# Patient Record
Sex: FEMALE | Race: WHITE | Employment: UNEMPLOYED | URBAN - METROPOLITAN AREA
[De-identification: names, ages, dates, MRNs, and addresses within clinical notes are randomized per-mention and may not be internally consistent; named-entity substitution may affect disease eponyms.]

---

## 2017-01-03 ENCOUNTER — HOSPITAL ENCOUNTER (OUTPATIENT)
Dept: INFUSION THERAPY | Age: 57
Discharge: HOME | End: 2017-01-03
Payer: OTHER GOVERNMENT

## 2017-01-03 VITALS
DIASTOLIC BLOOD PRESSURE: 74 MMHG | HEART RATE: 69 BPM | SYSTOLIC BLOOD PRESSURE: 112 MMHG | RESPIRATION RATE: 18 BRPM | TEMPERATURE: 97.1 F

## 2017-01-03 LAB
ALBUMIN SERPL BCP-MCNC: 3.7 G/DL (ref 3.5–5)
ALBUMIN/GLOB SERPL: 1.2 {RATIO} (ref 1.1–2.2)
ALP SERPL-CCNC: 132 U/L (ref 45–117)
ALT SERPL-CCNC: 30 U/L (ref 12–78)
ANION GAP BLD CALC-SCNC: 9 MMOL/L (ref 5–15)
AST SERPL W P-5'-P-CCNC: 24 U/L (ref 15–37)
BASOPHILS # BLD AUTO: 0 K/UL (ref 0–0.1)
BASOPHILS # BLD: 0 % (ref 0–1)
BILIRUB SERPL-MCNC: 0.3 MG/DL (ref 0.2–1)
BUN SERPL-MCNC: 16 MG/DL (ref 6–20)
BUN/CREAT SERPL: 17 (ref 12–20)
CALCIUM SERPL-MCNC: 9.3 MG/DL (ref 8.5–10.1)
CHLORIDE SERPL-SCNC: 103 MMOL/L (ref 97–108)
CO2 SERPL-SCNC: 27 MMOL/L (ref 21–32)
CREAT SERPL-MCNC: 0.94 MG/DL (ref 0.55–1.02)
EOSINOPHIL # BLD: 0 K/UL (ref 0–0.4)
EOSINOPHIL NFR BLD: 1 % (ref 0–7)
ERYTHROCYTE [DISTWIDTH] IN BLOOD BY AUTOMATED COUNT: 11.8 % (ref 11.5–14.5)
GLOBULIN SER CALC-MCNC: 3 G/DL (ref 2–4)
GLUCOSE SERPL-MCNC: 111 MG/DL (ref 65–100)
HCT VFR BLD AUTO: 32.5 % (ref 35–47)
HGB BLD-MCNC: 10.7 G/DL (ref 11.5–16)
LYMPHOCYTES # BLD AUTO: 30 % (ref 12–49)
LYMPHOCYTES # BLD: 1.7 K/UL (ref 0.8–3.5)
MCH RBC QN AUTO: 31.2 PG (ref 26–34)
MCHC RBC AUTO-ENTMCNC: 32.9 G/DL (ref 30–36.5)
MCV RBC AUTO: 94.8 FL (ref 80–99)
MONOCYTES # BLD: 0.3 K/UL (ref 0–1)
MONOCYTES NFR BLD AUTO: 4 % (ref 5–13)
NEUTS SEG # BLD: 3.8 K/UL (ref 1.8–8)
NEUTS SEG NFR BLD AUTO: 65 % (ref 32–75)
PLATELET # BLD AUTO: 200 K/UL (ref 150–400)
POTASSIUM SERPL-SCNC: 3.7 MMOL/L (ref 3.5–5.1)
PROT SERPL-MCNC: 6.7 G/DL (ref 6.4–8.2)
RBC # BLD AUTO: 3.43 M/UL (ref 3.8–5.2)
SODIUM SERPL-SCNC: 139 MMOL/L (ref 136–145)
WBC # BLD AUTO: 5.9 K/UL (ref 3.6–11)

## 2017-01-03 PROCEDURE — 74011000250 HC RX REV CODE- 250: Performed by: OBSTETRICS & GYNECOLOGY

## 2017-01-03 PROCEDURE — 36415 COLL VENOUS BLD VENIPUNCTURE: CPT | Performed by: OBSTETRICS & GYNECOLOGY

## 2017-01-03 PROCEDURE — 74011250636 HC RX REV CODE- 250/636: Performed by: OBSTETRICS & GYNECOLOGY

## 2017-01-03 PROCEDURE — 86304 IMMUNOASSAY TUMOR CA 125: CPT | Performed by: OBSTETRICS & GYNECOLOGY

## 2017-01-03 PROCEDURE — 80053 COMPREHEN METABOLIC PANEL: CPT | Performed by: OBSTETRICS & GYNECOLOGY

## 2017-01-03 PROCEDURE — 85025 COMPLETE CBC W/AUTO DIFF WBC: CPT | Performed by: OBSTETRICS & GYNECOLOGY

## 2017-01-03 PROCEDURE — 77030012965 HC NDL HUBR BBMI -A

## 2017-01-03 PROCEDURE — 36591 DRAW BLOOD OFF VENOUS DEVICE: CPT

## 2017-01-03 RX ORDER — SODIUM CHLORIDE 0.9 % (FLUSH) 0.9 %
10-40 SYRINGE (ML) INJECTION AS NEEDED
Status: ACTIVE | OUTPATIENT
Start: 2017-01-03 | End: 2017-01-04

## 2017-01-03 RX ORDER — SODIUM CHLORIDE 9 MG/ML
10 INJECTION INTRAMUSCULAR; INTRAVENOUS; SUBCUTANEOUS AS NEEDED
Status: ACTIVE | OUTPATIENT
Start: 2017-01-03 | End: 2017-01-04

## 2017-01-03 RX ORDER — HEPARIN 100 UNIT/ML
500 SYRINGE INTRAVENOUS AS NEEDED
Status: ACTIVE | OUTPATIENT
Start: 2017-01-03 | End: 2017-01-04

## 2017-01-03 RX ADMIN — Medication 500 UNITS: at 10:35

## 2017-01-03 RX ADMIN — Medication 20 ML: at 10:35

## 2017-01-03 RX ADMIN — SODIUM CHLORIDE 10 ML: 9 INJECTION, SOLUTION INTRAMUSCULAR; INTRAVENOUS; SUBCUTANEOUS at 10:35

## 2017-01-03 NOTE — PROGRESS NOTES
Outpatient Infusion Center Short Visit Progress Note    7253 Pt admit to [facility] for port flush with labs ambulatory in stable condition. Assessment completed. No new concerns voiced. Please review pending lab results in [address] [place] [address]. Visit Vitals    /74 (BP 1 Location: Right arm, BP Patient Position: Sitting)    Pulse 69    Temp 97.1 °F (36.2 °C)    Resp 18    Breastfeeding No       Port with positive blood return. Labs drawn, port flushed, heparinized and de-accessed per protocol. Medications:  Normal saline  Heparin flush    1045 Pt tolerated treatment well. D/c home ambulatory in no distress. Pt aware of next appointment scheduled for 2/28/17 at 1030. Recent Results (from the past 12 hour(s))   CBC WITH AUTOMATED DIFF    Collection Time: 01/03/17 10:34 AM   Result Value Ref Range    WBC 5.9 3.6 - 11.0 K/uL    RBC 3.43 (L) 3.80 - 5.20 M/uL    HGB 10.7 (L) 11.5 - 16.0 g/dL    HCT 32.5 (L) 35.0 - 47.0 %    MCV 94.8 80.0 - 99.0 FL    MCH 31.2 26.0 - 34.0 PG    MCHC 32.9 30.0 - 36.5 g/dL    RDW 11.8 11.5 - 14.5 %    PLATELET 067 769 - 651 K/uL    NEUTROPHILS 65 32 - 75 %    LYMPHOCYTES 30 12 - 49 %    MONOCYTES 4 (L) 5 - 13 %    EOSINOPHILS 1 0 - 7 %    BASOPHILS 0 0 - 1 %    ABS. NEUTROPHILS 3.8 1.8 - 8.0 K/UL    ABS. LYMPHOCYTES 1.7 0.8 - 3.5 K/UL    ABS. MONOCYTES 0.3 0.0 - 1.0 K/UL    ABS. EOSINOPHILS 0.0 0.0 - 0.4 K/UL    ABS.  BASOPHILS 0.0 0.0 - 0.1 K/UL   METABOLIC PANEL, COMPREHENSIVE    Collection Time: 01/03/17 10:34 AM   Result Value Ref Range    Sodium 139 136 - 145 mmol/L    Potassium 3.7 3.5 - 5.1 mmol/L    Chloride 103 97 - 108 mmol/L    CO2 27 21 - 32 mmol/L    Anion gap 9 5 - 15 mmol/L    Glucose 111 (H) 65 - 100 mg/dL    BUN 16 6 - 20 MG/DL    Creatinine 0.94 0.55 - 1.02 MG/DL    BUN/Creatinine ratio 17 12 - 20      GFR est AA >60 >60 ml/min/1.73m2    GFR est non-AA >60 >60 ml/min/1.73m2    Calcium 9.3 8.5 - 10.1 MG/DL    Bilirubin, total 0.3 0.2 - 1.0 MG/DL    ALT 30 12 - 78 U/L    AST 24 15 - 37 U/L    Alk.  phosphatase 132 (H) 45 - 117 U/L    Protein, total 6.7 6.4 - 8.2 g/dL    Albumin 3.7 3.5 - 5.0 g/dL    Globulin 3.0 2.0 - 4.0 g/dL    A-G Ratio 1.2 1.1 - 2.2

## 2017-01-04 LAB — CANCER AG125 SERPL-ACNC: 27 U/ML (ref 0–30)

## 2017-01-10 DIAGNOSIS — R10.84 GENERALIZED ABDOMINAL PAIN: ICD-10-CM

## 2017-01-10 DIAGNOSIS — R11.2 CINV (CHEMOTHERAPY-INDUCED NAUSEA AND VOMITING): ICD-10-CM

## 2017-01-10 DIAGNOSIS — T45.1X5A CINV (CHEMOTHERAPY-INDUCED NAUSEA AND VOMITING): ICD-10-CM

## 2017-01-10 DIAGNOSIS — G89.3 CANCER ASSOCIATED PAIN: ICD-10-CM

## 2017-01-10 DIAGNOSIS — C56.9 OVARIAN CANCER, UNSPECIFIED LATERALITY (HCC): ICD-10-CM

## 2017-01-10 DIAGNOSIS — C78.6 CARCINOMATOSIS PERITONEI (HCC): ICD-10-CM

## 2017-01-10 DIAGNOSIS — Z93.2 ILEOSTOMY IN PLACE (HCC): ICD-10-CM

## 2017-01-10 DIAGNOSIS — F41.8 ANXIETY ABOUT HEALTH: ICD-10-CM

## 2017-01-10 RX ORDER — HYDROCODONE BITARTRATE AND ACETAMINOPHEN 7.5; 325 MG/15ML; MG/15ML
15-30 SOLUTION ORAL
Qty: 950 ML | Refills: 0 | Status: SHIPPED | OUTPATIENT
Start: 2017-01-10 | End: 2017-01-23 | Stop reason: SDUPTHER

## 2017-01-23 DIAGNOSIS — R10.84 GENERALIZED ABDOMINAL PAIN: ICD-10-CM

## 2017-01-23 DIAGNOSIS — F41.8 ANXIETY ABOUT HEALTH: ICD-10-CM

## 2017-01-23 DIAGNOSIS — G89.3 CANCER ASSOCIATED PAIN: ICD-10-CM

## 2017-01-23 DIAGNOSIS — C56.9 OVARIAN CANCER, UNSPECIFIED LATERALITY (HCC): ICD-10-CM

## 2017-01-23 DIAGNOSIS — C78.6 CARCINOMATOSIS PERITONEI (HCC): ICD-10-CM

## 2017-01-23 DIAGNOSIS — Z93.2 ILEOSTOMY IN PLACE (HCC): ICD-10-CM

## 2017-01-23 DIAGNOSIS — T45.1X5A CINV (CHEMOTHERAPY-INDUCED NAUSEA AND VOMITING): ICD-10-CM

## 2017-01-23 DIAGNOSIS — R11.2 CINV (CHEMOTHERAPY-INDUCED NAUSEA AND VOMITING): ICD-10-CM

## 2017-01-24 RX ORDER — HYDROCODONE BITARTRATE AND ACETAMINOPHEN 7.5; 325 MG/15ML; MG/15ML
15-30 SOLUTION ORAL
Qty: 950 ML | Refills: 0 | Status: SHIPPED | OUTPATIENT
Start: 2017-01-24 | End: 2017-02-07 | Stop reason: SDUPTHER

## 2017-01-24 NOTE — TELEPHONE ENCOUNTER
From: iGgi Lynch  To: Fernando Wright MD  Sent: 1/23/2017 2:31 PM EST  Subject: Medication Renewal Request    Original authorizing provider: MD Gigi Avila would like a refill of the following medications:  HYDROcodone-acetaminophen (HYCET) 0.5-21.7 mg/mL oral solution Fernando Wright MD]    Preferred pharmacy: [place] [id number] [place] [address] AT [facility]    Comment:  Hi Dr. Cam Duncan, I can  the prescription or have someone pick it up on Wednesday or Thursday, if that's okay. Thanks!  Leatha Smith

## 2017-02-06 ENCOUNTER — HOSPITAL ENCOUNTER (OUTPATIENT)
Dept: CT IMAGING | Age: 57
Discharge: HOME | End: 2017-02-06
Attending: OBSTETRICS & GYNECOLOGY
Payer: OTHER GOVERNMENT

## 2017-02-06 DIAGNOSIS — C56.9 OVARIAN CANCER, UNSPECIFIED LATERALITY (HCC): ICD-10-CM

## 2017-02-06 PROCEDURE — 74011636320 HC RX REV CODE- 636/320: Performed by: OBSTETRICS & GYNECOLOGY

## 2017-02-06 PROCEDURE — 74177 CT ABD & PELVIS W/CONTRAST: CPT

## 2017-02-06 PROCEDURE — 74011000258 HC RX REV CODE- 258: Performed by: OBSTETRICS & GYNECOLOGY

## 2017-02-06 RX ORDER — SODIUM CHLORIDE 0.9 % (FLUSH) 0.9 %
10 SYRINGE (ML) INJECTION
Status: COMPLETED | OUTPATIENT
Start: 2017-02-06 | End: 2017-02-06

## 2017-02-06 RX ADMIN — SODIUM CHLORIDE 100 ML: 900 INJECTION, SOLUTION INTRAVENOUS at 13:07

## 2017-02-06 RX ADMIN — Medication 10 ML: at 13:07

## 2017-02-06 RX ADMIN — IOHEXOL 50 ML: 240 INJECTION, SOLUTION INTRATHECAL; INTRAVASCULAR; INTRAVENOUS; ORAL at 13:07

## 2017-02-06 RX ADMIN — IOPAMIDOL 100 ML: 755 INJECTION, SOLUTION INTRAVENOUS at 13:07

## 2017-02-07 ENCOUNTER — OFFICE VISIT (OUTPATIENT)
Dept: GYNECOLOGY | Age: 57
End: 2017-02-07

## 2017-02-07 VITALS
BODY MASS INDEX: 21.97 KG/M2 | HEIGHT: 63 IN | HEART RATE: 64 BPM | WEIGHT: 124 LBS | DIASTOLIC BLOOD PRESSURE: 68 MMHG | SYSTOLIC BLOOD PRESSURE: 108 MMHG

## 2017-02-07 DIAGNOSIS — F41.8 ANXIETY ABOUT HEALTH: ICD-10-CM

## 2017-02-07 DIAGNOSIS — C56.9 OVARIAN CANCER, UNSPECIFIED LATERALITY (HCC): Primary | ICD-10-CM

## 2017-02-07 DIAGNOSIS — Z93.2 ILEOSTOMY IN PLACE (HCC): ICD-10-CM

## 2017-02-07 DIAGNOSIS — C78.6 CARCINOMATOSIS PERITONEI (HCC): ICD-10-CM

## 2017-02-07 DIAGNOSIS — G89.3 CANCER ASSOCIATED PAIN: ICD-10-CM

## 2017-02-07 DIAGNOSIS — R11.2 CINV (CHEMOTHERAPY-INDUCED NAUSEA AND VOMITING): ICD-10-CM

## 2017-02-07 DIAGNOSIS — R10.84 GENERALIZED ABDOMINAL PAIN: ICD-10-CM

## 2017-02-07 DIAGNOSIS — T45.1X5A CINV (CHEMOTHERAPY-INDUCED NAUSEA AND VOMITING): ICD-10-CM

## 2017-02-07 RX ORDER — HYDROCODONE BITARTRATE AND ACETAMINOPHEN 7.5; 325 MG/15ML; MG/15ML
15-30 SOLUTION ORAL
Qty: 950 ML | Refills: 0 | Status: SHIPPED | OUTPATIENT
Start: 2017-02-07 | End: 2017-02-14 | Stop reason: SDUPTHER

## 2017-02-07 NOTE — PROGRESS NOTES
[address] [place] [address] Mathias Moritz [address], [address] [facility]  [phone]  Virlinda Bamberger, MD Casey Bue, MD  Office Note  Patient ID:  Name:  Simona Le  MRN:  [id number]  :  1960/56 y.o. Date:  2017      HISTORY OF PRESENT ILLNESS:  Simona Le is a 61 y.o.  postmenopausal female with a diagnosis of stage IV ovarian cancer. She underwent exploratory laparotomy with suboptimal debulking. She had extensive disease. She was readmitted about a week later with obstruction and subsequently had a cecal perforation, requiring resection, end ileostomy, and mucous fistula. During the hospitalization she was also diagnosed with pulmonary embolus and had chest tubes placed for bilateral pleural effusions. She had a prolonged hospital course and actually received a dose of cisplatin chemotherapy while in the hospital to help dry up the effusions. She completed 6 cycles of Taxol/Carboplatin chemotherapy following her initial debulking. She  did relatively well, considering she had an ileostomy to deal with during chemotherapy. I took her to the OR on 16 for interval debulking and reversal of her ileostomy. She had extensive disease, but we were able to resect the majority of her disease. One week later she developed an anastomotic leak requiring reexploration and recreation of an ileostomy. She had another prolonged hospitalization, but recovered well since surgery. We then reinitiated Taxol/Carboplatin chemotherapy, but did reduce the dose of Taxol to 135 mg/m2. She completed 4 mores cycles. Her CA-125 normalized and we stopped treatment. She presents today with her  to discuss her recent CT scan. Unfortunately, it appears to demonstrate progression of disease, although minimally worse than her prior scan 2 months ago. She is actually feeling better than she did at that time.         Problem List:  Patient Active Problem List    Diagnosis Date Noted    Cancer associated pain 05/13/2016    Generalized abdominal pain 05/13/2016    Anxiety about health 05/13/2016    CINV (chemotherapy-induced nausea and vomiting) 03/22/2016    Anemia due to chemotherapy 02/22/2016    Ile[facility] in place [facility]) 02/15/2016    Carcinomatosis peritonei ([facility] 10/29/2015    Ovarian cancer ([facility] 10/12/2015     PMH:  Past Medical History   Diagnosis Date    Abdominal carcinomatosis ([address] 10/2015    Arthritis      left shoulder    BRCA negative 12/2015    Chronic pain     Depression      HX    Environmental allergies     GERD (gastroesophageal reflux disease)     Hypertension      NEW OVER PAST 5-6 MONTHS    Ovarian cancer ([facility] 10/2015     serous adenocarcinoma, high grade, stage IIIC    Pulmonary embolism ([facility] 10/2015      PSH:  Past Surgical History   Procedure Laterality Date    Hx breast biopsy  1995     benign right breast bx    Hx heent  LAST 2005     oral tissue graft X3    Hx heent  1970     tonsillectomy    Hx laparotomy  10/2015     tumor sampling    Pr cardiac surg procedure unlist  12/11/15     cardiac cath/normal     Hx other surgical  11/2015     tunneled portacath    Hx dilation and curettage  2/2011     POLYPECTOMY    Hx gyn  10/2015     EXP LAPAROTOMY    Hx gi  2015     PERFORATED BOWEL; ILEOSTOMY    Hx laparotomy  4/2016     hysterectomy, BSO, radical debulking    Hx laparotomy  5/2016     resection ileocolic anastomosis, leak, ileostomy      Social History:  Social History   Substance Use Topics    Smoking status: Never Smoker    Smokeless tobacco: Never Used    Alcohol use No      Family History:  Family History   Problem Relation Age of Onset    Cancer Maternal Uncle      skin    Hypertension Mother     High Cholesterol Mother     Anxiety Mother     Heart Disease Mother     High Cholesterol Father     Hypertension Father     Stroke Father     Arthritis-osteo Sister    [facility] Migraines Sister     Other Sister      FIBROMYALGIA    Depression Brother     Other Brother      DRUG ABUSE HEPATITIS C    Migraines Sister     Arrhythmia Sister     Depression Sister     Lung Disease Paternal Aunt      COPD    Cancer Paternal Uncle      LUNG    Lung Disease Paternal Uncle      COPD    Lung Disease Maternal Grandmother      COPD    Anesth Problems Neg Hx       Medications: (reviewed)  Current Outpatient Prescriptions   Medication Sig    HYDROcodone-acetaminophen (HYCET) 0.5-21.7 mg/mL oral solution Take 15-30 mL by mouth every six (6) hours as needed. Max Daily Amount: 60 mg. Disp: two(2) 473 ml bottles  Indications: PAIN    zolpidem (AMBIEN) 10 mg tablet Take 1 Tab by mouth nightly as needed for Sleep. Max Daily Amount: 10 mg.    magnesium oxide (MAG-OX) 400 mg tablet Take 1 Tab by mouth three (3) times daily. Indications: HYPOMAGNESEMIA    ondansetron (ZOFRAN ODT) 4 mg disintegrating tablet Take 1 Tab by mouth every eight (8) hours as needed for Nausea.  esomeprazole (NEXIUM) 40 mg capsule Take 1 Cap by mouth daily.  apixaban (ELIQUIS) 5 mg tablet Take 1 Tab by mouth two (2) times a day.  promethazine (PHENERGAN) 25 mg tablet Take 1 Tab by mouth every six (6) hours as needed for Nausea.  multivit-min-FA-coenzyme Q10 (AQUADEKS) 100-350-5 mcg-mcg-mg chew Take 1 Tab by mouth daily. Indications: VITAMIN DEFICIENCY PREVENTION    Calcium-Vitamin D3-Vitamin K 724-455-82 mg-unit-mcg chew Take 1 Tab by mouth two (2) times a day.  metoprolol (LOPRESSOR) 100 mg tablet Take  by mouth two (2) times a day.  lidocaine-prilocaine (EMLA) topical cream Apply small amount over port area and cover with a band aid 1 hour before chemo treatment    multivitamin (ONE A DAY) tablet Take 1 Tab by mouth daily. No current facility-administered medications for this visit.       Allergies: (reviewed)  Allergies   Allergen Reactions    Ativan [Lorazepam] Other (comments)     SEVERE CONFUSION    Morphine Rash and Itching          OBJECTIVE:    Physical Exam:  VITAL SIGNS: Vitals:    02/07/17 1414   BP: 108/68   Pulse: 64   Weight: 124 lb (56.2 kg)   Height: 5' 3\" (1.6 m)     Body mass index is 21.97 kg/(m^2). GENERAL AVA: Conversant, alert, oriented. No acute distress. HEENT: HEENT. No thyroid enlargement. No JVD. Neck: Supple without restrictions. RESPIRATORY: Clear to auscultation and percussion to the bases. No CVAT. CARDIOVASC: RRR without murmur/rub. GASTROINT: soft, non-tender, without masses or organomegaly; ileostomy in RLQ   MUSCULOSKEL: no joint tenderness, deformity or swelling   EXTREMITIES: extremities normal, atraumatic, no cyanosis or edema   PELVIC: Deferred   RECTAL: Deferred   JOSE SURVEY: No suspicious lymphadenopathy or edema noted. NEURO: Grossly intact. No acute deficit. Lab Results   Component Value Date/Time    WBC 5.9 01/03/2017 10:34 AM    HGB 10.7 01/03/2017 10:34 AM    HCT 32.5 01/03/2017 10:34 AM    PLATELET 256 45/37/9920 10:34 AM    MCV 94.8 01/03/2017 10:34 AM     Lab Results   Component Value Date/Time    Sodium 139 01/03/2017 10:34 AM    Potassium 3.7 01/03/2017 10:34 AM    Chloride 103 01/03/2017 10:34 AM    CO2 27 01/03/2017 10:34 AM    Anion gap 9 01/03/2017 10:34 AM    Glucose 111 01/03/2017 10:34 AM    BUN 16 01/03/2017 10:34 AM    Creatinine 0.94 01/03/2017 10:34 AM    BUN/Creatinine ratio 17 01/03/2017 10:34 AM    GFR est AA >60 01/03/2017 10:34 AM    GFR est non-AA >60 01/03/2017 10:34 AM    Calcium 9.3 01/03/2017 10:34 AM       CT of chest/abdomen/pelvis (1/6/17)  LUNG BASES: Clear. INCIDENTALLY IMAGED HEART AND MEDIASTINUM: Unremarkable. LIVER: A small hypodensity in segment 8 of the liver is unchanged from the prior  examination. No evidence to suggest metastatic disease to the liver. There is a  tiny cyst superficial in segment 3.     GALLBLADDER: Unremarkable. SPLEEN: No mass. PANCREAS: No mass or ductal dilatation. ADRENALS: Unremarkable.   KIDNEYS: No mass, calculus, or hydronephrosis. STOMACH: Unremarkable. SMALL BOWEL: No dilatation or wall thickening. COLON: No dilatation or wall thickening. PERITONEUM: There is increased focal ascites in the subhepatic space. There is persistent soft tissue abnormality superior spleen as well as adjacent  to the dome of the liver on the right which has been demonstrated previously and  may represent carcinomatosis.     RETROPERITONEUM: No lymphadenopathy or aortic aneurysm. REPRODUCTIVE ORGANS: Uterus and ovaries are surgically removed  URINARY BLADDER: No mass or calculus. BONES: No destructive bone lesion. ADDITIONAL COMMENTS: N/A     IMPRESSION:  1. Peritoneal implants superior to the spleen in the subphrenic space is  slightly more prominent than the prior examination. Peritoneal thickening at the  dome of the liver appears stable. 2. Slight increase in subhepatic ascites. IMPRESSION/PLAN:  Melody Oseguera is a 61 y.o. female with a diagnosis of stage IV ovarian cancer. She is s/p suboptimal debulking, followed by a second laparotomy for cecal perforation. She completed 6 cycles of Taxol/Carboplatin chemotherapy, in addition to the one dose of cisplatin given during her initial hospitalization. Based upon her CT and her CA-125, she responded well, though there was still disease remaining. She underwent interval debulking with resection of the majority of her disease, although there was still small volume carcinomatosis remaining. I recommended continuing with Taxol/Carboplatin, as she did respond well, despite still having residual disease at her interval debulking. I dropped the dose of Taxol to 135 mg/m2 to reduce the chance of neuropathy, but kept the carboplatin dose at an AUC of 6. She completed 4 cycles and her CA-125 has normalized. I reviewed the scan with her and her . We went through the images and compared it to her prior scan. We discussed treatment option.   I would recommend Doxil/Avastin considering it has been less than 6 months since we stopped Taxol/Carboplatin. She wants to think about it before making a decision. If she decides to hold off on treatment, we will repeat a scan in 2 months.       Signed By: Maile Taylor MD     2/7/2017/10:35 AM

## 2017-02-09 ENCOUNTER — TELEPHONE (OUTPATIENT)
Dept: GYNECOLOGY | Age: 57
End: 2017-02-09

## 2017-02-10 NOTE — TELEPHONE ENCOUNTER
Pt calling to state she would like to be scheduled for new chemo regimen. Would like to wait for 2 weeks before starting. Tues. , Wednesday, or thursdays are good.

## 2017-02-14 DIAGNOSIS — R10.84 GENERALIZED ABDOMINAL PAIN: ICD-10-CM

## 2017-02-14 DIAGNOSIS — C56.9 OVARIAN CANCER, UNSPECIFIED LATERALITY (HCC): ICD-10-CM

## 2017-02-14 DIAGNOSIS — G89.3 CANCER ASSOCIATED PAIN: ICD-10-CM

## 2017-02-14 DIAGNOSIS — F41.8 ANXIETY ABOUT HEALTH: ICD-10-CM

## 2017-02-14 DIAGNOSIS — C78.6 CARCINOMATOSIS PERITONEI (HCC): ICD-10-CM

## 2017-02-14 DIAGNOSIS — Z93.2 ILEOSTOMY IN PLACE (HCC): ICD-10-CM

## 2017-02-14 DIAGNOSIS — R11.2 CINV (CHEMOTHERAPY-INDUCED NAUSEA AND VOMITING): ICD-10-CM

## 2017-02-14 DIAGNOSIS — T45.1X5A CINV (CHEMOTHERAPY-INDUCED NAUSEA AND VOMITING): ICD-10-CM

## 2017-02-14 RX ORDER — HYDROCODONE BITARTRATE AND ACETAMINOPHEN 7.5; 325 MG/15ML; MG/15ML
15-30 SOLUTION ORAL
Qty: 950 ML | Refills: 0 | Status: SHIPPED | OUTPATIENT
Start: 2017-02-14 | End: 2017-02-21 | Stop reason: SDUPTHER

## 2017-02-14 NOTE — TELEPHONE ENCOUNTER
From: Megan Peng  To: Sylwia Retana MD  Sent: 2/14/2017 1:49 PM EST  Subject: Medication Renewal Request    Original authorizing provider: MD Megan Menendez would like a refill of the following medications:  HYDROcodone-acetaminophen (HYCET) 0.5-21.7 mg/mL oral solution Sylwia Retana MD]    Preferred pharmacy: [address] [place] [address] AT [facility]    Comment:  Hi Dr. Mal Mcfadden, If you would write a prescription for Hycet, I can ask my sister to pick it up later this week. I'll have time to get it refilled before I run out. Thanks!  Hema Smith

## 2017-02-15 ENCOUNTER — TELEPHONE (OUTPATIENT)
Dept: GYNECOLOGY | Age: 57
End: 2017-02-15

## 2017-02-15 DIAGNOSIS — C56.9 OVARIAN CANCER, UNSPECIFIED LATERALITY (HCC): Primary | ICD-10-CM

## 2017-02-16 DIAGNOSIS — C56.9 OVARIAN CANCER, UNSPECIFIED LATERALITY (HCC): Primary | ICD-10-CM

## 2017-02-21 DIAGNOSIS — C78.6 CARCINOMATOSIS PERITONEI (HCC): ICD-10-CM

## 2017-02-21 DIAGNOSIS — G89.3 CANCER ASSOCIATED PAIN: ICD-10-CM

## 2017-02-21 DIAGNOSIS — C56.9 OVARIAN CANCER, UNSPECIFIED LATERALITY (HCC): ICD-10-CM

## 2017-02-21 DIAGNOSIS — F41.8 ANXIETY ABOUT HEALTH: ICD-10-CM

## 2017-02-21 DIAGNOSIS — Z93.2 ILEOSTOMY IN PLACE (HCC): ICD-10-CM

## 2017-02-21 DIAGNOSIS — R11.2 CINV (CHEMOTHERAPY-INDUCED NAUSEA AND VOMITING): ICD-10-CM

## 2017-02-21 DIAGNOSIS — R10.84 GENERALIZED ABDOMINAL PAIN: ICD-10-CM

## 2017-02-21 DIAGNOSIS — T45.1X5A CINV (CHEMOTHERAPY-INDUCED NAUSEA AND VOMITING): ICD-10-CM

## 2017-02-21 RX ORDER — HYDROCODONE BITARTRATE AND ACETAMINOPHEN 7.5; 325 MG/15ML; MG/15ML
15-30 SOLUTION ORAL
Qty: 950 ML | Refills: 0 | Status: SHIPPED | OUTPATIENT
Start: 2017-02-21 | End: 2017-03-01 | Stop reason: SDUPTHER

## 2017-02-21 NOTE — TELEPHONE ENCOUNTER
From: Bella Vallejo  To: Holly Roche MD  Sent: 2/21/2017 12:48 PM EST  Subject: Medication Renewal Request    Original authorizing provider: MD Bella Morton would like a refill of the following medications:  HYDROcodone-acetaminophen (HYCET) 0.5-21.7 mg/mL oral solution Holly Rohce MD]    Preferred pharmacy: [address] [place] [address] AT [facility]    Comment:  I'll be coming to [facility] tomorrow for an EKG at 11:30. I can  the prescription before or after that appointment, depending on if you close for lunch. Also, could Dr. Mariela Byrnes check on the dosage frequency. When I was in the last cycle of chemo it was written for every four hours. Currently the prescription is written to take every 6 hours. I'm starting chemo on Monday & I don't want to have any problems with my insurance company approving refills if I need to take it more often. Thanks.

## 2017-02-22 ENCOUNTER — HOSPITAL ENCOUNTER (OUTPATIENT)
Dept: NON INVASIVE DIAGNOSTICS | Age: 57
Discharge: HOME | End: 2017-02-22
Attending: OBSTETRICS & GYNECOLOGY
Payer: OTHER GOVERNMENT

## 2017-02-22 DIAGNOSIS — C56.9 OVARIAN CANCER, UNSPECIFIED LATERALITY (HCC): ICD-10-CM

## 2017-02-22 PROCEDURE — 93306 TTE W/DOPPLER COMPLETE: CPT

## 2017-02-23 RX ORDER — DEXAMETHASONE SODIUM PHOSPHATE 4 MG/ML
12 INJECTION, SOLUTION INTRA-ARTICULAR; INTRALESIONAL; INTRAMUSCULAR; INTRAVENOUS; SOFT TISSUE ONCE
Status: COMPLETED | OUTPATIENT
Start: 2017-02-27 | End: 2017-02-27

## 2017-02-24 LAB
ALBUMIN SERPL-MCNC: 4.2 G/DL (ref 3.5–5.5)
ALBUMIN/GLOB SERPL: 1.8 {RATIO} (ref 1.1–2.5)
ALP SERPL-CCNC: 118 IU/L (ref 39–117)
ALT SERPL-CCNC: 18 IU/L (ref 0–32)
APPEARANCE UR: CLEAR
AST SERPL-CCNC: 23 IU/L (ref 0–40)
BASOPHILS # BLD AUTO: 0 X10E3/UL (ref 0–0.2)
BASOPHILS NFR BLD AUTO: 0 %
BILIRUB SERPL-MCNC: 0.4 MG/DL (ref 0–1.2)
BILIRUB UR QL STRIP: NEGATIVE
BUN SERPL-MCNC: 17 MG/DL (ref 6–24)
BUN/CREAT SERPL: 18 (ref 9–23)
CALCIUM SERPL-MCNC: 9.2 MG/DL (ref 8.7–10.2)
CANCER AG125 SERPL-ACNC: 63.9 U/ML (ref 0–38.1)
CHLORIDE SERPL-SCNC: 98 MMOL/L (ref 96–106)
CO2 SERPL-SCNC: 23 MMOL/L (ref 18–29)
COLOR UR: YELLOW
CREAT SERPL-MCNC: 0.96 MG/DL (ref 0.57–1)
EOSINOPHIL # BLD AUTO: 0.1 X10E3/UL (ref 0–0.4)
EOSINOPHIL NFR BLD AUTO: 2 %
ERYTHROCYTE [DISTWIDTH] IN BLOOD BY AUTOMATED COUNT: 14.3 % (ref 12.3–15.4)
GLOBULIN SER CALC-MCNC: 2.3 G/DL (ref 1.5–4.5)
GLUCOSE SERPL-MCNC: 82 MG/DL (ref 65–99)
GLUCOSE UR QL: NEGATIVE
HCT VFR BLD AUTO: 31.6 % (ref 34–46.6)
HGB BLD-MCNC: 10.3 G/DL (ref 11.1–15.9)
HGB UR QL STRIP: NEGATIVE
IMM GRANULOCYTES # BLD: 0 X10E3/UL (ref 0–0.1)
IMM GRANULOCYTES NFR BLD: 0 %
KETONES UR QL STRIP: NEGATIVE
LEUKOCYTE ESTERASE UR QL STRIP: NEGATIVE
LYMPHOCYTES # BLD AUTO: 2.8 X10E3/UL (ref 0.7–3.1)
LYMPHOCYTES NFR BLD AUTO: 46 %
MCH RBC QN AUTO: 30.3 PG (ref 26.6–33)
MCHC RBC AUTO-ENTMCNC: 32.6 G/DL (ref 31.5–35.7)
MCV RBC AUTO: 93 FL (ref 79–97)
MICRO URNS: NORMAL
MONOCYTES # BLD AUTO: 0.3 X10E3/UL (ref 0.1–0.9)
MONOCYTES NFR BLD AUTO: 5 %
NEUTROPHILS # BLD AUTO: 2.8 X10E3/UL (ref 1.4–7)
NEUTROPHILS NFR BLD AUTO: 47 %
NITRITE UR QL STRIP: NEGATIVE
PH UR STRIP: 6.5 [PH] (ref 5–7.5)
PLATELET # BLD AUTO: 262 X10E3/UL (ref 150–379)
POTASSIUM SERPL-SCNC: 4.9 MMOL/L (ref 3.5–5.2)
PROT SERPL-MCNC: 6.5 G/DL (ref 6–8.5)
PROT UR QL STRIP: NEGATIVE
RBC # BLD AUTO: 3.4 X10E6/UL (ref 3.77–5.28)
SODIUM SERPL-SCNC: 135 MMOL/L (ref 134–144)
SP GR UR: 1.02 (ref 1–1.03)
UROBILINOGEN UR STRIP-MCNC: 0.2 MG/DL (ref 0.2–1)
WBC # BLD AUTO: 6 X10E3/UL (ref 3.4–10.8)

## 2017-02-27 ENCOUNTER — HOSPITAL ENCOUNTER (OUTPATIENT)
Dept: INFUSION THERAPY | Age: 57
Discharge: HOME | End: 2017-02-27
Payer: OTHER GOVERNMENT

## 2017-02-27 VITALS
WEIGHT: 126 LBS | BODY MASS INDEX: 22.32 KG/M2 | HEIGHT: 63 IN | DIASTOLIC BLOOD PRESSURE: 52 MMHG | SYSTOLIC BLOOD PRESSURE: 86 MMHG | HEART RATE: 62 BPM | OXYGEN SATURATION: 98 % | TEMPERATURE: 98 F | RESPIRATION RATE: 16 BRPM

## 2017-02-27 PROCEDURE — 74011000250 HC RX REV CODE- 250: Performed by: OBSTETRICS & GYNECOLOGY

## 2017-02-27 PROCEDURE — 74011000258 HC RX REV CODE- 258: Performed by: OBSTETRICS & GYNECOLOGY

## 2017-02-27 PROCEDURE — 96417 CHEMO IV INFUS EACH ADDL SEQ: CPT

## 2017-02-27 PROCEDURE — 96415 CHEMO IV INFUSION ADDL HR: CPT

## 2017-02-27 PROCEDURE — 74011250636 HC RX REV CODE- 250/636: Performed by: OBSTETRICS & GYNECOLOGY

## 2017-02-27 PROCEDURE — 96375 TX/PRO/DX INJ NEW DRUG ADDON: CPT

## 2017-02-27 PROCEDURE — 96413 CHEMO IV INFUSION 1 HR: CPT

## 2017-02-27 RX ORDER — SODIUM CHLORIDE 0.9 % (FLUSH) 0.9 %
5-10 SYRINGE (ML) INJECTION EVERY 8 HOURS
Status: ACTIVE | OUTPATIENT
Start: 2017-02-27 | End: 2017-02-28

## 2017-02-27 RX ORDER — DEXTROSE MONOHYDRATE 50 MG/ML
50 INJECTION, SOLUTION INTRAVENOUS AS NEEDED
Status: DISPENSED | OUTPATIENT
Start: 2017-02-27 | End: 2017-02-28

## 2017-02-27 RX ORDER — SODIUM CHLORIDE 9 MG/ML
50 INJECTION, SOLUTION INTRAVENOUS AS NEEDED
Status: DISPENSED | OUTPATIENT
Start: 2017-02-27 | End: 2017-02-28

## 2017-02-27 RX ORDER — SODIUM CHLORIDE 9 MG/ML
10 INJECTION INTRAMUSCULAR; INTRAVENOUS; SUBCUTANEOUS AS NEEDED
Status: ACTIVE | OUTPATIENT
Start: 2017-02-27 | End: 2017-02-28

## 2017-02-27 RX ORDER — HEPARIN 100 UNIT/ML
500 SYRINGE INTRAVENOUS AS NEEDED
Status: ACTIVE | OUTPATIENT
Start: 2017-02-27 | End: 2017-02-28

## 2017-02-27 RX ADMIN — FAMOTIDINE 20 MG: 10 INJECTION INTRAVENOUS at 12:00

## 2017-02-27 RX ADMIN — Medication 10 ML: at 15:55

## 2017-02-27 RX ADMIN — Medication 500 UNITS: at 15:55

## 2017-02-27 RX ADMIN — SODIUM CHLORIDE 10 ML: 9 INJECTION, SOLUTION INTRAMUSCULAR; INTRAVENOUS; SUBCUTANEOUS at 15:25

## 2017-02-27 RX ADMIN — SODIUM CHLORIDE 50 ML/HR: 900 INJECTION, SOLUTION INTRAVENOUS at 12:40

## 2017-02-27 RX ADMIN — DOXORUBICIN HYDROCHLORIDE 60 MG: 2 INJECTION, SUSPENSION, LIPOSOMAL INTRAVENOUS at 12:46

## 2017-02-27 RX ADMIN — BEVACIZUMAB 800 MG: 400 INJECTION, SOLUTION INTRAVENOUS at 14:17

## 2017-02-27 RX ADMIN — DEXAMETHASONE SODIUM PHOSPHATE 12 MG: 4 INJECTION, SOLUTION INTRA-ARTICULAR; INTRALESIONAL; INTRAMUSCULAR; INTRAVENOUS; SOFT TISSUE at 11:55

## 2017-02-27 RX ADMIN — DEXTROSE MONOHYDRATE 50 ML/HR: 5 INJECTION, SOLUTION INTRAVENOUS at 12:39

## 2017-02-28 ENCOUNTER — APPOINTMENT (OUTPATIENT)
Dept: INFUSION THERAPY | Age: 57
End: 2017-02-28
Payer: OTHER GOVERNMENT

## 2017-03-01 DIAGNOSIS — G89.3 CANCER ASSOCIATED PAIN: ICD-10-CM

## 2017-03-01 DIAGNOSIS — R11.2 CINV (CHEMOTHERAPY-INDUCED NAUSEA AND VOMITING): ICD-10-CM

## 2017-03-01 DIAGNOSIS — T45.1X5A CINV (CHEMOTHERAPY-INDUCED NAUSEA AND VOMITING): ICD-10-CM

## 2017-03-01 DIAGNOSIS — Z93.2 ILEOSTOMY IN PLACE (HCC): ICD-10-CM

## 2017-03-01 DIAGNOSIS — C78.6 CARCINOMATOSIS PERITONEI (HCC): ICD-10-CM

## 2017-03-01 DIAGNOSIS — F41.8 ANXIETY ABOUT HEALTH: ICD-10-CM

## 2017-03-01 DIAGNOSIS — C56.9 OVARIAN CANCER, UNSPECIFIED LATERALITY (HCC): ICD-10-CM

## 2017-03-01 DIAGNOSIS — R10.84 GENERALIZED ABDOMINAL PAIN: ICD-10-CM

## 2017-03-02 RX ORDER — HYDROCODONE BITARTRATE AND ACETAMINOPHEN 7.5; 325 MG/15ML; MG/15ML
15-30 SOLUTION ORAL
Qty: 950 ML | Refills: 0 | Status: SHIPPED | OUTPATIENT
Start: 2017-03-02 | End: 2017-03-09 | Stop reason: SDUPTHER

## 2017-03-02 NOTE — TELEPHONE ENCOUNTER
From: Megan Peng  To: Sylwia Retana MD  Sent: 3/1/2017 3:01 PM EST  Subject: Medication Renewal Request    Original authorizing provider: MD eMgan Menendez would like a refill of the following medications:  HYDROcodone-acetaminophen (HYCET) 0.5-21.7 mg/mL oral solution Sylwia Retana MD]    Preferred pharmacy: [facility] [address] [place] [address] AT [facility]    Comment:  Pedro Mcfadden, My sister or brother-in-law, Felicia Medrano or Chelo Marcio, will come by to pick this up on Thursday if that's okay. Thanks!  Hema Smith

## 2017-03-09 DIAGNOSIS — T45.1X5A CINV (CHEMOTHERAPY-INDUCED NAUSEA AND VOMITING): ICD-10-CM

## 2017-03-09 DIAGNOSIS — R11.2 CINV (CHEMOTHERAPY-INDUCED NAUSEA AND VOMITING): ICD-10-CM

## 2017-03-09 DIAGNOSIS — G89.3 CANCER ASSOCIATED PAIN: ICD-10-CM

## 2017-03-09 DIAGNOSIS — F41.8 ANXIETY ABOUT HEALTH: ICD-10-CM

## 2017-03-09 DIAGNOSIS — C78.6 CARCINOMATOSIS PERITONEI (HCC): ICD-10-CM

## 2017-03-09 DIAGNOSIS — Z93.2 ILEOSTOMY IN PLACE (HCC): ICD-10-CM

## 2017-03-09 DIAGNOSIS — R10.84 GENERALIZED ABDOMINAL PAIN: ICD-10-CM

## 2017-03-09 DIAGNOSIS — C56.9 OVARIAN CANCER, UNSPECIFIED LATERALITY (HCC): ICD-10-CM

## 2017-03-09 RX ORDER — HYDROCODONE BITARTRATE AND ACETAMINOPHEN 7.5; 325 MG/15ML; MG/15ML
15-30 SOLUTION ORAL
Qty: 950 ML | Refills: 0 | Status: SHIPPED | OUTPATIENT
Start: 2017-03-09 | End: 2017-03-16 | Stop reason: SDUPTHER

## 2017-03-14 ENCOUNTER — TELEPHONE (OUTPATIENT)
Dept: GYNECOLOGY | Age: 57
End: 2017-03-14

## 2017-03-14 NOTE — TELEPHONE ENCOUNTER
Pt calling with c/o an area in crease of vulva that has opened up \"about 1\" long, with itching and burning upon urination. She \"feels this may be a yeast infection\". She will use Desadin and keep area clean upon urination. To call back if this does not help.

## 2017-03-21 ENCOUNTER — HOSPITAL ENCOUNTER (INPATIENT)
Age: 57
LOS: 1 days | Discharge: HOME | DRG: 389 | End: 2017-03-23
Attending: EMERGENCY MEDICINE | Admitting: OBSTETRICS & GYNECOLOGY
Payer: OTHER GOVERNMENT

## 2017-03-21 ENCOUNTER — APPOINTMENT (OUTPATIENT)
Dept: CT IMAGING | Age: 57
DRG: 389 | End: 2017-03-21
Attending: EMERGENCY MEDICINE
Payer: OTHER GOVERNMENT

## 2017-03-21 DIAGNOSIS — R10.84 GENERALIZED ABDOMINAL PAIN: Primary | ICD-10-CM

## 2017-03-21 DIAGNOSIS — C56.9 OVARIAN CANCER, UNSPECIFIED LATERALITY (HCC): Primary | ICD-10-CM

## 2017-03-21 DIAGNOSIS — K56.609 SBO (SMALL BOWEL OBSTRUCTION) (HCC): ICD-10-CM

## 2017-03-21 PROBLEM — E86.0 DEHYDRATION: Status: ACTIVE | Noted: 2017-03-21

## 2017-03-21 PROBLEM — Z86.711 HX OF PULMONARY EMBOLUS: Status: ACTIVE | Noted: 2017-03-21

## 2017-03-21 PROBLEM — R79.89 ELEVATED SERUM CREATININE: Status: ACTIVE | Noted: 2017-03-21

## 2017-03-21 PROBLEM — I82.890 THROMBOSIS OF PELVIC VEIN: Status: ACTIVE | Noted: 2017-03-21

## 2017-03-21 PROBLEM — K56.7 ILEUS (HCC): Status: ACTIVE | Noted: 2017-03-21

## 2017-03-21 LAB
ALBUMIN SERPL BCP-MCNC: 3.5 G/DL (ref 3.5–5)
ALBUMIN/GLOB SERPL: 0.8 {RATIO} (ref 1.1–2.2)
ALP SERPL-CCNC: 227 U/L (ref 45–117)
ALT SERPL-CCNC: 26 U/L (ref 12–78)
AMYLASE SERPL-CCNC: 101 U/L (ref 25–115)
ANION GAP BLD CALC-SCNC: 9 MMOL/L (ref 5–15)
AST SERPL W P-5'-P-CCNC: 19 U/L (ref 15–37)
BASOPHILS # BLD AUTO: 0 K/UL (ref 0–0.1)
BASOPHILS # BLD: 0 % (ref 0–1)
BILIRUB SERPL-MCNC: 0.3 MG/DL (ref 0.2–1)
BUN SERPL-MCNC: 28 MG/DL (ref 6–20)
BUN/CREAT SERPL: 24 (ref 12–20)
CALCIUM SERPL-MCNC: 9.6 MG/DL (ref 8.5–10.1)
CHLORIDE SERPL-SCNC: 105 MMOL/L (ref 97–108)
CO2 SERPL-SCNC: 22 MMOL/L (ref 21–32)
CREAT SERPL-MCNC: 1.19 MG/DL (ref 0.55–1.02)
EOSINOPHIL # BLD: 0.1 K/UL (ref 0–0.4)
EOSINOPHIL NFR BLD: 1 % (ref 0–7)
ERYTHROCYTE [DISTWIDTH] IN BLOOD BY AUTOMATED COUNT: 13.5 % (ref 11.5–14.5)
GLOBULIN SER CALC-MCNC: 4.6 G/DL (ref 2–4)
GLUCOSE SERPL-MCNC: 129 MG/DL (ref 65–100)
HCT VFR BLD AUTO: 33.1 % (ref 35–47)
HGB BLD-MCNC: 11 G/DL (ref 11.5–16)
LIPASE SERPL-CCNC: 151 U/L (ref 73–393)
LYMPHOCYTES # BLD AUTO: 31 % (ref 12–49)
LYMPHOCYTES # BLD: 1.5 K/UL (ref 0.8–3.5)
MCH RBC QN AUTO: 31 PG (ref 26–34)
MCHC RBC AUTO-ENTMCNC: 33.2 G/DL (ref 30–36.5)
MCV RBC AUTO: 93.2 FL (ref 80–99)
MONOCYTES # BLD: 0.4 K/UL (ref 0–1)
MONOCYTES NFR BLD AUTO: 8 % (ref 5–13)
NEUTS SEG # BLD: 3.1 K/UL (ref 1.8–8)
NEUTS SEG NFR BLD AUTO: 60 % (ref 32–75)
PLATELET # BLD AUTO: 278 K/UL (ref 150–400)
POTASSIUM SERPL-SCNC: 4 MMOL/L (ref 3.5–5.1)
PROT SERPL-MCNC: 8.1 G/DL (ref 6.4–8.2)
RBC # BLD AUTO: 3.55 M/UL (ref 3.8–5.2)
SODIUM SERPL-SCNC: 136 MMOL/L (ref 136–145)
WBC # BLD AUTO: 5 K/UL (ref 3.6–11)

## 2017-03-21 PROCEDURE — 74011000258 HC RX REV CODE- 258: Performed by: EMERGENCY MEDICINE

## 2017-03-21 PROCEDURE — 80053 COMPREHEN METABOLIC PANEL: CPT | Performed by: EMERGENCY MEDICINE

## 2017-03-21 PROCEDURE — 74011250637 HC RX REV CODE- 250/637: Performed by: NURSE PRACTITIONER

## 2017-03-21 PROCEDURE — 36415 COLL VENOUS BLD VENIPUNCTURE: CPT | Performed by: EMERGENCY MEDICINE

## 2017-03-21 PROCEDURE — 96375 TX/PRO/DX INJ NEW DRUG ADDON: CPT

## 2017-03-21 PROCEDURE — 99285 EMERGENCY DEPT VISIT HI MDM: CPT

## 2017-03-21 PROCEDURE — 74011250636 HC RX REV CODE- 250/636: Performed by: EMERGENCY MEDICINE

## 2017-03-21 PROCEDURE — 82150 ASSAY OF AMYLASE: CPT | Performed by: EMERGENCY MEDICINE

## 2017-03-21 PROCEDURE — 85025 COMPLETE CBC W/AUTO DIFF WBC: CPT | Performed by: EMERGENCY MEDICINE

## 2017-03-21 PROCEDURE — 99218 HC RM OBSERVATION: CPT

## 2017-03-21 PROCEDURE — 74011000250 HC RX REV CODE- 250: Performed by: NURSE PRACTITIONER

## 2017-03-21 PROCEDURE — 74177 CT ABD & PELVIS W/CONTRAST: CPT

## 2017-03-21 PROCEDURE — 74011000258 HC RX REV CODE- 258: Performed by: NURSE PRACTITIONER

## 2017-03-21 PROCEDURE — 83690 ASSAY OF LIPASE: CPT | Performed by: EMERGENCY MEDICINE

## 2017-03-21 PROCEDURE — 74011636320 HC RX REV CODE- 636/320: Performed by: EMERGENCY MEDICINE

## 2017-03-21 PROCEDURE — 74011250636 HC RX REV CODE- 250/636

## 2017-03-21 PROCEDURE — 74011250636 HC RX REV CODE- 250/636: Performed by: NURSE PRACTITIONER

## 2017-03-21 PROCEDURE — 96374 THER/PROPH/DIAG INJ IV PUSH: CPT

## 2017-03-21 RX ORDER — METOCLOPRAMIDE HYDROCHLORIDE 5 MG/ML
10 INJECTION INTRAMUSCULAR; INTRAVENOUS EVERY 6 HOURS
Status: COMPLETED | OUTPATIENT
Start: 2017-03-21 | End: 2017-03-21

## 2017-03-21 RX ORDER — HYDROCODONE BITARTRATE AND ACETAMINOPHEN 7.5; 325 MG/15ML; MG/15ML
7.5-1 SOLUTION ORAL
Status: DISCONTINUED | OUTPATIENT
Start: 2017-03-21 | End: 2017-03-22

## 2017-03-21 RX ORDER — DEXTROSE MONOHYDRATE AND SODIUM CHLORIDE 5; .225 G/100ML; G/100ML
125 INJECTION, SOLUTION INTRAVENOUS CONTINUOUS
Status: DISCONTINUED | OUTPATIENT
Start: 2017-03-21 | End: 2017-03-21

## 2017-03-21 RX ORDER — HYDROMORPHONE HYDROCHLORIDE 1 MG/ML
0.5 INJECTION, SOLUTION INTRAMUSCULAR; INTRAVENOUS; SUBCUTANEOUS
Status: DISCONTINUED | OUTPATIENT
Start: 2017-03-21 | End: 2017-03-23 | Stop reason: HOSPADM

## 2017-03-21 RX ORDER — KETOROLAC TROMETHAMINE 30 MG/ML
30 INJECTION, SOLUTION INTRAMUSCULAR; INTRAVENOUS
Status: COMPLETED | OUTPATIENT
Start: 2017-03-21 | End: 2017-03-21

## 2017-03-21 RX ORDER — DIPHENHYDRAMINE HYDROCHLORIDE 50 MG/ML
12.5 INJECTION, SOLUTION INTRAMUSCULAR; INTRAVENOUS
Status: DISCONTINUED | OUTPATIENT
Start: 2017-03-21 | End: 2017-03-23 | Stop reason: HOSPADM

## 2017-03-21 RX ORDER — SODIUM CHLORIDE 0.9 % (FLUSH) 0.9 %
5-10 SYRINGE (ML) INJECTION EVERY 8 HOURS
Status: DISCONTINUED | OUTPATIENT
Start: 2017-03-21 | End: 2017-03-23 | Stop reason: HOSPADM

## 2017-03-21 RX ORDER — DEXTROSE MONOHYDRATE AND SODIUM CHLORIDE 5; .45 G/100ML; G/100ML
125 INJECTION, SOLUTION INTRAVENOUS CONTINUOUS
Status: DISCONTINUED | OUTPATIENT
Start: 2017-03-21 | End: 2017-03-23 | Stop reason: HOSPADM

## 2017-03-21 RX ORDER — ALUMINA, MAGNESIA, AND SIMETHICONE 2400; 2400; 240 MG/30ML; MG/30ML; MG/30ML
30 SUSPENSION ORAL
Status: DISCONTINUED | OUTPATIENT
Start: 2017-03-21 | End: 2017-03-23 | Stop reason: HOSPADM

## 2017-03-21 RX ORDER — SODIUM CHLORIDE 0.9 % (FLUSH) 0.9 %
5-10 SYRINGE (ML) INJECTION AS NEEDED
Status: DISCONTINUED | OUTPATIENT
Start: 2017-03-21 | End: 2017-03-23 | Stop reason: HOSPADM

## 2017-03-21 RX ORDER — HYDROCODONE BITARTRATE AND ACETAMINOPHEN 7.5; 325 MG/15ML; MG/15ML
7.5 SOLUTION ORAL
Status: DISCONTINUED | OUTPATIENT
Start: 2017-03-21 | End: 2017-03-21

## 2017-03-21 RX ORDER — HYDROMORPHONE HYDROCHLORIDE 1 MG/ML
1 INJECTION, SOLUTION INTRAMUSCULAR; INTRAVENOUS; SUBCUTANEOUS
Status: DISPENSED | OUTPATIENT
Start: 2017-03-21 | End: 2017-03-21

## 2017-03-21 RX ORDER — ONDANSETRON 2 MG/ML
INJECTION INTRAMUSCULAR; INTRAVENOUS
Status: COMPLETED
Start: 2017-03-21 | End: 2017-03-21

## 2017-03-21 RX ORDER — ONDANSETRON 4 MG/1
4 TABLET, ORALLY DISINTEGRATING ORAL
Status: DISPENSED | OUTPATIENT
Start: 2017-03-21 | End: 2017-03-21

## 2017-03-21 RX ORDER — NALOXONE HYDROCHLORIDE 0.4 MG/ML
0.4 INJECTION, SOLUTION INTRAMUSCULAR; INTRAVENOUS; SUBCUTANEOUS AS NEEDED
Status: DISCONTINUED | OUTPATIENT
Start: 2017-03-21 | End: 2017-03-23 | Stop reason: HOSPADM

## 2017-03-21 RX ORDER — SODIUM CHLORIDE 0.9 % (FLUSH) 0.9 %
10 SYRINGE (ML) INJECTION
Status: COMPLETED | OUTPATIENT
Start: 2017-03-21 | End: 2017-03-21

## 2017-03-21 RX ADMIN — IOPAMIDOL 100 ML: 755 INJECTION, SOLUTION INTRAVENOUS at 07:20

## 2017-03-21 RX ADMIN — HYDROCODONE BITARTRATE, ACETAMINOPHEN 7.5 MG: 325; 7.5 SOLUTION ORAL at 13:13

## 2017-03-21 RX ADMIN — SODIUM CHLORIDE 100 ML: 900 INJECTION, SOLUTION INTRAVENOUS at 07:20

## 2017-03-21 RX ADMIN — Medication 10 ML: at 07:20

## 2017-03-21 RX ADMIN — MAGNESIUM SULFATE HEPTAHYDRATE: 500 INJECTION, SOLUTION INTRAMUSCULAR; INTRAVENOUS at 12:10

## 2017-03-21 RX ADMIN — KETOROLAC TROMETHAMINE 30 MG: 30 INJECTION INTRAMUSCULAR; INTRAVENOUS at 06:06

## 2017-03-21 RX ADMIN — SODIUM CHLORIDE 1000 ML: 900 INJECTION, SOLUTION INTRAVENOUS at 06:05

## 2017-03-21 RX ADMIN — METOCLOPRAMIDE 10 MG: 5 INJECTION, SOLUTION INTRAMUSCULAR; INTRAVENOUS at 23:26

## 2017-03-21 RX ADMIN — HYDROCODONE BITARTRATE, ACETAMINOPHEN 10 MG: 325; 7.5 SOLUTION ORAL at 22:14

## 2017-03-21 RX ADMIN — APIXABAN 5 MG: 5 TABLET, FILM COATED ORAL at 20:35

## 2017-03-21 RX ADMIN — METOCLOPRAMIDE 10 MG: 5 INJECTION, SOLUTION INTRAMUSCULAR; INTRAVENOUS at 13:08

## 2017-03-21 RX ADMIN — APIXABAN 5 MG: 5 TABLET, FILM COATED ORAL at 11:34

## 2017-03-21 RX ADMIN — DEXTROSE MONOHYDRATE AND SODIUM CHLORIDE 125 ML/HR: 5; .45 INJECTION, SOLUTION INTRAVENOUS at 19:23

## 2017-03-21 RX ADMIN — HYDROMORPHONE HYDROCHLORIDE 0.5 MG: 1 INJECTION, SOLUTION INTRAMUSCULAR; INTRAVENOUS; SUBCUTANEOUS at 09:51

## 2017-03-21 RX ADMIN — METOCLOPRAMIDE 10 MG: 5 INJECTION, SOLUTION INTRAMUSCULAR; INTRAVENOUS at 19:26

## 2017-03-21 RX ADMIN — ONDANSETRON 4 MG: 2 INJECTION INTRAMUSCULAR; INTRAVENOUS at 06:06

## 2017-03-21 RX ADMIN — HYDROMORPHONE HYDROCHLORIDE 0.5 MG: 1 INJECTION, SOLUTION INTRAMUSCULAR; INTRAVENOUS; SUBCUTANEOUS at 19:31

## 2017-03-21 RX ADMIN — HYDROCODONE BITARTRATE, ACETAMINOPHEN 10 MG: 325; 7.5 SOLUTION ORAL at 17:01

## 2017-03-21 NOTE — PROGRESS NOTES
Spiritual Care Assessment/Progress Notes    Radha Samayoa [id number]  [id number]    1960  61 y.o.  female    Patient Telephone Number: [phone] (home)   Latter day Affiliation: Religion   Language: English   Extended Emergency Contact Information  Primary Emergency Contact: Eamon Smith  Address: [facility] [address]           [place], [address] Phone: [phone]  Mobile Phone: [phone]  Relation: Spouse   Patient Active Problem List    Diagnosis Date Noted    Cancer associated pain 05/13/2016    Generalized abdominal pain 05/13/2016    Anxiety about health 05/13/2016    CINV (chemotherapy-induced nausea and vomiting) 03/22/2016    Anemia due to chemotherapy 02/22/2016    Ileostomy in place [facility]) 02/15/2016    Carcinomatosis peritonei ([facility] 10/29/2015    Ovarian cancer ([facility] 10/12/2015        Date: 3/21/2017       Level of Latter day/Spiritual Activity:  [x]         Involved in linda tradition/spiritual practice    []         Not involved in linda tradition/spiritual practice  []         Spiritually oriented    []         Claims no spiritual orientation    []         seeking spiritual identity  []         Feels alienated from Hinduism practice/tradition  []         Feels angry about Hinduism practice/tradition  [x]         Spirituality/Hinduism tradition is a resource for coping at this time.   []         Not able to assess due to medical condition    Services Provided Today:  []         crisis intervention    []         reading Scriptures  [x]         spiritual assessment    [x]         prayer  [x]         empathic listening/emotional support  []         rites and rituals (cite in comments)  []         life review     []         Hinduism support  []         theological development   []         advocacy  []         ethical dialog     []         blessing  []         bereavement support    [x]         support to family  []         anticipatory grief support []         help with AMD  []         spiritual guidance    []         meditation      Spiritual Care Needs  []         Emotional Support  []         Spiritual/Jehovah's witness Care  []         Loss/Adjustment  []         Advocacy/Referral                /Ethics  [x]         No needs expressed at               this time  []         Other: (note in               comments)  [address]  []         Follow up visits with               pt/family  []         Provide materials  []         Schedule sacraments  []         Contact Community               Clergy  [x]         Follow up as needed  []         Other: (note in               comments)     Comments: I was called to visit with Ms. Smith because she had a question related to hospital chaplaincy. She was inquiring because her nephew is a . We had a very nice conversation, and in the midst of it she shared with me that she has stage 4 ovarian cancer. She noted that she has been hospitalized here in the recent past and her  recalled chaplains visiting. Valerie is an important coping resource for her. I provided a presence of support and affirmation and also prayed with her and her . They were appreciative of the support. Spiritual Care Services remains available as needed. Rev. Stacia Sahni M.Div, [facility]

## 2017-03-21 NOTE — ED NOTES
Assumed care, verbal and beside report received from Beatrice Her.JEAN. Pt resting comfortably in bed, monitor x 3,  alert and oriented x 3 with no complaints at this time.

## 2017-03-21 NOTE — ED NOTES
Pt resting comfortably in bed; call bell within reach. On monitor x2 with no complaints at this time. Pt's  at bedside. No needs expressed at this time.

## 2017-03-21 NOTE — ED PROVIDER NOTES
HPI Comments: The patient is a 58-year-old female with a past medical history significant for ovarian cancer status post total hysterectomy with BSO, ileostomy, peritoneal carcinomatosis, currently undergoing chemotherapy presents to the ED with the complaint of abdominal cramps for the last 12 hours worse this evening, intermittent in nature and coming in waves, lasting approximately 2-3 minutes at a time, without any relieving factors. She also admits to nausea. The patient state that she's had significant decrease output of the ostomy. She denies any fever, cough, congestion, headache, neck pain, back pain, chest pain, shortness of breath, dysuria, hematuria, vaginal discharge or bleeding, dizziness, weakness and numbness. Patient is a 61 y.o. female presenting with abdominal pain.    Abdominal Pain           Past Medical History:   Diagnosis Date    Abdominal carcinomatosis ([address] 10/2015    Arthritis     left shoulder    BRCA negative 12/2015    Chronic pain     Depression     HX    Environmental allergies     GERD (gastroesophageal reflux disease)     Hypertension     NEW OVER PAST 5-6 MONTHS    Ovar[address] cancer [facility] [address] 10/2015    serous adenocarcinoma, high grade, stage IIIC    Pulmonary embolism ([facility] [address] 10/2015       Past Surgical History:   Procedure Laterality Date    CARDIAC SURG PROCEDURE UNLIST  12/11/15    cardiac cath/normal     HX BREAST BIOPSY  1995    benign right breast bx    HX DILATION AND CURETTAGE  2/2011    POLYPECTOMY    HX GI  2015    PERFORATED BOWEL; ILEOSTOMY    HX GYN  10/2015    EXP LAPAROTOMY    HX HEENT  LAST 2005    oral tissue graft X3    HX HEENT  1970    tonsillectomy    HX LAPAROTOMY  10/2015    tumor sampling    HX LAPAROTOMY  4/2016    hysterectomy, BSO, radical debulking    HX LAPAROTOMY  5/2016    resection ileocolic anastomosis, leak, ileostomy    HX OTHER SURGICAL  11/2015    tunneled portacath         Family History:   Problem Relation Age of Onset    Cancer Maternal Uncle      skin    Hypertension Mother     High Cholesterol Mother     Anxiety Mother     Heart Disease Mother     High Cholesterol Father     Hypertension Father     Stroke Father     Arthritis-osteo Sister     Migraines Sister     Other Sister      FIBROMYALGIA    Depression Brother     Other Brother      DRUG ABUSE HEPATITIS C    Migraines Sister     Arrhythmia Sister     Depression Sister     Lung Disease Paternal Aunt      COPD    Cancer Paternal Uncle      LUNG    Lung Disease Paternal Uncle      COPD    Lung Disease Maternal Grandmother      COPD    Anesth Problems Neg Hx        Social History     Social History    Marital status:      Spouse name: N/A    Number of children: N/A    Years of education: N/A     Occupational History    Not on file. Social History Main Topics    Smoking status: Never Smoker    Smokeless tobacco: Never Used    Alcohol use No    Drug use: No    Sexual activity: Not on file     Other Topics Concern    Not on file     Social History Narrative         ALLERGIES: Ativan [lorazepam] and Morphine    Review of Systems   Gastrointestinal: Positive for abdominal pain. All other systems reviewed and are negative. Vitals:    03/21/17 0552   BP: 117/67   Pulse: 69   Resp: 18   Temp: 98.4 °F (36.9 °C)   SpO2: 97%   Weight: 56.5 kg (124 lb 9.6 oz)   Height: 5' 3\" (1.6 m)            Physical Exam   Nursing note and vitals reviewed. CONSTITUTIONAL: Well-appearing; well-nourished; in moderate distress  HEAD: Normocephalic; atraumatic  EYES: PERRL; EOM intact; conjunctiva and sclera are clear bilaterally. ENT: No rhinorrhea; normal pharynx with no tonsillar hypertrophy; mucous membranes pink/moist, no erythema, no exudate. NECK: Supple; non-tender; no cervical lymphadenopathy  CARD: Normal S1, S2; no murmurs, rubs, or gallops. Regular rate and rhythm.   RESP: Normal respiratory effort; breath sounds clear and equal bilaterally; no wheezes, rhonchi, or rales. ABD: Normal bowel sounds; non-distended; Diffuse tenderness; no rebound or guarding; no palpable organomegaly, no masses, no bruits. Ileostomy is patent and draining with approximately 50 cc of residue. Back Exam: Normal inspection; no vertebral point tenderness, no CVA tenderness. Normal range of motion. EXT: Normal ROM in all four extremities; non-tender to palpation; no swelling or deformity; distal pulses are normal, no edema. SKIN: Warm; dry; no rash. NEURO:Alert and oriented x 3, coherent, VALERI-XII grossly intact, sensory and motor are non-focal.        MDM  Number of Diagnoses or Management Options  Diagnosis management comments:   Assessment: 67-year-old female with intermittent abdominal pain and history of ovarian cancer with ileostomy rule out bowel obstruction. Plan: Lab/ IV fluid/ antiemetics and analgesia/ CT scan of the abdomen and pelvis/ serial exam/ monitor and reevaluate. Amount and/or Complexity of Data Reviewed  Clinical lab tests: reviewed and ordered  Tests in the radiology section of CPT®: ordered and reviewed  Tests in the medicine section of CPT®: reviewed and ordered  Discussion of test results with the performing providers: yes  Decide to obtain previous medical records or to obtain history from someone other than the patient: yes  Obtain history from someone other than the patient: yes  Review and summarize past medical records: yes  Discuss the patient with other providers: yes  Independent visualization of images, tracings, or specimens: yes    Risk of Complications, Morbidity, and/or Mortality  Presenting problems: moderate  Diagnostic procedures: moderate  Management options: moderate      ED Course       Procedures     PROGRESS NOTE:  Pt has been reexamined by Lele Cook MD all available results have been reviewed with pt and any available family. Pt understands sx, dx, and tx in ED.  Care plan has been outlined and questions have been answered. Pt and any available family understands and agrees to need for admission to hospital for further tx not available in ED. Pt is ready for admission. Written by Benedict Rizvi MD,  7:58 AM    CONSULT NOTE:  Benedict Rizvi MD spoke with Dr. Stefan Parada of gyn. Oncology  Discussed patient's presentation, history, physical assessment, and available diagnostic results. Will come and see the patient in the ED. 08:10 AM.    .

## 2017-03-21 NOTE — PROGRESS NOTES
TRANSFER - IN REPORT:    Verbal report received from Ya Deal RN(name) on [address]  being received from ED(unit) for routine progression of care      Report consisted of patients Situation, Background, Assessment and   Recommendations(SBAR). Information from the following report(s) SBAR, Kardex, ED Summary, Procedure Summary, Intake/Output, MAR, Recent Results and Med Rec Status was reviewed with the receiving nurse. Opportunity for questions and clarification was provided. Assessment completed upon patients arrival to unit and care assumed.

## 2017-03-21 NOTE — H&P
Almita Christensen. Sunil Sharp NP       Melody Oseguera       [id number]  1960    Admitted 3/21/2017 Date 3/21/2017     HISTORY OF PRESENT ILLNESS:  Melody Oseguera is a 61 y.o.  postmenopausal female with a diagnosis of stage IV ovarian cancer. She underwent exploratory laparotomy with suboptimal debulking. She had extensive disease. She was readmitted about a week later with obstruction and subsequently had a cecal perforation, requiring resection, end ileostomy, and mucous fistula. During the hospitalization she was also diagnosed with pulmonary embolus and had chest tubes placed for bilateral pleural effusions. She had a prolonged hospital course and actually received a dose of cisplatin chemotherapy while in the hospital to help dry up the effusions. She completed 6 cycles of Taxol/Carboplatin chemotherapy following her initial debulking. She did relatively well, considering she had an ileostomy to deal with during chemotherapy. I took her to the OR on 16 for interval debulking and reversal of her ileostomy. She had extensive disease, but we were able to resect the majority of her disease. One week later she developed an anastomotic leak requiring reexploration and recreation of an ileostomy. She had another prolonged hospitalization, but recovered well since surgery. We then reinitiated Taxol/Carboplatin chemotherapy, but did reduce the dose of Taxol to 135 mg/m2. She completed 4 mores cycles. Her CA-125 normalized and we stopped treatment.     She recently presented to Dr. Chava Vicente office today with her  to discuss her follow up/surveillance CT scan. Unfortunately, it demonstrated progression of disease, although minimally worse than her prior scan 2 months previously. She was actually feeling better than she did at that time.    After review of scans and disease, the decision was made to begin Doxil/Avastin Q28 days for 6 cycles. She began this on 2/27/2017    SUBJECTIVE:  After her first treatment, pt said she did well and said \"all was good until yesterday\" when she began having increase in abdominal pain and a noticeable increase in belching. Pt said she was taking her pain medication [id number] yesterday with \"very little relief\" of her abd pain. She was able to eat \"a normal dinner\" of chicken and rice with apple sauce without nausea. She said the abd pain was present throughout. Stated she began to notice a decrease in ostomy output beginning yesterday afternoon. She said she usually has to get up 2-3 times between 8pm and 9am to empty her bag. She said she only had to empty at 8 pm last night and not again until 8 this morning. Said it was \"just slightly more liquid\" than usual, but had been regular up until yesterday. She denies any vomiting to this point and says she is currently not nauseated. Says abd pain is currently \"pretty well controlled\".  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/21/2017    Hx of pulmonary embolus 03/21/2017    Elevated serum creatinine 03/21/2017    Dehydration 03/21/2017    Cancer associated pain 05/13/2016    Generalized abdominal pain 05/13/2016    Anxiety about health 05/13/2016    CINV (chemotherapy-induced nausea and vomiting) 03/22/2016    Anemia due to chemotherapy 02/22/2016    Ileostomy in place [facility]) 02/15/2016    Carcinomatosis peritonei ([address] 10/29/2015    Ovarian cancer ([address] 10/12/2015     Past Medical History:   Diagnosis Date    Abdominal carcinomatosis ([address] 10/2015    Arthritis     left shoulder    BRCA negative 12/2015    Chronic pain     Depression     HX    Environmental allergies     GERD (gastroesophageal reflux disease)     Hypertension     NEW OVER PAST 5-6 MONTHS    Ovarian cancer ([address] 10/2015    serous adenocarcinoma, high grade, stage IIIC    Pulmonary embolism ([facility] [address] 10/2015      Past Surgical History:   Procedure Laterality Date    CARDIAC SURG PROCEDURE UNLIST  12/11/15    cardiac cath/normal     HX BREAST BIOPSY  1995    benign right breast bx    HX DILATION AND CURETTAGE  2/2011    POLYPECTOMY    HX GI  2015    PERFORATED BOWEL; ILEOSTOMY    HX GYN  10/2015    EXP LAPAROTOMY    HX HEENT  LAST 2005    oral tissue graft X3    HX HEENT  1970    tonsillectomy    HX LAPAROTOMY  10/2015    tumor sampling    HX LAPAROTOMY  4/2016    hysterectomy, BSO, radical debulking    HX LAPAROTOMY  5/2016    resection ileocolic anastomosis, leak, ileostomy    HX OTHER SURGICAL  11/2015    tunneled portacath      Social History   Substance Use Topics    Smoking status: Never Smoker    Smokeless tobacco: Never Used    Alcohol use No      Family History   Problem Relation Age of Onset    Cancer Maternal Uncle      skin    Hypertension Mother     High Cholesterol Mother     Anxiety Mother     Heart Disease Mother     High Cholesterol Father     Hypertension Father     Stroke Father     Arthritis-osteo Sister     Migraines Sister     Other Sister      FIBROMYALGIA    Depression Brother     Other Brother      DRUG ABUSE HEPATITIS C    Migraines Sister     Arrhythmia Sister     Depression Sister     Lung Disease Paternal Aunt      COPD    Cancer Paternal Uncle      LUNG    Lung Disease Paternal Uncle      COPD    Lung Disease Maternal Grandmother      COPD    Anesth Problems Neg Hx       Current Facility-Administered Medications   Medication Dose Route Frequency    HYDROmorphone (PF) (DILAUDID) injection 1 mg  1 mg IntraVENous NOW    ondansetron (ZOFRAN ODT) tablet 4 mg  4 mg Oral NOW    sodium chloride (NS) flush 5-10 mL  5-10 mL IntraVENous Q8H    sodium chloride (NS) flush 5-10 mL  5-10 mL IntraVENous PRN    HYDROmorphone (PF) (DILAUDID) injection 0.5 mg  0.5 mg IntraVENous Q4H PRN    naloxone (NARCAN) injection 0.4 mg  0.4 mg IntraVENous PRN    diphenhydrAMINE (BENADRYL) injection 12.5 mg  12.5 mg IntraVENous Q4H PRN    metoclopramide HCl (REGLAN) injection 10 mg  10 mg IntraVENous Q6H    dextrose 5 % - 0.45% NaCl 1,000 mL with potassium chloride 20 mEq, magnesium sulfate 1 g, thiamine 100 mg, mvi, adult no. 4 with vit K 10 mL infusion   IntraVENous Q24H    HYDROcodone-acetaminophen (HYCET) 0.5-21.7 mg/mL oral solution 7.5 mg  7.5 mg Oral Q6H PRN    apixaban (ELIQUIS) tablet 5 mg  5 mg Oral Q12H    aluminum & magnesium hydroxide-simethicone (MYLANTA II) oral suspension 30 mL  30 mL Oral Q4H PRN    dextrose 5 % - 0.45% NaCl infusion  125 mL/hr IntraVENous CONTINUOUS     Allergies   Allergen Reactions    Ativan [Lorazepam] Other (comments)     SEVERE CONFUSION    Morphine Rash and Itching        Review of Systems  General: Denies wt loss, fatigue  HEENT: Denies visual changes, dysphagia or headache  Resp: Denies SOB, DUDLEY, wheezing or cough  CV: Denies CP, palpitations  GI/: See subjective above: Denies dysuria or frequency  MuskSkel: Denies muscle ache or joint pain  Neuro: Denies dizzyness or syncope      OBJECTIVE:    Physical Exam  General: A&O X3 in NAD with pleasant affect  HEENT: Sclera anicteric, Mucosa pink, moist   Neck: No JVD bilat. No cervical adenopathy appreciated  Port site without redness, tenderness or swelling  Heart: Regular without M/R/G  Lungs: CTA Bilat without wheezing or rales   Abd: Soft, with mild tenderness and no distention noted. Ostomy with small amount of stool.  Well healed abd scars with + BS throughout abdomen  Ext: Without edema and + pedal pulses bilat  Neuro: grossly intact      Data Review      Recent Results (from the past 24 hour(s))   CBC WITH AUTOMATED DIFF    Collection Time: 03/21/17  6:04 AM   Result Value Ref Range    WBC 5.0 3.6 - 11.0 K/uL    RBC 3.55 (L) 3.80 - 5.20 M/uL    HGB 11.0 (L) 11.5 - 16.0 g/dL    HCT 33.1 (L) 35.0 - 47.0 %    MCV 93.2 80.0 - 99.0 FL    MCH 31.0 26.0 - 34.0 PG    MCHC 33.2 30.0 - 36.5 g/dL    RDW 13.5 11.5 - 14.5 %    PLATELET 346 710 - 122 K/uL    NEUTROPHILS 60 32 - 75 %    LYMPHOCYTES 31 12 - 49 %    MONOCYTES 8 5 - 13 %    EOSINOPHILS 1 0 - 7 %    BASOPHILS 0 0 - 1 %    ABS. NEUTROPHILS 3.1 1.8 - 8.0 K/UL    ABS. LYMPHOCYTES 1.5 0.8 - 3.5 K/UL    ABS. MONOCYTES 0.4 0.0 - 1.0 K/UL    ABS. EOSINOPHILS 0.1 0.0 - 0.4 K/UL    ABS. BASOPHILS 0.0 0.0 - 0.1 K/UL   METABOLIC PANEL, COMPREHENSIVE    Collection Time: 03/21/17  6:04 AM   Result Value Ref Range    Sodium 136 136 - 145 mmol/L    Potassium 4.0 3.5 - 5.1 mmol/L    Chloride 105 97 - 108 mmol/L    CO2 22 21 - 32 mmol/L    Anion gap 9 5 - 15 mmol/L    Glucose 129 (H) 65 - 100 mg/dL    BUN 28 (H) 6 - 20 MG/DL    Creatinine 1.19 (H) 0.55 - 1.02 MG/DL    BUN/Creatinine ratio 24 (H) 12 - 20      GFR est AA 57 (L) >60 ml/min/1.73m2    GFR est non-AA 47 (L) >60 ml/min/1.73m2    Calcium 9.6 8.5 - 10.1 MG/DL    Bilirubin, total 0.3 0.2 - 1.0 MG/DL    ALT (SGPT) 26 12 - 78 U/L    AST (SGOT) 19 15 - 37 U/L    Alk. phosphatase 227 (H) 45 - 117 U/L    Protein, total 8.1 6.4 - 8.2 g/dL    Albumin 3.5 3.5 - 5.0 g/dL    Globulin 4.6 (H) 2.0 - 4.0 g/dL    A-G Ratio 0.8 (L) 1.1 - 2.2     LIPASE    Collection Time: 03/21/17  6:04 AM   Result Value Ref Range    Lipase 151 73 - 393 U/L   AMYLASE    Collection Time: 03/21/17  6:04 AM   Result Value Ref Range    Amylase 101 25 - 115 U/L       Imaging  CT of abd 3/21/2017:  FINDINGS:   LUNG BASES: Scarring is stable at the left lung base. INCIDENTALLY IMAGED HEART AND MEDIASTINUM: Unremarkable. LIVER: Hypodensity in the right hepatic lobe is stable compared to the prior  exam. Tiny cyst in the left hepatic lobe is stable. No new liver lesion is  identified. GALLBLADDER: Unremarkable. SPLEEN: No mass. PANCREAS: No mass or ductal dilatation. ADRENALS: Unremarkable. KIDNEYS: Tiny hypodensity in the right kidney is unchanged.  No mass lesion or  hydronephrosis. STOMACH: Unremarkable. SMALL BOWEL: There is mild small bowel distention in the lower abdomen, new  compared to the prior exam, with decompressed loops distally. This is concerning  for early or partial obstruction. COLON: The patient is status post partial colectomy. There is a right lower  quadrant ostomy. APPENDIX: Surgically absent. PERITONEUM: Peritoneal implants are unchanged compared to the prior examination. Ascites likely has not changed significantly. RETROPERITONEUM: IVC filter projects within the infrarenal vena cava. REPRODUCTIVE ORGANS: The patient is status post hysterectomy and bilateral  oophorectomy. URINARY BLADDER: No mass or calculus. BONES: No destructive bone lesion. ADDITIONAL COMMENTS: N/A       IMPRESSION:  New mild small bowel distention with decompressed loops distally. This is  concerning for early or partial obstruction.  Peritoneal implants and ascites are  unchanged compared to the prior examination.       IMPRESSION:    Patient Active Problem List   Diagnosis Code    Ovarian cancer ([facility] [address] C56.9    Carcinomatosis peritonei ([facility] [facility] C78.6, C80.1    Ileostomy in place ([facility] [address] [facility] Z93.2    Anemia due to chemotherapy D64.81, T45.1X5A    CINV (chemotherapy-induced nausea and vomiting) R11.2, T45.1X5A    Cancer associated pain G89.3    Generalized abdominal pain R10.84    Anxiety about health F41.8    Ileus ([facility]) K56.7    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    Hx of pulmonary embolus Z86.711    Elevated serum creatinine R79.89    Dehydration E86.0       PLAN:  Will admit under observation   NPO with ice chips and some med's  Monitor BUN/creat carefully  Begin IV hydration with one goody bag daily with MVI/K+, Mag. D5%/0.45 NaCl when goody bag not infusing  Due to no nausea or vomiting, will hold on NG tube, but did discuss with pt if these occur, it could become necessary  Begin Reglan for 3 days scheduled unless diarrhea begins  Simethicone liquid for belching (pt requested this as long as she is not vomiting)  Continue Eliquis for pelvic vein thrombosis with active cancer  Ambulate throughout the day.        Signed By: Carlos Demarco NP     3/21/2017/9:39 AM

## 2017-03-21 NOTE — IP AVS SNAPSHOT
Current Discharge Medication List  
  
CONTINUE these medications which have NOT CHANGED Dose & Instructions Dispensing Information Comments Morning Noon Evening Bedtime  
 apixaban 5 mg tablet Commonly known as:  Virgel Roxanne Your last dose was: Your next dose is:    
   
   
 Dose:  5 mg Take 1 Tab by mouth two (2) times a day. Quantity:  60 Tab Refills:  6 Calcium-Vitamin D3-Vitamin K [id number] mg-unit-mcg Chew Your last dose was: Your next dose is:    
   
   
 Dose:  1 Tab Take 1 Tab by mouth two (2) times a day. Refills:  0  
     
   
   
   
  
 esomeprazole 40 mg capsule Commonly known as:  NexIUM Your last dose was: Your next dose is:    
   
   
 Dose:  40 mg Take 1 Cap by mouth daily. Quantity:  90 Cap Refills:  2 HYDROcodone-acetaminophen 0.5-21.7 mg/mL oral solution Commonly known as:  HYCET Your last dose was: Your next dose is:    
   
   
 Dose:  15-30 mL Take 15-30 mL by mouth every four (4) hours as needed. Max Daily Amount: 90 mg. Disp: two(2) 473 ml bottles  Indications: Pain Quantity:  950 mL Refills:  0  
     
   
   
   
  
 lidocaine-prilocaine topical cream  
Commonly known as:  EMLA Your last dose was: Your next dose is:    
   
   
 Apply small amount over port area and cover with a band aid 1 hour before chemo treatment Quantity:  30 g Refills:  3  
     
   
   
   
  
 magnesium oxide 400 mg tablet Commonly known as:  MAG-OX Your last dose was: Your next dose is:    
   
   
 Dose:  400 mg Take 1 Tab by mouth three (3) times daily. Indications: HYPOMAGNESEMIA Quantity:  90 Tab Refills:  2  
     
   
   
   
  
 metoprolol tartrate 100 mg IR tablet Commonly known as:  LOPRESSOR Your last dose was: Your next dose is: Take  by mouth two (2) times a day. Refills:  0  
     
   
   
   
  
 multivitamin tablet Commonly known as:  ONE A DAY Your last dose was: Your next dose is:    
   
   
 Dose:  1 Tab Take 1 Tab by mouth daily. Refills:  0  
     
   
   
   
  
 mv. min cmb#[id number] 100-350-5 mcg-mcg-mg Chew Commonly known as:  AQUADEKS Your last dose was: Your next dose is:    
   
   
 Dose:  1 Tab Take 1 Tab by mouth daily. Indications: VITAMIN DEFICIENCY PREVENTION Quantity:  60 Tab Refills:  1 Substitution to equivalent ok  
    
   
   
   
  
 ondansetron 4 mg disintegrating tablet Commonly known as:  ZOFRAN ODT Your last dose was: Your next dose is:    
   
   
 Dose:  4 mg Take 1 Tab by mouth every eight (8) hours as needed for Nausea. Quantity:  30 Tab Refills:  2  
     
   
   
   
  
 promethazine 25 mg tablet Commonly known as:  PHENERGAN Your last dose was: Your next dose is:    
   
   
 Dose:  25 mg Take 1 Tab by mouth every six (6) hours as needed for Nausea. Quantity:  40 Tab Refills:  1  
     
   
   
   
  
 zolpidem 10 mg tablet Commonly known as:  AMBIEN Your last dose was: Your next dose is:    
   
   
 Dose:  10 mg Take 1 Tab by mouth nightly as needed for Sleep. Max Daily Amount: 10 mg.  
 Quantity:  30 Tab Refills:  1

## 2017-03-21 NOTE — IP AVS SNAPSHOT
[address] 
[phone] Patient: Simona Le MRN: [id number] :1960 You are allergic to the following Allergen Reactions Ativan (Lorazepam) Other (comments) SEVERE CONFUSION Morphine Rash Itching Recent Documentation Height Weight Breastfeeding? BMI OB Status Smoking Status 1.6 m 56.5 kg No 22.07 kg/m2 Postmenopausal Never Smoker Emergency Contacts Name Discharge Info Relation Home Work Mobile [address] CAREGIVER [3] Spouse [3] [phone] About your hospitalization You were admitted on:  2017 You last received care in the:  [facility] [address] You were discharged on:  2017 Unit phone number:  [phone] Why you were hospitalized Your primary diagnosis was:  Not on File Your diagnoses also included:  Ileus (Hcc), Thrombosis Of Pelvic Vein, Hx Of Pulmonary Embolus, Ovarian Cancer (Hcc), Cancer Associated Pain, Carcinomatosis Peritonei (Hcc), Ileostomy In Place (Hcc), Elevated Serum Creatinine, Dehydration, Chemotherapy-Induced Neutropenia (Hcc) Providers Seen During Your Hospitalizations Provider Role Specialty Primary office phone Radha Jensen MD Attending Provider Emergency Medicine [phone] Isaak Bowers MD Attending Provider Gynecologic Oncology [phone] Your Primary Care Physician (PCP) Primary Care Physician Office Phone Office Fax NONE ** None ** ** None ** Follow-up Information Follow up With Details Comments Contact Info None   None ([phone]) Patient stated that they have no PCP Isaak Bowers MD Schedule an appointment as soon as possible for a visit in 2 weeks  [address] [place] [address] [address] 
[phone] Your Appointments  2017 11:00 AM EDT  
 INFUSION [address] with PERFECTO INFUSION NURSE 7  
[address] [facility] ([address]) [address] [phone]  
[phone] Go to [address], ground floor. Thursday April 20, 2017  2:15 PM EDT CHEMOTHERAPY with Juni Cabrera MD  
[facility] Gynecologic Oncology [facility] [address] [facility] [address] [phone]  
[phone] Tuesday April 25, 2017 10:00 AM EDT INFUSION [address] with PERFECTO INFUSION NURSE 7  
[address] [facility] ([address]) [address] [phone]  
[phone] Go to [address], ground floor. Current Discharge Medication List  
  
CONTINUE these medications which have NOT CHANGED Dose & Instructions Dispensing Information Comments Morning Noon Evening Bedtime  
 apixaban 5 mg tablet Commonly known as:  Jacquie Craig Your last dose was: Your next dose is:    
   
   
 Dose:  5 mg Take 1 Tab by mouth two (2) times a day. Quantity:  60 Tab Refills:  6 Calcium-Vitamin D3-Vitamin K [id number] mg-unit-mcg Chew Your last dose was: Your next dose is:    
   
   
 Dose:  1 Tab Take 1 Tab by mouth two (2) times a day. Refills:  0  
     
   
   
   
  
 esomeprazole 40 mg capsule Commonly known as:  NexIUM Your last dose was: Your next dose is:    
   
   
 Dose:  40 mg Take 1 Cap by mouth daily. Quantity:  90 Cap Refills:  2 HYDROcodone-acetaminophen 0.5-21.7 mg/mL oral solution Commonly known as:  HYCET Your last dose was: Your next dose is:    
   
   
 Dose:  15-30 mL Take 15-30 mL by mouth every four (4) hours as needed. Max Daily Amount: 90 mg. Disp: two(2) 473 ml bottles  Indications: Pain Quantity:  950 mL Refills:  0  
     
   
   
   
  
 lidocaine-prilocaine topical cream  
Commonly known as:  EMLA Your last dose was: Your next dose is:    
   
   
 Apply small amount over port area and cover with a band aid 1 hour before chemo treatment Quantity:  30 g Refills:  3  
     
   
   
   
  
 magnesium oxide 400 mg tablet Commonly known as:  MAG-OX Your last dose was: Your next dose is:    
   
   
 Dose:  400 mg Take 1 Tab by mouth three (3) times daily. Indications: HYPOMAGNESEMIA Quantity:  90 Tab Refills:  2  
     
   
   
   
  
 metoprolol tartrate 100 mg IR tablet Commonly known as:  LOPRESSOR Your last dose was: Your next dose is: Take  by mouth two (2) times a day. Refills:  0  
     
   
   
   
  
 multivitamin tablet Commonly known as:  ONE A DAY Your last dose was: Your next dose is:    
   
   
 Dose:  1 Tab Take 1 Tab by mouth daily. Refills:  0  
     
   
   
   
  
 mv. min cmb#[id number] 100-350-5 mcg-mcg-mg Chew Commonly known as:  AQUADEKS Your last dose was: Your next dose is:    
   
   
 Dose:  1 Tab Take 1 Tab by mouth daily. Indications: VITAMIN DEFICIENCY PREVENTION Quantity:  60 Tab Refills:  1 Substitution to equivalent ok  
    
   
   
   
  
 ondansetron 4 mg disintegrating tablet Commonly known as:  ZOFRAN ODT Your last dose was: Your next dose is:    
   
   
 Dose:  4 mg Take 1 Tab by mouth every eight (8) hours as needed for Nausea. Quantity:  30 Tab Refills:  2  
     
   
   
   
  
 promethazine 25 mg tablet Commonly known as:  PHENERGAN Your last dose was: Your next dose is:    
   
   
 Dose:  25 mg Take 1 Tab by mouth every six (6) hours as needed for Nausea. Quantity:  40 Tab Refills:  1  
     
   
   
   
  
 zolpidem 10 mg tablet Commonly known as:  AMBIEN Your last dose was: Your next dose is:    
   
   
 Dose:  10 mg Take 1 Tab by mouth nightly as needed for Sleep. Max Daily Amount: 10 mg.  
 Quantity:  30 Tab Refills:  1 Discharge Instructions [facility] GYN[facility]OLOG[facility] ON[facility]OGY 
[address] Gladys Carbajal [phone]         [phone] MD Bernardo Jones MD Dianne E. Wall, HAYDER Patient Discharge Instructions Edelmira Crenshaw / [id number] : 1960 Admit Date: 3/21/2017 Discharge Date: 3/23/2017 Take Home Medications See Discharge Medication Review provided to you by your nurse. If you did not receive one, request this prior to your discharge. · It is important that you take your medications as they are prescribed. · Keep your medications in the bottles provided by the pharmacist and keep a list of the medication names, dosages, times to be taken and what they are for in your wallet. · Do not take other medications without consulting your doctor. · You should take a daily gentle stool softener such as a colace pill or dulcolax suppository for constipation as this is not uncommon after surgery and/or while on pain medication. If not prescribed, this can be found over the counter. If constipation persists for >24 hours you should take a dose of Milk of Magnesia. Call if your constipation continues. Diet · Stay hydrated with fluids such as gatorade and water. This will also help prevent constipation. Limit somewhat any usual caffeine intake of beverages such as soda, tea and coffee as this may serve to dehydrate you. · For the first 2-3 days keep a low fiber diet avoiding raw vegetables or fruits with skin. A diet consisting of soup, cereal, yogurt, eggs, fish, Boost/Ensure. Avoid fatty/greasy foods. · If nauseated, keep your diet limited to liquids and call if this persists. Activity · Gradually increase your activity each day. There are generally no restrictions on walking, climbing stairs or riding in a car. Try to walk at least 6 times per day. · Showers are okay. If you have an incision, no tub bathing/swimming for two weeks. · There are no lifting restrictions if you did not have surgery. Causes For Concern If any of the following occur, please call our office and speak with the Nurse/aid who will help you with your problem or ask the doctor to call you. Increasing abdominal pain despite medication Persisting nausea or vomiting. Fever or chills and a temperature >101F Constipation (no bowel movement for three days). Diarrhea (more than three watery stools within 24 hours). If after hours and you cannot reach an on-call physician, call 911. Follow-Up Call [phone] to schedule an appointment with Dr. Rae Washington in 2-3 weeks or sooner if needed. Information obtained by : 
I understand that if any problems occur once I am at home I am to contact my physician. I understand and acknowledge receipt of the instructions indicated above. Physician's or R.N.'s Signature                                                                  Date/Time Patient or Representative Signature                                                          Date/Time Discharge Orders None [facility] Announcement We are excited to announce that we are making your provider's discharge notes available to you in [facility].   You will see these notes when they are completed and signed by the physician that discharged you from your recent hospital stay. If you have any questions or concerns about any information you see in [facility], please call the Health Information Department where you were seen or reach out to your Primary Care Provider for more information about your plan of care. Introducing [facility]! Dear Annette Ayon: 
Thank you for requesting a [employer] [facility] account. Our records indicate that you already have an active [facility] [facility] account. You can access your account anytime at [email or web address] Did you know that you can access your hospital and ER discharge instructions at any time in [facility] [facility]? You can also review all of your test results from your hospital stay or ER visit. Additional Information If you have questions, please visit the Frequently Asked Questions section of the [employer] website at [email or web address] Remember, [facility] [facility] is NOT to be used for urgent needs. For medical emergencies, dial 911. Now available from your iPhone and Android! General Information Please provide this summary of care documentation to your next provider. Patient Signature:  ____________________________________________________________ Date:  ____________________________________________________________  
  
[employer] Police Provider Signature:  ____________________________________________________________ Date:  ____________________________________________________________

## 2017-03-21 NOTE — ED TRIAGE NOTES
TRIAGE NOTE: Patient arrived from home with c/o abdominal pain since yesterday evening but has increased today. +nausea. Patient has an ileostomy that has not had much output since yesterday.

## 2017-03-21 NOTE — ROUTINE PROCESS
TRANSFER - OUT REPORT:    Verbal report given to Yassine Delgado RN ([facility] (unit) for routine progression of care       Report consisted of patients Situation, Background, Assessment and Recommendations(SBAR). Information from the following report(s) SBAR, ED Summary, [facility] and Recent Results was reviewed with the receiving nurse. Lines:   Venous Access Device left chest port 05/17/16 Upper chest (subclavicular area), left (Active)       Peripheral IV 09/06/16 Left Antecubital (Active)       Peripheral IV 03/21/17 Right Forearm (Active)   Site Assessment Clean, dry, & intact 3/21/2017  6:09 AM   Phlebitis Assessment 0 3/21/2017  6:09 AM   Infiltration Assessment 4 3/21/2017  6:09 AM   Dressing Status Clean, dry, & intact 3/21/2017  6:09 AM   Dressing Type Transparent 3/21/2017  6:09 AM   Hub Color/Line Status Flushed 3/21/2017  6:09 AM   Action Taken Blood drawn 3/21/2017  6:09 AM        Opportunity for questions and clarification was provided.

## 2017-03-22 PROBLEM — D70.1 CHEMOTHERAPY-INDUCED NEUTROPENIA (HCC): Status: ACTIVE | Noted: 2017-03-22

## 2017-03-22 PROBLEM — T45.1X5A CHEMOTHERAPY-INDUCED NEUTROPENIA (HCC): Status: ACTIVE | Noted: 2017-03-22

## 2017-03-22 LAB
ALBUMIN SERPL BCP-MCNC: 3 G/DL (ref 3.5–5)
ALBUMIN/GLOB SERPL: 0.8 {RATIO} (ref 1.1–2.2)
ALP SERPL-CCNC: 189 U/L (ref 45–117)
ALT SERPL-CCNC: 20 U/L (ref 12–78)
ANION GAP BLD CALC-SCNC: 7 MMOL/L (ref 5–15)
AST SERPL W P-5'-P-CCNC: 17 U/L (ref 15–37)
BASOPHILS # BLD AUTO: 0 K/UL (ref 0–0.1)
BASOPHILS # BLD: 0 % (ref 0–1)
BILIRUB SERPL-MCNC: 0.3 MG/DL (ref 0.2–1)
BUN SERPL-MCNC: 15 MG/DL (ref 6–20)
BUN/CREAT SERPL: 17 (ref 12–20)
CALCIUM SERPL-MCNC: 8.8 MG/DL (ref 8.5–10.1)
CANCER AG125 SERPL-ACNC: 80 U/ML (ref 0–30)
CHLORIDE SERPL-SCNC: 108 MMOL/L (ref 97–108)
CO2 SERPL-SCNC: 26 MMOL/L (ref 21–32)
CREAT SERPL-MCNC: 0.9 MG/DL (ref 0.55–1.02)
DIFFERENTIAL METHOD BLD: ABNORMAL
EOSINOPHIL # BLD: 0.1 K/UL (ref 0–0.4)
EOSINOPHIL NFR BLD: 2 % (ref 0–7)
ERYTHROCYTE [DISTWIDTH] IN BLOOD BY AUTOMATED COUNT: 13.6 % (ref 11.5–14.5)
GLOBULIN SER CALC-MCNC: 3.8 G/DL (ref 2–4)
GLUCOSE SERPL-MCNC: 95 MG/DL (ref 65–100)
HCT VFR BLD AUTO: 29.3 % (ref 35–47)
HGB BLD-MCNC: 9.6 G/DL (ref 11.5–16)
LYMPHOCYTES # BLD AUTO: 65 % (ref 12–49)
LYMPHOCYTES # BLD: 1.9 K/UL (ref 0.8–3.5)
MAGNESIUM SERPL-MCNC: 2.2 MG/DL (ref 1.6–2.4)
MCH RBC QN AUTO: 31.2 PG (ref 26–34)
MCHC RBC AUTO-ENTMCNC: 32.8 G/DL (ref 30–36.5)
MCV RBC AUTO: 95.1 FL (ref 80–99)
MONOCYTES # BLD: 0.1 K/UL (ref 0–1)
MONOCYTES NFR BLD AUTO: 4 % (ref 5–13)
NEUTS SEG # BLD: 0.9 K/UL (ref 1.8–8)
NEUTS SEG NFR BLD AUTO: 29 % (ref 32–75)
PATH REV BLD -IMP: ABNORMAL
PLATELET # BLD AUTO: 245 K/UL (ref 150–400)
PLATELET COMMENTS,PCOM: ABNORMAL
POTASSIUM SERPL-SCNC: 3.5 MMOL/L (ref 3.5–5.1)
PROT SERPL-MCNC: 6.8 G/DL (ref 6.4–8.2)
RBC # BLD AUTO: 3.08 M/UL (ref 3.8–5.2)
RBC MORPH BLD: ABNORMAL
RBC MORPH BLD: ABNORMAL
SODIUM SERPL-SCNC: 141 MMOL/L (ref 136–145)
WBC # BLD AUTO: 3 K/UL (ref 3.6–11)
WBC MORPH BLD: ABNORMAL

## 2017-03-22 PROCEDURE — 77030003560 HC NDL HUBR BARD -A

## 2017-03-22 PROCEDURE — 86304 IMMUNOASSAY TUMOR CA 125: CPT | Performed by: NURSE PRACTITIONER

## 2017-03-22 PROCEDURE — 77030011641 HC PASTE OST ADH BMS -A

## 2017-03-22 PROCEDURE — 77030010522

## 2017-03-22 PROCEDURE — 83735 ASSAY OF MAGNESIUM: CPT | Performed by: NURSE PRACTITIONER

## 2017-03-22 PROCEDURE — 77030010541

## 2017-03-22 PROCEDURE — 85025 COMPLETE CBC W/AUTO DIFF WBC: CPT | Performed by: NURSE PRACTITIONER

## 2017-03-22 PROCEDURE — 77030010520

## 2017-03-22 PROCEDURE — 74011000250 HC RX REV CODE- 250: Performed by: NURSE PRACTITIONER

## 2017-03-22 PROCEDURE — 74011250637 HC RX REV CODE- 250/637: Performed by: NURSE PRACTITIONER

## 2017-03-22 PROCEDURE — 74011250636 HC RX REV CODE- 250/636: Performed by: NURSE PRACTITIONER

## 2017-03-22 PROCEDURE — 36415 COLL VENOUS BLD VENIPUNCTURE: CPT | Performed by: NURSE PRACTITIONER

## 2017-03-22 PROCEDURE — 80053 COMPREHEN METABOLIC PANEL: CPT | Performed by: NURSE PRACTITIONER

## 2017-03-22 PROCEDURE — 74011000258 HC RX REV CODE- 258: Performed by: NURSE PRACTITIONER

## 2017-03-22 PROCEDURE — 99218 HC RM OBSERVATION: CPT

## 2017-03-22 RX ORDER — METOPROLOL TARTRATE 50 MG/1
50 TABLET ORAL EVERY 12 HOURS
Status: DISCONTINUED | OUTPATIENT
Start: 2017-03-22 | End: 2017-03-23

## 2017-03-22 RX ORDER — METOCLOPRAMIDE HYDROCHLORIDE 5 MG/ML
10 INJECTION INTRAMUSCULAR; INTRAVENOUS EVERY 6 HOURS
Status: DISCONTINUED | OUTPATIENT
Start: 2017-03-22 | End: 2017-03-23 | Stop reason: HOSPADM

## 2017-03-22 RX ORDER — CETIRIZINE HCL 10 MG
10 TABLET ORAL DAILY
Status: DISCONTINUED | OUTPATIENT
Start: 2017-03-22 | End: 2017-03-23 | Stop reason: HOSPADM

## 2017-03-22 RX ORDER — POTASSIUM CHLORIDE 14.9 MG/ML
10 INJECTION INTRAVENOUS
Status: COMPLETED | OUTPATIENT
Start: 2017-03-22 | End: 2017-03-22

## 2017-03-22 RX ORDER — HYDROCODONE BITARTRATE AND ACETAMINOPHEN 7.5; 325 MG/15ML; MG/15ML
10-15 SOLUTION ORAL
Status: DISCONTINUED | OUTPATIENT
Start: 2017-03-22 | End: 2017-03-23 | Stop reason: HOSPADM

## 2017-03-22 RX ADMIN — HYDROCODONE BITARTRATE, ACETAMINOPHEN 10 MG: 325; 7.5 SOLUTION ORAL at 03:50

## 2017-03-22 RX ADMIN — HYDROCODONE BITARTRATE, ACETAMINOPHEN 15 MG: 325; 7.5 SOLUTION ORAL at 17:36

## 2017-03-22 RX ADMIN — METOPROLOL TARTRATE 50 MG: 50 TABLET ORAL at 08:52

## 2017-03-22 RX ADMIN — HYDROCODONE BITARTRATE, ACETAMINOPHEN 15 MG: 325; 7.5 SOLUTION ORAL at 13:53

## 2017-03-22 RX ADMIN — METOCLOPRAMIDE 10 MG: 5 INJECTION, SOLUTION INTRAMUSCULAR; INTRAVENOUS at 17:34

## 2017-03-22 RX ADMIN — CETIRIZINE HYDROCHLORIDE 10 MG: 10 TABLET, FILM COATED ORAL at 08:52

## 2017-03-22 RX ADMIN — HYDROCODONE BITARTRATE, ACETAMINOPHEN 15 MG: 325; 7.5 SOLUTION ORAL at 23:05

## 2017-03-22 RX ADMIN — MAGNESIUM SULFATE HEPTAHYDRATE: 500 INJECTION, SOLUTION INTRAMUSCULAR; INTRAVENOUS at 11:17

## 2017-03-22 RX ADMIN — APIXABAN 5 MG: 5 TABLET, FILM COATED ORAL at 08:52

## 2017-03-22 RX ADMIN — HYDROCODONE BITARTRATE, ACETAMINOPHEN 15 MG: 325; 7.5 SOLUTION ORAL at 08:52

## 2017-03-22 RX ADMIN — Medication 10 ML: at 21:01

## 2017-03-22 RX ADMIN — APIXABAN 5 MG: 5 TABLET, FILM COATED ORAL at 21:00

## 2017-03-22 RX ADMIN — DEXTROSE MONOHYDRATE AND SODIUM CHLORIDE 125 ML/HR: 5; .45 INJECTION, SOLUTION INTRAVENOUS at 19:07

## 2017-03-22 RX ADMIN — POTASSIUM CHLORIDE 10 MEQ: 200 INJECTION, SOLUTION INTRAVENOUS at 08:49

## 2017-03-22 NOTE — PROGRESS NOTES
Problem: Falls - Risk of  Goal: *Absence of falls  Outcome: Progressing Towards Goal  Patient up ad virgil. Fall precautions in place. Bed in lowest position, side rails up, slip resistant footwear on patient and personal belongings within reach. Patient instructed to use call bell when needing assistance.

## 2017-03-22 NOTE — PROGRESS NOTES
Bedside and Verbal shift change report given to Naresh Montano (oncoming nurse) by Abiodun Yen (offgoing nurse). Report included the following information SBAR, Kardex, Procedure Summary, MAR and Recent Results. 7516: IV K+ hung. Ran concurrently with IVF. Patient complained of burring. Decreased rate from 100 ML to 75 ML. Still had c/o burning decreased to 50 ML and still had complaints so infusion stopped. Patient K+ 3.5 and patient to receive goodie bag at 1200. Will continue to monitor.

## 2017-03-22 NOTE — PROGRESS NOTES
Bedside and Verbal shift change report given to [address] [place] (oncoming nurse) by Dexter Andrade (offgoing nurse). Report included the following information SBAR, Kardex, Procedure Summary, MAR and Recent Results.

## 2017-03-22 NOTE — PROGRESS NOTES
Lizzie Don, NP    Admit Date: 3/21/2017  Hospital day 2    Subjective:   Pt stated she had \"a good night\". Pain is better, but still present. Denies Nausea with ice chips  Ostomy bag did need to be emptied at 8 pm and 3AM this morning. Pt says she noticed flatus only after Reglan given  IV fluid continues infusing with BUN/Creat improved this morning  Ambulating in room     supportive at side      Review of Systems:  General: Feels a \"bit tired\" this morning\" otherwise, denies complaint  HEENT: Denies visual changes, dysphagia or headache  Resp: Denies SOB, DUDLEY, wheezing or cough  CV: Denies CP, palpitations  GI/: Continues with some abd pain/discomfort. Denies nausea and has not vomited. Had some flatus, but \"not much\"  MuskSkel: Denies muscle ache or joint pain  Neuro: Denies dizzyness or syncope    Objective:     Blood pressure 100/58, pulse 75, temperature 98.2 °F (36.8 °C), resp. rate 16, height 5' 3\" (1.6 m), weight 124 lb 9.6 oz (56.5 kg), SpO2 97 %. Temp (24hrs), Av °F (36.7 °C), Min:97.5 °F (36.4 °C), Max:98.2 °F (36.8 °C)      _____________________  Physical Exam:     General: A&O X 3  in no acute distress. Cardiovascular: Regular without M/R/G  Lungs:CTA bilat without wheezing or rales  Abdomen: Soft without distention and with ostomy bag empty at present (emptied at 3:30), no gas present. + bowel sounds throughout with only mild tenderness with palpation (improved form yesterday). Ext: + pedal pulses without edema bilat.  SCD's and Compression boots in place bilat  Neuro: grossly intact      Recent Results (from the past 12 hour(s))   METABOLIC PANEL, COMPREHENSIVE    Collection Time: 17  3:55 AM   Result Value Ref Range    Sodium 141 136 - 145 mmol/L    Potassium 3.5 3.5 - 5.1 mmol/L    Chloride 108 97 - 108 mmol/L    CO2 26 21 - 32 mmol/L    Anion gap 7 5 - 15 mmol/L    Glucose 95 65 - 100 mg/dL BUN 15 6 - 20 MG/DL    Creatinine 0.90 0.55 - 1.02 MG/DL    BUN/Creatinine ratio 17 12 - 20      GFR est AA >60 >60 ml/min/1.73m2    GFR est non-AA >60 >60 ml/min/1.73m2    Calcium 8.8 8.5 - 10.1 MG/DL    Bilirubin, total 0.3 0.2 - 1.0 MG/DL    ALT (SGPT) 20 12 - 78 U/L    AST (SGOT) 17 15 - 37 U/L    Alk. phosphatase 189 (H) 45 - 117 U/L    Protein, total 6.8 6.4 - 8.2 g/dL    Albumin 3.0 (L) 3.5 - 5.0 g/dL    Globulin 3.8 2.0 - 4.0 g/dL    A-G Ratio 0.8 (L) 1.1 - 2.2     CBC WITH AUTOMATED DIFF    Collection Time: 03/22/17  3:55 AM   Result Value Ref Range    WBC 3.0 (L) 3.6 - 11.0 K/uL    RBC 3.08 (L) 3.80 - 5.20 M/uL    HGB 9.6 (L) 11.5 - 16.0 g/dL    HCT 29.3 (L) 35.0 - 47.0 %    MCV 95.1 80.0 - 99.0 FL    MCH 31.2 26.0 - 34.0 PG    MCHC 32.8 30.0 - 36.5 g/dL    RDW 13.6 11.5 - 14.5 %    PLATELET 508 312 - 973 K/uL    NEUTROPHILS 29 (L) 32 - 75 %    LYMPHOCYTES 65 (H) 12 - 49 %    MONOCYTES 4 (L) 5 - 13 %    EOSINOPHILS 2 0 - 7 %    BASOPHILS 0 0 - 1 %    ABS. NEUTROPHILS 0.9 (L) 1.8 - 8.0 K/UL    ABS. LYMPHOCYTES 1.9 0.8 - 3.5 K/UL    ABS. MONOCYTES 0.1 0.0 - 1.0 K/UL    ABS. EOSINOPHILS 0.1 0.0 - 0.4 K/UL    ABS.  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 Will restart her home lopressor at 1/2 usual dose. Will place on neutropenic precautions with ANC now down to 0.9  Pt takes Zyrtec daily and has asked to continue it while in hospital   Hycet dose adjusted to pt's home dose  Mobilize in hallway throughout the day and this was discussed with pt.     Iraida Baugh NP  3/22/2017    Data Review:    Recent Labs      03/22/17   0355  03/21/17   0604   WBC  3.0*  5.0   HGB  9.6*  11.0*   HCT  29.3*  33.1*   PLT  245  278     Recent Labs      03/22/17   0355  03/21/17   0604   NA  141  136   K  3.5  4.0   CL  108  105   CO2  26  22   GLU  95  129*   BUN  15  28*   CREA  0.90  1.19*   CA  8.8  9.6   MG  2.2   --    ALB  3.0*  3.5   SGOT  17  19   ALT  20  26     Recent Labs      03/21/17   0604   AML  101   LPSE  151           ______________________  Medications:    Current Facility-Administered Medications   Medication Dose Route Frequency Provider Last Rate Last Dose    potassium chloride 10 mEq in 50 ml IVPB  10 mEq IntraVENous NOW Genny Don, NP        HYDROcodone-acetaminophen (HYCET) 0.5-21.7 mg/mL oral solution 10-15 mg  10-15 mg Oral Q4H PRN Genny Don, NP        metoprolol tartrate (LOPRESSOR) tablet 50 mg  50 mg Oral Q12H Genny Don, NP        cetirizine (ZYRTEC) tablet 10 mg  10 mg Oral DAILY Genny Don, NP        sodium chloride (OCEAN) 0.65 % nasal spray 2 Spray  2 Spray Both Nostrils Q2H PRN Genny Don, NP        sodium chloride (NS) flush 5-10 mL  5-10 mL IntraVENous Q8H Genny Don, NP        sodium chloride (NS) flush 5-10 mL  5-10 mL IntraVENous PRN Genny Don, NP        HYDROmorphone (PF) (DILAUDID) injection 0.5 mg  0.5 mg IntraVENous Q4H PRN Genny Don, NP   0.5 mg at 03/21/17 1931    naloxone (NARCAN) injection 0.4 mg  0.4 mg IntraVENous PRN Genny Don NP        diphenhydrAMINE (BENADRYL) injection 12.5 mg  12.5 mg IntraVENous Q4H PRN Genny Don, HAYDER        dextrose 5 % - 0.45% NaCl 1,000 mL with potassium chloride 20 mEq, magnesium sulfate 1 g, thiamine 100 mg, mvi, adult no. 4 with vit K 10 mL infusion   IntraVENous Q24H Genny Don NP        apixaban (ELIQUIS) tablet 5 mg  5 mg Oral Q12H Genny Don NP   5 mg at 03/21/17 2035    aluminum & magnesium hydroxide-simethicone (MYLANTA II) oral suspension 30 mL  30 mL Oral Q4H PRN Genny Don NP        dextrose 5 % - 0.45% NaCl infusion  125 mL/hr IntraVENous CONTINUOUS Genny Don  mL/hr at 03/21/17 1923 125 mL/hr at 03/21/17 1923       Seen and agree with above note. Looks and feels much better today. Ostomy output has increased. Remains without pain. No nausea at this time. Tolerated clears this morning. Will advance diet as tolerated, possibly even allowing some solid food this evening. Best case scenario, she may be able to go home tomorrow.     Emeterio Campos MD

## 2017-03-22 NOTE — WOUND CARE
Ostomy Consult:   Patient supine in bed;  Right abdominal quadrant ileostomy appliance intact; Patient draining stool; scant amount in pouch because patient recently emptied;  Provided supplies for ostomy care;   Will continue to follow weekly and as needed for ostomy care;     Cedric Danielson RN

## 2017-03-23 VITALS
TEMPERATURE: 98.7 F | SYSTOLIC BLOOD PRESSURE: 105 MMHG | OXYGEN SATURATION: 97 % | HEART RATE: 65 BPM | BODY MASS INDEX: 22.08 KG/M2 | WEIGHT: 124.6 LBS | HEIGHT: 63 IN | DIASTOLIC BLOOD PRESSURE: 64 MMHG | RESPIRATION RATE: 17 BRPM

## 2017-03-23 LAB
ALBUMIN SERPL BCP-MCNC: 2.8 G/DL (ref 3.5–5)
ALBUMIN/GLOB SERPL: 0.8 {RATIO} (ref 1.1–2.2)
ALP SERPL-CCNC: 166 U/L (ref 45–117)
ALT SERPL-CCNC: 17 U/L (ref 12–78)
ANION GAP BLD CALC-SCNC: 8 MMOL/L (ref 5–15)
AST SERPL W P-5'-P-CCNC: 15 U/L (ref 15–37)
BASOPHILS # BLD AUTO: 0 K/UL (ref 0–0.1)
BASOPHILS # BLD: 0 % (ref 0–1)
BILIRUB SERPL-MCNC: 0.3 MG/DL (ref 0.2–1)
BUN SERPL-MCNC: 6 MG/DL (ref 6–20)
BUN/CREAT SERPL: 7 (ref 12–20)
CALCIUM SERPL-MCNC: 8.6 MG/DL (ref 8.5–10.1)
CHLORIDE SERPL-SCNC: 110 MMOL/L (ref 97–108)
CO2 SERPL-SCNC: 25 MMOL/L (ref 21–32)
CREAT SERPL-MCNC: 0.84 MG/DL (ref 0.55–1.02)
EOSINOPHIL # BLD: 0.1 K/UL (ref 0–0.4)
EOSINOPHIL NFR BLD: 2 % (ref 0–7)
ERYTHROCYTE [DISTWIDTH] IN BLOOD BY AUTOMATED COUNT: 13.5 % (ref 11.5–14.5)
GLOBULIN SER CALC-MCNC: 3.6 G/DL (ref 2–4)
GLUCOSE SERPL-MCNC: 91 MG/DL (ref 65–100)
HCT VFR BLD AUTO: 27.4 % (ref 35–47)
HGB BLD-MCNC: 9 G/DL (ref 11.5–16)
LYMPHOCYTES # BLD AUTO: 61 % (ref 12–49)
LYMPHOCYTES # BLD: 2.4 K/UL (ref 0.8–3.5)
MAGNESIUM SERPL-MCNC: 2 MG/DL (ref 1.6–2.4)
MCH RBC QN AUTO: 31.3 PG (ref 26–34)
MCHC RBC AUTO-ENTMCNC: 32.8 G/DL (ref 30–36.5)
MCV RBC AUTO: 95.1 FL (ref 80–99)
MONOCYTES # BLD: 0.3 K/UL (ref 0–1)
MONOCYTES NFR BLD AUTO: 8 % (ref 5–13)
NEUTS SEG # BLD: 1.2 K/UL (ref 1.8–8)
NEUTS SEG NFR BLD AUTO: 29 % (ref 32–75)
PLATELET # BLD AUTO: 243 K/UL (ref 150–400)
POTASSIUM SERPL-SCNC: 3.7 MMOL/L (ref 3.5–5.1)
PROT SERPL-MCNC: 6.4 G/DL (ref 6.4–8.2)
RBC # BLD AUTO: 2.88 M/UL (ref 3.8–5.2)
SODIUM SERPL-SCNC: 143 MMOL/L (ref 136–145)
WBC # BLD AUTO: 4 K/UL (ref 3.6–11)

## 2017-03-23 PROCEDURE — 74011250636 HC RX REV CODE- 250/636: Performed by: NURSE PRACTITIONER

## 2017-03-23 PROCEDURE — 99218 HC RM OBSERVATION: CPT

## 2017-03-23 PROCEDURE — 65210000002 HC RM PRIVATE GYN

## 2017-03-23 PROCEDURE — 83735 ASSAY OF MAGNESIUM: CPT | Performed by: NURSE PRACTITIONER

## 2017-03-23 PROCEDURE — 36415 COLL VENOUS BLD VENIPUNCTURE: CPT | Performed by: NURSE PRACTITIONER

## 2017-03-23 PROCEDURE — 74011250637 HC RX REV CODE- 250/637: Performed by: NURSE PRACTITIONER

## 2017-03-23 PROCEDURE — 85025 COMPLETE CBC W/AUTO DIFF WBC: CPT | Performed by: NURSE PRACTITIONER

## 2017-03-23 PROCEDURE — 80053 COMPREHEN METABOLIC PANEL: CPT | Performed by: NURSE PRACTITIONER

## 2017-03-23 PROCEDURE — 74011000258 HC RX REV CODE- 258: Performed by: NURSE PRACTITIONER

## 2017-03-23 PROCEDURE — 74011000250 HC RX REV CODE- 250: Performed by: NURSE PRACTITIONER

## 2017-03-23 PROCEDURE — 74011250636 HC RX REV CODE- 250/636: Performed by: OBSTETRICS & GYNECOLOGY

## 2017-03-23 RX ORDER — HEPARIN 100 UNIT/ML
300 SYRINGE INTRAVENOUS AS NEEDED
Status: DISCONTINUED | OUTPATIENT
Start: 2017-03-23 | End: 2017-03-23 | Stop reason: HOSPADM

## 2017-03-23 RX ORDER — METOPROLOL TARTRATE 25 MG/1
25 TABLET, FILM COATED ORAL EVERY 12 HOURS
Status: DISCONTINUED | OUTPATIENT
Start: 2017-03-23 | End: 2017-03-23 | Stop reason: HOSPADM

## 2017-03-23 RX ADMIN — Medication 10 ML: at 06:34

## 2017-03-23 RX ADMIN — HYDROCODONE BITARTRATE, ACETAMINOPHEN 15 MG: 325; 7.5 SOLUTION ORAL at 05:07

## 2017-03-23 RX ADMIN — SODIUM CHLORIDE, PRESERVATIVE FREE 300 UNITS: 5 INJECTION INTRAVENOUS at 15:44

## 2017-03-23 RX ADMIN — HYDROCODONE BITARTRATE, ACETAMINOPHEN 10 MG: 325; 7.5 SOLUTION ORAL at 09:44

## 2017-03-23 RX ADMIN — CETIRIZINE HYDROCHLORIDE 10 MG: 10 TABLET, FILM COATED ORAL at 09:20

## 2017-03-23 RX ADMIN — METOCLOPRAMIDE 10 MG: 5 INJECTION, SOLUTION INTRAMUSCULAR; INTRAVENOUS at 00:50

## 2017-03-23 RX ADMIN — METOPROLOL TARTRATE 25 MG: 25 TABLET ORAL at 09:19

## 2017-03-23 RX ADMIN — METOCLOPRAMIDE 10 MG: 5 INJECTION, SOLUTION INTRAMUSCULAR; INTRAVENOUS at 06:34

## 2017-03-23 RX ADMIN — APIXABAN 5 MG: 5 TABLET, FILM COATED ORAL at 09:20

## 2017-03-23 RX ADMIN — SALINE NASAL SPRAY 2 SPRAY: 1.5 SOLUTION NASAL at 09:44

## 2017-03-23 RX ADMIN — HYDROCODONE BITARTRATE, ACETAMINOPHEN 10 MG: 325; 7.5 SOLUTION ORAL at 14:08

## 2017-03-23 RX ADMIN — DEXTROSE MONOHYDRATE AND SODIUM CHLORIDE 125 ML/HR: 5; .45 INJECTION, SOLUTION INTRAVENOUS at 10:39

## 2017-03-23 RX ADMIN — METOCLOPRAMIDE 10 MG: 5 INJECTION, SOLUTION INTRAMUSCULAR; INTRAVENOUS at 14:05

## 2017-03-23 RX ADMIN — MAGNESIUM SULFATE HEPTAHYDRATE: 500 INJECTION, SOLUTION INTRAMUSCULAR; INTRAVENOUS at 12:08

## 2017-03-23 NOTE — PROGRESS NOTES
Almita Christensen. Florencia, NP    Admit Date: 3/21/2017  Hospital day 2    Subjective:   Denies nausea with increase in diet yesterday. Says she feels better this morning  Ostomy bag did need to be emptied at 8 pm and 2 AM this morning. Pt says she has noticed a \"little more gas in my bag\" since yesterday  IV fluid continues infusing   Ambulating in hallway yesterday well          Review of Systems:  General: Feels better today and denies complaint  HEENT: Denies visual changes, dysphagia or headache  Resp: Denies SOB, DUDLEY, wheezing or cough  CV: Denies CP, palpitations  GI/: Denies abd pain this morning. Denies nausea and has not vomited since admission now. Musk[place]el: Denies muscle ache or joint pain  Neuro: Denies dizzyness or syncope    Objective:     Blood pressure 99/59, pulse 85, temperature 98.2 °F (36.8 °C), resp. rate 16, height 5' 3\" (1.6 m), weight 124 lb 9.6 oz (56.5 kg), SpO2 97 %, not currently breastfeeding. Temp (24hrs), Av.9 °F (36.6 °C), Min:97.7 °F (36.5 °C), Max:98.2 °F (36.8 °C)      _____________________  Physical Exam:     General: A&O X 3  in no acute distress. Cardiovascular: Regular without M/R/G  Lungs:CTA bilat without wheezing or rales  Abdomen: Soft without distention or tenderness and + bowel sounds throughout. Small amount of gas noted in ostomy bag   Ext: + pedal pulses without edema bilat.  SCD's and Compression boots in place bilat  Neuro: grossly intact      Recent Results (from the past 12 hour(s))   METABOLIC PANEL, COMPREHENSIVE    Collection Time: 17  4:26 AM   Result Value Ref Range    Sodium 143 136 - 145 mmol/L    Potassium 3.7 3.5 - 5.1 mmol/L    Chloride 110 (H) 97 - 108 mmol/L    CO2 25 21 - 32 mmol/L    Anion gap 8 5 - 15 mmol/L    Glucose 91 65 - 100 mg/dL    BUN 6 6 - 20 MG/DL    Creatinine 0.84 0.55 - 1.02 MG/DL    BUN/Creatinine ratio 7 (L) 12 - 20      GFR est AA >60 >60 ml/min/1.73m2 GFR est non-AA >60 >60 ml/min/1.73m2    Calcium 8.6 8.5 - 10.1 MG/DL    Bilirubin, total 0.3 0.2 - 1.0 MG/DL    ALT (SGPT) 17 12 - 78 U/L    AST (SGOT) 15 15 - 37 U/L    Alk. phosphatase 166 (H) 45 - 117 U/L    Protein, total 6.4 6.4 - 8.2 g/dL    Albumin 2.8 (L) 3.5 - 5.0 g/dL    Globulin 3.6 2.0 - 4.0 g/dL    A-G Ratio 0.8 (L) 1.1 - 2.2     CBC WITH AUTOMATED DIFF    Collection Time: 03/23/17  4:26 AM   Result Value Ref Range    WBC 4.0 3.6 - 11.0 K/uL    RBC 2.88 (L) 3.80 - 5.20 M/uL    HGB 9.0 (L) 11.5 - 16.0 g/dL    HCT 27.4 (L) 35.0 - 47.0 %    MCV 95.1 80.0 - 99.0 FL    MCH 31.3 26.0 - 34.0 PG    MCHC 32.8 30.0 - 36.5 g/dL    RDW 13.5 11.5 - 14.5 %    PLATELET 826 511 - 583 K/uL    NEUTROPHILS 29 (L) 32 - 75 %    LYMPHOCYTES 61 (H) 12 - 49 %    MONOCYTES 8 5 - 13 %    EOSINOPHILS 2 0 - 7 %    BASOPHILS 0 0 - 1 %    ABS. NEUTROPHILS 1.2 (L) 1.8 - 8.0 K/UL    ABS. LYMPHOCYTES 2.4 0.8 - 3.5 K/UL    ABS. MONOCYTES 0.3 0.0 - 1.0 K/UL    ABS. EOSINOPHILS 0.1 0.0 - 0.4 K/UL    ABS. BASOPHILS 0.0 0.0 - 0.1 K/UL   MAGNESIUM    Collection Time: 03/23/17  4:26 AM   Result Value Ref Range    Magnesium 2.0 1.6 - 2.4 mg/dL         Assessment:   Active Problems:    Ovarian cancer ([facility] (10/12/2015)      Carcinomatosis peritonei ([facility] (10/29/2015)      Ileostomy in place 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  (25 mg) as pt's HR and SBP remain too low to be able to administer 50 mg dose  Will D/C neutropenic precautions with ANC now up to 1.2  Continue Zyrtec daily with saline nasal spray as needed  Mobilize in hallway again today throughout the day and this was discussed with pt.   Possible discharge later today if pt remains without nausea or vomiting    Jessica Don NP  3/23/2017    Data Review:    Recent Labs      03/23/17   0426  03/22/17   0355  03/21/17   0604   WBC  4.0  3.0*  5.0   HGB  9.0*  9.6*  11.0*   HCT  27.4*  29.3*  33.1*   PLT  243  245  278     Recent Labs      03/23/17   0426  03/22/17   0355  03/21/17   0604   NA  143  141  136   K  3.7  3.5  4.0   CL  110*  108  105   CO2  25  26  22   GLU  91  95  129*   BUN  6  15  28*   CREA  0.84  0.90  1.19*   CA  8.6  8.8  9.6   MG  2.0  2.2   --    ALB  2.8*  3.0*  3.5   SGOT  15  17  19   ALT  17  20  26     Recent Labs      03/21/17   0604   AML  101   LPSE  151           ______________________  Medications:    Current Facility-Administered Medications   Medication Dose Route Frequency Provider Last Rate Last Dose    HYDROcodone-acetaminophen (HYCET) 0.5-21.7 mg/mL oral solution 10-15 mg  10-15 mg Oral Q4H PRN Genny Don, NP   15 mg at 03/23/17 0507    metoprolol tartrate (LOPRESSOR) tablet 50 mg  50 mg Oral Q12H Gennyyancy Don, NP   Stopped at 03/22/17 2059    cetirizine (ZYRTEC) tablet 10 mg  10 mg Oral DAILY Genny Don, NP   10 mg at 03/22/17 0852    sodium chloride (OCEAN) 0.65 % nasal spray 2 Spray  2 Spray Both Nostrils Q2H PRN Genny Don, NP        metoclopramide HCl (REGLAN) injection 10 mg  10 mg IntraVENous Q6H Genny ROMÁN Don, NP   10 mg at 03/23/17 0634    sodium chloride (NS) flush 5-10 mL  5-10 mL IntraVENous Q8H Genny Don, NP   10 mL at 03/23/17 0634    sodium chloride (NS) flush 5-10 mL  5-10 mL IntraVENous PRN Genny Don, NP        HYDROmorphone (PF) (DILAUDID) injection 0.5 mg  0.5 mg IntraVENous Q4H PRN Genny Don NP   0.5 mg at 03/21/17 1931    naloxone (NARCAN) injection 0.4 mg  0.4 mg IntraVENous PRN Genny Don NP        diphenhydrAMINE (BENADRYL) injection 12.5 mg  12.5 mg IntraVENous Q4H PRN Genny Don NP        dextrose 5 % - 0.45% NaCl 1,000 mL with potassium chloride 20 mEq, magnesium sulfate 1 g, thiamine 100 mg, mvi, adult no. 4 with vit K 10 mL infusion   IntraVENous Q24H Genny Don NP        apixaban (ELIQUIS) tablet 5 mg  5 mg Oral Q12H Genny Don NP   5 mg at 03/22/17 2100    aluminum & magnesium hydroxide-simethicone (MYLANTA II) oral suspension 30 mL  30 mL Oral Q4H PRN Genny Don NP        dextrose 5 % - 0.45% NaCl infusion  125 mL/hr IntraVENous CONTINUOUS Genny Don  mL/hr at 03/22/17 1907 125 mL/hr at 03/22/17 1907

## 2017-03-23 NOTE — PROGRESS NOTES
Bedside shift change report given to Marina Vazquez RN (oncoming nurse) by J Carlos Bennett RN (offgoing nurse). Report included the following information SBAR.

## 2017-03-23 NOTE — CONSULTS
Upon review, Inpatient admission is appropriate for this patient. Known metastatic ovarian cancer and ileostomy presents with SBO with nausea and vomiting. Has needed IV hydration with repletion of electrolytes. Also issues of BP control and neutropenia following chemotherapy PTA. Gonsalo Whitlock, [facility].  Care Management

## 2017-03-23 NOTE — DISCHARGE SUMMARY
[place] [address] Oncology   Discharge Summary        Patient ID:  Christopher Ruelas  [id number]  61 y.o.  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 See daily notes for details of hospital stay, but briefly, pt pt was recently found to have recurrence of he ovarian cancer and was re-started treatment (Doxil/Avastin this time) and her first treatment was  2/27/ 2017. After her first treatment, pt said she did well and said \"all was good\" until 3/20 when she began having increase in abdominal pain and a noticeable increase in belching. Pt said she was taking her pain medication [id number] yesterday with \"very little relief\" of her abd pain. She was able to eat \"a normal dinner\" of chicken and rice with apple sauce without nausea. She said the abd pain was present throughout. Stated she began to notice a decrease in ostomy output beginning yesterday afternoon. She said she usually has to get up 2-3 times between 8pm and 9am to empty her bag. She said she only had to empty at 8 pm last night and not again until 8 this morning. Said it was \"just slightly more liquid\" than usual, but had been regular up until yesterday. She denies any vomiting to this point and says she is currently not nauseated. Says abd pain is currently \"pretty well controlled\".  She says she has not noticed as much gas in her bag. She was admitted and placed on bowel rest. IV fluids with daily goody bag was begun. She was also started Reglan. On HD #2, she had had no further nausea and no vomiting. And her ostomy had begun to put out slight increase in stool, but still not much gas. She was started on clear liquids and monitored. Later in the day of HD #2, she had tolerated clear liquids without any nausea and in the evening was started on a GI lite diet. She tolerated this well  On HD #3, her ostomy began to put out slightly more than prior day and she was now noticing gas in her bag. Pain was well controlled and she continued without nausea. She was deemed ready for discharge. Discharge instructions were reviewed with her and she will continue with her planned chemotherapy next Tuesday. Encouraged to call for any concerns or questions. Consults: None    Disposition: home    Patient Instructions: Activity: No lifting, pushing or pulling anything heavier than 20 lbs x 2 weeks. Walking as tolerated. No driving while you are taking narcotics. Diet: regular. If you have cramps or nausea, try eating smaller light meals more frequently. Follow-up with Dr. Cisco sEpinosa in 3-4 weeks.  Call [phone] to make your appointment       Signed:  Ewa Leung NP  3/23/2017

## 2017-03-23 NOTE — DISCHARGE INSTRUCTIONS
[facility] GYNECOLOGIC ONCOLOGY  [address], [facility] Thelma [phone]         [phone]    MD Alexandre Nunez MD Dianne E. Sylvester Aberdeen, NP     Patient Discharge Instructions          Bella Vallejo / [id number] : 1960    Admit Date: 3/21/2017 Discharge Date: 3/23/2017     Take Home Medications     See Discharge Medication Review provided to you by your nurse. If you did not receive one, request this prior to your discharge. · It is important that you take your medications as they are prescribed. · Keep your medications in the bottles provided by the pharmacist and keep a list of the medication names, dosages, times to be taken and what they are for in your wallet. · Do not take other medications without consulting your doctor. · You should take a daily gentle stool softener such as a colace pill or dulcolax suppository for constipation as this is not uncommon after surgery and/or while on pain medication. If not prescribed, this can be found over the counter. If constipation persists for >24 hours you should take a dose of Milk of Magnesia. Call if your constipation continues. Diet    · Stay hydrated with fluids such as gatorade and water. This will also help prevent constipation. Limit somewhat any usual caffeine intake of beverages such as soda, tea and coffee as this may serve to dehydrate you. · For the first 2-3 days keep a low fiber diet avoiding raw vegetables or fruits with skin. A diet consisting of soup, cereal, yogurt, eggs, fish, Boost/Ensure. Avoid fatty/greasy foods. · If nauseated, keep your diet limited to liquids and call if this persists. Activity    · Gradually increase your activity each day. There are generally no restrictions on walking, climbing stairs or riding in a car. Try to walk at least 6 times per day. · Showers are okay. If you have an incision, no tub bathing/swimming for two weeks.    · There are no lifting restrictions if you did not have surgery. Causes For Concern    If any of the following occur, please call our office and speak with the Nurse/aid who will help you with your problem or ask the doctor to call you. Increasing abdominal pain despite medication  Persisting nausea or vomiting. Fever or chills and a temperature >101F  Constipation (no bowel movement for three days). Diarrhea (more than three watery stools within 24 hours). If after hours and you cannot reach an on-call physician, call 911. Follow-Up    Call [phone] to schedule an appointment with Dr. Karime Yusuf in 2-3 weeks or sooner if needed. Information obtained by :  I understand that if any problems occur once I am at home I am to contact my physician. I understand and acknowledge receipt of the instructions indicated above.                                                                                                                                            Physician's or R.N.'s Signature                                                                  Date/Time                                                                                                                                              Patient or Representative Signature                                                          Date/Time

## 2017-03-24 RX ORDER — DEXAMETHASONE SODIUM PHOSPHATE 4 MG/ML
12 INJECTION, SOLUTION INTRA-ARTICULAR; INTRALESIONAL; INTRAMUSCULAR; INTRAVENOUS; SOFT TISSUE ONCE
Status: COMPLETED | OUTPATIENT
Start: 2017-03-28 | End: 2017-03-28

## 2017-03-28 ENCOUNTER — HOSPITAL ENCOUNTER (OUTPATIENT)
Dept: INFUSION THERAPY | Age: 57
Discharge: HOME | End: 2017-03-28
Payer: OTHER GOVERNMENT

## 2017-03-28 VITALS
RESPIRATION RATE: 16 BRPM | SYSTOLIC BLOOD PRESSURE: 99 MMHG | BODY MASS INDEX: 22.15 KG/M2 | DIASTOLIC BLOOD PRESSURE: 63 MMHG | TEMPERATURE: 98.2 F | WEIGHT: 125 LBS | HEIGHT: 63 IN | HEART RATE: 65 BPM

## 2017-03-28 DIAGNOSIS — T45.1X5A CINV (CHEMOTHERAPY-INDUCED NAUSEA AND VOMITING): ICD-10-CM

## 2017-03-28 DIAGNOSIS — R10.84 GENERALIZED ABDOMINAL PAIN: ICD-10-CM

## 2017-03-28 DIAGNOSIS — Z93.2 ILEOSTOMY IN PLACE (HCC): ICD-10-CM

## 2017-03-28 DIAGNOSIS — C56.9 OVARIAN CANCER, UNSPECIFIED LATERALITY (HCC): ICD-10-CM

## 2017-03-28 DIAGNOSIS — R11.2 CINV (CHEMOTHERAPY-INDUCED NAUSEA AND VOMITING): ICD-10-CM

## 2017-03-28 DIAGNOSIS — G89.3 CANCER ASSOCIATED PAIN: ICD-10-CM

## 2017-03-28 DIAGNOSIS — C78.6 CARCINOMATOSIS PERITONEI (HCC): ICD-10-CM

## 2017-03-28 DIAGNOSIS — F41.8 ANXIETY ABOUT HEALTH: ICD-10-CM

## 2017-03-28 PROBLEM — R79.89 ELEVATED SERUM CREATININE: Status: RESOLVED | Noted: 2017-03-21 | Resolved: 2017-03-28

## 2017-03-28 PROBLEM — Z79.01 ANTICOAGULATION ADEQUATE WITH ANTICOAGULANT THERAPY: Status: ACTIVE | Noted: 2017-03-28

## 2017-03-28 LAB
ALBUMIN SERPL BCP-MCNC: 3 G/DL (ref 3.5–5)
ALBUMIN/GLOB SERPL: 0.7 {RATIO} (ref 1.1–2.2)
ALP SERPL-CCNC: 148 U/L (ref 45–117)
ALT SERPL-CCNC: 17 U/L (ref 12–78)
ANION GAP BLD CALC-SCNC: 6 MMOL/L (ref 5–15)
APPEARANCE UR: CLEAR
AST SERPL W P-5'-P-CCNC: 14 U/L (ref 15–37)
BACTERIA URNS QL MICRO: NEGATIVE /HPF
BASOPHILS # BLD AUTO: 0 K/UL (ref 0–0.1)
BASOPHILS # BLD: 0 % (ref 0–1)
BILIRUB SERPL-MCNC: 0.2 MG/DL (ref 0.2–1)
BILIRUB UR QL: NEGATIVE
BUN SERPL-MCNC: 16 MG/DL (ref 6–20)
BUN/CREAT SERPL: 17 (ref 12–20)
CALCIUM SERPL-MCNC: 8.9 MG/DL (ref 8.5–10.1)
CHLORIDE SERPL-SCNC: 103 MMOL/L (ref 97–108)
CO2 SERPL-SCNC: 31 MMOL/L (ref 21–32)
COLOR UR: ABNORMAL
CREAT SERPL-MCNC: 0.95 MG/DL (ref 0.55–1.02)
EOSINOPHIL # BLD: 0.1 K/UL (ref 0–0.4)
EOSINOPHIL NFR BLD: 1 % (ref 0–7)
EPITH CASTS URNS QL MICRO: ABNORMAL /LPF
ERYTHROCYTE [DISTWIDTH] IN BLOOD BY AUTOMATED COUNT: 13 % (ref 11.5–14.5)
GLOBULIN SER CALC-MCNC: 4.2 G/DL (ref 2–4)
GLUCOSE SERPL-MCNC: 88 MG/DL (ref 65–100)
GLUCOSE UR STRIP.AUTO-MCNC: NEGATIVE MG/DL
HCT VFR BLD AUTO: 28.6 % (ref 35–47)
HGB BLD-MCNC: 9.4 G/DL (ref 11.5–16)
HGB UR QL STRIP: ABNORMAL
HYALINE CASTS URNS QL MICRO: ABNORMAL /LPF (ref 0–5)
KETONES UR QL STRIP.AUTO: NEGATIVE MG/DL
LEUKOCYTE ESTERASE UR QL STRIP.AUTO: NEGATIVE
LYMPHOCYTES # BLD AUTO: 41 % (ref 12–49)
LYMPHOCYTES # BLD: 2.1 K/UL (ref 0.8–3.5)
MCH RBC QN AUTO: 31.2 PG (ref 26–34)
MCHC RBC AUTO-ENTMCNC: 32.9 G/DL (ref 30–36.5)
MCV RBC AUTO: 95 FL (ref 80–99)
MONOCYTES # BLD: 0.5 K/UL (ref 0–1)
MONOCYTES NFR BLD AUTO: 9 % (ref 5–13)
NEUTS SEG # BLD: 2.5 K/UL (ref 1.8–8)
NEUTS SEG NFR BLD AUTO: 49 % (ref 32–75)
NITRITE UR QL STRIP.AUTO: NEGATIVE
PH UR STRIP: 6 [PH] (ref 5–8)
PLATELET # BLD AUTO: 256 K/UL (ref 150–400)
POTASSIUM SERPL-SCNC: 4 MMOL/L (ref 3.5–5.1)
PROT SERPL-MCNC: 7.2 G/DL (ref 6.4–8.2)
PROT UR STRIP-MCNC: 30 MG/DL
RBC # BLD AUTO: 3.01 M/UL (ref 3.8–5.2)
RBC #/AREA URNS HPF: ABNORMAL /HPF (ref 0–5)
SODIUM SERPL-SCNC: 140 MMOL/L (ref 136–145)
SP GR UR REFRACTOMETRY: 1.02 (ref 1–1.03)
UA: UC IF INDICATED,UAUC: ABNORMAL
UROBILINOGEN UR QL STRIP.AUTO: 0.2 EU/DL (ref 0.2–1)
WBC # BLD AUTO: 5.2 K/UL (ref 3.6–11)
WBC URNS QL MICRO: ABNORMAL /HPF (ref 0–4)

## 2017-03-28 PROCEDURE — 74011000258 HC RX REV CODE- 258: Performed by: OBSTETRICS & GYNECOLOGY

## 2017-03-28 PROCEDURE — 36415 COLL VENOUS BLD VENIPUNCTURE: CPT | Performed by: OBSTETRICS & GYNECOLOGY

## 2017-03-28 PROCEDURE — 77030012965 HC NDL HUBR BBMI -A

## 2017-03-28 PROCEDURE — 96375 TX/PRO/DX INJ NEW DRUG ADDON: CPT

## 2017-03-28 PROCEDURE — 74011250636 HC RX REV CODE- 250/636: Performed by: OBSTETRICS & GYNECOLOGY

## 2017-03-28 PROCEDURE — 96417 CHEMO IV INFUS EACH ADDL SEQ: CPT

## 2017-03-28 PROCEDURE — 96413 CHEMO IV INFUSION 1 HR: CPT

## 2017-03-28 PROCEDURE — 80053 COMPREHEN METABOLIC PANEL: CPT | Performed by: OBSTETRICS & GYNECOLOGY

## 2017-03-28 PROCEDURE — 74011250636 HC RX REV CODE- 250/636

## 2017-03-28 PROCEDURE — 81001 URINALYSIS AUTO W/SCOPE: CPT | Performed by: OBSTETRICS & GYNECOLOGY

## 2017-03-28 PROCEDURE — 85025 COMPLETE CBC W/AUTO DIFF WBC: CPT | Performed by: OBSTETRICS & GYNECOLOGY

## 2017-03-28 PROCEDURE — 74011000250 HC RX REV CODE- 250: Performed by: OBSTETRICS & GYNECOLOGY

## 2017-03-28 RX ORDER — ONDANSETRON 2 MG/ML
INJECTION INTRAMUSCULAR; INTRAVENOUS
Status: COMPLETED
Start: 2017-03-28 | End: 2017-03-28

## 2017-03-28 RX ORDER — HYDROCODONE BITARTRATE AND ACETAMINOPHEN 7.5; 325 MG/15ML; MG/15ML
15-30 SOLUTION ORAL
Qty: 950 ML | Refills: 0 | Status: SHIPPED | OUTPATIENT
Start: 2017-03-28 | End: 2017-04-04 | Stop reason: SDUPTHER

## 2017-03-28 RX ORDER — HEPARIN 100 UNIT/ML
500 SYRINGE INTRAVENOUS AS NEEDED
Status: ACTIVE | OUTPATIENT
Start: 2017-03-28 | End: 2017-03-29

## 2017-03-28 RX ORDER — ONDANSETRON HYDROCHLORIDE 4 MG/5ML
8 SOLUTION ORAL ONCE
Status: DISCONTINUED | OUTPATIENT
Start: 2017-03-28 | End: 2017-03-29 | Stop reason: SDUPTHER

## 2017-03-28 RX ORDER — SODIUM CHLORIDE 9 MG/ML
10 INJECTION INTRAMUSCULAR; INTRAVENOUS; SUBCUTANEOUS AS NEEDED
Status: ACTIVE | OUTPATIENT
Start: 2017-03-28 | End: 2017-03-29

## 2017-03-28 RX ORDER — SODIUM CHLORIDE 0.9 % (FLUSH) 0.9 %
10-40 SYRINGE (ML) INJECTION AS NEEDED
Status: ACTIVE | OUTPATIENT
Start: 2017-03-28 | End: 2017-03-29

## 2017-03-28 RX ADMIN — DOXORUBICIN HYDROCHLORIDE 60 MG: 2 INJECTION, SUSPENSION, LIPOSOMAL INTRAVENOUS at 16:01

## 2017-03-28 RX ADMIN — DEXAMETHASONE SODIUM PHOSPHATE 12 MG: 4 INJECTION, SOLUTION INTRA-ARTICULAR; INTRALESIONAL; INTRAMUSCULAR; INTRAVENOUS; SOFT TISSUE at 14:13

## 2017-03-28 RX ADMIN — BEVACIZUMAB 800 MG: 400 INJECTION, SOLUTION INTRAVENOUS at 14:55

## 2017-03-28 RX ADMIN — Medication 10 ML: at 15:53

## 2017-03-28 RX ADMIN — Medication 500 UNITS: at 15:52

## 2017-03-28 RX ADMIN — SODIUM CHLORIDE 10 ML: 9 INJECTION, SOLUTION INTRAMUSCULAR; INTRAVENOUS; SUBCUTANEOUS at 11:17

## 2017-03-28 RX ADMIN — FAMOTIDINE 20 MG: 10 INJECTION INTRAVENOUS at 14:10

## 2017-03-28 RX ADMIN — ONDANSETRON 8 MG: 2 INJECTION INTRAMUSCULAR; INTRAVENOUS at 17:13

## 2017-03-28 NOTE — PROGRESS NOTES
[facility] - Chemotherapy Progress Note    9077 Pt admit to [facility] for Avastin/Liposomal Doxil. Chemotherapy Flowsheet 3/28/2017   Cycle C2   Date 3/28/2017   Drug / Regimen Avastin/Doxil   Pre Meds Given   Notes Given     Pt ambulatory in stable condition. Assessment completed. No new concerns voiced. Port accessed with positive blood return. Labs drawn per order and sent. Recent Results (from the past 12 hour(s))   CBC WITH AUTOMATED DIFF    Collection Time: 03/28/17 11:17 AM   Result Value Ref Range    WBC 5.2 3.6 - 11.0 K/uL    RBC 3.01 (L) 3.80 - 5.20 M/uL    HGB 9.4 (L) 11.5 - 16.0 g/dL    HCT 28.6 (L) 35.0 - 47.0 %    MCV 95.0 80.0 - 99.0 FL    MCH 31.2 26.0 - 34.0 PG    MCHC 32.9 30.0 - 36.5 g/dL    RDW 13.0 11.5 - 14.5 %    PLATELET 999 135 - 454 K/uL    NEUTROPHILS 49 32 - 75 %    LYMPHOCYTES 41 12 - 49 %    MONOCYTES 9 5 - 13 %    EOSINOPHILS 1 0 - 7 %    BASOPHILS 0 0 - 1 %    ABS. NEUTROPHILS 2.5 1.8 - 8.0 K/UL    ABS. LYMPHOCYTES 2.1 0.8 - 3.5 K/UL    ABS. MONOCYTES 0.5 0.0 - 1.0 K/UL    ABS. EOSINOPHILS 0.1 0.0 - 0.4 K/UL    ABS. BASOPHILS 0.0 0.0 - 0.1 K/UL   METABOLIC PANEL, COMPREHENSIVE    Collection Time: 03/28/17 11:17 AM   Result Value Ref Range    Sodium 140 136 - 145 mmol/L    Potassium 4.0 3.5 - 5.1 mmol/L    Chloride 103 97 - 108 mmol/L    CO2 31 21 - 32 mmol/L    Anion gap 6 5 - 15 mmol/L    Glucose 88 65 - 100 mg/dL    BUN 16 6 - 20 MG/DL    Creatinine 0.95 0.55 - 1.02 MG/DL    BUN/Creatinine ratio 17 12 - 20      GFR est AA >60 >60 ml/min/1.73m2    GFR est non-AA >60 >60 ml/min/1.73m2    Calcium 8.9 8.5 - 10.1 MG/DL    Bilirubin, total 0.2 0.2 - 1.0 MG/DL    ALT (SGPT) 17 12 - 78 U/L    AST (SGOT) 14 (L) 15 - 37 U/L    Alk.  phosphatase 148 (H) 45 - 117 U/L    Protein, total 7.2 6.4 - 8.2 g/dL    Albumin 3.0 (L) 3.5 - 5.0 g/dL    Globulin 4.2 (H) 2.0 - 4.0 g/dL    A-G Ratio 0.7 (L) 1.1 - 2.2     URINALYSIS W/ REFLEX CULTURE    Collection Time: 03/28/17 11:17 AM   Result Value Ref Range    Color YELLOW/STRAW      Appearance CLEAR CLEAR      Specific gravity 1.025 1.003 - 1.030      pH (UA) 6.0 5.0 - 8.0      Protein 30 (A) NEG mg/dL    Glucose NEGATIVE  NEG mg/dL    Ketone NEGATIVE  NEG mg/dL    Bilirubin NEGATIVE  NEG      Blood SMALL (A) NEG      Urobilinogen 0.2 0.2 - 1.0 EU/dL    Nitrites NEGATIVE  NEG      Leukocyte Esterase NEGATIVE  NEG      UA:UC IF INDICATED CULTURE NOT INDICATED BY UA RESULT CNI      WBC 0-4 0 - 4 /hpf    RBC 5-10 0 - 5 /hpf    Epithelial cells FEW FEW /lpf    Bacteria NEGATIVE  NEG /hpf    Hyaline cast 0-2 0 - 5 /lpf       Visit Vitals    BP 99/63    Pulse 65    Temp 98.2 °F (36.8 °C)    Resp 16    Ht 5' 3\" (1.6 m)    Wt 56.7 kg (125 lb)    BMI 22.14 kg/m2       Medications:  NS at KVO  Dextrose at [place]  Pepcid  Decadron  Avastin  Doxil Liposomal    1700-towards end of second infusion pt c/o \"a little\" nausea. Spoke with Taj ReadD who advised to give pt 8MG of Zofran IV. Administered per order. Pt states she was feeling better afterwards. 1730 Pt tolerated treatment well. Port maintained positive blood return throughout treatment, flushed with positive blood return at conclusion and de-accessed. D/c home ambulatory in no distress. Pt aware of next [facility] appointment scheduled for 04/25/17.

## 2017-03-28 NOTE — PROGRESS NOTES
Benigno Rocha. Florencia, NP    Infusion Date: 3/28/2017     MAHSA Samayoa is a 61 y.o.  postmenopausal female with a diagnosis of stage IV ovarian cancer. She underwent exploratory laparotomy with suboptimal debulking. She had extensive disease. She was readmitted about a week later with obstruction and subsequently had a cecal perforation, requiring resection, end ileostomy, and mucous fistula. During the hospitalization she was also diagnosed with pulmonary embolus and had chest tubes placed for bilateral pleural effusions. She had a prolonged hospital course and actually received a dose of cisplatin chemotherapy while in the hospital to help dry up the effusions. She completed 6 cycles of Taxol/Carboplatin chemotherapy following her initial debulking. She did relatively well, considering she had an ileostomy to deal with during chemotherapy. I took her to the OR on 4/29/16 for interval debulking and reversal of her ileostomy. She had extensive disease, but we were able to resect the majority of her disease. One week later she developed an anastomotic leak requiring reexploration and recreation of an ileostomy. She had another prolonged hospitalization, but recovered well since surgery. We then reinitiated Taxol/Carboplatin chemotherapy, but did reduce the dose of Taxol to 135 mg/m2. She completed 4 mores cycles. Her CA-125 normalized and we stopped treatment.      On 2/7, she presented to Dr. Ball Peoples office with her  to discuss her follow up/surveillance CT scan. Unfortunately, it demonstrated progression of disease, although minimally worse than her prior scan 2 months previously. She was actually feeling better than she did at that time. After review of scans and disease, the decision was made to begin Doxil/Avastin Q28 days for 6 cycles.  She began this on 2/27/2017    Subjective:   Pt presents today for her second cycl of Doxil/Avastin  She was recently in the hospital for partial bowel obstruction and this resolved with medical management and bowel rest.  Since discharge last Thursday (), pt says she has been feeling \"so much better\" and even though it has been mostly bland diet, she has not bee nauseated and her colostomy has been working well  Today, she says she feels strong and denies any complaint           Review of Systems:  General: Denies complaint  HEENT: Denies visual changes, dysphagia or headache  Resp: Denies SOB, DUDLEY, wheezing or cough  CV: Denies CP, palpitations  GI/: Denies abd pain. Denies N/V. Says appetite is improving   MuskSkel: Denies muscle ache or joint pain  Neuro: Denies dizzyness or syncope    Objective:     Blood pressure 96/62, pulse 66, temperature 97.4 °F (36.3 °C), resp. rate 16, height 5' 3\" (1.6 m), weight 125 lb (56.7 kg). Temp (24hrs), Av.4 °F (36.3 °C), Min:97.4 °F (36.3 °C), Max:97.4 °F (36.3 °C)      _____________________  Physical Exam:     General: A&O X 3  in no acute distress. Port site without redness, tenderness or swelling  Cardiovascular: Regular without M/R/G  Lungs:CTA bilat without wheezing or rales  Abdomen: Soft without distention or tenderness and + bowel sounds throughout. Stool present in bag  Ext: + pedal pulses without edema bilat. SCD's and Compression boots in place bilat  Neuro: grossly intact      Recent Results (from the past 12 hour(s))   CBC WITH AUTOMATED DIFF    Collection Time: 17 11:17 AM   Result Value Ref Range    WBC 5.2 3.6 - 11.0 K/uL    RBC 3.01 (L) 3.80 - 5.20 M/uL    HGB 9.4 (L) 11.5 - 16.0 g/dL    HCT 28.6 (L) 35.0 - 47.0 %    MCV 95.0 80.0 - 99.0 FL    MCH 31.2 26.0 - 34.0 PG    MCHC 32.9 30.0 - 36.5 g/dL    RDW 13.0 11.5 - 14.5 %    PLATELET 938 835 - 801 K/uL    NEUTROPHILS 49 32 - 75 %    LYMPHOCYTES 41 12 - 49 %    MONOCYTES 9 5 - 13 %    EOSINOPHILS 1 0 - 7 %    BASOPHILS 0 0 - 1 %    ABS. NEUTROPHILS 2.5 1.8 - 8.0 K/UL    ABS. LYMPHOCYTES 2.1 0.8 - 3.5 K/UL    ABS. MONOCYTES 0.5 0.0 - 1.0 K/UL    ABS. EOSINOPHILS 0.1 0.0 - 0.4 K/UL    ABS. BASOPHILS 0.0 0.0 - 0.1 K/UL   METABOLIC PANEL, COMPREHENSIVE    Collection Time: 03/28/17 11:17 AM   Result Value Ref Range    Sodium 140 136 - 145 mmol/L    Potassium 4.0 3.5 - 5.1 mmol/L    Chloride 103 97 - 108 mmol/L    CO2 31 21 - 32 mmol/L    Anion gap 6 5 - 15 mmol/L    Glucose 88 65 - 100 mg/dL    BUN 16 6 - 20 MG/DL    Creatinine 0.95 0.55 - 1.02 MG/DL    BUN/Creatinine ratio 17 12 - 20      GFR est AA >60 >60 ml/min/1.73m2    GFR est non-AA >60 >60 ml/min/1.73m2    Calcium 8.9 8.5 - 10.1 MG/DL    Bilirubin, total 0.2 0.2 - 1.0 MG/DL    ALT (SGPT) 17 12 - 78 U/L    AST (SGOT) 14 (L) 15 - 37 U/L    Alk.  phosphatase 148 (H) 45 - 117 U/L    Protein, total 7.2 6.4 - 8.2 g/dL    Albumin 3.0 (L) 3.5 - 5.0 g/dL    Globulin 4.2 (H) 2.0 - 4.0 g/dL    A-G Ratio 0.7 (L) 1.1 - 2.2     URINALYSIS W/ REFLEX CULTURE    Collection Time: 03/28/17 11:17 AM   Result Value Ref Range    Color YELLOW/STRAW      Appearance CLEAR CLEAR      Specific gravity 1.025 1.003 - 1.030      pH (UA) 6.0 5.0 - 8.0      Protein 30 (A) NEG mg/dL    Glucose NEGATIVE  NEG mg/dL    Ketone NEGATIVE  NEG mg/dL    Bilirubin NEGATIVE  NEG      Blood SMALL (A) NEG      Urobilinogen 0.2 0.2 - 1.0 EU/dL    Nitrites NEGATIVE  NEG      Leukocyte Esterase NEGATIVE  NEG      UA:UC IF INDICATED CULTURE NOT INDICATED BY UA RESULT CNI      WBC 0-4 0 - 4 /hpf    RBC 5-10 0 - 5 /hpf    Epithelial cells FEW FEW /lpf    Bacteria NEGATIVE  NEG /hpf    Hyaline cast 0-2 0 - 5 /lpf         Assessment:     Patient Active Problem List   Diagnosis Code    Ovarian cancer (HCC) C56.9    Carcinomatosis peritonei (HCC) 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.6, C80.1    Ileostomy in place (HCC) Z93.2    Anemia due to chemotherapy D64.81, T45.1X5A    CINV (chemotherapy-induced nausea and vomiting) R11.2, T45.1X5A    Cancer associated pain G89.3    Generalized abdominal pain R10.84    Anxiety about health F41.8    Ileus ([address] K56.7    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    Hx of pulmonary embolus Z86.711    Elevated serum creatinine R79.89    Dehydration E86.0    Chemotherapy-induced neutropenia (HCC) D70.1         Plan:   Proceed with treatment today and she will let us know if she has any return of nausea or abd pain/discomfort  Refill Hycet per pt's request  Hydration encouraged  Monitor HGB closely  Continue Xarelto for previous PE    Genny Don NP  3/28/2017          ______________________  Medications:    Current Outpatient Prescriptions   Medication Sig Dispense Refill    HYDROcodone-acetaminophen (HYCET) 0.5-21.7 mg/mL oral solution Take 15-30 mL by mouth every four (4) hours as needed. Max Daily Amount: 90 mg. Disp: two(2) 473 ml bottles  Indications: Pain 950 mL 0    ondansetron (ZOFRAN ODT) 4 mg disintegrating tablet Take 1 Tab by mouth every eight (8) hours as needed for Nausea. 30 Tab 2    zolpidem (AMBIEN) 10 mg tablet Take 1 Tab by mouth nightly as needed for Sleep. Max Daily Amount: 10 mg. 30 Tab 1    magnesium oxide (MAG-OX) 400 mg tablet Take 1 Tab by mouth three (3) times daily. Indications: HYPOMAGNESEMIA 90 Tab 2    esomeprazole (NEXIUM) 40 mg capsule Take 1 Cap by mouth daily. 90 Cap 2    apixaban (ELIQUIS) 5 mg tablet Take 1 Tab by mouth two (2) times a day. 60 Tab 6    promethazine (PHENERGAN) 25 mg tablet Take 1 Tab by mouth every six (6) hours as needed for Nausea. 40 Tab 1    multivit-min-FA-coenzyme Q10 (AQUADEKS) 100-350-5 mcg-mcg-mg chew Take 1 Tab by mouth daily. Indications: VITAMIN DEFICIENCY PREVENTION 60 Tab 1    Calcium-Vitamin D3-Vitamin K 500-500-40 mg-unit-mcg chew Take 1 Tab by mouth two (2) times a day.  metoprolol (LOPRESSOR) 100 mg tablet Take  by mouth two (2) times a day.       lidocaine-prilocaine (EMLA) topical cream Apply small amount over port area and cover with a band aid 1 hour before chemo treatment 30 g 3    multivitamin (ONE A DAY) tablet Take 1 Tab by mouth daily.        Current Facility-Administered Medications   Medication Dose Route Frequency Provider Last Rate Last Dose    sodium chloride 0.9 % injection 10 mL  10 mL IntraVENous PRN Conner Man MD        heparin (porcine) pf 500 Units  500 Units IntraVENous PRN Conner Man MD        sodium chloride (NS) flush 10-40 mL  10-40 mL IntraVENous PRN Conner Man MD        liposomal DOXOrubicin (DOXIL/LIPODOX) 60 mg in dextrose 5% 250 mL, overfill volume 25 mL chemo infusion  60 mg IntraVENous ONCE Conner Man MD        bevacizumab (AVASTIN) 800 mg in 0.9% sodium chloride 100 mL, overfill volume 10 mL IVPB  800 mg IntraVENous Rachel Russell MD

## 2017-03-28 NOTE — NURSE NAVIGATOR
ONCOLOGY NURSE NAVIGATOR    Met with June last week on [address] shortly before her discharge  I had met June and her  back in 2015  She reflected on her recent recurrence of ovarian cancer and getting back in to chemo    She [place] from [place] [facility] [place] [address]  She likes idea of accessing integrative supports in [facility]  Will aim to schedule massage at times she has appts pre-chemo with Dr. Faith Lopez    No barriers to care assessed  She knows how to reach me and I'll follow    Ludmila Dos Santos from [facility] will [facility] to [facility]    Mortimer Hinders MS RN AOCNS

## 2017-04-04 DIAGNOSIS — F41.8 ANXIETY ABOUT HEALTH: ICD-10-CM

## 2017-04-04 DIAGNOSIS — T45.1X5A CINV (CHEMOTHERAPY-INDUCED NAUSEA AND VOMITING): ICD-10-CM

## 2017-04-04 DIAGNOSIS — C56.9 OVARIAN CANCER, UNSPECIFIED LATERALITY (HCC): ICD-10-CM

## 2017-04-04 DIAGNOSIS — Z93.2 ILEOSTOMY IN PLACE (HCC): ICD-10-CM

## 2017-04-04 DIAGNOSIS — C78.6 CARCINOMATOSIS PERITONEI (HCC): ICD-10-CM

## 2017-04-04 DIAGNOSIS — G89.3 CANCER ASSOCIATED PAIN: ICD-10-CM

## 2017-04-04 DIAGNOSIS — R10.84 GENERALIZED ABDOMINAL PAIN: ICD-10-CM

## 2017-04-04 DIAGNOSIS — R11.2 CINV (CHEMOTHERAPY-INDUCED NAUSEA AND VOMITING): ICD-10-CM

## 2017-04-04 RX ORDER — HYDROCODONE BITARTRATE AND ACETAMINOPHEN 7.5; 325 MG/15ML; MG/15ML
15-30 SOLUTION ORAL
Qty: 950 ML | Refills: 0 | Status: SHIPPED | OUTPATIENT
Start: 2017-04-04 | End: 2017-04-10 | Stop reason: SDUPTHER

## 2017-04-04 RX ORDER — FENTANYL 12.5 UG/1
1 PATCH TRANSDERMAL
Qty: 10 PATCH | Refills: 0 | Status: SHIPPED | OUTPATIENT
Start: 2017-04-04 | End: 2017-04-27 | Stop reason: DRUGHIGH

## 2017-04-05 ENCOUNTER — TELEPHONE (OUTPATIENT)
Dept: GYNECOLOGY | Age: 57
End: 2017-04-05

## 2017-04-05 NOTE — TELEPHONE ENCOUNTER
Called pt to discuss her concerns. She says she is feeling better today and has not had any further temp and her bag is passing gas and has semi liquid stool in it. She says she has stuck to a clear liquid diet today with chicken broth, \"lot's of water\" and decaf teas. Says she was nauseated some early this morning, but not since even with broth and tea. She still has phenergan and used it this morning. We discussed trying full liquid in small amounts to see how it worked. She said she would do a cream soup this evening. Instructed if she has any vomiting, or bag output stopped, to proceed to the ER for evaluation. She will call me tomorrow to let me know how she is doing or sooner if needed. She also decided to wait on beginning to use he Fentanyl patch as when she was reading the side effects, she read it could cause a bowel obstruction and she wanted to wait and make sure it was not that causing it. I agreed and she said she had enough pain medication for now.

## 2017-04-05 NOTE — TELEPHONE ENCOUNTER
Pt states she started yesterday afternoon with abdominal spasms that would come and go. With each spasms she noticed liquid stool output in her colostomy bag. Has been using Phenergan for nausea. Felt she had a fever during the night. Temp at 4am 99.6,and at 5:30am temp was 98.8 and had broken a sweat. Has changed diet to liquids. Temp now is 98.0.

## 2017-04-10 DIAGNOSIS — F41.8 ANXIETY ABOUT HEALTH: ICD-10-CM

## 2017-04-10 DIAGNOSIS — C56.9 OVARIAN CANCER, UNSPECIFIED LATERALITY (HCC): ICD-10-CM

## 2017-04-10 DIAGNOSIS — C78.6 CARCINOMATOSIS PERITONEI (HCC): ICD-10-CM

## 2017-04-10 DIAGNOSIS — Z93.2 ILEOSTOMY IN PLACE (HCC): ICD-10-CM

## 2017-04-10 DIAGNOSIS — G89.3 CANCER ASSOCIATED PAIN: ICD-10-CM

## 2017-04-10 DIAGNOSIS — T45.1X5A CINV (CHEMOTHERAPY-INDUCED NAUSEA AND VOMITING): ICD-10-CM

## 2017-04-10 DIAGNOSIS — R11.2 CINV (CHEMOTHERAPY-INDUCED NAUSEA AND VOMITING): ICD-10-CM

## 2017-04-10 DIAGNOSIS — R10.84 GENERALIZED ABDOMINAL PAIN: ICD-10-CM

## 2017-04-10 RX ORDER — HYDROCODONE BITARTRATE AND ACETAMINOPHEN 7.5; 325 MG/15ML; MG/15ML
15-30 SOLUTION ORAL
Qty: 950 ML | Refills: 0 | Status: SHIPPED | OUTPATIENT
Start: 2017-04-10 | End: 2017-04-20 | Stop reason: SDUPTHER

## 2017-04-11 ENCOUNTER — HOSPITAL ENCOUNTER (OUTPATIENT)
Dept: GENERAL RADIOLOGY | Age: 57
Discharge: HOME | End: 2017-04-11
Attending: INTERNAL MEDICINE
Payer: OTHER GOVERNMENT

## 2017-04-11 DIAGNOSIS — K56.7 ILEUS, UNSPECIFIED (HCC): ICD-10-CM

## 2017-04-11 DIAGNOSIS — Z92.89 HISTORY OF ETT: ICD-10-CM

## 2017-04-11 PROCEDURE — 74000 XR ABD PORT  1 V: CPT

## 2017-04-11 PROCEDURE — 71010 XR CHEST PORT: CPT

## 2017-04-19 ENCOUNTER — APPOINTMENT (OUTPATIENT)
Dept: CT IMAGING | Age: 57
End: 2017-04-19
Attending: EMERGENCY MEDICINE
Payer: OTHER GOVERNMENT

## 2017-04-19 ENCOUNTER — HOSPITAL ENCOUNTER (EMERGENCY)
Age: 57
Discharge: HOME | End: 2017-04-20
Attending: EMERGENCY MEDICINE
Payer: OTHER GOVERNMENT

## 2017-04-19 DIAGNOSIS — R10.84 ABDOMINAL PAIN, GENERALIZED: Primary | ICD-10-CM

## 2017-04-19 LAB
ALBUMIN SERPL BCP-MCNC: 3.3 G/DL (ref 3.5–5)
ALBUMIN/GLOB SERPL: 0.8 {RATIO} (ref 1.1–2.2)
ALP SERPL-CCNC: 124 U/L (ref 45–117)
ALT SERPL-CCNC: 16 U/L (ref 12–78)
ANION GAP BLD CALC-SCNC: 9 MMOL/L (ref 5–15)
AST SERPL W P-5'-P-CCNC: 26 U/L (ref 15–37)
BASOPHILS # BLD AUTO: 0 K/UL (ref 0–0.1)
BASOPHILS # BLD: 0 % (ref 0–1)
BILIRUB SERPL-MCNC: 0.2 MG/DL (ref 0.2–1)
BUN SERPL-MCNC: 19 MG/DL (ref 6–20)
BUN/CREAT SERPL: 18 (ref 12–20)
CALCIUM SERPL-MCNC: 9.3 MG/DL (ref 8.5–10.1)
CHLORIDE SERPL-SCNC: 99 MMOL/L (ref 97–108)
CO2 SERPL-SCNC: 27 MMOL/L (ref 21–32)
CREAT SERPL-MCNC: 1.03 MG/DL (ref 0.55–1.02)
EOSINOPHIL # BLD: 0.1 K/UL (ref 0–0.4)
EOSINOPHIL NFR BLD: 2 % (ref 0–7)
ERYTHROCYTE [DISTWIDTH] IN BLOOD BY AUTOMATED COUNT: 13.3 % (ref 11.5–14.5)
GLOBULIN SER CALC-MCNC: 4.2 G/DL (ref 2–4)
GLUCOSE SERPL-MCNC: 93 MG/DL (ref 65–100)
HCT VFR BLD AUTO: 30.5 % (ref 35–47)
HGB BLD-MCNC: 9.9 G/DL (ref 11.5–16)
LYMPHOCYTES # BLD AUTO: 41 % (ref 12–49)
LYMPHOCYTES # BLD: 2.1 K/UL (ref 0.8–3.5)
MCH RBC QN AUTO: 31.3 PG (ref 26–34)
MCHC RBC AUTO-ENTMCNC: 32.5 G/DL (ref 30–36.5)
MCV RBC AUTO: 96.5 FL (ref 80–99)
MONOCYTES # BLD: 0.5 K/UL (ref 0–1)
MONOCYTES NFR BLD AUTO: 9 % (ref 5–13)
NEUTS SEG # BLD: 2.5 K/UL (ref 1.8–8)
NEUTS SEG NFR BLD AUTO: 48 % (ref 32–75)
PLATELET # BLD AUTO: 304 K/UL (ref 150–400)
POTASSIUM SERPL-SCNC: 3.9 MMOL/L (ref 3.5–5.1)
PROT SERPL-MCNC: 7.5 G/DL (ref 6.4–8.2)
RBC # BLD AUTO: 3.16 M/UL (ref 3.8–5.2)
SODIUM SERPL-SCNC: 135 MMOL/L (ref 136–145)
WBC # BLD AUTO: 5.1 K/UL (ref 3.6–11)

## 2017-04-19 PROCEDURE — 85025 COMPLETE CBC W/AUTO DIFF WBC: CPT | Performed by: EMERGENCY MEDICINE

## 2017-04-19 PROCEDURE — 74177 CT ABD & PELVIS W/CONTRAST: CPT

## 2017-04-19 PROCEDURE — 99283 EMERGENCY DEPT VISIT LOW MDM: CPT

## 2017-04-19 PROCEDURE — 80053 COMPREHEN METABOLIC PANEL: CPT | Performed by: EMERGENCY MEDICINE

## 2017-04-19 PROCEDURE — 36415 COLL VENOUS BLD VENIPUNCTURE: CPT | Performed by: EMERGENCY MEDICINE

## 2017-04-19 PROCEDURE — 74011250636 HC RX REV CODE- 250/636: Performed by: EMERGENCY MEDICINE

## 2017-04-19 PROCEDURE — 74011636320 HC RX REV CODE- 636/320: Performed by: EMERGENCY MEDICINE

## 2017-04-19 PROCEDURE — 96375 TX/PRO/DX INJ NEW DRUG ADDON: CPT

## 2017-04-19 PROCEDURE — 96374 THER/PROPH/DIAG INJ IV PUSH: CPT

## 2017-04-19 PROCEDURE — 96361 HYDRATE IV INFUSION ADD-ON: CPT

## 2017-04-19 PROCEDURE — 74011000258 HC RX REV CODE- 258: Performed by: EMERGENCY MEDICINE

## 2017-04-19 RX ORDER — HYDROMORPHONE HYDROCHLORIDE 1 MG/ML
1 INJECTION, SOLUTION INTRAMUSCULAR; INTRAVENOUS; SUBCUTANEOUS
Status: COMPLETED | OUTPATIENT
Start: 2017-04-19 | End: 2017-04-19

## 2017-04-19 RX ORDER — HYDROCODONE BITARTRATE AND ACETAMINOPHEN 7.5; 325 MG/15ML; MG/15ML
30 SOLUTION ORAL
Status: COMPLETED | OUTPATIENT
Start: 2017-04-19 | End: 2017-04-20

## 2017-04-19 RX ORDER — SODIUM CHLORIDE 0.9 % (FLUSH) 0.9 %
10 SYRINGE (ML) INJECTION
Status: COMPLETED | OUTPATIENT
Start: 2017-04-19 | End: 2017-04-19

## 2017-04-19 RX ORDER — ONDANSETRON 2 MG/ML
4 INJECTION INTRAMUSCULAR; INTRAVENOUS
Status: COMPLETED | OUTPATIENT
Start: 2017-04-19 | End: 2017-04-19

## 2017-04-19 RX ADMIN — IOPAMIDOL 100 ML: 755 INJECTION, SOLUTION INTRAVENOUS at 22:56

## 2017-04-19 RX ADMIN — Medication 10 ML: at 22:56

## 2017-04-19 RX ADMIN — ONDANSETRON 4 MG: 2 INJECTION INTRAMUSCULAR; INTRAVENOUS at 23:03

## 2017-04-19 RX ADMIN — SODIUM CHLORIDE 100 ML: 900 INJECTION, SOLUTION INTRAVENOUS at 22:56

## 2017-04-19 RX ADMIN — HYDROMORPHONE HYDROCHLORIDE 1 MG: 1 INJECTION, SOLUTION INTRAMUSCULAR; INTRAVENOUS; SUBCUTANEOUS at 23:03

## 2017-04-19 RX ADMIN — SODIUM CHLORIDE 1000 ML: 900 INJECTION, SOLUTION INTRAVENOUS at 23:04

## 2017-04-20 ENCOUNTER — OFFICE VISIT (OUTPATIENT)
Dept: GYNECOLOGY | Age: 57
End: 2017-04-20

## 2017-04-20 VITALS
SYSTOLIC BLOOD PRESSURE: 97 MMHG | BODY MASS INDEX: 22.54 KG/M2 | DIASTOLIC BLOOD PRESSURE: 65 MMHG | HEART RATE: 67 BPM | HEIGHT: 63 IN

## 2017-04-20 VITALS
WEIGHT: 127.2 LBS | BODY MASS INDEX: 22.54 KG/M2 | RESPIRATION RATE: 16 BRPM | TEMPERATURE: 98.2 F | HEIGHT: 63 IN | SYSTOLIC BLOOD PRESSURE: 106 MMHG | DIASTOLIC BLOOD PRESSURE: 58 MMHG | HEART RATE: 74 BPM | OXYGEN SATURATION: 99 %

## 2017-04-20 DIAGNOSIS — G89.3 CANCER ASSOCIATED PAIN: ICD-10-CM

## 2017-04-20 DIAGNOSIS — C56.9 OVARIAN CANCER, UNSPECIFIED LATERALITY (HCC): Primary | ICD-10-CM

## 2017-04-20 DIAGNOSIS — R10.84 GENERALIZED ABDOMINAL PAIN: ICD-10-CM

## 2017-04-20 DIAGNOSIS — Z93.2 ILEOSTOMY IN PLACE (HCC): ICD-10-CM

## 2017-04-20 DIAGNOSIS — R11.2 CINV (CHEMOTHERAPY-INDUCED NAUSEA AND VOMITING): ICD-10-CM

## 2017-04-20 DIAGNOSIS — T45.1X5A CINV (CHEMOTHERAPY-INDUCED NAUSEA AND VOMITING): ICD-10-CM

## 2017-04-20 DIAGNOSIS — C78.6 CARCINOMATOSIS PERITONEI (HCC): ICD-10-CM

## 2017-04-20 DIAGNOSIS — C56.9 OVARIAN CANCER, UNSPECIFIED LATERALITY (HCC): ICD-10-CM

## 2017-04-20 DIAGNOSIS — F41.8 ANXIETY ABOUT HEALTH: ICD-10-CM

## 2017-04-20 PROCEDURE — 74011250637 HC RX REV CODE- 250/637: Performed by: EMERGENCY MEDICINE

## 2017-04-20 RX ORDER — HYDROCODONE BITARTRATE AND ACETAMINOPHEN 7.5; 325 MG/15ML; MG/15ML
15-30 SOLUTION ORAL
Qty: 950 ML | Refills: 0 | Status: SHIPPED | OUTPATIENT
Start: 2017-04-20 | End: 2017-04-25 | Stop reason: SDUPTHER

## 2017-04-20 RX ORDER — PROMETHAZINE HYDROCHLORIDE 25 MG/1
25 TABLET ORAL
Qty: 40 TAB | Refills: 1 | Status: SHIPPED | OUTPATIENT
Start: 2017-04-20 | End: 2017-06-07

## 2017-04-20 RX ORDER — ZOLPIDEM TARTRATE 10 MG/1
10 TABLET ORAL
Qty: 30 TAB | Refills: 1 | Status: SHIPPED | OUTPATIENT
Start: 2017-04-20 | End: 2017-12-27 | Stop reason: SDUPTHER

## 2017-04-20 RX ADMIN — HYDROCODONE BITARTRATE, ACETAMINOPHEN 15 MG: 325; 7.5 SOLUTION ORAL at 00:46

## 2017-04-20 NOTE — ED PROVIDER NOTES
HPI Comments: 61 y.o. female with extensive past medical history, please see list, significant for ovarian cancer, abd carcinomatosis, PE, BRCA negative, HTN, depression, tonsillectomy, laparotomy, cardiac cath, perforated bowel ileostomy, and hysterectomy  who presents from home with chief complaint of abd pain. Pt c/o 8/10 abd pain earlier that has improved that onset 8 hours ago. Pt emptied her ileostomy bag 9 hours ago and had no output over the next 5 hours, which is unusual. Pt reports a small output since then, but much less than normal. Pt took hycet and has a fentanyl 12mcg patch, which she claims didn't provide her any relief. Pt also c/o nausea for which she took phenergan and zofran, chills, and that urinating \"feels different, but not painful\". Pt ate lunch, but hasn't had anything but tea and water since her abd. Pain onset. Pt had a partial bowel obstruction 1 month ago that didn't require surgery. Pt is undergoing chemo, which she last received 21 days ago through her chest port. Pt denies any fever, sweating, dysuria, or vomiting. Pt denies any allergy to IV contrast. There are no other acute medical concerns at this time. Social hx: No tobacco use  Oncologist: Per Garcia MD  PCP: None    Note written by Arnaud Ba, as dictated by [facility], [place], DO 11:06 PM        The history is provided by the patient. No  was used.         Past Medical History:   Diagnosis Date    Abdominal carcinomatosis ([address] 10/2015    Arthritis     left shoulder    BRCA negative 12/2015    Chronic pain     Depression     HX    Environmental allergies     GERD (gastroesophageal reflux disease)     Hypertension     NEW OVER PAST 5-6 MONTHS    Ovarian cancer ([address] 10/2015    serous adenocarcinoma, high grade, stage IIIC    Pulmonary embolism ([address] 10/2015       Past Surgical History:   Procedure Laterality Date    CARDIAC SURG PROCEDURE UNLIST  12/11/15    cardiac cath/normal     HX BREAST BIOPSY  1995    benign right breast bx    HX DILATION AND CURETTAGE  2/2011    POLYPECTOMY    HX GI  2015    PERFORATED BOWEL; ILEOSTOMY    HX GYN  10/2015    EXP LAPAROTOMY    HX HEENT  LAST 2005    oral tissue graft X3    HX HEENT  1970    tonsillectomy    HX LAPAROTOMY  10/2015    tumor sampling    HX LAPAROTOMY  4/2016    hysterectomy, BSO, radical debulking    HX LAPAROTOMY  5/2016    resection ileocolic anastomosis, leak, ileostomy    HX OTHER SURGICAL  11/2015    tunneled portacath         Family History:   Problem Relation Age of Onset    Cancer Maternal Uncle      skin    Hypertension Mother     High Cholesterol Mother     Anxiety Mother     Heart Disease Mother     High Cholesterol Father     Hypertension Father     Stroke Father     Arthritis-osteo Sister     Migraines Sister     Other Sister      FIBROMYALGIA    Depression Brother     Other Brother      DRUG ABUSE HEPATITIS C    Migraines Sister     Arrhythmia Sister     Depression Sister     Lung Disease Paternal Aunt      COPD    Cancer Paternal Uncle      LUNG    Lung Disease Paternal Uncle      COPD    Lung Disease Maternal Grandmother      COPD    Anesth Problems Neg Hx        Social History     Social History    Marital status:      Spouse name: N/A    Number of children: N/A    Years of education: N/A     Occupational History    Not on file. Social History Main Topics    Smoking status: Never Smoker    Smokeless tobacco: Never Used    Alcohol use No    Drug use: No    Sexual activity: Not on file     Other Topics Concern    Not on file     Social History Narrative         ALLERGIES: Ativan [lorazepam] and Morphine    Review of Systems   Constitutional: Positive for chills. Negative for diaphoresis and fever. Gastrointestinal: Positive for abdominal pain (comes and goes) and nausea. Negative for vomiting. Genitourinary: Negative for dysuria.    All other systems reviewed and are negative. Vitals:    04/19/17 2110 04/19/17 2304   BP: 112/71 111/51   Pulse: 69 79   Resp: 16 16   Temp: 98 °F (36.7 °C)    SpO2: 96% 100%   Weight: 57.7 kg (127 lb 3.2 oz)    Height: 5' 3\" (1.6 m)             Physical Exam   Nursing note and vitals reviewed. Constitutional: Pt is awake and alert. Pt appears well-developed and well-nourished. NAD. HENT:   Head: Normocephalic and atraumatic. Nose: Nose normal.   Mouth/Throat: Oropharynx is clear and moist. No oropharyngeal exudate. Eyes: Conjunctivae and extraocular motions are normal. Pupils are equal, round, and reactive to light. Right eye exhibits no discharge. Left eye exhibits no discharge. No scleral icterus. Neck: No tracheal deviation present. Supple neck. Cardiovascular: Normal rate, regular rhythm, normal heart sounds and intact distal pulses. Exam reveals no gallop and no friction rub. No murmur heard. Pulmonary/Chest: Effort normal and breath sounds normal.  Pt  has no wheezes. Pt  has no rales. Abdominal: Soft. Pt  exhibits mild distention and no mass. Tender centrally around umbilicus  Pt  has no rebound and no guarding. Ileostomy bag in RLQ  Musculoskeletal:  Pt  exhibits no edema and no tenderness. Ext: Normal ROM in all four extremities; not tender to palpation; distal pulses are normal, no edema. Neurological:  Pt is alert. nonfocal neuro exam.  Skin: Skin is warm and dry. Pt  is not diaphoretic. Psychiatric:  Pt  has a normal mood and affect. Behavior is normal.   Note written by Arnaud Thompson, as dictated by Joie Baron DO 11:08 PM              [facility]  ED Course       Procedures      11:57 PM - getting ivf. No nausea. Pain 1/10. Feels better. CT results reassuring. Would like to go home. Will see Dr Emily Granados in 14 hrs. They will do chemo labs which will include a UA. Did  Not send one here. No dysuria sx. Has half ileostomy bag full right now. [id number] [address]a going home. Went over reasons to return.   Was already having improved pain by the time she got here earlier. Labs Reviewed   CBC WITH AUTOMATED DIFF - Abnormal; Notable for the following:        Result Value    RBC 3.16 (*)     HGB 9.9 (*)     HCT 30.5 (*)     All other components within normal limits   METABOLIC PANEL, COMPREHENSIVE - Abnormal; Notable for the following:     Sodium 135 (*)     Creatinine 1.03 (*)     GFR est non-AA 55 (*)     Alk.  phosphatase 124 (*)     Albumin 3.3 (*)     Globulin 4.2 (*)     A-G Ratio 0.8 (*)     All other components within normal limits   SAMPLES BEING HELD

## 2017-04-20 NOTE — PROGRESS NOTES
[address] [place] [address] [address], [address]  [phone]  MD Carli Lofton MD  Office Note  Patient ID:  Name:  Dona Longo  MRN:  [id number]  :  1960/56 y.o. Date:  2017      HISTORY OF PRESENT ILLNESS:  Dona Longo is a 61 y.o.  postmenopausal female with a diagnosis of stage IV ovarian cancer. She underwent exploratory laparotomy with suboptimal debulking. She had extensive disease. She was readmitted about a week later with obstruction and subsequently had a cecal perforation, requiring resection, end ileostomy, and mucous fistula. During the hospitalization she was also diagnosed with pulmonary embolus and had chest tubes placed for bilateral pleural effusions. She had a prolonged hospital course and actually received a dose of cisplatin chemotherapy while in the hospital to help dry up the effusions. She completed 6 cycles of Taxol/Carboplatin chemotherapy following her initial debulking. She  did relatively well, considering she had an ileostomy to deal with during chemotherapy. I took her to the OR on 16 for interval debulking and reversal of her ileostomy. She had extensive disease, but we were able to resect the majority of her disease. One week later she developed an anastomotic leak requiring reexploration and recreation of an ileostomy. She had another prolonged hospitalization, but recovered well since surgery. We then reinitiated Taxol/Carboplatin chemotherapy, but did reduce the dose of Taxol to 135 mg/m2. She completed 4 mores cycles. Her CA-125 normalized and we stopped treatment. She now has evidence of recurrent disease and she is receiving Doxil/Avastin. She has completed 2 cycles so far. She was actually seen in the ER last night for some abdominal pain and decreased ostomy output. She was concerned for a possible obstruction.   She was admitted to [facility] last month for similar symptoms. Her scan showed no evidence of obstruction and she was discharged home. She feels better today.        Problem List:  Patient Active Problem List    Diagnosis Date Noted    Anticoagulation adequate with anticoagulant therapy 03/28/2017    Chemotherapy-induced neutropenia (HCC) 03/22/2017    Ileus ([address] 03/21/2017    Thrombosis of pelvic vein 03/21/2017    Hx of pulmonary embolus 03/21/2017    Dehydration 03/21/2017    Cancer associated pain 05/13/2016    Generalized abdominal pain 05/13/2016    Anxiety about health 05/13/2016    CINV (chemotherapy-induced nausea and vomiting) 03/22/2016    Anemia due to chemotherapy 02/22/2016    Ileostomy in place [facility]) 02/15/2016    Carcinomatosis peritonei ([address] 10/29/2015    Ovarian cancer ([address] 10/12/2015     PMH:  Past Medical History:   Diagnosis Date    Abdominal carcinomatosis ([address] 10/2015    Arthritis     left shoulder    BRCA negative 12/2015    Chronic pain     Depression     HX    Environmental allergies     GERD (gastroesophageal reflux disease)     Hypertension     NEW OVER PAST 5-6 MONTHS    Ovarian cancer ([address] 10/2015    serous adenocarcinoma, high grade, stage IIIC    Pulmonary embolism ([address] 10/2015      PSH:  Past Surgical History:   Procedure Laterality Date    CARDIAC SURG PROCEDURE UNLIST  12/11/15    cardiac cath/normal     HX BREAST BIOPSY  1995    benign right breast bx    HX DILATION AND CURETTAGE  2/2011    POLYPECTOMY    HX GI  2015    PERFORATED BOWEL; ILEOSTOMY    HX GYN  10/2015    EXP LAPAROTOMY    HX HEENT  LAST 2005    oral tissue graft X3    HX HEENT  1970    tonsillectomy    HX LAPAROTOMY  10/2015    tumor sampling    HX LAPAROTOMY  4/2016    hysterectomy, BSO, radical debulking    HX LAPAROTOMY  5/2016    resection ileocolic anastomosis, leak, ileostomy    HX OTHER SURGICAL  11/2015    tunneled portacath      Social History:  Social History   Substance Use Topics    Smoking status: Never Smoker    Smokeless tobacco: Never Used    Alcohol use No      Family History:  Family History   Problem Relation Age of Onset    Cancer Maternal Uncle      skin    Hypertension Mother     High Cholesterol Mother     Anxiety Mother     Heart Disease Mother     High Cholesterol Father     Hypertension Father     Stroke Father    Pia Mourning Sister     Migraines Sister     Other Sister      FIBROMYALGIA    Depression Brother     Other Brother      DRUG ABUSE HEPATITIS C    Migraines Sister     Arrhythmia Sister     Depression Sister     Lung Disease Paternal Aunt      COPD    Cancer Paternal Uncle      LUNG    Lung Disease Paternal Uncle      COPD    Lung Disease Maternal Grandmother      COPD    Anesth Problems Neg Hx       Medications: (reviewed)  Current Outpatient Prescriptions   Medication Sig    HYDROcodone-acetaminophen (HYCET) 0.5-21.7 mg/mL oral solution Take 15-30 mL by mouth every four (4) hours as needed. Max Daily Amount: 90 mg. Disp: two(2) 473 ml bottles  Indications: Pain    fentaNYL (DURAGESIC) 12 mcg/hr patch 1 Patch by TransDERmal route every seventy-two (72) hours. Max Daily Amount: 1 Patch.  ondansetron (ZOFRAN ODT) 4 mg disintegrating tablet Take 1 Tab by mouth every eight (8) hours as needed for Nausea.  zolpidem (AMBIEN) 10 mg tablet Take 1 Tab by mouth nightly as needed for Sleep. Max Daily Amount: 10 mg.    magnesium oxide (MAG-OX) 400 mg tablet Take 1 Tab by mouth three (3) times daily. Indications: HYPOMAGNESEMIA    esomeprazole (NEXIUM) 40 mg capsule Take 1 Cap by mouth daily.  apixaban (ELIQUIS) 5 mg tablet Take 1 Tab by mouth two (2) times a day.  promethazine (PHENERGAN) 25 mg tablet Take 1 Tab by mouth every six (6) hours as needed for Nausea.  multivit-min-FA-coenzyme Q10 (AQUADEKS) 100-350-5 mcg-mcg-mg chew Take 1 Tab by mouth daily.  Indications: VITAMIN DEFICIENCY PREVENTION    Calcium-Vitamin D3-Vitamin K 681-059-66 mg-unit-mcg chew Take 1 Tab by mouth two (2) times a day.  metoprolol (LOPRESSOR) 100 mg tablet Take  by mouth two (2) times a day.  lidocaine-prilocaine (EMLA) topical cream Apply small amount over port area and cover with a band aid 1 hour before chemo treatment    multivitamin (ONE A DAY) tablet Take 1 Tab by mouth daily. No current facility-administered medications for this visit. Allergies: (reviewed)  Allergies   Allergen Reactions    Ativan [Lorazepam] Other (comments)     SEVERE CONFUSION    Morphine Rash and Itching          OBJECTIVE:    Physical Exam:  VITAL SIGNS: There were no vitals filed for this visit. There is no height or weight on file to calculate BMI. GENERAL AVA: Conversant, alert, oriented. No acute distress. HEENT: HEENT. No thyroid enlargement. No JVD. Neck: Supple without restrictions. RESPIRATORY: Clear to auscultation and percussion to the bases. No CVAT. CARDIOVASC: RRR without murmur/rub. GASTROINT: soft, non-tender, without masses or organomegaly; ileostomy in RLQ   MUSCULOSKEL: no joint tenderness, deformity or swelling   EXTREMITIES: extremities normal, atraumatic, no cyanosis or edema   PELVIC: Deferred   RECTAL: Deferred   JOSE SURVEY: No suspicious lymphadenopathy or edema noted. NEURO: Grossly intact. No acute deficit.        Lab Results   Component Value Date/Time    WBC 5.1 04/19/2017 09:22 PM    HGB 9.9 04/19/2017 09:22 PM    HCT 30.5 04/19/2017 09:22 PM    PLATELET 299 17/33/5312 09:22 PM    MCV 96.5 04/19/2017 09:22 PM     Lab Results   Component Value Date/Time    Sodium 135 04/19/2017 09:22 PM    Potassium 3.9 04/19/2017 09:22 PM    Chloride 99 04/19/2017 09:22 PM    CO2 27 04/19/2017 09:22 PM    Anion gap 9 04/19/2017 09:22 PM    Glucose 93 04/19/2017 09:22 PM    BUN 19 04/19/2017 09:22 PM    Creatinine 1.03 04/19/2017 09:22 PM    BUN/Creatinine ratio 18 04/19/2017 09:22 PM    GFR est AA >60 04/19/2017 09:22 PM    GFR est non-AA 55 04/19/2017 09:22 PM    Calcium 9.3 04/19/2017 09:22 PM       CT of chest/abdomen/pelvis (1/6/17)  LUNG BASES: Clear. INCIDENTALLY IMAGED HEART AND MEDIASTINUM: Unremarkable. LIVER: A small hypodensity in segment 8 of the liver is unchanged from the prior  examination. No evidence to suggest metastatic disease to the liver. There is a  tiny cyst superficial in segment 3.     GALLBLADDER: Unremarkable. SPLEEN: No mass. PANCREAS: No mass or ductal dilatation. ADRENALS: Unremarkable. KIDNEYS: No mass, calculus, or hydronephrosis. STOMACH: Unremarkable. SMALL BOWEL: No dilatation or wall thickening. COLON: No dilatation or wall thickening. PERITONEUM: There is increased focal ascites in the subhepatic space. There is persistent soft tissue abnormality superior spleen as well as adjacent  to the dome of the liver on the right which has been demonstrated previously and  may represent carcinomatosis.     RETROPERITONEUM: No lymphadenopathy or aortic aneurysm. REPRODUCTIVE ORGANS: Uterus and ovaries are surgically removed  URINARY BLADDER: No mass or calculus. BONES: No destructive bone lesion. ADDITIONAL COMMENTS: N/A     IMPRESSION:  1. Peritoneal implants superior to the spleen in the subphrenic space is  slightly more prominent than the prior examination. Peritoneal thickening at the  dome of the liver appears stable. 2. Slight increase in subhepatic ascites. IMPRESSION/PLAN:  Gin Parra is a 61 y.o. female with a diagnosis of stage IV ovarian cancer. She is s/p suboptimal debulking, followed by a second laparotomy for cecal perforation. She completed 6 cycles of Taxol/Carboplatin chemotherapy, in addition to the one dose of cisplatin given during her initial hospitalization. Based upon her CT and her CA-125, she responded well, though there was still disease remaining.   She underwent interval debulking with resection of the majority of her disease, although there was still small volume carcinomatosis remaining. I recommended continuing with Taxol/Carboplatin, as she did respond well, despite still having residual disease at her interval debulking. I dropped the dose of Taxol to 135 mg/m2 to reduce the chance of neuropathy, but kept the carboplatin dose at an AUC of 6. She completed 4 more cycles and her CA-125 normalized. She now has recurrent disease and I recommended Doxil/Avastin, considering it had been less than 6 months since we stopped Taxol/Carboplatin. She has completed 2 cycles of that regimen. It is a little to early to determine if the regimen is working or not, although I am a little concerned about the increase in ascites on her CT. We will continue with the current regimen for now.       Signed By: Daniel Simon MD     4/20/2017/10:35 AM

## 2017-04-20 NOTE — ED NOTES
Pt discharged from ED with follow up care instructions; pt verbalized understanding. VSS. NAD. Pt reports pain of 2/10 at this time. Pt ambulatory from ED with steady gait.

## 2017-04-20 NOTE — DISCHARGE INSTRUCTIONS
We hope that we have addressed all of your medical concerns. The examination and treatment you received in the Emergency Department were for an emergent problem and were not intended as complete care. It is important that you follow up with your healthcare provider(s) for ongoing care. If your symptoms worsen or do not improve as expected, and you are unable to reach your usual health care provider(s), you should return to the Emergency Department. Today's healthcare is undergoing tremendous change, and patient satisfaction surveys are one of the many tools to assess the quality of medical care. You may receive a survey from the [employer] regarding your experience in the Emergency Department. I hope that your experience has been completely positive, particularly the medical care that I provided. As such, please participate in the survey; anything less than excellent does not meet my expectations or intentions. [facility] and [address] [place] [address] participate in nationally recognized quality of care measures. If your blood pressure is greater than 120/80, as reported below, we urge that you seek medical care to address the potential of high blood pressure, commonly known as hypertension. Hypertension can be hereditary or can be caused by certain medical conditions, pain, stress, or \"white coat syndrome. \"       Please make an appointment with your health care provider(s) for follow up of your Emergency Department visit. VITALS:   Patient Vitals for the past 8 hrs:   Temp Pulse Resp BP SpO2   04/19/17 2304 - 79 16 111/51 100 %   04/19/17 2110 98 °F (36.7 °C) 69 16 112/71 96 %          Thank you for allowing us to provide you with medical care today. We realize that you have many choices for your emergency care needs. Please choose us in the future for any continued health care needs.       DO [facility].   Office: [phone]            Recent Results (from the past 24 hour(s))   CBC WITH AUTOMATED DIFF    Collection Time: 04/19/17  9:22 PM   Result Value Ref Range    WBC 5.1 3.6 - 11.0 K/uL    RBC 3.16 (L) 3.80 - 5.20 M/uL    HGB 9.9 (L) 11.5 - 16.0 g/dL    HCT 30.5 (L) 35.0 - 47.0 %    MCV 96.5 80.0 - 99.0 FL    MCH 31.3 26.0 - 34.0 PG    MCHC 32.5 30.0 - 36.5 g/dL    RDW 13.3 11.5 - 14.5 %    PLATELET 610 178 - 657 K/uL    NEUTROPHILS 48 32 - 75 %    LYMPHOCYTES 41 12 - 49 %    MONOCYTES 9 5 - 13 %    EOSINOPHILS 2 0 - 7 %    BASOPHILS 0 0 - 1 %    ABS. NEUTROPHILS 2.5 1.8 - 8.0 K/UL    ABS. LYMPHOCYTES 2.1 0.8 - 3.5 K/UL    ABS. MONOCYTES 0.5 0.0 - 1.0 K/UL    ABS. EOSINOPHILS 0.1 0.0 - 0.4 K/UL    ABS. BASOPHILS 0.0 0.0 - 0.1 K/UL   METABOLIC PANEL, COMPREHENSIVE    Collection Time: 04/19/17  9:22 PM   Result Value Ref Range    Sodium 135 (L) 136 - 145 mmol/L    Potassium 3.9 3.5 - 5.1 mmol/L    Chloride 99 97 - 108 mmol/L    CO2 27 21 - 32 mmol/L    Anion gap 9 5 - 15 mmol/L    Glucose 93 65 - 100 mg/dL    BUN 19 6 - 20 MG/DL    Creatinine 1.03 (H) 0.55 - 1.02 MG/DL    BUN/Creatinine ratio 18 12 - 20      GFR est AA >60 >60 ml/min/1.73m2    GFR est non-AA 55 (L) >60 ml/min/1.73m2    Calcium 9.3 8.5 - 10.1 MG/DL    Bilirubin, total 0.2 0.2 - 1.0 MG/DL    ALT (SGPT) 16 12 - 78 U/L    AST (SGOT) 26 15 - 37 U/L    Alk. phosphatase 124 (H) 45 - 117 U/L    Protein, total 7.5 6.4 - 8.2 g/dL    Albumin 3.3 (L) 3.5 - 5.0 g/dL    Globulin 4.2 (H) 2.0 - 4.0 g/dL    A-G Ratio 0.8 (L) 1.1 - 2.2         Ct Abd Pelv W Cont    Result Date: 4/19/2017  INDICATION: abd pain. no oral contrast. COMPARISON: 3/21/2017 TECHNIQUE: Following the uneventful intravenous administration of 100 cc Isovue-370, thin axial images were obtained through the abdomen and pelvis. Coronal and sagittal reconstructions were generated. Oral contrast was not administered.  CT dose reduction was achieved through use of a standardized protocol tailored for this examination and automatic exposure control for dose modulation. FINDINGS: LUNG BASES: Clear. INCIDENTALLY IMAGED HEART AND MEDIASTINUM: Unremarkable. LIVER: 1 cm low-density and 5 mm low-density in the liver are stable. Peritoneal implants overlying the liver and spleen are stable. Ascites is stable. Small bowel is less distended on the current study. Patient appears be status post subtotal colectomy. GALLBLADDER: Unremarkable. SPLEEN: No mass. PANCREAS: No mass or ductal dilatation. ADRENALS: Unremarkable. KIDNEYS: No mass, calculus, or hydronephrosis. STOMACH: Unremarkable. SMALL BOWEL: There is less small bowel distention when compared to 3/21/2017 COLON: Patient appears to be status post subtotal colectomy APPENDIX: Surgically absent PERITONEUM: Ascites is stable RETROPERITONEUM: No lymphadenopathy or aortic aneurysm. REPRODUCTIVE ORGANS: Surgically absent URINARY BLADDER: No mass or calculus. BONES: No destructive bone lesion. ADDITIONAL COMMENTS: N/A     IMPRESSION: 1. Ascites and peritoneal implants are stable. 2 small low densities in the liver are stable. Small bowel distention has improved when compared to 3/21/2017 without definite evidence of obstruction. . Ileostomy is noted in the right lower quadrant. Patient appears to be status post subtotal colectomy. Abdominal Pain: After Your Visit to the Emergency Room  Your Care Instructions  Many abdominal problems can cause belly pain. Some are not serious and get better on their own in a few days. Other abdominal problems need more testing and emergency treatment. Your doctor may have recommended a follow-up visit in the next 8 to 12 hours. If you are not getting better, you may need more tests or treatment. Even though you have been released from the emergency room, you still need to watch for any problems. The doctor carefully checked you. But sometimes problems can develop later.  If you have new symptoms, or if your symptoms do not get better, return to the emergency room or call your doctor right away. A visit to the emergency room is only one step in your treatment. Even if you feel better, you still need to do what your doctor recommends, such as going to all suggested follow-up appointments and taking medicines exactly as directed. This will help you recover and help prevent future problems. How can you care for yourself at home? · Rest until you feel better. · Drink plenty of fluids to prevent dehydration. Choose water and other caffeine-free clear liquids until you feel better. If you have kidney, heart, or liver disease and have to limit fluids, talk with your doctor before you increase the amount of fluids you drink. · Take your medicines exactly as directed. Call your doctor if you think you are having a problem with your medicine. · Do not drive after taking a prescription pain medicine. When should you call for help? Call 911 if:  · You passed out (lost consciousness). · You have sudden chest pain and shortness of breath, or you cough up blood. · You pass maroon or very bloody stools. · You vomit blood or what looks like coffee grounds. · You have new, severe belly pain. · You have other symptoms that you think are a medical emergency. Return to the emergency room now if:  · You feel weak and lightheaded. · Your pain becomes focused in one area of your belly. · Your belly pain is worse after you cough or move. · You have a new or higher fever. Call your doctor today if:  · Your stools are black and tarlike or have streaks of blood. · You have new, unusual bleeding or discharge from the vagina. · You have blood in your urine, it hurts when you urinate, or you have to urinate more often than usual.  · Your belly pain has not improved after 1 day. Where can you learn more?    Go to [employer]  Enter D163 in the search box to learn more about \"Abdominal Pain: After Your Visit to the Emergency Room. \"   © 4551-0992 [employer]. Care instructions adapted under license by [facility] (which disclaims liability or warranty for this information). This care instruction is for use with your licensed healthcare professional. If you have questions about a medical condition or this instruction, always ask your healthcare professional. [place] [address] any warranty or liability for your use of this information.   Content Version: 9.3.14605; Last Revised: January 14, 2011

## 2017-04-21 ENCOUNTER — TELEPHONE (OUTPATIENT)
Dept: GYNECOLOGY | Age: 57
End: 2017-04-21

## 2017-04-21 LAB
ALBUMIN SERPL-MCNC: 4 G/DL (ref 3.5–5.5)
ALBUMIN/GLOB SERPL: 1.7 {RATIO} (ref 1.2–2.2)
ALP SERPL-CCNC: 113 IU/L (ref 39–117)
ALT SERPL-CCNC: 9 IU/L (ref 0–32)
APPEARANCE UR: CLEAR
AST SERPL-CCNC: 20 IU/L (ref 0–40)
BACTERIA #/AREA URNS HPF: ABNORMAL /[HPF]
BASOPHILS # BLD AUTO: 0 X10E3/UL (ref 0–0.2)
BASOPHILS NFR BLD AUTO: 1 %
BILIRUB SERPL-MCNC: 0.2 MG/DL (ref 0–1.2)
BILIRUB UR QL STRIP: NEGATIVE
BUN SERPL-MCNC: 10 MG/DL (ref 6–24)
BUN/CREAT SERPL: 11 (ref 9–23)
CALCIUM SERPL-MCNC: 9.2 MG/DL (ref 8.7–10.2)
CASTS URNS MICRO: ABNORMAL
CASTS URNS QL MICRO: PRESENT /LPF
CHLORIDE SERPL-SCNC: 96 MMOL/L (ref 96–106)
CO2 SERPL-SCNC: 27 MMOL/L (ref 18–29)
COLOR UR: YELLOW
CREAT SERPL-MCNC: 0.87 MG/DL (ref 0.57–1)
EOSINOPHIL # BLD AUTO: 0.1 X10E3/UL (ref 0–0.4)
EOSINOPHIL NFR BLD AUTO: 1 %
EPI CELLS #/AREA URNS HPF: ABNORMAL /HPF
ERYTHROCYTE [DISTWIDTH] IN BLOOD BY AUTOMATED COUNT: 12.2 % (ref 12.3–15.4)
GLOBULIN SER CALC-MCNC: 2.4 G/DL (ref 1.5–4.5)
GLUCOSE SERPL-MCNC: 100 MG/DL (ref 65–99)
GLUCOSE UR QL: NEGATIVE
HCT VFR BLD AUTO: 30.3 % (ref 34–46.6)
HGB BLD-MCNC: 10.1 G/DL (ref 11.1–15.9)
HGB UR QL STRIP: ABNORMAL
KETONES UR QL STRIP: NEGATIVE
LEUKOCYTE ESTERASE UR QL STRIP: NEGATIVE
LYMPHOCYTES # BLD AUTO: 1.8 X10E3/UL (ref 0.7–3.1)
LYMPHOCYTES NFR BLD AUTO: 27 %
MCH RBC QN AUTO: 31.4 PG (ref 26.6–33)
MCHC RBC AUTO-ENTMCNC: 33.2 G/DL (ref 31.5–35.7)
MCV RBC AUTO: 94 FL (ref 79–97)
MICRO URNS: ABNORMAL
MONOCYTES # BLD AUTO: 0.5 X10E3/UL (ref 0.1–0.9)
MONOCYTES NFR BLD AUTO: 8 %
NEUTROPHILS # BLD AUTO: 4.1 X10E3/UL (ref 1.4–7)
NEUTROPHILS NFR BLD AUTO: 63 %
NITRITE UR QL STRIP: NEGATIVE
PH UR STRIP: 5.5 [PH] (ref 5–7.5)
PLATELET # BLD AUTO: 311 X10E3/UL (ref 150–379)
POTASSIUM SERPL-SCNC: 3.5 MMOL/L (ref 3.5–5.2)
PROT SERPL-MCNC: 6.4 G/DL (ref 6–8.5)
PROT UR QL STRIP: ABNORMAL
RBC # BLD AUTO: 3.21 X10E6/UL (ref 3.77–5.28)
RBC #/AREA URNS HPF: ABNORMAL /HPF
SODIUM SERPL-SCNC: 132 MMOL/L (ref 134–144)
SP GR UR: >=1.03 (ref 1–1.03)
UROBILINOGEN UR STRIP-MCNC: 0.2 MG/DL (ref 0.2–1)
WBC # BLD AUTO: 6.5 X10E3/UL (ref 3.4–10.8)
WBC #/AREA URNS HPF: ABNORMAL /HPF

## 2017-04-21 RX ORDER — CIPROFLOXACIN 500 MG/1
500 TABLET ORAL 2 TIMES DAILY
Qty: 14 TAB | Refills: 0 | Status: SHIPPED | OUTPATIENT
Start: 2017-04-21 | End: 2017-05-18 | Stop reason: ALTCHOICE

## 2017-04-21 NOTE — TELEPHONE ENCOUNTER
Stat lab results received and reviewed by . Called the pt per of  and advised of results and uti. Advised that she will need to start Cipro for 7 days. Her temperature is down to 99.8 and she states she is feeling better. Precautions reviewed and advised to call Monday to let us know how she is doing. Per vo prescription for Cipro 500mg sent to pharmacy.

## 2017-04-21 NOTE — TELEPHONE ENCOUNTER
Pt states she spoke with  last night due to a fever she was running. He advised her to have stat labs today to evaluate. Per vo from Dr. Sajan Huynh order changed to stat and faxed to [facility] [phone].

## 2017-04-22 LAB — CANCER AG125 SERPL-ACNC: 126.2 U/ML (ref 0–38.1)

## 2017-04-24 ENCOUNTER — TELEPHONE (OUTPATIENT)
Dept: GYNECOLOGY | Age: 57
End: 2017-04-24

## 2017-04-24 RX ORDER — DEXAMETHASONE SODIUM PHOSPHATE 4 MG/ML
12 INJECTION, SOLUTION INTRA-ARTICULAR; INTRALESIONAL; INTRAMUSCULAR; INTRAVENOUS; SOFT TISSUE ONCE
Status: DISCONTINUED | OUTPATIENT
Start: 2017-04-26 | End: 2017-04-25 | Stop reason: SDUPTHER

## 2017-04-25 ENCOUNTER — HOSPITAL ENCOUNTER (OUTPATIENT)
Dept: INFUSION THERAPY | Age: 57
Discharge: HOME | End: 2017-04-25
Payer: OTHER GOVERNMENT

## 2017-04-25 ENCOUNTER — APPOINTMENT (OUTPATIENT)
Dept: INFUSION THERAPY | Age: 57
End: 2017-04-25
Payer: OTHER GOVERNMENT

## 2017-04-25 VITALS
HEART RATE: 69 BPM | HEIGHT: 63 IN | RESPIRATION RATE: 16 BRPM | WEIGHT: 125.5 LBS | DIASTOLIC BLOOD PRESSURE: 68 MMHG | BODY MASS INDEX: 22.24 KG/M2 | OXYGEN SATURATION: 100 % | TEMPERATURE: 96.6 F | SYSTOLIC BLOOD PRESSURE: 103 MMHG

## 2017-04-25 DIAGNOSIS — G89.3 CANCER ASSOCIATED PAIN: ICD-10-CM

## 2017-04-25 DIAGNOSIS — E87.1 HYPONATREMIA: ICD-10-CM

## 2017-04-25 DIAGNOSIS — C78.6 CARCINOMATOSIS PERITONEI (HCC): ICD-10-CM

## 2017-04-25 DIAGNOSIS — D70.1 CHEMOTHERAPY-INDUCED NEUTROPENIA (HCC): Primary | ICD-10-CM

## 2017-04-25 DIAGNOSIS — C56.9 OVARIAN CANCER, UNSPECIFIED LATERALITY (HCC): ICD-10-CM

## 2017-04-25 DIAGNOSIS — D64.81 ANEMIA DUE TO CHEMOTHERAPY: ICD-10-CM

## 2017-04-25 DIAGNOSIS — T45.1X5A ANEMIA DUE TO CHEMOTHERAPY: ICD-10-CM

## 2017-04-25 DIAGNOSIS — R10.84 GENERALIZED ABDOMINAL PAIN: ICD-10-CM

## 2017-04-25 DIAGNOSIS — R11.2 CINV (CHEMOTHERAPY-INDUCED NAUSEA AND VOMITING): ICD-10-CM

## 2017-04-25 DIAGNOSIS — T45.1X5A CHEMOTHERAPY-INDUCED NEUTROPENIA (HCC): Primary | ICD-10-CM

## 2017-04-25 DIAGNOSIS — F41.8 ANXIETY ABOUT HEALTH: ICD-10-CM

## 2017-04-25 DIAGNOSIS — Z93.2 ILEOSTOMY IN PLACE (HCC): ICD-10-CM

## 2017-04-25 DIAGNOSIS — T45.1X5A CINV (CHEMOTHERAPY-INDUCED NAUSEA AND VOMITING): ICD-10-CM

## 2017-04-25 PROBLEM — Z71.89 COUNSELING REGARDING END OF LIFE DECISION MAKING: Status: ACTIVE | Noted: 2017-04-25

## 2017-04-25 PROCEDURE — 74011250636 HC RX REV CODE- 250/636: Performed by: OBSTETRICS & GYNECOLOGY

## 2017-04-25 PROCEDURE — 96413 CHEMO IV INFUSION 1 HR: CPT

## 2017-04-25 PROCEDURE — 74011000250 HC RX REV CODE- 250: Performed by: OBSTETRICS & GYNECOLOGY

## 2017-04-25 PROCEDURE — 74011000258 HC RX REV CODE- 258: Performed by: OBSTETRICS & GYNECOLOGY

## 2017-04-25 PROCEDURE — 77030012965 HC NDL HUBR BBMI -A

## 2017-04-25 PROCEDURE — 74011250636 HC RX REV CODE- 250/636: Performed by: NURSE PRACTITIONER

## 2017-04-25 PROCEDURE — 74011250636 HC RX REV CODE- 250/636

## 2017-04-25 PROCEDURE — 96417 CHEMO IV INFUS EACH ADDL SEQ: CPT

## 2017-04-25 PROCEDURE — 96367 TX/PROPH/DG ADDL SEQ IV INF: CPT

## 2017-04-25 PROCEDURE — 96375 TX/PRO/DX INJ NEW DRUG ADDON: CPT

## 2017-04-25 RX ORDER — POTASSIUM CHLORIDE 7.45 MG/ML
10 INJECTION INTRAVENOUS
Status: COMPLETED | OUTPATIENT
Start: 2017-04-25 | End: 2017-04-25

## 2017-04-25 RX ORDER — DEXAMETHASONE SODIUM PHOSPHATE 4 MG/ML
12 INJECTION, SOLUTION INTRA-ARTICULAR; INTRALESIONAL; INTRAMUSCULAR; INTRAVENOUS; SOFT TISSUE ONCE
Status: COMPLETED | OUTPATIENT
Start: 2017-04-25 | End: 2017-04-25

## 2017-04-25 RX ORDER — HYDROCODONE BITARTRATE AND ACETAMINOPHEN 7.5; 325 MG/15ML; MG/15ML
15-30 SOLUTION ORAL
Qty: 950 ML | Refills: 0 | Status: SHIPPED | OUTPATIENT
Start: 2017-04-25 | End: 2017-05-18 | Stop reason: SDUPTHER

## 2017-04-25 RX ORDER — SODIUM CHLORIDE 9 MG/ML
10 INJECTION INTRAMUSCULAR; INTRAVENOUS; SUBCUTANEOUS AS NEEDED
Status: ACTIVE | OUTPATIENT
Start: 2017-04-25 | End: 2017-04-26

## 2017-04-25 RX ORDER — HEPARIN 100 UNIT/ML
500 SYRINGE INTRAVENOUS AS NEEDED
Status: ACTIVE | OUTPATIENT
Start: 2017-04-25 | End: 2017-04-26

## 2017-04-25 RX ORDER — FENTANYL 50 UG/1
1 PATCH TRANSDERMAL
Qty: 10 PATCH | Refills: 0 | Status: SHIPPED | OUTPATIENT
Start: 2017-04-25 | End: 2017-05-18 | Stop reason: DRUGHIGH

## 2017-04-25 RX ORDER — SODIUM CHLORIDE 0.9 % (FLUSH) 0.9 %
10-40 SYRINGE (ML) INJECTION AS NEEDED
Status: ACTIVE | OUTPATIENT
Start: 2017-04-25 | End: 2017-04-26

## 2017-04-25 RX ADMIN — DEXAMETHASONE SODIUM PHOSPHATE 12 MG: 4 INJECTION, SOLUTION INTRA-ARTICULAR; INTRALESIONAL; INTRAMUSCULAR; INTRAVENOUS; SOFT TISSUE at 10:36

## 2017-04-25 RX ADMIN — Medication 20 ML: at 14:35

## 2017-04-25 RX ADMIN — BEVACIZUMAB 800 MG: 400 INJECTION, SOLUTION INTRAVENOUS at 12:55

## 2017-04-25 RX ADMIN — DOXORUBICIN HYDROCHLORIDE 60 MG: 2 INJECTION, SUSPENSION, LIPOSOMAL INTRAVENOUS at 11:41

## 2017-04-25 RX ADMIN — POTASSIUM CHLORIDE 10 MEQ: 10 INJECTION, SOLUTION INTRAVENOUS at 13:30

## 2017-04-25 RX ADMIN — Medication 500 UNITS: at 14:36

## 2017-04-25 RX ADMIN — FAMOTIDINE 20 MG: 10 INJECTION, SOLUTION INTRAVENOUS at 10:34

## 2017-04-25 NOTE — PROGRESS NOTES
Ally Davalos. Wall, NP    Infusion Date: 4/25/2017     MAHSA Estrella is a 61 y.o.  postmenopausal female with a diagnosis of stage IV ovarian cancer. She underwent exploratory laparotomy with suboptimal debulking. She had extensive disease. She was readmitted about a week later with obstruction and subsequently had a cecal perforation, requiring resection, end ileostomy, and mucous fistula. During the hospitalization she was also diagnosed with pulmonary embolus and had chest tubes placed for bilateral pleural effusions. She had a prolonged hospital course and actually received a dose of cisplatin chemotherapy while in the hospital to help dry up the effusions. She completed 6 cycles of Taxol/Carboplatin chemotherapy following her initial debulking. She did relatively well, considering she had an ileostomy to deal with during chemotherapy. I took her to the OR on 4/29/16 for interval debulking and reversal of her ileostomy. She had extensive disease, but we were able to resect the majority of her disease. One week later she developed an anastomotic leak requiring reexploration and recreation of an ileostomy. She had another prolonged hospitalization, but recovered well since surgery. We then reinitiated Taxol/Carboplatin chemotherapy, but did reduce the dose of Taxol to 135 mg/m2. She completed 4 mores cycles. Her CA-125 normalized and we stopped treatment.      On 2/7, she presented to Dr. Deborah Kumari office with her  to discuss her follow up/surveillance CT scan. Unfortunately, it demonstrated progression of disease, although minimally worse than her prior scan 2 months previously. She was actually feeling better than she did at that time. After review of scans and disease, the decision was made to begin Doxil/Avastin Q28 days for 6 cycles.  She began this on 2/27/2017    Subjective:   Pt presents today for her third cycl of Doxil/Avastin  She was recently in the ER for abd pain and r/o bowel obstruction. CT scan in the ER was negative and whe was discharged home. She ate very light for a few days and her ostomy output improved. She says her abd pain is increasing and she has increased her fentanyl patches from 12.5 to 25. She said it held her well for a few days, but this past week, she has needed her breakthrough medication almost [id number] around the clock. She is \"not happy\" about her doubling of her ca-125 since 3/22. She and Dr. Edita Hall discussed that it could take 2-3 cycles to begin to see a decrease in her CA-125. They said if it was not beginning to decrease after this round, they would begin to look at other treatment options. Review of Systems:  General: abdominal pain as above. Continued fatigue  HEENT: Denies visual changes, dysphagia or headache  Resp: Denies SOB, DUDLEY, wheezing or cough  CV: Denies CP, palpitations  GI/: Denies abd pain. Denies N/V. Says appetite is only fair, but she eats because she knows she must  MuskSkel: Denies muscle ache or joint pain  Neuro: Denies dizzyness or syncope    Objective:     Blood pressure 103/68, pulse 69, temperature 96.6 °F (35.9 °C), resp. rate 16, height 5' 3\" (1.6 m), weight 125 lb 8 oz (56.9 kg), SpO2 100 %, not currently breastfeeding. Temp (24hrs), Av.6 °F (35.9 °C), Min:96.6 °F (35.9 °C), Max:96.6 °F (35.9 °C)      _____________________  Physical Exam:     General: A&O X 3  Slightly uncomfortable appearing  HEENT: Oral mucosa pink, moist. No cervical adenopathy appreciated  Port site without redness, tenderness or swelling  Cardiovascular: Regular without M/R/G  Lungs:CTA bilat without wheezing or rales  Abdomen: Soft with tenderness to palpation to lower/mid abdomen and + bowel sounds throughout. Stool present in bag  Ext: + pedal pulses without edema bilat.   bilat  Neuro: grossly intact    LABS: 2017:  WBC 6.5; HGB 10.2 HCT 30.1; Plts 311; ANC 4.1; Na 132; K+ 3.5; Cl 96; CO2 27; BUN 10; Creat 0.87; Gluc 100; Urine protein: trace   162 (on 3/22=80; 2/23= 63.9)      Imaging:  CT can of Abdomen/Pelvis with contrast 4/19/2017  FINDINGS:   LUNG BASES: Clear. INCIDENTALLY IMAGED HEART AND MEDIASTINUM: Unremarkable. LIVER: 1 cm low-density and 5 mm low-density in the liver are stable. Peritoneal  implants overlying the liver and spleen are stable.     Ascites is stable. Small bowel is less distended on the current study. Patient  appears be status post subtotal colectomy. GALLBLADDER: Unremarkable. SPLEEN: No mass. PANCREAS: No mass or ductal dilatation. ADRENALS: Unremarkable. KIDNEYS: No mass, calculus, or hydronephrosis. STOMACH: Unremarkable. SMALL BOWEL: There is less small bowel distention when compared to 3/21/2017  COLON: Patient appears to be status post subtotal colectomy  APPENDIX: Surgically absent  PERITONEUM: Ascites is stable  RETROPERITONEUM: No lymphadenopathy or aortic aneurysm. REPRODUCTIVE ORGANS: Surgically absent  URINARY BLADDER: No mass or calculus. BONES: No destructive bone lesion. ADDITIONAL COMMENTS: N/A     IMPRESSION  IMPRESSION:     1. Ascites and peritoneal implants are stable. 2 small low densities in the  liver are stable. Small bowel distention has improved when compared to 3/21/2017  without definite evidence of obstruction. . 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 Leatha Hooks so they can discuss options going forward. Replace K+ of 3.5 today with 10 meq KCL IV (due to short gut, pt does not absorb PO well)  Increased Fentanyl patch to 50 mcg Q3 days and gave re-fill of Hycet   Hydration encouraged  Continue Xarelto for previous PE. When the pt began discussing her rising CA-125, she became teary. I sat and talked with her for an hour. She said she needed \"help talking to her children about dying\". I discussed that we had a counselor available for her to maybe help with strategies. She was very open to this. We also discussed \"5 Wishes\" and she was given a copy to review. She expressed teary concern saying \"I how does one live with knowing they have a limited time left? \". I stayed with pt and just allowed her to talk and express her fears and concerns. Acknowledged I do not have all the answers, but with the input of the the counselor, maybe it would help. Pt acknowledged she had a Will. She discussed that she is concerned for her  as she stated \"I handle all the insurance and banking in the house and I guess I need to begin to show him how I do things\". She did acknowledges \"I am going with the mind set I have 18 months left\". I said we never know and to live live daily as a celebration and enjoy he family. At the end, I said [facility] would [facility] contact her about meeting with counselor    I strongly encouraged her to call for any concerns or questions.   Greater than 60 minutes spent with pt with greater than 50% spent in counseling       [address], NP  4/25/2017          ______________________  Medications:    Current Outpatient Prescriptions   Medication Sig Dispense Refill    fentaNYL (DURAGESIC) 50 mcg/hr PATCH 1 Patch by TransDERmal route every seventy-two (72) hours. Max Daily Amount: 1 Patch. 10 Patch 0    HYDROcodone-acetaminophen (HYCET) 0.5-21.7 mg/mL oral solution Take 15-30 mL by mouth every four (4) hours as needed. Max Daily Amount: 90 mg. Disp: two(2) 473 ml bottles  Indications: Pain 950 mL 0    ciprofloxacin HCl (CIPRO) 500 mg tablet Take 1 Tab by mouth two (2) times a day. 14 Tab 0    Cetirizine (ZYRTEC) 10 mg cap Take  by mouth.  apixaban (ELIQUIS) 2.5 mg tablet Take 1 Tab by mouth two (2) times a day. 60 Tab 6    promethazine (PHENERGAN) 25 mg tablet Take 1 Tab by mouth every six (6) hours as needed for Nausea. 40 Tab 1    zolpidem (AMBIEN) 10 mg tablet Take 1 Tab by mouth nightly as needed for Sleep. Max Daily Amount: 10 mg. 30 Tab 1    fentaNYL (DURAGESIC) 12 mcg/hr patch 1 Patch by TransDERmal route every seventy-two (72) hours. Max Daily Amount: 1 Patch. 10 Patch 0    ondansetron (ZOFRAN ODT) 4 mg disintegrating tablet Take 1 Tab by mouth every eight (8) hours as needed for Nausea. 30 Tab 2    magnesium oxide (MAG-OX) 400 mg tablet Take 1 Tab by mouth three (3) times daily. Indications: HYPOMAGNESEMIA 90 Tab 2    esomeprazole (NEXIUM) 40 mg capsule Take 1 Cap by mouth daily. 90 Cap 2    multivit-min-FA-coenzyme Q10 (AQUADEKS) 100-350-5 mcg-mcg-mg chew Take 1 Tab by mouth daily. Indications: VITAMIN DEFICIENCY PREVENTION 60 Tab 1    Calcium-Vitamin D3-Vitamin K 500-500-40 mg-unit-mcg chew Take 1 Tab by mouth two (2) times a day.  metoprolol (LOPRESSOR) 100 mg tablet Take  by mouth two (2) times a day.  lidocaine-prilocaine (EMLA) topical cream Apply small amount over port area and cover with a band aid 1 hour before chemo treatment 30 g 3    multivitamin (ONE A DAY) tablet Take 1 Tab by mouth daily.        Current Facility-Administered Medications   Medication Dose Route Frequency Provider Last Rate Last Dose    sodium chloride 0.9 % injection 10 mL 10 mL IntraVENous PRN Cristal Salas MD        heparin (porcine) pf 500 Units  500 Units IntraVENous PRN Cristal Salas MD   500 Units at 04/25/17 1436    sodium chloride (NS) flush 10-40 mL  10-40 mL IntraVENous PRN Cristal Salas MD   20 mL at 04/25/17 1439

## 2017-04-27 ENCOUNTER — APPOINTMENT (OUTPATIENT)
Dept: GENERAL RADIOLOGY | Age: 57
DRG: 389 | End: 2017-04-27
Attending: NURSE PRACTITIONER
Payer: OTHER GOVERNMENT

## 2017-04-27 ENCOUNTER — HOSPITAL ENCOUNTER (INPATIENT)
Age: 57
LOS: 1 days | Discharge: HOME | DRG: 389 | End: 2017-04-28
Attending: OBSTETRICS & GYNECOLOGY | Admitting: OBSTETRICS & GYNECOLOGY
Payer: OTHER GOVERNMENT

## 2017-04-27 ENCOUNTER — TELEPHONE (OUTPATIENT)
Dept: GYNECOLOGY | Age: 57
End: 2017-04-27

## 2017-04-27 ENCOUNTER — DOCUMENTATION ONLY (OUTPATIENT)
Dept: GYNECOLOGY | Age: 57
End: 2017-04-27

## 2017-04-27 PROBLEM — K56.609 BOWEL OBSTRUCTION (HCC): Status: ACTIVE | Noted: 2017-04-27

## 2017-04-27 LAB
ALBUMIN SERPL BCP-MCNC: 3.3 G/DL (ref 3.5–5)
ALBUMIN/GLOB SERPL: 0.8 {RATIO} (ref 1.1–2.2)
ALP SERPL-CCNC: 113 U/L (ref 45–117)
ALT SERPL-CCNC: 14 U/L (ref 12–78)
ANION GAP BLD CALC-SCNC: 9 MMOL/L (ref 5–15)
APPEARANCE UR: CLEAR
AST SERPL W P-5'-P-CCNC: 15 U/L (ref 15–37)
BACTERIA URNS QL MICRO: NEGATIVE /HPF
BASOPHILS # BLD AUTO: 0 K/UL (ref 0–0.1)
BASOPHILS # BLD: 0 % (ref 0–1)
BILIRUB SERPL-MCNC: 0.2 MG/DL (ref 0.2–1)
BILIRUB UR QL: NEGATIVE
BUN SERPL-MCNC: 20 MG/DL (ref 6–20)
BUN/CREAT SERPL: 20 (ref 12–20)
CALCIUM SERPL-MCNC: 9.2 MG/DL (ref 8.5–10.1)
CHLORIDE SERPL-SCNC: 101 MMOL/L (ref 97–108)
CO2 SERPL-SCNC: 29 MMOL/L (ref 21–32)
COLOR UR: ABNORMAL
CREAT SERPL-MCNC: 0.99 MG/DL (ref 0.55–1.02)
EOSINOPHIL # BLD: 0 K/UL (ref 0–0.4)
EOSINOPHIL NFR BLD: 0 % (ref 0–7)
EPITH CASTS URNS QL MICRO: ABNORMAL /LPF
ERYTHROCYTE [DISTWIDTH] IN BLOOD BY AUTOMATED COUNT: 13.3 % (ref 11.5–14.5)
GLOBULIN SER CALC-MCNC: 4.3 G/DL (ref 2–4)
GLUCOSE SERPL-MCNC: 100 MG/DL (ref 65–100)
GLUCOSE UR STRIP.AUTO-MCNC: NEGATIVE MG/DL
HCT VFR BLD AUTO: 30.4 % (ref 35–47)
HGB BLD-MCNC: 10.2 G/DL (ref 11.5–16)
HGB UR QL STRIP: NEGATIVE
KETONES UR QL STRIP.AUTO: NEGATIVE MG/DL
LEUKOCYTE ESTERASE UR QL STRIP.AUTO: NEGATIVE
LYMPHOCYTES # BLD AUTO: 24 % (ref 12–49)
LYMPHOCYTES # BLD: 1.9 K/UL (ref 0.8–3.5)
MAGNESIUM SERPL-MCNC: 1.4 MG/DL (ref 1.6–2.4)
MCH RBC QN AUTO: 31.4 PG (ref 26–34)
MCHC RBC AUTO-ENTMCNC: 33.6 G/DL (ref 30–36.5)
MCV RBC AUTO: 93.5 FL (ref 80–99)
MONOCYTES # BLD: 0.6 K/UL (ref 0–1)
MONOCYTES NFR BLD AUTO: 8 % (ref 5–13)
NEUTS SEG # BLD: 5.6 K/UL (ref 1.8–8)
NEUTS SEG NFR BLD AUTO: 68 % (ref 32–75)
NITRITE UR QL STRIP.AUTO: NEGATIVE
PH UR STRIP: 6 [PH] (ref 5–8)
PLATELET # BLD AUTO: 346 K/UL (ref 150–400)
POTASSIUM SERPL-SCNC: 3.5 MMOL/L (ref 3.5–5.1)
PREALB SERPL-MCNC: 30 MG/DL (ref 20–40)
PROT SERPL-MCNC: 7.6 G/DL (ref 6.4–8.2)
PROT UR STRIP-MCNC: NEGATIVE MG/DL
RBC # BLD AUTO: 3.25 M/UL (ref 3.8–5.2)
RBC #/AREA URNS HPF: ABNORMAL /HPF (ref 0–5)
SODIUM SERPL-SCNC: 139 MMOL/L (ref 136–145)
SP GR UR REFRACTOMETRY: 1.02 (ref 1–1.03)
UROBILINOGEN UR QL STRIP.AUTO: 0.2 EU/DL (ref 0.2–1)
WBC # BLD AUTO: 8.2 K/UL (ref 3.6–11)
WBC URNS QL MICRO: ABNORMAL /HPF (ref 0–4)

## 2017-04-27 PROCEDURE — 77030003560 HC NDL HUBR BARD -A

## 2017-04-27 PROCEDURE — 74011000258 HC RX REV CODE- 258: Performed by: NURSE PRACTITIONER

## 2017-04-27 PROCEDURE — 80053 COMPREHEN METABOLIC PANEL: CPT | Performed by: NURSE PRACTITIONER

## 2017-04-27 PROCEDURE — 83735 ASSAY OF MAGNESIUM: CPT | Performed by: NURSE PRACTITIONER

## 2017-04-27 PROCEDURE — 85025 COMPLETE CBC W/AUTO DIFF WBC: CPT | Performed by: NURSE PRACTITIONER

## 2017-04-27 PROCEDURE — 74011250637 HC RX REV CODE- 250/637: Performed by: NURSE PRACTITIONER

## 2017-04-27 PROCEDURE — 36415 COLL VENOUS BLD VENIPUNCTURE: CPT | Performed by: NURSE PRACTITIONER

## 2017-04-27 PROCEDURE — 86304 IMMUNOASSAY TUMOR CA 125: CPT | Performed by: NURSE PRACTITIONER

## 2017-04-27 PROCEDURE — 74011000250 HC RX REV CODE- 250: Performed by: NURSE PRACTITIONER

## 2017-04-27 PROCEDURE — 65210000002 HC RM PRIVATE GYN

## 2017-04-27 PROCEDURE — 84134 ASSAY OF PREALBUMIN: CPT | Performed by: NURSE PRACTITIONER

## 2017-04-27 PROCEDURE — 74011250636 HC RX REV CODE- 250/636: Performed by: NURSE PRACTITIONER

## 2017-04-27 PROCEDURE — 81001 URINALYSIS AUTO W/SCOPE: CPT | Performed by: NURSE PRACTITIONER

## 2017-04-27 PROCEDURE — 74020 XR ABD (AP AND ERECT OR DECUB): CPT

## 2017-04-27 RX ORDER — SODIUM CHLORIDE 0.9 % (FLUSH) 0.9 %
SYRINGE (ML) INJECTION
Status: COMPLETED
Start: 2017-04-27 | End: 2017-04-27

## 2017-04-27 RX ORDER — NALOXONE HYDROCHLORIDE 0.4 MG/ML
0.4 INJECTION, SOLUTION INTRAMUSCULAR; INTRAVENOUS; SUBCUTANEOUS AS NEEDED
Status: DISCONTINUED | OUTPATIENT
Start: 2017-04-27 | End: 2017-04-28 | Stop reason: HOSPADM

## 2017-04-27 RX ORDER — FENTANYL 50 UG/1
1 PATCH TRANSDERMAL
Status: DISCONTINUED | OUTPATIENT
Start: 2017-04-27 | End: 2017-04-28 | Stop reason: HOSPADM

## 2017-04-27 RX ORDER — HYDROMORPHONE HYDROCHLORIDE 1 MG/ML
1 INJECTION, SOLUTION INTRAMUSCULAR; INTRAVENOUS; SUBCUTANEOUS
Status: DISCONTINUED | OUTPATIENT
Start: 2017-04-27 | End: 2017-04-28 | Stop reason: HOSPADM

## 2017-04-27 RX ORDER — METOPROLOL TARTRATE 50 MG/1
100 TABLET ORAL EVERY 12 HOURS
Status: DISCONTINUED | OUTPATIENT
Start: 2017-04-27 | End: 2017-04-28

## 2017-04-27 RX ORDER — ONDANSETRON 2 MG/ML
4 INJECTION INTRAMUSCULAR; INTRAVENOUS
Status: DISCONTINUED | OUTPATIENT
Start: 2017-04-27 | End: 2017-04-28 | Stop reason: HOSPADM

## 2017-04-27 RX ORDER — DEXTROSE, SODIUM CHLORIDE, AND POTASSIUM CHLORIDE 5; .45; .15 G/100ML; G/100ML; G/100ML
125 INJECTION INTRAVENOUS CONTINUOUS
Status: DISCONTINUED | OUTPATIENT
Start: 2017-04-27 | End: 2017-04-28 | Stop reason: HOSPADM

## 2017-04-27 RX ORDER — HYDROCODONE BITARTRATE AND ACETAMINOPHEN 7.5; 325 MG/15ML; MG/15ML
10 SOLUTION ORAL
Status: DISCONTINUED | OUTPATIENT
Start: 2017-04-27 | End: 2017-04-28 | Stop reason: HOSPADM

## 2017-04-27 RX ORDER — METOCLOPRAMIDE HYDROCHLORIDE 5 MG/ML
10 INJECTION INTRAMUSCULAR; INTRAVENOUS EVERY 6 HOURS
Status: DISCONTINUED | OUTPATIENT
Start: 2017-04-27 | End: 2017-04-28 | Stop reason: HOSPADM

## 2017-04-27 RX ORDER — ZOLPIDEM TARTRATE 5 MG/1
10 TABLET ORAL
Status: DISCONTINUED | OUTPATIENT
Start: 2017-04-27 | End: 2017-04-28 | Stop reason: HOSPADM

## 2017-04-27 RX ORDER — ONDANSETRON 4 MG/1
4 TABLET, ORALLY DISINTEGRATING ORAL
Status: DISCONTINUED | OUTPATIENT
Start: 2017-04-27 | End: 2017-04-27

## 2017-04-27 RX ORDER — DIPHENHYDRAMINE HYDROCHLORIDE 50 MG/ML
12.5 INJECTION, SOLUTION INTRAMUSCULAR; INTRAVENOUS
Status: DISCONTINUED | OUTPATIENT
Start: 2017-04-27 | End: 2017-04-28 | Stop reason: HOSPADM

## 2017-04-27 RX ADMIN — METOCLOPRAMIDE 10 MG: 5 INJECTION, SOLUTION INTRAMUSCULAR; INTRAVENOUS at 13:29

## 2017-04-27 RX ADMIN — ONDANSETRON 4 MG: 2 INJECTION INTRAMUSCULAR; INTRAVENOUS at 13:22

## 2017-04-27 RX ADMIN — Medication 10 ML: at 20:39

## 2017-04-27 RX ADMIN — DEXTROSE MONOHYDRATE, SODIUM CHLORIDE, AND POTASSIUM CHLORIDE 125 ML/HR: 50; 4.5; 1.49 INJECTION, SOLUTION INTRAVENOUS at 20:48

## 2017-04-27 RX ADMIN — HYDROMORPHONE HYDROCHLORIDE 1 MG: 1 INJECTION, SOLUTION INTRAMUSCULAR; INTRAVENOUS; SUBCUTANEOUS at 18:15

## 2017-04-27 RX ADMIN — HYDROCODONE BITARTRATE, ACETAMINOPHEN 10 MG: 325; 7.5 SOLUTION ORAL at 21:12

## 2017-04-27 RX ADMIN — PROCHLORPERAZINE EDISYLATE 10 MG: 5 INJECTION INTRAMUSCULAR; INTRAVENOUS at 20:38

## 2017-04-27 RX ADMIN — FOLIC ACID: 5 INJECTION, SOLUTION INTRAMUSCULAR; INTRAVENOUS; SUBCUTANEOUS at 13:22

## 2017-04-27 RX ADMIN — METOCLOPRAMIDE 10 MG: 5 INJECTION, SOLUTION INTRAMUSCULAR; INTRAVENOUS at 19:20

## 2017-04-27 RX ADMIN — METOPROLOL TARTRATE 50 MG: 50 TABLET ORAL at 20:36

## 2017-04-27 RX ADMIN — HYDROMORPHONE HYDROCHLORIDE 1 MG: 1 INJECTION, SOLUTION INTRAMUSCULAR; INTRAVENOUS; SUBCUTANEOUS at 13:39

## 2017-04-27 RX ADMIN — APIXABAN 2.5 MG: 2.5 TABLET, FILM COATED ORAL at 20:38

## 2017-04-27 RX ADMIN — PROCHLORPERAZINE EDISYLATE 10 MG: 5 INJECTION INTRAMUSCULAR; INTRAVENOUS at 13:51

## 2017-04-27 NOTE — IP AVS SNAPSHOT
Summary of Care Report The Summary of Care report has been created to help improve care coordination. Users with access to [employer] or [address] (Web-based application) may access additional patient information including the Discharge Summary. If you are not currently a [address] user and need more information, please call the number listed below in the [place] [address] section and ask to be connected with Medical Records. Facility Information Name Address Phone [address] [facility] [phone] [id number] [phone] Patient Information Patient Name Sex  Irene Rocha ([id number]) Female 1960 Discharge Information Admitting Provider Service Area Unit Martha Cruz MD / [phone] [address] / [phone] Discharge Provider Discharge Date/Time Discharge Disposition Destination (none) 2017 15:00 (Pending) AHR (none) Patient Language Language ENGLISH [13] Hospital Problems as of 2017  Reviewed: 2017  3:38 PM by Martha Cruz MD  
  
  
  
 Class Noted - Resolved Last Modified POA Active Problems Bowel obstruction ([facility]  2017 - Present 2017 by Johanny Rand NP Unknown Entered by Johanny Rand NP Protein calorie malnutrition ([facility]  2017 - Present 2017 by Martha Cruz MD Unknown Entered by Martha Cruz MD  
  
Non-Hospital Problems as of 2017  Reviewed: 2017  3:38 PM by Martha Cruz MD  
  
  
  
 Class Noted - Resolved Last Modified Active Problems Ovarian cancer ([address]  10/12/2015 - Present 3/21/2017 by Johanny Rand NP Entered by Martha Cruz MD  
  Carcinomatosis peritonei ([facility]  10/29/2015 - Present 3/21/2017 by Johanny Rand NP   Entered by Janneth Martinez MD  
 Ileostomy in place [facility])  2/15/2016 - Present 3/21/2017 by Ethan Valdivia, NP Entered by BENY Laguna Anemia due to chemotherapy  2/22/2016 - Present 2/22/2016 by Barbie Graham MD  
  Entered by Barbie Graham MD  
  CINV (chemotherapy-induced nausea and vomiting)  3/22/2016 - Present 3/22/2016 by BENY Laguna Entered by BENY Laguna Cancer associated pain  5/13/2016 - Present 3/21/2017 by Ethan Valdivia, NP Entered by Shandra Whitehead MD  
  Generalized abdominal pain  5/13/2016 - Present 5/17/2016 by BENY Laguna Entered by Shandra Whitehead MD  
  Anxiety about health  5/13/2016 - Present 5/17/2016 by BENY Laguna Entered by Shandra Whitehead MD  
  Ileus [facility])  3/21/2017 - Present 3/21/2017 by Ethan Valdivia NP Entered by Ethan Valdivia NP Thrombosis of pelvic vein  3/21/2017 - Present 3/21/2017 by Ethan Valdivia, NP Entered by Ethan Valdivia NP Hx of pulmonary embolus  3/21/2017 - Present 3/21/2017 by Ethan Valdivia, NP Entered by Ethan Valdivia NP Dehydration  3/21/2017 - Present 3/21/2017 by Ethan Valdivia, NP Entered by Ethan Valdivia NP Chemotherapy-induced neutropenia ([facility]  3/22/2017 - Present 3/22/2017 by Ethan Valdivia, NP Entered by Ethan Valdivia NP Anticoagulation adequate with anticoagulant therapy  3/28/2017 - Present 3/28/2017 by Ethan Valdivia NP Entered by Ethan Valdivia NP Counseling regarding end of life decision making  4/25/2017 - Present 4/25/2017 by Ethan Valdivia, NP Entered by Ethan Valdivia NP You are allergic to the following Allergen Reactions Ativan (Lorazepam) Other (comments) SEVERE CONFUSION Morphine Rash Itching Current Discharge Medication List  
  
START taking these medications Dose & Instructions Dispensing Information Comments  
 polyethylene glycol 17 gram/dose powder Commonly known as:  Simona Qiu Dose:  17 g Take 17 g by mouth daily as needed. Quantity:  238 g Refills:  3 CONTINUE these medications which have NOT CHANGED Dose & Instructions Dispensing Information Comments  
 apixaban 2.5 mg tablet Commonly known as:  Noah Mcfadden Dose:  2.5 mg Take 1 Tab by mouth two (2) times a day. Quantity:  60 Tab Refills:  6 Calcium-Vitamin D3-Vitamin K 023-610-78 mg-unit-mcg Chew Dose:  1 Tab Take 1 Tab by mouth two (2) times a day. Refills:  0  
   
 ciprofloxacin HCl 500 mg tablet Commonly known as:  CIPRO Dose:  500 mg Take 1 Tab by mouth two (2) times a day. Quantity:  14 Tab Refills:  0  
   
 esomeprazole 40 mg capsule Commonly known as:  NexIUM Dose:  40 mg Take 1 Cap by mouth daily. Quantity:  90 Cap Refills:  2  
   
 fentaNYL 50 mcg/hr PATCH Commonly known as:  Sharad Umana Dose:  1 Patch 1 Patch by TransDERmal route every seventy-two (72) hours. Max Daily Amount: 1 Patch. Quantity:  10 Patch Refills:  0 HYDROcodone-acetaminophen 0.5-21.7 mg/mL oral solution Commonly known as:  HYCET Dose:  15-30 mL Take 15-30 mL by mouth every four (4) hours as needed. Max Daily Amount: 90 mg. Disp: two(2) 473 ml bottles  Indications: Pain Quantity:  950 mL Refills:  0  
   
 lidocaine-prilocaine topical cream  
Commonly known as:  EMLA Apply small amount over port area and cover with a band aid 1 hour before chemo treatment Quantity:  30 g Refills:  3  
   
 magnesium oxide 400 mg tablet Commonly known as:  MAG-OX Dose:  400 mg Take 1 Tab by mouth three (3) times daily. Indications: HYPOMAGNESEMIA Quantity:  90 Tab Refills:  2  
   
 metoprolol tartrate 100 mg IR tablet Commonly known as:  LOPRESSOR Dose:  50 mg Take 50 mg by mouth two (2) times a day. Refills:  0  
   
 multivitamin tablet Commonly known as:  ONE A DAY Dose:  1 Tab Take 1 Tab by mouth daily. Refills:  0 mv. min [id number] 100-350-5 mcg-mcg-mg Miranda Bunting Commonly known as:  AQUADEKS Dose:  1 Tab Take 1 Tab by mouth daily. Indications: VITAMIN DEFICIENCY PREVENTION Quantity:  60 Tab Refills:  1 Substitution to equivalent ok  
  
 ondansetron 4 mg disintegrating tablet Commonly known as:  ZOFRAN ODT Dose:  4 mg Take 1 Tab by mouth every eight (8) hours as needed for Nausea. Quantity:  30 Tab Refills:  2  
   
 promethazine 25 mg tablet Commonly known as:  PHENERGAN Dose:  25 mg Take 1 Tab by mouth every six (6) hours as needed for Nausea. Quantity:  40 Tab Refills:  1 ZyrTEC 10 mg Cap Generic drug:  Cetirizine Take  by mouth. Refills:  0 ASK your doctor about these medications Dose & Instructions Dispensing Information Comments  
 zolpidem 10 mg tablet Commonly known as:  AMBIEN Dose:  10 mg Take 1 Tab by mouth nightly as needed for Sleep. Max Daily Amount: 10 mg.  
 Quantity:  30 Tab Refills:  1 Follow-up Information Follow up With Details Comments Contact Info None   None (395) Patient stated that they have no PCP Discharge Instructions Bowel Blockage (Intestinal Obstruction): Care Instructions Your Care Instructions A bowel blockage, also called an intestinal obstruction, can prevent gas, fluids, or solids from moving through the intestines normally. It can cause constipation and, rarely, diarrhea. You may have pain, nausea, vomiting, and cramping. Most of the time, complete blockages require a stay in the hospital and possibly surgery. But if your bowel is only partly blocked, your doctor may tell you to wait until it clears on its own and you are able to pass gas and stool. If so, there are things you can do at home to help make you feel better.  
If you have had surgery for a bowel blockage, there are things you can do at home to make sure you heal well. You can also make some changes to keep your bowel from becoming blocked again. Follow-up care is a key part of your treatment and safety. Be sure to make and go to all appointments, and call your doctor if you are having problems. It's also a good idea to know your test results and keep a list of the medicines you take. How can you care for yourself at home? If your doctor has told you to wait at home for a blockage to clear on its own: · Follow your doctor's instructions. These may include eating a liquid diet to avoid complete blockage. · Take your medicines exactly as prescribed. Call your doctor if you think you are having a problem with your medicine. · Put a heating pad set on low on your belly to relieve mild cramps and pain. To prevent another blockage · Try to eat smaller amounts of food more often. For example, have 5 or 6 small meals throughout the day instead of 2 or 3 large meals. · Chew your food very well. Try to chew each bite about 20 times or until it is liquid. · Avoid high-fiber foods and raw fruits and vegetables with skins, husks, strings, or seeds. These can form a ball of undigested material that can cause a blockage if a part of your bowel is scarred or narrowed. · Check with your doctor before you eat whole-grain products or use a fiber supplement such as Citrucel or Metamucil. · To help you have regular bowel movements, eat at regular times, do not strain during a bowel movement, and drink at least 8 to 10 glasses of water each day. If you have kidney, heart, or liver disease and have to limit fluids, talk with your doctor or before you increase the amount of fluids you drink. · Drink high-calorie liquid formulas if your doctor says to. Severe symptoms may make it hard for your body to take in vitamins and minerals. · Get regular exercise. It helps you digest your food better.  Get at least 30 minutes of physical activity on most days of the week. Walking is a good choice. When should you call for help? Call 911 anytime you think you may need emergency care. For example, call if: 
· You passed out (lost consciousness). · You have severe pain or swelling in your belly. · You vomit blood or what looks like coffee grounds. · You pass maroon or very bloody stools. · You cannot pass any stools or gas. Call your doctor now or seek immediate medical care if: 
· You have a new or higher fever. · You cannot keep fluids or medicines down. · You have new pain that gets worse when you move or cough. · Your symptoms become much worse than usual. 
Watch closely for changes in your health, and be sure to contact your doctor if: 
· You do not get better as expected. · You have steady diarrhea for more than 2 weeks. · You've been losing weight. Where can you learn more? Go to http://[employer].info/. Enter H314 in the search box to learn more about \"Bowel Blockage (Intestinal Obstruction): Care Instructions. \" Current as of: August 9, 2016 Content Version: 11.2 © 6691-1402 [employer]. Care instructions adapted under license by [employer] (which disclaims liability or warranty for this information). If you have questions about a medical condition or this instruction, always ask your healthcare professional. [address] any warranty or liability for your use of this information. Chart Review Routing History Recipient Method Report Sent By Rios Hill MD  
Phone: [phone] In [employer] IP Auto Routed Rachana Cruz MD [[id number]] 11/5/2015  7:05 AM 11/05/2015 Chance Hyde In [employer] IP Auto Routed Rachana Cruz MD [[id number]] 11/5/2015  7:05 AM 11/05/2015 Rachael Childers In [employer] IP Auto Routed Rachana Cruz MD [[id number]] 11/5/2015  7:05 AM 11/05/2015 Frank Rueda In [employer] IP Auto Routed Rachana Cruz MD [[id number]] 11/5/2015  7:05 AM 11/05/2015 Mike Rodas MD  
Fax: [phone] Phone: [phone] Fax Notes/Transcriptions Veronica Loaiza [address] [place] [address] [[id number]] 12/8/2015  3:25 PM 12/08/2015 Notes/Transcriptions Benton Macedo [place], [place] [[id number]] 12/8/2015  3:25 PM 12/03/2015

## 2017-04-27 NOTE — IP AVS SNAPSHOT
[address] [facility] 
[phone] Patient: Jin Reynolds MRN: [id number] :1960 You are allergic to the following Allergen Reactions Ativan (Lorazepam) Other (comments) SEVERE CONFUSION Morphine Rash Itching Recent Documentation Weight BMI OB Status Smoking Status 55.6 kg 21.72 kg/m2 Pregnant Never Smoker Emergency Contacts Name Discharge Info Relation Home Work Mobile [address] CAREGIVER [3] Spouse [3] [phone] About your hospitalization You were admitted on:  2017 You last received care in the:  [facility] [place] [facility]  You were discharged on:  2017 Unit phone number:  [phone] Why you were hospitalized Your primary diagnosis was:  Not on File Your diagnoses also included: Bowel Obstruction (Hcc), Protein Calorie Malnutrition (Hcc) Providers Seen During Your Hospitalizations Provider Role Specialty Primary office phone Ethan Blanco MD Attending Provider Gynecologic Oncology [phone] Your Primary Care Physician (PCP) Primary Care Physician Office Phone Office Fax NONE ** None ** ** None ** Follow-up Information Follow up With Details Comments Contact Info None   None ([phone]) Patient stated that they have no PCP Your Appointments Thursday May 18, 2017  2:00 PM EDT CHEMOTHERAPY with Ethan Blanco MD  
[facility] Gynecologic Oncology [address] [place] [address] [address] [facility] [address] [phone]  
[phone] Tuesday May 23, 2017 11:00 AM EDT INFUSION [address] with RM 102A- CHAIR [address] ([address]) [address] [facility] [address] [address]  
[phone] Go to [address] [address], ground floor. Current Discharge Medication List  
  
START taking these medications Dose & Instructions Dispensing Information Comments Morning Noon Evening Bedtime  
 polyethylene glycol 17 gram/dose powder Commonly known as:  Sarah Fletcher Your last dose was: Your next dose is:    
   
   
 Dose:  17 g Take 17 g by mouth daily as needed. Quantity:  238 g Refills:  3 CONTINUE these medications which have NOT CHANGED Dose & Instructions Dispensing Information Comments Morning Noon Evening Bedtime  
 apixaban 2.5 mg tablet Commonly known as:  Leslye Miller Your last dose was: Your next dose is:    
   
   
 Dose:  2.5 mg Take 1 Tab by mouth two (2) times a day. Quantity:  60 Tab Refills:  6 Calcium-Vitamin D3-Vitamin K [id number] mg-unit-mcg Chew Your last dose was: Your next dose is:    
   
   
 Dose:  1 Tab Take 1 Tab by mouth two (2) times a day. Refills:  0  
     
   
   
   
  
 ciprofloxacin HCl 500 mg tablet Commonly known as:  CIPRO Your last dose was: Your next dose is:    
   
   
 Dose:  500 mg Take 1 Tab by mouth two (2) times a day. Quantity:  14 Tab Refills:  0  
     
   
   
   
  
 esomeprazole 40 mg capsule Commonly known as:  NexIUM Your last dose was: Your next dose is:    
   
   
 Dose:  40 mg Take 1 Cap by mouth daily. Quantity:  90 Cap Refills:  2  
     
   
   
   
  
 fentaNYL 50 mcg/hr PATCH Commonly known as:  Gabino Roy Your last dose was: Your next dose is:    
   
   
 Dose:  1 Patch 1 Patch by TransDERmal route every seventy-two (72) hours. Max Daily Amount: 1 Patch. Quantity:  10 Patch Refills:  0 HYDROcodone-acetaminophen 0.5-21.7 mg/mL oral solution Commonly known as:  HYCET Your last dose was: Your next dose is:    
   
   
 Dose:  15-30 mL Take 15-30 mL by mouth every four (4) hours as needed. Max Daily Amount: 90 mg. Disp: two(2) 473 ml bottles  Indications: Pain Quantity:  950 mL Refills:  0  
     
   
   
   
  
 lidocaine-prilocaine topical cream  
Commonly known as:  EMLA Your last dose was: Your next dose is:    
   
   
 Apply small amount over port area and cover with a band aid 1 hour before chemo treatment Quantity:  30 g Refills:  3  
     
   
   
   
  
 magnesium oxide 400 mg tablet Commonly known as:  MAG-OX Your last dose was: Your next dose is:    
   
   
 Dose:  400 mg Take 1 Tab by mouth three (3) times daily. Indications: HYPOMAGNESEMIA Quantity:  90 Tab Refills:  2  
     
   
   
   
  
 metoprolol tartrate 100 mg IR tablet Commonly known as:  LOPRESSOR Your last dose was: Your next dose is:    
   
   
 Dose:  50 mg Take 50 mg by mouth two (2) times a day. Refills:  0  
     
   
   
   
  
 multivitamin tablet Commonly known as:  ONE A DAY Your last dose was: Your next dose is:    
   
   
 Dose:  1 Tab Take 1 Tab by mouth daily. Refills:  0  
     
   
   
   
  
 mv. min cmb#[id number] 100-350-5 mcg-mcg-mg Chew Commonly known as:  AQUADEKS Your last dose was: Your next dose is:    
   
   
 Dose:  1 Tab Take 1 Tab by mouth daily. Indications: VITAMIN DEFICIENCY PREVENTION Quantity:  60 Tab Refills:  1 Substitution to equivalent ok  
    
   
   
   
  
 ondansetron 4 mg disintegrating tablet Commonly known as:  ZOFRAN ODT Your last dose was: Your next dose is:    
   
   
 Dose:  4 mg Take 1 Tab by mouth every eight (8) hours as needed for Nausea. Quantity:  30 Tab Refills:  2  
     
   
   
   
  
 promethazine 25 mg tablet Commonly known as:  PHENERGAN Your last dose was:     
   
Your next dose is:    
   
   
 Dose:  25 mg  
 Take 1 Tab by mouth every six (6) hours as needed for Nausea. Quantity:  40 Tab Refills:  1 ZyrTEC 10 mg Cap Generic drug:  Cetirizine Your last dose was: Your next dose is: Take  by mouth. Refills:  0 ASK your doctor about these medications Dose & Instructions Dispensing Information Comments Morning Noon Evening Bedtime  
 zolpidem 10 mg tablet Commonly known as:  AMBIEN Your last dose was: Your next dose is:    
   
   
 Dose:  10 mg Take 1 Tab by mouth nightly as needed for Sleep. Max Daily Amount: 10 mg.  
 Quantity:  30 Tab Refills:  1 Where to Get Your Medications Information on where to get these meds will be given to you by the nurse or doctor. ! Ask your nurse or doctor about these medications  
  polyethylene glycol 17 gram/dose powder Discharge Instructions Bowel Blockage (Intestinal Obstruction): Care Instructions Your Care Instructions A bowel blockage, also called an intestinal obstruction, can prevent gas, fluids, or solids from moving through the intestines normally. It can cause constipation and, rarely, diarrhea. You may have pain, nausea, vomiting, and cramping. Most of the time, complete blockages require a stay in the hospital and possibly surgery. But if your bowel is only partly blocked, your doctor may tell you to wait until it clears on its own and you are able to pass gas and stool. If so, there are things you can do at home to help make you feel better. If you have had surgery for a bowel blockage, there are things you can do at home to make sure you heal well. You can also make some changes to keep your bowel from becoming blocked again. Follow-up care is a key part of your treatment and safety.  Be sure to make and go to all appointments, and call your doctor if you are having problems. It's also a good idea to know your test results and keep a list of the medicines you take. How can you care for yourself at home? If your doctor has told you to wait at home for a blockage to clear on its own: · Follow your doctor's instructions. These may include eating a liquid diet to avoid complete blockage. · Take your medicines exactly as prescribed. Call your doctor if you think you are having a problem with your medicine. · Put a heating pad set on low on your belly to relieve mild cramps and pain. To prevent another blockage · Try to eat smaller amounts of food more often. For example, have 5 or 6 small meals throughout the day instead of 2 or 3 large meals. · Chew your food very well. Try to chew each bite about 20 times or until it is liquid. · Avoid high-fiber foods and raw fruits and vegetables with skins, husks, strings, or seeds. These can form a ball of undigested material that can cause a blockage if a part of your bowel is scarred or narrowed. · Check with your doctor before you eat whole-grain products or use a fiber supplement such as Citrucel or Metamucil. · To help you have regular bowel movements, eat at regular times, do not strain during a bowel movement, and drink at least 8 to 10 glasses of water each day. If you have kidney, heart, or liver disease and have to limit fluids, talk with your doctor or before you increase the amount of fluids you drink. · Drink high-calorie liquid formulas if your doctor says to. Severe symptoms may make it hard for your body to take in vitamins and minerals. · Get regular exercise. It helps you digest your food better. Get at least 30 minutes of physical activity on most days of the week. Walking is a good choice. When should you call for help? Call 911 anytime you think you may need emergency care. For example, call if: 
· You passed out (lost consciousness). · You have severe pain or swelling in your belly. · You vomit blood or what looks like coffee grounds. · You pass maroon or very bloody stools. · You cannot pass any stools or gas. Call your doctor now or seek immediate medical care if: 
· You have a new or higher fever. · You cannot keep fluids or medicines down. · You have new pain that gets worse when you move or cough. · Your symptoms become much worse than usual. 
Watch closely for changes in your health, and be sure to contact your doctor if: 
· You do not get better as expected. · You have steady diarrhea for more than 2 weeks. · You've been losing weight. Where can you learn more? Go to http://narda-manuel.info/. Enter B736 in the search box to learn more about \"Bowel Blockage (Intestinal Obstruction): Care Instructions. \" Current as of: August 9, 2016 Content Version: 11.2 © 8398-8000 [employer]. Care instructions adapted under license by [employer] (which disclaims liability or warranty for this information). If you have questions about a medical condition or this instruction, always ask your healthcare professional. [address] any warranty or liability for your use of this information. Discharge Orders None Introducing [facility]! Dear Marce Snider: 
Thank you for requesting a Quirky account. Our records indicate that you already have an active Quirky account. You can access your account anytime at [email or web address] Did you know that you can access your hospital and ER discharge instructions at any time in Quirky? You can also review all of your test results from your hospital stay or ER visit. Additional Information If you have questions, please visit the Frequently Asked Questions section of the Quirky website at [email or web address] Remember, Quirky is NOT to be used for urgent needs. For medical emergencies, dial 911. Now available from your iPhone and Android! General Information Please provide this summary of care documentation to your next provider. Patient Signature:  ____________________________________________________________ Date:  ____________________________________________________________  
  
Ricarda Juanpablo Provider Signature:  ____________________________________________________________ Date:  ____________________________________________________________

## 2017-04-27 NOTE — IP AVS SNAPSHOT
Current Discharge Medication List  
  
START taking these medications Dose & Instructions Dispensing Information Comments Morning Noon Evening Bedtime  
 polyethylene glycol 17 gram/dose powder Commonly known as:  Chaneta [place] Your last dose was: Your next dose is:    
   
   
 Dose:  17 g Take 17 g by mouth daily as needed. Quantity:  238 g Refills:  3 CONTINUE these medications which have NOT CHANGED Dose & Instructions Dispensing Information Comments Morning Noon Evening Bedtime  
 apixaban 2.5 mg tablet Commonly known as:  Elta Cheek Your last dose was: Your next dose is:    
   
   
 Dose:  2.5 mg Take 1 Tab by mouth two (2) times a day. Quantity:  60 Tab Refills:  6 Calcium-Vitamin D3-Vitamin K [phone] mg-unit-mcg Chew Your last dose was: Your next dose is:    
   
   
 Dose:  1 Tab Take 1 Tab by mouth two (2) times a day. Refills:  0  
     
   
   
   
  
 ciprofloxacin HCl 500 mg tablet Commonly known as:  CIPRO Your last dose was: Your next dose is:    
   
   
 Dose:  500 mg Take 1 Tab by mouth two (2) times a day. Quantity:  14 Tab Refills:  0  
     
   
   
   
  
 esomeprazole 40 mg capsule Commonly known as:  NexIUM Your last dose was: Your next dose is:    
   
   
 Dose:  40 mg Take 1 Cap by mouth daily. Quantity:  90 Cap Refills:  2  
     
   
   
   
  
 fentaNYL 50 mcg/hr PATCH Commonly known as:  Mayte Ardon Your last dose was: Your next dose is:    
   
   
 Dose:  1 Patch 1 Patch by TransDERmal route every seventy-two (72) hours. Max Daily Amount: 1 Patch. Quantity:  10 Patch Refills:  0 HYDROcodone-acetaminophen 0.5-21.7 mg/mL oral solution Commonly known as:  HYCET Your last dose was: Your next dose is:    
   
   
 Dose:  15-30 mL Take 15-30 mL by mouth every four (4) hours as needed. Max Daily Amount: 90 mg. Disp: two(2) 473 ml bottles  Indications: Pain Quantity:  950 mL Refills:  0  
     
   
   
   
  
 lidocaine-prilocaine topical cream  
Commonly known as:  EMLA Your last dose was: Your next dose is:    
   
   
 Apply small amount over port area and cover with a band aid 1 hour before chemo treatment Quantity:  30 g Refills:  3  
     
   
   
   
  
 magnesium oxide 400 mg tablet Commonly known as:  MAG-OX Your last dose was: Your next dose is:    
   
   
 Dose:  400 mg Take 1 Tab by mouth three (3) times daily. Indications: HYPOMAGNESEMIA Quantity:  90 Tab Refills:  2  
     
   
   
   
  
 metoprolol tartrate 100 mg IR tablet Commonly known as:  LOPRESSOR Your last dose was: Your next dose is:    
   
   
 Dose:  50 mg Take 50 mg by mouth two (2) times a day. Refills:  0  
     
   
   
   
  
 multivitamin tablet Commonly known as:  ONE A DAY Your last dose was: Your next dose is:    
   
   
 Dose:  1 Tab Take 1 Tab by mouth daily. Refills:  0  
     
   
   
   
  
 mv. min cmb#[id number] 100-350-5 mcg-mcg-mg Chew Commonly known as:  AQUADEKS Your last dose was: Your next dose is:    
   
   
 Dose:  1 Tab Take 1 Tab by mouth daily. Indications: VITAMIN DEFICIENCY PREVENTION Quantity:  60 Tab Refills:  1 Substitution to equivalent ok  
    
   
   
   
  
 ondansetron 4 mg disintegrating tablet Commonly known as:  ZOFRAN ODT Your last dose was: Your next dose is:    
   
   
 Dose:  4 mg Take 1 Tab by mouth every eight (8) hours as needed for Nausea. Quantity:  30 Tab Refills:  2  
     
   
   
   
  
 promethazine 25 mg tablet Commonly known as:  PHENERGAN Your last dose was:     
   
Your next dose is:    
   
   
 Dose:  25 mg  
 Take 1 Tab by mouth every six (6) hours as needed for Nausea. Quantity:  40 Tab Refills:  1 ZyrTEC 10 mg Cap Generic drug:  Cetirizine Your last dose was: Your next dose is: Take  by mouth. Refills:  0 ASK your doctor about these medications Dose & Instructions Dispensing Information Comments Morning Noon Evening Bedtime  
 zolpidem 10 mg tablet Commonly known as:  AMBIEN Your last dose was: Your next dose is:    
   
   
 Dose:  10 mg Take 1 Tab by mouth nightly as needed for Sleep. Max Daily Amount: 10 mg.  
 Quantity:  30 Tab Refills:  1 Where to Get Your Medications Information on where to get these meds will be given to you by the nurse or doctor. ! Ask your nurse or doctor about these medications  
  polyethylene glycol 17 gram/dose powder

## 2017-04-27 NOTE — H&P
[address] [place] [address]  Dr. Devorah Law. [place], HAYDER       Kathrin Damon       [id number]  1960    Admitted (Not on file) Date 2017     HISTORY OF PRESENT ILLNESS:  Kathrin Damon is a 61 y.o.  postmenopausal female with a diagnosis of stage IV ovarian cancer. She underwent exploratory laparotomy with suboptimal debulking. She had extensive disease. She was readmitted about a week later with obstruction and subsequently had a cecal perforation, requiring resection, end ileostomy, and mucous fistula. During the hospitalization she was also diagnosed with pulmonary embolus and had chest tubes placed for bilateral pleural effusions. She had a prolonged hospital course and actually received a dose of cisplatin chemotherapy while in the hospital to help dry up the effusions. She completed 6 cycles of Taxol/Carboplatin chemotherapy following her initial debulking. She did relatively well, considering she had an ileostomy to deal with during chemotherapy. I took her to the OR on 16 for interval debulking and reversal of her ileostomy. She had extensive disease, but we were able to resect the majority of her disease. One week later she developed an anastomotic leak requiring reexploration and recreation of an ileostomy. She had another prolonged hospitalization, but recovered well since surgery. We then reinitiated Taxol/Carboplatin chemotherapy, but did reduce the dose of Taxol to 135 mg/m2. She completed 4 mores cycles. Her CA-125 normalized and we stopped treatment.     She recently presented to Dr. Jaime Prince office today with her  to discuss her follow up/surveillance CT scan. Unfortunately, it demonstrated progression of disease, although minimally worse than her prior scan 2 months previously. She was actually feeling better than she did at that time.    After review of scans and disease, the decision was made to begin Doxil/Avastin Q28 days for 6 cycles. She began this on 2/27/2017 and has now completed 3 cycles. SUBJECTIVE:  After pt completed her third cycle of Doxil/Avastin this past Tuesday (4/25/17), she initially did well. Then yesterday, she noticed her output in her ostomy decreasing in the morning with increasing abd pain. Throughout the day yesterday, she said her pain \"really increased and her nausea got worse\". By the evening, she began with some vomiting and no further s tool in ostomy and by this morning, no gas in her bag with increase nausea and vomiting. She has been unable to keep any po antiemetics down today. She is therefore admitted for possible bowel obstruction with metastatic ovarian cancer.       is present and supportive          Patient Active Problem List    Diagnosis Date Noted    Counseling regarding end of life decision making 04/25/2017    Anticoagulation adequate with anticoagulant therapy 03/28/2017    Chemotherapy-induced neutropenia (HCC) 03/22/2017    Ileus ([address] 03/21/2017    Thrombosis of pelvic vein 03/21/2017    Hx of pulmonary embolus 03/21/2017    Dehydration 03/21/2017    Cancer associated pain 05/13/2016    Generalized abdominal pain 05/13/2016    Anxiety about health 05/13/2016    CINV (chemotherapy-induced nausea and vomiting) 03/22/2016    Anemia due to chemotherapy 02/22/2016    Ileostomy in place [facility]) 02/15/2016    Carcinomatosis peritonei ([address] 10/29/2015    Ovarian cancer ([address] 10/12/2015     Past Medical History:   Diagnosis Date    Abdominal carcinomatosis ([address] 10/2015    Arthritis     left shoulder    BRCA negative 12/2015    Chronic pain     Depression     HX    Environmental allergies     GERD (gastroesophageal reflux disease)     Hypertension     NEW OVER PAST 5-6 MONTHS    Ovarian cancer ([address] 10/2015    serous adenocarcinoma, high grade, stage IIIC    Pulmonary embolism ([address] 10/2015      Past Surgical History:   Procedure Laterality Date    CARDIAC SURG PROCEDURE UNLIST  12/11/15    cardiac cath/normal     HX BREAST BIOPSY  1995    benign right breast bx    HX DILATION AND CURETTAGE  2/2011    POLYPECTOMY    HX GI  2015    PERFORATED BOWEL; ILEOSTOMY    HX GYN  10/2015    EXP LAPAROTOMY    HX HEENT  LAST 2005    oral tissue graft X3    HX HEENT  1970    tonsillectomy    HX LAPAROTOMY  10/2015    tumor sampling    HX LAPAROTOMY  4/2016    hysterectomy, BSO, radical debulking    HX LAPAROTOMY  5/2016    resection ileocolic anastomosis, leak, ileostomy    HX OTHER SURGICAL  11/2015    tunneled portacath      Social History   Substance Use Topics    Smoking status: Never Smoker    Smokeless tobacco: Never Used    Alcohol use No      Family History   Problem Relation Age of Onset    Cancer Maternal Uncle      skin    Hypertension Mother     High Cholesterol Mother     Anxiety Mother     Heart Disease Mother     High Cholesterol Father     Hypertension Father     Stroke Father     Arthritis-osteo Sister     Migraines Sister     Other Sister      FIBROMYALGIA    Depression Brother     Other Brother      DRUG ABUSE HEPATITIS C    Migraines Sister     Arrhythmia Sister     Depression Sister     Lung Disease Paternal Aunt      COPD    Cancer Paternal Uncle      LUNG    Lung Disease Paternal Uncle      COPD    Lung Disease Maternal Grandmother      COPD    Anesth Problems Neg Hx       No current facility-administered medications for this encounter. Current Outpatient Prescriptions   Medication Sig    fentaNYL (DURAGESIC) 50 mcg/hr PATCH 1 Patch by TransDERmal route every seventy-two (72) hours. Max Daily Amount: 1 Patch.  HYDROcodone-acetaminophen (HYCET) 0.5-21.7 mg/mL oral solution Take 15-30 mL by mouth every four (4) hours as needed. Max Daily Amount: 90 mg. Disp: two(2) 473 ml bottles  Indications: Pain    ciprofloxacin HCl (CIPRO) 500 mg tablet Take 1 Tab by mouth two (2) times a day.     Cetirizine (ZYRTEC) 10 mg cap Take  by mouth.    apixaban (ELIQUIS) 2.5 mg tablet Take 1 Tab by mouth two (2) times a day.  promethazine (PHENERGAN) 25 mg tablet Take 1 Tab by mouth every six (6) hours as needed for Nausea.  zolpidem (AMBIEN) 10 mg tablet Take 1 Tab by mouth nightly as needed for Sleep. Max Daily Amount: 10 mg.    fentaNYL (DURAGESIC) 12 mcg/hr patch 1 Patch by TransDERmal route every seventy-two (72) hours. Max Daily Amount: 1 Patch.  ondansetron (ZOFRAN ODT) 4 mg disintegrating tablet Take 1 Tab by mouth every eight (8) hours as needed for Nausea.  magnesium oxide (MAG-OX) 400 mg tablet Take 1 Tab by mouth three (3) times daily. Indications: HYPOMAGNESEMIA    esomeprazole (NEXIUM) 40 mg capsule Take 1 Cap by mouth daily.  multivit-min-FA-coenzyme Q10 (AQUADEKS) 100-350-5 mcg-mcg-mg chew Take 1 Tab by mouth daily. Indications: VITAMIN DEFICIENCY PREVENTION    Calcium-Vitamin D3-Vitamin K 686-087-65 mg-unit-mcg chew Take 1 Tab by mouth two (2) times a day.  metoprolol (LOPRESSOR) 100 mg tablet Take  by mouth two (2) times a day.  lidocaine-prilocaine (EMLA) topical cream Apply small amount over port area and cover with a band aid 1 hour before chemo treatment    multivitamin (ONE A DAY) tablet Take 1 Tab by mouth daily. Allergies   Allergen Reactions    Ativan [Lorazepam] Other (comments)     SEVERE CONFUSION    Morphine Rash and Itching        Review of Systems  10 point review negative other than stated in the subject above    OBJECTIVE:  Visit Vitals    /70 (BP 1 Location: Left arm, BP Patient Position: At rest)    Pulse 75    Temp 97.9 °F (36.6 °C)    Resp 16    Wt 122 lb 9.6 oz (55.6 kg)    SpO2 97%    BMI 21.72 kg/m2       Physical Exam  General: A&O X3 appearing uncomfortable and holding emesis bag   HEENT: Sclera anicteric, Mucosa pink, dry  Neck: No JVD bilat. No cervical adenopathy appreciated  Port site: Accessed without redness, tenderness or swelling.   Heart: Regular without M/R/G  Lungs: CTA Bilat without wheezing or rales   Abd: Soft, with mild tenderness throughout abdomen, but no distention noted. Ostomy without any stool present. No gas noted at present Well healed abd scars  Faint bowel sounds audible to LUQ  Ext: Without edema and + pedal pulses bilat  Neuro: grossly intact      Data Review  Recent Results (from the past 12 hour(s))   METABOLIC PANEL, COMPREHENSIVE    Collection Time: 04/27/17  1:02 PM   Result Value Ref Range    Sodium 139 136 - 145 mmol/L    Potassium 3.5 3.5 - 5.1 mmol/L    Chloride 101 97 - 108 mmol/L    CO2 29 21 - 32 mmol/L    Anion gap 9 5 - 15 mmol/L    Glucose 100 65 - 100 mg/dL    BUN 20 6 - 20 MG/DL    Creatinine 0.99 0.55 - 1.02 MG/DL    BUN/Creatinine ratio 20 12 - 20      GFR est AA >60 >60 ml/min/1.73m2    GFR est non-AA 58 (L) >60 ml/min/1.73m2    Calcium 9.2 8.5 - 10.1 MG/DL    Bilirubin, total 0.2 0.2 - 1.0 MG/DL    ALT (SGPT) 14 12 - 78 U/L    AST (SGOT) 15 15 - 37 U/L    Alk. phosphatase 113 45 - 117 U/L    Protein, total 7.6 6.4 - 8.2 g/dL    Albumin 3.3 (L) 3.5 - 5.0 g/dL    Globulin 4.3 (H) 2.0 - 4.0 g/dL    A-G Ratio 0.8 (L) 1.1 - 2.2     MAGNESIUM    Collection Time: 04/27/17  1:02 PM   Result Value Ref Range    Magnesium 1.4 (L) 1.6 - 2.4 mg/dL   CBC WITH AUTOMATED DIFF    Collection Time: 04/27/17  1:02 PM   Result Value Ref Range    WBC 8.2 3.6 - 11.0 K/uL    RBC 3.25 (L) 3.80 - 5.20 M/uL    HGB 10.2 (L) 11.5 - 16.0 g/dL    HCT 30.4 (L) 35.0 - 47.0 %    MCV 93.5 80.0 - 99.0 FL    MCH 31.4 26.0 - 34.0 PG    MCHC 33.6 30.0 - 36.5 g/dL    RDW 13.3 11.5 - 14.5 %    PLATELET 087 626 - 158 K/uL    NEUTROPHILS 68 32 - 75 %    LYMPHOCYTES 24 12 - 49 %    MONOCYTES 8 5 - 13 %    EOSINOPHILS 0 0 - 7 %    BASOPHILS 0 0 - 1 %    ABS. NEUTROPHILS 5.6 1.8 - 8.0 K/UL    ABS. LYMPHOCYTES 1.9 0.8 - 3.5 K/UL    ABS. MONOCYTES 0.6 0.0 - 1.0 K/UL    ABS. EOSINOPHILS 0.0 0.0 - 0.4 K/UL    ABS.  BASOPHILS 0.0 0.0 - 0.1 K/UL   PREALBUMIN    Collection Time: 04/27/17  1:02 PM   Result Value Ref Range    Prealbumin 30.0 20.0 - 40.0 mg/dL   URINALYSIS W/MICROSCOPIC    Collection Time: 04/27/17  1:02 PM   Result Value Ref Range    Color YELLOW/STRAW      Appearance CLEAR CLEAR      Specific gravity 1.017 1.003 - 1.030      pH (UA) 6.0 5.0 - 8.0      Protein NEGATIVE  NEG mg/dL    Glucose NEGATIVE  NEG mg/dL    Ketone NEGATIVE  NEG mg/dL    Bilirubin NEGATIVE  NEG      Blood NEGATIVE  NEG      Urobilinogen 0.2 0.2 - 1.0 EU/dL    Nitrites NEGATIVE  NEG      Leukocyte Esterase NEGATIVE  NEG      WBC 0-4 0 - 4 /hpf    RBC 0-5 0 - 5 /hpf    Epithelial cells MODERATE (A) FEW /lpf    Bacteria NEGATIVE  NEG /hpf           Imaging (will obtain new imaging today)  CT of abd/pelvis 4/19/2017:  FINDINGS:   LUNG BASES: Clear. INCIDENTALLY IMAGED HEART AND MEDIASTINUM: Unremarkable. LIVER: 1 cm low-density and 5 mm low-density in the liver are stable. Peritoneal  implants overlying the liver and spleen are stable.     Ascites is stable. Small bowel is less distended on the current study. Patient  appears be status post subtotal colectomy. GALLBLADDER: Unremarkable. SPLEEN: No mass. PANCREAS: No mass or ductal dilatation. ADRENALS: Unremarkable. KIDNEYS: No mass, calculus, or hydronephrosis. STOMACH: Unremarkable. SMALL BOWEL: There is less small bowel distention when compared to 3/21/2017  COLON: Patient appears to be status post subtotal colectomy  APPENDIX: Surgically absent  PERITONEUM: Ascites is stable  RETROPERITONEUM: No lymphadenopathy or aortic aneurysm. REPRODUCTIVE ORGANS: Surgically absent  URINARY BLADDER: No mass or calculus. BONES: No destructive bone lesion. ADDITIONAL COMMENTS: N/A       IMPRESSION:  1. Ascites and peritoneal implants are stable. 2 small low densities in the  liver are stable. Small bowel distention has improved when compared to 3/21/2017  without definite evidence of obstruction. . Ileostomy is noted in the right lower  quadrant.  Patient appears to be status post subtotal colectomy.      KUB 4/27/2017 (today)  FINDINGS: Supine and upright views of the abdomen demonstrate a filter over the  inferior vena cava and surgical clips in the midabdomen and left pelvis. There  is an air-fluid level in the stomach and there are small bowel air-fluid levels  within over a possibility of bowel gas. There is a right lower quadrant ostomy. No colon gas is identified. There is no free intraperitoneal air. No soft tissue  masses or pathologic calcifications are seen. The bones and soft tissues are  within normal limits.       IMPRESSION: Probable small bowel obstruction versus ileus. IMPRESSION:    Patient Active Problem List   Diagnosis Code    Ovarian cancer ([facility] C56.9    Carcinomatosis peritonei ([facility] C78.6, C80.1    Ileostomy in place ([facility] Z93.2    Anemia due to chemotherapy D64.81, T45.1X5A    CINV (chemotherapy-induced nausea and vomiting) R11.2, T45.1X5A    Cancer associated pain G89.3    Generalized abdominal pain R10.84    Anxiety about health F41.8    Ileus (HCC) K56.7    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    Hx of pulmonary embolus Z86.711    Dehydration E86.0    Chemotherapy-induced neutropenia (HCC) D70.1    Anticoagulation adequate with anticoagulant therapy Z79.01    Counseling regarding end of life decision making Z71.89       PLAN:  Will admit for hydration and IV nausea medication with bowel rest to see if it can resolve with consecutive management   NPO with ice chips and some med's  Begin IV hydration with one goody bag daily with MVI/K+, Mag. D5%/0.45 NaCl when goody bag not infusing  Will hold on NG tube and evaluate how she does with IV antiemetics (currently not able to keep down)   Begin Reglan for 3 days scheduled unless diarrhea begins  Continue Eliquis for pelvic vein thrombosis with active cancer  Ambulate throughout the day.        Signed By: Jose Schofield NP     4/27/2017/9:39 AM       Patient seen and examined with NP. Agree with above plan. Flat/upright abdominal films personally reviewed. Air-fluid levels consistent with SBO. This is only partial as she is still having some ostomy output. Her pain has improved since arriving and her nausea is better after IV antiemetics. Will allow sips of clears and monitor closely.       Luis Palma MD

## 2017-04-27 NOTE — TELEPHONE ENCOUNTER
Pt  she states she had chemo on Tuesday, nausea and vomiting, \"cannot keep anything down: and her ostomy output has decreased

## 2017-04-27 NOTE — PROGRESS NOTES
Pt called to say she had decreased output in her ostomy early yesterday, then it seemed to just stop and she has had constant abd pain with nausea and vomiting that began lat last night. Says she has now been vomiting this morning and is no longer is expelling gas in her bag.   She will be a direct admit for possible bowel obstruction/metastatic ovarian cancer

## 2017-04-28 VITALS
DIASTOLIC BLOOD PRESSURE: 61 MMHG | RESPIRATION RATE: 16 BRPM | BODY MASS INDEX: 21.72 KG/M2 | WEIGHT: 122.6 LBS | TEMPERATURE: 98.1 F | OXYGEN SATURATION: 95 % | SYSTOLIC BLOOD PRESSURE: 104 MMHG | HEART RATE: 71 BPM

## 2017-04-28 PROBLEM — E46 PROTEIN CALORIE MALNUTRITION (HCC): Status: ACTIVE | Noted: 2017-04-28

## 2017-04-28 LAB
ALBUMIN SERPL BCP-MCNC: 2.9 G/DL (ref 3.5–5)
ALBUMIN/GLOB SERPL: 0.8 {RATIO} (ref 1.1–2.2)
ALP SERPL-CCNC: 103 U/L (ref 45–117)
ALT SERPL-CCNC: 14 U/L (ref 12–78)
ANION GAP BLD CALC-SCNC: 8 MMOL/L (ref 5–15)
AST SERPL W P-5'-P-CCNC: 14 U/L (ref 15–37)
BASOPHILS # BLD AUTO: 0 K/UL (ref 0–0.1)
BASOPHILS # BLD: 0 % (ref 0–1)
BILIRUB SERPL-MCNC: 0.2 MG/DL (ref 0.2–1)
BUN SERPL-MCNC: 13 MG/DL (ref 6–20)
BUN/CREAT SERPL: 14 (ref 12–20)
CALCIUM SERPL-MCNC: 9 MG/DL (ref 8.5–10.1)
CANCER AG125 SERPL-ACNC: 100 U/ML (ref 0–30)
CHLORIDE SERPL-SCNC: 103 MMOL/L (ref 97–108)
CO2 SERPL-SCNC: 27 MMOL/L (ref 21–32)
CREAT SERPL-MCNC: 0.94 MG/DL (ref 0.55–1.02)
EOSINOPHIL # BLD: 0.1 K/UL (ref 0–0.4)
EOSINOPHIL NFR BLD: 1 % (ref 0–7)
ERYTHROCYTE [DISTWIDTH] IN BLOOD BY AUTOMATED COUNT: 13.8 % (ref 11.5–14.5)
GLOBULIN SER CALC-MCNC: 3.8 G/DL (ref 2–4)
GLUCOSE SERPL-MCNC: 112 MG/DL (ref 65–100)
HCT VFR BLD AUTO: 28.3 % (ref 35–47)
HGB BLD-MCNC: 9.1 G/DL (ref 11.5–16)
LYMPHOCYTES # BLD AUTO: 27 % (ref 12–49)
LYMPHOCYTES # BLD: 1.9 K/UL (ref 0.8–3.5)
MAGNESIUM SERPL-MCNC: 2.2 MG/DL (ref 1.6–2.4)
MCH RBC QN AUTO: 30.4 PG (ref 26–34)
MCHC RBC AUTO-ENTMCNC: 32.2 G/DL (ref 30–36.5)
MCV RBC AUTO: 94.6 FL (ref 80–99)
MONOCYTES # BLD: 0.7 K/UL (ref 0–1)
MONOCYTES NFR BLD AUTO: 10 % (ref 5–13)
NEUTS SEG # BLD: 4.4 K/UL (ref 1.8–8)
NEUTS SEG NFR BLD AUTO: 62 % (ref 32–75)
PLATELET # BLD AUTO: 283 K/UL (ref 150–400)
POTASSIUM SERPL-SCNC: 3.8 MMOL/L (ref 3.5–5.1)
PROT SERPL-MCNC: 6.7 G/DL (ref 6.4–8.2)
RBC # BLD AUTO: 2.99 M/UL (ref 3.8–5.2)
SODIUM SERPL-SCNC: 138 MMOL/L (ref 136–145)
WBC # BLD AUTO: 7.1 K/UL (ref 3.6–11)

## 2017-04-28 PROCEDURE — 74011250636 HC RX REV CODE- 250/636: Performed by: OBSTETRICS & GYNECOLOGY

## 2017-04-28 PROCEDURE — 74011250636 HC RX REV CODE- 250/636: Performed by: NURSE PRACTITIONER

## 2017-04-28 PROCEDURE — 36415 COLL VENOUS BLD VENIPUNCTURE: CPT | Performed by: NURSE PRACTITIONER

## 2017-04-28 PROCEDURE — 80053 COMPREHEN METABOLIC PANEL: CPT | Performed by: NURSE PRACTITIONER

## 2017-04-28 PROCEDURE — 74011000258 HC RX REV CODE- 258: Performed by: NURSE PRACTITIONER

## 2017-04-28 PROCEDURE — 83735 ASSAY OF MAGNESIUM: CPT | Performed by: NURSE PRACTITIONER

## 2017-04-28 PROCEDURE — 85025 COMPLETE CBC W/AUTO DIFF WBC: CPT | Performed by: NURSE PRACTITIONER

## 2017-04-28 PROCEDURE — 74011000250 HC RX REV CODE- 250: Performed by: NURSE PRACTITIONER

## 2017-04-28 PROCEDURE — 74011250637 HC RX REV CODE- 250/637: Performed by: NURSE PRACTITIONER

## 2017-04-28 RX ORDER — HEPARIN 100 UNIT/ML
300 SYRINGE INTRAVENOUS AS NEEDED
Status: DISCONTINUED | OUTPATIENT
Start: 2017-04-28 | End: 2017-04-28 | Stop reason: HOSPADM

## 2017-04-28 RX ORDER — METOPROLOL TARTRATE 50 MG/1
50 TABLET ORAL EVERY 12 HOURS
Status: DISCONTINUED | OUTPATIENT
Start: 2017-04-28 | End: 2017-04-28 | Stop reason: HOSPADM

## 2017-04-28 RX ORDER — POLYETHYLENE GLYCOL 3350 17 G/17G
17 POWDER, FOR SOLUTION ORAL
Qty: 238 G | Refills: 3 | Status: SHIPPED | OUTPATIENT
Start: 2017-04-28 | End: 2018-04-10 | Stop reason: ALTCHOICE

## 2017-04-28 RX ADMIN — PROCHLORPERAZINE EDISYLATE 10 MG: 5 INJECTION INTRAMUSCULAR; INTRAVENOUS at 09:32

## 2017-04-28 RX ADMIN — Medication 300 UNITS: at 15:46

## 2017-04-28 RX ADMIN — METOCLOPRAMIDE 10 MG: 5 INJECTION, SOLUTION INTRAMUSCULAR; INTRAVENOUS at 07:35

## 2017-04-28 RX ADMIN — METOPROLOL TARTRATE 50 MG: 50 TABLET ORAL at 08:49

## 2017-04-28 RX ADMIN — FOLIC ACID: 5 INJECTION, SOLUTION INTRAMUSCULAR; INTRAVENOUS; SUBCUTANEOUS at 09:00

## 2017-04-28 RX ADMIN — HYDROMORPHONE HYDROCHLORIDE 1 MG: 1 INJECTION, SOLUTION INTRAMUSCULAR; INTRAVENOUS; SUBCUTANEOUS at 02:13

## 2017-04-28 RX ADMIN — METOCLOPRAMIDE 10 MG: 5 INJECTION, SOLUTION INTRAMUSCULAR; INTRAVENOUS at 02:16

## 2017-04-28 RX ADMIN — METOCLOPRAMIDE 10 MG: 5 INJECTION, SOLUTION INTRAMUSCULAR; INTRAVENOUS at 11:36

## 2017-04-28 RX ADMIN — APIXABAN 2.5 MG: 2.5 TABLET, FILM COATED ORAL at 08:48

## 2017-04-28 RX ADMIN — HYDROCODONE BITARTRATE, ACETAMINOPHEN 10 MG: 325; 7.5 SOLUTION ORAL at 05:14

## 2017-04-28 RX ADMIN — HYDROMORPHONE HYDROCHLORIDE 1 MG: 1 INJECTION, SOLUTION INTRAMUSCULAR; INTRAVENOUS; SUBCUTANEOUS at 06:47

## 2017-04-28 RX ADMIN — HYDROMORPHONE HYDROCHLORIDE 1 MG: 1 INJECTION, SOLUTION INTRAMUSCULAR; INTRAVENOUS; SUBCUTANEOUS at 11:36

## 2017-04-28 NOTE — CDMP QUERY
There is noted history of PE which is still being treated. Please clarify acuity    =>Continued Acute PE in the setting of history requiring Eliquis   =>Other Explanation of clinical findings  =>Unable to Determine (no explanation of clinical findings)    Reference--  Acute PE   \"Acute\" defines the period of time beginning with the initial diagnosis, up to and including the entire period of time where anticoagulation is administered (3-12 months). Recurrent Acute PE   The \"recurrent\" acute PE time period begins with the diagnosis of the recurrent acute PE and end 3-12 months beyond that time. These patients will likely require lifelong anticoagulation therapy. Chronic PE   \"Chronic\" is equivalent to chronic thromboembolic pulmonary HTN           The medical record reflects the following clinical findings, treatment, and risk factors:    Risk Factors: PE  Treatment: Eliquis    Please clarify and document your clinical opinion in the progress notes and discharge summary including the definitive and/or presumptive diagnosis, (suspected or probable), related to the above clinical findings. Please include clinical findings supporting your diagnosis.       Thank you,         Ayad Santillan [facility]

## 2017-04-28 NOTE — DISCHARGE INSTRUCTIONS
Bowel Blockage (Intestinal Obstruction): Care Instructions  Your Care Instructions  A bowel blockage, also called an intestinal obstruction, can prevent gas, fluids, or solids from moving through the intestines normally. It can cause constipation and, rarely, diarrhea. You may have pain, nausea, vomiting, and cramping. Most of the time, complete blockages require a stay in the hospital and possibly surgery. But if your bowel is only partly blocked, your doctor may tell you to wait until it clears on its own and you are able to pass gas and stool. If so, there are things you can do at home to help make you feel better. If you have had surgery for a bowel blockage, there are things you can do at home to make sure you heal well. You can also make some changes to keep your bowel from becoming blocked again. Follow-up care is a key part of your treatment and safety. Be sure to make and go to all appointments, and call your doctor if you are having problems. It's also a good idea to know your test results and keep a list of the medicines you take. How can you care for yourself at home? If your doctor has told you to wait at home for a blockage to clear on its own:  · Follow your doctor's instructions. These may include eating a liquid diet to avoid complete blockage. · Take your medicines exactly as prescribed. Call your doctor if you think you are having a problem with your medicine. · Put a heating pad set on low on your belly to relieve mild cramps and pain. To prevent another blockage  · Try to eat smaller amounts of food more often. For example, have 5 or 6 small meals throughout the day instead of 2 or 3 large meals. · Chew your food very well. Try to chew each bite about 20 times or until it is liquid. · Avoid high-fiber foods and raw fruits and vegetables with skins, husks, strings, or seeds.  These can form a ball of undigested material that can cause a blockage if a part of your bowel is scarred or narrowed. · Check with your doctor before you eat whole-grain products or use a fiber supplement such as Citrucel or Metamucil. · To help you have regular bowel movements, eat at regular times, do not strain during a bowel movement, and drink at least 8 to 10 glasses of water each day. If you have kidney, heart, or liver disease and have to limit fluids, talk with your doctor or before you increase the amount of fluids you drink. · Drink high-calorie liquid formulas if your doctor says to. Severe symptoms may make it hard for your body to take in vitamins and minerals. · Get regular exercise. It helps you digest your food better. Get at least 30 minutes of physical activity on most days of the week. Walking is a good choice. When should you call for help? Call 911 anytime you think you may need emergency care. For example, call if:  · You passed out (lost consciousness). · You have severe pain or swelling in your belly. · You vomit blood or what looks like coffee grounds. · You pass maroon or very bloody stools. · You cannot pass any stools or gas. Call your doctor now or seek immediate medical care if:  · You have a new or higher fever. · You cannot keep fluids or medicines down. · You have new pain that gets worse when you move or cough. · Your symptoms become much worse than usual.  Watch closely for changes in your health, and be sure to contact your doctor if:  · You do not get better as expected. · You have steady diarrhea for more than 2 weeks. · You've been losing weight. Where can you learn more? Go to http://narda-manuel.info/. Enter B718 in the search box to learn more about \"Bowel Blockage (Intestinal Obstruction): Care Instructions. \"  Current as of: August 9, 2016  Content Version: 11.2  © 2432-1599 [employer]. Care instructions adapted under license by [employer] (which disclaims liability or warranty for this information).  If you have questions about a medical condition or this instruction, always ask your healthcare professional. [place] [address] any warranty or liability for your use of this information.

## 2017-04-28 NOTE — DISCHARGE SUMMARY
[address] [address] [facility]  [phone]  MD Victorina Haas MD Dianne E. Ennis Olmsted, NP      Discharge Summary  Patient ID:  Karlee Amezcua  [id number]  61 y.o.  1960    Admit date: 4/27/2017    PCP: None    Admit MD: Lester Simon MD    Discharge MD: Dr. Victorina Forbes    Discharge date and time: 04/28/2017  Admission Diagnoses:     met ovarian cancer  Bowel obstruction [facility])  Patient Active Problem List   Diagnosis Code    Ovarian cancer ([facility] C56.9    Carcinomatosis peritonei ([facility] C78.6, C80.1    Ileostomy in place ([facility] Z93.2    Anemia due to chemotherapy D64.81, T45.1X5A    CINV (chemotherapy-induced nausea and vomiting) R11.2, T45.1X5A    Cancer associated pain G89.3    Generalized abdominal pain R10.84    Anxiety about health F41.8    Ileus (HCC) K56.7    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    Hx of pulmonary embolus Z86.711    Dehydration E86.0    Chemotherapy-induced neutropenia (HCC) D70.1    Anticoagulation adequate with anticoagulant therapy Z79.01    Counseling regarding end of life decision making Z71.89    Bowel obstruction (HCC) K56.60    Protein calorie malnutrition ([facility] E46       Discharge Diagnoses:    Patient Active Problem List   Diagnosis Code    Ovarian cancer ([facility] C56.9    Carcinomatosis peritonei ([facility] C78.6, C80.1    Ileostomy in place ([facility] Z93.2    Anemia due to chemotherapy D64.81, T45.1X5A    CINV (chemotherapy-induced nausea and vomiting) R11.2, T45.1X5A    Cancer associated pain G89.3    Generalized abdominal pain R10.84    Anxiety about health F41.8    Ileus (HCC) K56.7    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 Then on 4/27, pt called to say she had decreased output in her ostomy early yesterday, then it seemed to just stop and she has had constant abd pain with nausea and vomiting that began late on 4/26. She then said she had been vomiting in the morning of 4/27 and was no longer expelling gas in her bag. She was then a direct admit for possible bowel obstruction/metastatic ovarian cancer for IV hydration, IV antiemetics, bowel rest and IV electrolyte replacement. By late on 4/27, she said she was beginning to have relief of her nausea and she began clear liquids. On the morning of the 28th, her ostomy had begun to function. She was started on GI lite diet and tolerated this well. By late on 4/28, she was feeling \"much better\", denied nausea and ostomy had both stool and gas present. At this time, she was deemed stable for discharge home. Discharge instructions reviewed with pt and her . She will begin Miralax the day of chemotherapy as well as anytime she feels her output in her ostomy is decreased. She was encouraged to call for any concerns or questions. Her follow up with Dr. Radha Casper is on May 18th. She will keep that apt or will be sooner if needed. Significant Diagnostic Studies:  Lab Results   Component Value Date/Time    WBC 7.1 04/28/2017 04:17 AM    ABS.  NEUTROPHILS 4.4 04/28/2017 04:17 AM    HGB 9.1 04/28/2017 04:17 AM    HCT 28.3 04/28/2017 04:17 AM    MCV 94.6 04/28/2017 04:17 AM    MCH 30.4 04/28/2017 04:17 AM    PLATELET 350 72/10/6721 04:17 AM     Lab Results   Component Value Date/Time    Sodium 138 04/28/2017 04:17 AM    Potassium 3.8 04/28/2017 04:17 AM    Chloride 103 04/28/2017 04:17 AM    CO2 27 04/28/2017 04:17 AM    Glucose 112 04/28/2017 04:17 AM    BUN 13 04/28/2017 04:17 AM    Creatinine 0.94 04/28/2017 04:17 AM    Calcium 9.0 04/28/2017 04:17 AM    Albumin 2.9 04/28/2017 04:17 AM    Bilirubin, total 0.2 04/28/2017 04:17 AM    AST (SGOT) 14 04/28/2017 04:17 AM    ALT (SGPT) 14 04/28/2017 04:17 AM    Alk. phosphatase 103 04/28/2017 04:17 AM       Condition on Discharge: good    Disposition: home    Patient Instructions:   Current Discharge Medication List      START taking these medications    Details   polyethylene glycol (MIRALAX) 17 gram/dose powder Take 17 g by mouth daily as needed. Qty: 238 g, Refills: 3         CONTINUE these medications which have NOT CHANGED    Details   fentaNYL (DURAGESIC) 50 mcg/hr PATCH 1 Patch by TransDERmal route every seventy-two (72) hours. Max Daily Amount: 1 Patch. Qty: 10 Patch, Refills: 0    Associated Diagnoses: Chemotherapy-induced neutropenia ([facility] Carcinomatosis peritonei ([facility] Ovarian cancer, unspecified laterality ([facility] Hyponatremia; Anemia due to chemotherapy; Ileostomy in place [facility]); Cancer associated pain; Anxiety about health; Generalized abdominal pain; CINV (chemotherapy-induced nausea and vomiting)      HYDROcodone-acetaminophen (HYCET) 0.5-21.7 mg/mL oral solution Take 15-30 mL by mouth every four (4) hours as needed. Max Daily Amount: 90 mg. Disp: two(2) 473 ml bottles  Indications: Pain  Qty: 950 mL, Refills: 0    Associated Diagnoses: Ovarian cancer, unspecified laterality ([facility] Ileostomy in place [facility]); Carcinomatosis peritonei ([facility] Cancer associated pain; Anxiety about health; Generalized abdominal pain; CINV (chemotherapy-induced nausea and vomiting); Chemotherapy-induced neutropenia ([facility] Hyponatremia; Anemia due to chemotherapy      ciprofloxacin HCl (CIPRO) 500 mg tablet Take 1 Tab by mouth two (2) times a day. Qty: 14 Tab, Refills: 0      Cetirizine (ZYRTEC) 10 mg cap Take  by mouth. apixaban (ELIQUIS) 2.5 mg tablet Take 1 Tab by mouth two (2) times a day.   Qty: 60 Tab, Refills: 6      promethazine (PHENERGAN) 25 mg tablet Take 1 Tab by mouth every six (6) hours as needed for Nausea. Qty: 40 Tab, Refills: 1    Associated Diagnoses: Ovarian cancer, unspecified laterality ([address] Carcinomatosis peritonei ([address] CINV (chemotherapy-induced nausea and vomiting)      ondansetron (ZOFRAN ODT) 4 mg disintegrating tablet Take 1 Tab by mouth every eight (8) hours as needed for Nausea. Qty: 30 Tab, Refills: 2    Associated Diagnoses: Ovarian cancer, unspecified laterality ([address] Generalized abdominal pain; CINV (chemotherapy-induced nausea and vomiting); Cancer associated pain; Anxiety about health; Carcinomatosis peritonei ([address] Ileostomy in place ([facility]      esomeprazole (NEXIUM) 40 mg capsule Take 1 Cap by mouth daily. Qty: 90 Cap, Refills: 2      Calcium-Vitamin D3-Vitamin K 352-302-03 mg-unit-mcg chew Take 1 Tab by mouth two (2) times a day. metoprolol (LOPRESSOR) 100 mg tablet Take 50 mg by mouth two (2) times a day. multivitamin (ONE A DAY) tablet Take 1 Tab by mouth daily. magnesium oxide (MAG-OX) 400 mg tablet Take 1 Tab by mouth three (3) times daily. Indications: HYPOMAGNESEMIA  Qty: 90 Tab, Refills: 2    Associated Diagnoses: Ovarian cancer, unspecified laterality ([address] Hypomagnesemia      multivit-min-FA-coenzyme Q10 (AQUADEKS) 100-350-5 mcg-mcg-mg chew Take 1 Tab by mouth daily. Indications: VITAMIN DEFICIENCY PREVENTION  Qty: 60 Tab, Refills: 1    Comments: Substitution to equivalent ok      lidocaine-prilocaine (EMLA) topical cream Apply small amount over port area and cover with a band aid 1 hour before chemo treatment  Qty: 30 g, Refills: 3             Miralax and Stool softner for constipation  Diet: Regular Diet and Encourage fluids      Follow-up with Dr. Phuong Molina as scheduled.  She will call for any concerns or questions    Signed:  Tavo Montanez NP  4/28/2017/2:53 PM

## 2017-04-28 NOTE — PROGRESS NOTES
Primary Nurse Devan Can, RN and Fatou Anne RN, RN performed a dual skin assessment on this patient No impairment noted  Pb score is 21

## 2017-04-28 NOTE — PROGRESS NOTES
Care Management Interventions  PCP Verified by CM:  (Uses [facility])  Mode of Transport at Discharge: Other (see comment) (private vehicle)  Discharge Durable Medical Equipment: No (Has Rolling Walker, Bedside Commode, and Shower Chair at home)  Physical Therapy Consult: No  Occupational Therapy Consult: No  Speech Therapy Consult: No  Current Support Network: Lives with Spouse (Independent with ADLs)  Confirm Follow Up Transport: Family  Plan discussed with Pt/Family/Caregiver: Yes  Freedom of Choice Offered: Yes    CM reviewed chart. Pt is independent with her ADLs and IADLs. Pt lives with her  in a 1-level private residence with 3 steps to enter. Pt has a rolling, bedside commode, and shower chair at home if needed. Pt has used [facility] and [facility] in the past.  Pt has also used [facility] for home infusions. Pt gets her prescriptions filled at her local [facility] and/or [facility]. Pt's  will provide transportation. Cm will continue to follow for any needs that arise.   Kingsley Chen, CLARISSAW, ACM

## 2017-04-28 NOTE — CDMP QUERY
Please clarify if this patient is being treated/managed for:      => Severe Protein Calorie Malnutrition with 19.21 % weight loss in year in the setting of CA/SBO requiring nutritional support  =>Other Explanation of clinical findings  =>Unable to Determine (no explanation of clinical findings)    The medical record reflects the following clinical findings, treatment, and risk factors:    Risk Factors: CA/SOB  Clinical Indicators: N/V , wt 4/2016 151 lbs --admission wt 122 lbs  Meets Criteria for Chronic Malnutrition    [X] Severe Malnutrition, as evidenced by:              [  ] Moderate muscle wasting, loss of subcutaneous fat              [X] Nutritional intake of <75% of recommended intake for >1 month              [X] Weight loss of  >5% in 1 month, >7.5% in 3 months, >10% in 6 months,               [  ] Severe edema   Treatment: IV fluids lab monitoring       Please clarify and document your clinical opinion in the progress notes and discharge summary including the definitive and/or presumptive diagnosis, (suspected or probable), related to the above clinical findings.  Please include clinical findings supporting your diagnosis    Thank you,         Yvonne Said [facility]

## 2017-04-28 NOTE — PROGRESS NOTES
Music Therapy Assessment    Lenin Gong [id number]  [id number]    1960  61 y.o.  female    Patient Telephone Number: [phone] (home)   Jain Affiliation: Scientologist   Language: English   Extended Emergency Contact Information  Primary Emergency Contact: Eamon Smith  Address: [address]   [address]           [place] [place] Phone: [phone]  Mobile Phone: [phone]  Relation: Spouse   Patient Active Problem List    Diagnosis Date Noted    Protein calorie malnutrition ([address] 04/28/2017    Bowel obstruction ([address] 04/27/2017    Counseling regarding end of life decision making 04/25/2017    Anticoagulation adequate with anticoagulant therapy 03/28/2017    Chemotherapy-induced neutropenia ([address] 03/22/2017    Ileus ([address] 03/21/2017    Thrombosis of pelvic vein 03/21/2017    Hx of pulmonary embolus 03/21/2017    Dehydration 03/21/2017    Cancer associated pain 05/13/2016    Generalized abdominal pain 05/13/2016    Anxiety about health 05/13/2016    CINV (chemotherapy-induced nausea and vomiting) 03/22/2016    Anemia due to chemotherapy 02/22/2016    Ileostomy in place [facility]) 02/15/2016    Carcinomatosis peritonei ([address] 10/29/2015    Ovarian cancer ([address] 10/12/2015        Date: 4/28/2017       Mental Status:   [x] Alert [  ] Jose D Mantis [  ]  Confused     Communication Status: [  ] Impaired Speech [  ] Nonverbal     Physical Status:   [  ] Hard of Hearing [  ] Oxygen in use [  ] Ambulatory   [  ] Ambulatory with assistance  [  ] Non-ambulatory -N/A    Music Preferences, Background: N/A: Please see Session Observations below. Clinical Problem addressed: N/A: Please see Session Observations below.     Goal(s) met in session: N/A: \"                                       \"  Physical/Pain management (Scale of 1-10):    Pre-session rating ___________    Post-session rating __________   [  ] Increased relaxation   [  ] Regulated breathing patterns  [  ] Minimized physical distress   [  ] Decreased nausea discomfort     Emotional/Psychological:  Expression of feelings _____________ [  ] Decreased aggressive behavior   [  ] Decreased sadness   [  ] Increased range of coping skills     [  ] Improved mood    [  ] Decreased withdrawn behavior     Social:  [  ] Decreased feelings of isolation  [  ] Positive social interaction   [  ] Improved strained family relationships:     Spiritual:  [  ] Spiritual support    [  ] Expressed peace     Techniques Utilized (Check all that apply): N/A: Please see Session Observations below. [  ] Procedural support MT [  ] Music for relaxation [  ] Patient preferred music  [  ] Zuly analysis  [  ] Song choice  [  ] Music for validation  [  ] Music listening  [  ] Progressive relaxation [  ] Guided imagery  [  ] Servando Reyes  [  ] Figueroa Mosley   [  ] Carolina Lawler writing  [  ] Priya Dessert along   [  ] Darliss Other  [  ] Sensory stimulation  [  ] Active Listening  [  ] Music for spiritual support [  ] Making of CDs as gifts    Session Observations:  Referral from Rosario Michelle, Nurse Navigator. The patient (pt) was lying in bed facing the window when this music therapist (MT) entered. The pt said she was hoping to take a nap so requested a follow up visit later. The MT expressed understanding. Will follow as able.     Sara Escalera MT-BC (Music Therapist-Board Certified)  Spiritual Care Department  Referral-based service

## 2017-04-28 NOTE — PROGRESS NOTES
Chastity Aleman. Bonilla Helms, HAYDER       Girish Villagomez       [id number]  1960    Admitted 2017 Date 2017     HISTORY OF PRESENT ILLNESS:  Girish Villagomez is a 61 y.o.  postmenopausal female with a diagnosis of stage IV ovarian cancer. She underwent exploratory laparotomy with suboptimal debulking. She had extensive disease. She was readmitted about a week later with obstruction and subsequently had a cecal perforation, requiring resection, end ileostomy, and mucous fistula. During the hospitalization she was also diagnosed with pulmonary embolus and had chest tubes placed for bilateral pleural effusions. She had a prolonged hospital course and actually received a dose of cisplatin chemotherapy while in the hospital to help dry up the effusions. She completed 6 cycles of Taxol/Carboplatin chemotherapy following her initial debulking. She did relatively well, considering she had an ileostomy to deal with during chemotherapy. I took her to the OR on 16 for interval debulking and reversal of her ileostomy. She had extensive disease, but we were able to resect the majority of her disease. One week later she developed an anastomotic leak requiring reexploration and recreation of an ileostomy. She had another prolonged hospitalization, but recovered well since surgery. We then reinitiated Taxol/Carboplatin chemotherapy, but did reduce the dose of Taxol to 135 mg/m2. She completed 4 mores cycles. Her CA-125 normalized and we stopped treatment.     She recently presented to Dr. Beti Unger office today with her  to discuss her follow up/surveillance CT scan. Unfortunately, it demonstrated progression of disease, although minimally worse than her prior scan 2 months previously. She was actually feeling better than she did at that time.    After review of scans and disease, the decision was made to begin Doxil/Avastin Q28 days for 6 cycles. She began this on 2/27/2017 and has now completed 3 cycles. SUBJECTIVE:  This morning, pt is feeling better. Her nausea has improved with her sudden onset of stool in her ostomy. Tolerated clear liquid last evening/this morning. Says her pain is improved this morning, but remains \"sore to my abdomen\"   Pt has not ambulated much yet   is present and supportive       Current Facility-Administered Medications   Medication Dose Route Frequency    HYDROmorphone (PF) (DILAUDID) injection 1 mg  1 mg IntraVENous Q4H PRN    naloxone (NARCAN) injection 0.4 mg  0.4 mg IntraVENous PRN    diphenhydrAMINE (BENADRYL) injection 12.5 mg  12.5 mg IntraVENous Q4H PRN    apixaban (ELIQUIS) tablet 2.5 mg  2.5 mg Oral Q12H    metoprolol tartrate (LOPRESSOR) tablet 100 mg  100 mg Oral Q12H    fentaNYL (DURAGESIC) 50 mcg/hr patch 1 Patch  1 Patch TransDERmal Q72H    HYDROcodone-acetaminophen (HYCET) 0.5-21.7 mg/mL oral solution 10 mg  10 mg Oral Q4H PRN    dextrose 5 % - 0.45% NaCl 1,000 mL with potassium chloride 20 mEq, magnesium sulfate 2 g, folic acid 1 mg, thiamine 100 mg, mvi, adult no. 4 with vit K 10 mL infusion   IntraVENous DAILY    dextrose 5% - 0.45% NaCl with KCl 20 mEq/L infusion  125 mL/hr IntraVENous CONTINUOUS    metoclopramide HCl (REGLAN) injection 10 mg  10 mg IntraVENous Q6H    zolpidem (AMBIEN) tablet 10 mg  10 mg Oral QHS PRN    ondansetron (ZOFRAN) injection 4 mg  4 mg IntraVENous Q6H PRN    prochlorperazine (COMPAZINE) with saline injection 10 mg  10 mg IntraVENous Q6H PRN     Allergies   Allergen Reactions    Ativan [Lorazepam] Other (comments)     SEVERE CONFUSION    Morphine Rash and Itching        Review of Systems  10 point review negative other than stated in the subject above    OBJECTIVE:  Visit Vitals    /61 (BP 1 Location: Right arm, BP Patient Position: At rest)    Pulse 71    Temp 98.1 °F (36.7 °C)    Resp 16    Wt 122 lb 9.6 oz (55.6 kg)    SpO2 95%    BMI 21.72 kg/m2       Physical Exam  General: A&O X3 currently, in NAD  HEENT: Sclera anicteric, Mucosa pink, dry  Neck: No JVD bilat. Remains without cervical adenopathy   Port site: Accessed without redness, tenderness or swelling. Heart: Regular without M/R/G  Lungs: CTA Bilat without wheezing or rales   Abd: Soft, remains with mild tenderness throughout abdomen, but pt states \"it is more sore than pain\". Ostomy with stool present and small amount of gas in bag. Well healed abd scars  +bowel sounds audible throughout  Ext: Without edema and + pedal pulses bilat  Neuro: grossly intact      Data Review  Recent Results (from the past 12 hour(s))   METABOLIC PANEL, COMPREHENSIVE    Collection Time: 04/28/17  4:17 AM   Result Value Ref Range    Sodium 138 136 - 145 mmol/L    Potassium 3.8 3.5 - 5.1 mmol/L    Chloride 103 97 - 108 mmol/L    CO2 27 21 - 32 mmol/L    Anion gap 8 5 - 15 mmol/L    Glucose 112 (H) 65 - 100 mg/dL    BUN 13 6 - 20 MG/DL    Creatinine 0.94 0.55 - 1.02 MG/DL    BUN/Creatinine ratio 14 12 - 20      GFR est AA >60 >60 ml/min/1.73m2    GFR est non-AA >60 >60 ml/min/1.73m2    Calcium 9.0 8.5 - 10.1 MG/DL    Bilirubin, total 0.2 0.2 - 1.0 MG/DL    ALT (SGPT) 14 12 - 78 U/L    AST (SGOT) 14 (L) 15 - 37 U/L    Alk.  phosphatase 103 45 - 117 U/L    Protein, total 6.7 6.4 - 8.2 g/dL    Albumin 2.9 (L) 3.5 - 5.0 g/dL    Globulin 3.8 2.0 - 4.0 g/dL    A-G Ratio 0.8 (L) 1.1 - 2.2     MAGNESIUM    Collection Time: 04/28/17  4:17 AM   Result Value Ref Range    Magnesium 2.2 1.6 - 2.4 mg/dL   CBC WITH AUTOMATED DIFF    Collection Time: 04/28/17  4:17 AM   Result Value Ref Range    WBC 7.1 3.6 - 11.0 K/uL    RBC 2.99 (L) 3.80 - 5.20 M/uL    HGB 9.1 (L) 11.5 - 16.0 g/dL    HCT 28.3 (L) 35.0 - 47.0 %    MCV 94.6 80.0 - 99.0 FL    MCH 30.4 26.0 - 34.0 PG    MCHC 32.2 30.0 - 36.5 g/dL    RDW 13.8 11.5 - 14.5 %    PLATELET 120 403 - 279 K/uL    NEUTROPHILS 62 32 - 75 %    LYMPHOCYTES 27 12 - 49 %    MONOCYTES 10 5 - 13 %    EOSINOPHILS 1 0 - 7 %    BASOPHILS 0 0 - 1 %    ABS. NEUTROPHILS 4.4 1.8 - 8.0 K/UL    ABS. LYMPHOCYTES 1.9 0.8 - 3.5 K/UL    ABS. MONOCYTES 0.7 0.0 - 1.0 K/UL    ABS. EOSINOPHILS 0.1 0.0 - 0.4 K/UL    ABS. BASOPHILS 0.0 0.0 - 0.1 K/UL           Imaging (will obtain new imaging today)  CT of abd/pelvis 4/19/2017:  FINDINGS:   LUNG BASES: Clear. INCIDENTALLY IMAGED HEART AND MEDIASTINUM: Unremarkable. LIVER: 1 cm low-density and 5 mm low-density in the liver are stable. Peritoneal  implants overlying the liver and spleen are stable.     Ascites is stable. Small bowel is less distended on the current study. Patient  appears be status post subtotal colectomy. GALLBLADDER: Unremarkable. SPLEEN: No mass. PANCREAS: No mass or ductal dilatation. ADRENALS: Unremarkable. KIDNEYS: No mass, calculus, or hydronephrosis. STOMACH: Unremarkable. SMALL BOWEL: There is less small bowel distention when compared to 3/21/2017  COLON: Patient appears to be status post subtotal colectomy  APPENDIX: Surgically absent  PERITONEUM: Ascites is stable  RETROPERITONEUM: No lymphadenopathy or aortic aneurysm. REPRODUCTIVE ORGANS: Surgically absent  URINARY BLADDER: No mass or calculus. BONES: No destructive bone lesion. ADDITIONAL COMMENTS: N/A       IMPRESSION:  1. Ascites and peritoneal implants are stable. 2 small low densities in the  liver are stable. Small bowel distention has improved when compared to 3/21/2017  without definite evidence of obstruction. . Ileostomy is noted in the right lower  quadrant. Patient appears to be status post subtotal colectomy.      KUB 4/27/2017 (today)  FINDINGS: Supine and upright views of the abdomen demonstrate a filter over the  inferior vena cava and surgical clips in the midabdomen and left pelvis. There  is an air-fluid level in the stomach and there are small bowel air-fluid levels  within over a possibility of bowel gas. There is a right lower quadrant ostomy.   No colon gas is identified. There is no free intraperitoneal air. No soft tissue  masses or pathologic calcifications are seen. The bones and soft tissues are  within normal limits.       IMPRESSION: Probable small bowel obstruction versus ileus. IMPRESSION:    Patient Active Problem List   Diagnosis Code    Ovarian cancer ([facility] [address] C56.9    Carcinomatosis peritonei ([facility] [address] C78.6, C80.1    Ileostomy in place ([facility] [address] Z93.2    Anemia due to chemotherapy D64.81, T45.1X5A    CINV (chemotherapy-induced nausea and vomiting) R11.2, T45.1X5A    Cancer associated pain G89.3    Generalized abdominal pain R10.84    Anxiety about health F41.8    Ileus (HCC) K56.7    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    Hx of pulmonary embolus Z86.711    Dehydration E86.0    Chemotherapy-induced neutropenia (HCC) D70.1    Anticoagulation adequate with anticoagulant therapy Z79.01    Counseling regarding end of life decision making Z71.89    Bowel obstruction (HCC) K56.60       PLAN:  Will begin GI Lite diet and if tolerated, possible discharge later this evening. Discussed with pt that on the day of her chemotherapy, it may be necessary for her to begin Miralax the day of and for a few days after as she is not able to tolerate constipation well at all and leads to obstruction/ N/V quickly. Pt is in agreement and will begin this with next treatment   Continue IV hydration with one goody bag daily with MVI/K+, Mag. D5%/0.45 NaCl when goody bag not infusing  Protein calorie malnutrition, chronic, secondary to malignancy, obstructive symptoms, and clinical short gut. Continue Reglan for 3 days scheduled unless diarrhea begins  Continue Eliquis for pelvic vein thrombosis and hx of pulmonary embolus in the setting of active cancer  Ambulate throughout the day. Signed By: Mimi Walton NP     4/28/2017/9:39 AM     Seen and evaluated with NP. Agree with above plan. Looks much better this morning. Advance diet. Possible PM discharge.     Lavone Ganser Harriet Baez MD

## 2017-04-28 NOTE — PROGRESS NOTES
Bedside and Verbal shift change report given to Tian Jensen (oncoming nurse) by Mina Grace RN (offgoing nurse). Report included the following information SBAR, Intake/Output and MAR.

## 2017-04-28 NOTE — PROGRESS NOTES
Bedside and Verbal shift change report given to Keyon (oncoming nurse) by Radha Alston (offgoing nurse). Report included the following information SBAR, Kardex, Procedure Summary, Intake/Output, MAR and Recent Results.

## 2017-05-05 ENCOUNTER — TELEPHONE (OUTPATIENT)
Dept: GYNECOLOGY | Age: 57
End: 2017-05-05

## 2017-05-05 NOTE — TELEPHONE ENCOUNTER
Pt calling with c/o continuous nausea. Taking Zofran, Phenergan, Greer tea, Peppermint on cotton balls without relief. States she is eating little, taking in fluids, but has lost 5 lbs. This week. Doesn't feel dehydrate. Urinating without problems, stools continue to be liquid. No fevers.

## 2017-05-07 ENCOUNTER — APPOINTMENT (OUTPATIENT)
Dept: CT IMAGING | Age: 57
End: 2017-05-07
Attending: EMERGENCY MEDICINE
Payer: OTHER GOVERNMENT

## 2017-05-07 ENCOUNTER — HOSPITAL ENCOUNTER (OUTPATIENT)
Age: 57
Setting detail: OBSERVATION
Discharge: HOME | End: 2017-05-11
Attending: EMERGENCY MEDICINE | Admitting: OBSTETRICS & GYNECOLOGY
Payer: OTHER GOVERNMENT

## 2017-05-07 DIAGNOSIS — N13.30 HYDRONEPHROSIS, UNSPECIFIED HYDRONEPHROSIS TYPE: Primary | ICD-10-CM

## 2017-05-07 DIAGNOSIS — R18.8 OTHER ASCITES: ICD-10-CM

## 2017-05-07 LAB
ALBUMIN SERPL BCP-MCNC: 3.5 G/DL (ref 3.5–5)
ALBUMIN/GLOB SERPL: 0.8 {RATIO} (ref 1.1–2.2)
ALP SERPL-CCNC: 110 U/L (ref 45–117)
ALT SERPL-CCNC: 12 U/L (ref 12–78)
ANION GAP BLD CALC-SCNC: 9 MMOL/L (ref 5–15)
AST SERPL W P-5'-P-CCNC: 20 U/L (ref 15–37)
BILIRUB SERPL-MCNC: 0.4 MG/DL (ref 0.2–1)
BUN SERPL-MCNC: 29 MG/DL (ref 6–20)
BUN/CREAT SERPL: 25 (ref 12–20)
CALCIUM SERPL-MCNC: 9.7 MG/DL (ref 8.5–10.1)
CHLORIDE SERPL-SCNC: 92 MMOL/L (ref 97–108)
CO2 SERPL-SCNC: 30 MMOL/L (ref 21–32)
CREAT SERPL-MCNC: 1.14 MG/DL (ref 0.55–1.02)
ERYTHROCYTE [DISTWIDTH] IN BLOOD BY AUTOMATED COUNT: 13 % (ref 11.5–14.5)
GLOBULIN SER CALC-MCNC: 4.5 G/DL (ref 2–4)
GLUCOSE SERPL-MCNC: 108 MG/DL (ref 65–100)
HCT VFR BLD AUTO: 33.3 % (ref 35–47)
HGB BLD-MCNC: 11 G/DL (ref 11.5–16)
MCH RBC QN AUTO: 31.3 PG (ref 26–34)
MCHC RBC AUTO-ENTMCNC: 33 G/DL (ref 30–36.5)
MCV RBC AUTO: 94.9 FL (ref 80–99)
PLATELET # BLD AUTO: 287 K/UL (ref 150–400)
POTASSIUM SERPL-SCNC: 4 MMOL/L (ref 3.5–5.1)
PROT SERPL-MCNC: 8 G/DL (ref 6.4–8.2)
RBC # BLD AUTO: 3.51 M/UL (ref 3.8–5.2)
SODIUM SERPL-SCNC: 131 MMOL/L (ref 136–145)
WBC # BLD AUTO: 7.2 K/UL (ref 3.6–11)

## 2017-05-07 PROCEDURE — 74011636320 HC RX REV CODE- 636/320: Performed by: EMERGENCY MEDICINE

## 2017-05-07 PROCEDURE — 99284 EMERGENCY DEPT VISIT MOD MDM: CPT

## 2017-05-07 PROCEDURE — 74011250636 HC RX REV CODE- 250/636: Performed by: OBSTETRICS & GYNECOLOGY

## 2017-05-07 PROCEDURE — 74011250637 HC RX REV CODE- 250/637: Performed by: OBSTETRICS & GYNECOLOGY

## 2017-05-07 PROCEDURE — 96361 HYDRATE IV INFUSION ADD-ON: CPT

## 2017-05-07 PROCEDURE — 96374 THER/PROPH/DIAG INJ IV PUSH: CPT

## 2017-05-07 PROCEDURE — 74011000258 HC RX REV CODE- 258: Performed by: EMERGENCY MEDICINE

## 2017-05-07 PROCEDURE — 74177 CT ABD & PELVIS W/CONTRAST: CPT

## 2017-05-07 PROCEDURE — 96375 TX/PRO/DX INJ NEW DRUG ADDON: CPT

## 2017-05-07 PROCEDURE — 36415 COLL VENOUS BLD VENIPUNCTURE: CPT | Performed by: EMERGENCY MEDICINE

## 2017-05-07 PROCEDURE — 85027 COMPLETE CBC AUTOMATED: CPT | Performed by: EMERGENCY MEDICINE

## 2017-05-07 PROCEDURE — 80053 COMPREHEN METABOLIC PANEL: CPT | Performed by: EMERGENCY MEDICINE

## 2017-05-07 PROCEDURE — 99218 HC RM OBSERVATION: CPT

## 2017-05-07 PROCEDURE — 74011250636 HC RX REV CODE- 250/636: Performed by: EMERGENCY MEDICINE

## 2017-05-07 RX ORDER — METOPROLOL TARTRATE 50 MG/1
50 TABLET ORAL 2 TIMES DAILY
Status: DISCONTINUED | OUTPATIENT
Start: 2017-05-07 | End: 2017-05-11 | Stop reason: HOSPADM

## 2017-05-07 RX ORDER — ZOLPIDEM TARTRATE 5 MG/1
10 TABLET ORAL
Status: DISCONTINUED | OUTPATIENT
Start: 2017-05-07 | End: 2017-05-07 | Stop reason: DRUGHIGH

## 2017-05-07 RX ORDER — HYDROMORPHONE HYDROCHLORIDE 1 MG/ML
1 INJECTION, SOLUTION INTRAMUSCULAR; INTRAVENOUS; SUBCUTANEOUS
Status: COMPLETED | OUTPATIENT
Start: 2017-05-07 | End: 2017-05-07

## 2017-05-07 RX ORDER — FENTANYL 50 UG/1
1 PATCH TRANSDERMAL
Status: DISCONTINUED | OUTPATIENT
Start: 2017-05-07 | End: 2017-05-11 | Stop reason: HOSPADM

## 2017-05-07 RX ORDER — SODIUM CHLORIDE 0.9 % (FLUSH) 0.9 %
5-10 SYRINGE (ML) INJECTION AS NEEDED
Status: DISCONTINUED | OUTPATIENT
Start: 2017-05-07 | End: 2017-05-11 | Stop reason: HOSPADM

## 2017-05-07 RX ORDER — ONDANSETRON 2 MG/ML
8 INJECTION INTRAMUSCULAR; INTRAVENOUS
Status: COMPLETED | OUTPATIENT
Start: 2017-05-07 | End: 2017-05-07

## 2017-05-07 RX ORDER — HYDROCODONE BITARTRATE AND ACETAMINOPHEN 7.5; 325 MG/15ML; MG/15ML
15-30 SOLUTION ORAL
Status: DISCONTINUED | OUTPATIENT
Start: 2017-05-07 | End: 2017-05-11 | Stop reason: HOSPADM

## 2017-05-07 RX ORDER — SODIUM CHLORIDE 0.9 % (FLUSH) 0.9 %
5-10 SYRINGE (ML) INJECTION EVERY 8 HOURS
Status: DISCONTINUED | OUTPATIENT
Start: 2017-05-07 | End: 2017-05-11 | Stop reason: HOSPADM

## 2017-05-07 RX ORDER — HYDROMORPHONE HYDROCHLORIDE 1 MG/ML
1 INJECTION, SOLUTION INTRAMUSCULAR; INTRAVENOUS; SUBCUTANEOUS
Status: DISCONTINUED | OUTPATIENT
Start: 2017-05-07 | End: 2017-05-11 | Stop reason: HOSPADM

## 2017-05-07 RX ORDER — SODIUM CHLORIDE 9 MG/ML
75 INJECTION, SOLUTION INTRAVENOUS CONTINUOUS
Status: DISCONTINUED | OUTPATIENT
Start: 2017-05-07 | End: 2017-05-07

## 2017-05-07 RX ORDER — ONDANSETRON 2 MG/ML
4 INJECTION INTRAMUSCULAR; INTRAVENOUS
Status: DISCONTINUED | OUTPATIENT
Start: 2017-05-07 | End: 2017-05-11 | Stop reason: HOSPADM

## 2017-05-07 RX ORDER — DEXTROSE, SODIUM CHLORIDE, AND POTASSIUM CHLORIDE 5; .45; .15 G/100ML; G/100ML; G/100ML
125 INJECTION INTRAVENOUS CONTINUOUS
Status: DISCONTINUED | OUTPATIENT
Start: 2017-05-07 | End: 2017-05-11 | Stop reason: HOSPADM

## 2017-05-07 RX ORDER — NALOXONE HYDROCHLORIDE 0.4 MG/ML
0.4 INJECTION, SOLUTION INTRAMUSCULAR; INTRAVENOUS; SUBCUTANEOUS AS NEEDED
Status: DISCONTINUED | OUTPATIENT
Start: 2017-05-07 | End: 2017-05-11 | Stop reason: HOSPADM

## 2017-05-07 RX ORDER — SODIUM CHLORIDE 0.9 % (FLUSH) 0.9 %
10 SYRINGE (ML) INJECTION
Status: DISCONTINUED | OUTPATIENT
Start: 2017-05-07 | End: 2017-05-07

## 2017-05-07 RX ORDER — POLYETHYLENE GLYCOL 3350 17 G/17G
17 POWDER, FOR SOLUTION ORAL
Status: DISCONTINUED | OUTPATIENT
Start: 2017-05-07 | End: 2017-05-11 | Stop reason: HOSPADM

## 2017-05-07 RX ORDER — SODIUM CHLORIDE 0.9 % (FLUSH) 0.9 %
10 SYRINGE (ML) INJECTION
Status: DISCONTINUED | OUTPATIENT
Start: 2017-05-07 | End: 2017-05-07 | Stop reason: SDUPTHER

## 2017-05-07 RX ORDER — ZOLPIDEM TARTRATE 5 MG/1
5 TABLET ORAL
Status: DISCONTINUED | OUTPATIENT
Start: 2017-05-07 | End: 2017-05-11 | Stop reason: HOSPADM

## 2017-05-07 RX ORDER — DIPHENHYDRAMINE HYDROCHLORIDE 50 MG/ML
12.5 INJECTION, SOLUTION INTRAMUSCULAR; INTRAVENOUS
Status: DISCONTINUED | OUTPATIENT
Start: 2017-05-07 | End: 2017-05-11 | Stop reason: HOSPADM

## 2017-05-07 RX ADMIN — HYDROMORPHONE HYDROCHLORIDE 1 MG: 1 INJECTION, SOLUTION INTRAMUSCULAR; INTRAVENOUS; SUBCUTANEOUS at 21:54

## 2017-05-07 RX ADMIN — Medication 10 ML: at 19:20

## 2017-05-07 RX ADMIN — DEXTROSE MONOHYDRATE, SODIUM CHLORIDE, AND POTASSIUM CHLORIDE 125 ML/HR: 50; 4.5; 1.49 INJECTION, SOLUTION INTRAVENOUS at 19:52

## 2017-05-07 RX ADMIN — HYDROMORPHONE HYDROCHLORIDE 1 MG: 1 INJECTION, SOLUTION INTRAMUSCULAR; INTRAVENOUS; SUBCUTANEOUS at 16:17

## 2017-05-07 RX ADMIN — ONDANSETRON 8 MG: 2 INJECTION INTRAMUSCULAR; INTRAVENOUS at 16:17

## 2017-05-07 RX ADMIN — PROMETHAZINE HYDROCHLORIDE 12.5 MG: 25 INJECTION INTRAMUSCULAR; INTRAVENOUS at 19:40

## 2017-05-07 RX ADMIN — SODIUM CHLORIDE 100 ML: 900 INJECTION, SOLUTION INTRAVENOUS at 19:19

## 2017-05-07 RX ADMIN — SODIUM CHLORIDE 1000 ML: 900 INJECTION, SOLUTION INTRAVENOUS at 16:17

## 2017-05-07 RX ADMIN — IOHEXOL 50 ML: 240 INJECTION, SOLUTION INTRATHECAL; INTRAVASCULAR; INTRAVENOUS; ORAL at 17:27

## 2017-05-07 RX ADMIN — METOPROLOL TARTRATE 50 MG: 50 TABLET ORAL at 21:47

## 2017-05-07 RX ADMIN — APIXABAN 2.5 MG: 2.5 TABLET, FILM COATED ORAL at 21:47

## 2017-05-07 RX ADMIN — HYDROMORPHONE HYDROCHLORIDE 1 MG: 1 INJECTION, SOLUTION INTRAMUSCULAR; INTRAVENOUS; SUBCUTANEOUS at 19:27

## 2017-05-07 RX ADMIN — IOPAMIDOL 100 ML: 755 INJECTION, SOLUTION INTRAVENOUS at 19:11

## 2017-05-07 NOTE — ED TRIAGE NOTES
Pt presents with complaints of nausea and vomiting, abdominal pain that began this morning at 6am. Pt states that she is currently undergoing chemotherapy for ovarian cancer. Pt states her last chemo treatment was two weeks ago.  Pt sent to ER by her oncologist Dr Jeanne Gibson for evaluation

## 2017-05-07 NOTE — IP AVS SNAPSHOT
[address] 
[phone] Patient: Do Patel MRN: [id number] :1960 You are allergic to the following Allergen Reactions Ativan (Lorazepam) Other (comments) SEVERE CONFUSION Morphine Rash Itching Recent Documentation Height Weight Breastfeeding? BMI OB Status Smoking Status 1.6 m 55.3 kg Unknown 21.61 kg/m2 Unknown Never Smoker Emergency Contacts Name Discharge Info Relation Home Work Mobile [address] CAREGIVER [3] Spouse [3] [phone] About your hospitalization You were admitted on: May 7, 2017 You last received care in the:  [facility] [address] You were discharged on:  May 11, 2017 Unit phone number:  [phone] Why you were hospitalized Your primary diagnosis was:  Not on File Your diagnoses also included:  Small Bowel Obstruction (Hcc), Ovarian Cancer (Hcc), Hx Of Pulmonary Embolus, Protein Calorie Malnutrition (Hcc), Dehydration, Anemia Due To Chemotherapy, Carcinomatosis Peritonei (Hcc), Generalized Abdominal Pain, Ileostomy In Place (Hcc), Bowel Obstruction (Hcc), Malignant Ascites, Hydronephrosis Of Right Kidney Providers Seen During Your Hospitalizations Provider Role Specialty Primary office phone Jemma Ogden MD Attending Provider Emergency Medicine [phone] Gerrianne Libman., MD Attending Provider Gynecologic Oncology [phone] Your Primary Care Physician (PCP) Primary Care Physician Office Phone Office Fax NONE ** None ** ** None ** Follow-up Information Follow up With Details Comments Contact Info None   None ([phone]) Patient stated that they have no PCP Gerrianne Libman., MD In 2 weeks  [address] [place] [address] [address] 
[phone] Your Appointments  Thursday May 18, 2017  2:00 PM EDT  
 CHEMOTHERAPY with Curly Abraham MD  
[facility] ABDIRAHMAN WILCOX Gynecologic Oncology [address] [facility]) [address] [place] [address] [facility] [place] [phone]  
[phone] Tuesday May 23, 2017 11:00 AM EDT INFUSION 180 [address] with RM 102A- CHAIR [facility] - [address] ([address]) [address] [place] [address] [address] [phone]  
[phone] Go to [address], ground floor. Thursday Carrie 15, 2017  2:00 PM EDT CHEMOTHERAPY with Curly bAraham MD  
[facility] Gynecologic Oncology [address] [facility]) [address] [address] [address] [phone]  
[phone] Tuesday June 20, 2017 11:00 AM EDT INFUSION [address] RI with  [address]B - CHAIR [address] ([address]) [address] [place] [address] [address] [phone]  
[phone] Go to [address], ground floor. Current Discharge Medication List  
  
START taking these medications Dose & Instructions Dispensing Information Comments Morning Noon Evening Bedtime  
 metoclopramide HCl 10 mg tablet Commonly known as:  REGLAN Your last dose was: Your next dose is: Take with meals and at bedtime. Quantity:  120 Tab Refills:  3  
     
   
   
   
  
 sucralfate 100 mg/mL suspension Commonly known as:  Lorjeremias Calender Your last dose was: Your next dose is:    
   
   
 Dose:  1 tsp Take 5 mL by mouth four (4) times daily. Quantity:  414 mL Refills:  3 CONTINUE these medications which have CHANGED Dose & Instructions Dispensing Information Comments Morning Noon Evening Bedtime * HYDROcodone-acetaminophen 0.5-21.7 mg/mL oral solution Commonly known as:  HYCET What changed:  Another medication with the same name was added.  Make sure you understand how and when to take each. Your last dose was: Your next dose is:    
   
   
 Dose:  15-30 mL Take 15-30 mL by mouth every four (4) hours as needed. Max Daily Amount: 90 mg. Disp: two(2) 473 ml bottles  Indications: Pain Quantity:  950 mL Refills:  0  
     
   
   
   
  
 * HYDROcodone-acetaminophen 0.5-21.7 mg/mL oral solution Commonly known as:  HYCET What changed: You were already taking a medication with the same name, and this prescription was added. Make sure you understand how and when to take each. Your last dose was: Your next dose is:    
   
   
 Dose:  15-30 mL Take 15-30 mL by mouth every four (4) hours as needed. Max Daily Amount: 90 mg. Indications: Pain Quantity:  473 mL Refills:  0  
     
   
   
   
  
 * Notice: This list has 2 medication(s) that are the same as other medications prescribed for you. Read the directions carefully, and ask your doctor or other care provider to review them with you. CONTINUE these medications which have NOT CHANGED Dose & Instructions Dispensing Information Comments Morning Noon Evening Bedtime  
 apixaban 2.5 mg tablet Commonly known as:  Roz Deras Your last dose was: Your next dose is:    
   
   
 Dose:  2.5 mg Take 1 Tab by mouth two (2) times a day. Quantity:  60 Tab Refills:  6 Calcium-Vitamin D3-Vitamin K 490-450-21 mg-unit-mcg Chew Your last dose was: Your next dose is:    
   
   
 Dose:  1 Tab Take 1 Tab by mouth two (2) times a day. Refills:  0  
     
   
   
   
  
 ciprofloxacin HCl 500 mg tablet Commonly known as:  CIPRO Your last dose was: Your next dose is:    
   
   
 Dose:  500 mg Take 1 Tab by mouth two (2) times a day. Quantity:  14 Tab Refills:  0  
     
   
   
   
  
 esomeprazole 40 mg capsule Commonly known as:  NexIUM Your last dose was: Your next dose is:    
   
   
 Dose:  40 mg Take 1 Cap by mouth daily. Quantity:  90 Cap Refills:  2  
     
   
   
   
  
 fentaNYL 50 mcg/hr PATCH Commonly known as:  Marge [place] Your last dose was: Your next dose is:    
   
   
 Dose:  1 Patch 1 Patch by TransDERmal route every seventy-two (72) hours. Max Daily Amount: 1 Patch. Quantity:  10 Patch Refills:  0  
     
   
   
   
  
 lidocaine-prilocaine topical cream  
Commonly known as:  EMLA Your last dose was: Your next dose is:    
   
   
 Apply small amount over port area and cover with a band aid 1 hour before chemo treatment Quantity:  30 g Refills:  3  
     
   
   
   
  
 magnesium oxide 400 mg tablet Commonly known as:  MAG-OX Your last dose was: Your next dose is:    
   
   
 Dose:  400 mg Take 1 Tab by mouth three (3) times daily. Indications: HYPOMAGNESEMIA Quantity:  90 Tab Refills:  2  
     
   
   
   
  
 metoprolol tartrate 100 mg IR tablet Commonly known as:  LOPRESSOR Your last dose was: Your next dose is:    
   
   
 Dose:  50 mg Take 50 mg by mouth two (2) times a day. Refills:  0  
     
   
   
   
  
 multivitamin tablet Commonly known as:  ONE A DAY Your last dose was: Your next dose is:    
   
   
 Dose:  1 Tab Take 1 Tab by mouth daily. Refills:  0  
     
   
   
   
  
 mv. min cmb#[id number] 100-350-5 mcg-mcg-mg Chew Commonly known as:  AQUADEKS Your last dose was: Your next dose is:    
   
   
 Dose:  1 Tab Take 1 Tab by mouth daily. Indications: VITAMIN DEFICIENCY PREVENTION Quantity:  60 Tab Refills:  1 Substitution to equivalent ok  
    
   
   
   
  
 ondansetron 4 mg disintegrating tablet Commonly known as:  ZOFRAN ODT Your last dose was: Your next dose is:    
   
   
 Dose:  4 mg Take 1 Tab by mouth every eight (8) hours as needed for Nausea. Quantity:  30 Tab Refills:  2  
     
   
   
   
  
 polyethylene glycol 17 gram/dose powder Commonly known as:  Lyneugenio Glad Your last dose was: Your next dose is:    
   
   
 Dose:  17 g Take 17 g by mouth daily as needed. Quantity:  238 g Refills:  3  
     
   
   
   
  
 promethazine 25 mg tablet Commonly known as:  PHENERGAN Your last dose was: Your next dose is:    
   
   
 Dose:  25 mg Take 1 Tab by mouth every six (6) hours as needed for Nausea. Quantity:  40 Tab Refills:  1  
     
   
   
   
  
 zolpidem 10 mg tablet Commonly known as:  AMBIEN Your last dose was: Your next dose is:    
   
   
 Dose:  10 mg Take 1 Tab by mouth nightly as needed for Sleep. Max Daily Amount: 10 mg.  
 Quantity:  30 Tab Refills:  1 ZyrTEC 10 mg Cap Generic drug:  Cetirizine Your last dose was: Your next dose is: Take  by mouth. Refills:  0 Where to Get Your Medications Information on where to get these meds will be given to you by the nurse or doctor. ! Ask your nurse or doctor about these medications HYDROcodone-acetaminophen 0.5-21.7 mg/mL oral solution  
 metoclopramide HCl 10 mg tablet  
 sucralfate 100 mg/mL suspension Discharge Instructions [address] [place] [address] Basilio Osgood, MD Chinita Friar, MD Gates Irons, NP Patient Discharge Instructions Kathrin Damon / [id number] : 1960 Admitted 2017 Discharged: 2017 Take Home Medications No current facility-administered medications on file prior to encounter. Current Outpatient Prescriptions on File Prior to Encounter Medication Sig Dispense Refill  polyethylene glycol (MIRALAX) 17 gram/dose powder Take 17 g by mouth daily as needed.  238 g 3  
 fentaNYL (DURAGESIC) 50 mcg/hr PATCH 1 Patch by TransDERmal route every seventy-two (72) hours. Max Daily Amount: 1 Patch. 10 Patch 0  
 HYDROcodone-acetaminophen (HYCET) 0.5-21.7 mg/mL oral solution Take 15-30 mL by mouth every four (4) hours as needed. Max Daily Amount: 90 mg. Disp: two(2) 473 ml bottles  Indications: Pain 950 mL 0  
 ciprofloxacin HCl (CIPRO) 500 mg tablet Take 1 Tab by mouth two (2) times a day. 14 Tab 0  
 Cetirizine (ZYRTEC) 10 mg cap Take  by mouth.  apixaban (ELIQUIS) 2.5 mg tablet Take 1 Tab by mouth two (2) times a day. 60 Tab 6  promethazine (PHENERGAN) 25 mg tablet Take 1 Tab by mouth every six (6) hours as needed for Nausea. 40 Tab 1  
 zolpidem (AMBIEN) 10 mg tablet Take 1 Tab by mouth nightly as needed for Sleep. Max Daily Amount: 10 mg. 30 Tab 1  
 ondansetron (ZOFRAN ODT) 4 mg disintegrating tablet Take 1 Tab by mouth every eight (8) hours as needed for Nausea. 30 Tab 2  
 magnesium oxide (MAG-OX) 400 mg tablet Take 1 Tab by mouth three (3) times daily. Indications: HYPOMAGNESEMIA 90 Tab 2  
 esomeprazole (NEXIUM) 40 mg capsule Take 1 Cap by mouth daily. 90 Cap 2  
 multivit-min-FA-coenzyme Q10 (AQUADEKS) 100-350-5 mcg-mcg-mg chew Take 1 Tab by mouth daily. Indications: VITAMIN DEFICIENCY PREVENTION 60 Tab 1  
 Calcium-Vitamin D3-Vitamin K 500-500-40 mg-unit-mcg chew Take 1 Tab by mouth two (2) times a day.  metoprolol (LOPRESSOR) 100 mg tablet Take 50 mg by mouth two (2) times a day.  lidocaine-prilocaine (EMLA) topical cream Apply small amount over port area and cover with a band aid 1 hour before chemo treatment 30 g 3  
 multivitamin (ONE A DAY) tablet Take 1 Tab by mouth daily. · It is important that you take the medication exactly as they are prescribed. · Keep your medication in the bottles provided by the pharmacist and keep a list of the medication names, dosages, and times to be taken in your wallet. · Do not take other medications without consulting your doctor. What to do at [facility] Recommended diet: Regular Diet, stay hydrated. You should take a stool softener such as colace for constipation. If constipation persists for >24 hours you should take a dose of Milk of Magnesia. Call if your constipation persists. If you have had a hysterectomy or vaginal surgery you may experience vaginal spotting. This is acceptable. Should the bleeding require more that 4 pads a day please call our office. Nothing per vagina for 6-8 weeks. Recommended activity: No driving for 1 week and/or while on pain medication Walk at least 6 times per day Showers okay, no tub bathing/swimming for two weeks Activity as able, stairs okay. If you had a laparoscopic procedure, no lifting greater than 25 lbs for 3 weeks. If you had an open procedure, no heavy lifting greater than 15 lbs for 6 weeks. If you experience any of the following persisting symptoms: 
 
Nausea/vomiting, fever > 101 F, diarrhea/constipation, increasing pain despite medication, increasing vaginal discharge or any concerns, please call our office. Follow-up with Dr. Chip Lawson in 2 weeks. Call [phone] for an appointment. Information obtained by : 
I understand that if any problems occur once I am at home I am to contact my physician. I understand and acknowledge receipt of the instructions indicated above. Physician's or R.N.'s Signature                                                                  Date/Time Patient or Representative Signature                                                          Date/Time Discharge Orders None Introducing [facility] & [facility] SERVICES!    
 Dear Caden Many: 
 Thank you for requesting a [employer] account. Our records indicate that you already have an active [employer] account. You can access your account anytime at [email or web address] Did you know that you can access your hospital and ER discharge instructions at any time in [employer]? You can also review all of your test results from your hospital stay or ER visit. Additional Information If you have questions, please visit the Frequently Asked Questions section of the [employer] website at [email or web address] Remember, [employer] is NOT to be used for urgent needs. For medical emergencies, dial 911. Now available from your iPhone and Android! General Information Please provide this summary of care documentation to your next provider. Patient Signature:  ____________________________________________________________ Date:  ____________________________________________________________  
  
[facility] Provider Signature:  ____________________________________________________________ Date:  ____________________________________________________________

## 2017-05-07 NOTE — ED PROVIDER NOTES
HPI Comments: 61 y.o. female with past medical history significant for GERD, arthritis, ovarian cancer, abdominal carcinomatosis, pulmonary embolism, HTN, chronic pain, and depression who presents from home with chief complaint of vomiting. Pt states that she had nausea and vomiting on Thursday (3 days ago) and had another episode of vomiting this morning. Pt reports that the nausea has been constant since Thursday. She also complains of some abdominal pain that has been intermittent in severity since Thursday as well. She states that she has been SOB and slightly diaphoretic due to the pain. Pt reports that she was unable to keep any food down and has not been able to take her medications today either. She reports having some irregularities urinating and was told by an Urgent Care clinic this morning that her urinalysis was normal and she did not have a UTI. Pt also complains of some back pain. She reports having multiple partial bowel obstructions in the past but states that her current pain is not similar to the pain she experienced while having a bowel obstruction. Pt has had an ileostomy bag since November 2015. She is currently undergoing chemotherapy for ovarian cancer. There are no other acute medical concerns at this time. Social hx: nonsmoker, no EtOH use, no drug use  PCP: None  Oncologist: Barbara Gonzalez MD    Note written by Arnaud Leiva, as dictated by Torres Roberts MD 3:45 PM        The history is provided by the patient. No  was used.         Past Medical History:   Diagnosis Date    Abdominal carcinomatosis ([facility] 10/2015    Arthritis     left shoulder    BRCA negative 12/2015    Chronic pain     Depression     HX    Environmental allergies     GERD (gastroesophageal reflux disease)     Hypertension     NEW OVER PAST 5-6 MONTHS    Ovarian cancer ([facility] [address] 10/2015    serous adenocarcinoma, high grade, stage IIIC    Pulmonary embolism ([facility] [address] [facility] 10/2015       Past Surgical History:   Procedure Laterality Date    CARDIAC SURG PROCEDURE UNLIST  12/11/15    cardiac cath/normal     HX BREAST BIOPSY  1995    benign right breast bx    HX DILATION AND CURETTAGE  2/2011    POLYPECTOMY    HX GI  2015    PERFORATED BOWEL; ILEOSTOMY    HX GYN  10/2015    EXP LAPAROTOMY    HX HEENT  LAST 2005    oral tissue graft X3    HX HEENT  1970    tonsillectomy    HX LAPAROTOMY  10/2015    tumor sampling    HX LAPAROTOMY  4/2016    hysterectomy, BSO, radical debulking    HX LAPAROTOMY  5/2016    resection ileocolic anastomosis, leak, ileostomy    HX OTHER SURGICAL  11/2015    tunneled portacath         Family History:   Problem Relation Age of Onset    Cancer Maternal Uncle      skin    Hypertension Mother     High Cholesterol Mother     Anxiety Mother     Heart Disease Mother     High Cholesterol Father     Hypertension Father     Stroke Father     Arthritis-osteo Sister     Migraines Sister     Other Sister      FIBROMYALGIA    Depression Brother     Other Brother      DRUG ABUSE HEPATITIS C    Migraines Sister     Arrhythmia Sister     Depression Sister     Lung Disease Paternal Aunt      COPD    Cancer Paternal Uncle      LUNG    Lung Disease Paternal Uncle      COPD    Lung Disease Maternal Grandmother      COPD    Anesth Problems Neg Hx        Social History     Social History    Marital status:      Spouse name: N/A    Number of children: N/A    Years of education: N/A     Occupational History    Not on file. Social History Main Topics    Smoking status: Never Smoker    Smokeless tobacco: Never Used    Alcohol use No    Drug use: No    Sexual activity: Not on file     Other Topics Concern    Not on file     Social History Narrative         ALLERGIES: Ativan [lorazepam] and Morphine    Review of Systems   Constitutional: Positive for diaphoresis. Negative for activity change, appetite change and fatigue.    HENT: Negative for ear pain, facial swelling, sore throat and trouble swallowing. Eyes: Negative for pain, discharge and visual disturbance. Respiratory: Positive for shortness of breath. Negative for chest tightness and wheezing. Cardiovascular: Negative for chest pain and palpitations. Gastrointestinal: Positive for abdominal pain, nausea and vomiting. Negative for blood in stool. Genitourinary: Negative for difficulty urinating, flank pain and hematuria. Musculoskeletal: Negative for arthralgias, joint swelling, myalgias and neck pain. Skin: Negative for color change and rash. Neurological: Negative for dizziness, weakness, numbness and headaches. Hematological: Negative for adenopathy. Does not bruise/bleed easily. Psychiatric/Behavioral: Negative for behavioral problems, confusion and sleep disturbance. All other systems reviewed and are negative. Vitals:    05/07/17 1503   BP: 136/88   Pulse: (!) 114   Resp: 18   Temp: 98.2 °F (36.8 °C)   SpO2: 99%   Weight: 55.3 kg (122 lb)   Height: 5' 3\" (1.6 m)            Physical Exam   Constitutional: She is oriented to person, place, and time. She appears well-developed and well-nourished. No distress. HENT:   Head: Normocephalic and atraumatic. Nose: Nose normal.   Mouth/Throat: Oropharynx is clear and moist.   Eyes: Conjunctivae and EOM are normal. Pupils are equal, round, and reactive to light. No scleral icterus. Neck: Normal range of motion. Neck supple. No JVD present. No tracheal deviation present. No thyromegaly present. No carotid bruits noted. Cardiovascular: Normal rate, regular rhythm, normal heart sounds and intact distal pulses. Exam reveals no gallop and no friction rub. No murmur heard. Pulmonary/Chest: Effort normal and breath sounds normal. No respiratory distress. She has no wheezes. She has no rales. She exhibits no tenderness. Abdominal: Soft. She exhibits no distension and no mass. Bowel sounds are decreased.  There is tenderness (upon palpation). There is no rebound and no guarding. Ileostomy bag on R side. Musculoskeletal: Normal range of motion. She exhibits no edema or tenderness. Lymphadenopathy:     She has no cervical adenopathy. Neurological: She is alert and oriented to person, place, and time. She has normal reflexes. No cranial nerve deficit. Coordination normal.   Skin: Skin is warm and dry. No rash noted. No erythema. Psychiatric: She has a normal mood and affect. Her behavior is normal. Judgment and thought content normal.   Nursing note and vitals reviewed. Note written by Arnaud Dickey, as dictated by Clari Francis MD 3:45 PM    MDM  Number of Diagnoses or Management Options  Hydronephrosis, unspecified hydronephrosis type: Other ascites:      Amount and/or Complexity of Data Reviewed  Clinical lab tests: ordered and reviewed  Tests in the radiology section of CPT®: ordered and reviewed  Decide to obtain previous medical records or to obtain history from someone other than the patient: yes  Review and summarize past medical records: yes  Independent visualization of images, tracings, or specimens: yes    Risk of Complications, Morbidity, and/or Mortality  Presenting problems: high  Diagnostic procedures: high  Management options: high    Patient Progress  Patient progress: stable    ED Course       Procedures      Patient is to be admitted with possible ascites/small bowel obstruction and right sided hydronephrosis  .

## 2017-05-07 NOTE — H&P
[facility] Gynecologic Oncology  H&P      Jin Reynolds     [id number] : 1960    Admitted: 2017 Date: 2017     Subjective:     Jin Reynolds is a 61 y.o.  postmenopausal female who is being seen for nausea/vomiting and abdominal pain in the setting of recurrent ovarian carcinoma. Patient well know to me and the gyn oncology team.    Briefly, Mrs. Smith has a diagnosis of stage IV ovarian cancer. She underwent exploratory laparotomy with suboptimal debulking. She had extensive disease. She was readmitted about a week later with obstruction and subsequently had a cecal perforation, requiring resection, end ileostomy, and mucous fistula. During the hospitalization she was also diagnosed with pulmonary embolus and had chest tubes placed for bilateral pleural effusions. She had a prolonged hospital course and actually received a dose of cisplatin chemotherapy while in the hospital to help dry up the effusions. She completed 6 cycles of Taxol/Carboplatin chemotherapy following her initial debulking. She did relatively well, considering she had an ileostomy to deal with during chemotherapy. I took her to the OR on 16 for interval debulking and reversal of her ileostomy. She had extensive disease, but we were able to resect the majority of her disease. One week later she developed an anastomotic leak requiring reexploration and recreation of an ileostomy. She had another prolonged hospitalization, but recovered well since surgery. We then reinitiated Taxol/Carboplatin chemotherapy, but did reduce the dose of Taxol to 135 mg/m2. She completed 4 mores cycles. Her CA-125 normalized and we stopped treatment.     She now has evidence of recurrent disease and was initiated on Doxil/Avastin. She has completed 3 cycles so far.   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 She has been admitted 2 times since March and seen in the ER an additional time for these symptoms. Her symptoms quickly resolve following admission and she has not required any significant intervention, not even an NGT. Her most recent scan on 4/19/17 showed no evidence of obstruction, and her peritoneal disease appeared stable when compared to her scan from 3/20/17. She called the office a few days ago and reported symptoms of abdominal pain, nausea, and decreased PO intake. She was offered the opportunity to come into the [facility] for IV fluids but she declined. She called me today through the paging system and reported nausea/vomiting, which was initially productive, but eventually turned into dry heaves. Her pain had also increased, including back pain, which is new for her. I directed her to come to the ER at [facility] for evaluation due to my concern for a small bowel obstruction. Since arriving in the ER she states that her pain has significantly improved. She says some of that may be secondary to the IV Dilaudid, but she also reports increased ostomy output. She thinks that maybe the obstruction is relieving itself. Her nausea has also improved, but she did get IV Zofran. She denies any fevers or chills. She reports some dysuria, but a urinalysis from earlier today at an urgent care center in [facility] was negative.       Patient Active Problem List    Diagnosis Date Noted    Protein calorie malnutrition ([address] 04/28/2017    Bowel obstruction ([address] 04/27/2017    Counseling regarding end of life decision making 04/25/2017    Anticoagulation adequate with anticoagulant therapy 03/28/2017    Chemotherapy-induced neutropenia ([address] 03/22/2017    Ileus ([address] 03/21/2017    Thrombosis of pelvic vein 03/21/2017    Hx of pulmonary embolus 03/21/2017    Dehydration 03/21/2017    Cancer associated pain 05/13/2016    Generalized abdominal pain 05/13/2016    Anxiety about health 05/13/2016    CINV (chemotherapy-induced nausea and vomiting) 03/22/2016    Anemia due to chemotherapy 02/22/2016    Ileostomy in place [facility]) 02/15/2016    Carcinomatosis peritonei ([address] 10/29/2015    Small bowel obstruction ([address] 10/29/2015    Ovarian cancer ([address] 10/12/2015     Past Medical History:   Diagnosis Date    Abdominal carcinomatosis ([address] 10/2015    Arthritis     left shoulder    BRCA negative 12/2015    Chronic pain     Depression     HX    Environmental allergies     GERD (gastroesophageal reflux disease)     Hypertension     NEW OVER PAST 5-6 MONTHS    Ovarian cancer ([facility] [address] [address] 10/2015    serous adenocarcinoma, high grade, stage IIIC    Pulmonary embolism ([facility] [address] 10/2015      Past Surgical History:   Procedure Laterality Date    CARDIAC SURG PROCEDURE UNLIST  12/11/15    cardiac cath/normal     HX BREAST BIOPSY  1995    benign right breast bx    HX DILATION AND CURETTAGE  2/2011    POLYPECTOMY    HX GI  2015    PERFORATED BOWEL; ILEOSTOMY    HX GYN  10/2015    EXP LAPAROTOMY    HX HEENT  LAST 2005    oral tissue graft X3    HX HEENT  1970    tonsillectomy    HX LAPAROTOMY  10/2015    tumor sampling    HX LAPAROTOMY  4/2016    hysterectomy, BSO, radical debulking    HX LAPAROTOMY  5/2016    resection ileocolic anastomosis, leak, ileostomy    HX OTHER SURGICAL  11/2015    tunneled portacath      Social History   Substance Use Topics    Smoking status: Never Smoker    Smokeless tobacco: Never Used    Alcohol use No      Family History   Problem Relation Age of Onset    Cancer Maternal Uncle      skin    Hypertension Mother     High Cholesterol Mother     Anxiety Mother     Heart Disease Mother     High Cholesterol Father     Hypertension Father     Stroke Father     Arthritis-osteo Sister    [facility] Migraines Sister     Other Sister      FIBROMYALGIA    Depression Brother     Other Brother      DRUG ABUSE HEPATITIS C    Migraines Sister     Arrhythmia Sister     Depression Sister     Lung Disease Paternal Aunt      COPD    Cancer Paternal Uncle      LUNG    Lung Disease Paternal Uncle      COPD    Lung Disease Maternal Grandmother      COPD    Anesth Problems Neg Hx       Current Facility-Administered Medications   Medication Dose Route Frequency    0.9% sodium chloride infusion  75 mL/hr IntraVENous CONTINUOUS    sodium chloride 0.9 % bolus infusion 100 mL  100 mL IntraVENous RAD ONCE    iopamidol (ISOVUE-370) 76 % injection 100 mL  100 mL IntraVENous RAD ONCE    sodium chloride (NS) flush 10 mL  10 mL IntraVENous RAD ONCE     Current Outpatient Prescriptions   Medication Sig    polyethylene glycol (MIRALAX) 17 gram/dose powder Take 17 g by mouth daily as needed.  fentaNYL (DURAGESIC) 50 mcg/hr PATCH 1 Patch by TransDERmal route every seventy-two (72) hours. Max Daily Amount: 1 Patch.  HYDROcodone-acetaminophen (HYCET) 0.5-21.7 mg/mL oral solution Take 15-30 mL by mouth every four (4) hours as needed. Max Daily Amount: 90 mg. Disp: two(2) 473 ml bottles  Indications: Pain    ciprofloxacin HCl (CIPRO) 500 mg tablet Take 1 Tab by mouth two (2) times a day.  Cetirizine (ZYRTEC) 10 mg cap Take  by mouth.  apixaban (ELIQUIS) 2.5 mg tablet Take 1 Tab by mouth two (2) times a day.  promethazine (PHENERGAN) 25 mg tablet Take 1 Tab by mouth every six (6) hours as needed for Nausea.  zolpidem (AMBIEN) 10 mg tablet Take 1 Tab by mouth nightly as needed for Sleep. Max Daily Amount: 10 mg.    ondansetron (ZOFRAN ODT) 4 mg disintegrating tablet Take 1 Tab by mouth every eight (8) hours as needed for Nausea.  magnesium oxide (MAG-OX) 400 mg tablet Take 1 Tab by mouth three (3) times daily. Indications: HYPOMAGNESEMIA    esomeprazole (NEXIUM) 40 mg capsule Take 1 Cap by mouth daily.  multivit-min-FA-coenzyme Q10 (AQUADEKS) 100-350-5 mcg-mcg-mg chew Take 1 Tab by mouth daily.  Indications: VITAMIN DEFICIENCY PREVENTION    Calcium-Vitamin D3-Vitamin K 989-006-90 mg-unit-mcg chew Take 1 Tab by mouth two (2) times a day.  metoprolol (LOPRESSOR) 100 mg tablet Take 50 mg by mouth two (2) times a day.  lidocaine-prilocaine (EMLA) topical cream Apply small amount over port area and cover with a band aid 1 hour before chemo treatment    multivitamin (ONE A DAY) tablet Take 1 Tab by mouth daily. Allergies   Allergen Reactions    Ativan [Lorazepam] Other (comments)     SEVERE CONFUSION    Morphine Rash and Itching        Review of Systems:  A comprehensive review of systems was negative except for that written in the History of Present Illness. , 10 point ROS    Objective:     Physical Exam:   Visit Vitals    /88 (BP 1 Location: Left arm, BP Patient Position: At rest)    Pulse (!) 114    Temp 98.2 °F (36.8 °C)    Resp 18    Ht 5' 3\" (1.6 m)    Wt 122 lb (55.3 kg)    SpO2 99%    Breastfeeding Unknown    BMI 21.61 kg/m2     General:  Alert, cooperative, no distress, appears stated age. HEENT:  Within normal limits. Neck: Supple, without masses. Back:   No CVA tenderness. Lungs:   Clear to auscultation bilaterally. Heart:  Regular rhythm. Tachycardia. Abdomen:   Soft, mildly distended, mildly tender. No guarding or rebound. Ileostomy in right abdomen pink and productive of liquid stool. Pelvic:  Deferred. Rectal:  Deferred. Extremities: Extremities normal, atraumatic, no cyanosis or edema. Skin: No rashes or lesions. Lymph nodes: Cervical, supraclavicular, and axillary nodes normal.   Neurologic: Grossly intact.        Labs:    Recent Results (from the past 24 hour(s))   CBC W/O DIFF    Collection Time: 05/07/17  3:56 PM   Result Value Ref Range    WBC 7.2 3.6 - 11.0 K/uL    RBC 3.51 (L) 3.80 - 5.20 M/uL    HGB 11.0 (L) 11.5 - 16.0 g/dL    HCT 33.3 (L) 35.0 - 47.0 %    MCV 94.9 80.0 - 99.0 FL    MCH 31.3 26.0 - 34.0 PG    MCHC 33.0 30.0 - 36.5 g/dL    RDW 13.0 11.5 - 14.5 % PLATELET 642 052 - 521 K/uL   METABOLIC PANEL, COMPREHENSIVE    Collection Time: 05/07/17  3:56 PM   Result Value Ref Range    Sodium 131 (L) 136 - 145 mmol/L    Potassium 4.0 3.5 - 5.1 mmol/L    Chloride 92 (L) 97 - 108 mmol/L    CO2 30 21 - 32 mmol/L    Anion gap 9 5 - 15 mmol/L    Glucose 108 (H) 65 - 100 mg/dL    BUN 29 (H) 6 - 20 MG/DL    Creatinine 1.14 (H) 0.55 - 1.02 MG/DL    BUN/Creatinine ratio 25 (H) 12 - 20      GFR est AA 60 (L) >60 ml/min/1.73m2    GFR est non-AA 49 (L) >60 ml/min/1.73m2    Calcium 9.7 8.5 - 10.1 MG/DL    Bilirubin, total 0.4 0.2 - 1.0 MG/DL    ALT (SGPT) 12 12 - 78 U/L    AST (SGOT) 20 15 - 37 U/L    Alk. phosphatase 110 45 - 117 U/L    Protein, total 8.0 6.4 - 8.2 g/dL    Albumin 3.5 3.5 - 5.0 g/dL    Globulin 4.5 (H) 2.0 - 4.0 g/dL    A-G Ratio 0.8 (L) 1.1 - 2.2         Imaging:   CT of abdomen/pelvis pending      Assessment:     60 yo WF with recurrent ovarian carcinoma, currently receiving Doxil/Avastin chemotherapy. Presents with clinical picture of small bowel obstruction. It appears to be only partial, as she is still having ostomy output. This is the result of her ovarian cancer and peritoneal adhesive disease. On her last CT, she was noted have increased ascites. Question if this may be playing some part as it appeared to be loculated in the upper abdomen. She does appear to be dehydrated, as she appears to be hemoconcentrated and her creatinine is elevated from baseline. Plan:     I reviewed with Lm Fair her medical records, physical exam, and review of symptoms. Will admit for observation. I have ordered a repeat CT of the abdomen/pelvis with IV and PO contrast.  Results pending at this time. We will aggressively hydrate. This will hopefully make her feel better, as well as help protect her kidneys. Continue with IV antiemetics and analgesia as needed.   If her ascites has increased from her prior scan, we will consider a paracentesis to see if this will help her symptoms.         Signed By: James Palmer MD     May 7, 2017

## 2017-05-07 NOTE — ROUTINE PROCESS
TRANSFER - OUT REPORT:    Verbal report given to Maia Menezes RN (name) on Andrae Smiley  being transferred to [facility] (unit) for routine progression of care       Report consisted of patients Situation, Background, Assessment and   Recommendations(SBAR). Information from the following report(s) SBAR, ED Summary and MAR was reviewed with the receiving nurse. Lines:   Peripheral IV 05/07/17 Left Antecubital (Active)   Site Assessment Clean, dry, & intact 5/7/2017  4:33 PM   Phlebitis Assessment 0 5/7/2017  4:33 PM   Infiltration Assessment 0 5/7/2017  4:33 PM   Dressing Status Clean, dry, & intact 5/7/2017  4:33 PM   Dressing Type Tape;Transparent 5/7/2017  4:33 PM   Hub Color/Line Status Pink;Capped;Flushed;Patent 5/7/2017  4:33 PM        Opportunity for questions and clarification was provided.       Patient transported with:   [facility]

## 2017-05-07 NOTE — PROGRESS NOTES
TRANSFER - IN REPORT:    Verbal report received from [facility] (name) on Mindy King  being received from ED (unit) for routine progression of care      Report consisted of patients Situation, Background, Assessment and   Recommendations(SBAR). Information from the following report(s) SBAR was reviewed with the receiving nurse. Opportunity for questions and clarification was provided. Assessment completed upon patients arrival to unit and care assumed.

## 2017-05-08 ENCOUNTER — APPOINTMENT (OUTPATIENT)
Dept: ULTRASOUND IMAGING | Age: 57
End: 2017-05-08
Attending: NURSE PRACTITIONER
Payer: OTHER GOVERNMENT

## 2017-05-08 PROBLEM — R18.0 MALIGNANT ASCITES: Status: ACTIVE | Noted: 2017-05-08

## 2017-05-08 PROBLEM — N13.30 HYDRONEPHROSIS OF RIGHT KIDNEY: Status: ACTIVE | Noted: 2017-05-08

## 2017-05-08 LAB
ALBUMIN SERPL BCP-MCNC: 2.6 G/DL (ref 3.5–5)
ALBUMIN/GLOB SERPL: 0.7 {RATIO} (ref 1.1–2.2)
ALP SERPL-CCNC: 82 U/L (ref 45–117)
ALT SERPL-CCNC: 10 U/L (ref 12–78)
ANION GAP BLD CALC-SCNC: 7 MMOL/L (ref 5–15)
AST SERPL W P-5'-P-CCNC: 14 U/L (ref 15–37)
BASOPHILS # BLD AUTO: 0 K/UL (ref 0–0.1)
BASOPHILS # BLD: 0 % (ref 0–1)
BILIRUB SERPL-MCNC: 0.4 MG/DL (ref 0.2–1)
BUN SERPL-MCNC: 19 MG/DL (ref 6–20)
BUN/CREAT SERPL: 19 (ref 12–20)
CALCIUM SERPL-MCNC: 8.9 MG/DL (ref 8.5–10.1)
CHLORIDE SERPL-SCNC: 96 MMOL/L (ref 97–108)
CO2 SERPL-SCNC: 32 MMOL/L (ref 21–32)
CREAT SERPL-MCNC: 0.99 MG/DL (ref 0.55–1.02)
EOSINOPHIL # BLD: 0.1 K/UL (ref 0–0.4)
EOSINOPHIL NFR BLD: 2 % (ref 0–7)
ERYTHROCYTE [DISTWIDTH] IN BLOOD BY AUTOMATED COUNT: 13.1 % (ref 11.5–14.5)
GLOBULIN SER CALC-MCNC: 3.7 G/DL (ref 2–4)
GLUCOSE SERPL-MCNC: 103 MG/DL (ref 65–100)
HCT VFR BLD AUTO: 27 % (ref 35–47)
HGB BLD-MCNC: 8.9 G/DL (ref 11.5–16)
INR PPP: 1.1 (ref 0.9–1.1)
LYMPHOCYTES # BLD AUTO: 43 % (ref 12–49)
LYMPHOCYTES # BLD: 1.7 K/UL (ref 0.8–3.5)
MAGNESIUM SERPL-MCNC: 2 MG/DL (ref 1.6–2.4)
MCH RBC QN AUTO: 31.4 PG (ref 26–34)
MCHC RBC AUTO-ENTMCNC: 33 G/DL (ref 30–36.5)
MCV RBC AUTO: 95.4 FL (ref 80–99)
MONOCYTES # BLD: 0.2 K/UL (ref 0–1)
MONOCYTES NFR BLD AUTO: 5 % (ref 5–13)
NEUTS SEG # BLD: 2.1 K/UL (ref 1.8–8)
NEUTS SEG NFR BLD AUTO: 50 % (ref 32–75)
PHOSPHATE SERPL-MCNC: 3.6 MG/DL (ref 2.6–4.7)
PLATELET # BLD AUTO: 234 K/UL (ref 150–400)
POTASSIUM SERPL-SCNC: 3.5 MMOL/L (ref 3.5–5.1)
PREALB SERPL-MCNC: 19.9 MG/DL (ref 20–40)
PROT SERPL-MCNC: 6.3 G/DL (ref 6.4–8.2)
PROTHROMBIN TIME: 11.2 SEC (ref 9–11.1)
RBC # BLD AUTO: 2.83 M/UL (ref 3.8–5.2)
SODIUM SERPL-SCNC: 135 MMOL/L (ref 136–145)
WBC # BLD AUTO: 4.1 K/UL (ref 3.6–11)

## 2017-05-08 PROCEDURE — 84100 ASSAY OF PHOSPHORUS: CPT | Performed by: OBSTETRICS & GYNECOLOGY

## 2017-05-08 PROCEDURE — 85610 PROTHROMBIN TIME: CPT | Performed by: NURSE PRACTITIONER

## 2017-05-08 PROCEDURE — 74011000250 HC RX REV CODE- 250: Performed by: NURSE PRACTITIONER

## 2017-05-08 PROCEDURE — 74011250637 HC RX REV CODE- 250/637: Performed by: OBSTETRICS & GYNECOLOGY

## 2017-05-08 PROCEDURE — C9113 INJ PANTOPRAZOLE SODIUM, VIA: HCPCS | Performed by: NURSE PRACTITIONER

## 2017-05-08 PROCEDURE — 84134 ASSAY OF PREALBUMIN: CPT | Performed by: NURSE PRACTITIONER

## 2017-05-08 PROCEDURE — 80053 COMPREHEN METABOLIC PANEL: CPT | Performed by: OBSTETRICS & GYNECOLOGY

## 2017-05-08 PROCEDURE — 83735 ASSAY OF MAGNESIUM: CPT | Performed by: OBSTETRICS & GYNECOLOGY

## 2017-05-08 PROCEDURE — 85025 COMPLETE CBC W/AUTO DIFF WBC: CPT | Performed by: OBSTETRICS & GYNECOLOGY

## 2017-05-08 PROCEDURE — 36415 COLL VENOUS BLD VENIPUNCTURE: CPT | Performed by: OBSTETRICS & GYNECOLOGY

## 2017-05-08 PROCEDURE — 99218 HC RM OBSERVATION: CPT

## 2017-05-08 PROCEDURE — 74011250636 HC RX REV CODE- 250/636: Performed by: OBSTETRICS & GYNECOLOGY

## 2017-05-08 PROCEDURE — 74011250636 HC RX REV CODE- 250/636: Performed by: NURSE PRACTITIONER

## 2017-05-08 RX ORDER — METOCLOPRAMIDE HYDROCHLORIDE 5 MG/ML
5 INJECTION INTRAMUSCULAR; INTRAVENOUS EVERY 6 HOURS
Status: DISPENSED | OUTPATIENT
Start: 2017-05-08 | End: 2017-05-11

## 2017-05-08 RX ADMIN — PROMETHAZINE HYDROCHLORIDE 12.5 MG: 25 INJECTION INTRAMUSCULAR; INTRAVENOUS at 23:04

## 2017-05-08 RX ADMIN — HYDROCODONE BITARTRATE, ACETAMINOPHEN 15 MG: 325; 7.5 SOLUTION ORAL at 20:40

## 2017-05-08 RX ADMIN — METOPROLOL TARTRATE 50 MG: 50 TABLET ORAL at 20:40

## 2017-05-08 RX ADMIN — PROMETHAZINE HYDROCHLORIDE 12.5 MG: 25 INJECTION INTRAMUSCULAR; INTRAVENOUS at 02:45

## 2017-05-08 RX ADMIN — SODIUM CHLORIDE 40 MG: 9 INJECTION INTRAMUSCULAR; INTRAVENOUS; SUBCUTANEOUS at 10:19

## 2017-05-08 RX ADMIN — Medication 5 ML: at 22:00

## 2017-05-08 RX ADMIN — METOCLOPRAMIDE 5 MG: 5 INJECTION, SOLUTION INTRAMUSCULAR; INTRAVENOUS at 10:01

## 2017-05-08 RX ADMIN — DEXTROSE MONOHYDRATE, SODIUM CHLORIDE, AND POTASSIUM CHLORIDE 125 ML/HR: 50; 4.5; 1.49 INJECTION, SOLUTION INTRAVENOUS at 04:15

## 2017-05-08 RX ADMIN — ONDANSETRON 4 MG: 2 INJECTION INTRAMUSCULAR; INTRAVENOUS at 20:40

## 2017-05-08 RX ADMIN — HYDROCODONE BITARTRATE, ACETAMINOPHEN 15 MG: 325; 7.5 SOLUTION ORAL at 13:01

## 2017-05-08 RX ADMIN — METOCLOPRAMIDE 5 MG: 5 INJECTION, SOLUTION INTRAMUSCULAR; INTRAVENOUS at 17:27

## 2017-05-08 RX ADMIN — HYDROMORPHONE HYDROCHLORIDE 1 MG: 1 INJECTION, SOLUTION INTRAMUSCULAR; INTRAVENOUS; SUBCUTANEOUS at 02:43

## 2017-05-08 RX ADMIN — METOPROLOL TARTRATE 50 MG: 50 TABLET ORAL at 10:19

## 2017-05-08 RX ADMIN — DEXTROSE MONOHYDRATE, SODIUM CHLORIDE, AND POTASSIUM CHLORIDE 125 ML/HR: 50; 4.5; 1.49 INJECTION, SOLUTION INTRAVENOUS at 12:53

## 2017-05-08 RX ADMIN — HYDROMORPHONE HYDROCHLORIDE 1 MG: 1 INJECTION, SOLUTION INTRAMUSCULAR; INTRAVENOUS; SUBCUTANEOUS at 22:54

## 2017-05-08 RX ADMIN — METOCLOPRAMIDE 5 MG: 5 INJECTION, SOLUTION INTRAMUSCULAR; INTRAVENOUS at 23:00

## 2017-05-08 RX ADMIN — Medication 10 ML: at 04:42

## 2017-05-08 NOTE — PROGRESS NOTES
Car Dobson. HAYDER Don    Admit Date: 5/7/2017  HPI:  Tonita Dakins is a 61 y.o.  postmenopausal female who is being seen for nausea/vomiting and abdominal pain in the setting of recurrent ovarian carcinoma. Patient well know to me and the gyn oncology team.     Briefly, Mrs. Smith has a diagnosis of stage IV ovarian cancer. She underwent exploratory laparotomy with suboptimal debulking. She had extensive disease. She was readmitted about a week later with obstruction and subsequently had a cecal perforation, requiring resection, end ileostomy, and mucous fistula. During the hospitalization she was also diagnosed with pulmonary embolus and had chest tubes placed for bilateral pleural effusions. She had a prolonged hospital course and actually received a dose of cisplatin chemotherapy while in the hospital to help dry up the effusions. She completed 6 cycles of Taxol/Carboplatin chemotherapy following her initial debulking. She did relatively well, considering she had an ileostomy to deal with during chemotherapy. I took her to the OR on 4/29/16 for interval debulking and reversal of her ileostomy. She had extensive disease, but we were able to resect the majority of her disease. One week later she developed an anastomotic leak requiring reexploration and recreation of an ileostomy. She had another prolonged hospitalization, but recovered well since surgery. We then reinitiated Taxol/Carboplatin chemotherapy, but did reduce the dose of Taxol to 135 mg/m2. She completed 4 mores cycles. Her CA-125 normalized and we stopped treatment.      She now has evidence of recurrent disease and was initiated on Doxil/Avastin. She has completed 3 cycles so far.  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 She has been admitted 2 times since March and seen in the ER an additional time for these symptoms. Her symptoms quickly resolve following admission and she has not required any significant intervention, not even an NGT. Her most recent scan on 17 showed no evidence of obstruction, and her peritoneal disease appeared stable when compared to her scan from 3/20/17.      She called the office a few days ago and reported symptoms of abdominal pain, nausea, and decreased PO intake. She was offered the opportunity to come into the [facility] for IV fluids but she declined. She called me today through the paging system and reported nausea/vomiting, which was initially productive, but eventually turned into dry heaves. Her pain had also increased, including back pain, which is new for her. Dr. Dc Bowen directed her to come to the ER at [facility] for evaluation due to his concern for a small bowel obstruction.         Subjective:   Since arriving in the ER yesterday, she states that her pain has significantly improved. She says some of that may be secondary to the IV Dilaudid, but she also reports increased ostomy output. She thinks that maybe the obstruction has been resolving since admission. Her nausea has also improved, and denies any in last 24 hours. She is tolerating liquids well. This morning without nausea  She denies any fevers or chills. Says her dysuria is resolved. Says she is voiding without difficulty. She is complaining of dysphagia that she feels has been increasing over the last \"few weeks\". Objective:     Blood pressure 104/63, pulse 69, temperature 98.6 °F (37 °C), resp. rate 16, height 5' 3\" (1.6 m), weight 122 lb (55.3 kg), SpO2 97 %, unknown if currently breastfeeding. Temp (24hrs), Av.3 °F (36.8 °C), Min:98.2 °F (36.8 °C), Max:98.6 °F (37 °C)      _____________________  Physical Exam:     General: A&O X 3  in no acute distress.   Cardiovascular: Regular without M/R/G  Lungs:CTA Bilat without wheezing/rales  Abdomen: Softly and mildly distended. Non-tender. Ostomy with stool present. + BS throughout. Ext: + pedal pulses without edema bilat. Neuro: grossly intact      Assessment:     Patient Active Problem List   Diagnosis Code    Ovarian cancer ([facility] [address] [address] C56.9    Carcinomatosis peritonei ([facility] [address] C78.6, C80.1    Small bowel obstruction ([facility] [address] K56.69    Ileostomy in place ([facility] [address] Z93.2    Anemia due to chemotherapy D64.81, T45.1X5A    CINV (chemotherapy-induced nausea and vomiting) R11.2, T45.1X5A    Cancer associated pain G89.3    Generalized abdominal pain R10.84    Anxiety about health F41.8    Ileus (HCC) K56.7    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    Hx of pulmonary embolus Z86.711    Dehydration E86.0    Chemotherapy-induced neutropenia (HCC) D70.1    Anticoagulation adequate with anticoagulant therapy Z79.01    Counseling regarding end of life decision making Z71.89    Bowel obstruction (HCC) K56.60    Protein calorie malnutrition ([facility] [address] E46    Malignant ascites R18.0    Hydronephrosis of right kidney N13.30         Plan:   Would like to have a paracentesis for malignant ascites, but will have to wait a day due to her last dose of Eliquis being 2100 last evening. Will check INR  Also, will consult GI for evaluation of dysphagia   Begin Reglan/ Protonix IV  Mobilize  Hold Eliqus for now      Slava Brody NP  5/8/2017    Data Review:    Recent Labs      05/08/17   0453  05/07/17   1556   WBC  4.1  7.2   HGB  8.9*  11.0*   HCT  27.0*  33.3*   PLT  234  287     Recent Labs      05/08/17   0453  05/07/17   1556   NA  135*  131*   K  3.5  4.0   CL  96*  92*   CO2  32  30   GLU  103*  108*   BUN  19  29*   CREA  0.99  1.14*   CA  8.9  9.7   MG  2.0   --    PHOS  3.6   --    ALB  2.6*  3.5   SGOT  14*  20   ALT  10*  12     No results for input(s): AML, LPSE in the last 72 hours.         ______________________  Medications:    Current Facility-Administered Medications   Medication Dose Route Frequency Provider Last Rate Last Dose    pantoprazole (PROTONIX) 40 mg in sodium chloride 0.9 % 10 mL injection  40 mg IntraVENous Q24H Genny Don, HAYDER        metoclopramide HCl (REGLAN) injection 5 mg  5 mg IntraVENous Q6H Genny Don, HAYDER        fentaNYL (DURAGESIC) 50 mcg/hr patch 1 Patch  1 Patch TransDERmal Q72H Alessandra Dixon MD   1 Patch at 05/07/17 1957    HYDROcodone-acetaminophen (HYCET) 0.5-21.7 mg/mL oral solution 7.5-15 mg  15-30 mL Oral Q4H PRN Alessandra Dixon MD        metoprolol tartrate (LOPRESSOR) tablet 50 mg  50 mg Oral BID Alessandra Dixon MD   50 mg at 05/07/17 2147    polyethylene glycol (MIRALAX) packet 17 g  17 g Oral DAILY PRN Alessandra Dixon MD        sodium chloride (NS) flush 5-10 mL  5-10 mL IntraVENous Q8H Alessandra Dixon MD        sodium chloride (NS) flush 5-10 mL  5-10 mL IntraVENous PRN Alessandra Dixon MD   10 mL at 05/08/17 0442    dextrose 5% - 0.45% NaCl with KCl 20 mEq/L infusion  125 mL/hr IntraVENous CONTINUOUS Alessandra Dixon  mL/hr at 05/08/17 0415 125 mL/hr at 05/08/17 0415    HYDROmorphone (PF) (DILAUDID) injection 1 mg  1 mg IntraVENous Q2H PRN Alessandra Dixon MD   1 mg at 05/08/17 0243    naloxone (NARCAN) injection 0.4 mg  0.4 mg IntraVENous PRN Alessandra Dixon MD        diphenhydrAMINE (BENADRYL) injection 12.5 mg  12.5 mg IntraVENous Q4H PRN Alessandra Dixon MD        promethazine (PHENERGAN) 12.5 mg in NS 50 mL IVPB  12.5 mg IntraVENous Q4H PRN Alessandra Dixon  mL/hr at 05/08/17 0245 12.5 mg at 05/08/17 0245    ondansetron (ZOFRAN) injection 4 mg  4 mg IntraVENous Q4H PRN Alessandra Dixon MD        zolpidem [facility] [place]) tablet 5 mg  5 mg Oral QHS PRN Alessandra Dixon MD

## 2017-05-08 NOTE — PROGRESS NOTES
Problem: Discharge Planning  Goal: *Discharge to safe environment  Outcome: Progressing Towards Goal  Disposition plan is to discharge home in care of family and self

## 2017-05-08 NOTE — PROGRESS NOTES
CM reviewed chart. CM noted pt had complaints of vomiting with history of abdominal carcinomatosis, arthritis, chronic pain, depression, GERD, HTN, ovarian cancer, and pulmonary embolism. CM met with pt to introduce self and role and offer support. Bedside assessment completed. CURRENT LIVING SITUATION-  Pt states she lives with her  in a private residence. Pt was driving prior to this hospital stay and has assistance with transportation as needed. Pt denies use of assistive devices and is independent with ADL's and IADL's. Pt does not have a PCP and declines list of PCP providers. Pt states she uses Dr Sheila Mathur as her PCP. PT gets her prescriptions filled at [facility]. CM verified with pt her insurance is [employer]. Pt has an AMD.      DISCHARGE PLANNING-  Pt denies need for assistance with medications, transportation, and follow up appointments. Disposition plan is to discharge home in care of  and self. CM will continue to follow as necessary.   Scott Mohs, RN CRM      Care Management Interventions  PCP Verified by CM: No (No PCP and declines list of PCP's)  Palliative Care Consult (Criteria: CHF and RRAT>21): No  Mode of Transport at Discharge: Self (Private car)  MyChart Signup: Yes  Physical Therapy Consult: No  Occupational Therapy Consult: No  Speech Therapy Consult: No  Current Support Network: Lives with Spouse, Own Home  Confirm Follow Up Transport: Family (private car)  Plan discussed with Pt/Family/Caregiver: Yes  Freedom of Choice Offered: Yes  Discharge Location  Discharge Placement: Home with family assistance

## 2017-05-08 NOTE — PROGRESS NOTES
Bedside and Verbal shift change report given to Chica Rice RN (oncoming nurse) by Adina Shaikh RN (offgoing nurse). Report included the following information SBAR, Kardex, ED Summary, Intake/Output, MAR, Accordion and Recent Results.

## 2017-05-08 NOTE — CONSULTS
[address] [facility] NOTE  NAILA Marrero  [phone] office  [phone] NP in-hospital cell phone M-F until 4:30  After 5pm or on weekends, please call  for physician on call        NAME:  Andrae Smiley   :   1960   MRN:   [id number]       Referring Physician: Jessica Fischer NP    Consult Date: 2017 4:16 PM    Chief Complaint: dysphagia     History of Present Illness:  Patient is a 61 y.o. who is seen in consultation at the request of Jessica Fischer NP for dysphagia. Pt admitted for severe abdominal pain radiating to back. Different from other presentations of bowel obstructions like in past. Pt admits to dysphagia and we are asked to assess. Pt is having dysphagia with pills, solids and some liquids. Some liquids get to point and stop. Sometimes relieved with repeated swallowing or regurgitation. Pt had a couple of scary instances home alone where she was unsure if it would be cleared. Pt denies any thrush, black/bloody stools. States she has had 2 EGD in past by her GI doctor in [facility]. Pt states she has a hiatal hernia. I have reviewed the emergency room note, hospital admission note, notes by all other clinicians who have seen the patient during this hospitalization to date. I have reviewed the problem list and the reason for this hospitalization. I have reviewed the allergies and the medications the patient was taking at home prior to this hospitalization.     PMH:  Past Medical History:   Diagnosis Date    Abdominal carcinomatosis ([address] 10/2015    Arthritis     left shoulder    BRCA negative 2015    Chronic pain     Depression     HX    Environmental allergies     GERD (gastroesophageal reflux disease)     Hypertension     NEW OVER PAST 5-6 MONTHS    Ovarian cancer ([address] 10/2015    serous adenocarcinoma, high grade, stage IIIC    Pulmonary embolism ([address] 10/2015 PSH:  Past Surgical History:   Procedure Laterality Date    CARDIAC SURG PROCEDURE UNLIST  12/11/15    cardiac cath/normal     HX BREAST BIOPSY      benign right breast bx    HX DILATION AND CURETTAGE  2011    POLYPECTOMY    HX GI      PERFORATED BOWEL; ILEOSTOMY    HX GYN  10/2015    EXP LAPAROTOMY    HX HEENT  LAST     oral tissue graft X3    HX HEENT  1970    tonsillectomy    HX LAPAROTOMY  10/2015    tumor sampling    HX LAPAROTOMY  2016    hysterectomy, BSO, radical debulking    HX LAPAROTOMY  2016    resection ileocolic anastomosis, leak, ileostomy    HX OTHER SURGICAL  2015    tunneled portacath       Allergies: Allergies   Allergen Reactions    Ativan [Lorazepam] Other (comments)     SEVERE CONFUSION    Morphine Rash and Itching       Home Medications:  Prior to Admission Medications   Prescriptions Last Dose Informant Patient Reported? Taking? Calcium-Vitamin D3-Vitamin K 915-348-13 mg-unit-mcg chew   Yes No   Sig: Take 1 Tab by mouth two (2) times a day. Cetirizine (ZYRTEC) 10 mg cap   Yes No   Sig: Take  by mouth. HYDROcodone-acetaminophen (HYCET) 0.5-21.7 mg/mL oral solution   No No   Sig: Take 15-30 mL by mouth every four (4) hours as needed. Max Daily Amount: 90 mg. Disp: two(2) 473 ml bottles  Indications: Pain   apixaban (ELIQUIS) 2.5 mg tablet   No No   Sig: Take 1 Tab by mouth two (2) times a day. ciprofloxacin HCl (CIPRO) 500 mg tablet   No No   Sig: Take 1 Tab by mouth two (2) times a day. esomeprazole (NEXIUM) 40 mg capsule   No No   Sig: Take 1 Cap by mouth daily. fentaNYL (DURAGESIC) 50 mcg/hr PATCH   No No   Si Patch by TransDERmal route every seventy-two (72) hours. Max Daily Amount: 1 Patch.   lidocaine-prilocaine (EMLA) topical cream   No No   Sig: Apply small amount over port area and cover with a band aid 1 hour before chemo treatment   magnesium oxide (MAG-OX) 400 mg tablet   No No   Sig: Take 1 Tab by mouth three (3) times daily. Indications: HYPOMAGNESEMIA   metoprolol (LOPRESSOR) 100 mg tablet   Yes No   Sig: Take 50 mg by mouth two (2) times a day. multivit-min-FA-coenzyme Q10 (AQUADEKS) 100-350-5 mcg-mcg-mg chew   No No   Sig: Take 1 Tab by mouth daily. Indications: VITAMIN DEFICIENCY PREVENTION   multivitamin (ONE A DAY) tablet   Yes No   Sig: Take 1 Tab by mouth daily. ondansetron (ZOFRAN ODT) 4 mg disintegrating tablet   No No   Sig: Take 1 Tab by mouth every eight (8) hours as needed for Nausea. polyethylene glycol (MIRALAX) 17 gram/dose powder   No No   Sig: Take 17 g by mouth daily as needed. promethazine (PHENERGAN) 25 mg tablet   No No   Sig: Take 1 Tab by mouth every six (6) hours as needed for Nausea. zolpidem (AMBIEN) 10 mg tablet   No No   Sig: Take 1 Tab by mouth nightly as needed for Sleep. Max Daily Amount: 10 mg.       Facility-Administered Medications: None       Hospital Medications:  Current Facility-Administered Medications   Medication Dose Route Frequency    pantoprazole (PROTONIX) 40 mg in sodium chloride 0.9 % 10 mL injection  40 mg IntraVENous Q24H    metoclopramide HCl (REGLAN) injection 5 mg  5 mg IntraVENous Q6H    fentaNYL (DURAGESIC) 50 mcg/hr patch 1 Patch  1 Patch TransDERmal Q72H    HYDROcodone-acetaminophen (HYCET) 0.5-21.7 mg/mL oral solution 7.5-15 mg  15-30 mL Oral Q4H PRN    metoprolol tartrate (LOPRESSOR) tablet 50 mg  50 mg Oral BID    polyethylene glycol (MIRALAX) packet 17 g  17 g Oral DAILY PRN    sodium chloride (NS) flush 5-10 mL  5-10 mL IntraVENous Q8H    sodium chloride (NS) flush 5-10 mL  5-10 mL IntraVENous PRN    dextrose 5% - 0.45% NaCl with KCl 20 mEq/L infusion  125 mL/hr IntraVENous CONTINUOUS    HYDROmorphone (PF) (DILAUDID) injection 1 mg  1 mg IntraVENous Q2H PRN    naloxone (NARCAN) injection 0.4 mg  0.4 mg IntraVENous PRN    diphenhydrAMINE (BENADRYL) injection 12.5 mg  12.5 mg IntraVENous Q4H PRN    promethazine (PHENERGAN) 12.5 mg in NS 50 mL IVPB  12.5 mg IntraVENous Q4H PRN    ondansetron (ZOFRAN) injection 4 mg  4 mg IntraVENous Q4H PRN    zolpidem (AMBIEN) tablet 5 mg  5 mg Oral QHS PRN       Social History:  Social History   Substance Use Topics    Smoking status: Never Smoker    Smokeless tobacco: Never Used    Alcohol use No       Family History:  Family History   Problem Relation Age of Onset    Cancer Maternal Uncle      skin    Hypertension Mother     High Cholesterol Mother     Anxiety Mother     Heart Disease Mother     High Cholesterol Father     Hypertension Father     Stroke Father     Arthritis-osteo Sister     Migraines Sister     Other Sister      FIBROMYALGIA    Depression Brother     Other Brother      DRUG ABUSE HEPATITIS C    Migraines Sister     Arrhythmia Sister     Depression Sister     Lung Disease Paternal Aunt      COPD    Cancer Paternal Uncle      LUNG    Lung Disease Paternal Uncle      COPD    Lung Disease Maternal Grandmother      COPD    Anesth Problems Neg Hx        Review of Systems:  Constitutional: negative fever, negative chills, + weight loss  Eyes:   negative visual changes  ENT:   negative sore throat, tongue or lip swelling  Respiratory:  negative cough, negative dyspnea  Cards:  negative for chest pain, palpitations, lower extremity edema  GI:   See HPI  :  negative for frequency, dysuria  Integument:  negative for rash and pruritus  Heme:  + on Eliquis for PE  Musculoskel: negative for myalgias, back pain and muscle weakness  Neuro: negative for headaches, dizziness, vertigo  Psych:  negative for feelings of anxiety, depression     Objective:   Patient Vitals for the past 8 hrs:   BP Temp Pulse Resp SpO2   05/08/17 1602 93/56 98.2 °F (36.8 °C) 73 18 98 %   05/08/17 1035 95/64 98 °F (36.7 °C) 76 18 97 %     05/08 0701 - 05/08 1900  In: 250 [P.O.:250]  Out: -   05/06 1901 - 05/08 0700  In: -   Out: 350 [Urine:350]    EXAM:     CONST:  Pleasant, small woman in bed in no acute distress   NEURO: alert and oriented x 3   HEENT: EOMI, no scleral icterus   LUNGS: clear to ausculation, (-) wheeze   CARD:  regular rate and rhythm, S1 S2   ABD:  soft, no tenderness, no rebound, bowel sounds (+) all 4 quadrants, no masses, non distended. Ileostomy in R abdomen   EXT:  no edema, warm   PSYCH: full, not anxious     Data Review     Recent Labs      05/08/17   0453  05/07/17   1556   WBC  4.1  7.2   HGB  8.9*  11.0*   HCT  27.0*  33.3*   PLT  234  287     Recent Labs      05/08/17   0453  05/07/17   1556   NA  135*  131*   K  3.5  4.0   CL  96*  92*   CO2  32  30   BUN  19  29*   CREA  0.99  1.14*   GLU  103*  108*   PHOS  3.6   --    CA  8.9  9.7     Recent Labs      05/08/17   0453  05/07/17   1556   SGOT  14*  20   AP  82  110   TP  6.3*  8.0   ALB  2.6*  3.5   GLOB  3.7  4.5*     Recent Labs      05/08/17   1042   INR  1.1   PTP  11.2*          Assessment:   · Dysphagia: pt with dysphagia, mostly since starting the last round of chemo. Also more nausea and vomiting. No signs of thrush in oropharynx.       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 Due to Eliquis, need to hold procedure until at least Wednesday. If still here, will perform inpatient. If discharged, will plan on Wednesday as an outpatient. · Agree with PPI  · Thank you for allowing me to participate in care of Tonita Dakins     Signed By: Camilla Moreno. Andria Holloway NP     5/8/2017  4:16 PM       GI attending note:    Gen: NAD; Cardiac: nml S1, S2; Pulm: CTA B; Abd: normoactive BS, nt, nd, no rebound/guarding, RLQ ileostomy and midline scar. Vitals, labs, and imaging reviewed. Agree with the history, physical, and plan of the NP. Thank you for this consultation.     Dr. Benton Vidal

## 2017-05-08 NOTE — PROGRESS NOTES
Bedside and Verbal shift change report given to Slava Montague RN (oncoming nurse) by Oj Jerez RN (offgoing nurse). Report included the following information SBAR.

## 2017-05-09 ENCOUNTER — APPOINTMENT (OUTPATIENT)
Dept: ULTRASOUND IMAGING | Age: 57
End: 2017-05-09
Attending: NURSE PRACTITIONER
Payer: OTHER GOVERNMENT

## 2017-05-09 LAB
ALBUMIN SERPL BCP-MCNC: 2.6 G/DL (ref 3.5–5)
ALBUMIN/GLOB SERPL: 0.7 {RATIO} (ref 1.1–2.2)
ALP SERPL-CCNC: 78 U/L (ref 45–117)
ALT SERPL-CCNC: 10 U/L (ref 12–78)
ANION GAP BLD CALC-SCNC: 6 MMOL/L (ref 5–15)
AST SERPL W P-5'-P-CCNC: 15 U/L (ref 15–37)
BASOPHILS # BLD AUTO: 0 K/UL (ref 0–0.1)
BASOPHILS # BLD: 0 % (ref 0–1)
BILIRUB SERPL-MCNC: 0.2 MG/DL (ref 0.2–1)
BUN SERPL-MCNC: 13 MG/DL (ref 6–20)
BUN/CREAT SERPL: 14 (ref 12–20)
CALCIUM SERPL-MCNC: 9 MG/DL (ref 8.5–10.1)
CHLORIDE SERPL-SCNC: 102 MMOL/L (ref 97–108)
CO2 SERPL-SCNC: 29 MMOL/L (ref 21–32)
CREAT SERPL-MCNC: 0.93 MG/DL (ref 0.55–1.02)
DIFFERENTIAL METHOD BLD: ABNORMAL
EOSINOPHIL # BLD: 0.1 K/UL (ref 0–0.4)
EOSINOPHIL NFR BLD: 2 % (ref 0–7)
ERYTHROCYTE [DISTWIDTH] IN BLOOD BY AUTOMATED COUNT: 13.2 % (ref 11.5–14.5)
GLOBULIN SER CALC-MCNC: 3.5 G/DL (ref 2–4)
GLUCOSE SERPL-MCNC: 96 MG/DL (ref 65–100)
HCT VFR BLD AUTO: 26.4 % (ref 35–47)
HGB BLD-MCNC: 8.5 G/DL (ref 11.5–16)
LYMPHOCYTES # BLD AUTO: 47 % (ref 12–49)
LYMPHOCYTES # BLD: 1.7 K/UL (ref 0.8–3.5)
MAGNESIUM SERPL-MCNC: 1.7 MG/DL (ref 1.6–2.4)
MCH RBC QN AUTO: 31.5 PG (ref 26–34)
MCHC RBC AUTO-ENTMCNC: 32.2 G/DL (ref 30–36.5)
MCV RBC AUTO: 97.8 FL (ref 80–99)
MONOCYTES # BLD: 0.2 K/UL (ref 0–1)
MONOCYTES NFR BLD AUTO: 6 % (ref 5–13)
NEUTS SEG # BLD: 1.6 K/UL (ref 1.8–8)
NEUTS SEG NFR BLD AUTO: 45 % (ref 32–75)
PLATELET # BLD AUTO: 224 K/UL (ref 150–400)
POTASSIUM SERPL-SCNC: 3.9 MMOL/L (ref 3.5–5.1)
PROT SERPL-MCNC: 6.1 G/DL (ref 6.4–8.2)
RBC # BLD AUTO: 2.7 M/UL (ref 3.8–5.2)
RBC MORPH BLD: ABNORMAL
SODIUM SERPL-SCNC: 137 MMOL/L (ref 136–145)
WBC # BLD AUTO: 3.6 K/UL (ref 3.6–11)
WBC MORPH BLD: ABNORMAL

## 2017-05-09 PROCEDURE — 99218 HC RM OBSERVATION: CPT

## 2017-05-09 PROCEDURE — 49083 ABD PARACENTESIS W/IMAGING: CPT

## 2017-05-09 PROCEDURE — 74011000250 HC RX REV CODE- 250: Performed by: NURSE PRACTITIONER

## 2017-05-09 PROCEDURE — C1729 CATH, DRAINAGE: HCPCS

## 2017-05-09 PROCEDURE — 74011250637 HC RX REV CODE- 250/637: Performed by: OBSTETRICS & GYNECOLOGY

## 2017-05-09 PROCEDURE — 80053 COMPREHEN METABOLIC PANEL: CPT | Performed by: NURSE PRACTITIONER

## 2017-05-09 PROCEDURE — 83735 ASSAY OF MAGNESIUM: CPT | Performed by: NURSE PRACTITIONER

## 2017-05-09 PROCEDURE — 85025 COMPLETE CBC W/AUTO DIFF WBC: CPT | Performed by: NURSE PRACTITIONER

## 2017-05-09 PROCEDURE — C9113 INJ PANTOPRAZOLE SODIUM, VIA: HCPCS | Performed by: NURSE PRACTITIONER

## 2017-05-09 PROCEDURE — 74011250636 HC RX REV CODE- 250/636: Performed by: OBSTETRICS & GYNECOLOGY

## 2017-05-09 PROCEDURE — 74011250636 HC RX REV CODE- 250/636: Performed by: NURSE PRACTITIONER

## 2017-05-09 PROCEDURE — 36415 COLL VENOUS BLD VENIPUNCTURE: CPT | Performed by: NURSE PRACTITIONER

## 2017-05-09 RX ADMIN — METOCLOPRAMIDE 5 MG: 5 INJECTION, SOLUTION INTRAMUSCULAR; INTRAVENOUS at 06:00

## 2017-05-09 RX ADMIN — DIPHENHYDRAMINE HYDROCHLORIDE 12.5 MG: 50 INJECTION, SOLUTION INTRAMUSCULAR; INTRAVENOUS at 20:55

## 2017-05-09 RX ADMIN — SODIUM CHLORIDE 40 MG: 9 INJECTION INTRAMUSCULAR; INTRAVENOUS; SUBCUTANEOUS at 07:32

## 2017-05-09 RX ADMIN — Medication 10 ML: at 10:29

## 2017-05-09 RX ADMIN — HYDROMORPHONE HYDROCHLORIDE 1 MG: 1 INJECTION, SOLUTION INTRAMUSCULAR; INTRAVENOUS; SUBCUTANEOUS at 16:57

## 2017-05-09 RX ADMIN — DEXTROSE MONOHYDRATE, SODIUM CHLORIDE, AND POTASSIUM CHLORIDE 125 ML/HR: 50; 4.5; 1.49 INJECTION, SOLUTION INTRAVENOUS at 05:11

## 2017-05-09 RX ADMIN — Medication 10 ML: at 06:00

## 2017-05-09 RX ADMIN — METOCLOPRAMIDE 5 MG: 5 INJECTION, SOLUTION INTRAMUSCULAR; INTRAVENOUS at 18:23

## 2017-05-09 RX ADMIN — HYDROMORPHONE HYDROCHLORIDE 1 MG: 1 INJECTION, SOLUTION INTRAMUSCULAR; INTRAVENOUS; SUBCUTANEOUS at 10:26

## 2017-05-09 RX ADMIN — PROMETHAZINE HYDROCHLORIDE 12.5 MG: 25 INJECTION INTRAMUSCULAR; INTRAVENOUS at 18:52

## 2017-05-09 RX ADMIN — METOPROLOL TARTRATE 50 MG: 50 TABLET ORAL at 20:42

## 2017-05-09 RX ADMIN — HYDROMORPHONE HYDROCHLORIDE 1 MG: 1 INJECTION, SOLUTION INTRAMUSCULAR; INTRAVENOUS; SUBCUTANEOUS at 20:45

## 2017-05-09 RX ADMIN — METOCLOPRAMIDE 5 MG: 5 INJECTION, SOLUTION INTRAMUSCULAR; INTRAVENOUS at 12:16

## 2017-05-09 RX ADMIN — HYDROMORPHONE HYDROCHLORIDE 1 MG: 1 INJECTION, SOLUTION INTRAMUSCULAR; INTRAVENOUS; SUBCUTANEOUS at 05:08

## 2017-05-09 RX ADMIN — METOPROLOL TARTRATE 50 MG: 50 TABLET ORAL at 10:09

## 2017-05-09 RX ADMIN — DEXTROSE MONOHYDRATE, SODIUM CHLORIDE, AND POTASSIUM CHLORIDE 125 ML/HR: 50; 4.5; 1.49 INJECTION, SOLUTION INTRAVENOUS at 20:45

## 2017-05-09 RX ADMIN — PROMETHAZINE HYDROCHLORIDE 12.5 MG: 25 INJECTION INTRAMUSCULAR; INTRAVENOUS at 10:30

## 2017-05-09 NOTE — PROGRESS NOTES
Noel Cavazos [address] M.D.  [phone]               GASTROENTEROLOGY PROGRESS NOTE        NAME: Montana Santos   :  1960   MRN:  [id number]       Subjective:   No major issues. Plan to stay in the hospital till tomorrow. Objective:   VITALS:   Last 24hrs VS reviewed. Most recent are:  Visit Vitals    /65    Pulse 76    Temp 98.3 °F (36.8 °C)    Resp 16    Ht 5' 3\" (1.6 m)    Wt 55.3 kg (122 lb)    SpO2 100%    Breastfeeding Unknown    BMI 21.61 kg/m2       Intake/Output Summary (Last 24 hours) at 17 1610  Last data filed at 17 0143   Gross per 24 hour   Intake              240 ml   Output                0 ml   Net              240 ml       PHYSICAL EXAM:  General: Alert, in no acute distress    Lungs:            CTA Bilaterally  Heart:  Normal S1, S2    Abdomen: Soft, Non distended, Non tender. Normoactive bowel sounds, no rebound/guarding, RLQ ostomy in place  MSK:   Poor muscle tone  Skin:   Warm to touch  Psych:   Good insight. Not anxious nor agitated. Lab Data Reviewed:   Recent Labs      17   0458  17   0453   WBC  3.6  4.1   HGB  8.5*  8.9*   HCT  26.4*  27.0*   PLT  224  234     Recent Labs      17   0458  17   0453   NA  137  135*   K  3.9  3.5   CL  102  96*   CO2  29  32   BUN  13  19   CREA  0.93  0.99   GLU  96  103*   CA  9.0  8.9   MG  1.7  2.0   PHOS   --   3.6     Recent Labs      17   0458  17   0453   SGOT  15  14*   AP  78  82   TP  6.1*  6.3*   ALB  2.6*  2.6*   GLOB  3.5  3.7     Recent Labs      17   1042   INR  1.1   PTP  11.2*      No results for input(s): FE, TIBC, PSAT, FERR in the last 72 hours. No results for input(s): CPK, CKMB in the last 72 hours. No lab exists for component: EDI    See Electronic Medical Record for all procedure/radiology reports and details which were not copied into this note but were reviewed prior to the creation of the Plan.     Assessment: · Dysphagia: pt with dysphagia, mostly since starting the last round of chemo. Also more nausea and vomiting. No signs of thrush in oropharynx.            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 Signed by:  Shahzad Hensley MD         5/9/2017  4:10 PM

## 2017-05-09 NOTE — PROGRESS NOTES
Bedside and Verbal shift change report given to [address] [facility] (oncoming nurse) by Tony Andrews RN (offgoing nurse). Report included the following information SBAR, Intake/Output and MAR.

## 2017-05-09 NOTE — ROUTINE PROCESS
TRANSFER - OUT REPORT:    Verbal report given to Liza(name) on Rut Smith  being transferred to [facility](unit) for routine progression of care       Report consisted of patients Situation, Background, Assessment and   Recommendations(SBAR). Information from the following report(s) SBAR, Procedure Summary and MAR was reviewed with the receiving nurse. Lines:   Venous Access Device Upper chest (subclavicular area), left (Active)   Central Line Being Utilized Yes 5/8/2017  9:57 PM   Criteria for Appropriate Use Irritant/vesicant medication 5/8/2017  9:57 PM   Site Assessment Clean, dry, & intact 5/8/2017  9:57 PM   Date of Last Dressing Change 05/07/17 5/8/2017  9:57 PM   Dressing Status Clean, dry, & intact 5/8/2017  9:57 PM   Dressing Type Transparent;Disk with Chlorhexadine gluconate (CHG) 5/8/2017  9:57 PM   Action Taken Open ports on tubing capped 5/8/2017  9:57 PM   Positive Blood Return (Medial Site) Yes 5/8/2017  8:40 AM   Action Taken (Medial Site) Flushed 5/8/2017  1:27 AM       Peripheral IV 05/07/17 Left Antecubital (Active)   Site Assessment Clean, dry, & intact 5/8/2017  9:57 PM   Phlebitis Assessment 0 5/8/2017  9:57 PM   Infiltration Assessment 0 5/8/2017  9:57 PM   Dressing Status Clean, dry, & intact 5/8/2017  9:57 PM   Dressing Type Transparent 5/8/2017  9:57 PM   Hub Color/Line Status Pink;Flushed;Capped 5/8/2017  9:57 PM   Action Taken Open ports on tubing capped 5/8/2017  9:57 PM   Alcohol Cap Used Yes 5/8/2017  9:57 PM        Opportunity for questions and clarification was provided.       Patient transported with:   [place]

## 2017-05-09 NOTE — PROGRESS NOTES
[address] [phone]         [phone]    MD Magdy Jensen MD Ruperto Flurry. Emily Arvizu NP     Patient Name: Cassy Rico   Admit Date: 5/7/2017   OR Date: * No surgery found *       Visit Date: 5/9/2017        SUBJECTIVE:    No new complaints. Overall feeling better. No emesis. Tolerating GI lite diet. OBJECTIVE:    Patient Vitals for the past 24 hrs:   Temp Pulse Resp BP SpO2   05/09/17 0403 98.4 °F (36.9 °C) 65 16 121/65 100 %   05/08/17 2036 98.8 °F (37.1 °C) 79 18 107/67 99 %   05/08/17 1602 98.2 °F (36.8 °C) 73 18 93/56 98 %   05/08/17 1035 98 °F (36.7 °C) 76 18 95/64 97 %         Date 05/08/17 0700 - 05/09/17 0659 05/09/17 0700 - 05/10/17 0659   Shift 0909-3333 5323-7772 24 Hour Total 7491-5953 1339-7329 24 Hour Total   I  N  T  A  K  E   P.O. 250 240 490         P.O. 250 240 490       Shift Total  (mL/kg) 250  (4.5) 240  (4.3) 490  (8.9)      O  U  T  P  U  T   Urine  (mL/kg/hr) 400  (0.6)  400  (0.3)         Urine Voided 400  400         Urine Occurrence(s) 1 x  1 x       Shift Total  (mL/kg) 400  (7.2)  400  (7.2)      NET -150 240 90      Weight (kg) 55.3 55.3 55.3 55.3 55.3 55.3       Physical Exam     General:  alert, cooperative, no distress     Cardiac:  Regular rate and rhythm        Lungs:  clear to auscultation bilaterally  Abdomen:  soft, mildly tender; pink ostomy in right abdomen producing liquid stool. Wound:  n/a   Extremity: extremities normal, atraumatic, no cyanosis or edema      Lab Results   Component Value Date/Time    WBC 3.6 05/09/2017 04:58 AM    ABS.  NEUTROPHILS 1.6 05/09/2017 04:58 AM    HGB 8.5 05/09/2017 04:58 AM    HCT 26.4 05/09/2017 04:58 AM    MCV 97.8 05/09/2017 04:58 AM    MCH 31.5 05/09/2017 04:58 AM    PLATELET 066 60/80/2740 04:58 AM     Lab Results   Component Value Date/Time    Sodium 137 05/09/2017 04:58 AM    Potassium 3.9 05/09/2017 04:58 AM    Chloride 102 05/09/2017 04:58 AM    CO2 29 05/09/2017 04:58 AM Glucose 96 05/09/2017 04:58 AM    BUN 13 05/09/2017 04:58 AM    Creatinine 0.93 05/09/2017 04:58 AM    Calcium 9.0 05/09/2017 04:58 AM    Albumin 2.6 05/09/2017 04:58 AM    Bilirubin, total 0.2 05/09/2017 04:58 AM    AST (SGOT) 15 05/09/2017 04:58 AM    ALT (SGPT) 10 05/09/2017 04:58 AM    Alk. phosphatase 78 05/09/2017 04:58 AM       IMPRESSION/PLAN:    Oncologic:  Progressive, recurrent ovarian cancer. Currently on Doxil/Avastin, s/p 3 cycles. Heme/CV:  Hemodynamically stable. Renal:  Good UOP. Creatinine back to baseline. FEN/GI:  Partial small bowel obstruction. She has been having intermittent symptoms for a couple months now. Feels better this morning. Gastroenterology has seen her and is planning an EGD tomorrow to evaluate her complaints of difficulty swallowing. I suspect that is from her obstructive symptoms and ascites causing pressure. She is scheduled for a paracentesis today. ID/Wound:  Afebrile. No evidence of infection. PPX:  Holding Eliquis for procedures. Dispostion:  Looking better. Possible d/c tomorrow afternoon following her EGD.         Jerel Allen MD

## 2017-05-09 NOTE — PROGRESS NOTES
patient requested to see Lewis Liscomb ostomy RN called and seen. Pt also requested a listing for GI lite diet requested .

## 2017-05-09 NOTE — PROGRESS NOTES
Bedside and Verbal shift change report given to JEAN De Jesus (oncoming nurse) by Lenny Gurrola (offgoing nurse). Report included the following information SBAR, Kardex, Intake/Output, MAR, Accordion and Recent Results.

## 2017-05-09 NOTE — WOUND CARE
Ostomy: provided ostomy supplies for patient; stoma red, moist, budded draining brown liquid drainage;     Madhav Dover RN

## 2017-05-10 ENCOUNTER — ANESTHESIA (OUTPATIENT)
Dept: ENDOSCOPY | Age: 57
End: 2017-05-10
Payer: OTHER GOVERNMENT

## 2017-05-10 ENCOUNTER — ANESTHESIA EVENT (OUTPATIENT)
Dept: ENDOSCOPY | Age: 57
End: 2017-05-10
Payer: OTHER GOVERNMENT

## 2017-05-10 PROCEDURE — 99218 HC RM OBSERVATION: CPT

## 2017-05-10 PROCEDURE — 74011250637 HC RX REV CODE- 250/637: Performed by: OBSTETRICS & GYNECOLOGY

## 2017-05-10 PROCEDURE — 88342 IMHCHEM/IMCYTCHM 1ST ANTB: CPT | Performed by: INTERNAL MEDICINE

## 2017-05-10 PROCEDURE — 88341 IMHCHEM/IMCYTCHM EA ADD ANTB: CPT | Performed by: INTERNAL MEDICINE

## 2017-05-10 PROCEDURE — 77030009426 HC FCPS BIOP ENDOSC BSC -B: Performed by: INTERNAL MEDICINE

## 2017-05-10 PROCEDURE — 76040000019: Performed by: INTERNAL MEDICINE

## 2017-05-10 PROCEDURE — C9113 INJ PANTOPRAZOLE SODIUM, VIA: HCPCS | Performed by: NURSE PRACTITIONER

## 2017-05-10 PROCEDURE — 76060000031 HC ANESTHESIA FIRST 0.5 HR: Performed by: INTERNAL MEDICINE

## 2017-05-10 PROCEDURE — 74011250636 HC RX REV CODE- 250/636

## 2017-05-10 PROCEDURE — 88305 TISSUE EXAM BY PATHOLOGIST: CPT | Performed by: INTERNAL MEDICINE

## 2017-05-10 PROCEDURE — 74011250636 HC RX REV CODE- 250/636: Performed by: NURSE PRACTITIONER

## 2017-05-10 PROCEDURE — 74011000250 HC RX REV CODE- 250: Performed by: NURSE PRACTITIONER

## 2017-05-10 PROCEDURE — 74011250636 HC RX REV CODE- 250/636: Performed by: OBSTETRICS & GYNECOLOGY

## 2017-05-10 PROCEDURE — 88312 SPECIAL STAINS GROUP 1: CPT | Performed by: INTERNAL MEDICINE

## 2017-05-10 RX ORDER — PROPOFOL 10 MG/ML
INJECTION, EMULSION INTRAVENOUS AS NEEDED
Status: DISCONTINUED | OUTPATIENT
Start: 2017-05-10 | End: 2017-05-10 | Stop reason: HOSPADM

## 2017-05-10 RX ORDER — EPINEPHRINE 0.1 MG/ML
1 INJECTION INTRACARDIAC; INTRAVENOUS
Status: CANCELLED | OUTPATIENT
Start: 2017-05-10 | End: 2017-05-10

## 2017-05-10 RX ORDER — SODIUM CHLORIDE 9 MG/ML
150 INJECTION, SOLUTION INTRAVENOUS CONTINUOUS
Status: CANCELLED | OUTPATIENT
Start: 2017-05-10 | End: 2017-05-10

## 2017-05-10 RX ORDER — SODIUM CHLORIDE 0.9 % (FLUSH) 0.9 %
5-10 SYRINGE (ML) INJECTION AS NEEDED
Status: CANCELLED | OUTPATIENT
Start: 2017-05-10 | End: 2017-05-10

## 2017-05-10 RX ORDER — ATROPINE SULFATE 0.1 MG/ML
0.5 INJECTION INTRAVENOUS
Status: CANCELLED | OUTPATIENT
Start: 2017-05-10 | End: 2017-05-10

## 2017-05-10 RX ORDER — SODIUM CHLORIDE 9 MG/ML
INJECTION, SOLUTION INTRAVENOUS
Status: DISCONTINUED | OUTPATIENT
Start: 2017-05-10 | End: 2017-05-10 | Stop reason: HOSPADM

## 2017-05-10 RX ORDER — MIDAZOLAM HYDROCHLORIDE 1 MG/ML
.25-5 INJECTION, SOLUTION INTRAMUSCULAR; INTRAVENOUS
Status: CANCELLED | OUTPATIENT
Start: 2017-05-10 | End: 2017-05-10

## 2017-05-10 RX ORDER — SODIUM CHLORIDE 0.9 % (FLUSH) 0.9 %
5-10 SYRINGE (ML) INJECTION EVERY 8 HOURS
Status: CANCELLED | OUTPATIENT
Start: 2017-05-10 | End: 2017-05-10

## 2017-05-10 RX ORDER — PANTOPRAZOLE SODIUM 40 MG/1
40 TABLET, DELAYED RELEASE ORAL
Status: DISCONTINUED | OUTPATIENT
Start: 2017-05-11 | End: 2017-05-11 | Stop reason: HOSPADM

## 2017-05-10 RX ORDER — DEXTROMETHORPHAN/PSEUDOEPHED 2.5-7.5/.8
1.2 DROPS ORAL
Status: CANCELLED | OUTPATIENT
Start: 2017-05-10

## 2017-05-10 RX ADMIN — DEXTROSE MONOHYDRATE, SODIUM CHLORIDE, AND POTASSIUM CHLORIDE 125 ML/HR: 50; 4.5; 1.49 INJECTION, SOLUTION INTRAVENOUS at 04:53

## 2017-05-10 RX ADMIN — PROPOFOL 100 MG: 10 INJECTION, EMULSION INTRAVENOUS at 13:03

## 2017-05-10 RX ADMIN — METOCLOPRAMIDE 5 MG: 5 INJECTION, SOLUTION INTRAMUSCULAR; INTRAVENOUS at 00:17

## 2017-05-10 RX ADMIN — HYDROMORPHONE HYDROCHLORIDE 1 MG: 1 INJECTION, SOLUTION INTRAMUSCULAR; INTRAVENOUS; SUBCUTANEOUS at 19:12

## 2017-05-10 RX ADMIN — PROPOFOL 30 MG: 10 INJECTION, EMULSION INTRAVENOUS at 13:09

## 2017-05-10 RX ADMIN — HYDROMORPHONE HYDROCHLORIDE 1 MG: 1 INJECTION, SOLUTION INTRAMUSCULAR; INTRAVENOUS; SUBCUTANEOUS at 14:50

## 2017-05-10 RX ADMIN — HYDROMORPHONE HYDROCHLORIDE 1 MG: 1 INJECTION, SOLUTION INTRAMUSCULAR; INTRAVENOUS; SUBCUTANEOUS at 09:36

## 2017-05-10 RX ADMIN — METOCLOPRAMIDE 5 MG: 5 INJECTION, SOLUTION INTRAMUSCULAR; INTRAVENOUS at 19:01

## 2017-05-10 RX ADMIN — PROPOFOL 20 MG: 10 INJECTION, EMULSION INTRAVENOUS at 13:11

## 2017-05-10 RX ADMIN — Medication 10 ML: at 05:00

## 2017-05-10 RX ADMIN — Medication 10 ML: at 00:17

## 2017-05-10 RX ADMIN — METOPROLOL TARTRATE 50 MG: 50 TABLET ORAL at 22:03

## 2017-05-10 RX ADMIN — HYDROMORPHONE HYDROCHLORIDE 1 MG: 1 INJECTION, SOLUTION INTRAMUSCULAR; INTRAVENOUS; SUBCUTANEOUS at 00:17

## 2017-05-10 RX ADMIN — PROPOFOL 50 MG: 10 INJECTION, EMULSION INTRAVENOUS at 13:06

## 2017-05-10 RX ADMIN — HYDROMORPHONE HYDROCHLORIDE 1 MG: 1 INJECTION, SOLUTION INTRAMUSCULAR; INTRAVENOUS; SUBCUTANEOUS at 16:53

## 2017-05-10 RX ADMIN — PROMETHAZINE HYDROCHLORIDE 12.5 MG: 25 INJECTION INTRAMUSCULAR; INTRAVENOUS at 22:42

## 2017-05-10 RX ADMIN — Medication 10 ML: at 22:05

## 2017-05-10 RX ADMIN — SODIUM CHLORIDE 40 MG: 9 INJECTION INTRAMUSCULAR; INTRAVENOUS; SUBCUTANEOUS at 08:38

## 2017-05-10 RX ADMIN — PROPOFOL 20 MG: 10 INJECTION, EMULSION INTRAVENOUS at 13:14

## 2017-05-10 RX ADMIN — SODIUM CHLORIDE: 9 INJECTION, SOLUTION INTRAVENOUS at 12:45

## 2017-05-10 RX ADMIN — HYDROMORPHONE HYDROCHLORIDE 1 MG: 1 INJECTION, SOLUTION INTRAMUSCULAR; INTRAVENOUS; SUBCUTANEOUS at 22:08

## 2017-05-10 RX ADMIN — Medication 10 ML: at 16:54

## 2017-05-10 RX ADMIN — METOCLOPRAMIDE 5 MG: 5 INJECTION, SOLUTION INTRAMUSCULAR; INTRAVENOUS at 05:00

## 2017-05-10 RX ADMIN — HYDROMORPHONE HYDROCHLORIDE 1 MG: 1 INJECTION, SOLUTION INTRAMUSCULAR; INTRAVENOUS; SUBCUTANEOUS at 04:54

## 2017-05-10 NOTE — PROGRESS NOTES
Clinical Pharmacy Note: IV to PO Automatic Conversion  Please note: Aditi Howell medication(s) (protonix) has been changed from IV to PO based on the following critiera:    - Patient is taking scheduled oral medications  - Patient is tolerating tube feeds at goal rate or a full liquid, soft or regular diet    This IV to PO conversion is based on the [employer] approved automatic conversion policy for eligible patients. Please call with questions.

## 2017-05-10 NOTE — PROGRESS NOTES
Bedside and Verbal shift change report given to [address] (oncoming nurse) by Scott Alcazar RN (offgoing nurse). Report included the following information SBAR, Intake/Output and MAR.

## 2017-05-10 NOTE — PROCEDURES
Noel Chapin [facility] [address] [place] [address] [facility]  [phone]               Esophagogastroduodenoscopy (EGD) Procedure Note    NAME: Jennifer Nobles  :  1960  MRN:  [id number]    Indications:  Dyphagia/odynophagia     : Rubina Brown MD    Referring Provider:  None    Medicine:  MAC anesthesia      Procedure Details:  After informed consent was obtained with all risks and benefits of the procedure explained and preprocedure exam completed, the patient was placed in the left lateral decubitus position. Universal protocol for patient identification was performed and documented in the nursing notes. Throughout the procedure, the patient's blood pressure was monitored at least every five minutes; pulse, and oxygen saturations were monitored continuously. All vital signs were documented in the nursing notes. The endoscope was inserted into the mouth and advanced under direct vision to second portion of the duodenum. A careful inspection was made as the gastroscope was withdrawn, including a retroflexed view of the proximal stomach; findings and interventions are described below.       Findings:   Esophagus: in the mid esophagus was circumferential ulceration with mild narrowing which sloughed away with passage of the scope s/p biopsies, mild distal linear ulcerations s/p biopsies  Stomach: moderate hiatal hernia, diminutive polyp in the body s/p biopsies, rest of the stomach was normal  Duodenum: normal    Interventions:    biopsy of esophagus and stomach    Specimens:     ID Type Source Tests Collected by Time Destination   1 : gastric polyp Preservative Gastric  Rubnia Brown MD 5/10/2017 1314 Pathology   2 : lower esophagus r/o inflammation, cmv, hsv Preservative Esophagus, Distal  Rubina Brown MD 5/10/2017 1316 Pathology   3 : proximal esophagus r/o inflammation, cmv, hsv Preservative Esophagus, Proximal  Rubina Brown MD 5/10/2017 1318 Pathology        EBL: None          Complications:     No immediate complications        Impression:  -As above. No significant stricture. Esophageal ulcerations-biopsies taken. Recommendations:  -Acid suppression with a proton pump inhibitor 40 mg daily.  -Carafate 1 g TID. -Resume Eliquis tomorrow  -Stable for discharge from GI perspective    Signed by:  Jeanmarie Calhoun MD         5/10/2017 1:29 PM

## 2017-05-10 NOTE — PROGRESS NOTES
Bedside and Verbal shift change report given to [address] (oncoming nurse) by Rosa Clayton RN (offgoing nurse). Report included the following information SBAR, Intake/Output and MAR.

## 2017-05-10 NOTE — PERIOP NOTES
TRANSFER - IN REPORT:    [facility] Selvin Adams  being received from [facility] (unit) for ordered procedure      Report consisted of patients Situation, Background, Assessment and   Recommendations(SBAR). Information from the following report(s) SBAR, ED Summary, [facility] and Recent Results was reviewed with the receiving nurse. Opportunity for questions and clarification was provided. Assessment completed upon patients arrival to unit and care assumed.

## 2017-05-10 NOTE — WOUND CARE
Ostomy:   Patient removed ileostomy appliance; stoma red, moist with opening @ 7 O'clock draining brown liquid drainage; peristomal skin intact; crease @ 3 and 9 O'clock; cleansed skin, dried well, applied skin prep; cut to fit wafer 1 1/8 opening, applied eakins ring over caulking and around stoma; placed wafer around stoma, attached pouch and clip; supplies at bedside;     Renny Jordan RN

## 2017-05-10 NOTE — PROGRESS NOTES
Bedside and Verbal shift change report given to [address] [facility] (oncoming nurse) by Noel Hall RN (offgoing nurse). Report included the following information SBAR, Intake/Output and MAR.

## 2017-05-10 NOTE — ROUTINE PROCESS
Merissa Mclean  1960  [id number]    Situation:  Verbal report received from: Radha Spears  Procedure: Procedure(s):  ESOPHAGOGASTRODUODENOSCOPY (EGD)  ESOPHAGOGASTRODUODENAL (EGD) BIOPSY    Background:    Preoperative diagnosis: Abdominal pain  Postoperative diagnosis: Gastric Polyps  Hiatal Hernia  Esophagitis    :  Dr. Shani Curry  Assistant(s): Endoscopy Technician-1: Evan Woods  Endoscopy RN-1: Susana Saleem RN    Specimens:   ID Type Source Tests Collected by Time Destination   1 : gastric polyp Preservative Gastric  Jeanmarie Calhoun MD 5/10/2017 1314 Pathology   2 : lower esophagus r/o inflammation, cmv, hsv Preservative Esophagus, Distal  Jeanmarie Calhoun MD 5/10/2017 1316 Pathology   3 : proximal esophagus r/o inflammation, cmv, hsv Preservative Esophagus, Proximal  Jeanmarie Calhoun MD 5/10/2017 1318 Pathology     H. Pylori  no    Assessment:  Intra-procedure medications   Anesthesia gave intra-procedure sedation and medications, see anesthesia flow sheet yes    Intravenous fluids: NS@ KVO     Vital signs stable     Abdominal assessment: round and soft     Recommendation:  Return to floor.

## 2017-05-10 NOTE — PROGRESS NOTES
TRANSFER - IN REPORT:    Verbal report received from Madai AGUAYO rn(name) on [address]  being received from ENDO(unit) for routine progression of care      Report consisted of patients Situation, Background, Assessment and   Recommendations(SBAR). Information from the following report(s) SBAR, Intake/Output and MAR was reviewed with the receiving nurse. Opportunity for questions and clarification was provided. Assessment completed upon patients arrival to unit and care assumed.

## 2017-05-10 NOTE — PHYSICIAN ADVISORY
Letter of Status Determination:   Recommend hospitalization status upgraded from Observation to Inpatient  Status     Pt Name:  Yesika Joshi   MR#  [id number]   [facility]#   [id number]   [address] [place] [address]  [phone]  @ . [facility] hospital   Hospitalization date  5/7/2017  2:53 PM   Current Attending Physician  Isela Vickers MD   Principal diagnosis  <principal problem not specified>   Small bowel obstruction    Clinicals  61 y.o. y.o  female hospitalized with above diagnosis   The pt has progressive Ovarian cancer who presented to the ER on 05/07/17 with nausea and vomiting, This started two weeks post her latest chemo. Workup in ER revealed signs of partial small bowel obstruction. She is now improving with conservative measures. EGD is planned for today for evaluation of her dysphagia      Milliman (MCG) criteria   Does  NOT apply    STATUS DETERMINATION  This patient is at above basilio risk of deterioration based on documented presenting clinical data, comorbid conditions, high risk of adverse events and current acute care course. Ms. Yesika Joshi now meets Inpatient Admission status criteria in accordance with CMS regulation Section 43 .3. Specifically, due to medical necessity the patient's stay now exceeds Two Midnights. It is our recommendation that this patient's hospitalization status should be upgraded from  OBSERVATION to INPATIENT status.      The final decision of the patient's hospitalization status depends on the attending physician's judgment            Additional comments     Payor:  / Plan: [address] DEPENDENTS / Product Type: Kiah Berman /         Shirley Nagel MD MPH FACP     Physician [address]   President Medical Staff, [facility]  [phone]

## 2017-05-10 NOTE — H&P
[address], [address] [place] [address]          Pre-procedure History and Physical       NAME:  Merissa Mclean   :   1960   MRN:   [id number]     CHIEF COMPLAINT/HPI: See procedure note    PMH:  Past Medical History:   Diagnosis Date    Abdominal carcinomatosis ([facility] [address] 10/2015    Arthritis     left shoulder    BRCA negative 2015    Chronic pain     Depression     HX    Environmental allergies     GERD (gastroesophageal reflux disease)     Hypertension     NEW OVER PAST 5-6 MONTHS    Ovarian cancer ([facility] [address] 10/2015    serous adenocarcinoma, high grade, stage IIIC    Pulmonary embolism ([facility] [address] 10/2015       PSH:  Past Surgical History:   Procedure Laterality Date    CARDIAC SURG PROCEDURE UNLIST  12/11/15    cardiac cath/normal     HX BREAST BIOPSY      benign right breast bx    HX DILATION AND CURETTAGE  2011    POLYPECTOMY    HX GI      PERFORATED BOWEL; ILEOSTOMY    HX GYN  10/2015    EXP LAPAROTOMY    HX HEENT  LAST     oral tissue graft X3    HX HEENT  1970    tonsillectomy    HX LAPAROTOMY  10/2015    tumor sampling    HX LAPAROTOMY  2016    hysterectomy, BSO, radical debulking    HX LAPAROTOMY  2016    resection ileocolic anastomosis, leak, ileostomy    HX OTHER SURGICAL  2015    tunneled portacath       Allergies: Allergies   Allergen Reactions    Ativan [Lorazepam] Other (comments)     SEVERE CONFUSION    Morphine Rash and Itching       Home Medications:  Prior to Admission Medications   Prescriptions Last Dose Informant Patient Reported? Taking? Calcium-Vitamin D3-Vitamin K [phone] mg-unit-mcg chew   Yes No   Sig: Take 1 Tab by mouth two (2) times a day. Cetirizine (ZYRTEC) 10 mg cap   Yes No   Sig: Take  by mouth. HYDROcodone-acetaminophen (HYCET) 0.5-21.7 mg/mL oral solution   No No   Sig: Take 15-30 mL by mouth every four (4) hours as needed. Max Daily Amount: 90 mg.  Disp: two(2) 473 ml bottles  Indications: Pain apixaban (ELIQUIS) 2.5 mg tablet   No No   Sig: Take 1 Tab by mouth two (2) times a day. ciprofloxacin HCl (CIPRO) 500 mg tablet   No No   Sig: Take 1 Tab by mouth two (2) times a day. esomeprazole (NEXIUM) 40 mg capsule   No No   Sig: Take 1 Cap by mouth daily. fentaNYL (DURAGESIC) 50 mcg/hr PATCH   No No   Si Patch by TransDERmal route every seventy-two (72) hours. Max Daily Amount: 1 Patch.   lidocaine-prilocaine (EMLA) topical cream   No No   Sig: Apply small amount over port area and cover with a band aid 1 hour before chemo treatment   magnesium oxide (MAG-OX) 400 mg tablet   No No   Sig: Take 1 Tab by mouth three (3) times daily. Indications: HYPOMAGNESEMIA   metoprolol (LOPRESSOR) 100 mg tablet   Yes No   Sig: Take 50 mg by mouth two (2) times a day. multivit-min-FA-coenzyme Q10 (AQUADEKS) 100-350-5 mcg-mcg-mg chew   No No   Sig: Take 1 Tab by mouth daily. Indications: VITAMIN DEFICIENCY PREVENTION   multivitamin (ONE A DAY) tablet   Yes No   Sig: Take 1 Tab by mouth daily. ondansetron (ZOFRAN ODT) 4 mg disintegrating tablet   No No   Sig: Take 1 Tab by mouth every eight (8) hours as needed for Nausea. polyethylene glycol (MIRALAX) 17 gram/dose powder   No No   Sig: Take 17 g by mouth daily as needed. promethazine (PHENERGAN) 25 mg tablet   No No   Sig: Take 1 Tab by mouth every six (6) hours as needed for Nausea. zolpidem (AMBIEN) 10 mg tablet   No No   Sig: Take 1 Tab by mouth nightly as needed for Sleep. Max Daily Amount: 10 mg.       Facility-Administered Medications: None       Hospital Medications:  Current Facility-Administered Medications   Medication Dose Route Frequency    pantoprazole (PROTONIX) 40 mg in sodium chloride 0.9 % 10 mL injection  40 mg IntraVENous Q24H    metoclopramide HCl (REGLAN) injection 5 mg  5 mg IntraVENous Q6H    fentaNYL (DURAGESIC) 50 mcg/hr patch 1 Patch  1 Patch TransDERmal Q72H    HYDROcodone-acetaminophen (HYCET) 0.5-21.7 mg/mL oral solution 7.5-15 mg  15-30 mL Oral Q4H PRN    metoprolol tartrate (LOPRESSOR) tablet 50 mg  50 mg Oral BID    polyethylene glycol (MIRALAX) packet 17 g  17 g Oral DAILY PRN    sodium chloride (NS) flush 5-10 mL  5-10 mL IntraVENous Q8H    sodium chloride (NS) flush 5-10 mL  5-10 mL IntraVENous PRN    dextrose 5% - 0.45% NaCl with KCl 20 mEq/L infusion  125 mL/hr IntraVENous CONTINUOUS    HYDROmorphone (PF) (DILAUDID) injection 1 mg  1 mg IntraVENous Q2H PRN    naloxone (NARCAN) injection 0.4 mg  0.4 mg IntraVENous PRN    diphenhydrAMINE (BENADRYL) injection 12.5 mg  12.5 mg IntraVENous Q4H PRN    promethazine (PHENERGAN) 12.5 mg in NS 50 mL IVPB  12.5 mg IntraVENous Q4H PRN    ondansetron (ZOFRAN) injection 4 mg  4 mg IntraVENous Q4H PRN    zolpidem (AMBIEN) tablet 5 mg  5 mg Oral QHS PRN       Family History:  Family History   Problem Relation Age of Onset    Cancer Maternal Uncle      skin    Hypertension Mother     High Cholesterol Mother     Anxiety Mother     Heart Disease Mother     High Cholesterol Father     Hypertension Father     Stroke Father     Arthritis-osteo Sister     Migraines Sister     Other Sister      FIBROMYALGIA    Depression Brother     Other Brother      DRUG ABUSE HEPATITIS C    Migraines Sister     Arrhythmia Sister     Depression Sister     Lung Disease Paternal Aunt      COPD    Cancer Paternal Uncle      LUNG    Lung Disease Paternal Uncle      COPD    Lung Disease Maternal Grandmother      COPD    Anesth Problems Neg Hx        Social History:  Social History   Substance Use Topics    Smoking status: Never Smoker    Smokeless tobacco: Never Used    Alcohol use No         PHYSICAL EXAM PRIOR TO SEDATION:  General: Alert, in no acute distress    Lungs:            CTA bilaterally  Heart:  Normal S1, S2    Abdomen: Soft, Non distended, Non tender. Normoactive bowel sounds. Assessment:   Stable for sedation administration.     Plan:   · Endoscopic procedure with sedation     Signed By: Maciel Garduno MD     5/10/2017  1:03 PM

## 2017-05-10 NOTE — PROGRESS NOTES
[phone]         [phone]    MD Mary Ruiz MD Zoila Maffucci. Mamie Koch NP     Patient Name: Merle Berger   Admit Date: 5/7/2017   Visit Date: 5/10/2017        SUBJECTIVE:  Complains of discomfort to paracentesis site. Noted results with only 200 cc's removed. No further vomiting and ostomy functioning well. Still has some dysphagia  Noted plan for upper endoscopy today. Pt has been ambulating in hallway      OBJECTIVE:    Patient Vitals for the past 24 hrs:   Temp Pulse Resp BP SpO2   05/10/17 0846 98.3 °F (36.8 °C) 74 17 101/64 98 %   05/10/17 0449 98.9 °F (37.2 °C) 73 16 117/68 99 %   05/09/17 2036 99.1 °F (37.3 °C) 83 16 124/74 100 %   05/09/17 1430 98.3 °F (36.8 °C) 76 16 113/65 100 %   05/09/17 0930 - 76 16 115/56 100 %   05/09/17 0923 - 79 16 117/56 100 %         Date 05/09/17 0700 - 05/10/17 0659 05/10/17 0700 - 05/11/17 0659   Shift 3425-7213 8030-7975 24 Hour Total 4969-6271 3777-7113 24 Hour Total   I  N  T  A  K  E   P. O.  700 700         P. O.  700 700       I.V.  (mL/kg/hr)  1200  (1.8) 1200  (0.9)         Volume (dextrose 5% - 0.45% NaCl with KCl 20 mEq/L infusion)  1200 1200       Shift Total  (mL/kg)  1900  (34.3) 1900  (34.3)      O  U  T  P  U  T   Urine  (mL/kg/hr)  600  (0.9) 600  (0.5)         Urine Voided  600 600       Shift Total  (mL/kg)  600  (10.8) 600  (10.8)      NET  1300 1300      Weight (kg) 55.3 55.3 55.3 55.3 55.3 55.3       Physical Exam     General:  alert, cooperative, no distress       HEENT:  Oral mucosa pink/moist.  No visible signs of candida       Cardiac:  Regular without M/R/G        Lungs:  CTA without wheezing or rales. Abdomen:  soft, mildly tender; pink ostomy in right abdomen continues producing liquid stool.  Paracentesis site without redness, swelling but mild tenderness to touch   Extremity: + pedal pulses without edema bilat      Lab Results   Component Value Date/Time    WBC 3.6 05/09/2017 04:58 AM    ABS. NEUTROPHILS 1.6 05/09/2017 04:58 AM    HGB 8.5 05/09/2017 04:58 AM    HCT 26.4 05/09/2017 04:58 AM    MCV 97.8 05/09/2017 04:58 AM    MCH 31.5 05/09/2017 04:58 AM    PLATELET 108 09/62/8058 04:58 AM     Lab Results   Component Value Date/Time    Sodium 137 05/09/2017 04:58 AM    Potassium 3.9 05/09/2017 04:58 AM    Chloride 102 05/09/2017 04:58 AM    CO2 29 05/09/2017 04:58 AM    Glucose 96 05/09/2017 04:58 AM    BUN 13 05/09/2017 04:58 AM    Creatinine 0.93 05/09/2017 04:58 AM    Calcium 9.0 05/09/2017 04:58 AM    Albumin 2.6 05/09/2017 04:58 AM    Bilirubin, total 0.2 05/09/2017 04:58 AM    AST (SGOT) 15 05/09/2017 04:58 AM    ALT (SGPT) 10 05/09/2017 04:58 AM    Alk. phosphatase 78 05/09/2017 04:58 AM       IMPRESSION/PLAN:    Oncologic:  Progressive, recurrent ovarian cancer. Currently on Doxil/Avastin, s/p 3 cycles. Heme/CV:  Hemodynamically stable. Renal:  Good UOP. Creatinine back to baseline. FEN/GI:  Partial small bowel obstruction. She has been having intermittent symptoms for a couple months now. Feels better this morning. Gastroenterology has seen her and is planning an EGD today to evaluate her complaints of difficulty swallowing. I suspect that is from her obstructive symptoms causing pressure. She had a paracentesis yesterday with only 200 cc's removed. ID/Wound:  Afebrile. No evidence of infection. PPX:  Holding Eliquis for procedures. Dispostion:  Looking better. Possible d/c later today following her EGD pending results  Will consider giving pt Rx for reglan to be taken beginin day 3 for 3 days after chemotherapy as this seems to be the time when she begins with her obstructive symptoms and nausea. She will also begin Miralax the evening after chemotherapy as another preventive measure. Will consider scheduling IV hydration after chemotherapy as this is when she begin her dehydration. Will re-evaluate after endoscopy today.   Appreciate GI input Miles Sethi NP     Agree with above assessment. Samanta Blas was down in endoscopy for her EGD when I made rounds. There appeared to be some circumferential ulceration with mild narrowing in the mid esophagus. She was also noted to have a moderate hiatal hernia. The stomach and duodenum appeared normal.     Possible d/c tomorrow.     Dexter Briceno MD

## 2017-05-10 NOTE — ANESTHESIA POSTPROCEDURE EVALUATION
Post-Anesthesia Evaluation and Assessment    Patient: Kelly Rice MRN: [id number]  SSN: [id number]    YOB: 1960  Age: 61 y.o. Sex: female       Cardiovascular Function/Vital Signs  Visit Vitals    /73    Pulse 76    Temp 36.9 °C (98.5 °F)    Resp 13    Ht 5' 3\" (1.6 m)    Wt 55.3 kg (122 lb)    SpO2 100%    Breastfeeding Unknown    BMI 21.61 kg/m2       Patient is status post MAC anesthesia for Procedure(s):  ESOPHAGOGASTRODUODENOSCOPY (EGD)  ESOPHAGOGASTRODUODENAL (EGD) BIOPSY. Nausea/Vomiting: None    Postoperative hydration reviewed and adequate. Pain:  Pain Scale 1: Visual (05/10/17 1326)  Pain Intensity 1: 0 (05/10/17 1326)   Managed    Neurological Status: At baseline    Mental Status and Level of Consciousness: Arousable    Pulmonary Status:   O2 Device: Room air (05/10/17 1326)   Adequate oxygenation and airway patent    Complications related to anesthesia: None    Post-anesthesia assessment completed.  No concerns    Signed By: Cierra Tijerina MD     May 10, 2017

## 2017-05-10 NOTE — ANESTHESIA PREPROCEDURE EVALUATION
Anesthetic History     PONV          Review of Systems / Medical History  Patient summary reviewed, nursing notes reviewed and pertinent labs reviewed    Pulmonary                   Neuro/Psych         Psychiatric history     Cardiovascular    Hypertension                Comments: 2/2017--SUMMARY:  Left ventricle: Systolic function was normal. Ejection fraction was  estimated in the range of 60 % to 65 %. There were no regional wall motion  abnormalities.      GI/Hepatic/Renal     GERD          Comments: C/o ab pain  S/p colon resection 2016  illeostomy Endo/Other        Arthritis     Other Findings   Comments: Ovarian cancer/ malignant ascites  chemo         Physical Exam    Airway  Mallampati: II  TM Distance: > 6 cm  Neck ROM: normal range of motion   Mouth opening: Normal     Cardiovascular    Rhythm: regular  Rate: normal         Dental  No notable dental hx       Pulmonary  Breath sounds clear to auscultation               Abdominal  Abdominal exam normal       Other Findings            Anesthetic Plan    ASA: 3  Anesthesia type: MAC          Induction: Intravenous  Anesthetic plan and risks discussed with: Patient

## 2017-05-10 NOTE — PROGRESS NOTES
Bedside and Verbal shift change report given to JEAN De Jesus (oncoming nurse) by Peyton Venegas (offgoing nurse). Report included the following information SBAR, Kardex, Intake/Output, MAR, Accordion and Recent Results.

## 2017-05-10 NOTE — PROGRESS NOTES
TRANSFER - OUT REPORT:    Verbal report given to Aram Hess RN on Gin Parra  being transferred to [facility] for routine post - op       Report consisted of patients Situation, Background, Assessment and   Recommendations(SBAR). Information from the following report(s) Procedure Summary was reviewed with the receiving nurse. Lines:   Venous Access Device Upper chest (subclavicular area), left (Active)   Central Line Being Utilized Yes 5/10/2017 12:56 AM   Criteria for Appropriate Use Irritant/vesicant medication 5/10/2017 12:56 AM   Site Assessment Clean, dry, & intact 5/10/2017 12:56 AM   Date of Last Dressing Change 05/07/17 5/10/2017 12:56 AM   Dressing Status Clean, dry, & intact 5/10/2017 12:56 AM   Dressing Type Transparent 5/10/2017 12:56 AM   Action Taken Open ports on tubing capped 5/10/2017 12:56 AM   Positive Blood Return (Medial Site) Yes 5/8/2017  8:40 AM   Action Taken (Medial Site) Flushed 5/8/2017  1:27 AM       Peripheral IV 05/07/17 Left Antecubital (Active)   Site Assessment Clean, dry, & intact 5/10/2017 12:56 AM   Phlebitis Assessment 0 5/10/2017 12:56 AM   Infiltration Assessment 0 5/10/2017 12:56 AM   Dressing Status Clean, dry, & intact 5/10/2017 12:56 AM   Dressing Type Tape;Transparent 5/10/2017 12:56 AM   Hub Color/Line Status Pink;Capped 5/10/2017 12:56 AM   Action Taken Open ports on tubing capped 5/8/2017  9:57 PM   Alcohol Cap Used Yes 5/8/2017  9:57 PM        Opportunity for questions and clarification was provided. Patient transported with:   transporter on stretcher.

## 2017-05-11 VITALS
OXYGEN SATURATION: 96 % | DIASTOLIC BLOOD PRESSURE: 70 MMHG | SYSTOLIC BLOOD PRESSURE: 113 MMHG | WEIGHT: 122 LBS | TEMPERATURE: 98.6 F | HEIGHT: 63 IN | RESPIRATION RATE: 16 BRPM | BODY MASS INDEX: 21.62 KG/M2 | HEART RATE: 76 BPM

## 2017-05-11 PROCEDURE — 74011250636 HC RX REV CODE- 250/636: Performed by: NURSE PRACTITIONER

## 2017-05-11 PROCEDURE — 99218 HC RM OBSERVATION: CPT

## 2017-05-11 PROCEDURE — 74011250637 HC RX REV CODE- 250/637: Performed by: NURSE PRACTITIONER

## 2017-05-11 PROCEDURE — 74011250637 HC RX REV CODE- 250/637: Performed by: OBSTETRICS & GYNECOLOGY

## 2017-05-11 PROCEDURE — 74011250636 HC RX REV CODE- 250/636: Performed by: OBSTETRICS & GYNECOLOGY

## 2017-05-11 RX ORDER — HEPARIN 100 UNIT/ML
300 SYRINGE INTRAVENOUS AS NEEDED
Status: DISCONTINUED | OUTPATIENT
Start: 2017-05-11 | End: 2017-05-11 | Stop reason: HOSPADM

## 2017-05-11 RX ORDER — HYDROCODONE BITARTRATE AND ACETAMINOPHEN 7.5; 325 MG/15ML; MG/15ML
15-30 SOLUTION ORAL
Qty: 473 ML | Refills: 0 | Status: SHIPPED | OUTPATIENT
Start: 2017-05-11 | End: 2017-05-18 | Stop reason: SDUPTHER

## 2017-05-11 RX ORDER — METOCLOPRAMIDE 10 MG/1
TABLET ORAL
Qty: 120 TAB | Refills: 3 | Status: SHIPPED | OUTPATIENT
Start: 2017-05-11 | End: 2017-06-07

## 2017-05-11 RX ORDER — SUCRALFATE 1 G/10ML
1 SUSPENSION ORAL 4 TIMES DAILY
Qty: 414 ML | Refills: 3 | Status: SHIPPED | OUTPATIENT
Start: 2017-05-11

## 2017-05-11 RX ADMIN — HYDROCODONE BITARTRATE, ACETAMINOPHEN 15 MG: 325; 7.5 SOLUTION ORAL at 07:10

## 2017-05-11 RX ADMIN — Medication 300 UNITS: at 10:37

## 2017-05-11 RX ADMIN — DEXTROSE MONOHYDRATE, SODIUM CHLORIDE, AND POTASSIUM CHLORIDE 125 ML/HR: 50; 4.5; 1.49 INJECTION, SOLUTION INTRAVENOUS at 00:25

## 2017-05-11 RX ADMIN — HYDROMORPHONE HYDROCHLORIDE 1 MG: 1 INJECTION, SOLUTION INTRAMUSCULAR; INTRAVENOUS; SUBCUTANEOUS at 04:29

## 2017-05-11 RX ADMIN — METOPROLOL TARTRATE 50 MG: 50 TABLET ORAL at 08:25

## 2017-05-11 RX ADMIN — METOCLOPRAMIDE 5 MG: 5 INJECTION, SOLUTION INTRAMUSCULAR; INTRAVENOUS at 01:43

## 2017-05-11 RX ADMIN — HYDROMORPHONE HYDROCHLORIDE 1 MG: 1 INJECTION, SOLUTION INTRAMUSCULAR; INTRAVENOUS; SUBCUTANEOUS at 10:36

## 2017-05-11 RX ADMIN — PANTOPRAZOLE SODIUM 40 MG: 40 TABLET, DELAYED RELEASE ORAL at 07:11

## 2017-05-11 NOTE — DISCHARGE INSTRUCTIONS
[address] [place] [address]  MD Marcelle Kohli MD Jonny Kidd, HAYDER    Patient Discharge Instructions    Davion Santillan / [id number] : 1960    Admitted 2017 Discharged: 2017     Take Home Medications     No current facility-administered medications on file prior to encounter. Current Outpatient Prescriptions on File Prior to Encounter   Medication Sig Dispense Refill    polyethylene glycol (MIRALAX) 17 gram/dose powder Take 17 g by mouth daily as needed. 238 g 3    fentaNYL (DURAGESIC) 50 mcg/hr PATCH 1 Patch by TransDERmal route every seventy-two (72) hours. Max Daily Amount: 1 Patch. 10 Patch 0    HYDROcodone-acetaminophen (HYCET) 0.5-21.7 mg/mL oral solution Take 15-30 mL by mouth every four (4) hours as needed. Max Daily Amount: 90 mg. Disp: two(2) 473 ml bottles  Indications: Pain 950 mL 0    ciprofloxacin HCl (CIPRO) 500 mg tablet Take 1 Tab by mouth two (2) times a day. 14 Tab 0    Cetirizine (ZYRTEC) 10 mg cap Take  by mouth.  apixaban (ELIQUIS) 2.5 mg tablet Take 1 Tab by mouth two (2) times a day. 60 Tab 6    promethazine (PHENERGAN) 25 mg tablet Take 1 Tab by mouth every six (6) hours as needed for Nausea. 40 Tab 1    zolpidem (AMBIEN) 10 mg tablet Take 1 Tab by mouth nightly as needed for Sleep. Max Daily Amount: 10 mg. 30 Tab 1    ondansetron (ZOFRAN ODT) 4 mg disintegrating tablet Take 1 Tab by mouth every eight (8) hours as needed for Nausea. 30 Tab 2    magnesium oxide (MAG-OX) 400 mg tablet Take 1 Tab by mouth three (3) times daily. Indications: HYPOMAGNESEMIA 90 Tab 2    esomeprazole (NEXIUM) 40 mg capsule Take 1 Cap by mouth daily. 90 Cap 2    multivit-min-FA-coenzyme Q10 (AQUADEKS) 100-350-5 mcg-mcg-mg chew Take 1 Tab by mouth daily. Indications: VITAMIN DEFICIENCY PREVENTION 60 Tab 1    Calcium-Vitamin D3-Vitamin K 500-500-40 mg-unit-mcg chew Take 1 Tab by mouth two (2) times a day.       metoprolol (LOPRESSOR) 100 mg tablet Take 50 mg by mouth two (2) times a day.  lidocaine-prilocaine (EMLA) topical cream Apply small amount over port area and cover with a band aid 1 hour before chemo treatment 30 g 3    multivitamin (ONE A DAY) tablet Take 1 Tab by mouth daily. · It is important that you take the medication exactly as they are prescribed. · Keep your medication in the bottles provided by the pharmacist and keep a list of the medication names, dosages, and times to be taken in your wallet. · Do not take other medications without consulting your doctor. What to do at Home    Recommended diet: Regular Diet, stay hydrated. You should take a stool softener such as colace for constipation. If constipation persists for >24 hours you should take a dose of Milk of Magnesia. Call if your constipation persists. If you have had a hysterectomy or vaginal surgery you may experience vaginal spotting. This is acceptable. Should the bleeding require more that 4 pads a day please call our office. Nothing per vagina for 6-8 weeks. Recommended activity:   No driving for 1 week and/or while on pain medication  Walk at least 6 times per day  Showers okay, no tub bathing/swimming for two weeks  Activity as able, stairs okay. If you had a laparoscopic procedure, no lifting greater than 25 lbs for 3 weeks. If you had an open procedure, no heavy lifting greater than 15 lbs for 6 weeks. If you experience any of the following persisting symptoms:    Nausea/vomiting, fever > 101 F, diarrhea/constipation, increasing pain despite medication, increasing vaginal discharge or any concerns, please call our office. Follow-up with Dr. Reagan Parks in 2 weeks. Call [phone] for an appointment. Information obtained by :  I understand that if any problems occur once I am at home I am to contact my physician. I understand and acknowledge receipt of the instructions indicated above. Physician's or R.N.'s Signature                                                                  Date/Time                                                                                                                                              Patient or Representative Signature                                                          Date/Time

## 2017-05-11 NOTE — PROGRESS NOTES
[facility]  [phone]         [phone]    MD Christopher Birmingham MD Malen Kling. Ana Barron, NP     Patient Name: Nay Villarreal   Admit Date: 5/7/2017   Visit Date: 5/11/2017        SUBJECTIVE:    Discomfort at paracentesis site improved. Only 200 cc's removed. No further vomiting and ostomy functioning well. Still has some dysphagia. EGD yesterday - revealed mid esophogeal ulceration and narrowing. OBJECTIVE:    Patient Vitals for the past 24 hrs:   Temp Pulse Resp BP SpO2   05/11/17 0223 98.9 °F (37.2 °C) 76 16 130/77 99 %   05/10/17 2114 98.9 °F (37.2 °C) 84 16 143/81 100 %   05/10/17 1437 - 86 - 120/71 -   05/10/17 1427 - 77 (!) 5 147/74 100 %   05/10/17 1417 - 69 18 129/71 100 %   05/10/17 1407 - 76 17 128/76 100 %   05/10/17 1402 - 75 27 121/72 100 %   05/10/17 1357 - 69 13 120/70 100 %   05/10/17 1352 - 76 - 131/73 100 %   05/10/17 1347 - 69 13 126/75 100 %   05/10/17 1342 - 71 13 127/69 100 %   05/10/17 1337 - 80 13 126/72 100 %   05/10/17 1332 - 69 16 121/71 100 %   05/10/17 1327 - 71 19 121/69 100 %   05/10/17 1326 - 72 20 121/69 -   05/10/17 1319 - 80 12 152/90 100 %   05/10/17 1230 98.5 °F (36.9 °C) 75 16 125/71 99 %   05/10/17 0846 98.3 °F (36.8 °C) 74 17 101/64 98 %         Date 05/10/17 0700 - 05/11/17 0659 05/11/17 0700 - 05/12/17 0659   Shift 5351-0017 4387-5568 24 Hour Total 2225-6925 0701-9445 24 Hour Total   I  N  T  A  K  E   P.O.  240 240 240  240      P. O.  240 240 240  240    I.V.  (mL/kg/hr)  59801  (15.2) 48287  (7.6) 346  346      I.V.  08006 30660 346  346    Shift Total  (mL/kg)  55849  (186.9) 93947  (186.9) 586  (10.6)  586  (10.6)   O  U  T  P  U  T   Urine  (mL/kg/hr)            Urine Occurrence(s)  2 x 2 x 1 x  1 x    Stool            Stool Occurrence(s)  1 x 1 x       Shift Total  (mL/kg)         NET  84601 93029 586  586   Weight (kg) 55.3 55.3 55.3 55.3 55.3 55.3       Physical Exam     General:  alert, cooperative, no distress HEENT:  Oral mucosa pink/moist.  No visible signs of candida       Cardiac:  Regular without M/R/G        Lungs:  CTA without wheezing or rales. Abdomen:  soft, mildly tender; pink ostomy in right abdomen continues producing liquid stool. Paracentesis site without redness, swelling but mild tenderness to touch   Extremity: + pedal pulses without edema bilat      Lab Results   Component Value Date/Time    WBC 3.6 05/09/2017 04:58 AM    ABS. NEUTROPHILS 1.6 05/09/2017 04:58 AM    HGB 8.5 05/09/2017 04:58 AM    HCT 26.4 05/09/2017 04:58 AM    MCV 97.8 05/09/2017 04:58 AM    MCH 31.5 05/09/2017 04:58 AM    PLATELET 264 78/07/0162 04:58 AM     Lab Results   Component Value Date/Time    Sodium 137 05/09/2017 04:58 AM    Potassium 3.9 05/09/2017 04:58 AM    Chloride 102 05/09/2017 04:58 AM    CO2 29 05/09/2017 04:58 AM    Glucose 96 05/09/2017 04:58 AM    BUN 13 05/09/2017 04:58 AM    Creatinine 0.93 05/09/2017 04:58 AM    Calcium 9.0 05/09/2017 04:58 AM    Albumin 2.6 05/09/2017 04:58 AM    Bilirubin, total 0.2 05/09/2017 04:58 AM    AST (SGOT) 15 05/09/2017 04:58 AM    ALT (SGPT) 10 05/09/2017 04:58 AM    Alk. phosphatase 78 05/09/2017 04:58 AM       IMPRESSION/PLAN:    Oncologic:  Progressive, recurrent ovarian cancer. Currently on Doxil/Avastin, s/p 3 cycles. Heme/CV:  Hemodynamically stable. Renal:  Good UOP. Creatinine back to baseline. FEN/GI:  Partial small bowel obstruction. She has been having intermittent symptoms for a couple months now. Feels better. Overall improving. EGD revealed some ulceration and narrowing of the esophagus. They recommended PPI and carafate. Biopsies pending. ID/Wound:  Afebrile. No evidence of infection. PPX:  Holding Eliquis for procedures. Dispostion:  Looking better.     Will consider giving pt Rx for reglan to be taken beginin day 3 for 3 days after chemotherapy as this seems to be the time when she begins with her obstructive symptoms and nausea. She will also begin Miralax the evening after chemotherapy as another preventive measure. Will consider scheduling IV hydration after chemotherapy as this is when she begin her dehydration. Appreciate GI input    Plan d/c home today.         Ellard Cheadle., MD

## 2017-05-11 NOTE — PROGRESS NOTES
Bedside and Verbal shift change report given to Leah Nunes (oncoming nurse) by Angel Doe (offgoing nurse). Report included the following information SBAR, Kardex, Intake/Output and MAR.

## 2017-05-16 ENCOUNTER — DOCUMENTATION ONLY (OUTPATIENT)
Dept: GYNECOLOGY | Age: 57
End: 2017-05-16

## 2017-05-16 NOTE — PROGRESS NOTES
Pt called saying she felt like she was dehydrated \"again\" and was requesting hydration on the day that she has to be her for her office visit with Dr. Roula Armstrong. I have arranged for her to get 1-2 liters of NaCl hydration at 1100 on Thursday 5/18 and she will proceed to her apporintment with Dr. Roula Armstrong later that day at 1400.   She will call if she needs anything before then,

## 2017-05-17 LAB
ALBUMIN SERPL-MCNC: 4 G/DL (ref 3.5–5.5)
ALBUMIN/GLOB SERPL: 1.6 {RATIO} (ref 1.2–2.2)
ALP SERPL-CCNC: 81 IU/L (ref 39–117)
ALT SERPL-CCNC: 11 IU/L (ref 0–32)
AST SERPL-CCNC: 16 IU/L (ref 0–40)
BASOPHILS # BLD AUTO: 0 X10E3/UL (ref 0–0.2)
BASOPHILS NFR BLD AUTO: 0 %
BILIRUB SERPL-MCNC: 0.2 MG/DL (ref 0–1.2)
BUN SERPL-MCNC: 12 MG/DL (ref 6–24)
BUN/CREAT SERPL: 12 (ref 9–23)
CALCIUM SERPL-MCNC: 9.1 MG/DL (ref 8.7–10.2)
CANCER AG125 SERPL-ACNC: 145.6 U/ML (ref 0–38.1)
CHLORIDE SERPL-SCNC: 94 MMOL/L (ref 96–106)
CO2 SERPL-SCNC: 27 MMOL/L (ref 18–29)
CREAT SERPL-MCNC: 1.01 MG/DL (ref 0.57–1)
EOSINOPHIL # BLD AUTO: 0.1 X10E3/UL (ref 0–0.4)
EOSINOPHIL NFR BLD AUTO: 1 %
ERYTHROCYTE [DISTWIDTH] IN BLOOD BY AUTOMATED COUNT: 14.4 % (ref 12.3–15.4)
GLOBULIN SER CALC-MCNC: 2.5 G/DL (ref 1.5–4.5)
GLUCOSE SERPL-MCNC: 98 MG/DL (ref 65–99)
HCT VFR BLD AUTO: 30.7 % (ref 34–46.6)
HGB BLD-MCNC: 9.8 G/DL (ref 11.1–15.9)
IMM GRANULOCYTES # BLD: 0 X10E3/UL (ref 0–0.1)
IMM GRANULOCYTES NFR BLD: 0 %
LYMPHOCYTES # BLD AUTO: 2 X10E3/UL (ref 0.7–3.1)
LYMPHOCYTES NFR BLD AUTO: 48 %
MAGNESIUM SERPL-MCNC: 1.3 MG/DL (ref 1.6–2.3)
MCH RBC QN AUTO: 31 PG (ref 26.6–33)
MCHC RBC AUTO-ENTMCNC: 31.9 G/DL (ref 31.5–35.7)
MCV RBC AUTO: 97 FL (ref 79–97)
MONOCYTES # BLD AUTO: 0.3 X10E3/UL (ref 0.1–0.9)
MONOCYTES NFR BLD AUTO: 8 %
NEUTROPHILS # BLD AUTO: 1.8 X10E3/UL (ref 1.4–7)
NEUTROPHILS NFR BLD AUTO: 43 %
PLATELET # BLD AUTO: 334 X10E3/UL (ref 150–379)
POTASSIUM SERPL-SCNC: 3.4 MMOL/L (ref 3.5–5.2)
PROT SERPL-MCNC: 6.5 G/DL (ref 6–8.5)
RBC # BLD AUTO: 3.16 X10E6/UL (ref 3.77–5.28)
SODIUM SERPL-SCNC: 139 MMOL/L (ref 134–144)
WBC # BLD AUTO: 4.2 X10E3/UL (ref 3.4–10.8)

## 2017-05-18 ENCOUNTER — HOSPITAL ENCOUNTER (OUTPATIENT)
Dept: INFUSION THERAPY | Age: 57
Discharge: HOME | End: 2017-05-18
Payer: OTHER GOVERNMENT

## 2017-05-18 ENCOUNTER — OFFICE VISIT (OUTPATIENT)
Dept: GYNECOLOGY | Age: 57
End: 2017-05-18

## 2017-05-18 VITALS
OXYGEN SATURATION: 100 % | TEMPERATURE: 97.5 F | SYSTOLIC BLOOD PRESSURE: 98 MMHG | DIASTOLIC BLOOD PRESSURE: 62 MMHG | HEART RATE: 73 BPM | RESPIRATION RATE: 16 BRPM

## 2017-05-18 VITALS — WEIGHT: 124.2 LBS | HEIGHT: 63 IN | BODY MASS INDEX: 22.01 KG/M2

## 2017-05-18 DIAGNOSIS — E87.1 HYPONATREMIA: ICD-10-CM

## 2017-05-18 DIAGNOSIS — C78.6 CARCINOMATOSIS PERITONEI (HCC): ICD-10-CM

## 2017-05-18 DIAGNOSIS — T45.1X5A CINV (CHEMOTHERAPY-INDUCED NAUSEA AND VOMITING): ICD-10-CM

## 2017-05-18 DIAGNOSIS — G89.3 CANCER ASSOCIATED PAIN: ICD-10-CM

## 2017-05-18 DIAGNOSIS — R11.2 CINV (CHEMOTHERAPY-INDUCED NAUSEA AND VOMITING): ICD-10-CM

## 2017-05-18 DIAGNOSIS — C56.9 OVARIAN CANCER, UNSPECIFIED LATERALITY (HCC): ICD-10-CM

## 2017-05-18 DIAGNOSIS — T45.1X5A CHEMOTHERAPY-INDUCED NEUTROPENIA (HCC): ICD-10-CM

## 2017-05-18 DIAGNOSIS — T45.1X5A ANEMIA DUE TO CHEMOTHERAPY: ICD-10-CM

## 2017-05-18 DIAGNOSIS — Z93.2 ILEOSTOMY IN PLACE (HCC): ICD-10-CM

## 2017-05-18 DIAGNOSIS — D70.1 CHEMOTHERAPY-INDUCED NEUTROPENIA (HCC): ICD-10-CM

## 2017-05-18 DIAGNOSIS — F41.8 ANXIETY ABOUT HEALTH: ICD-10-CM

## 2017-05-18 DIAGNOSIS — R10.84 GENERALIZED ABDOMINAL PAIN: ICD-10-CM

## 2017-05-18 DIAGNOSIS — D64.81 ANEMIA DUE TO CHEMOTHERAPY: ICD-10-CM

## 2017-05-18 DIAGNOSIS — C56.9 OVARIAN CANCER, UNSPECIFIED LATERALITY (HCC): Primary | ICD-10-CM

## 2017-05-18 DIAGNOSIS — E83.42 HYPOMAGNESEMIA: ICD-10-CM

## 2017-05-18 PROCEDURE — 74011250636 HC RX REV CODE- 250/636: Performed by: NURSE PRACTITIONER

## 2017-05-18 PROCEDURE — 96361 HYDRATE IV INFUSION ADD-ON: CPT

## 2017-05-18 PROCEDURE — 74011000250 HC RX REV CODE- 250

## 2017-05-18 PROCEDURE — 77030012965 HC NDL HUBR BBMI -A

## 2017-05-18 PROCEDURE — 96360 HYDRATION IV INFUSION INIT: CPT

## 2017-05-18 PROCEDURE — 74011250636 HC RX REV CODE- 250/636

## 2017-05-18 RX ORDER — FENTANYL 100 UG/H
1 PATCH TRANSDERMAL
Qty: 10 PATCH | Refills: 0 | Status: SHIPPED | OUTPATIENT
Start: 2017-05-18 | End: 2017-06-13 | Stop reason: SDUPTHER

## 2017-05-18 RX ORDER — SODIUM CHLORIDE 9 MG/ML
10 INJECTION INTRAMUSCULAR; INTRAVENOUS; SUBCUTANEOUS AS NEEDED
Status: ACTIVE | OUTPATIENT
Start: 2017-05-18 | End: 2017-05-19

## 2017-05-18 RX ORDER — FENTANYL 12.5 UG/1
PATCH TRANSDERMAL
COMMUNITY
Start: 2017-04-04 | End: 2017-05-18 | Stop reason: DRUGHIGH

## 2017-05-18 RX ORDER — SODIUM CHLORIDE 0.9 % (FLUSH) 0.9 %
10-40 SYRINGE (ML) INJECTION AS NEEDED
Status: ACTIVE | OUTPATIENT
Start: 2017-05-18 | End: 2017-05-19

## 2017-05-18 RX ORDER — HEPARIN 100 UNIT/ML
500 SYRINGE INTRAVENOUS AS NEEDED
Status: ACTIVE | OUTPATIENT
Start: 2017-05-18 | End: 2017-05-19

## 2017-05-18 RX ORDER — HYDROCODONE BITARTRATE AND ACETAMINOPHEN 7.5; 325 MG/15ML; MG/15ML
15-30 SOLUTION ORAL
Qty: 950 ML | Refills: 0 | Status: SHIPPED | OUTPATIENT
Start: 2017-05-18 | End: 2017-05-23 | Stop reason: SDUPTHER

## 2017-05-18 RX ORDER — LANOLIN ALCOHOL/MO/W.PET/CERES
400 CREAM (GRAM) TOPICAL 2 TIMES DAILY
Qty: 60 TAB | Refills: 2 | Status: SHIPPED | OUTPATIENT
Start: 2017-05-18

## 2017-05-18 RX ADMIN — SODIUM CHLORIDE 10 ML: 9 INJECTION, SOLUTION INTRAMUSCULAR; INTRAVENOUS; SUBCUTANEOUS at 11:19

## 2017-05-18 RX ADMIN — SODIUM CHLORIDE 2000 ML: 900 INJECTION, SOLUTION INTRAVENOUS at 11:20

## 2017-05-18 RX ADMIN — Medication 20 ML: at 13:25

## 2017-05-18 RX ADMIN — HEPARIN 500 UNITS: 100 SYRINGE at 13:25

## 2017-05-18 NOTE — PROGRESS NOTES
[address] [place] [address] [phone]  [phone]  MD Quinn Aguilar MD  Office Note  Patient ID:  Name:  Alejandro Menard  MRN:  [id number]  :  1960/56 y.o. Date:  2017      HISTORY OF PRESENT ILLNESS:  Alejandro Menard is a 61 y.o.  postmenopausal female with a diagnosis of stage IV ovarian cancer. She underwent exploratory laparotomy with suboptimal debulking. She had extensive disease. She was readmitted about a week later with obstruction and subsequently had a cecal perforation, requiring resection, end ileostomy, and mucous fistula. During the hospitalization she was also diagnosed with pulmonary embolus and had chest tubes placed for bilateral pleural effusions. She had a prolonged hospital course and actually received a dose of cisplatin chemotherapy while in the hospital to help dry up the effusions. She completed 6 cycles of Taxol/Carboplatin chemotherapy following her initial debulking. She  did relatively well, considering she had an ileostomy to deal with during chemotherapy. I took her to the OR on 16 for interval debulking and reversal of her ileostomy. She had extensive disease, but we were able to resect the majority of her disease. One week later she developed an anastomotic leak requiring reexploration and recreation of an ileostomy. She had another prolonged hospitalization, but recovered well since surgery. We then reinitiated Taxol/Carboplatin chemotherapy, but did reduce the dose of Taxol to 135 mg/m2. She completed 4 mores cycles. Her CA-125 normalized and we stopped treatment. She now has evidence of recurrent disease and she is receiving Doxil/Avastin. She has completed 3 cycles so far. She has been admitted to [facility] several time since starting this regimen with evidence of partial small bowel obstruction.   The symptoms have resolved each time with conservative management. She had increased ascites on her last scan, but no significant change in her disease otherwise. Her CA-125 done earlier this week went up. All of this suggests progression of disease. She presents today for her pre-chemo visit.       Problem List:  Patient Active Problem List    Diagnosis Date Noted    Malignant ascites 05/08/2017    Hydronephrosis of right kidney 05/08/2017    Protein calorie malnutrition ([address] 04/28/2017    Bowel obstruction ([address] 04/27/2017    Counseling regarding end of life decision making 04/25/2017    Anticoagulation adequate with anticoagulant therapy 03/28/2017    Chemotherapy-induced neutropenia ([address] 03/22/2017    Ileus ([address] 03/21/2017    Thrombosis of pelvic vein 03/21/2017    Hx of pulmonary embolus 03/21/2017    Dehydration 03/21/2017    Cancer associated pain 05/13/2016    Generalized abdominal pain 05/13/2016    Anxiety about health 05/13/2016    CINV (chemotherapy-induced nausea and vomiting) 03/22/2016    Anemia due to chemotherapy 02/22/2016    Ileostomy in place [facility]) 02/15/2016    Carcinomatosis peritonei ([address] 10/29/2015    Small bowel obstruction ([address] 10/29/2015    Ovarian cancer ([address] 10/12/2015     PMH:  Past Medical History:   Diagnosis Date    Abdominal carcinomatosis ([address] 10/2015    Arthritis     left shoulder    BRCA negative 12/2015    Chronic pain     Depression     HX    Environmental allergies     GERD (gastroesophageal reflux disease)     Hypertension     NEW OVER PAST 5-6 MONTHS    Ovarian cancer ([address] 10/2015    serous adenocarcinoma, high grade, stage IIIC    Pulmonary embolism ([address] 10/2015      PSH:  Past Surgical History:   Procedure Laterality Date    CARDIAC SURG PROCEDURE UNLIST  12/11/15    cardiac cath/normal     HX BREAST BIOPSY  1995    benign right breast bx    HX DILATION AND CURETTAGE  2/2011    POLYPECTOMY    HX GI  2015    PERFORATED BOWEL; ILEOSTOMY    HX GYN  10/2015    EXP LAPAROTOMY    HX HEENT  LAST 2005    oral tissue graft X3    HX HEENT  1970    tonsillectomy    HX LAPAROTOMY  10/2015    tumor sampling    HX LAPAROTOMY  4/2016    hysterectomy, BSO, radical debulking    HX LAPAROTOMY  5/2016    resection ileocolic anastomosis, leak, ileostomy    HX OTHER SURGICAL  11/2015    tunneled portacath      Social History:  Social History   Substance Use Topics    Smoking status: Never Smoker    Smokeless tobacco: Never Used    Alcohol use No      Family History:  Family History   Problem Relation Age of Onset    Cancer Maternal Uncle      skin    Hypertension Mother     High Cholesterol Mother     Anxiety Mother     Heart Disease Mother     High Cholesterol Father     Hypertension Father     Stroke Father     Arthritis-osteo Sister     Migraines Sister     Other Sister      FIBROMYALGIA    Depression Brother     Other Brother      DRUG ABUSE HEPATITIS C    Migraines Sister     Arrhythmia Sister     Depression Sister     Lung Disease Paternal Aunt      COPD    Cancer Paternal Uncle      LUNG    Lung Disease Paternal Uncle      COPD    Lung Disease Maternal Grandmother      COPD    Anesth Problems Neg Hx       Medications: (reviewed)  Current Outpatient Prescriptions   Medication Sig    HYDROcodone-acetaminophen (HYCET) 0.5-21.7 mg/mL oral solution Take 15-30 mL by mouth every four (4) hours as needed. Max Daily Amount: 90 mg. Disp: two(2) 473 ml bottles  Indications: Pain    fentaNYL (DURAGESIC) 100 mcg/hr PATCH 1 Patch by TransDERmal route every seventy-two (72) hours. Max Daily Amount: 1 Patch.  metoclopramide HCl (REGLAN) 10 mg tablet Take with meals and at bedtime.  sucralfate (CARAFATE) 100 mg/mL suspension Take 5 mL by mouth four (4) times daily.  polyethylene glycol (MIRALAX) 17 gram/dose powder Take 17 g by mouth daily as needed.  Cetirizine (ZYRTEC) 10 mg cap Take  by mouth.  apixaban (ELIQUIS) 2.5 mg tablet Take 1 Tab by mouth two (2) times a day.     promethazine (PHENERGAN) 25 mg tablet Take 1 Tab by mouth every six (6) hours as needed for Nausea.  zolpidem (AMBIEN) 10 mg tablet Take 1 Tab by mouth nightly as needed for Sleep. Max Daily Amount: 10 mg.    ondansetron (ZOFRAN ODT) 4 mg disintegrating tablet Take 1 Tab by mouth every eight (8) hours as needed for Nausea.  esomeprazole (NEXIUM) 40 mg capsule Take 1 Cap by mouth daily.  multivit-min-FA-coenzyme Q10 (AQUADEKS) 100-350-5 mcg-mcg-mg chew Take 1 Tab by mouth daily. Indications: VITAMIN DEFICIENCY PREVENTION    Calcium-Vitamin D3-Vitamin K 903-224-84 mg-unit-mcg chew Take 1 Tab by mouth two (2) times a day.  metoprolol (LOPRESSOR) 100 mg tablet Take 50 mg by mouth two (2) times a day.  lidocaine-prilocaine (EMLA) topical cream Apply small amount over port area and cover with a band aid 1 hour before chemo treatment    multivitamin (ONE A DAY) tablet Take 1 Tab by mouth daily.  magnesium oxide (MAG-OX) 400 mg tablet Take 1 Tab by mouth two (2) times a day. Indications: HYPOMAGNESEMIA     No current facility-administered medications for this visit. Facility-Administered Medications Ordered in Other Visits   Medication Dose Route Frequency    sodium chloride 0.9 % injection 10 mL  10 mL IntraVENous PRN    heparin (porcine) pf 500 Units  500 Units IntraVENous PRN    sodium chloride (NS) flush 10-40 mL  10-40 mL IntraVENous PRN     Allergies: (reviewed)  Allergies   Allergen Reactions    Ativan [Lorazepam] Other (comments)     SEVERE CONFUSION    Morphine Rash and Itching          OBJECTIVE:    Physical Exam:  VITAL SIGNS: Vitals:    05/18/17 1401   Weight: 124 lb 3.2 oz (56.3 kg)   Height: 5' 2.99\" (1.6 m)     Body mass index is 22.01 kg/(m^2). GENERAL AVA: Conversant, alert, oriented. No acute distress. HEENT: HEENT. No thyroid enlargement. No JVD. Neck: Supple without restrictions. RESPIRATORY: Clear to auscultation and percussion to the bases. No CVAT.    CARDIOVASC: RRR without murmur/rub. GASTROINT: soft, non-tender, without masses or organomegaly; ileostomy in RLQ   MUSCULOSKEL: no joint tenderness, deformity or swelling   EXTREMITIES: extremities normal, atraumatic, no cyanosis or edema   PELVIC: Deferred   RECTAL: Deferred   JOSE SURVEY: No suspicious lymphadenopathy or edema noted. NEURO: Grossly intact. No acute deficit. Lab Results   Component Value Date/Time    WBC 4.2 05/16/2017 10:12 AM    HGB 9.8 05/16/2017 10:12 AM    HCT 30.7 05/16/2017 10:12 AM    PLATELET 863 48/24/0649 10:12 AM    MCV 97 05/16/2017 10:12 AM     Lab Results   Component Value Date/Time    Sodium 139 05/16/2017 10:12 AM    Potassium 3.4 05/16/2017 10:12 AM    Chloride 94 05/16/2017 10:12 AM    CO2 27 05/16/2017 10:12 AM    Anion gap 6 05/09/2017 04:58 AM    Glucose 98 05/16/2017 10:12 AM    BUN 12 05/16/2017 10:12 AM    Creatinine 1.01 05/16/2017 10:12 AM    BUN/Creatinine ratio 12 05/16/2017 10:12 AM    GFR est AA 71 05/16/2017 10:12 AM    GFR est non-AA 62 05/16/2017 10:12 AM    Calcium 9.1 05/16/2017 10:12 AM       CT of chest/abdomen/pelvis (5/7/17)  LUNG BASES: Left lung base subpleural scarring is unchanged. No pneumonia. INCIDENTALLY IMAGED HEART AND MEDIASTINUM: Cardiac size is within normal limits. No pericardial effusion. Small hiatal hernia is unchanged. LIVER: Small segment 8 and segment 3 hypoenhancing liver lesions are unchanged. GALLBLADDER: Unremarkable. SPLEEN: No mass. PANCREAS: No mass or ductal dilatation. ADRENALS: Unremarkable. KIDNEYS: Right hydronephrosis is mild and new. Proximal right ureter is mildly  dilated. There is transition to nondilated ureter in the mid ureter. No renal  mass or left hydronephrosis. STOMACH: Unremarkable. SMALL BOWEL: Small bowel loops in the lower pelvis measuring up to 2.7 cm in  diameter. Contrast extends through the small bowel into the right lower quadrant  ileostomy. Small bowel centralization is unchanged.   COLON: Majority of the colon has been resected. Distal sigmoid colon and rectum  are nondistended. APPENDIX: Resected. PERITONEUM: Small volume of ascites is slightly increased. Subtle non masslike  enhancement of the peritoneum is unchanged. No pneumoperitoneum. RETROPERITONEUM: No lymphadenopathy. IVC filter is unchanged. REPRODUCTIVE ORGANS: Uterus is been resected. Ovaries have been resected. No  pelvic mass. URINARY BLADDER: No mass or calculus. BONES: No destructive bone lesion. ADDITIONAL COMMENTS: N/A     IMPRESSION:  1. New mild right hydronephrosis is likely secondary to ureteral obstruction in  the mid ureter. No ureteral calculus. 2. Mild small bowel distention may represent early partial small bowel  obstruction. No complete bowel obstruction. 3. Increased small volume of ascites. Unchanged non masslike peritoneal  enhancement. 4. Unchanged small liver lesions. IMPRESSION/PLAN:  Andrae Smiley is a 61 y.o. female with a diagnosis of stage IV ovarian cancer. She is s/p suboptimal debulking, followed by a second laparotomy for cecal perforation. She completed 6 cycles of Taxol/Carboplatin chemotherapy, in addition to the one dose of cisplatin given during her initial hospitalization. Based upon her CT and her CA-125, she responded well, though there was still disease remaining. She underwent interval debulking with resection of the majority of her disease, although there was still small volume carcinomatosis remaining. I recommended continuing with Taxol/Carboplatin, as she did respond well, despite still having residual disease at her interval debulking. I dropped the dose of Taxol to 135 mg/m2 to reduce the chance of neuropathy, but kept the carboplatin dose at an AUC of 6. She completed 4 more cycles and her CA-125 normalized. We then stopped therapy.  She subsequently developed recurrent disease and I recommended Doxil/Avastin, considering it had been less than 6 months since we stopped Taxol/Carboplatin. She has completed 3 cycles of that regimen and appears to have progression of disease. I went over different options with her, specifically Gemzar and Topotecan. I recommended Gemzar, but I also suggested that we continue the Avastin, as this might help with the ascites. We will hopefully see a response soon.         Signed By: Gerrianne Libman., MD     5/18/2017/10:35 AM

## 2017-05-18 NOTE — PROGRESS NOTES
Pre chemo appointment, pt went for hydration in opic today. Pt complains of pain in her lower abdomen.

## 2017-05-18 NOTE — PROGRESS NOTES
Pt was noted with a Mag of 1.3. She still has \"a few of the old prescription form the Fall\" at home. She will take this until Tuesday when we will recheck her mag at her chemo apt. Also, she was noted with K+ of 3.4, with will take 3 days of 20 meq KCL and we will recheck he on Tuesday as well.

## 2017-05-18 NOTE — PROGRESS NOTES
[facility]fusion [facility] -  Progress Note    8541 Pt admit to [facility] for Hydration ambulatory in stable condition. Assessment completed. No new concerns voiced. Port accessed with positive blood return, line flushed and NS infusing per orders. Visit Vitals    BP 98/62    Pulse 73    Temp 97.5 °F (36.4 °C)    Resp 16    SpO2 100%    Breastfeeding No       Medications:  NS 2L bolus    1325 Pt tolerated treatment well. Port maintained positive blood return throughout treatment, flushed with positive blood return at conclusion, heparinized and de-accessed D/c home ambulatory in no distress. Pt aware of next [facility] appointment scheduled for 5/23/17 at 1100.

## 2017-05-19 RX ORDER — GRANISETRON HYDROCHLORIDE 1 MG/ML
1 INJECTION, SOLUTION INTRAVENOUS ONCE
Status: COMPLETED | OUTPATIENT
Start: 2017-05-23 | End: 2017-05-23

## 2017-05-19 RX ORDER — DEXAMETHASONE SODIUM PHOSPHATE 4 MG/ML
8 INJECTION, SOLUTION INTRA-ARTICULAR; INTRALESIONAL; INTRAMUSCULAR; INTRAVENOUS; SOFT TISSUE ONCE
Status: COMPLETED | OUTPATIENT
Start: 2017-05-23 | End: 2017-05-23

## 2017-05-23 ENCOUNTER — HOSPITAL ENCOUNTER (OUTPATIENT)
Dept: INFUSION THERAPY | Age: 57
Discharge: HOME | End: 2017-05-23
Payer: OTHER GOVERNMENT

## 2017-05-23 VITALS
HEIGHT: 63 IN | BODY MASS INDEX: 21.26 KG/M2 | OXYGEN SATURATION: 98 % | TEMPERATURE: 97.1 F | RESPIRATION RATE: 16 BRPM | HEART RATE: 80 BPM | WEIGHT: 120 LBS | SYSTOLIC BLOOD PRESSURE: 111 MMHG | DIASTOLIC BLOOD PRESSURE: 68 MMHG

## 2017-05-23 DIAGNOSIS — G89.3 CANCER ASSOCIATED PAIN: ICD-10-CM

## 2017-05-23 DIAGNOSIS — T45.1X5A CINV (CHEMOTHERAPY-INDUCED NAUSEA AND VOMITING): ICD-10-CM

## 2017-05-23 DIAGNOSIS — E87.1 HYPONATREMIA: ICD-10-CM

## 2017-05-23 DIAGNOSIS — D70.1 CHEMOTHERAPY-INDUCED NEUTROPENIA (HCC): ICD-10-CM

## 2017-05-23 DIAGNOSIS — T45.1X5A CHEMOTHERAPY-INDUCED NEUTROPENIA (HCC): ICD-10-CM

## 2017-05-23 DIAGNOSIS — R10.84 GENERALIZED ABDOMINAL PAIN: ICD-10-CM

## 2017-05-23 DIAGNOSIS — C78.6 CARCINOMATOSIS PERITONEI (HCC): ICD-10-CM

## 2017-05-23 DIAGNOSIS — C56.9 OVARIAN CANCER, UNSPECIFIED LATERALITY (HCC): ICD-10-CM

## 2017-05-23 DIAGNOSIS — D64.81 ANEMIA DUE TO CHEMOTHERAPY: ICD-10-CM

## 2017-05-23 DIAGNOSIS — R11.2 CINV (CHEMOTHERAPY-INDUCED NAUSEA AND VOMITING): ICD-10-CM

## 2017-05-23 DIAGNOSIS — F41.8 ANXIETY ABOUT HEALTH: ICD-10-CM

## 2017-05-23 DIAGNOSIS — Z93.2 ILEOSTOMY IN PLACE (HCC): ICD-10-CM

## 2017-05-23 DIAGNOSIS — T45.1X5A ANEMIA DUE TO CHEMOTHERAPY: ICD-10-CM

## 2017-05-23 LAB
ANION GAP BLD CALC-SCNC: 9 MMOL/L (ref 5–15)
APPEARANCE UR: CLEAR
BACTERIA URNS QL MICRO: NEGATIVE /HPF
BILIRUB UR QL CFM: NEGATIVE
BUN SERPL-MCNC: 19 MG/DL (ref 6–20)
BUN/CREAT SERPL: 22 (ref 12–20)
CALCIUM SERPL-MCNC: 8.4 MG/DL (ref 8.5–10.1)
CHLORIDE SERPL-SCNC: 103 MMOL/L (ref 97–108)
CO2 SERPL-SCNC: 27 MMOL/L (ref 21–32)
COLOR UR: ABNORMAL
CREAT SERPL-MCNC: 0.86 MG/DL (ref 0.55–1.02)
EPITH CASTS URNS QL MICRO: ABNORMAL /LPF
GLUCOSE SERPL-MCNC: 122 MG/DL (ref 65–100)
GLUCOSE UR STRIP.AUTO-MCNC: NEGATIVE MG/DL
HGB UR QL STRIP: NEGATIVE
HYALINE CASTS URNS QL MICRO: ABNORMAL /LPF (ref 0–5)
KETONES UR QL STRIP.AUTO: ABNORMAL MG/DL
LEUKOCYTE ESTERASE UR QL STRIP.AUTO: NEGATIVE
NITRITE UR QL STRIP.AUTO: NEGATIVE
PH UR STRIP: 5.5 [PH] (ref 5–8)
POTASSIUM SERPL-SCNC: 4.4 MMOL/L (ref 3.5–5.1)
PROT UR STRIP-MCNC: 30 MG/DL
RBC #/AREA URNS HPF: ABNORMAL /HPF (ref 0–5)
SODIUM SERPL-SCNC: 139 MMOL/L (ref 136–145)
SP GR UR REFRACTOMETRY: 1.02 (ref 1–1.03)
UA: UC IF INDICATED,UAUC: ABNORMAL
UROBILINOGEN UR QL STRIP.AUTO: 0.2 EU/DL (ref 0.2–1)
WBC URNS QL MICRO: ABNORMAL /HPF (ref 0–4)

## 2017-05-23 PROCEDURE — 74011250636 HC RX REV CODE- 250/636

## 2017-05-23 PROCEDURE — 74011250636 HC RX REV CODE- 250/636: Performed by: NURSE PRACTITIONER

## 2017-05-23 PROCEDURE — 77030012965 HC NDL HUBR BBMI -A

## 2017-05-23 PROCEDURE — 96367 TX/PROPH/DG ADDL SEQ IV INF: CPT

## 2017-05-23 PROCEDURE — 96375 TX/PRO/DX INJ NEW DRUG ADDON: CPT

## 2017-05-23 PROCEDURE — 36415 COLL VENOUS BLD VENIPUNCTURE: CPT | Performed by: NURSE PRACTITIONER

## 2017-05-23 PROCEDURE — 74011000258 HC RX REV CODE- 258: Performed by: OBSTETRICS & GYNECOLOGY

## 2017-05-23 PROCEDURE — 96417 CHEMO IV INFUS EACH ADDL SEQ: CPT

## 2017-05-23 PROCEDURE — 74011250636 HC RX REV CODE- 250/636: Performed by: OBSTETRICS & GYNECOLOGY

## 2017-05-23 PROCEDURE — 74011000250 HC RX REV CODE- 250: Performed by: OBSTETRICS & GYNECOLOGY

## 2017-05-23 PROCEDURE — 80048 BASIC METABOLIC PNL TOTAL CA: CPT | Performed by: NURSE PRACTITIONER

## 2017-05-23 PROCEDURE — 81001 URINALYSIS AUTO W/SCOPE: CPT | Performed by: OBSTETRICS & GYNECOLOGY

## 2017-05-23 PROCEDURE — 96413 CHEMO IV INFUSION 1 HR: CPT

## 2017-05-23 RX ORDER — HYDROCODONE BITARTRATE AND ACETAMINOPHEN 7.5; 325 MG/15ML; MG/15ML
15-30 SOLUTION ORAL
Qty: 950 ML | Refills: 0 | Status: SHIPPED | OUTPATIENT
Start: 2017-05-23 | End: 2017-05-30 | Stop reason: ALTCHOICE

## 2017-05-23 RX ORDER — POTASSIUM CHLORIDE 7.45 MG/ML
10 INJECTION INTRAVENOUS
Status: COMPLETED | OUTPATIENT
Start: 2017-05-23 | End: 2017-05-23

## 2017-05-23 RX ORDER — SODIUM CHLORIDE 0.9 % (FLUSH) 0.9 %
5-10 SYRINGE (ML) INJECTION AS NEEDED
Status: ACTIVE | OUTPATIENT
Start: 2017-05-23 | End: 2017-05-24

## 2017-05-23 RX ORDER — HEPARIN 100 UNIT/ML
500 SYRINGE INTRAVENOUS AS NEEDED
Status: ACTIVE | OUTPATIENT
Start: 2017-05-23 | End: 2017-05-24

## 2017-05-23 RX ORDER — POTASSIUM CHLORIDE 7.45 MG/ML
10 INJECTION INTRAVENOUS
Status: DISCONTINUED | OUTPATIENT
Start: 2017-05-23 | End: 2017-05-23

## 2017-05-23 RX ORDER — SODIUM CHLORIDE 9 MG/ML
10 INJECTION INTRAMUSCULAR; INTRAVENOUS; SUBCUTANEOUS AS NEEDED
Status: ACTIVE | OUTPATIENT
Start: 2017-05-23 | End: 2017-05-24

## 2017-05-23 RX ADMIN — SODIUM CHLORIDE 1264 MG: 900 INJECTION, SOLUTION INTRAVENOUS at 12:58

## 2017-05-23 RX ADMIN — SODIUM CHLORIDE 10 ML: 9 INJECTION INTRAMUSCULAR; INTRAVENOUS; SUBCUTANEOUS at 11:19

## 2017-05-23 RX ADMIN — SODIUM CHLORIDE 1000 ML: 900 INJECTION, SOLUTION INTRAVENOUS at 14:15

## 2017-05-23 RX ADMIN — FAMOTIDINE 20 MG: 10 INJECTION, SOLUTION INTRAVENOUS at 11:46

## 2017-05-23 RX ADMIN — GRANISETRON HYDROCHLORIDE 1 MG: 1 INJECTION, SOLUTION INTRAVENOUS at 11:49

## 2017-05-23 RX ADMIN — POTASSIUM CHLORIDE 10 MEQ: 10 INJECTION, SOLUTION INTRAVENOUS at 14:31

## 2017-05-23 RX ADMIN — BEVACIZUMAB 840 MG: 100 INJECTION, SOLUTION INTRAVENOUS at 13:33

## 2017-05-23 RX ADMIN — DEXAMETHASONE SODIUM PHOSPHATE 8 MG: 4 INJECTION, SOLUTION INTRA-ARTICULAR; INTRALESIONAL; INTRAMUSCULAR; INTRAVENOUS; SOFT TISSUE at 11:51

## 2017-05-23 RX ADMIN — SODIUM CHLORIDE 150 MG: 900 INJECTION, SOLUTION INTRAVENOUS at 12:23

## 2017-05-23 RX ADMIN — Medication 10 ML: at 11:18

## 2017-05-23 NOTE — PROGRESS NOTES
Trav Jimenez. Wall, NP    Infusion Date: 5/23/2017     HPI  Chinmay Jacobson is a 61 y.o.  postmenopausal female with a diagnosis of stage IV ovarian cancer. She underwent exploratory laparotomy with suboptimal debulking. She had extensive disease. She was readmitted about a week later with obstruction and subsequently had a cecal perforation, requiring resection, end ileostomy, and mucous fistula. During the hospitalization she was also diagnosed with pulmonary embolus and had chest tubes placed for bilateral pleural effusions. She had a prolonged hospital course and actually received a dose of cisplatin chemotherapy while in the hospital to help dry up the effusions. She completed 6 cycles of Taxol/Carboplatin chemotherapy following her initial debulking. She did relatively well, considering she had an ileostomy to deal with during chemotherapy. I took her to the OR on 4/29/16 for interval debulking and reversal of her ileostomy. She had extensive disease, but we were able to resect the majority of her disease. One week later she developed an anastomotic leak requiring reexploration and recreation of an ileostomy. She had another prolonged hospitalization, but recovered well since surgery. We then reinitiated Taxol/Carboplatin chemotherapy, but did reduce the dose of Taxol to 135 mg/m2. She completed 4 mores cycles. Her CA-125 normalized and we stopped treatment.      On 2/7, she presented to Dr. Maria Del Carmen Allison office with her  to discuss her follow up/surveillance CT scan. Unfortunately, it demonstrated progression of disease, although minimally worse than her prior scan 2 months previously. She was actually feeling better than she did at that time. After review of scans and disease, the decision was made to begin Doxil/Avastin Q28 days for 6 cycles.  She began this on 2/27/2017    She now has evidence of recurrent disease and she is receiving Doxil/Avastin. She has completed 3 cycles so far. She has been admitted to [address] [place] [address] several time since starting this regimen with evidence of partial small bowel obstruction. The symptoms have resolved each time with conservative management. She had increased ascites on her last scan, but no significant change in her disease otherwise. Her CA-125 done earlier this week went up. All of this suggests progression of disease. Subjective:   Pt presents today for her first cycle of Gemzar/Avastin. She has recently completed 3 cycle os Doxil/Avastin (2/27-4/25) and progressed on this regimen. Dr. Parviz Armas and pt discussed options and agreed to switch to [facility]. She was recently in the hospital for abd pain and r/o bowel obstruction. It resolved with medical management alone. While there, she wa sable to obtain an upper endoscopy which showed some narrowing and ulcerations. (see below). She was started on Carafate and will follow up with her GI doctor in [place]. She says her abd pain is increasing and she has increased her fentanyl patches now to 100 mcg Q 72 hours. She says she is still needing the Hycet almost every 5-6 hours     Her  is now up to 145 (up from 100 on 4/27: 126 on 4/21 and 80 on 3/22)      Of note, while she was in the hospital on 5/10, she had been complaining of difficulty swallowing and a GI consult was obtained. Dr. Jaye Shone saw the pt and an upper endoscopy was performed. It showed  Findings:   Esophagus: in the mid esophagus was circumferential ulceration with mild narrowing which sloughed away with passage of the scope s/p biopsies, mild distal linear ulcerations s/p biopsies  Stomach: moderate hiatal hernia, diminutive polyp in the body s/p biopsies, rest of the stomach was normal  Duodenum: normal   (Bx results below)      Review of Systems:  General: abdominal pain as above.  Continued fatigue with some wt loss  HEENT: Denies visual changes, dysphagia or headache  Resp: Denies SOB, DUDLEY, wheezing or cough  CV: Denies CP, palpitations  GI/: Acknowledges continued abd pain, but says it is manageable with meds. Denies N/V. Says appetite is only fair, but she eats because she knows she must and continues with protein shakes. She says she has  Been having more gas in her bag this week. Says constipation has been better this week and stool soft. [place]el: Denies muscle ache or joint pain  Neuro: Denies dizzyness or syncope    Objective:     Blood pressure 111/68, pulse 80, temperature 97.1 °F (36.2 °C), resp. rate 16, height 5' 3\" (1.6 m), weight 120 lb (54.4 kg), SpO2 98 %. Temp (24hrs), Av.1 °F (36.2 °C), Min:97.1 °F (36.2 °C), Max:97.1 °F (36.2 °C)      _____________________  Physical Exam:     General: A&O X 3. Remains tired appearing, but with continued pleasant affect. HEENT: Oral mucosa pink, slightly dry no sores or lesions noted. No cervical adenopathy appreciated  Port site without redness, tenderness or swelling  Cardiovascular: Regular without M/R/G  Lungs:CTA bilat without wheezing or rales  Abdomen: Soft with persistent tenderness to palpation to lower/mid abdomen and + bowel sounds throughout. Stool present in bag with some gas  Ext: + pedal pulses without edema bilat. bilat  Neuro: grossly intact    LABS: 17:  WBC 4.2; HGB 9.8 HCT 30.7; Plts 334; ANC 4.1; Na 132; K+ 3.5; Cl 96; CO2 27; BUN 10; Creat 0.87; Gluc 100; Urine protein: 30   17= 145.6 (on = 100; 14=221.2 3/22=80; = 63.9)        PATHOLOGY from 5/10/2017 Upper Endoscopy:  1. Gastric polyp, polypectomy:   Benign polypoid gastric oxyntic mucosa with some features of hyperplastic polyp   2. Lower esophagus, biopsy:   Acute erosive esophagitis of squamous mucosa with ulceration and severe squamous atypia (see comment)   3.  Proximal esophagus, biopsy:   Acute esophagitis of squamous mucosa with severe squamous atypia           Imaging:  CT Abd/Pelvis 5/7/2017:  FINDINGS:   LUNG BASES: Left lung base subpleural scarring is unchanged. No pneumonia. INCIDENTALLY IMAGED HEART AND MEDIASTINUM: Cardiac size is within normal limits. No pericardial effusion. Small hiatal hernia is unchanged. LIVER: Small segment 8 and segment 3 hypoenhancing liver lesions are unchanged. GALLBLADDER: Unremarkable. SPLEEN: No mass. PANCREAS: No mass or ductal dilatation. ADRENALS: Unremarkable. KIDNEYS: Right hydronephrosis is mild and new. Proximal right ureter is mildly  dilated. There is transition to nondilated ureter in the mid ureter. No renal  mass or left hydronephrosis. STOMACH: Unremarkable. SMALL BOWEL: Small bowel loops in the lower pelvis measuring up to 2.7 cm in  diameter. Contrast extends through the small bowel into the right lower quadrant  ileostomy. Small bowel centralization is unchanged. COLON: Majority of the colon has been resected. Distal sigmoid colon and rectum  are nondistended. APPENDIX: Resected. PERITONEUM: Small volume of ascites is slightly increased. Subtle non masslike  enhancement of the peritoneum is unchanged. No pneumoperitoneum. RETROPERITONEUM: No lymphadenopathy. IVC filter is unchanged. REPRODUCTIVE ORGANS: Uterus is been resected. Ovaries have been resected. No  pelvic mass. URINARY BLADDER: No mass or calculus. BONES: No destructive bone lesion. ADDITIONAL COMMENTS: N/A     IMPRESSION  IMPRESSION:     1. New mild right hydronephrosis is likely secondary to ureteral obstruction in  the mid ureter. No ureteral calculus. 2. Mild small bowel distention may represent early partial small bowel  obstruction. No complete bowel obstruction. 3. Increased small volume of ascites. Unchanged non masslike peritoneal  enhancement. 4. Unchanged small liver lesions. CT scan of Abdomen/Pelvis with contrast 4/19/2017  FINDINGS:   LUNG BASES: Clear. INCIDENTALLY IMAGED HEART AND MEDIASTINUM: Unremarkable.   LIVER: 1 cm low-density and 5 mm low-density in the liver are stable. Peritoneal  implants overlying the liver and spleen are stable.     Ascites is stable. Small bowel is less distended on the current study. Patient  appears be status post subtotal colectomy. GALLBLADDER: Unremarkable. SPLEEN: No mass. PANCREAS: No mass or ductal dilatation. ADRENALS: Unremarkable. KIDNEYS: No mass, calculus, or hydronephrosis. STOMACH: Unremarkable. SMALL BOWEL: There is less small bowel distention when compared to 3/21/2017  COLON: Patient appears to be status post subtotal colectomy  APPENDIX: Surgically absent  PERITONEUM: Ascites is stable  RETROPERITONEUM: No lymphadenopathy or aortic aneurysm. REPRODUCTIVE ORGANS: Surgically absent  URINARY BLADDER: No mass or calculus. BONES: No destructive bone lesion. ADDITIONAL COMMENTS: N/A     IMPRESSION  IMPRESSION:     1. Ascites and peritoneal implants are stable. 2 small low densities in the  liver are stable. Small bowel distention has improved when compared to 3/21/2017  without definite evidence of obstruction. . 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    Hx of pulmonary embolus Z86.711    Dehydration E86.0    Chemotherapy-induced neutropenia (HCC) D70.1    Anticoagulation adequate with anticoagulant therapy Z79.01    Counseling regarding end of life decision making Z71.89    Bowel obstruction (HCC) K56.60    Protein calorie malnutrition ([address] E46    Malignant ascites R18.0    Hydronephrosis of right kidney N13.30         Plan:   Proceed with C1 of Gemzar/Avastin today and she will let us know if she has any return of nausea or abd pain/discomfort  Will hydrate with extra liter of NaCl today due to creat on 5/16   Monitor hgb and protein in urine (today it was 30 and reviewed with Dr. Duran Overall. We will proceed today and monitor closely)  Encouaged protein shakes and frequent small meals. She will keep tract of her weight and call in one week to let us know how she is doing. If she loses more than 5 lbs by Friday, she is to call. Will monitor creat closely especially in light of her mild right hydronephrosis  Replace K+ of 3.4 today with 10 meq KCL IV (due to short gut, pt does not absorb PO well) and she will continue with her oral at home  Continue with Fentanyl patch to 100 mcg Q3 days (per Dr. Duran Overall on last visit) and gave re-fill of Hycet   Hydration encouraged  Continue Eliquis for previous PE. She will try some phazyme for gas  Begin Miralax to try to prevent her post chemotherapy constipation  She will call for any concerns or questions      Aspen Guzman NP  5/23/2017          ______________________  Medications:    Current Outpatient Prescriptions   Medication Sig Dispense Refill    HYDROcodone-acetaminophen (HYCET) 0.5-21.7 mg/mL oral solution Take 15-30 mL by mouth every four (4) hours as needed. Max Daily Amount: 90 mg. Disp: two(2) 473 ml bottles  Indications: Pain 950 mL 0    fentaNYL (DURAGESIC) 100 mcg/hr PATCH 1 Patch by TransDERmal route every seventy-two (72) hours. Max Daily Amount: 1 Patch. 10 Patch 0    magnesium oxide (MAG-OX) 400 mg tablet Take 1 Tab by mouth two (2) times a day. Indications: HYPOMAGNESEMIA 60 Tab 2    metoclopramide HCl (REGLAN) 10 mg tablet Take with meals and at bedtime. 120 Tab 3    sucralfate (CARAFATE) 100 mg/mL suspension Take 5 mL by mouth four (4) times daily.  414 mL 3    polyethylene glycol (MIRALAX) 17 gram/dose powder Take 17 g by mouth daily as needed. 238 g 3    Cetirizine (ZYRTEC) 10 mg cap Take  by mouth.  apixaban (ELIQUIS) 2.5 mg tablet Take 1 Tab by mouth two (2) times a day. 60 Tab 6    promethazine (PHENERGAN) 25 mg tablet Take 1 Tab by mouth every six (6) hours as needed for Nausea. 40 Tab 1    zolpidem (AMBIEN) 10 mg tablet Take 1 Tab by mouth nightly as needed for Sleep. Max Daily Amount: 10 mg. 30 Tab 1    ondansetron (ZOFRAN ODT) 4 mg disintegrating tablet Take 1 Tab by mouth every eight (8) hours as needed for Nausea. 30 Tab 2    esomeprazole (NEXIUM) 40 mg capsule Take 1 Cap by mouth daily. 90 Cap 2    multivit-min-FA-coenzyme Q10 (AQUADEKS) 100-350-5 mcg-mcg-mg chew Take 1 Tab by mouth daily. Indications: VITAMIN DEFICIENCY PREVENTION 60 Tab 1    Calcium-Vitamin D3-Vitamin K 500-500-40 mg-unit-mcg chew Take 1 Tab by mouth two (2) times a day.  metoprolol (LOPRESSOR) 100 mg tablet Take 50 mg by mouth two (2) times a day.  lidocaine-prilocaine (EMLA) topical cream Apply small amount over port area and cover with a band aid 1 hour before chemo treatment 30 g 3    multivitamin (ONE A DAY) tablet Take 1 Tab by mouth daily.        Current Facility-Administered Medications   Medication Dose Route Frequency Provider Last Rate Last Dose    sodium chloride (NS) flush 5-10 mL  5-10 mL IntraVENous PRN Tk Vickers MD   10 mL at 05/23/17 1118    sodium chloride 0.9 % injection 10 mL  10 mL IntraVENous PRN Tk Vickers MD   10 mL at 05/23/17 1119    heparin (porcine) pf 500 Units  500 Units IntraVENous PRN Tk Vickers MD        sodium chloride 0.9 % bolus infusion 1,000 mL  1,000 mL IntraVENous CONTINUOUS Genny Don NP 1,333.3 mL/hr at 05/23/17 1415 1,000 mL at 05/23/17 1415

## 2017-05-23 NOTE — PROGRESS NOTES
Outpatient Infusion Center - Chemotherapy Progress Note    3163 Pt admit to [facility] for Gemzar/Avastin C1D1 ambulatory in stable condition accompanied by spouse. Assessment completed. Pt reports nausea, asked if she could take her phenergan; discussed that she will be receiving pre-meds today before treatment; stated she will hold off. No new concerns voiced. Port accessed with positive blood return; labs drawn 05/16, per JEAN Dent/Dr. Brenda Thornton, okay to use treatment today; will collect urine sample for Avastin. Line flushed, NS infusing; pre-meds given, Gemzar ordered. [address], NP in w/ pt; BMP entered in CC/ IV potassium entered in CC; urine protein, 30, notified Alexander Ambriz NP, dedra to proceed w/ Avastin    Visit Vitals    /72    Pulse 76    Temp 97.1 °F (36.2 °C)    Resp 16    Ht 5' 3\" (1.6 m)    Wt 54.4 kg (120 lb)    SpO2 98%    BMI 21.26 kg/m2       Medications:  NS  Pepcid 20 mg  Kytril 1 mg  Decadron 8 mg  Emend 150 mg  Gemzar  Avastin  NS 1L bolus  Potassium IVPB/1 run      1545 Pt tolerated treatment well. Port maintained positive blood return throughout treatment, flushed with positive blood return at conclusion, heparinized and de-accessed. D/c home ambulatory in no distress. Pt aware of next [facility] appointment scheduled for 05/30/2017 at the [address] [place] [address].       Recent Results (from the past 12 hour(s))   URINALYSIS W/ REFLEX CULTURE    Collection Time: 05/23/17 11:06 AM   Result Value Ref Range    Color YELLOW/STRAW      Appearance CLEAR CLEAR      Specific gravity 1.020 1.003 - 1.030      pH (UA) 5.5 5.0 - 8.0      Protein 30 (A) NEG mg/dL    Glucose NEGATIVE  NEG mg/dL    Ketone TRACE (A) NEG mg/dL    Blood NEGATIVE  NEG      Urobilinogen 0.2 0.2 - 1.0 EU/dL    Nitrites NEGATIVE  NEG      Leukocyte Esterase NEGATIVE  NEG      UA:UC IF INDICATED CULTURE NOT INDICATED BY UA RESULT CNI      WBC 0-4 0 - 4 /hpf    RBC 5-10 0 - 5 /hpf    Epithelial cells FEW FEW /lpf    Bacteria NEGATIVE  NEG /hpf Hyaline cast 2-5 0 - 5 /lpf   BILIRUBIN, CONFIRM    Collection Time: 05/23/17 11:06 AM   Result Value Ref Range    Bilirubin UA, confirm NEGATIVE  NEG     METABOLIC PANEL, BASIC    Collection Time: 05/23/17  2:22 PM   Result Value Ref Range    Sodium 139 136 - 145 mmol/L    Potassium 4.4 3.5 - 5.1 mmol/L    Chloride 103 97 - 108 mmol/L    CO2 27 21 - 32 mmol/L    Anion gap 9 5 - 15 mmol/L    Glucose 122 (H) 65 - 100 mg/dL    BUN 19 6 - 20 MG/DL    Creatinine 0.86 0.55 - 1.02 MG/DL    BUN/Creatinine ratio 22 (H) 12 - 20      GFR est AA >60 >60 ml/min/1.73m2    GFR est non-AA >60 >60 ml/min/1.73m2    Calcium 8.4 (L) 8.5 - 10.1 MG/DL

## 2017-05-25 PROBLEM — R80.0 ISOLATED PROTEINURIA: Status: ACTIVE | Noted: 2017-05-25

## 2017-05-29 RX ORDER — GRANISETRON HYDROCHLORIDE 1 MG/ML
1 INJECTION, SOLUTION INTRAVENOUS ONCE
Status: COMPLETED | OUTPATIENT
Start: 2017-05-30 | End: 2017-05-30

## 2017-05-29 RX ORDER — DEXAMETHASONE SODIUM PHOSPHATE 4 MG/ML
8 INJECTION, SOLUTION INTRA-ARTICULAR; INTRALESIONAL; INTRAMUSCULAR; INTRAVENOUS; SOFT TISSUE ONCE
Status: COMPLETED | OUTPATIENT
Start: 2017-05-30 | End: 2017-05-30

## 2017-05-30 ENCOUNTER — DOCUMENTATION ONLY (OUTPATIENT)
Dept: GYNECOLOGY | Age: 57
End: 2017-05-30

## 2017-05-30 ENCOUNTER — HOSPITAL ENCOUNTER (OUTPATIENT)
Dept: INFUSION THERAPY | Age: 57
Discharge: HOME | End: 2017-05-30
Payer: OTHER GOVERNMENT

## 2017-05-30 VITALS
BODY MASS INDEX: 20.91 KG/M2 | WEIGHT: 118 LBS | TEMPERATURE: 98.3 F | SYSTOLIC BLOOD PRESSURE: 102 MMHG | DIASTOLIC BLOOD PRESSURE: 68 MMHG | HEART RATE: 85 BPM | HEIGHT: 63 IN | RESPIRATION RATE: 16 BRPM

## 2017-05-30 DIAGNOSIS — T45.1X5A CINV (CHEMOTHERAPY-INDUCED NAUSEA AND VOMITING): ICD-10-CM

## 2017-05-30 DIAGNOSIS — D70.1 CHEMOTHERAPY-INDUCED NEUTROPENIA (HCC): ICD-10-CM

## 2017-05-30 DIAGNOSIS — F41.8 ANXIETY ABOUT HEALTH: ICD-10-CM

## 2017-05-30 DIAGNOSIS — Z93.2 ILEOSTOMY IN PLACE (HCC): ICD-10-CM

## 2017-05-30 DIAGNOSIS — C56.9 OVARIAN CANCER, UNSPECIFIED LATERALITY (HCC): ICD-10-CM

## 2017-05-30 DIAGNOSIS — C78.6 CARCINOMATOSIS PERITONEI (HCC): ICD-10-CM

## 2017-05-30 DIAGNOSIS — T45.1X5A CHEMOTHERAPY-INDUCED NEUTROPENIA (HCC): ICD-10-CM

## 2017-05-30 DIAGNOSIS — E87.1 HYPONATREMIA: ICD-10-CM

## 2017-05-30 DIAGNOSIS — R10.84 GENERALIZED ABDOMINAL PAIN: ICD-10-CM

## 2017-05-30 DIAGNOSIS — G89.3 CANCER ASSOCIATED PAIN: ICD-10-CM

## 2017-05-30 DIAGNOSIS — R11.2 CINV (CHEMOTHERAPY-INDUCED NAUSEA AND VOMITING): ICD-10-CM

## 2017-05-30 DIAGNOSIS — D64.81 ANEMIA DUE TO CHEMOTHERAPY: ICD-10-CM

## 2017-05-30 DIAGNOSIS — T45.1X5A ANEMIA DUE TO CHEMOTHERAPY: ICD-10-CM

## 2017-05-30 LAB
ALBUMIN SERPL BCP-MCNC: 3 G/DL (ref 3.5–5)
ALBUMIN/GLOB SERPL: 0.7 {RATIO} (ref 1.1–2.2)
ALP SERPL-CCNC: 99 U/L (ref 45–117)
ALT SERPL-CCNC: 24 U/L (ref 12–78)
ANION GAP BLD CALC-SCNC: 9 MMOL/L (ref 5–15)
AST SERPL W P-5'-P-CCNC: 23 U/L (ref 15–37)
BASOPHILS # BLD AUTO: 0 K/UL (ref 0–0.1)
BASOPHILS # BLD: 0 % (ref 0–1)
BILIRUB SERPL-MCNC: 0.3 MG/DL (ref 0.2–1)
BUN SERPL-MCNC: 24 MG/DL (ref 6–20)
BUN/CREAT SERPL: 24 (ref 12–20)
CALCIUM SERPL-MCNC: 9.3 MG/DL (ref 8.5–10.1)
CHLORIDE SERPL-SCNC: 98 MMOL/L (ref 97–108)
CO2 SERPL-SCNC: 32 MMOL/L (ref 21–32)
CREAT SERPL-MCNC: 1.02 MG/DL (ref 0.55–1.02)
EOSINOPHIL # BLD: 0 K/UL (ref 0–0.4)
EOSINOPHIL NFR BLD: 1 % (ref 0–7)
ERYTHROCYTE [DISTWIDTH] IN BLOOD BY AUTOMATED COUNT: 12.6 % (ref 11.5–14.5)
GLOBULIN SER CALC-MCNC: 4.5 G/DL (ref 2–4)
GLUCOSE SERPL-MCNC: 83 MG/DL (ref 65–100)
HCT VFR BLD AUTO: 29 % (ref 35–47)
HGB BLD-MCNC: 9.3 G/DL (ref 11.5–16)
LYMPHOCYTES # BLD AUTO: 27 % (ref 12–49)
LYMPHOCYTES # BLD: 1.5 K/UL (ref 0.8–3.5)
MCH RBC QN AUTO: 31 PG (ref 26–34)
MCHC RBC AUTO-ENTMCNC: 32.1 G/DL (ref 30–36.5)
MCV RBC AUTO: 96.7 FL (ref 80–99)
MONOCYTES # BLD: 0.4 K/UL (ref 0–1)
MONOCYTES NFR BLD AUTO: 7 % (ref 5–13)
NEUTS SEG # BLD: 3.6 K/UL (ref 1.8–8)
NEUTS SEG NFR BLD AUTO: 65 % (ref 32–75)
PLATELET # BLD AUTO: 200 K/UL (ref 150–400)
POTASSIUM SERPL-SCNC: 4 MMOL/L (ref 3.5–5.1)
PROT SERPL-MCNC: 7.5 G/DL (ref 6.4–8.2)
RBC # BLD AUTO: 3 M/UL (ref 3.8–5.2)
SODIUM SERPL-SCNC: 139 MMOL/L (ref 136–145)
WBC # BLD AUTO: 5.5 K/UL (ref 3.6–11)

## 2017-05-30 PROCEDURE — 74011000250 HC RX REV CODE- 250: Performed by: NURSE PRACTITIONER

## 2017-05-30 PROCEDURE — 74011000250 HC RX REV CODE- 250: Performed by: OBSTETRICS & GYNECOLOGY

## 2017-05-30 PROCEDURE — 36415 COLL VENOUS BLD VENIPUNCTURE: CPT | Performed by: NURSE PRACTITIONER

## 2017-05-30 PROCEDURE — 80053 COMPREHEN METABOLIC PANEL: CPT | Performed by: NURSE PRACTITIONER

## 2017-05-30 PROCEDURE — 74011250636 HC RX REV CODE- 250/636: Performed by: NURSE PRACTITIONER

## 2017-05-30 PROCEDURE — 74011250636 HC RX REV CODE- 250/636: Performed by: OBSTETRICS & GYNECOLOGY

## 2017-05-30 PROCEDURE — 96361 HYDRATE IV INFUSION ADD-ON: CPT

## 2017-05-30 PROCEDURE — 96367 TX/PROPH/DG ADDL SEQ IV INF: CPT

## 2017-05-30 PROCEDURE — 77030012965 HC NDL HUBR BBMI -A

## 2017-05-30 PROCEDURE — 74011000258 HC RX REV CODE- 258: Performed by: OBSTETRICS & GYNECOLOGY

## 2017-05-30 PROCEDURE — 96413 CHEMO IV INFUSION 1 HR: CPT

## 2017-05-30 PROCEDURE — 96375 TX/PRO/DX INJ NEW DRUG ADDON: CPT

## 2017-05-30 PROCEDURE — 85025 COMPLETE CBC W/AUTO DIFF WBC: CPT | Performed by: NURSE PRACTITIONER

## 2017-05-30 RX ORDER — SODIUM CHLORIDE 9 MG/ML
10 INJECTION INTRAMUSCULAR; INTRAVENOUS; SUBCUTANEOUS AS NEEDED
Status: ACTIVE | OUTPATIENT
Start: 2017-05-30 | End: 2017-05-31

## 2017-05-30 RX ORDER — MORPHINE SULFATE ORAL SOLUTION 10 MG/5ML
10 SOLUTION ORAL
Qty: 500 ML | Refills: 0 | Status: SHIPPED | OUTPATIENT
Start: 2017-05-30 | End: 2017-06-13 | Stop reason: SDUPTHER

## 2017-05-30 RX ORDER — HEPARIN 100 UNIT/ML
500 SYRINGE INTRAVENOUS AS NEEDED
Status: ACTIVE | OUTPATIENT
Start: 2017-05-30 | End: 2017-05-31

## 2017-05-30 RX ORDER — SODIUM CHLORIDE 0.9 % (FLUSH) 0.9 %
10-40 SYRINGE (ML) INJECTION AS NEEDED
Status: DISCONTINUED | OUTPATIENT
Start: 2017-05-30 | End: 2017-06-03 | Stop reason: HOSPADM

## 2017-05-30 RX ADMIN — GRANISETRON HYDROCHLORIDE 1 MG: 1 INJECTION INTRAVENOUS at 12:47

## 2017-05-30 RX ADMIN — SODIUM CHLORIDE 20 MG: 9 INJECTION INTRAMUSCULAR; INTRAVENOUS; SUBCUTANEOUS at 12:16

## 2017-05-30 RX ADMIN — Medication 10 ML: at 14:09

## 2017-05-30 RX ADMIN — SODIUM CHLORIDE 10 ML: 9 INJECTION INTRAMUSCULAR; INTRAVENOUS; SUBCUTANEOUS at 11:10

## 2017-05-30 RX ADMIN — SODIUM CHLORIDE 150 MG: 900 INJECTION, SOLUTION INTRAVENOUS at 13:05

## 2017-05-30 RX ADMIN — SODIUM CHLORIDE 1264 MG: 900 INJECTION, SOLUTION INTRAVENOUS at 13:30

## 2017-05-30 RX ADMIN — HEPARIN 500 UNITS: 100 SYRINGE at 14:09

## 2017-05-30 RX ADMIN — Medication 10 ML: at 11:10

## 2017-05-30 RX ADMIN — SODIUM CHLORIDE 1000 ML: 900 INJECTION, SOLUTION INTRAVENOUS at 11:15

## 2017-05-30 RX ADMIN — DEXAMETHASONE SODIUM PHOSPHATE 8 MG: 4 INJECTION, SOLUTION INTRA-ARTICULAR; INTRALESIONAL; INTRAMUSCULAR; INTRAVENOUS; SOFT TISSUE at 12:19

## 2017-05-30 NOTE — PROGRESS NOTES
Cycle 1D8 Gemzar with hydration given today at [place] [address] without issues. Patient aware of next appointment with MD/labs.

## 2017-05-30 NOTE — PROGRESS NOTES
Outpatient Infusion Center - Chemotherapy Progress Note    7382 Pt admit to [facility] for Gemzar C1D8 ambulatory in stable condition. Assessment completed. No new concerns voiced. Port accessed with positive blood return. Labs drawn per order and sent. Line flushed, pt connected to IV bolus. Pt has c/o of redness and itchiness in between fingers. Stated that this happened previously when she was on Doxil, but that it went away. After her 1st treatment of Gemzar it came back. NP notified. No new orders at this time. Labs reviewed, chemo ordered. Premeds given. Visit Vitals    /68    Pulse 85    Temp 98.3 °F (36.8 °C)    Resp 16    Ht 5' 3\" (1.6 m)    Wt 53.5 kg (118 lb)    BMI 20.9 kg/m2       Medications:  NS 1000 mL bolus  NS @ KVO  Pepcid  Decadron  Kytril  Emend  Gemzar    1410 Pt tolerated treatment well. Port maintained positive blood return throughout treatment, flushed with positive blood return at conclusion and heparinized and de-accessed per protocol. D/c home ambulatory in no distress. Pt aware of next [facility] appointment scheduled for 6/13/17. Recent Results (from the past 12 hour(s))   CBC WITH AUTOMATED DIFF    Collection Time: 05/30/17 10:54 AM   Result Value Ref Range    WBC 5.5 3.6 - 11.0 K/uL    RBC 3.00 (L) 3.80 - 5.20 M/uL    HGB 9.3 (L) 11.5 - 16.0 g/dL    HCT 29.0 (L) 35.0 - 47.0 %    MCV 96.7 80.0 - 99.0 FL    MCH 31.0 26.0 - 34.0 PG    MCHC 32.1 30.0 - 36.5 g/dL    RDW 12.6 11.5 - 14.5 %    PLATELET 115 768 - 574 K/uL    NEUTROPHILS 65 32 - 75 %    LYMPHOCYTES 27 12 - 49 %    MONOCYTES 7 5 - 13 %    EOSINOPHILS 1 0 - 7 %    BASOPHILS 0 0 - 1 %    ABS. NEUTROPHILS 3.6 1.8 - 8.0 K/UL    ABS. LYMPHOCYTES 1.5 0.8 - 3.5 K/UL    ABS. MONOCYTES 0.4 0.0 - 1.0 K/UL    ABS. EOSINOPHILS 0.0 0.0 - 0.4 K/UL    ABS.  BASOPHILS 0.0 0.0 - 0.1 K/UL   METABOLIC PANEL, COMPREHENSIVE    Collection Time: 05/30/17 10:54 AM   Result Value Ref Range    Sodium 139 136 - 145 mmol/L    Potassium 4.0 3.5 - 5.1 mmol/L    Chloride 98 97 - 108 mmol/L    CO2 32 21 - 32 mmol/L    Anion gap 9 5 - 15 mmol/L    Glucose 83 65 - 100 mg/dL    BUN 24 (H) 6 - 20 MG/DL    Creatinine 1.02 0.55 - 1.02 MG/DL    BUN/Creatinine ratio 24 (H) 12 - 20      GFR est AA >60 >60 ml/min/1.73m2    GFR est non-AA 56 (L) >60 ml/min/1.73m2    Calcium 9.3 8.5 - 10.1 MG/DL    Bilirubin, total 0.3 0.2 - 1.0 MG/DL    ALT (SGPT) 24 12 - 78 U/L    AST (SGOT) 23 15 - 37 U/L    Alk.  phosphatase 99 45 - 117 U/L    Protein, total 7.5 6.4 - 8.2 g/dL    Albumin 3.0 (L) 3.5 - 5.0 g/dL    Globulin 4.5 (H) 2.0 - 4.0 g/dL    A-G Ratio 0.7 (L) 1.1 - 2.2

## 2017-06-03 ENCOUNTER — HOSPITAL ENCOUNTER (OUTPATIENT)
Dept: INFUSION THERAPY | Age: 57
Discharge: HOME | End: 2017-06-03
Payer: OTHER GOVERNMENT

## 2017-06-03 VITALS
RESPIRATION RATE: 18 BRPM | HEART RATE: 95 BPM | OXYGEN SATURATION: 97 % | DIASTOLIC BLOOD PRESSURE: 73 MMHG | SYSTOLIC BLOOD PRESSURE: 130 MMHG | TEMPERATURE: 97 F

## 2017-06-03 PROCEDURE — 77030012965 HC NDL HUBR BBMI -A

## 2017-06-03 PROCEDURE — 74011000250 HC RX REV CODE- 250: Performed by: NURSE PRACTITIONER

## 2017-06-03 PROCEDURE — 96361 HYDRATE IV INFUSION ADD-ON: CPT

## 2017-06-03 PROCEDURE — 96360 HYDRATION IV INFUSION INIT: CPT

## 2017-06-03 PROCEDURE — 74011250636 HC RX REV CODE- 250/636: Performed by: NURSE PRACTITIONER

## 2017-06-03 RX ORDER — HEPARIN 100 UNIT/ML
500 SYRINGE INTRAVENOUS AS NEEDED
Status: ACTIVE | OUTPATIENT
Start: 2017-06-03 | End: 2017-06-04

## 2017-06-03 RX ORDER — SODIUM CHLORIDE 9 MG/ML
10 INJECTION INTRAMUSCULAR; INTRAVENOUS; SUBCUTANEOUS AS NEEDED
Status: ACTIVE | OUTPATIENT
Start: 2017-06-03 | End: 2017-06-04

## 2017-06-03 RX ORDER — SODIUM CHLORIDE 0.9 % (FLUSH) 0.9 %
10-40 SYRINGE (ML) INJECTION AS NEEDED
Status: ACTIVE | OUTPATIENT
Start: 2017-06-03 | End: 2017-06-04

## 2017-06-03 RX ADMIN — Medication 10 ML: at 11:08

## 2017-06-03 RX ADMIN — Medication 500 UNITS: at 11:08

## 2017-06-03 RX ADMIN — SODIUM CHLORIDE 2000 ML: 900 INJECTION, SOLUTION INTRAVENOUS at 09:14

## 2017-06-03 RX ADMIN — SODIUM CHLORIDE 10 ML: 9 INJECTION INTRAMUSCULAR; INTRAVENOUS; SUBCUTANEOUS at 09:14

## 2017-06-03 NOTE — PROGRESS NOTES
0900 Pt arrived at [facility] ambulatory and in no distress for hydration. Assessment completed, pt reports nausea and increased output from ileosotomy. Port accessed, blood return positive. Visit Vitals    /83 (BP 1 Location: Left arm, BP Patient Position: Sitting)    Pulse 98    Temp 97 °F (36.1 °C)    Resp 18    SpO2 97%       Medications received:  NS 2000 ml IV over 2 hours    Port flushed with NS and heparin and needle removed. Visit Vitals    /73    Pulse 95    Temp 97 °F (36.1 °C)    Resp 18    SpO2 97%       1105 Tolerated treatment well, no adverse reaction noted. D/Cd from [facility] ambulatory and in no distress accompanied by daughter.   Next appt 6/13/17

## 2017-06-05 ENCOUNTER — TELEPHONE (OUTPATIENT)
Dept: GYNECOLOGY | Age: 57
End: 2017-06-05

## 2017-06-05 NOTE — TELEPHONE ENCOUNTER
The pt states she continues to have difficulty swallowing and continues to have spasms. She states the spasms were worse last night. She states it is hard to swallow a pill and has not been eating solid foods. She has put a call in to Dr. Brad Jerome her GI provider and he will be calling her this afternoon. She is requesting for us to send him her endoscopy report.

## 2017-06-07 ENCOUNTER — TELEPHONE (OUTPATIENT)
Dept: GYNECOLOGY | Age: 57
End: 2017-06-07

## 2017-06-07 DIAGNOSIS — K22.10 ULCERATIVE ESOPHAGITIS: ICD-10-CM

## 2017-06-07 DIAGNOSIS — I82.890 THROMBOSIS OF PELVIC VEIN: ICD-10-CM

## 2017-06-07 DIAGNOSIS — G89.3 CANCER ASSOCIATED PAIN: ICD-10-CM

## 2017-06-07 DIAGNOSIS — C56.9 OVARIAN CANCER, UNSPECIFIED LATERALITY (HCC): Primary | ICD-10-CM

## 2017-06-07 DIAGNOSIS — R18.0 MALIGNANT ASCITES: ICD-10-CM

## 2017-06-07 DIAGNOSIS — K56.609 SMALL BOWEL OBSTRUCTION (HCC): ICD-10-CM

## 2017-06-07 DIAGNOSIS — C78.6 CARCINOMATOSIS PERITONEI (HCC): ICD-10-CM

## 2017-06-07 DIAGNOSIS — K22.10 EROSIVE ESOPHAGITIS: ICD-10-CM

## 2017-06-07 RX ORDER — METOCLOPRAMIDE HYDROCHLORIDE 5 MG/5ML
10 SOLUTION ORAL
Qty: 500 ML | Refills: 3 | Status: SHIPPED | OUTPATIENT
Start: 2017-06-07 | End: 2017-10-30 | Stop reason: SDUPTHER

## 2017-06-07 RX ORDER — PROMETHAZINE HYDROCHLORIDE 6.25 MG/5ML
25 SYRUP ORAL
Qty: 400 ML | Refills: 4 | Status: SHIPPED | OUTPATIENT
Start: 2017-06-07 | End: 2017-08-31 | Stop reason: SDUPTHER

## 2017-06-07 NOTE — TELEPHONE ENCOUNTER
Pt called to say her esophagitis is getting worse and she has spoken with her local GI doctor. He has told her to go on a strictly liquid diet. Pills cause her a lot of pain and discomfort. We discussed liquid forms of medications. I have switched her Reglan and phenergan to elixir. Her Eliquis does not come in a liquid form. (I discussed this with her). She will have her Metoprolol changed by her local cardiologist.  Julia Caldwell only comed in a spray form and there is difficulty obtaining this and she said this was not as crucial now. She has lost 5 lbs in a week due to the difficulty with her esophagus. We discussed taking at least 4-5 Ensures a day and she was good with this. She will continue with other liquid intake as well. Told her to keep us abreast of any further wt loss. We will see her in the office tomorrow for a regularly scheduled apt.

## 2017-06-08 ENCOUNTER — OFFICE VISIT (OUTPATIENT)
Dept: GYNECOLOGY | Age: 57
End: 2017-06-08

## 2017-06-08 VITALS
SYSTOLIC BLOOD PRESSURE: 127 MMHG | HEART RATE: 106 BPM | HEIGHT: 63 IN | DIASTOLIC BLOOD PRESSURE: 86 MMHG | BODY MASS INDEX: 20.84 KG/M2 | WEIGHT: 117.6 LBS

## 2017-06-08 DIAGNOSIS — C56.9 OVARIAN CANCER, UNSPECIFIED LATERALITY (HCC): ICD-10-CM

## 2017-06-08 DIAGNOSIS — K56.609 SMALL BOWEL OBSTRUCTION (HCC): ICD-10-CM

## 2017-06-08 DIAGNOSIS — C56.3 MALIGNANT NEOPLASM OF BOTH OVARIES (HCC): Primary | ICD-10-CM

## 2017-06-08 RX ORDER — ESOMEPRAZOLE MAGNESIUM 40 MG/1
GRANULE, DELAYED RELEASE ORAL 2 TIMES DAILY
Status: ON HOLD | COMMUNITY
Start: 2017-06-07 | End: 2018-03-26

## 2017-06-08 NOTE — PROGRESS NOTES
[address] [facility] [address], [address] [facility]  [phone]  MD Philipp Goetz MD  Office Note  Patient ID:  Name:  Kayleen Mosley  MRN:  [id number]  :  1960/56 y.o. Date:  2017      HISTORY OF PRESENT ILLNESS:  Kayleen Mosley is a 61 y.o.  postmenopausal female with a diagnosis of stage IV ovarian cancer. She underwent exploratory laparotomy with suboptimal debulking in 2015. She had extensive disease. She was readmitted about a week later with obstruction and subsequently had a cecal perforation, requiring resection, end ileostomy, and mucous fistula. During the hospitalization she was also diagnosed with pulmonary embolus and had chest tubes placed for bilateral pleural effusions. She had a prolonged hospital course and actually received a dose of cisplatin chemotherapy while in the hospital to help dry up the effusions. She completed 6 cycles of Taxol/Carboplatin chemotherapy following her initial debulking. She  did relatively well, considering she had an ileostomy to deal with during chemotherapy. I took her to the OR on 16 for interval debulking and reversal of her ileostomy. She had extensive disease, but we were able to resect the majority of her disease. One week later she developed an anastomotic leak requiring reexploration and recreation of an ileostomy. She had another prolonged hospitalization, but recovered well since surgery. We then reinitiated Taxol/Carboplatin chemotherapy, but did reduce the dose of Taxol to 135 mg/m2. She completed 4 mores cycles. Her CA-125 normalized and we stopped treatment. She now has evidence of recurrent disease and was receiving Doxil/Avastin. She has completed 3 cycles of that regimen. She has been admitted to [facility] several time since starting this regimen with evidence of partial small bowel obstruction.   The symptoms have resolved each time with conservative management. She had increased ascites on her last scan, but no significant change in her disease otherwise. Her CA-125 also went up. All of this suggests progression of disease. We switched her chemotherapy to [facility]. She has completed one cycle so far. She has been having a lot of dysphagia and has seen her gastroenterologist in [facility]. He has backed her down to a liquid diet. Her ostomy is working well and is showing no signs of obstruction.       Problem List:  Patient Active Problem List    Diagnosis Date Noted    Erosive esophagitis 06/07/2017    Ulcerative esophagitis 06/07/2017    Isolated proteinuria 05/25/2017    Malignant ascites 05/08/2017    Hydronephrosis of right kidney 05/08/2017    Protein calorie malnutrition ([address] 04/28/2017    Bowel obstruction ([address] 04/27/2017    Counseling regarding end of life decision making 04/25/2017    Anticoagulation adequate with anticoagulant therapy 03/28/2017    Chemotherapy-induced neutropenia ([address] 03/22/2017    Ileus ([address] 03/21/2017    Thrombosis of pelvic vein 03/21/2017    Hx of pulmonary embolus 03/21/2017    Dehydration 03/21/2017    Cancer associated pain 05/13/2016    Generalized abdominal pain 05/13/2016    Anxiety about health 05/13/2016    CINV (chemotherapy-induced nausea and vomiting) 03/22/2016    Anemia due to chemotherapy 02/22/2016    Ileostomy in place [facility]) 02/15/2016    Carcinomatosis peritonei ([address] 10/29/2015    Small bowel obstruction ([address] 10/29/2015    Malignant neoplasm of both ovaries ([address] 10/12/2015     PMH:  Past Medical History:   Diagnosis Date    Abdominal carcinomatosis ([address] 10/2015    Arthritis     left shoulder    BRCA negative 12/2015    Chronic pain     Depression     HX    Environmental allergies     GERD (gastroesophageal reflux disease)     Hypertension     NEW OVER PAST 5-6 MONTHS    Ovarian cancer ([address] 10/2015    serous adenocarcinoma, high grade, stage IIIC    Pulmonary embolism [facility] 10/2015      PSH:  Past Surgical History:   Procedure Laterality Date    CARDIAC SURG PROCEDURE UNLIST  12/11/15    cardiac cath/normal     HX BREAST BIOPSY  1995    benign right breast bx    HX DILATION AND CURETTAGE  2/2011    POLYPECTOMY    HX GI  2015    PERFORATED BOWEL; ILEOSTOMY    HX GYN  10/2015    EXP LAPAROTOMY    HX HEENT  LAST 2005    oral tissue graft X3    HX HEENT  1970    tonsillectomy    HX LAPAROTOMY  10/2015    tumor sampling    HX LAPAROTOMY  4/2016    hysterectomy, BSO, radical debulking    HX LAPAROTOMY  5/2016    resection ileocolic anastomosis, leak, ileostomy    HX OTHER SURGICAL  11/2015    tunneled portacath      Social History:  Social History   Substance Use Topics    Smoking status: Never Smoker    Smokeless tobacco: Never Used    Alcohol use No      Family History:  Family History   Problem Relation Age of Onset    Cancer Maternal Uncle      skin    Hypertension Mother     High Cholesterol Mother     Anxiety Mother     Heart Disease Mother     High Cholesterol Father     Hypertension Father     Stroke Father     Arthritis-osteo Sister     Migraines Sister     Other Sister      FIBROMYALGIA    Depression Brother     Other Brother      DRUG ABUSE HEPATITIS C    Migraines Sister     Arrhythmia Sister     Depression Sister     Lung Disease Paternal Aunt      COPD    Cancer Paternal Uncle      LUNG    Lung Disease Paternal Uncle      COPD    Lung Disease Maternal Grandmother      COPD    Anesth Problems Neg Hx       Medications: (reviewed)  Current Outpatient Prescriptions   Medication Sig    NEXIUM PACKET 40 mg granules for oral suspension two (2) times a day.  metoclopramide (REGLAN) 5 mg/5 mL syrup Take 10 mL by mouth four (4) times daily as needed. Take before meals as needed    promethazine (PHENERGAN) 6.25 mg/5 mL syrup Take 20 mL by mouth four (4) times daily as needed for Nausea.     morphine 10 mg/5 mL oral solution Take 5 mL by mouth every four (4) hours as needed for Pain.  fentaNYL (DURAGESIC) 100 mcg/hr PATCH 1 Patch by TransDERmal route every seventy-two (72) hours. Max Daily Amount: 1 Patch.  sucralfate (CARAFATE) 100 mg/mL suspension Take 5 mL by mouth four (4) times daily.  polyethylene glycol (MIRALAX) 17 gram/dose powder Take 17 g by mouth daily as needed.  apixaban (ELIQUIS) 2.5 mg tablet Take 1 Tab by mouth two (2) times a day.  esomeprazole (NEXIUM) 40 mg capsule Take 1 Cap by mouth daily.  lidocaine-prilocaine (EMLA) topical cream Apply small amount over port area and cover with a band aid 1 hour before chemo treatment    magnesium oxide (MAG-OX) 400 mg tablet Take 1 Tab by mouth two (2) times a day. Indications: HYPOMAGNESEMIA    Cetirizine (ZYRTEC) 10 mg cap Take  by mouth.  zolpidem (AMBIEN) 10 mg tablet Take 1 Tab by mouth nightly as needed for Sleep. Max Daily Amount: 10 mg.    ondansetron (ZOFRAN ODT) 4 mg disintegrating tablet Take 1 Tab by mouth every eight (8) hours as needed for Nausea.  multivit-min-FA-coenzyme Q10 (AQUADEKS) 100-350-5 mcg-mcg-mg chew Take 1 Tab by mouth daily. Indications: VITAMIN DEFICIENCY PREVENTION    Calcium-Vitamin D3-Vitamin K 133-646-68 mg-unit-mcg chew Take 1 Tab by mouth two (2) times a day.  metoprolol (LOPRESSOR) 100 mg tablet Take 50 mg by mouth two (2) times a day.  multivitamin (ONE A DAY) tablet Take 1 Tab by mouth daily. No current facility-administered medications for this visit. Allergies: (reviewed)  Allergies   Allergen Reactions    Ativan [Lorazepam] Other (comments)     SEVERE CONFUSION    Morphine Rash and Itching          OBJECTIVE:    Physical Exam:  VITAL SIGNS: Vitals:    06/08/17 1410   BP: 127/86   Pulse: (!) 106   Weight: 53.3 kg (117 lb 9.6 oz)   Height: 5' 2.99\" (1.6 m)     Body mass index is 20.84 kg/(m^2). GENERAL AVA: Conversant, alert, oriented. No acute distress. HEENT: HEENT. No thyroid enlargement. No JVD. Neck: Supple without restrictions. RESPIRATORY: Clear to auscultation and percussion to the bases. No CVAT. CARDIOVASC: RRR without murmur/rub. GASTROINT: soft, non-tender, without masses or organomegaly; ileostomy in RLQ   MUSCULOSKEL: no joint tenderness, deformity or swelling   EXTREMITIES: extremities normal, atraumatic, no cyanosis or edema   PELVIC: Deferred   RECTAL: Deferred   JOSE SURVEY: No suspicious lymphadenopathy or edema noted. NEURO: Grossly intact. No acute deficit. Lab Results   Component Value Date/Time    WBC 5.5 05/30/2017 10:54 AM    HGB 9.3 05/30/2017 10:54 AM    HCT 29.0 05/30/2017 10:54 AM    PLATELET 230 85/89/3877 10:54 AM    MCV 96.7 05/30/2017 10:54 AM     Lab Results   Component Value Date/Time    Sodium 139 05/30/2017 10:54 AM    Potassium 4.0 05/30/2017 10:54 AM    Chloride 98 05/30/2017 10:54 AM    CO2 32 05/30/2017 10:54 AM    Anion gap 9 05/30/2017 10:54 AM    Glucose 83 05/30/2017 10:54 AM    BUN 24 05/30/2017 10:54 AM    Creatinine 1.02 05/30/2017 10:54 AM    BUN/Creatinine ratio 24 05/30/2017 10:54 AM    GFR est AA >60 05/30/2017 10:54 AM    GFR est non-AA 56 05/30/2017 10:54 AM    Calcium 9.3 05/30/2017 10:54 AM       CT of chest/abdomen/pelvis (5/7/17)  LUNG BASES: Left lung base subpleural scarring is unchanged. No pneumonia. INCIDENTALLY IMAGED HEART AND MEDIASTINUM: Cardiac size is within normal limits. No pericardial effusion. Small hiatal hernia is unchanged. LIVER: Small segment 8 and segment 3 hypoenhancing liver lesions are unchanged. GALLBLADDER: Unremarkable. SPLEEN: No mass. PANCREAS: No mass or ductal dilatation. ADRENALS: Unremarkable. KIDNEYS: Right hydronephrosis is mild and new. Proximal right ureter is mildly  dilated. There is transition to nondilated ureter in the mid ureter. No renal  mass or left hydronephrosis. STOMACH: Unremarkable.   SMALL BOWEL: Small bowel loops in the lower pelvis measuring up to 2.7 cm in  diameter. Contrast extends through the small bowel into the right lower quadrant  ileostomy. Small bowel centralization is unchanged. COLON: Majority of the colon has been resected. Distal sigmoid colon and rectum  are nondistended. APPENDIX: Resected. PERITONEUM: Small volume of ascites is slightly increased. Subtle non masslike  enhancement of the peritoneum is unchanged. No pneumoperitoneum. RETROPERITONEUM: No lymphadenopathy. IVC filter is unchanged. REPRODUCTIVE ORGANS: Uterus is been resected. Ovaries have been resected. No  pelvic mass. URINARY BLADDER: No mass or calculus. BONES: No destructive bone lesion. ADDITIONAL COMMENTS: N/A     IMPRESSION:  1. New mild right hydronephrosis is likely secondary to ureteral obstruction in  the mid ureter. No ureteral calculus. 2. Mild small bowel distention may represent early partial small bowel  obstruction. No complete bowel obstruction. 3. Increased small volume of ascites. Unchanged non masslike peritoneal  enhancement. 4. Unchanged small liver lesions. IMPRESSION/PLAN:  Jin Reynolds is a 61 y.o. female with a diagnosis of stage IV ovarian cancer. She is s/p suboptimal debulking, followed by a second laparotomy for cecal perforation. She completed 6 cycles of Taxol/Carboplatin chemotherapy, in addition to the one dose of cisplatin given during her initial hospitalization. Based upon her CT and her CA-125, she responded well, though there was still disease remaining. She underwent interval debulking with resection of the majority of her disease, although there was still small volume carcinomatosis remaining. I recommended continuing with Taxol/Carboplatin, as she did respond well, despite still having residual disease at her interval debulking. I dropped the dose of Taxol to 135 mg/m2 to reduce the chance of neuropathy, but kept the carboplatin dose at an AUC of 6. She completed 4 more cycles and her CA-125 normalized.   We then stopped therapy. She subsequently developed recurrent disease and I recommended Doxil/Avastin, considering it had been less than 6 months since we stopped Taxol/Carboplatin. She completed 3 cycles of that regimen but found to have progression of disease. I went over different options with her, specifically Gemzar and Topotecan. I recommended Gemzar, but I also suggested that we continue the Avastin, as this might help with the ascites. She has completed one cycle so far. We will continue with the current regimen and obtain a CT after this cycle. Adequate PO intake encouraged.            Signed By: Christi Desouza MD     6/8/2017/10:35 AM

## 2017-06-08 NOTE — PROGRESS NOTES
Pre Chemo, labs today, and chemo 6/13. Pt is doing much better after her GI physician put her on a liquid diet.

## 2017-06-09 LAB
ALBUMIN SERPL-MCNC: 4.1 G/DL (ref 3.5–5.5)
ALBUMIN/GLOB SERPL: 1.6 {RATIO} (ref 1.2–2.2)
ALP SERPL-CCNC: 92 IU/L (ref 39–117)
ALT SERPL-CCNC: 11 IU/L (ref 0–32)
APPEARANCE UR: CLEAR
AST SERPL-CCNC: 18 IU/L (ref 0–40)
BACTERIA #/AREA URNS HPF: ABNORMAL /[HPF]
BASOPHILS # BLD AUTO: 0 X10E3/UL (ref 0–0.2)
BASOPHILS NFR BLD AUTO: 0 %
BILIRUB SERPL-MCNC: <0.2 MG/DL (ref 0–1.2)
BILIRUB UR QL STRIP: NEGATIVE
BUN SERPL-MCNC: 21 MG/DL (ref 6–24)
BUN/CREAT SERPL: 23 (ref 9–23)
CALCIUM SERPL-MCNC: 9.3 MG/DL (ref 8.7–10.2)
CANCER AG125 SERPL-ACNC: 102.8 U/ML (ref 0–38.1)
CASTS URNS QL MICRO: ABNORMAL /LPF
CHLORIDE SERPL-SCNC: 93 MMOL/L (ref 96–106)
CO2 SERPL-SCNC: 26 MMOL/L (ref 18–29)
COLOR UR: YELLOW
CREAT SERPL-MCNC: 0.91 MG/DL (ref 0.57–1)
EOSINOPHIL # BLD AUTO: 0.1 X10E3/UL (ref 0–0.4)
EOSINOPHIL NFR BLD AUTO: 2 %
EPI CELLS #/AREA URNS HPF: ABNORMAL /HPF
ERYTHROCYTE [DISTWIDTH] IN BLOOD BY AUTOMATED COUNT: 15 % (ref 12.3–15.4)
GLOBULIN SER CALC-MCNC: 2.6 G/DL (ref 1.5–4.5)
GLUCOSE SERPL-MCNC: 106 MG/DL (ref 65–99)
GLUCOSE UR QL: NEGATIVE
HCT VFR BLD AUTO: 25 % (ref 34–46.6)
HGB BLD-MCNC: 8.3 G/DL (ref 11.1–15.9)
HGB UR QL STRIP: NEGATIVE
IMM GRANULOCYTES # BLD: 0 X10E3/UL (ref 0–0.1)
IMM GRANULOCYTES NFR BLD: 1 %
KETONES UR QL STRIP: NEGATIVE
LEUKOCYTE ESTERASE UR QL STRIP: ABNORMAL
LYMPHOCYTES # BLD AUTO: 1.7 X10E3/UL (ref 0.7–3.1)
LYMPHOCYTES NFR BLD AUTO: 43 %
MAGNESIUM SERPL-MCNC: 1.3 MG/DL (ref 1.6–2.3)
MCH RBC QN AUTO: 31.1 PG (ref 26.6–33)
MCHC RBC AUTO-ENTMCNC: 33.2 G/DL (ref 31.5–35.7)
MCV RBC AUTO: 94 FL (ref 79–97)
MICRO URNS: ABNORMAL
MONOCYTES # BLD AUTO: 0.4 X10E3/UL (ref 0.1–0.9)
MONOCYTES NFR BLD AUTO: 10 %
MUCOUS THREADS URNS QL MICRO: PRESENT
NEUTROPHILS # BLD AUTO: 1.7 X10E3/UL (ref 1.4–7)
NEUTROPHILS NFR BLD AUTO: 44 %
NITRITE UR QL STRIP: NEGATIVE
PH UR STRIP: 6 [PH] (ref 5–7.5)
PLATELET # BLD AUTO: 119 X10E3/UL (ref 150–379)
POTASSIUM SERPL-SCNC: 3.8 MMOL/L (ref 3.5–5.2)
PROT SERPL-MCNC: 6.7 G/DL (ref 6–8.5)
PROT UR QL STRIP: ABNORMAL
RBC # BLD AUTO: 2.67 X10E6/UL (ref 3.77–5.28)
RBC #/AREA URNS HPF: ABNORMAL /HPF
SODIUM SERPL-SCNC: 137 MMOL/L (ref 134–144)
SP GR UR: 1.03 (ref 1–1.03)
UROBILINOGEN UR STRIP-MCNC: 0.2 MG/DL (ref 0.2–1)
WBC # BLD AUTO: 3.9 X10E3/UL (ref 3.4–10.8)
WBC #/AREA URNS HPF: ABNORMAL /HPF

## 2017-06-09 RX ORDER — DEXAMETHASONE SODIUM PHOSPHATE 4 MG/ML
8 INJECTION, SOLUTION INTRA-ARTICULAR; INTRALESIONAL; INTRAMUSCULAR; INTRAVENOUS; SOFT TISSUE ONCE
Status: COMPLETED | OUTPATIENT
Start: 2017-06-13 | End: 2017-06-13

## 2017-06-09 RX ORDER — GRANISETRON HYDROCHLORIDE 1 MG/ML
1 INJECTION INTRAVENOUS ONCE
Status: COMPLETED | OUTPATIENT
Start: 2017-06-13 | End: 2017-06-13

## 2017-06-12 RX ORDER — MAGNESIUM SULFATE HEPTAHYDRATE 40 MG/ML
2 INJECTION, SOLUTION INTRAVENOUS ONCE
Status: DISCONTINUED | OUTPATIENT
Start: 2017-06-13 | End: 2017-06-13 | Stop reason: SDUPTHER

## 2017-06-13 ENCOUNTER — HOSPITAL ENCOUNTER (OUTPATIENT)
Dept: INFUSION THERAPY | Age: 57
Discharge: HOME | End: 2017-06-13
Payer: OTHER GOVERNMENT

## 2017-06-13 VITALS
WEIGHT: 115.8 LBS | TEMPERATURE: 97.7 F | RESPIRATION RATE: 16 BRPM | OXYGEN SATURATION: 93 % | BODY MASS INDEX: 20.52 KG/M2 | DIASTOLIC BLOOD PRESSURE: 65 MMHG | HEART RATE: 73 BPM | HEIGHT: 63 IN | SYSTOLIC BLOOD PRESSURE: 101 MMHG

## 2017-06-13 DIAGNOSIS — G89.3 CANCER ASSOCIATED PAIN: ICD-10-CM

## 2017-06-13 DIAGNOSIS — R11.2 CINV (CHEMOTHERAPY-INDUCED NAUSEA AND VOMITING): ICD-10-CM

## 2017-06-13 DIAGNOSIS — E87.1 HYPONATREMIA: ICD-10-CM

## 2017-06-13 DIAGNOSIS — Z93.2 ILEOSTOMY IN PLACE (HCC): ICD-10-CM

## 2017-06-13 DIAGNOSIS — D64.81 ANEMIA DUE TO CHEMOTHERAPY: ICD-10-CM

## 2017-06-13 DIAGNOSIS — C78.6 CARCINOMATOSIS PERITONEI (HCC): ICD-10-CM

## 2017-06-13 DIAGNOSIS — R10.84 GENERALIZED ABDOMINAL PAIN: ICD-10-CM

## 2017-06-13 DIAGNOSIS — D70.1 CHEMOTHERAPY-INDUCED NEUTROPENIA (HCC): ICD-10-CM

## 2017-06-13 DIAGNOSIS — F41.8 ANXIETY ABOUT HEALTH: ICD-10-CM

## 2017-06-13 DIAGNOSIS — C56.9 OVARIAN CANCER, UNSPECIFIED LATERALITY (HCC): ICD-10-CM

## 2017-06-13 DIAGNOSIS — T45.1X5A CINV (CHEMOTHERAPY-INDUCED NAUSEA AND VOMITING): ICD-10-CM

## 2017-06-13 DIAGNOSIS — T45.1X5A ANEMIA DUE TO CHEMOTHERAPY: ICD-10-CM

## 2017-06-13 DIAGNOSIS — T45.1X5A CHEMOTHERAPY-INDUCED NEUTROPENIA (HCC): ICD-10-CM

## 2017-06-13 PROBLEM — E83.42 HYPOMAGNESEMIA: Status: ACTIVE | Noted: 2017-06-13

## 2017-06-13 LAB
HCT VFR BLD AUTO: 29 % (ref 35–47)
HGB BLD-MCNC: 9.2 G/DL (ref 11.5–16)
MAGNESIUM SERPL-MCNC: 1.5 MG/DL (ref 1.6–2.4)

## 2017-06-13 PROCEDURE — 96413 CHEMO IV INFUSION 1 HR: CPT

## 2017-06-13 PROCEDURE — 96367 TX/PROPH/DG ADDL SEQ IV INF: CPT

## 2017-06-13 PROCEDURE — 36415 COLL VENOUS BLD VENIPUNCTURE: CPT | Performed by: NURSE PRACTITIONER

## 2017-06-13 PROCEDURE — 83735 ASSAY OF MAGNESIUM: CPT | Performed by: NURSE PRACTITIONER

## 2017-06-13 PROCEDURE — 96366 THER/PROPH/DIAG IV INF ADDON: CPT

## 2017-06-13 PROCEDURE — 74011000258 HC RX REV CODE- 258: Performed by: OBSTETRICS & GYNECOLOGY

## 2017-06-13 PROCEDURE — 74011250636 HC RX REV CODE- 250/636: Performed by: OBSTETRICS & GYNECOLOGY

## 2017-06-13 PROCEDURE — 74011000250 HC RX REV CODE- 250: Performed by: OBSTETRICS & GYNECOLOGY

## 2017-06-13 PROCEDURE — 77030012965 HC NDL HUBR BBMI -A

## 2017-06-13 PROCEDURE — 96375 TX/PRO/DX INJ NEW DRUG ADDON: CPT

## 2017-06-13 PROCEDURE — 74011250636 HC RX REV CODE- 250/636

## 2017-06-13 PROCEDURE — 85018 HEMOGLOBIN: CPT | Performed by: NURSE PRACTITIONER

## 2017-06-13 PROCEDURE — 96417 CHEMO IV INFUS EACH ADDL SEQ: CPT

## 2017-06-13 PROCEDURE — 96360 HYDRATION IV INFUSION INIT: CPT

## 2017-06-13 PROCEDURE — 74011250636 HC RX REV CODE- 250/636: Performed by: NURSE PRACTITIONER

## 2017-06-13 RX ORDER — FENTANYL 100 UG/H
1 PATCH TRANSDERMAL
Qty: 10 PATCH | Refills: 0 | Status: SHIPPED | OUTPATIENT
Start: 2017-06-13 | End: 2017-07-20 | Stop reason: SDUPTHER

## 2017-06-13 RX ORDER — SODIUM CHLORIDE 9 MG/ML
10 INJECTION INTRAMUSCULAR; INTRAVENOUS; SUBCUTANEOUS AS NEEDED
Status: ACTIVE | OUTPATIENT
Start: 2017-06-13 | End: 2017-06-14

## 2017-06-13 RX ORDER — HEPARIN 100 UNIT/ML
500 SYRINGE INTRAVENOUS AS NEEDED
Status: ACTIVE | OUTPATIENT
Start: 2017-06-13 | End: 2017-06-14

## 2017-06-13 RX ORDER — MAGNESIUM SULFATE HEPTAHYDRATE 40 MG/ML
2 INJECTION, SOLUTION INTRAVENOUS
Status: COMPLETED | OUTPATIENT
Start: 2017-06-13 | End: 2017-06-13

## 2017-06-13 RX ORDER — MORPHINE SULFATE ORAL SOLUTION 10 MG/5ML
10 SOLUTION ORAL
Qty: 500 ML | Refills: 0 | Status: SHIPPED | OUTPATIENT
Start: 2017-06-13 | End: 2017-07-20 | Stop reason: SDUPTHER

## 2017-06-13 RX ORDER — SODIUM CHLORIDE 9 MG/ML
50 INJECTION, SOLUTION INTRAVENOUS AS NEEDED
Status: DISPENSED | OUTPATIENT
Start: 2017-06-13 | End: 2017-06-14

## 2017-06-13 RX ORDER — SODIUM CHLORIDE 0.9 % (FLUSH) 0.9 %
10 SYRINGE (ML) INJECTION AS NEEDED
Status: ACTIVE | OUTPATIENT
Start: 2017-06-13 | End: 2017-06-14

## 2017-06-13 RX ADMIN — SODIUM CHLORIDE 10 ML: 9 INJECTION, SOLUTION INTRAMUSCULAR; INTRAVENOUS; SUBCUTANEOUS at 09:24

## 2017-06-13 RX ADMIN — SODIUM CHLORIDE 1000 ML: 900 INJECTION, SOLUTION INTRAVENOUS at 09:23

## 2017-06-13 RX ADMIN — SODIUM CHLORIDE, PRESERVATIVE FREE 500 UNITS: 5 INJECTION INTRAVENOUS at 14:25

## 2017-06-13 RX ADMIN — MAGNESIUM SULFATE HEPTAHYDRATE 2 G: 40 INJECTION, SOLUTION INTRAVENOUS at 10:23

## 2017-06-13 RX ADMIN — GRANISETRON HYDROCHLORIDE 1 MG: 1 INJECTION INTRAVENOUS at 11:48

## 2017-06-13 RX ADMIN — Medication 10 ML: at 14:25

## 2017-06-13 RX ADMIN — SODIUM CHLORIDE 150 MG: 900 INJECTION, SOLUTION INTRAVENOUS at 12:35

## 2017-06-13 RX ADMIN — FAMOTIDINE 20 MG: 10 INJECTION INTRAVENOUS at 12:00

## 2017-06-13 RX ADMIN — Medication 10 ML: at 09:24

## 2017-06-13 RX ADMIN — SODIUM CHLORIDE 1264 MG: 900 INJECTION, SOLUTION INTRAVENOUS at 13:06

## 2017-06-13 RX ADMIN — DEXAMETHASONE SODIUM PHOSPHATE 8 MG: 4 INJECTION, SOLUTION INTRA-ARTICULAR; INTRALESIONAL; INTRAMUSCULAR; INTRAVENOUS; SOFT TISSUE at 12:18

## 2017-06-13 RX ADMIN — MAGNESIUM SULFATE HEPTAHYDRATE 2 G: 40 INJECTION, SOLUTION INTRAVENOUS at 09:42

## 2017-06-13 RX ADMIN — BEVACIZUMAB 840 MG: 400 INJECTION, SOLUTION INTRAVENOUS at 13:49

## 2017-06-13 RX ADMIN — SODIUM CHLORIDE 50 ML/HR: 900 INJECTION, SOLUTION INTRAVENOUS at 12:20

## 2017-06-13 NOTE — PROGRESS NOTES
[address]  Dr. Myra Rico. Wall, NP    Infusion Date: 6/13/2017     MAHSA Peguero is a 61 y.o.  postmenopausal female with a diagnosis of stage IV ovarian cancer. She underwent exploratory laparotomy with suboptimal debulking. She had extensive disease. She was readmitted about a week later with obstruction and subsequently had a cecal perforation, requiring resection, end ileostomy, and mucous fistula. During the hospitalization she was also diagnosed with pulmonary embolus and had chest tubes placed for bilateral pleural effusions. She had a prolonged hospital course and actually received a dose of cisplatin chemotherapy while in the hospital to help dry up the effusions. She completed 6 cycles of Taxol/Carboplatin chemotherapy following her initial debulking. She did relatively well, considering she had an ileostomy to deal with during chemotherapy. I took her to the OR on 4/29/16 for interval debulking and reversal of her ileostomy. She had extensive disease, but we were able to resect the majority of her disease. One week later she developed an anastomotic leak requiring reexploration and recreation of an ileostomy. She had another prolonged hospitalization, but recovered well since surgery. We then reinitiated Taxol/Carboplatin chemotherapy, but did reduce the dose of Taxol to 135 mg/m2. She completed 4 mores cycles. Her CA-125 normalized and we stopped treatment.      On 2/7, she presented to Dr. Bel Oneill office with her  to discuss her follow up/surveillance CT scan. Unfortunately, it demonstrated progression of disease, although minimally worse than her prior scan 2 months previously. She was actually feeling better than she did at that time. After review of scans and disease, the decision was made to begin Doxil/Avastin Q28 days for 6 cycles.  She began this on 2/27/2017    She now has evidence of recurrent disease and she is receiving Doxil/Avastin. She has completed 3 cycles so far. She has been admitted to [facility] several time since starting this regimen with evidence of partial small bowel obstruction. The symptoms have resolved each time with conservative management. She had increased ascites on her last scan, but no significant change in her disease otherwise. Her CA-125 done earlier this week went up. All of this suggests progression of disease. Subjective:   Pt presents today for cycle 2/D1 of Gemzar/Avastin. She has recently completed 3 cycle os Doxil/Avastin (2/27-4/25) and progressed on this regimen. She was recently in the hospital for abd pain and r/o bowel obstruction. It resolved with medical management alone. While there, she wa sable to obtain an upper endoscopy which showed some narrowing and ulcerations. (see below). She was started on Carafate and will follow up with her GI doctor in [facility]. She says her abd pain is improved with escalante in pain medications and she is feeling like doing more. She is now on 100 mcg Q 72 hours with MS elixer for breakthrough. She was having great difficulty with pain in her esophagus and went to see her GI physician in [facility]. He recommended she stay on a full liquid diet through this week. She has been taking protein shakes and smoothies. A few days ago, she called and said she was loosing wt. I suggested increasing calories and try Ice cream with Ensure with [facility] as her \"milk\". She also went to [facility] and had their ice cream cone since they had a lot of calories. She said she thinks it is working as she has gained a pound back. Of note, while she was in the hospital on 5/10, she had been complaining of difficulty swallowing and a GI consult was obtained. Dr. Perfecto Rodriguez saw the pt and an upper endoscopy was performed.  It showed  Findings:   Esophagus: in the mid esophagus was circumferential ulceration with mild narrowing which sloughed away with passage of the scope s/p biopsies, mild distal linear ulcerations s/p biopsies  Stomach: moderate hiatal hernia, diminutive polyp in the body s/p biopsies, rest of the stomach was normal  Duodenum: normal   (Bx results below)      Review of Systems:  General: abdominal pain improved. Continued fatigue with some wt loss since last visit, but pt says she gained a pound in the last 2 days  HEENT: Denies visual changes, dysphagia or headache  Resp: Denies SOB, DUDLEY, wheezing or cough  CV: Denies CP, palpitations  GI/: Acknowledges continued abd pain, but says it is improved, or at least better with pain medication adjustment. Denies N/V. Says she is working hard to keep calories up with having to be on full liquid diet per GI doctor. continues with protein shakes. She says she has says gas in her bag is better this week with phazyme . Says constipation has been better this week and stool soft. MuskSkel: Denies muscle ache or joint pain  Neuro: Denies dizzyness or syncope    Objective:     Blood pressure 101/65, pulse 73, temperature 97.7 °F (36.5 °C), resp. rate 16, height 5' 3\" (1.6 m), weight 115 lb 12.8 oz (52.5 kg), SpO2 93 %, not currently breastfeeding. Temp (24hrs), Av °F (36.7 °C), Min:97.7 °F (36.5 °C), Max:98.2 °F (36.8 °C)      _____________________  Physical Exam:     General: A&O X 3. More alert today and continues with continued pleasant affect. HEENT: Oral mucosa pink, slightly dry again with no sores or lesions noted. No cervical adenopathy appreciated  Port site without redness, tenderness or swelling  Cardiovascular: Regular without M/R/G  Lungs:CTA bilat without wheezing or rales  Abdomen: Soft with less tenderness to palpation to lower/mid abdomen and + bowel sounds throughout. Stool present in bag with some gas  Ext: + pedal pulses without edema bilat.   bilat  Neuro: grossly intact    LABS: 17:  WBC 3.9; HGB 9.2 HCT 29.7; Plts 119; ANC 1.7; Na 137; K+ 3.8 Cl 93; CO2 26; BUN 21; Creat 0.91; Gluc 106; Urine protein:1+   6/8/17= 102  (5/16/17= 145.6:  4/27= 100; 4/2163=540.2 3/22=80; 2/23= 63.9)        PATHOLOGY from 5/10/2017 Upper Endoscopy:  1. Gastric polyp, polypectomy:   Benign polypoid gastric oxyntic mucosa with some features of hyperplastic polyp   2. Lower esophagus, biopsy:   Acute erosive esophagitis of squamous mucosa with ulceration and severe squamous atypia (see comment)   3. Proximal esophagus, biopsy:   Acute esophagitis of squamous mucosa with severe squamous atypia           Imaging:  CT Abd/Pelvis 5/7/2017:  FINDINGS:   LUNG BASES: Left lung base subpleural scarring is unchanged. No pneumonia. INCIDENTALLY IMAGED HEART AND MEDIASTINUM: Cardiac size is within normal limits. No pericardial effusion. Small hiatal hernia is unchanged. LIVER: Small segment 8 and segment 3 hypoenhancing liver lesions are unchanged. GALLBLADDER: Unremarkable. SPLEEN: No mass. PANCREAS: No mass or ductal dilatation. ADRENALS: Unremarkable. KIDNEYS: Right hydronephrosis is mild and new. Proximal right ureter is mildly  dilated. There is transition to nondilated ureter in the mid ureter. No renal  mass or left hydronephrosis. STOMACH: Unremarkable. SMALL BOWEL: Small bowel loops in the lower pelvis measuring up to 2.7 cm in  diameter. Contrast extends through the small bowel into the right lower quadrant  ileostomy. Small bowel centralization is unchanged. COLON: Majority of the colon has been resected. Distal sigmoid colon and rectum  are nondistended. APPENDIX: Resected. PERITONEUM: Small volume of ascites is slightly increased. Subtle non masslike  enhancement of the peritoneum is unchanged. No pneumoperitoneum. RETROPERITONEUM: No lymphadenopathy. IVC filter is unchanged. REPRODUCTIVE ORGANS: Uterus is been resected. Ovaries have been resected. No  pelvic mass. URINARY BLADDER: No mass or calculus. BONES: No destructive bone lesion.   ADDITIONAL COMMENTS: N/A     IMPRESSION  IMPRESSION:     1. New mild right hydronephrosis is likely secondary to ureteral obstruction in  the mid ureter. No ureteral calculus. 2. Mild small bowel distention may represent early partial small bowel  obstruction. No complete bowel obstruction. 3. Increased small volume of ascites. Unchanged non masslike peritoneal  enhancement. 4. Unchanged small liver lesions. CT scan of Abdomen/Pelvis with contrast 4/19/2017  FINDINGS:   LUNG BASES: Clear. INCIDENTALLY IMAGED HEART AND MEDIASTINUM: Unremarkable. LIVER: 1 cm low-density and 5 mm low-density in the liver are stable. Peritoneal  implants overlying the liver and spleen are stable.     Ascites is stable. Small bowel is less distended on the current study. Patient  appears be status post subtotal colectomy. GALLBLADDER: Unremarkable. SPLEEN: No mass. PANCREAS: No mass or ductal dilatation. ADRENALS: Unremarkable. KIDNEYS: No mass, calculus, or hydronephrosis. STOMACH: Unremarkable. SMALL BOWEL: There is less small bowel distention when compared to 3/21/2017  COLON: Patient appears to be status post subtotal colectomy  APPENDIX: Surgically absent  PERITONEUM: Ascites is stable  RETROPERITONEUM: No lymphadenopathy or aortic aneurysm. REPRODUCTIVE ORGANS: Surgically absent  URINARY BLADDER: No mass or calculus. BONES: No destructive bone lesion. ADDITIONAL COMMENTS: N/A     IMPRESSION  IMPRESSION:     1. Ascites and peritoneal implants are stable. 2 small low densities in the  liver are stable. Small bowel distention has improved when compared to 3/21/2017  without definite evidence of obstruction. . Ileostomy is noted in the righ          Assessment:     Patient Active Problem List   Diagnosis Code    Malignant neoplasm of both ovaries ([address] C56.1, C56.2    Carcinomatosis peritonei ([address] C78.6, C80.1    Small bowel obstruction ([address] K56.69    Ileostomy in place ([address] Z93.2    Anemia due to chemotherapy D64.81, T45.1X5A    CINV (chemotherapy-induced nausea and vomiting) R11.2, T45.1X5A    Cancer associated pain G89.3    Generalized abdominal pain R10.84    Anxiety about health F41.8    Ileus (HCC) K56.7    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    Hx of pulmonary embolus Z86.711    Dehydration E86.0    Chemotherapy-induced neutropenia (HCC) D70.1    Anticoagulation adequate with anticoagulant therapy Z79.01    Counseling regarding end of life decision making Z71.89    Bowel obstruction (HCC) K56.60    Protein calorie malnutrition (HCC) E46    Malignant ascites R18.0    Hydronephrosis of right kidney N13.30    Isolated proteinuria R80.0    Erosive esophagitis K22.10    Ulcerative esophagitis K22.10    Hypomagnesemia E83.42         Plan:   Proceed with C2 Day 1 of Gemzar/Avastin  Will hydrate with extra liter of NaCl today due to pt feeling \"dehydrated\" and says she does much better with the hydration after chemo. Monitor hgb and will transfuses for hgb less than 8  Replace Mag today and continue to monitor  Monitor Plt's that are now 119. Discussed bleeding precautions with her and especially with Eliquis. Encouaged protein shakes with ice cream for calories and ensure between until she is able to eat solids again (hopefull this week sometime when  GI doctor gives her the go ahead to eat. Will monitor creat closely especially in light of her mild right hydronephrosis  Continue to monitor K+  Since pain is finally well controlled, will continue with Fentanyl patch to 100 mcg Q3 days and gave re-fill of Fentanyl and MS today  Hydration encouraged  Continue Eliquis for previous PE.   She will continue phazyme for gas  Continue Miralax to try to prevent her post chemotherapy constipation  She will call for any concerns or questions      [facility],   6/13/2017          ______________________  Medications:    Current Outpatient Prescriptions   Medication Sig Dispense Refill    morphine 10 mg/5 mL oral solution Take 5 mL by mouth every four (4) hours as needed for Pain. 500 mL 0    fentaNYL (DURAGESIC) 100 mcg/hr PATCH 1 Patch by TransDERmal route every seventy-two (72) hours. Max Daily Amount: 1 Patch. 10 Patch 0    NEXIUM PACKET 40 mg granules for oral suspension two (2) times a day.  metoclopramide (REGLAN) 5 mg/5 mL syrup Take 10 mL by mouth four (4) times daily as needed. Take before meals as needed 500 mL 3    promethazine (PHENERGAN) 6.25 mg/5 mL syrup Take 20 mL by mouth four (4) times daily as needed for Nausea. 400 mL 4    magnesium oxide (MAG-OX) 400 mg tablet Take 1 Tab by mouth two (2) times a day. Indications: HYPOMAGNESEMIA 60 Tab 2    sucralfate (CARAFATE) 100 mg/mL suspension Take 5 mL by mouth four (4) times daily. 414 mL 3    polyethylene glycol (MIRALAX) 17 gram/dose powder Take 17 g by mouth daily as needed. 238 g 3    Cetirizine (ZYRTEC) 10 mg cap Take  by mouth.  apixaban (ELIQUIS) 2.5 mg tablet Take 1 Tab by mouth two (2) times a day. 60 Tab 6    zolpidem (AMBIEN) 10 mg tablet Take 1 Tab by mouth nightly as needed for Sleep. Max Daily Amount: 10 mg. 30 Tab 1    ondansetron (ZOFRAN ODT) 4 mg disintegrating tablet Take 1 Tab by mouth every eight (8) hours as needed for Nausea. 30 Tab 2    esomeprazole (NEXIUM) 40 mg capsule Take 1 Cap by mouth daily. 90 Cap 2    multivit-min-FA-coenzyme Q10 (AQUADEKS) 100-350-5 mcg-mcg-mg chew Take 1 Tab by mouth daily. Indications: VITAMIN DEFICIENCY PREVENTION 60 Tab 1    Calcium-Vitamin D3-Vitamin K 500-500-40 mg-unit-mcg chew Take 1 Tab by mouth two (2) times a day.  metoprolol (LOPRESSOR) 100 mg tablet Take 50 mg by mouth two (2) times a day.  lidocaine-prilocaine (EMLA) topical cream Apply small amount over port area and cover with a band aid 1 hour before chemo treatment 30 g 3    multivitamin (ONE A DAY) tablet Take 1 Tab by mouth daily.        Current Facility-Administered Medications   Medication Dose Route Frequency Provider Last Rate Last Dose    sodium chloride 0.9 % injection 10 mL  10 mL IntraVENous PRJORDAN Barr MD   10 mL at 06/13/17 0924    heparin (porcine) pf 500 Units  500 Units IntraVENous PRN Radha Barr MD   500 Units at 06/13/17 1425    sodium chloride (NS) flush 10 mL  10 mL IntraVENous PRJORDAN Barr MD   10 mL at 06/13/17 1425    0.9% sodium chloride infusion  50 mL/hr IntraVENous TITO Barr MD 50 mL/hr at 06/13/17 1220 50 mL/hr at 06/13/17 1220

## 2017-06-13 NOTE — PROGRESS NOTES
0910 Pt admit to [facility] for C2D1 GemzarAvastin/ ambulatory in stable condition. Assessment completed. No new concerns voiced. Port access and flushed with positive blood return. Labs drawn per order and sent for processing. Visit Vitals    /72    Pulse 89    Temp 98.2 °F (36.8 °C)    Resp 16    Ht 5' 3\" (1.6 m)    Wt 52.5 kg (115 lb 12.8 oz)    SpO2 98%    BMI 20.51 kg/m2       Medications:  Normal Saline Bolus 1000ml  Magnesium 2g IV x2  Pepcid IV Push  Kytril IV Push  Decadron IV Push  Emend  Gemzar  Avastin  Heparin Flush          1425 Pt tolerated treatment well. Port maintained positive blood return throughout treatment, flushed with positive blood return at conclusion, port heparinized and de-accessed per protocol. D/c home ambulatory in no distress. Pt aware of next appointment scheduled for 6/20/17.     Recent Results (from the past 12 hour(s))   MAGNESIUM    Collection Time: 06/13/17  9:18 AM   Result Value Ref Range    Magnesium 1.5 (L) 1.6 - 2.4 mg/dL   HGB & HCT    Collection Time: 06/13/17  9:18 AM   Result Value Ref Range    HGB 9.2 (L) 11.5 - 16.0 g/dL    HCT 29.0 (L) 35.0 - 47.0 %

## 2017-06-16 RX ORDER — GRANISETRON HYDROCHLORIDE 1 MG/ML
1 INJECTION INTRAVENOUS ONCE
Status: COMPLETED | OUTPATIENT
Start: 2017-06-20 | End: 2017-06-20

## 2017-06-16 RX ORDER — DEXAMETHASONE SODIUM PHOSPHATE 4 MG/ML
8 INJECTION, SOLUTION INTRA-ARTICULAR; INTRALESIONAL; INTRAMUSCULAR; INTRAVENOUS; SOFT TISSUE ONCE
Status: COMPLETED | OUTPATIENT
Start: 2017-06-20 | End: 2017-06-20

## 2017-06-20 ENCOUNTER — APPOINTMENT (OUTPATIENT)
Dept: INFUSION THERAPY | Age: 57
End: 2017-06-20
Payer: OTHER GOVERNMENT

## 2017-06-20 ENCOUNTER — HOSPITAL ENCOUNTER (OUTPATIENT)
Dept: INFUSION THERAPY | Age: 57
Discharge: HOME | End: 2017-06-20
Payer: OTHER GOVERNMENT

## 2017-06-20 VITALS
BODY MASS INDEX: 20.3 KG/M2 | WEIGHT: 114.6 LBS | DIASTOLIC BLOOD PRESSURE: 59 MMHG | SYSTOLIC BLOOD PRESSURE: 99 MMHG | TEMPERATURE: 97.3 F | HEIGHT: 63 IN | HEART RATE: 69 BPM | RESPIRATION RATE: 16 BRPM | OXYGEN SATURATION: 98 %

## 2017-06-20 DIAGNOSIS — C56.3 MALIGNANT NEOPLASM OF BOTH OVARIES (HCC): ICD-10-CM

## 2017-06-20 DIAGNOSIS — D64.81 ANEMIA DUE TO CHEMOTHERAPY: ICD-10-CM

## 2017-06-20 DIAGNOSIS — E86.0 DEHYDRATION: ICD-10-CM

## 2017-06-20 DIAGNOSIS — K22.10 ULCERATIVE ESOPHAGITIS: ICD-10-CM

## 2017-06-20 DIAGNOSIS — E46 PROTEIN CALORIE MALNUTRITION (HCC): Primary | ICD-10-CM

## 2017-06-20 DIAGNOSIS — C78.6 CARCINOMATOSIS PERITONEI (HCC): ICD-10-CM

## 2017-06-20 DIAGNOSIS — T45.1X5A CHEMOTHERAPY-INDUCED NEUTROPENIA (HCC): ICD-10-CM

## 2017-06-20 DIAGNOSIS — D70.1 CHEMOTHERAPY-INDUCED NEUTROPENIA (HCC): ICD-10-CM

## 2017-06-20 DIAGNOSIS — T45.1X5A ANEMIA DUE TO CHEMOTHERAPY: ICD-10-CM

## 2017-06-20 LAB
ALBUMIN SERPL BCP-MCNC: 3.2 G/DL (ref 3.5–5)
ALBUMIN/GLOB SERPL: 0.8 {RATIO} (ref 1.1–2.2)
ALP SERPL-CCNC: 106 U/L (ref 45–117)
ALT SERPL-CCNC: 29 U/L (ref 12–78)
ANION GAP BLD CALC-SCNC: 9 MMOL/L (ref 5–15)
AST SERPL W P-5'-P-CCNC: 25 U/L (ref 15–37)
BASOPHILS # BLD AUTO: 0 K/UL (ref 0–0.1)
BASOPHILS # BLD: 0 % (ref 0–1)
BILIRUB SERPL-MCNC: 0.2 MG/DL (ref 0.2–1)
BUN SERPL-MCNC: 20 MG/DL (ref 6–20)
BUN/CREAT SERPL: 19 (ref 12–20)
CALCIUM SERPL-MCNC: 9 MG/DL (ref 8.5–10.1)
CHLORIDE SERPL-SCNC: 97 MMOL/L (ref 97–108)
CO2 SERPL-SCNC: 26 MMOL/L (ref 21–32)
CREAT SERPL-MCNC: 1.04 MG/DL (ref 0.55–1.02)
EOSINOPHIL # BLD: 0 K/UL (ref 0–0.4)
EOSINOPHIL NFR BLD: 0 % (ref 0–7)
ERYTHROCYTE [DISTWIDTH] IN BLOOD BY AUTOMATED COUNT: 13.9 % (ref 11.5–14.5)
GLOBULIN SER CALC-MCNC: 4 G/DL (ref 2–4)
GLUCOSE SERPL-MCNC: 81 MG/DL (ref 65–100)
HCT VFR BLD AUTO: 25.7 % (ref 35–47)
HGB BLD-MCNC: 8.2 G/DL (ref 11.5–16)
LYMPHOCYTES # BLD AUTO: 33 % (ref 12–49)
LYMPHOCYTES # BLD: 1.9 K/UL (ref 0.8–3.5)
MAGNESIUM SERPL-MCNC: 1.3 MG/DL (ref 1.6–2.4)
MCH RBC QN AUTO: 29.9 PG (ref 26–34)
MCHC RBC AUTO-ENTMCNC: 31.9 G/DL (ref 30–36.5)
MCV RBC AUTO: 93.8 FL (ref 80–99)
MONOCYTES # BLD: 0.7 K/UL (ref 0–1)
MONOCYTES NFR BLD AUTO: 12 % (ref 5–13)
NEUTS SEG # BLD: 3.1 K/UL (ref 1.8–8)
NEUTS SEG NFR BLD AUTO: 55 % (ref 32–75)
PLATELET # BLD AUTO: 310 K/UL (ref 150–400)
POTASSIUM SERPL-SCNC: 3.9 MMOL/L (ref 3.5–5.1)
PROT SERPL-MCNC: 7.2 G/DL (ref 6.4–8.2)
RBC # BLD AUTO: 2.74 M/UL (ref 3.8–5.2)
SODIUM SERPL-SCNC: 132 MMOL/L (ref 136–145)
WBC # BLD AUTO: 5.7 K/UL (ref 3.6–11)

## 2017-06-20 PROCEDURE — 85025 COMPLETE CBC W/AUTO DIFF WBC: CPT | Performed by: OBSTETRICS & GYNECOLOGY

## 2017-06-20 PROCEDURE — 77030012965 HC NDL HUBR BBMI -A

## 2017-06-20 PROCEDURE — 96413 CHEMO IV INFUSION 1 HR: CPT

## 2017-06-20 PROCEDURE — 96367 TX/PROPH/DG ADDL SEQ IV INF: CPT

## 2017-06-20 PROCEDURE — 96375 TX/PRO/DX INJ NEW DRUG ADDON: CPT

## 2017-06-20 PROCEDURE — 74011250636 HC RX REV CODE- 250/636: Performed by: OBSTETRICS & GYNECOLOGY

## 2017-06-20 PROCEDURE — 74011000250 HC RX REV CODE- 250: Performed by: OBSTETRICS & GYNECOLOGY

## 2017-06-20 PROCEDURE — 83735 ASSAY OF MAGNESIUM: CPT | Performed by: NURSE PRACTITIONER

## 2017-06-20 PROCEDURE — 74011250636 HC RX REV CODE- 250/636: Performed by: NURSE PRACTITIONER

## 2017-06-20 PROCEDURE — 80053 COMPREHEN METABOLIC PANEL: CPT | Performed by: OBSTETRICS & GYNECOLOGY

## 2017-06-20 PROCEDURE — 74011000258 HC RX REV CODE- 258: Performed by: OBSTETRICS & GYNECOLOGY

## 2017-06-20 PROCEDURE — 96366 THER/PROPH/DIAG IV INF ADDON: CPT

## 2017-06-20 PROCEDURE — 36415 COLL VENOUS BLD VENIPUNCTURE: CPT | Performed by: OBSTETRICS & GYNECOLOGY

## 2017-06-20 RX ORDER — MAGNESIUM SULFATE HEPTAHYDRATE 40 MG/ML
2 INJECTION, SOLUTION INTRAVENOUS
Status: DISCONTINUED | OUTPATIENT
Start: 2017-06-20 | End: 2017-06-20

## 2017-06-20 RX ORDER — MAGNESIUM SULFATE 1 G/100ML
1 INJECTION INTRAVENOUS
Status: DISCONTINUED | OUTPATIENT
Start: 2017-06-20 | End: 2017-06-20

## 2017-06-20 RX ORDER — HEPARIN 100 UNIT/ML
500 SYRINGE INTRAVENOUS AS NEEDED
Status: ACTIVE | OUTPATIENT
Start: 2017-06-20 | End: 2017-06-21

## 2017-06-20 RX ORDER — SODIUM CHLORIDE 0.9 % (FLUSH) 0.9 %
10-40 SYRINGE (ML) INJECTION AS NEEDED
Status: ACTIVE | OUTPATIENT
Start: 2017-06-20 | End: 2017-06-21

## 2017-06-20 RX ORDER — SODIUM CHLORIDE 9 MG/ML
25 INJECTION, SOLUTION INTRAVENOUS AS NEEDED
Status: DISPENSED | OUTPATIENT
Start: 2017-06-20 | End: 2017-06-21

## 2017-06-20 RX ORDER — SODIUM CHLORIDE 9 MG/ML
10 INJECTION INTRAMUSCULAR; INTRAVENOUS; SUBCUTANEOUS AS NEEDED
Status: ACTIVE | OUTPATIENT
Start: 2017-06-20 | End: 2017-06-21

## 2017-06-20 RX ORDER — MAGNESIUM SULFATE 1 G/100ML
1 INJECTION INTRAVENOUS
Status: COMPLETED | OUTPATIENT
Start: 2017-06-20 | End: 2017-06-20

## 2017-06-20 RX ADMIN — MAGNESIUM SULFATE HEPTAHYDRATE 1 G: 1 INJECTION, SOLUTION INTRAVENOUS at 16:45

## 2017-06-20 RX ADMIN — SODIUM CHLORIDE 10 ML: 9 INJECTION, SOLUTION INTRAMUSCULAR; INTRAVENOUS; SUBCUTANEOUS at 11:19

## 2017-06-20 RX ADMIN — SODIUM CHLORIDE, PRESERVATIVE FREE 500 UNITS: 5 INJECTION INTRAVENOUS at 17:30

## 2017-06-20 RX ADMIN — SODIUM CHLORIDE 1264 MG: 900 INJECTION, SOLUTION INTRAVENOUS at 14:09

## 2017-06-20 RX ADMIN — GRANISETRON HYDROCHLORIDE 1 MG: 1 INJECTION INTRAVENOUS at 12:50

## 2017-06-20 RX ADMIN — SODIUM CHLORIDE 150 MG: 900 INJECTION, SOLUTION INTRAVENOUS at 13:31

## 2017-06-20 RX ADMIN — DEXAMETHASONE SODIUM PHOSPHATE 8 MG: 4 INJECTION, SOLUTION INTRA-ARTICULAR; INTRALESIONAL; INTRAMUSCULAR; INTRAVENOUS; SOFT TISSUE at 13:13

## 2017-06-20 RX ADMIN — SODIUM CHLORIDE 25 ML/HR: 900 INJECTION, SOLUTION INTRAVENOUS at 12:49

## 2017-06-20 RX ADMIN — MAGNESIUM SULFATE HEPTAHYDRATE 1 G: 1 INJECTION, SOLUTION INTRAVENOUS at 16:12

## 2017-06-20 RX ADMIN — FAMOTIDINE 20 MG: 10 INJECTION, SOLUTION INTRAVENOUS at 12:32

## 2017-06-20 RX ADMIN — MAGNESIUM SULFATE HEPTAHYDRATE 1 G: 1 INJECTION, SOLUTION INTRAVENOUS at 14:58

## 2017-06-20 RX ADMIN — SODIUM CHLORIDE 1000 ML: 900 INJECTION, SOLUTION INTRAVENOUS at 11:20

## 2017-06-20 RX ADMIN — MAGNESIUM SULFATE HEPTAHYDRATE 1 G: 1 INJECTION, SOLUTION INTRAVENOUS at 15:38

## 2017-06-20 NOTE — PROGRESS NOTES
Danis Amato. Wall, NP    Infusion Date: 6/20/2017     MAHSA Rodríguez is a 61 y.o.  postmenopausal female with a diagnosis of stage IV ovarian cancer. She underwent exploratory laparotomy with suboptimal debulking. She had extensive disease. She was readmitted about a week later with obstruction and subsequently had a cecal perforation, requiring resection, end ileostomy, and mucous fistula. During the hospitalization she was also diagnosed with pulmonary embolus and had chest tubes placed for bilateral pleural effusions. She had a prolonged hospital course and actually received a dose of cisplatin chemotherapy while in the hospital to help dry up the effusions. She completed 6 cycles of Taxol/Carboplatin chemotherapy following her initial debulking. She did relatively well, considering she had an ileostomy to deal with during chemotherapy. I took her to the OR on 4/29/16 for interval debulking and reversal of her ileostomy. She had extensive disease, but we were able to resect the majority of her disease. One week later she developed an anastomotic leak requiring reexploration and recreation of an ileostomy. She had another prolonged hospitalization, but recovered well since surgery. We then reinitiated Taxol/Carboplatin chemotherapy, but did reduce the dose of Taxol to 135 mg/m2. She completed 4 mores cycles. Her CA-125 normalized and we stopped treatment.      On 2/7, she presented to Dr. Lacey Lawrence office with her  to discuss her follow up/surveillance CT scan. Unfortunately, it demonstrated progression of disease, although minimally worse than her prior scan 2 months previously. She was actually feeling better than she did at that time. After review of scans and disease, the decision was made to begin Doxil/Avastin Q28 days for 6 cycles.  She began this on 2/27/2017    She now has evidence of recurrent disease and she is receiving Doxil/Avastin. She has completed 3 cycles so far. She has been admitted to [facility] several time since starting this regimen with evidence of partial small bowel obstruction. The symptoms have resolved each time with conservative management. She had increased ascites on her last scan, but no significant change in her disease otherwise. Her CA-125 done earlier this week went up. All of this suggests progression of disease. Subjective:   Pt presents today for cycle 2/D8 of Gemzar/Avastin. She has recently completed 3 cycle os Doxil/Avastin (2/27-4/25) and progressed on this regimen. Pt says she felt dehydrated again the last 2 days and would like a liter of hydration with her infusion today    Pt was dx with esophagitis and was put on a full liquid diet for ~2 weeks and has begun to eat solid foods again and is tolerating it well  This may be why pt also feels some dehydrated. Other than this, she is also noticing some abd discomfort the last 24 hours and says it is \"like when I get blocked\". She says it is not bad and she is not nauseated. Said her ostomy output has decreased the last 24 hours. ususlly she begins some Reglan and Miralax and says she has noticed it helps, but has not begun this. Review of Systems:  General: abdominal pain improved. Continued fatigue with some wt loss since last visit, but pt says she gained a pound in the last 2 days  HEENT: Denies visual changes, dysphagia or headache  Resp: Denies SOB, DUDLEY, wheezing or cough  CV: Denies CP, palpitations  GI/: Acknowledges continued abd pain, but says it is improved, or at least better with pain medication adjustment. Denies N/V. Says she is working hard to keep calories up with having to be on full liquid diet per GI doctor. continues with protein shakes. She says she has says gas in her bag is better this week with phazyme . Says constipation has been better this week and stool soft.     MuskSkel: Denies muscle ache or joint pain  Neuro: Denies dizzyness or syncope    Objective:     Blood pressure 117/76, pulse 76, temperature 97.4 °F (36.3 °C), resp. rate 18, height 5' 3\" (1.6 m), weight 114 lb 9.6 oz (52 kg), SpO2 99 %. Temp (24hrs), Av.4 °F (36.3 °C), Min:97.4 °F (36.3 °C), Max:97.4 °F (36.3 °C)      _____________________  Physical Exam:     General: A&O X 3. With continued pleasant affect. HEENT: Oral mucosa pink, dry with no sores or lesions noted. No cervical adenopathy appreciated  Port site without redness, tenderness or swelling  Cardiovascular: Regular without M/R/G  Lungs:CTA bilat without wheezing or rales  Abdomen: Soft with mild tenderness to palpation to lower/mid abdomen and + bowel sounds throughout. No stool currently present in bag. Small amount of gas noted. Ext: + pedal pulses without edema bilat. bilat  Neuro: grossly intact      : 17= 102  (17= 145.6:  = 100; 91=182.2 3/22=80; = 63.9)      Recent Results (from the past 12 hour(s))   METABOLIC PANEL, COMPREHENSIVE    Collection Time: 17 11:11 AM   Result Value Ref Range    Sodium 132 (L) 136 - 145 mmol/L    Potassium 3.9 3.5 - 5.1 mmol/L    Chloride 97 97 - 108 mmol/L    CO2 26 21 - 32 mmol/L    Anion gap 9 5 - 15 mmol/L    Glucose 81 65 - 100 mg/dL    BUN 20 6 - 20 MG/DL    Creatinine 1.04 (H) 0.55 - 1.02 MG/DL    BUN/Creatinine ratio 19 12 - 20      GFR est AA >60 >60 ml/min/1.73m2    GFR est non-AA 55 (L) >60 ml/min/1.73m2    Calcium 9.0 8.5 - 10.1 MG/DL    Bilirubin, total 0.2 0.2 - 1.0 MG/DL    ALT (SGPT) 29 12 - 78 U/L    AST (SGOT) 25 15 - 37 U/L    Alk.  phosphatase 106 45 - 117 U/L    Protein, total 7.2 6.4 - 8.2 g/dL    Albumin 3.2 (L) 3.5 - 5.0 g/dL    Globulin 4.0 2.0 - 4.0 g/dL    A-G Ratio 0.8 (L) 1.1 - 2.2     CBC WITH AUTOMATED DIFF    Collection Time: 17 11:11 AM   Result Value Ref Range    WBC 5.7 3.6 - 11.0 K/uL    RBC 2.74 (L) 3.80 - 5.20 M/uL    HGB 8.2 (L) 11.5 - 16.0 g/dL    HCT 25.7 (L) 35.0 - 47.0 %    MCV 93.8 80.0 - 99.0 FL    MCH 29.9 26.0 - 34.0 PG    MCHC 31.9 30.0 - 36.5 g/dL    RDW 13.9 11.5 - 14.5 %    PLATELET 650 058 - 891 K/uL    NEUTROPHILS 55 32 - 75 %    LYMPHOCYTES 33 12 - 49 %    MONOCYTES 12 5 - 13 %    EOSINOPHILS 0 0 - 7 %    BASOPHILS 0 0 - 1 %    ABS. NEUTROPHILS 3.1 1.8 - 8.0 K/UL    ABS. LYMPHOCYTES 1.9 0.8 - 3.5 K/UL    ABS. MONOCYTES 0.7 0.0 - 1.0 K/UL    ABS. EOSINOPHILS 0.0 0.0 - 0.4 K/UL    ABS. BASOPHILS 0.0 0.0 - 0.1 K/UL   MAGNESIUM    Collection Time: 06/20/17 11:11 AM   Result Value Ref Range    Magnesium 1.3 (L) 1.6 - 2.4 mg/dL           PATHOLOGY from 5/10/2017 Upper Endoscopy:  1. Gastric polyp, polypectomy:   Benign polypoid gastric oxyntic mucosa with some features of hyperplastic polyp   2. Lower esophagus, biopsy:   Acute erosive esophagitis of squamous mucosa with ulceration and severe squamous atypia (see comment)   3. Proximal esophagus, biopsy:   Acute esophagitis of squamous mucosa with severe squamous atypia           Imaging:  CT Abd/Pelvis 5/7/2017:  FINDINGS:   LUNG BASES: Left lung base subpleural scarring is unchanged. No pneumonia. INCIDENTALLY IMAGED HEART AND MEDIASTINUM: Cardiac size is within normal limits. No pericardial effusion. Small hiatal hernia is unchanged. LIVER: Small segment 8 and segment 3 hypoenhancing liver lesions are unchanged. GALLBLADDER: Unremarkable. SPLEEN: No mass. PANCREAS: No mass or ductal dilatation. ADRENALS: Unremarkable. KIDNEYS: Right hydronephrosis is mild and new. Proximal right ureter is mildly  dilated. There is transition to nondilated ureter in the mid ureter. No renal  mass or left hydronephrosis. STOMACH: Unremarkable. SMALL BOWEL: Small bowel loops in the lower pelvis measuring up to 2.7 cm in  diameter. Contrast extends through the small bowel into the right lower quadrant  ileostomy. Small bowel centralization is unchanged. COLON: Majority of the colon has been resected.  Distal sigmoid colon and rectum  are nondistended. APPENDIX: Resected. PERITONEUM: Small volume of ascites is slightly increased. Subtle non masslike  enhancement of the peritoneum is unchanged. No pneumoperitoneum. RETROPERITONEUM: No lymphadenopathy. IVC filter is unchanged. REPRODUCTIVE ORGANS: Uterus is been resected. Ovaries have been resected. No  pelvic mass. URINARY BLADDER: No mass or calculus. BONES: No destructive bone lesion. ADDITIONAL COMMENTS: N/A     IMPRESSION  IMPRESSION:     1. New mild right hydronephrosis is likely secondary to ureteral obstruction in  the mid ureter. No ureteral calculus. 2. Mild small bowel distention may represent early partial small bowel  obstruction. No complete bowel obstruction. 3. Increased small volume of ascites. Unchanged non masslike peritoneal  enhancement. 4. Unchanged small liver lesions. CT scan of Abdomen/Pelvis with contrast 4/19/2017  FINDINGS:   LUNG BASES: Clear. INCIDENTALLY IMAGED HEART AND MEDIASTINUM: Unremarkable. LIVER: 1 cm low-density and 5 mm low-density in the liver are stable. Peritoneal  implants overlying the liver and spleen are stable.     Ascites is stable. Small bowel is less distended on the current study. Patient  appears be status post subtotal colectomy. GALLBLADDER: Unremarkable. SPLEEN: No mass. PANCREAS: No mass or ductal dilatation. ADRENALS: Unremarkable. KIDNEYS: No mass, calculus, or hydronephrosis. STOMACH: Unremarkable. SMALL BOWEL: There is less small bowel distention when compared to 3/21/2017  COLON: Patient appears to be status post subtotal colectomy  APPENDIX: Surgically absent  PERITONEUM: Ascites is stable  RETROPERITONEUM: No lymphadenopathy or aortic aneurysm. REPRODUCTIVE ORGANS: Surgically absent  URINARY BLADDER: No mass or calculus. BONES: No destructive bone lesion. ADDITIONAL COMMENTS: N/A     IMPRESSION  IMPRESSION:     1. Ascites and peritoneal implants are stable.  2 small low densities in the  liver are stable. Small bowel distention has improved when compared to 3/21/2017  without definite evidence of obstruction. . Ileostomy is noted in the righ          Assessment:     Patient Active Problem List   Diagnosis Code    Malignant neoplasm of both ovaries ([facility] [address] C56.1, C56.2    Carcinomatosis peritonei ([facility] [address] C78.6, C80.1    Small bowel obstruction ([address] K56.69    Ileostomy in place ([facility] [address] Z93.2    Anemia due to chemotherapy D64.81, T45.1X5A    CINV (chemotherapy-induced nausea and vomiting) R11.2, T45.1X5A    Cancer associated pain G89.3    Generalized abdominal pain R10.84    Anxiety about health F41.8    Ileus (HCC) K56.7    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    Hx of pulmonary embolus Z86.711    Dehydration E86.0    Chemotherapy-induced neutropenia (HCC) D70.1    Anticoagulation adequate with anticoagulant therapy Z79.01    Counseling regarding end of life decision making Z71.89    Bowel obstruction (HCC) K56.60    Protein calorie malnutrition (HCC) E46    Malignant ascites R18.0    Hydronephrosis of right kidney N13.30    Isolated proteinuria R80.0    Erosive esophagitis K22.10    Ulcerative esophagitis K22.10    Hypomagnesemia E83.42         Plan:   Proceed with C2 Day 8 of Gemzar/Avastin (Gemzar only today  Will hydrate with extra liter of NaCl today due to pt feeling \"dehydrated\" and says she does much better with the hydration after chemo. Monitor hgb and will transfuses for hgb less than 8 (8.2 today)  Replace Mag today and continue to monitor  Discussed bleeding precautions with her and especially with Eliquis. Encouaged protein shakes between meals to help with calories and protein  Will monitor creat closely especially in light of her mild right hydronephrosis  Continue to monitor creat as it is slightly elevated today ?  Due to dehydration  Since pain is finally well controlled, will continue with Fentanyl patch to 100 mcg Q3 days   She will follow up with GI doctor on 7/11 or sooner if needed  Hydration encouraged  Continue Eliquis for previous PE. She will continue phazyme for gas  Continue Miralax to try to prevent her post chemotherapy constipation  She will call for any concerns or questions      Ivonne Cannon NP  6/20/2017          ______________________  Medications:    Current Outpatient Prescriptions   Medication Sig Dispense Refill    morphine 10 mg/5 mL oral solution Take 5 mL by mouth every four (4) hours as needed for Pain. 500 mL 0    fentaNYL (DURAGESIC) 100 mcg/hr PATCH 1 Patch by TransDERmal route every seventy-two (72) hours. Max Daily Amount: 1 Patch. 10 Patch 0    NEXIUM PACKET 40 mg granules for oral suspension two (2) times a day.  metoclopramide (REGLAN) 5 mg/5 mL syrup Take 10 mL by mouth four (4) times daily as needed. Take before meals as needed 500 mL 3    promethazine (PHENERGAN) 6.25 mg/5 mL syrup Take 20 mL by mouth four (4) times daily as needed for Nausea. 400 mL 4    magnesium oxide (MAG-OX) 400 mg tablet Take 1 Tab by mouth two (2) times a day. Indications: HYPOMAGNESEMIA 60 Tab 2    sucralfate (CARAFATE) 100 mg/mL suspension Take 5 mL by mouth four (4) times daily. 414 mL 3    polyethylene glycol (MIRALAX) 17 gram/dose powder Take 17 g by mouth daily as needed. 238 g 3    Cetirizine (ZYRTEC) 10 mg cap Take  by mouth.  apixaban (ELIQUIS) 2.5 mg tablet Take 1 Tab by mouth two (2) times a day. 60 Tab 6    zolpidem (AMBIEN) 10 mg tablet Take 1 Tab by mouth nightly as needed for Sleep. Max Daily Amount: 10 mg. 30 Tab 1    ondansetron (ZOFRAN ODT) 4 mg disintegrating tablet Take 1 Tab by mouth every eight (8) hours as needed for Nausea. 30 Tab 2    esomeprazole (NEXIUM) 40 mg capsule Take 1 Cap by mouth daily. 90 Cap 2    multivit-min-FA-coenzyme Q10 (AQUADEKS) 100-350-5 mcg-mcg-mg chew Take 1 Tab by mouth daily.  Indications: VITAMIN DEFICIENCY PREVENTION 60 Tab 1    Calcium-Vitamin D3-Vitamin K 500-500-40 mg-unit-mcg chew Take 1 Tab by mouth two (2) times a day.  metoprolol (LOPRESSOR) 100 mg tablet Take 50 mg by mouth two (2) times a day.  lidocaine-prilocaine (EMLA) topical cream Apply small amount over port area and cover with a band aid 1 hour before chemo treatment 30 g 3    multivitamin (ONE A DAY) tablet Take 1 Tab by mouth daily.        Current Facility-Administered Medications   Medication Dose Route Frequency Provider Last Rate Last Dose    sodium chloride 0.9 % injection 10 mL  10 mL IntraVENous PRN Patti Lagos MD   10 mL at 06/20/17 1119    heparin (porcine) pf 500 Units  500 Units IntraVENous PRN Patti Lagos MD        0.9% sodium chloride infusion  25 mL/hr IntraVENous PRN Patti Lagos MD 25 mL/hr at 06/20/17 1249 25 mL/hr at 06/20/17 1249    sodium chloride (NS) flush 10-40 mL  10-40 mL IntraVENous PRN Patti Lagos MD        magnesium sulfate 1 g/100 ml IVPB (premix or compounded)  1 g IntraVENous Q30MIN Genny Don  mL/hr at 06/20/17 1612 1 g at 06/20/17 1612

## 2017-06-20 NOTE — PROGRESS NOTES
1100 Pt admit to [facility] for Gemzar/Hydration ambulatory in stable condition. Assessment completed. No new concerns voiced. Port accessed and flushed with positive blood return. Labs drawn and per order and sent for processing. Normal Saline started at [facility]. Labs reviewed, spoke with Mya Edwards NP ok to order,  medication ordered. Visit Vitals    BP 99/59    Pulse 69    Temp 97.3 °F (36.3 °C)    Resp 16    Ht 5' 3\" (1.6 m)    Wt 52 kg (114 lb 9.6 oz)    SpO2 98%    BMI 20.3 kg/m2       Medications:  Normal Saline KVO  Pepcid IV Push  Decadron IV Push  Kytril IV Push  Emend  Gemzar  Magnesium 1 gram IV x4  Heparin Flush      1730 Pt tolerated treatment well. Port maintained positive blood return throughout treatment, flushed with positive blood return at conclusion, port heparinized  and de-accessed per protocol. D/c home ambulatory in no distress. Pt aware of next appointment scheduled for 7/5/17. Recent Results (from the past 12 hour(s))   METABOLIC PANEL, COMPREHENSIVE    Collection Time: 06/20/17 11:11 AM   Result Value Ref Range    Sodium 132 (L) 136 - 145 mmol/L    Potassium 3.9 3.5 - 5.1 mmol/L    Chloride 97 97 - 108 mmol/L    CO2 26 21 - 32 mmol/L    Anion gap 9 5 - 15 mmol/L    Glucose 81 65 - 100 mg/dL    BUN 20 6 - 20 MG/DL    Creatinine 1.04 (H) 0.55 - 1.02 MG/DL    BUN/Creatinine ratio 19 12 - 20      GFR est AA >60 >60 ml/min/1.73m2    GFR est non-AA 55 (L) >60 ml/min/1.73m2    Calcium 9.0 8.5 - 10.1 MG/DL    Bilirubin, total 0.2 0.2 - 1.0 MG/DL    ALT (SGPT) 29 12 - 78 U/L    AST (SGOT) 25 15 - 37 U/L    Alk.  phosphatase 106 45 - 117 U/L    Protein, total 7.2 6.4 - 8.2 g/dL    Albumin 3.2 (L) 3.5 - 5.0 g/dL    Globulin 4.0 2.0 - 4.0 g/dL    A-G Ratio 0.8 (L) 1.1 - 2.2     CBC WITH AUTOMATED DIFF    Collection Time: 06/20/17 11:11 AM   Result Value Ref Range    WBC 5.7 3.6 - 11.0 K/uL    RBC 2.74 (L) 3.80 - 5.20 M/uL    HGB 8.2 (L) 11.5 - 16.0 g/dL    HCT 25.7 (L) 35.0 - 47.0 %    MCV 93.8 80.0 - 99.0 FL    MCH 29.9 26.0 - 34.0 PG    MCHC 31.9 30.0 - 36.5 g/dL    RDW 13.9 11.5 - 14.5 %    PLATELET 024 070 - 829 K/uL    NEUTROPHILS 55 32 - 75 %    LYMPHOCYTES 33 12 - 49 %    MONOCYTES 12 5 - 13 %    EOSINOPHILS 0 0 - 7 %    BASOPHILS 0 0 - 1 %    ABS. NEUTROPHILS 3.1 1.8 - 8.0 K/UL    ABS. LYMPHOCYTES 1.9 0.8 - 3.5 K/UL    ABS. MONOCYTES 0.7 0.0 - 1.0 K/UL    ABS. EOSINOPHILS 0.0 0.0 - 0.4 K/UL    ABS.  BASOPHILS 0.0 0.0 - 0.1 K/UL   MAGNESIUM    Collection Time: 06/20/17 11:11 AM   Result Value Ref Range    Magnesium 1.3 (L) 1.6 - 2.4 mg/dL

## 2017-06-27 ENCOUNTER — APPOINTMENT (OUTPATIENT)
Dept: CT IMAGING | Age: 57
End: 2017-06-27
Attending: PHYSICIAN ASSISTANT
Payer: OTHER GOVERNMENT

## 2017-06-27 ENCOUNTER — HOSPITAL ENCOUNTER (OUTPATIENT)
Age: 57
Setting detail: OBSERVATION
Discharge: HOME | End: 2017-06-28
Attending: EMERGENCY MEDICINE | Admitting: OBSTETRICS & GYNECOLOGY
Payer: OTHER GOVERNMENT

## 2017-06-27 DIAGNOSIS — R10.9 ABDOMINAL PAIN, UNSPECIFIED LOCATION: Primary | ICD-10-CM

## 2017-06-27 LAB
ALBUMIN SERPL BCP-MCNC: 3.7 G/DL (ref 3.5–5)
ALBUMIN/GLOB SERPL: 0.8 {RATIO} (ref 1.1–2.2)
ALP SERPL-CCNC: 124 U/L (ref 45–117)
ALT SERPL-CCNC: 23 U/L (ref 12–78)
ANION GAP BLD CALC-SCNC: 11 MMOL/L (ref 5–15)
AST SERPL W P-5'-P-CCNC: 22 U/L (ref 15–37)
BASOPHILS # BLD AUTO: 0 K/UL (ref 0–0.1)
BASOPHILS # BLD: 0 % (ref 0–1)
BILIRUB SERPL-MCNC: 0.4 MG/DL (ref 0.2–1)
BUN SERPL-MCNC: 15 MG/DL (ref 6–20)
BUN/CREAT SERPL: 15 (ref 12–20)
CALCIUM SERPL-MCNC: 9.8 MG/DL (ref 8.5–10.1)
CHLORIDE SERPL-SCNC: 99 MMOL/L (ref 97–108)
CO2 SERPL-SCNC: 24 MMOL/L (ref 21–32)
CREAT SERPL-MCNC: 1 MG/DL (ref 0.55–1.02)
EOSINOPHIL # BLD: 0 K/UL (ref 0–0.4)
EOSINOPHIL NFR BLD: 0 % (ref 0–7)
ERYTHROCYTE [DISTWIDTH] IN BLOOD BY AUTOMATED COUNT: 14.4 % (ref 11.5–14.5)
GLOBULIN SER CALC-MCNC: 4.4 G/DL (ref 2–4)
GLUCOSE SERPL-MCNC: 119 MG/DL (ref 65–100)
HCT VFR BLD AUTO: 28 % (ref 35–47)
HGB BLD-MCNC: 9.1 G/DL (ref 11.5–16)
LACTATE SERPL-SCNC: 1.2 MMOL/L (ref 0.4–2)
LIPASE SERPL-CCNC: 106 U/L (ref 73–393)
LYMPHOCYTES # BLD AUTO: 34 % (ref 12–49)
LYMPHOCYTES # BLD: 2.2 K/UL (ref 0.8–3.5)
MCH RBC QN AUTO: 30.2 PG (ref 26–34)
MCHC RBC AUTO-ENTMCNC: 32.5 G/DL (ref 30–36.5)
MCV RBC AUTO: 93 FL (ref 80–99)
MONOCYTES # BLD: 0.4 K/UL (ref 0–1)
MONOCYTES NFR BLD AUTO: 6 % (ref 5–13)
NEUTS SEG # BLD: 3.8 K/UL (ref 1.8–8)
NEUTS SEG NFR BLD AUTO: 60 % (ref 32–75)
PLATELET # BLD AUTO: 121 K/UL (ref 150–400)
POTASSIUM SERPL-SCNC: 3.4 MMOL/L (ref 3.5–5.1)
PROT SERPL-MCNC: 8.1 G/DL (ref 6.4–8.2)
RBC # BLD AUTO: 3.01 M/UL (ref 3.8–5.2)
SODIUM SERPL-SCNC: 134 MMOL/L (ref 136–145)
WBC # BLD AUTO: 6.3 K/UL (ref 3.6–11)

## 2017-06-27 PROCEDURE — 83605 ASSAY OF LACTIC ACID: CPT | Performed by: PHYSICIAN ASSISTANT

## 2017-06-27 PROCEDURE — 99284 EMERGENCY DEPT VISIT MOD MDM: CPT

## 2017-06-27 PROCEDURE — 96374 THER/PROPH/DIAG INJ IV PUSH: CPT

## 2017-06-27 PROCEDURE — 74177 CT ABD & PELVIS W/CONTRAST: CPT

## 2017-06-27 PROCEDURE — 36415 COLL VENOUS BLD VENIPUNCTURE: CPT | Performed by: PHYSICIAN ASSISTANT

## 2017-06-27 PROCEDURE — 96361 HYDRATE IV INFUSION ADD-ON: CPT

## 2017-06-27 PROCEDURE — 96376 TX/PRO/DX INJ SAME DRUG ADON: CPT

## 2017-06-27 PROCEDURE — 96375 TX/PRO/DX INJ NEW DRUG ADDON: CPT

## 2017-06-27 PROCEDURE — 99218 HC RM OBSERVATION: CPT

## 2017-06-27 PROCEDURE — 74011250636 HC RX REV CODE- 250/636: Performed by: PHYSICIAN ASSISTANT

## 2017-06-27 PROCEDURE — 74011636320 HC RX REV CODE- 636/320: Performed by: EMERGENCY MEDICINE

## 2017-06-27 PROCEDURE — 85025 COMPLETE CBC W/AUTO DIFF WBC: CPT | Performed by: EMERGENCY MEDICINE

## 2017-06-27 PROCEDURE — 80053 COMPREHEN METABOLIC PANEL: CPT | Performed by: EMERGENCY MEDICINE

## 2017-06-27 PROCEDURE — 83690 ASSAY OF LIPASE: CPT | Performed by: PHYSICIAN ASSISTANT

## 2017-06-27 PROCEDURE — 74011000258 HC RX REV CODE- 258: Performed by: EMERGENCY MEDICINE

## 2017-06-27 RX ORDER — ONDANSETRON 2 MG/ML
4 INJECTION INTRAMUSCULAR; INTRAVENOUS
Status: DISCONTINUED | OUTPATIENT
Start: 2017-06-27 | End: 2017-06-28 | Stop reason: HOSPADM

## 2017-06-27 RX ORDER — SODIUM CHLORIDE 9 MG/ML
2000 INJECTION, SOLUTION INTRAVENOUS ONCE
Status: COMPLETED | OUTPATIENT
Start: 2017-06-27 | End: 2017-06-27

## 2017-06-27 RX ORDER — HYDROMORPHONE HYDROCHLORIDE 1 MG/ML
1 INJECTION, SOLUTION INTRAMUSCULAR; INTRAVENOUS; SUBCUTANEOUS
Status: COMPLETED | OUTPATIENT
Start: 2017-06-27 | End: 2017-06-27

## 2017-06-27 RX ORDER — SODIUM CHLORIDE 0.9 % (FLUSH) 0.9 %
10 SYRINGE (ML) INJECTION
Status: COMPLETED | OUTPATIENT
Start: 2017-06-27 | End: 2017-06-27

## 2017-06-27 RX ORDER — HYDROMORPHONE HYDROCHLORIDE 2 MG/ML
2 INJECTION, SOLUTION INTRAMUSCULAR; INTRAVENOUS; SUBCUTANEOUS
Status: DISCONTINUED | OUTPATIENT
Start: 2017-06-27 | End: 2017-06-28 | Stop reason: HOSPADM

## 2017-06-27 RX ORDER — DEXTROSE, SODIUM CHLORIDE, AND POTASSIUM CHLORIDE 5; .45; .075 G/100ML; G/100ML; G/100ML
100 INJECTION INTRAVENOUS CONTINUOUS
Status: DISCONTINUED | OUTPATIENT
Start: 2017-06-27 | End: 2017-06-28 | Stop reason: HOSPADM

## 2017-06-27 RX ORDER — LORAZEPAM 2 MG/ML
1 INJECTION INTRAMUSCULAR
Status: DISCONTINUED | OUTPATIENT
Start: 2017-06-27 | End: 2017-06-28 | Stop reason: HOSPADM

## 2017-06-27 RX ORDER — SODIUM CHLORIDE 9 MG/ML
2000 INJECTION, SOLUTION INTRAVENOUS ONCE
Status: DISPENSED | OUTPATIENT
Start: 2017-06-29 | End: 2017-06-30

## 2017-06-27 RX ORDER — ONDANSETRON 2 MG/ML
4 INJECTION INTRAMUSCULAR; INTRAVENOUS
Status: COMPLETED | OUTPATIENT
Start: 2017-06-27 | End: 2017-06-27

## 2017-06-27 RX ADMIN — IOPAMIDOL 100 ML: 755 INJECTION, SOLUTION INTRAVENOUS at 21:27

## 2017-06-27 RX ADMIN — Medication 10 ML: at 21:27

## 2017-06-27 RX ADMIN — HYDROMORPHONE HYDROCHLORIDE 1 MG: 1 INJECTION, SOLUTION INTRAMUSCULAR; INTRAVENOUS; SUBCUTANEOUS at 20:32

## 2017-06-27 RX ADMIN — HYDROMORPHONE HYDROCHLORIDE 1 MG: 1 INJECTION, SOLUTION INTRAMUSCULAR; INTRAVENOUS; SUBCUTANEOUS at 21:52

## 2017-06-27 RX ADMIN — SODIUM CHLORIDE 2000 ML/HR: 900 INJECTION, SOLUTION INTRAVENOUS at 20:32

## 2017-06-27 RX ADMIN — SODIUM CHLORIDE 100 ML: 900 INJECTION, SOLUTION INTRAVENOUS at 21:27

## 2017-06-27 RX ADMIN — ONDANSETRON 4 MG: 2 INJECTION INTRAMUSCULAR; INTRAVENOUS at 20:32

## 2017-06-27 NOTE — ED TRIAGE NOTES
Pt states \"i feel like I have a bowel obstruction I have been hospitalized for partial bowel obstructions in the past, I have an ileostomy and it has had next to nothing in it, taya been in pain since 1\".

## 2017-06-27 NOTE — IP AVS SNAPSHOT
[address] 
[phone] Patient: Lm Fair MRN: [id number] :1960 You are allergic to the following Allergen Reactions Ativan (Lorazepam) Other (comments) SEVERE CONFUSION Morphine Rash Itching Recent Documentation Height Weight Breastfeeding? BMI OB Status Smoking Status 1.6 m 53.1 kg No 20.75 kg/m2 Unknown Never Smoker Emergency Contacts  (Rel.) Home Phone Work Phone Mobile Phone Saúl Whitaker [phone] -- [phone] About your hospitalization You were admitted on:  2017 You last received care in the:  [facility] You were discharged on:  2017 Why you were hospitalized Your primary diagnosis was:  Not on File Your diagnoses also included:  Abdominal Pain, Hydronephrosis Of Left Kidney Providers Seen During Your Hospitalizations Provider Role Specialty Primary office phone Bertram Oviedo MD Attending Provider Emergency Medicine [phone] Michaela Higgins MD Attending Provider Gynecologic Oncology [phone] Your Primary Care Physician (PCP) Primary Care Physician Office Phone Office Fax NONE ** None ** ** None ** Follow-up Information Follow up With Details Comments Contact Info None   None ([phone]) Patient stated that they have no PCP Appointments for Next 14 days 2017  1:00 PM  MONI CT ABD PELV W CONT [facility] [place] RAD CT  
 2017  8:30 AM  CT [address] Gynecologic Oncology 2017  2:00 PM  INFUSION 60 [facility]  
 2017  9:00 AM  INFUSION 180 [facility] BREMO  
 2017 10:00 AM  INFUSION 180 [facility] Current Discharge Medication List  
  
CONTINUE these medications which have NOT CHANGED Dose & Instructions Dispensing Information Comments Morning Noon Evening Bedtime  
 apixaban 2.5 mg tablet Commonly known as:  Stefani iRggs Your last dose was: Your next dose is:    
   
   
 Dose:  2.5 mg Take 1 Tab by mouth two (2) times a day. Quantity:  60 Tab Refills:  6 Calcium-Vitamin D3-Vitamin K [id number] mg-unit-mcg Chew Your last dose was: Your next dose is:    
   
   
 Dose:  1 Tab Take 1 Tab by mouth two (2) times a day. Refills:  0  
     
   
   
   
  
 * esomeprazole 40 mg capsule Commonly known as:  NexIUM Your last dose was: Your next dose is:    
   
   
 Dose:  40 mg Take 1 Cap by mouth daily. Quantity:  90 Cap Refills:  2  
     
   
   
   
  
 * NexIUM Packet 40 mg granules for oral suspension Generic drug:  esomeprazole Your last dose was: Your next dose is:    
   
   
 two (2) times a day. Refills:  0  
     
   
   
   
  
 fentaNYL 100 mcg/hr PATCH Commonly known as:  Kee Jordan Your last dose was: Your next dose is:    
   
   
 Dose:  1 Patch 1 Patch by TransDERmal route every seventy-two (72) hours. Max Daily Amount: 1 Patch. Quantity:  10 Patch Refills:  0  
     
   
   
   
  
 lidocaine-prilocaine topical cream  
Commonly known as:  EMLA Your last dose was: Your next dose is:    
   
   
 Apply small amount over port area and cover with a band aid 1 hour before chemo treatment Quantity:  30 g Refills:  3  
     
   
   
   
  
 magnesium oxide 400 mg tablet Commonly known as:  MAG-OX Your last dose was: Your next dose is:    
   
   
 Dose:  400 mg Take 1 Tab by mouth two (2) times a day. Indications: HYPOMAGNESEMIA Quantity:  60 Tab Refills:  2  
     
   
   
   
  
 metoclopramide 5 mg/5 mL syrup Commonly known as:  REGLAN Your last dose was:     
   
Your next dose is:    
   
   
 Dose:  10 mg  
 Take 10 mL by mouth four (4) times daily as needed. Take before meals as needed Quantity:  500 mL Refills:  3  
     
   
   
   
  
 metoprolol tartrate 100 mg IR tablet Commonly known as:  LOPRESSOR Your last dose was: Your next dose is:    
   
   
 Dose:  50 mg Take 50 mg by mouth two (2) times a day. Refills:  0  
     
   
   
   
  
 morphine 10 mg/5 mL oral solution Your last dose was: Your next dose is:    
   
   
 Dose:  10 mg Take 5 mL by mouth every four (4) hours as needed for Pain. Quantity:  500 mL Refills:  0  
     
   
   
   
  
 multivitamin tablet Commonly known as:  ONE A DAY Your last dose was: Your next dose is:    
   
   
 Dose:  1 Tab Take 1 Tab by mouth daily. Refills:  0  
     
   
   
   
  
 mv. min cmb#[id number] 100-350-5 mcg-mcg-mg Chew Commonly known as:  AQUADEKS Your last dose was: Your next dose is:    
   
   
 Dose:  1 Tab Take 1 Tab by mouth daily. Indications: VITAMIN DEFICIENCY PREVENTION Quantity:  60 Tab Refills:  1 Substitution to equivalent ok  
    
   
   
   
  
 ondansetron 4 mg disintegrating tablet Commonly known as:  ZOFRAN ODT Your last dose was: Your next dose is:    
   
   
 Dose:  4 mg Take 1 Tab by mouth every eight (8) hours as needed for Nausea. Quantity:  30 Tab Refills:  2  
     
   
   
   
  
 polyethylene glycol 17 gram/dose powder Commonly known as:  Danyelle Limb Your last dose was: Your next dose is:    
   
   
 Dose:  17 g Take 17 g by mouth daily as needed. Quantity:  238 g Refills:  3  
     
   
   
   
  
 promethazine 6.25 mg/5 mL syrup Commonly known as:  PHENERGAN Your last dose was: Your next dose is:    
   
   
 Dose:  25 mg Take 20 mL by mouth four (4) times daily as needed for Nausea. Quantity:  400 mL Refills:  4  
     
   
   
   
  
 sucralfate 100 mg/mL suspension Commonly known as:  Mauro Hanson Your last dose was: Your next dose is:    
   
   
 Dose:  1 tsp Take 5 mL by mouth four (4) times daily. Quantity:  414 mL Refills:  3  
     
   
   
   
  
 zolpidem 10 mg tablet Commonly known as:  AMBIEN Your last dose was: Your next dose is:    
   
   
 Dose:  10 mg Take 1 Tab by mouth nightly as needed for Sleep. Max Daily Amount: 10 mg.  
 Quantity:  30 Tab Refills:  1 ZyrTEC 10 mg Cap Generic drug:  Cetirizine Your last dose was: Your next dose is: Take  by mouth. Refills:  0  
     
   
   
   
  
 * Notice: This list has 2 medication(s) that are the same as other medications prescribed for you. Read the directions carefully, and ask your doctor or other care provider to review them with you. Discharge Instructions [address] [place] [address] MD Ike Ortega MD Waynetta Slade, NP Patient Discharge Instructions Ji Phillips / [id number] : 1960 Admitted 2017 Discharged: 2017 Take Home Medications No current facility-administered medications on file prior to encounter. Current Outpatient Prescriptions on File Prior to Encounter Medication Sig Dispense Refill  morphine 10 mg/5 mL oral solution Take 5 mL by mouth every four (4) hours as needed for Pain. 500 mL 0  
 fentaNYL (DURAGESIC) 100 mcg/hr PATCH 1 Patch by TransDERmal route every seventy-two (72) hours. Max Daily Amount: 1 Patch. 10 Patch 0  
 NEXIUM PACKET 40 mg granules for oral suspension two (2) times a day.  metoclopramide (REGLAN) 5 mg/5 mL syrup Take 10 mL by mouth four (4) times daily as needed. Take before meals as needed 500 mL 3  
 promethazine (PHENERGAN) 6.25 mg/5 mL syrup Take 20 mL by mouth four (4) times daily as needed for Nausea.  400 mL 4  
 magnesium oxide (MAG-OX) 400 mg tablet Take 1 Tab by mouth two (2) times a day. Indications: HYPOMAGNESEMIA 60 Tab 2  
 sucralfate (CARAFATE) 100 mg/mL suspension Take 5 mL by mouth four (4) times daily. 414 mL 3  
 polyethylene glycol (MIRALAX) 17 gram/dose powder Take 17 g by mouth daily as needed. 238 g 3  
 Cetirizine (ZYRTEC) 10 mg cap Take  by mouth.  apixaban (ELIQUIS) 2.5 mg tablet Take 1 Tab by mouth two (2) times a day. 60 Tab 6  
 zolpidem (AMBIEN) 10 mg tablet Take 1 Tab by mouth nightly as needed for Sleep. Max Daily Amount: 10 mg. 30 Tab 1  
 ondansetron (ZOFRAN ODT) 4 mg disintegrating tablet Take 1 Tab by mouth every eight (8) hours as needed for Nausea. 30 Tab 2  
 esomeprazole (NEXIUM) 40 mg capsule Take 1 Cap by mouth daily. 90 Cap 2  
 multivit-min-FA-coenzyme Q10 (AQUADEKS) 100-350-5 mcg-mcg-mg chew Take 1 Tab by mouth daily. Indications: VITAMIN DEFICIENCY PREVENTION 60 Tab 1  
 Calcium-Vitamin D3-Vitamin K 500-500-40 mg-unit-mcg chew Take 1 Tab by mouth two (2) times a day.  metoprolol (LOPRESSOR) 100 mg tablet Take 50 mg by mouth two (2) times a day.  lidocaine-prilocaine (EMLA) topical cream Apply small amount over port area and cover with a band aid 1 hour before chemo treatment 30 g 3  
 multivitamin (ONE A DAY) tablet Take 1 Tab by mouth daily. · It is important that you take the medication exactly as they are prescribed. · Keep your medication in the bottles provided by the pharmacist and keep a list of the medication names, dosages, and times to be taken in your wallet. · Do not take other medications without consulting your doctor. What to do at [facility] Recommended diet: Clear liquids, advance as tolerated, stay hydrated. You should take a stool softener such as colace for constipation. If constipation persists for >24 hours you should take a dose of Milk of Magnesia. Call if your constipation persists. Recommended activity: No driving for 1 week and/or while on pain medication Walk at least 6 times per day Showers okay, no tub bathing/swimming for two weeks Activity as able, stairs okay. If you experience any of the following persisting symptoms: 
 
Nausea/vomiting, fever > 101 F, diarrhea/constipation, increasing pain despite medication, increasing vaginal discharge or any concerns, please call our office. Follow-up with Dr. Sheila Mathur in 1 day. Call [phone] for an appointment. Information obtained by : 
I understand that if any problems occur once I am at home I am to contact my physician. I understand and acknowledge receipt of the instructions indicated above. Physician's or R.N.'s Signature                                                                  Date/Time Patient or Representative Signature                                                          Date/Time Discharge Orders None Introducing [facility]! Dear Alvarado Philippe: 
Thank you for requesting a varinode account. Our records indicate that you already have an active varinode account. You can access your account anytime at [email or web address] Did you know that you can access your hospital and ER discharge instructions at any time in varinode? You can also review all of your test results from your hospital stay or ER visit. Additional Information If you have questions, please visit the Frequently Asked Questions section of the varinode website at [email or web address] Remember, varinode is NOT to be used for urgent needs. For medical emergencies, dial 911. Now available from your iPhone and Android! General Information Please provide this summary of care documentation to your next provider. Patient Signature:  ____________________________________________________________ Date:  ____________________________________________________________  
  
Cathlean Abu Provider Signature:  ____________________________________________________________ Date:  ____________________________________________________________

## 2017-06-28 VITALS
DIASTOLIC BLOOD PRESSURE: 72 MMHG | HEIGHT: 63 IN | TEMPERATURE: 98.3 F | HEART RATE: 69 BPM | OXYGEN SATURATION: 100 % | BODY MASS INDEX: 20.75 KG/M2 | SYSTOLIC BLOOD PRESSURE: 140 MMHG | RESPIRATION RATE: 18 BRPM | WEIGHT: 117.13 LBS

## 2017-06-28 PROBLEM — N13.30 HYDRONEPHROSIS OF LEFT KIDNEY: Status: ACTIVE | Noted: 2017-06-28

## 2017-06-28 PROCEDURE — 96361 HYDRATE IV INFUSION ADD-ON: CPT

## 2017-06-28 PROCEDURE — 74011250636 HC RX REV CODE- 250/636: Performed by: OBSTETRICS & GYNECOLOGY

## 2017-06-28 PROCEDURE — 99218 HC RM OBSERVATION: CPT

## 2017-06-28 PROCEDURE — 96376 TX/PRO/DX INJ SAME DRUG ADON: CPT

## 2017-06-28 RX ADMIN — HYDROMORPHONE HYDROCHLORIDE 2 MG: 2 INJECTION, SOLUTION INTRAMUSCULAR; INTRAVENOUS; SUBCUTANEOUS at 00:43

## 2017-06-28 RX ADMIN — HYDROMORPHONE HYDROCHLORIDE 2 MG: 2 INJECTION, SOLUTION INTRAMUSCULAR; INTRAVENOUS; SUBCUTANEOUS at 07:42

## 2017-06-28 RX ADMIN — DEXTROSE MONOHYDRATE, SODIUM CHLORIDE, AND POTASSIUM CHLORIDE 100 ML/HR: 50; 4.5; .745 INJECTION, SOLUTION INTRAVENOUS at 00:43

## 2017-06-28 NOTE — ED NOTES
Pt had good relief of pain with first dose of Dilaudid, but upon walking to restroom, pt had return of pain. Requesting additional medications. PA aware and new orders received. Pt medicated as ordered.  On CM x 2

## 2017-06-28 NOTE — PROGRESS NOTES
Primary Nurse Parker Geller and Karthikeyan Ferris RN performed a dual skin assessment on this patient Impairment noted- see wound doc flow sheet  Pb score is 23    Scars on abd from previous SBO, tumor removal  Ileostomy right upper quadrant

## 2017-06-28 NOTE — ROUTINE PROCESS
TRANSFER - OUT REPORT:    Verbal report given to [facility] RN (name) on Arturo Bhat  being transferred to  [phone] [id number] (unit) for routine progression of care       Report consisted of patients Situation, Background, Assessment and   Recommendations(SBAR). Information from the following report(s) SBAR, ED Summary, [facility] and Recent Results was reviewed with the receiving nurse. Lines:   Venous Access Device Upper chest (subclavicular area), left (Active)       Peripheral IV 06/27/17 Right Antecubital (Active)   Site Assessment Clean, dry, & intact 6/27/2017  7:50 PM   Phlebitis Assessment 0 6/27/2017  7:50 PM   Infiltration Assessment 0 6/27/2017  7:50 PM   Dressing Status Clean, dry, & intact 6/27/2017  7:50 PM   Dressing Type Tape;Transparent 6/27/2017  7:50 PM   Hub Color/Line Status Pink;Capped;Flushed;Patent 6/27/2017  7:50 PM        Opportunity for questions and clarification was provided.       Patient transported with:   Transport

## 2017-06-28 NOTE — PROGRESS NOTES
TRANSFER - IN REPORT:    Verbal report received from Gretta(name) on [address]  being received from ED(unit) for routine progression of care      Report consisted of patients Situation, Background, Assessment and   Recommendations(SBAR). Information from the following report(s) SBAR, Kardex, ED Summary, [facility] and Recent Results was reviewed with the receiving nurse. Opportunity for questions and clarification was provided. Assessment completed upon patients arrival to unit and care assumed.

## 2017-06-28 NOTE — ED NOTES
Pt returned from CT. States pain is 4/10. Denies nausea after vomiting x 1 just prior to Zofran administration. Ambulated to restroom. NS continues to infuse without difficulty. VSS.

## 2017-06-28 NOTE — PROGRESS NOTES
Bedside shift change report given to [address] [place] [address] 107 (oncoming nurse) by Tristen PENA (offgoing nurse). Report included the following information SBAR and Kardex.

## 2017-06-28 NOTE — DISCHARGE INSTRUCTIONS
[facility]  MD Philipp Benz MD Eunice Brazil, HAYDER    Patient Discharge Instructions    Kayleen Later / [id number] : 1960    Admitted 2017 Discharged: 2017     Take Home Medications     No current facility-administered medications on file prior to encounter. Current Outpatient Prescriptions on File Prior to Encounter   Medication Sig Dispense Refill    morphine 10 mg/5 mL oral solution Take 5 mL by mouth every four (4) hours as needed for Pain. 500 mL 0    fentaNYL (DURAGESIC) 100 mcg/hr PATCH 1 Patch by TransDERmal route every seventy-two (72) hours. Max Daily Amount: 1 Patch. 10 Patch 0    NEXIUM PACKET 40 mg granules for oral suspension two (2) times a day.  metoclopramide (REGLAN) 5 mg/5 mL syrup Take 10 mL by mouth four (4) times daily as needed. Take before meals as needed 500 mL 3    promethazine (PHENERGAN) 6.25 mg/5 mL syrup Take 20 mL by mouth four (4) times daily as needed for Nausea. 400 mL 4    magnesium oxide (MAG-OX) 400 mg tablet Take 1 Tab by mouth two (2) times a day. Indications: HYPOMAGNESEMIA 60 Tab 2    sucralfate (CARAFATE) 100 mg/mL suspension Take 5 mL by mouth four (4) times daily. 414 mL 3    polyethylene glycol (MIRALAX) 17 gram/dose powder Take 17 g by mouth daily as needed. 238 g 3    Cetirizine (ZYRTEC) 10 mg cap Take  by mouth.  apixaban (ELIQUIS) 2.5 mg tablet Take 1 Tab by mouth two (2) times a day. 60 Tab 6    zolpidem (AMBIEN) 10 mg tablet Take 1 Tab by mouth nightly as needed for Sleep. Max Daily Amount: 10 mg. 30 Tab 1    ondansetron (ZOFRAN ODT) 4 mg disintegrating tablet Take 1 Tab by mouth every eight (8) hours as needed for Nausea. 30 Tab 2    esomeprazole (NEXIUM) 40 mg capsule Take 1 Cap by mouth daily. 90 Cap 2    multivit-min-FA-coenzyme Q10 (AQUADEKS) 100-350-5 mcg-mcg-mg chew Take 1 Tab by mouth daily.  Indications: VITAMIN DEFICIENCY PREVENTION 60 Tab 1    Calcium-Vitamin D3-Vitamin K 025-368-87 mg-unit-mcg chew Take 1 Tab by mouth two (2) times a day.  metoprolol (LOPRESSOR) 100 mg tablet Take 50 mg by mouth two (2) times a day.  lidocaine-prilocaine (EMLA) topical cream Apply small amount over port area and cover with a band aid 1 hour before chemo treatment 30 g 3    multivitamin (ONE A DAY) tablet Take 1 Tab by mouth daily. · It is important that you take the medication exactly as they are prescribed. · Keep your medication in the bottles provided by the pharmacist and keep a list of the medication names, dosages, and times to be taken in your wallet. · Do not take other medications without consulting your doctor. What to do at [address]: Clear liquids, advance as tolerated, stay hydrated. You should take a stool softener such as colace for constipation. If constipation persists for >24 hours you should take a dose of Milk of Magnesia. Call if your constipation persists. Recommended activity:   No driving for 1 week and/or while on pain medication  Walk at least 6 times per day  Showers okay, no tub bathing/swimming for two weeks  Activity as able, stairs okay. If you experience any of the following persisting symptoms:    Nausea/vomiting, fever > 101 F, diarrhea/constipation, increasing pain despite medication, increasing vaginal discharge or any concerns, please call our office. Follow-up with Dr. Rita Stanley in 1 day. Call [phone] for an appointment. Information obtained by :  I understand that if any problems occur once I am at home I am to contact my physician. I understand and acknowledge receipt of the instructions indicated above.                                                                                                                                            Physician's or R.N.'s Signature                                                                  Date/Time Patient or Representative Signature                                                          Date/Time

## 2017-06-28 NOTE — ED NOTES
Assumed care of patient. In NAD. Report diffuse abd pain with nausea. Medicated as ordered. Pt states Zofran doesn't work for her, is requesting Phenergan. Advised pt we would attempt Zofran first, then give additional meds if necessary. Lights dimmed. Blankets provided. Pt asking for water, advised her nothing by mouth until we have results and nausea corrected. Male  at bedside.

## 2017-06-28 NOTE — ED PROVIDER NOTES
HPI Comments: This is a 62 yo  female with medical history remarkable for abdominal carcinomatosis, left shoulder arthritis, depression, GERD, hypertension, PE, and ovarian cancer presenting ambulatory to the ED with complaint of 8/10, upper abdominal pain, constant worsening since 1 PM today, exacerbated with palpation and movement with minimal improvement with oral morphine, reglan and application of heating pad. Noticed decreased ilesotomy output throughout the day. Associated nausea with vomiting that started upon arrival to ED. Described as similar to previous SBO. Pt currently on chemo. No fever, headache, sore throat, cough, rhinorrhea, sneezing, SOB, chest pain or urinary complaints. Oncologist: Dr. Vani Santos    Patient is a 61 y.o. female presenting with abdominal pain. The history is provided by the patient. Abdominal Pain    Associated symptoms include nausea and vomiting. Pertinent negatives include no fever, no diarrhea, no constipation, no frequency, no headaches, no arthralgias, no chest pain and no back pain.         Past Medical History:   Diagnosis Date    Abdominal carcinomatosis ([address] 10/2015    Arthritis     left shoulder    BRCA negative 12/2015    Chronic pain     Depression     HX    Environmental allergies     GERD (gastroesophageal reflux disease)     Hypertension     NEW OVER PAST 5-6 MONTHS    Ovarian cancer ([address] 10/2015    serous adenocarcinoma, high grade, stage IIIC    Pulmonary embolism ([facility] [address] 10/2015       Past Surgical History:   Procedure Laterality Date    CARDIAC SURG PROCEDURE UNLIST  12/11/15    cardiac cath/normal     HX BREAST BIOPSY  1995    benign right breast bx    HX DILATION AND CURETTAGE  2/2011    POLYPECTOMY    HX GI  2015    PERFORATED BOWEL; ILEOSTOMY    HX GYN  10/2015    EXP LAPAROTOMY    HX HEENT  LAST 2005    oral tissue graft X3    HX HEENT  1970    tonsillectomy    HX LAPAROTOMY  10/2015    tumor sampling    HX LAPAROTOMY 4/2016    hysterectomy, BSO, radical debulking    HX LAPAROTOMY  5/2016    resection ileocolic anastomosis, leak, ileostomy    HX OTHER SURGICAL  11/2015    tunneled portacath         Family History:   Problem Relation Age of Onset    Cancer Maternal Uncle      skin    Hypertension Mother     High Cholesterol Mother     Anxiety Mother     Heart Disease Mother     High Cholesterol Father     Hypertension Father     Stroke Father     Arthritis-osteo Sister     Migraines Sister     Other Sister      FIBROMYALGIA    Depression Brother     Other Brother      DRUG ABUSE HEPATITIS C    Migraines Sister     Arrhythmia Sister     Depression Sister     Lung Disease Paternal Aunt      COPD    Cancer Paternal Uncle      LUNG    Lung Disease Paternal Uncle      COPD    Lung Disease Maternal Grandmother      COPD    Anesth Problems Neg Hx        Social History     Social History    Marital status:      Spouse name: N/A    Number of children: N/A    Years of education: N/A     Occupational History    Not on file. Social History Main Topics    Smoking status: Never Smoker    Smokeless tobacco: Never Used    Alcohol use No    Drug use: No    Sexual activity: Not on file     Other Topics Concern    Not on file     Social History Narrative         ALLERGIES: Ativan [lorazepam] and Morphine    Review of Systems   Constitutional: Negative. Negative for chills, fatigue and fever. HENT: Negative. Negative for congestion, ear pain, facial swelling, rhinorrhea, sneezing and sore throat. Eyes: Negative for pain, discharge and itching. Respiratory: Negative for cough, chest tightness and shortness of breath. Cardiovascular: Negative. Negative for chest pain and leg swelling. Gastrointestinal: Positive for abdominal pain, nausea and vomiting. Negative for abdominal distention, constipation and diarrhea. Genitourinary: Negative for difficulty urinating, frequency and urgency. Musculoskeletal: Negative for arthralgias, back pain, joint swelling, neck pain and neck stiffness. Skin: Negative for color change and rash. Neurological: Negative for dizziness, numbness and headaches. Psychiatric/Behavioral: Negative for confusion and decreased concentration. All other systems reviewed and are negative. Vitals:    06/27/17 1942   BP: (!) 140/91   Pulse: 69   Resp: 16   Temp: 98.1 °F (36.7 °C)   SpO2: 100%   Weight: 53.1 kg (117 lb 2 oz)   Height: 5' 3\" (1.6 m)            Physical Exam   Constitutional: She is oriented to person, place, and time. She appears well-developed and well-nourished. No distress.  adult female actively vomiting     HENT:   Head: Normocephalic and atraumatic. Right Ear: External ear normal.   Left Ear: External ear normal.   Nose: Nose normal.   Mouth/Throat: Oropharynx is clear and moist. No oropharyngeal exudate. Eyes: Conjunctivae and EOM are normal. Pupils are equal, round, and reactive to light. Right eye exhibits no discharge. Left eye exhibits no discharge. Neck: Normal range of motion. Neck supple. Cardiovascular: Normal rate, regular rhythm and normal heart sounds. Exam reveals no friction rub. No murmur heard. Pulmonary/Chest: Effort normal and breath sounds normal. No respiratory distress. She has no wheezes. Abdominal: Soft. She exhibits no distension. There is tenderness (diffuse greater in upper abdomen). There is no rebound. illeostomy site dry and intact, small amount of output   Musculoskeletal: Normal range of motion. Neurological: She is alert and oriented to person, place, and time. Skin: Skin is warm and dry. No ecchymosis, no laceration, no lesion and no rash noted. Psychiatric: She has a normal mood and affect. Her behavior is normal.   Nursing note and vitals reviewed.        MDM  Number of Diagnoses or Management Options  Diagnosis management comments: 62 yo  female with ovarian cancer and hx of SBO with complaint of upper abdominal pain, nausea and vomiting   Plan  CBC. CMP, lipase, UA, IVF, antiemetic therapy, CT abd/pelv and reassess. Dary Reyna, [address]      ED Course       Procedures    Progress note    Labs and imaging reviewed. Dary Malave, [address]    Spoke with Dr. Armani Romero, ob/gyn oncology, discussed HPI, PE findings, labs and imaging. He will admit patient.  Dary Arvizu, [address]

## 2017-06-28 NOTE — H&P
[address] [facility] Shanon Peterson NP  Admission History and Physical  Patient Name: Arash Corona  MRN: [id number]  AGE: 61 y.o.  : 1960    Date: 2017    Date of Admission: 2017  Date of Surgery: * No surgery found *  Unit: 2/    Admission Diagnosis:        ICD-10-CM ICD-9-CM    1. Abdominal pain, unspecified location R10.9 789.00        Oncology History:    Arash Corona is a 61 y.o.  postmenopausal female who is being seen for nausea/vomiting and abdominal pain in the setting of recurrent ovarian carcinoma. Patient well know to me and the gyn oncology team.    Briefly, Mrs. Smith has a diagnosis of stage IV ovarian cancer. She underwent exploratory laparotomy with suboptimal debulking. She had extensive disease. She was readmitted about a week later with obstruction and subsequently had a cecal perforation, requiring resection, end ileostomy, and mucous fistula. During the hospitalization she was also diagnosed with pulmonary embolus and had chest tubes placed for bilateral pleural effusions. She had a prolonged hospital course and actually received a dose of cisplatin chemotherapy while in the hospital to help dry up the effusions. She completed 6 cycles of Taxol/Carboplatin chemotherapy following her initial debulking. She did relatively well, considering she had an ileostomy to deal with during chemotherapy. I took her to the OR on 16 for interval debulking and reversal of her ileostomy. She had extensive disease, but we were able to resect the majority of her disease. One week later she developed an anastomotic leak requiring reexploration and recreation of an ileostomy. She had another prolonged hospitalization, but recovered well since surgery. We then reinitiated Taxol/Carboplatin chemotherapy, but did reduce the dose of Taxol to 135 mg/m2. She completed 4 mores cycles.  Her CA-125 normalized and we stopped treatment.     She now has evidence of recurrent disease and was initiated on Doxil/Avastin. She has completed 3 cycles so far. 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 She has been admitted 2 times since March and seen in the ER an additional time for these symptoms. Her symptoms quickly resolve following admission and she has not required any significant intervention, not even an NGT. Her most recent scan on 4/19/17 showed no evidence of obstruction, and her peritoneal disease appeared stable when compared to her scan from 3/20/17. She called the office a few days ago and reported symptoms of abdominal pain, nausea, and decreased PO intake. She was offered the opportunity to come into the [facility] for IV fluids but she declined. She called me today through the paging system and reported nausea/vomiting, which was initially productive, but eventually turned into dry heaves. Her pain had also increased, including back pain, which is new for her. I directed her to come to the ER at [facility] for evaluation due to my concern for a small bowel obstruction. Since arriving in the ER she states that her pain has significantly improved. She says some of that may be secondary to the IV Dilaudid, but she also reports increased ostomy output. She thinks that maybe the obstruction is relieving itself. Her nausea has also improved, but she did get IV Zofran. She denies any fevers or chills. She reports some dysuria, but a urinalysis from earlier today at an urgent care center in [facility] was negative. Ms. Marc Coronel is admitted with the [facility] [place] [facility] with a 12 hour history of progressive abdominal pain, global, associated with mild nausea and vomiting within the ER setting.  The patient report no PO intake since 1:00 PM on the day of admission and cessation of output from her ileostomy. The patient had taken her Oxycodone and wears a 100 mcg Duragesic patch without relief of the pain. The patient was evaluated in the ER. IV dilaudid improved the pain considerably. Workup:  CT:  FINDINGS:   LUNG BASES: Clear. INCIDENTALLY IMAGED HEART AND MEDIASTINUM: Unremarkable. LIVER: Small hypodense lesions, one in the left hepatic lobe and one in the  right hepatic lobe are stable. GALLBLADDER: Unremarkable. SPLEEN: No mass. PANCREAS: No mass or ductal dilatation. ADRENALS: Unremarkable. KIDNEYS: Hydronephrosis is moderate and stable on the right and mild to moderate  on the left and new, to the level of the mid ureters. There is no definite  obstructing calculus on either side. STOMACH: Unremarkable. SMALL BOWEL: Previously described small bowel centralization is stable. Small  bowel loops are dilated but roughly stable in appearance, with a maximum caliber  of 3.1 cm. Pattern of dilatation is similar to the prior study. Small bowel is  fluid-filled to the level of the ostomy in the right anterior abdominal wall. COLON: Visualized colon shows no distention, but the colon is predominantly  absent. APPENDIX: Surgically absent  PERITONEUM: Small amount of ascites, stable to slightly decreased since the  prior study. RETROPERITONEUM: No lymphadenopathy or aortic aneurysm. IVC filter stable. REPRODUCTIVE ORGANS: Surgically absent. URINARY BLADDER: No mass or calculus. BONES: No destructive bone lesion. ADDITIONAL COMMENTS: N/A     IMPRESSION  IMPRESSION:     1. Mild to moderate small bowel distention with centralization of loops as  previously described, similar in appearance to prior study 5/7/2017.  2. Small to moderate amount of ascites, stable. 3. Postoperative changes as described above, stable. 4. Stable hypodense liver lesions. 5. Stable right hydronephrosis to the mid ureter without obvious obstructing  calculus.   6. New left hydronephrosis to the mid ureter without obvious obstructing  calculus.     The patient is admitted for observation, GI rest and hydration  The patient denies any fever/chills  Negative cardiopulmonary review. Problem List:     Patient Active Problem List   Diagnosis Code    Malignant neoplasm of both ovaries ([facility] C56.1, C56.2    Carcinomatosis peritonei ([facility] [facility] C78.6, C80.1    Small bowel obstruction ([facility] [address] K56.69    Ileostomy in place ([facility] [address] Z93.2    Anemia due to chemotherapy D64.81, T45.1X5A    CINV (chemotherapy-induced nausea and vomiting) R11.2, T45.1X5A    Cancer associated pain G89.3    Generalized abdominal pain R10.84    Anxiety about health F41.8    Ileus (HCC) K56.7    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    Hx of pulmonary embolus Z86.711    Dehydration E86.0    Chemotherapy-induced neutropenia (HCC) D70.1    Anticoagulation adequate with anticoagulant therapy Z79.01    Counseling regarding end of life decision making Z71.89    Bowel obstruction (HCC) K56.60    Protein calorie malnutrition (HCC) E46    Malignant ascites R18.0    Hydronephrosis of right kidney N13.30    Isolated proteinuria R80.0    Erosive esophagitis K22.10    Ulcerative esophagitis K22.10    Hypomagnesemia E83.42    Abdominal pain R10.9       Subjective:   No acute distress. Activity: No restrictions. Diet: NPO. Nausea/vomiting: Single episode reported. BM/flatus: Negative per ileostomy    Review of Systems:    Constitutional: Negative for fever, chills and diaphoresis. Respiratory: Negative for reported cough, shortness of breath and wheezing. Cardiovascular: Negative for reported chest pain and leg swelling. Gastrointestinal: Nausea noted, single episode of vomiting recorded   Neurological:  No acute deficit. Events of Prior Day: Patient interviewed. Chart reviewed.       Vitals:     Vitals:    06/27/17 2142 06/27/17 2230 06/27/17 2330 06/28/17 0012   BP: 123/54 116/64 107/57 128/68   Pulse: 87 77 66 61   Resp: 18 18 18 18   Temp:    97.9 °F (36.6 °C)   SpO2: 98% 99% 98% 100%   Weight:       Height:            Body mass index is 20.75 kg/(m^2). I/O:     Date 06/27/17 0700 - 06/28/17 0659 06/28/17 0700 - 06/29/17 0659   Shift 0700-1859 1900-0659 24 Hour Total 0700-1859 1900-0659 24 Hour Total   I  N  T  A  K  E   Shift Total  (mL/kg)         O  U  T  P  U  T   Stool            Stool Occurrence(s)  1 x 1 x       Shift Total  (mL/kg)         NET         Weight (kg)  53.1 53.1 53.1 53.1 53.1       Examination:   Gen: Conversant, alert, oriented. Without acute distress. Cardiac: Regular rate and rhythm    Lungs: Clear to auscultation   Abdomen:    Soft, minimal tenderness, no rebound, scant BS. Ileostomy without apparent function, stoma pink    Wound: Bandaged. No hernia    CVAT: Negative   Extremities: No deep tenderness, warm, pulses appreciated, trace edema. MSE: Grossly intact    Labs: Laboratory chart enteries reviewed. Recent Results (from the past 24 hour(s))   CBC WITH AUTOMATED DIFF    Collection Time: 06/27/17  7:52 PM   Result Value Ref Range    WBC 6.3 3.6 - 11.0 K/uL    RBC 3.01 (L) 3.80 - 5.20 M/uL    HGB 9.1 (L) 11.5 - 16.0 g/dL    HCT 28.0 (L) 35.0 - 47.0 %    MCV 93.0 80.0 - 99.0 FL    MCH 30.2 26.0 - 34.0 PG    MCHC 32.5 30.0 - 36.5 g/dL    RDW 14.4 11.5 - 14.5 %    PLATELET 623 (L) 500 - 400 K/uL    NEUTROPHILS 60 32 - 75 %    LYMPHOCYTES 34 12 - 49 %    MONOCYTES 6 5 - 13 %    EOSINOPHILS 0 0 - 7 %    BASOPHILS 0 0 - 1 %    ABS. NEUTROPHILS 3.8 1.8 - 8.0 K/UL    ABS. LYMPHOCYTES 2.2 0.8 - 3.5 K/UL    ABS. MONOCYTES 0.4 0.0 - 1.0 K/UL    ABS. EOSINOPHILS 0.0 0.0 - 0.4 K/UL    ABS.  BASOPHILS 0.0 0.0 - 0.1 K/UL   METABOLIC PANEL, COMPREHENSIVE    Collection Time: 06/27/17  7:52 PM   Result Value Ref Range    Sodium 134 (L) 136 - 145 mmol/L    Potassium 3.4 (L) 3.5 - 5.1 mmol/L    Chloride 99 97 - 108 mmol/L    CO2 24 21 - 32 mmol/L    Anion gap 11 5 - 15 mmol/L    Glucose 119 (H) 65 - 100 mg/dL    BUN 15 6 - 20 MG/DL    Creatinine 1.00 0.55 - 1.02 MG/DL    BUN/Creatinine ratio 15 12 - 20      GFR est AA >60 >60 ml/min/1.73m2    GFR est non-AA 57 (L) >60 ml/min/1.73m2    Calcium 9.8 8.5 - 10.1 MG/DL    Bilirubin, total 0.4 0.2 - 1.0 MG/DL    ALT (SGPT) 23 12 - 78 U/L    AST (SGOT) 22 15 - 37 U/L    Alk. phosphatase 124 (H) 45 - 117 U/L    Protein, total 8.1 6.4 - 8.2 g/dL    Albumin 3.7 3.5 - 5.0 g/dL    Globulin 4.4 (H) 2.0 - 4.0 g/dL    A-G Ratio 0.8 (L) 1.1 - 2.2     LIPASE    Collection Time: 06/27/17  7:52 PM   Result Value Ref Range    Lipase 106 73 - 393 U/L   LACTIC ACID, PLASMA    Collection Time: 06/27/17  8:35 PM   Result Value Ref Range    Lactic acid 1.2 0.4 - 2.0 MMOL/L   . Impression/Plan:    Oncologic: Refractory ovarian cancer    Cardiopulmonary: Stable   Endocrine: N/A   Heme/CV: Hemodynamically stable. Anemia, chronic, improved since last assess     Renal:  Hydronephrosis noted on CT. Creatinine normal   FEN/GI: Lack of GI function worrisome for SB obstruction. ID/Wound: Afebrile. .     Dispostion/Plan:   Diet: NPO      IVF: Suppotive    Antibiotics: None   Analgesia: IV Dilaudid     The patient will be admitted for GI rest hydration   Hopeful for a spontaneous resolution of the GI dysfunction   Hydronephrosis will need to be addressed.       Leann Fang MD    [facility]    Defined Sensitive Document    6/28/2017  6:56 AM

## 2017-06-28 NOTE — PROGRESS NOTES
[facility] Shanon Peterson, HAYDER    Patient Name: Arash Corona  MRN: [id number]  AGE: 61 y.o.  : 1960    Date: 2017    Date of Admission: 2017  Unit: 612/01    Admission Diagnosis:        ICD-10-CM ICD-9-CM    1. Abdominal pain, unspecified location R10.9 789.00        Problem List:     Patient Active Problem List   Diagnosis Code    Malignant neoplasm of both ovaries ([address] C56.1, C56.2    Carcinomatosis peritonei ([address] C78.6, C80.1    Small bowel obstruction ([address] K56.69    Ileostomy in place ([facility] [address] Z93.2    Anemia due to chemotherapy D64.81, T45.1X5A    CINV (chemotherapy-induced nausea and vomiting) R11.2, T45.1X5A    Cancer associated pain G89.3    Generalized abdominal pain R10.84    Anxiety about health F41.8    Ileus (HCC) K56.7    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    Hx of pulmonary embolus Z86.711    Dehydration E86.0    Chemotherapy-induced neutropenia (HCC) D70.1    Anticoagulation adequate with anticoagulant therapy Z79.01    Counseling regarding end of life decision making Z71.89    Bowel obstruction (HCC) K56.60    Protein calorie malnutrition (HCC) E46    Malignant ascites R18.0    Hydronephrosis of right kidney N13.30    Isolated proteinuria R80.0    Erosive esophagitis K22.10    Ulcerative esophagitis K22.10    Hypomagnesemia E83.42    Abdominal pain R10.9         Postoperative Day: . * No surgery found *       * No surgery found *    Subjective:   Ongoing nursing care reviewed. No acute distress. Activity: OOB. Diet: NPO. Nausea/vomiting: Resolved. BM/flatus: Resumption of GI function--abundant ileostomy output    Review of Systems:    Constitutional: Negative for fever, chills and diaphoresis. Respiratory: Negative for reported cough, shortness of breath and wheezing. Cardiovascular: Negative for reported chest pain and leg swelling.     Gastrointestinal: Negative for nausea, vomiting. Neurological:  No acute deficit. Events of Prior Day: Patient interviewed. Chart reviewed. Ongoing nursing care. Patient as remained NPO thru PM with spontaneous resumption of ileostomy function   Resolution of abdominal pain/nausea. Vitals:     Vitals:    06/27/17 2142 06/27/17 2230 06/27/17 2330 06/28/17 0012   BP: 123/54 116/64 107/57 128/68   Pulse: 87 77 66 61   Resp: 18 18 18 18   Temp:    97.9 °F (36.6 °C)   SpO2: 98% 99% 98% 100%   Weight:       Height:            Body mass index is 20.75 kg/(m^2). I/O:     Date 06/27/17 0700 - 06/28/17 0659 06/28/17 0700 - 06/29/17 0659   Shift 3080-9501 9053-6978 24 Hour Total 8615-1944 0820-5886 24 Hour Total   I  N  T  A  K  E   Shift Total  (mL/kg)         O  U  T  P  U  T   Stool            Stool Occurrence(s)  1 x 1 x       Shift Total  (mL/kg)         NET         Weight (kg)  53.1 53.1 53.1 53.1 53.1       Examination:   Gen: Conversant, alert, oriented. Without acute distress. Cardiac: Regular rate and rhythm without murmur   Lungs: Clear to auscultation   Abdomen:    Soft, flat, nontender, BS appreciated. Ileostomy functional    Wound: Bandaged, . No hernia    CVAT: Negative   Extremities: No deep tenderness, warm, pulses appreciated, trace edema. MSE: Grossly intact    Labs: Laboratory chart enteries reviewed. Recent Results (from the past 24 hour(s))   CBC WITH AUTOMATED DIFF    Collection Time: 06/27/17  7:52 PM   Result Value Ref Range    WBC 6.3 3.6 - 11.0 K/uL    RBC 3.01 (L) 3.80 - 5.20 M/uL    HGB 9.1 (L) 11.5 - 16.0 g/dL    HCT 28.0 (L) 35.0 - 47.0 %    MCV 93.0 80.0 - 99.0 FL    MCH 30.2 26.0 - 34.0 PG    MCHC 32.5 30.0 - 36.5 g/dL    RDW 14.4 11.5 - 14.5 %    PLATELET 220 (L) 887 - 400 K/uL    NEUTROPHILS 60 32 - 75 %    LYMPHOCYTES 34 12 - 49 %    MONOCYTES 6 5 - 13 %    EOSINOPHILS 0 0 - 7 %    BASOPHILS 0 0 - 1 %    ABS. NEUTROPHILS 3.8 1.8 - 8.0 K/UL    ABS. LYMPHOCYTES 2.2 0.8 - 3.5 K/UL    ABS.  MONOCYTES 0.4 0.0 - 1.0 K/UL    ABS. EOSINOPHILS 0.0 0.0 - 0.4 K/UL    ABS. BASOPHILS 0.0 0.0 - 0.1 K/UL   METABOLIC PANEL, COMPREHENSIVE    Collection Time: 06/27/17  7:52 PM   Result Value Ref Range    Sodium 134 (L) 136 - 145 mmol/L    Potassium 3.4 (L) 3.5 - 5.1 mmol/L    Chloride 99 97 - 108 mmol/L    CO2 24 21 - 32 mmol/L    Anion gap 11 5 - 15 mmol/L    Glucose 119 (H) 65 - 100 mg/dL    BUN 15 6 - 20 MG/DL    Creatinine 1.00 0.55 - 1.02 MG/DL    BUN/Creatinine ratio 15 12 - 20      GFR est AA >60 >60 ml/min/1.73m2    GFR est non-AA 57 (L) >60 ml/min/1.73m2    Calcium 9.8 8.5 - 10.1 MG/DL    Bilirubin, total 0.4 0.2 - 1.0 MG/DL    ALT (SGPT) 23 12 - 78 U/L    AST (SGOT) 22 15 - 37 U/L    Alk. phosphatase 124 (H) 45 - 117 U/L    Protein, total 8.1 6.4 - 8.2 g/dL    Albumin 3.7 3.5 - 5.0 g/dL    Globulin 4.4 (H) 2.0 - 4.0 g/dL    A-G Ratio 0.8 (L) 1.1 - 2.2     LIPASE    Collection Time: 06/27/17  7:52 PM   Result Value Ref Range    Lipase 106 73 - 393 U/L   LACTIC ACID, PLASMA    Collection Time: 06/27/17  8:35 PM   Result Value Ref Range    Lactic acid 1.2 0.4 - 2.0 MMOL/L   . Impression:    Oncologic: Refractory EOC    Reactive SBO with spontaneous resolution    Cardiopulmonary: Stable   Endocrine: N/A   Heme/CV: Hemodynamically stable      Renal:  Hydronephrosis noted, Cr normal    FEN/GI: Small bowel dysfunction resolved, suspect reactive adynamic etiology. ID/Wound: Afebrile. .     Dispostion/Plan:   Diet: As tolerated,   IVF: Supportive   Antibiotics: None   Analgesia: PO oxycodone (IR) and Duragesic 100 mcg     Discharge  Continue home meds. Maintain pre-admission analgesic protocol  Followup appointment with Dr. Phuong Molina   Will address hydronephrosis   Continue care plan addressing neoplastic disease    Discuss with the patient. All questions answered.     Kayce Ovalle MD    [address] [facility]    Defined Sensitive Document    6/28/2017  7:07 AM

## 2017-06-29 ENCOUNTER — OFFICE VISIT (OUTPATIENT)
Dept: GYNECOLOGY | Age: 57
End: 2017-06-29

## 2017-06-29 ENCOUNTER — HOSPITAL ENCOUNTER (OUTPATIENT)
Dept: INFUSION THERAPY | Age: 57
Discharge: HOME | End: 2017-06-29
Payer: OTHER GOVERNMENT

## 2017-06-29 VITALS
WEIGHT: 117.4 LBS | SYSTOLIC BLOOD PRESSURE: 120 MMHG | DIASTOLIC BLOOD PRESSURE: 69 MMHG | HEIGHT: 63 IN | BODY MASS INDEX: 20.8 KG/M2 | HEART RATE: 74 BPM

## 2017-06-29 VITALS
SYSTOLIC BLOOD PRESSURE: 106 MMHG | OXYGEN SATURATION: 99 % | DIASTOLIC BLOOD PRESSURE: 64 MMHG | TEMPERATURE: 97.7 F | HEART RATE: 64 BPM | RESPIRATION RATE: 16 BRPM

## 2017-06-29 DIAGNOSIS — C56.3 MALIGNANT NEOPLASM OF BOTH OVARIES (HCC): Primary | ICD-10-CM

## 2017-06-29 DIAGNOSIS — K56.609 SMALL BOWEL OBSTRUCTION (HCC): ICD-10-CM

## 2017-06-29 DIAGNOSIS — N13.30 HYDRONEPHROSIS OF LEFT KIDNEY: ICD-10-CM

## 2017-06-29 DIAGNOSIS — R18.0 MALIGNANT ASCITES: ICD-10-CM

## 2017-06-29 DIAGNOSIS — R10.84 GENERALIZED ABDOMINAL PAIN: ICD-10-CM

## 2017-06-29 DIAGNOSIS — N13.30 HYDRONEPHROSIS OF RIGHT KIDNEY: ICD-10-CM

## 2017-06-29 LAB
ALBUMIN SERPL-MCNC: 3.8 G/DL (ref 3.5–5.5)
ALBUMIN/GLOB SERPL: 1.7 {RATIO} (ref 1.2–2.2)
ALP SERPL-CCNC: 98 IU/L (ref 39–117)
ALT SERPL-CCNC: 13 IU/L (ref 0–32)
AST SERPL-CCNC: 20 IU/L (ref 0–40)
BASOPHILS # BLD AUTO: 0 X10E3/UL (ref 0–0.2)
BASOPHILS NFR BLD AUTO: 0 %
BILIRUB SERPL-MCNC: 0.2 MG/DL (ref 0–1.2)
BUN SERPL-MCNC: 11 MG/DL (ref 6–24)
BUN/CREAT SERPL: 15 (ref 9–23)
CALCIUM SERPL-MCNC: 8.7 MG/DL (ref 8.7–10.2)
CANCER AG125 SERPL-ACNC: 95.5 U/ML (ref 0–38.1)
CHLORIDE SERPL-SCNC: 98 MMOL/L (ref 96–106)
CO2 SERPL-SCNC: 21 MMOL/L (ref 18–29)
CREAT SERPL-MCNC: 0.71 MG/DL (ref 0.57–1)
EOSINOPHIL # BLD AUTO: 0 X10E3/UL (ref 0–0.4)
EOSINOPHIL NFR BLD AUTO: 1 %
ERYTHROCYTE [DISTWIDTH] IN BLOOD BY AUTOMATED COUNT: 16 % (ref 12.3–15.4)
GLOBULIN SER CALC-MCNC: 2.3 G/DL (ref 1.5–4.5)
GLUCOSE SERPL-MCNC: 116 MG/DL (ref 65–99)
HCT VFR BLD AUTO: 23.4 % (ref 34–46.6)
HGB BLD-MCNC: 7.6 G/DL (ref 11.1–15.9)
IMM GRANULOCYTES # BLD: 0 X10E3/UL (ref 0–0.1)
IMM GRANULOCYTES NFR BLD: 0 %
LYMPHOCYTES # BLD AUTO: 1.8 X10E3/UL (ref 0.7–3.1)
LYMPHOCYTES NFR BLD AUTO: 38 %
MAGNESIUM SERPL-MCNC: 1.5 MG/DL (ref 1.6–2.3)
MCH RBC QN AUTO: 30.3 PG (ref 26.6–33)
MCHC RBC AUTO-ENTMCNC: 32.5 G/DL (ref 31.5–35.7)
MCV RBC AUTO: 93 FL (ref 79–97)
MONOCYTES # BLD AUTO: 0.3 X10E3/UL (ref 0.1–0.9)
MONOCYTES NFR BLD AUTO: 7 %
NEUTROPHILS # BLD AUTO: 2.6 X10E3/UL (ref 1.4–7)
NEUTROPHILS NFR BLD AUTO: 54 %
PLATELET # BLD AUTO: 117 X10E3/UL (ref 150–379)
POTASSIUM SERPL-SCNC: 3.4 MMOL/L (ref 3.5–5.2)
PROT SERPL-MCNC: 6.1 G/DL (ref 6–8.5)
RBC # BLD AUTO: 2.51 X10E6/UL (ref 3.77–5.28)
SODIUM SERPL-SCNC: 136 MMOL/L (ref 134–144)
WBC # BLD AUTO: 4.8 X10E3/UL (ref 3.4–10.8)

## 2017-06-29 PROCEDURE — 86920 COMPATIBILITY TEST SPIN: CPT | Performed by: OBSTETRICS & GYNECOLOGY

## 2017-06-29 PROCEDURE — 36415 COLL VENOUS BLD VENIPUNCTURE: CPT | Performed by: OBSTETRICS & GYNECOLOGY

## 2017-06-29 PROCEDURE — 86900 BLOOD TYPING SEROLOGIC ABO: CPT | Performed by: OBSTETRICS & GYNECOLOGY

## 2017-06-29 NOTE — PROGRESS NOTES
Pre chemo and review ct scan results. Pt would like the Morphine allergy removed from her chart. She had a rash a long time ago but is taking it now without any reactions.

## 2017-06-29 NOTE — PROGRESS NOTES
Blood pressure 106/64, pulse 64, temperature 97.7 °F (36.5 °C), resp. rate 16, SpO2 99 %. Patient came in for a peripheral lab draw. Labs were drawn from the right antecubital.  Labs drawn were for a Type and Cross match for a blood transfusion to be done tomorrow at 10AM at the [facility] This was ordered by Dr. Baca Staff.

## 2017-06-29 NOTE — PROGRESS NOTES
[address] [place] [address] Mathias Moritz [address], [address] [facility]  [phone]  MD Christopher Birmingham MD  Office Note  Patient ID:  Name:  Nay Villarreal  MRN:  [id number]  :  1960/56 y.o. Date:  2017      HISTORY OF PRESENT ILLNESS:  Nay Villarreal is a 61 y.o.  postmenopausal female with a diagnosis of stage IV ovarian cancer. She underwent exploratory laparotomy with suboptimal debulking in 2015. She had extensive disease. She was readmitted about a week later with obstruction and subsequently had a cecal perforation, requiring resection, end ileostomy, and mucous fistula. During the hospitalization she was also diagnosed with pulmonary embolus and had chest tubes placed for bilateral pleural effusions. She had a prolonged hospital course and actually received a dose of cisplatin chemotherapy while in the hospital to help dry up the effusions. She completed 6 cycles of Taxol/Carboplatin chemotherapy following her initial debulking. She  did relatively well, considering she had an ileostomy to deal with during chemotherapy. I took her to the OR on 16 for interval debulking and reversal of her ileostomy. She had extensive disease, but we were able to resect the majority of her disease. One week later she developed an anastomotic leak requiring reexploration and recreation of an ileostomy. She had another prolonged hospitalization, but recovered well since surgery. We then reinitiated Taxol/Carboplatin chemotherapy, but did reduce the dose of Taxol to 135 mg/m2. She completed 4 mores cycles. Her CA-125 normalized and we stopped treatment. She now has evidence of recurrent disease and was receiving Doxil/Avastin. She has completed 3 cycles of that regimen. She has been admitted to [facility] several time since starting this regimen with evidence of partial small bowel obstruction.   The symptoms have resolved each time with conservative management. She had increased ascites on her last scan, but no significant change in her disease otherwise. Her CA-125 also went up. All of this suggests progression of disease. We switched her chemotherapy to [facility]. She has completed 2 cycles so far. She has been having a lot of dysphagia and has seen her gastroenterologist in [facility] He has backed her down to a liquid diet. Her ostomy had been working well and was showing no signs of obstruction, until earlier this week. She was briefly admitted to [facility] with pain and a CT demonstrated partial SBO. Her symptoms resolved within 24 hours and her ostomy began working again and her pain resolved. She presents today for a pre-chemo visit. Her CT showed some improvement in ascites. Her CA-125 is down slightly as well.         Problem List:  Patient Active Problem List    Diagnosis Date Noted    Hydronephrosis of left kidney 06/28/2017    Abdominal pain 06/27/2017    Hypomagnesemia 06/13/2017    Erosive esophagitis 06/07/2017    Ulcerative esophagitis 06/07/2017    Isolated proteinuria 05/25/2017    Malignant ascites 05/08/2017    Hydronephrosis of right kidney 05/08/2017    Protein calorie malnutrition ([address] 04/28/2017    Bowel obstruction ([address] 04/27/2017    Counseling regarding end of life decision making 04/25/2017    Anticoagulation adequate with anticoagulant therapy 03/28/2017    Chemotherapy-induced neutropenia ([address] 03/22/2017    Ileus ([address] 03/21/2017    Thrombosis of pelvic vein 03/21/2017    Hx of pulmonary embolus 03/21/2017    Dehydration 03/21/2017    Cancer associated pain 05/13/2016    Generalized abdominal pain 05/13/2016    Anxiety about health 05/13/2016    CINV (chemotherapy-induced nausea and vomiting) 03/22/2016    Anemia due to chemotherapy 02/22/2016    Ileostomy in place [facility]) 02/15/2016    Carcinomatosis peritonei ([address] 10/29/2015    Small bowel obstruction ([address] 10/29/2015    Malignant neoplasm of both ovaries ([facility] 10/12/2015     PMH:  Past Medical History:   Diagnosis Date    Abdominal carcinomatosis ([address] 10/2015    Arthritis     left shoulder    BRCA negative 12/2015    Chronic pain     Depression     HX    Environmental allergies     GERD (gastroesophageal reflux disease)     Hypertension     NEW OVER PAST 5-6 MONTHS    Ovarian cancer ([facility] 10/2015    serous adenocarcinoma, high grade, stage IIIC    Pulmonary embolism ([facility] 10/2015      PSH:  Past Surgical History:   Procedure Laterality Date    CARDIAC SURG PROCEDURE UNLIST  12/11/15    cardiac cath/normal     HX BREAST BIOPSY  1995    benign right breast bx    HX DILATION AND CURETTAGE  2/2011    POLYPECTOMY    HX GI  2015    PERFORATED BOWEL; ILEOSTOMY    HX GYN  10/2015    EXP LAPAROTOMY    HX HEENT  LAST 2005    oral tissue graft X3    HX HEENT  1970    tonsillectomy    HX LAPAROTOMY  10/2015    tumor sampling    HX LAPAROTOMY  4/2016    hysterectomy, BSO, radical debulking    HX LAPAROTOMY  5/2016    resection ileocolic anastomosis, leak, ileostomy    HX OTHER SURGICAL  11/2015    tunneled portacath      Social History:  Social History   Substance Use Topics    Smoking status: Never Smoker    Smokeless tobacco: Never Used    Alcohol use No      Family History:  Family History   Problem Relation Age of Onset    Cancer Maternal Uncle      skin    Hypertension Mother     High Cholesterol Mother     Anxiety Mother     Heart Disease Mother     High Cholesterol Father     Hypertension Father     Stroke Father     Arthritis-osteo Sister     Migraines Sister     Other Sister      FIBROMYALGIA    Depression Brother     Other Brother      DRUG ABUSE HEPATITIS C    Migraines Sister     Arrhythmia Sister     Depression Sister     Lung Disease Paternal Aunt      COPD    Cancer Paternal Uncle      LUNG    Lung Disease Paternal Uncle      COPD    Lung Disease Maternal Grandmother COPD    Anesth Problems Neg Hx       Medications: (reviewed)  Current Outpatient Prescriptions   Medication Sig    morphine 10 mg/5 mL oral solution Take 5 mL by mouth every four (4) hours as needed for Pain.  fentaNYL (DURAGESIC) 100 mcg/hr PATCH 1 Patch by TransDERmal route every seventy-two (72) hours. Max Daily Amount: 1 Patch.  NEXIUM PACKET 40 mg granules for oral suspension two (2) times a day.  metoclopramide (REGLAN) 5 mg/5 mL syrup Take 10 mL by mouth four (4) times daily as needed. Take before meals as needed    promethazine (PHENERGAN) 6.25 mg/5 mL syrup Take 20 mL by mouth four (4) times daily as needed for Nausea.  sucralfate (CARAFATE) 100 mg/mL suspension Take 5 mL by mouth four (4) times daily.  polyethylene glycol (MIRALAX) 17 gram/dose powder Take 17 g by mouth daily as needed.  apixaban (ELIQUIS) 2.5 mg tablet Take 1 Tab by mouth two (2) times a day.  zolpidem (AMBIEN) 10 mg tablet Take 1 Tab by mouth nightly as needed for Sleep. Max Daily Amount: 10 mg.    ondansetron (ZOFRAN ODT) 4 mg disintegrating tablet Take 1 Tab by mouth every eight (8) hours as needed for Nausea.  esomeprazole (NEXIUM) 40 mg capsule Take 1 Cap by mouth daily.  metoprolol (LOPRESSOR) 100 mg tablet Take 50 mg by mouth two (2) times a day.  lidocaine-prilocaine (EMLA) topical cream Apply small amount over port area and cover with a band aid 1 hour before chemo treatment    magnesium oxide (MAG-OX) 400 mg tablet Take 1 Tab by mouth two (2) times a day. Indications: HYPOMAGNESEMIA    Cetirizine (ZYRTEC) 10 mg cap Take  by mouth.  multivit-min-FA-coenzyme Q10 (AQUADEKS) 100-350-5 mcg-mcg-mg chew Take 1 Tab by mouth daily. Indications: VITAMIN DEFICIENCY PREVENTION    Calcium-Vitamin D3-Vitamin K 136-331-04 mg-unit-mcg chew Take 1 Tab by mouth two (2) times a day.  multivitamin (ONE A DAY) tablet Take 1 Tab by mouth daily.      No current facility-administered medications for this visit. Facility-Administered Medications Ordered in Other Visits   Medication Dose Route Frequency    0.9% sodium chloride infusion 2,000 mL  2,000 mL IntraVENous ONCE     Allergies: (reviewed)  Allergies   Allergen Reactions    Ativan [Lorazepam] Other (comments)     SEVERE CONFUSION    Morphine Rash and Itching          OBJECTIVE:    Physical Exam:  VITAL SIGNS: Vitals:    06/29/17 0829   BP: 120/69   Pulse: 74   Weight: 53.3 kg (117 lb 6.4 oz)     Body mass index is 20.8 kg/(m^2). GENERAL AVA: Conversant, alert, oriented. No acute distress. HEENT: HEENT. No thyroid enlargement. No JVD. Neck: Supple without restrictions. RESPIRATORY: Clear to auscultation and percussion to the bases. No CVAT. CARDIOVASC: RRR without murmur/rub. GASTROINT: soft, non-tender, without masses or organomegaly; ileostomy in RLQ   MUSCULOSKEL: no joint tenderness, deformity or swelling   EXTREMITIES: extremities normal, atraumatic, no cyanosis or edema   PELVIC: Deferred   RECTAL: Deferred   JOSE SURVEY: No suspicious lymphadenopathy or edema noted. NEURO: Grossly intact. No acute deficit. Lab Results   Component Value Date/Time    WBC 4.8 06/28/2017 08:56 AM    HGB 7.6 06/28/2017 08:56 AM    HCT 23.4 06/28/2017 08:56 AM    PLATELET 581 92/72/2531 08:56 AM    MCV 93 06/28/2017 08:56 AM     Lab Results   Component Value Date/Time    Sodium 136 06/28/2017 08:56 AM    Potassium 3.4 06/28/2017 08:56 AM    Chloride 98 06/28/2017 08:56 AM    CO2 21 06/28/2017 08:56 AM    Anion gap 11 06/27/2017 07:52 PM    Glucose 116 06/28/2017 08:56 AM    BUN 11 06/28/2017 08:56 AM    Creatinine 0.71 06/28/2017 08:56 AM    BUN/Creatinine ratio 15 06/28/2017 08:56 AM    GFR est  06/28/2017 08:56 AM    GFR est non-AA 95 06/28/2017 08:56 AM    Calcium 8.7 06/28/2017 08:56 AM       CT of chest/abdomen/pelvis (6/27/17)  LUNG BASES: Clear. INCIDENTALLY IMAGED HEART AND MEDIASTINUM: Unremarkable.   LIVER: Small hypodense lesions, one in the left hepatic lobe and one in the  right hepatic lobe are stable. GALLBLADDER: Unremarkable. SPLEEN: No mass. PANCREAS: No mass or ductal dilatation. ADRENALS: Unremarkable. KIDNEYS: Hydronephrosis is moderate and stable on the right and mild to moderate  on the left and new, to the level of the mid ureters. There is no definite  obstructing calculus on either side. STOMACH: Unremarkable. SMALL BOWEL: Previously described small bowel centralization is stable. Small  bowel loops are dilated but roughly stable in appearance, with a maximum caliber  of 3.1 cm. Pattern of dilatation is similar to the prior study. Small bowel is  fluid-filled to the level of the ostomy in the right anterior abdominal wall. COLON: Visualized colon shows no distention, but the colon is predominantly  absent. APPENDIX: Surgically absent  PERITONEUM: Small amount of ascites, stable to slightly decreased since the  prior study. RETROPERITONEUM: No lymphadenopathy or aortic aneurysm. IVC filter stable. REPRODUCTIVE ORGANS: Surgically absent. URINARY BLADDER: No mass or calculus. BONES: No destructive bone lesion. ADDITIONAL COMMENTS: N/A     IMPRESSION:  1. Mild to moderate small bowel distention with centralization of loops as  previously described, similar in appearance to prior study 5/7/2017.  2. Small to moderate amount of ascites, stable. 3. Postoperative changes as described above, stable. 4. Stable hypodense liver lesions. 5. Stable right hydronephrosis to the mid ureter without obvious obstructing  calculus. 6. New left hydronephrosis to the mid ureter without obvious obstructing  calculus. IMPRESSION/PLAN:  Jennifer Nobles is a 61 y.o. female with a diagnosis of stage IV ovarian cancer. She is s/p suboptimal debulking, followed by a second laparotomy for cecal perforation.   She completed 6 cycles of Taxol/Carboplatin chemotherapy, in addition to the one dose of cisplatin given during her initial hospitalization. Based upon her CT and her CA-125, she responded well, though there was still disease remaining. She underwent interval debulking with resection of the majority of her disease, although there was still small volume carcinomatosis remaining. I recommended continuing with Taxol/Carboplatin, as she did respond well, despite still having residual disease at her interval debulking. I dropped the dose of Taxol to 135 mg/m2 to reduce the chance of neuropathy, but kept the carboplatin dose at an AUC of 6. She completed 4 more cycles and her CA-125 normalized. We then stopped therapy. She subsequently developed recurrent disease and I recommended Doxil/Avastin, considering it had been less than 6 months since we stopped Taxol/Carboplatin. She completed 3 cycles of that regimen but found to have progression of disease. I went over different options with her, specifically Gemzar and Topotecan. I recommended Gemzar, but I also suggested that we continue the Avastin, as this might help with the ascites. She has completed 2 cycles so far. Overall, her CT looks a little better and her CA-125 is slightly improved. We will continue with the current regimen and obtain a CT after this cycle. Adequate PO intake encouraged. We will monitor the hydronephrosis since her kidney function appears normal.  She may ultimately need stenting. Her Hgb is down to 7.6. We will arrange for transfusion of 2 units PRBC.       Signed By: Velma Hussein MD     6/29/2017/10:35 AM

## 2017-06-30 ENCOUNTER — HOSPITAL ENCOUNTER (OUTPATIENT)
Dept: INFUSION THERAPY | Age: 57
Discharge: HOME | End: 2017-06-30
Payer: OTHER GOVERNMENT

## 2017-06-30 VITALS
HEART RATE: 65 BPM | DIASTOLIC BLOOD PRESSURE: 76 MMHG | RESPIRATION RATE: 18 BRPM | TEMPERATURE: 98.2 F | SYSTOLIC BLOOD PRESSURE: 124 MMHG

## 2017-06-30 PROCEDURE — P9016 RBC LEUKOCYTES REDUCED: HCPCS | Performed by: OBSTETRICS & GYNECOLOGY

## 2017-06-30 PROCEDURE — 77030013169 SET IV BLD ICUM -A

## 2017-06-30 PROCEDURE — 74011250636 HC RX REV CODE- 250/636: Performed by: OBSTETRICS & GYNECOLOGY

## 2017-06-30 PROCEDURE — 74011000250 HC RX REV CODE- 250: Performed by: OBSTETRICS & GYNECOLOGY

## 2017-06-30 PROCEDURE — 77030012965 HC NDL HUBR BBMI -A

## 2017-06-30 PROCEDURE — 36430 TRANSFUSION BLD/BLD COMPNT: CPT

## 2017-06-30 RX ORDER — SODIUM CHLORIDE 0.9 % (FLUSH) 0.9 %
10-40 SYRINGE (ML) INJECTION AS NEEDED
Status: ACTIVE | OUTPATIENT
Start: 2017-06-30 | End: 2017-06-30

## 2017-06-30 RX ORDER — HEPARIN 100 UNIT/ML
500 SYRINGE INTRAVENOUS AS NEEDED
Status: ACTIVE | OUTPATIENT
Start: 2017-06-30 | End: 2017-07-01

## 2017-06-30 RX ORDER — SODIUM CHLORIDE 9 MG/ML
250 INJECTION, SOLUTION INTRAVENOUS AS NEEDED
Status: DISPENSED | OUTPATIENT
Start: 2017-06-30 | End: 2017-06-30

## 2017-06-30 RX ORDER — SODIUM CHLORIDE 9 MG/ML
10 INJECTION INTRAMUSCULAR; INTRAVENOUS; SUBCUTANEOUS AS NEEDED
Status: ACTIVE | OUTPATIENT
Start: 2017-06-30 | End: 2017-07-01

## 2017-06-30 RX ADMIN — SODIUM CHLORIDE 10 ML: 9 INJECTION INTRAMUSCULAR; INTRAVENOUS; SUBCUTANEOUS at 10:12

## 2017-06-30 RX ADMIN — SODIUM CHLORIDE 250 ML: 900 INJECTION, SOLUTION INTRAVENOUS at 10:12

## 2017-06-30 NOTE — PROGRESS NOTES
1000 Pt arrived at [facility] ambulatory and in no distress for transfusion of 2 units PRBCs. Assessment completed, no new complaints voiced. Port accessed with positive blood return. Signs/symptoms of adverse blood reaction discussed with pt, voiced understanding. Patient Vitals for the past 12 hrs:   Temp Pulse Resp BP   06/30/17 1605 98.2 °F (36.8 °C) 65 18 124/76   06/30/17 1520 98.3 °F (36.8 °C) 63 18 117/75   06/30/17 1420 98.5 °F (36.9 °C) 62 18 105/63   06/30/17 1350 98.6 °F (37 °C) 70 18 125/78   06/30/17 1335 98.4 °F (36.9 °C) 62 18 113/69   06/30/17 1316 98.2 °F (36.8 °C) 61 18 100/76   06/30/17 1235 97.9 °F (36.6 °C) 62 18 134/88   06/30/17 1135 97.9 °F (36.6 °C) 62 18 101/63   06/30/17 1105 98 °F (36.7 °C) 65 18 97/60   06/30/17 1050 96.3 °F (35.7 °C) 61 18 103/64   06/30/17 1032 95.9 °F (35.5 °C) 65 16 94/55   06/30/17 1002 97.8 °F (36.6 °C) 70 16 129/77       Medications received:   NS @ KVO     1035: 1st unit PRBCs started and infusing without difficulty, will monitor. 1305: 1st unit completed without adverse reaction noted, NS flushing line. 1320: 2nd unit PRBCs started and infusing without difficulty   1550: 2nd unit completed without adverse reaction noted, NS flushing line. 1610 Tolerated transfusion well, no adverse reaction noted. D/C instructions reviewed, copy to pt, voiced understanding. Patient declined 1 hour post transfusion observation. D/Cd from [facility] ambulatory and in no distress accompanied by . Next appt 7/5/17.

## 2017-06-30 NOTE — DISCHARGE INSTRUCTIONS
OUTPATIENT INFUSION CENTER    DISCHARGE INSTRUCTIONS FOR:  BLOOD TRANSFUSION    We hope you are feeling better after your blood transfusion. Some mild tenderness or slight bruising at your IV site is normal.  Avoid lifting or heavy use of that extremity for the rest of the day. Drink plenty of fluids, eat a normal diet and get some rest.    There are some important signs that you need to watch for in case you experience a delayed reaction to the blood you have received. Call your physician immediately if you develop any of the following symptoms:    1. Severe headache or backache;    2. Fever above 100 degrees;    3. Chills;    4. Difficulty breathing;    5.  Blood or red color in urine;    6. The feeling of weakness or constant fatigue;    7. Yellowing of the whites of your eyes or skin (jaundice). If your physician is not available, call or go to the nearest emergency room, or dial 911.     Johnathan Smith, Signature: ___________________________ 6/30/2017  Malorie Ellis RN

## 2017-07-01 LAB
ABO + RH BLD: NORMAL
BLD PROD TYP BPU: NORMAL
BLOOD GROUP ANTIBODIES SERPL: NORMAL
BPU ID: NORMAL
CROSSMATCH RESULT,%XM: NORMAL
SPECIMEN EXP DATE BLD: NORMAL
STATUS OF UNIT,%ST: NORMAL
UNIT DIVISION, %UDIV: 0

## 2017-07-03 RX ORDER — DEXAMETHASONE SODIUM PHOSPHATE 4 MG/ML
8 INJECTION, SOLUTION INTRA-ARTICULAR; INTRALESIONAL; INTRAMUSCULAR; INTRAVENOUS; SOFT TISSUE ONCE
Status: COMPLETED | OUTPATIENT
Start: 2017-07-05 | End: 2017-07-05

## 2017-07-03 RX ORDER — GRANISETRON HYDROCHLORIDE 1 MG/ML
1 INJECTION INTRAVENOUS ONCE
Status: COMPLETED | OUTPATIENT
Start: 2017-07-05 | End: 2017-07-05

## 2017-07-05 ENCOUNTER — TELEPHONE (OUTPATIENT)
Dept: GYNECOLOGY | Age: 57
End: 2017-07-05

## 2017-07-05 ENCOUNTER — HOSPITAL ENCOUNTER (OUTPATIENT)
Dept: INFUSION THERAPY | Age: 57
Discharge: HOME | End: 2017-07-05
Payer: OTHER GOVERNMENT

## 2017-07-05 VITALS
SYSTOLIC BLOOD PRESSURE: 112 MMHG | HEART RATE: 56 BPM | OXYGEN SATURATION: 99 % | HEIGHT: 63 IN | BODY MASS INDEX: 20.59 KG/M2 | RESPIRATION RATE: 18 BRPM | WEIGHT: 116.2 LBS | TEMPERATURE: 97.2 F | DIASTOLIC BLOOD PRESSURE: 68 MMHG

## 2017-07-05 LAB
ALBUMIN SERPL BCP-MCNC: 3.3 G/DL (ref 3.5–5)
ALBUMIN/GLOB SERPL: 0.9 {RATIO} (ref 1.1–2.2)
ALP SERPL-CCNC: 101 U/L (ref 45–117)
ALT SERPL-CCNC: 25 U/L (ref 12–78)
ANION GAP BLD CALC-SCNC: 6 MMOL/L (ref 5–15)
APPEARANCE UR: CLEAR
AST SERPL W P-5'-P-CCNC: 25 U/L (ref 15–37)
BACTERIA URNS QL MICRO: NEGATIVE /HPF
BASOPHILS # BLD AUTO: 0 K/UL (ref 0–0.1)
BASOPHILS # BLD: 0 % (ref 0–1)
BILIRUB SERPL-MCNC: 0.3 MG/DL (ref 0.2–1)
BILIRUB UR QL: NEGATIVE
BUN SERPL-MCNC: 18 MG/DL (ref 6–20)
BUN/CREAT SERPL: 17 (ref 12–20)
CALCIUM SERPL-MCNC: 8.8 MG/DL (ref 8.5–10.1)
CHLORIDE SERPL-SCNC: 102 MMOL/L (ref 97–108)
CO2 SERPL-SCNC: 28 MMOL/L (ref 21–32)
COLOR UR: ABNORMAL
CREAT SERPL-MCNC: 1.03 MG/DL (ref 0.55–1.02)
EOSINOPHIL # BLD: 0.1 K/UL (ref 0–0.4)
EOSINOPHIL NFR BLD: 2 % (ref 0–7)
EPITH CASTS URNS QL MICRO: ABNORMAL /LPF
ERYTHROCYTE [DISTWIDTH] IN BLOOD BY AUTOMATED COUNT: 15.1 % (ref 11.5–14.5)
GLOBULIN SER CALC-MCNC: 3.7 G/DL (ref 2–4)
GLUCOSE SERPL-MCNC: 80 MG/DL (ref 65–100)
GLUCOSE UR STRIP.AUTO-MCNC: NEGATIVE MG/DL
HCT VFR BLD AUTO: 35.9 % (ref 35–47)
HGB BLD-MCNC: 11.6 G/DL (ref 11.5–16)
HGB UR QL STRIP: NEGATIVE
HYALINE CASTS URNS QL MICRO: ABNORMAL /LPF (ref 0–5)
KETONES UR QL STRIP.AUTO: NEGATIVE MG/DL
LEUKOCYTE ESTERASE UR QL STRIP.AUTO: NEGATIVE
LYMPHOCYTES # BLD AUTO: 36 % (ref 12–49)
LYMPHOCYTES # BLD: 2 K/UL (ref 0.8–3.5)
MAGNESIUM SERPL-MCNC: 1.4 MG/DL (ref 1.6–2.4)
MCH RBC QN AUTO: 30.7 PG (ref 26–34)
MCHC RBC AUTO-ENTMCNC: 32.3 G/DL (ref 30–36.5)
MCV RBC AUTO: 95 FL (ref 80–99)
MONOCYTES # BLD: 0.5 K/UL (ref 0–1)
MONOCYTES NFR BLD AUTO: 10 % (ref 5–13)
NEUTS SEG # BLD: 2.8 K/UL (ref 1.8–8)
NEUTS SEG NFR BLD AUTO: 52 % (ref 32–75)
NITRITE UR QL STRIP.AUTO: NEGATIVE
PH UR STRIP: 5.5 [PH] (ref 5–8)
PLATELET # BLD AUTO: 370 K/UL (ref 150–400)
POTASSIUM SERPL-SCNC: 4.3 MMOL/L (ref 3.5–5.1)
PROT SERPL-MCNC: 7 G/DL (ref 6.4–8.2)
PROT UR STRIP-MCNC: ABNORMAL MG/DL
RBC # BLD AUTO: 3.78 M/UL (ref 3.8–5.2)
RBC #/AREA URNS HPF: ABNORMAL /HPF (ref 0–5)
SODIUM SERPL-SCNC: 136 MMOL/L (ref 136–145)
SP GR UR REFRACTOMETRY: 1.02 (ref 1–1.03)
UA: UC IF INDICATED,UAUC: ABNORMAL
UROBILINOGEN UR QL STRIP.AUTO: 0.2 EU/DL (ref 0.2–1)
WBC # BLD AUTO: 5.4 K/UL (ref 3.6–11)
WBC URNS QL MICRO: ABNORMAL /HPF (ref 0–4)

## 2017-07-05 PROCEDURE — 96375 TX/PRO/DX INJ NEW DRUG ADDON: CPT

## 2017-07-05 PROCEDURE — 77030012965 HC NDL HUBR BBMI -A

## 2017-07-05 PROCEDURE — 74011000258 HC RX REV CODE- 258: Performed by: OBSTETRICS & GYNECOLOGY

## 2017-07-05 PROCEDURE — 74011250636 HC RX REV CODE- 250/636: Performed by: OBSTETRICS & GYNECOLOGY

## 2017-07-05 PROCEDURE — 81001 URINALYSIS AUTO W/SCOPE: CPT | Performed by: OBSTETRICS & GYNECOLOGY

## 2017-07-05 PROCEDURE — 36415 COLL VENOUS BLD VENIPUNCTURE: CPT | Performed by: OBSTETRICS & GYNECOLOGY

## 2017-07-05 PROCEDURE — 96417 CHEMO IV INFUS EACH ADDL SEQ: CPT

## 2017-07-05 PROCEDURE — 96361 HYDRATE IV INFUSION ADD-ON: CPT

## 2017-07-05 PROCEDURE — 74011250636 HC RX REV CODE- 250/636

## 2017-07-05 PROCEDURE — 96366 THER/PROPH/DIAG IV INF ADDON: CPT

## 2017-07-05 PROCEDURE — 85025 COMPLETE CBC W/AUTO DIFF WBC: CPT | Performed by: OBSTETRICS & GYNECOLOGY

## 2017-07-05 PROCEDURE — 83735 ASSAY OF MAGNESIUM: CPT | Performed by: OBSTETRICS & GYNECOLOGY

## 2017-07-05 PROCEDURE — 80053 COMPREHEN METABOLIC PANEL: CPT | Performed by: OBSTETRICS & GYNECOLOGY

## 2017-07-05 PROCEDURE — 74011000250 HC RX REV CODE- 250: Performed by: OBSTETRICS & GYNECOLOGY

## 2017-07-05 PROCEDURE — 96413 CHEMO IV INFUSION 1 HR: CPT

## 2017-07-05 RX ORDER — SODIUM CHLORIDE 9 MG/ML
10 INJECTION INTRAMUSCULAR; INTRAVENOUS; SUBCUTANEOUS AS NEEDED
Status: ACTIVE | OUTPATIENT
Start: 2017-07-05 | End: 2017-07-06

## 2017-07-05 RX ORDER — SODIUM CHLORIDE 0.9 % (FLUSH) 0.9 %
10-40 SYRINGE (ML) INJECTION AS NEEDED
Status: ACTIVE | OUTPATIENT
Start: 2017-07-05 | End: 2017-07-06

## 2017-07-05 RX ORDER — HEPARIN 100 UNIT/ML
500 SYRINGE INTRAVENOUS AS NEEDED
Status: ACTIVE | OUTPATIENT
Start: 2017-07-05 | End: 2017-07-06

## 2017-07-05 RX ADMIN — SODIUM CHLORIDE 150 MG: 900 INJECTION, SOLUTION INTRAVENOUS at 11:30

## 2017-07-05 RX ADMIN — DEXAMETHASONE SODIUM PHOSPHATE 8 MG: 4 INJECTION, SOLUTION INTRA-ARTICULAR; INTRALESIONAL; INTRAMUSCULAR; INTRAVENOUS; SOFT TISSUE at 11:17

## 2017-07-05 RX ADMIN — GRANISETRON HYDROCHLORIDE 1 MG: 1 INJECTION INTRAVENOUS at 11:26

## 2017-07-05 RX ADMIN — SODIUM CHLORIDE 1264 MG: 900 INJECTION, SOLUTION INTRAVENOUS at 12:15

## 2017-07-05 RX ADMIN — SODIUM CHLORIDE 10 ML: 9 INJECTION, SOLUTION INTRAMUSCULAR; INTRAVENOUS; SUBCUTANEOUS at 09:15

## 2017-07-05 RX ADMIN — SODIUM CHLORIDE, PRESERVATIVE FREE 500 UNITS: 5 INJECTION INTRAVENOUS at 14:16

## 2017-07-05 RX ADMIN — Medication 20 ML: at 09:15

## 2017-07-05 RX ADMIN — BEVACIZUMAB 840 MG: 400 INJECTION, SOLUTION INTRAVENOUS at 12:53

## 2017-07-05 RX ADMIN — FAMOTIDINE 20 MG: 10 INJECTION, SOLUTION INTRAVENOUS at 10:47

## 2017-07-05 RX ADMIN — SODIUM CHLORIDE 2000 ML: 900 INJECTION, SOLUTION INTRAVENOUS at 09:47

## 2017-07-05 RX ADMIN — Medication 10 ML: at 14:16

## 2017-07-05 NOTE — TELEPHONE ENCOUNTER
[facility] called to notify that the pt's Magnesium level has dropped to 1.4. I d/w  who advised to double her dose for today. [facility] in [facility] states the pt has not been taking because she had to switch to all liquid forms of medications. I advised Maria De Jesus Valencia will have to check with  and will contact the pt to advise.

## 2017-07-05 NOTE — TELEPHONE ENCOUNTER
I called the pharmacist to see if Magnesium Oxide comes in liquid form and it does not. It can be crushed. I advised Dr. Shabnam Lemus that the pt has not been taking for a month now. He would like for her to crush the Mag Ox. I called the pt back and advised she may crush the current prescription for Mag Oxide and put in applesauce to take. The pt verbalized an understanding.

## 2017-07-05 NOTE — PROGRESS NOTES
1005 Pt admit to [facility] for Cycle 3 D 1 Gemzar/Avastin ambulatory in stable condition. Accompanied by supportive , Matty Wise. Assessment completed, functioning ileostomy, continues to have neuropathy, nausea, tinnitis in left ear, fatigue which has improved since transfusion last week. No new concerns voiced. Port accessed with positive blood return. Labs drawn prior to admission and pre transfusion, labs redrawn and sent. chemo doses checked. Normal Saline 2 Liters infused per order. Chemo ordered after labs resulted and reviewed, premeds given. [address] [facility] [address] in MD office about magnesium level. Received call back from [facility] and patient advised to take double dose of daily magnesium today only. Patient states that she is no longer taking the magnesium pills as she is now taking all liquid medications and does not have this. Notified office who will contact patient to arrange. Visit Vitals    /76 (BP 1 Location: Left arm, BP Patient Position: Sitting)    Pulse 63    Temp 96.7 °F (35.9 °C)    Resp 18    Ht 5' 2.99\" (1.6 m)    Wt 52.7 kg (116 lb 3.2 oz)    SpO2 98%    BMI 20.59 kg/m2       Medications:  Normal Saline 2 Liters  Pepcid 20 MG  Kytril 1 MG  Decadron 8 MG  Emend 150 MG  Gemzar 1264 MG  Avastin 840 MG    1425 Pt tolerated treatment well. Port maintained positive blood return throughout treatment, flushed with positive blood return at conclusion and heparinized per protocol. Arnoldiaann Sickle removed. D/c home ambulatory in no distress, accompanied by . Pt aware of next appointment scheduled for 7/12/17.     Recent Results (from the past 12 hour(s))   CBC WITH AUTOMATED DIFF    Collection Time: 07/05/17  9:21 AM   Result Value Ref Range    WBC 5.4 3.6 - 11.0 K/uL    RBC 3.78 (L) 3.80 - 5.20 M/uL    HGB 11.6 11.5 - 16.0 g/dL    HCT 35.9 35.0 - 47.0 %    MCV 95.0 80.0 - 99.0 FL    MCH 30.7 26.0 - 34.0 PG    MCHC 32.3 30.0 - 36.5 g/dL    RDW 15.1 (H) 11.5 - 14.5 %    PLATELET 188 962 - 400 K/uL    NEUTROPHILS 52 32 - 75 %    LYMPHOCYTES 36 12 - 49 %    MONOCYTES 10 5 - 13 %    EOSINOPHILS 2 0 - 7 %    BASOPHILS 0 0 - 1 %    ABS. NEUTROPHILS 2.8 1.8 - 8.0 K/UL    ABS. LYMPHOCYTES 2.0 0.8 - 3.5 K/UL    ABS. MONOCYTES 0.5 0.0 - 1.0 K/UL    ABS. EOSINOPHILS 0.1 0.0 - 0.4 K/UL    ABS. BASOPHILS 0.0 0.0 - 0.1 K/UL   METABOLIC PANEL, COMPREHENSIVE    Collection Time: 07/05/17  9:21 AM   Result Value Ref Range    Sodium 136 136 - 145 mmol/L    Potassium 4.3 3.5 - 5.1 mmol/L    Chloride 102 97 - 108 mmol/L    CO2 28 21 - 32 mmol/L    Anion gap 6 5 - 15 mmol/L    Glucose 80 65 - 100 mg/dL    BUN 18 6 - 20 MG/DL    Creatinine 1.03 (H) 0.55 - 1.02 MG/DL    BUN/Creatinine ratio 17 12 - 20      GFR est AA >60 >60 ml/min/1.73m2    GFR est non-AA 55 (L) >60 ml/min/1.73m2    Calcium 8.8 8.5 - 10.1 MG/DL    Bilirubin, total 0.3 0.2 - 1.0 MG/DL    ALT (SGPT) 25 12 - 78 U/L    AST (SGOT) 25 15 - 37 U/L    Alk.  phosphatase 101 45 - 117 U/L    Protein, total 7.0 6.4 - 8.2 g/dL    Albumin 3.3 (L) 3.5 - 5.0 g/dL    Globulin 3.7 2.0 - 4.0 g/dL    A-G Ratio 0.9 (L) 1.1 - 2.2     URINALYSIS W/ REFLEX CULTURE    Collection Time: 07/05/17  9:21 AM   Result Value Ref Range    Color YELLOW/STRAW      Appearance CLEAR CLEAR      Specific gravity 1.025 1.003 - 1.030      pH (UA) 5.5 5.0 - 8.0      Protein TRACE (A) NEG mg/dL    Glucose NEGATIVE  NEG mg/dL    Ketone NEGATIVE  NEG mg/dL    Bilirubin NEGATIVE  NEG      Blood NEGATIVE  NEG      Urobilinogen 0.2 0.2 - 1.0 EU/dL    Nitrites NEGATIVE  NEG      Leukocyte Esterase NEGATIVE  NEG      WBC 0-4 0 - 4 /hpf    RBC 0-5 0 - 5 /hpf    Epithelial cells FEW FEW /lpf    Bacteria NEGATIVE  NEG /hpf    UA:UC IF INDICATED CULTURE NOT INDICATED BY UA RESULT CNI      Hyaline cast 5-10 0 - 5 /lpf   MAGNESIUM    Collection Time: 07/05/17  9:21 AM   Result Value Ref Range    Magnesium 1.4 (L) 1.6 - 2.4 mg/dL

## 2017-07-10 RX ORDER — GRANISETRON HYDROCHLORIDE 1 MG/ML
1 INJECTION INTRAVENOUS ONCE
Status: COMPLETED | OUTPATIENT
Start: 2017-07-12 | End: 2017-07-12

## 2017-07-10 RX ORDER — DEXAMETHASONE SODIUM PHOSPHATE 4 MG/ML
8 INJECTION, SOLUTION INTRA-ARTICULAR; INTRALESIONAL; INTRAMUSCULAR; INTRAVENOUS; SOFT TISSUE ONCE
Status: COMPLETED | OUTPATIENT
Start: 2017-07-12 | End: 2017-07-12

## 2017-07-12 ENCOUNTER — HOSPITAL ENCOUNTER (OUTPATIENT)
Dept: INFUSION THERAPY | Age: 57
Discharge: HOME | End: 2017-07-12
Payer: OTHER GOVERNMENT

## 2017-07-12 VITALS
HEART RATE: 58 BPM | TEMPERATURE: 97.7 F | HEIGHT: 63 IN | DIASTOLIC BLOOD PRESSURE: 63 MMHG | SYSTOLIC BLOOD PRESSURE: 102 MMHG | OXYGEN SATURATION: 99 % | RESPIRATION RATE: 18 BRPM | BODY MASS INDEX: 20.38 KG/M2 | WEIGHT: 115 LBS

## 2017-07-12 LAB
ALBUMIN SERPL BCP-MCNC: 3.4 G/DL (ref 3.5–5)
ALBUMIN/GLOB SERPL: 0.9 {RATIO} (ref 1.1–2.2)
ALP SERPL-CCNC: 117 U/L (ref 45–117)
ALT SERPL-CCNC: 82 U/L (ref 12–78)
ANION GAP BLD CALC-SCNC: 6 MMOL/L (ref 5–15)
AST SERPL W P-5'-P-CCNC: 64 U/L (ref 15–37)
BASOPHILS # BLD AUTO: 0 K/UL (ref 0–0.1)
BASOPHILS # BLD: 0 % (ref 0–1)
BILIRUB SERPL-MCNC: 0.2 MG/DL (ref 0.2–1)
BUN SERPL-MCNC: 15 MG/DL (ref 6–20)
BUN/CREAT SERPL: 16 (ref 12–20)
CALCIUM SERPL-MCNC: 9 MG/DL (ref 8.5–10.1)
CHLORIDE SERPL-SCNC: 101 MMOL/L (ref 97–108)
CO2 SERPL-SCNC: 27 MMOL/L (ref 21–32)
CREAT SERPL-MCNC: 0.95 MG/DL (ref 0.55–1.02)
EOSINOPHIL # BLD: 0.1 K/UL (ref 0–0.4)
EOSINOPHIL NFR BLD: 2 % (ref 0–7)
ERYTHROCYTE [DISTWIDTH] IN BLOOD BY AUTOMATED COUNT: 14.8 % (ref 11.5–14.5)
GLOBULIN SER CALC-MCNC: 3.9 G/DL (ref 2–4)
GLUCOSE SERPL-MCNC: 98 MG/DL (ref 65–100)
HCT VFR BLD AUTO: 35.8 % (ref 35–47)
HGB BLD-MCNC: 11.3 G/DL (ref 11.5–16)
LYMPHOCYTES # BLD AUTO: 40 % (ref 12–49)
LYMPHOCYTES # BLD: 1.8 K/UL (ref 0.8–3.5)
MCH RBC QN AUTO: 30.2 PG (ref 26–34)
MCHC RBC AUTO-ENTMCNC: 31.6 G/DL (ref 30–36.5)
MCV RBC AUTO: 95.7 FL (ref 80–99)
MONOCYTES # BLD: 0.3 K/UL (ref 0–1)
MONOCYTES NFR BLD AUTO: 6 % (ref 5–13)
NEUTS SEG # BLD: 2.4 K/UL (ref 1.8–8)
NEUTS SEG NFR BLD AUTO: 52 % (ref 32–75)
PLATELET # BLD AUTO: 227 K/UL (ref 150–400)
POTASSIUM SERPL-SCNC: 4 MMOL/L (ref 3.5–5.1)
PROT SERPL-MCNC: 7.3 G/DL (ref 6.4–8.2)
RBC # BLD AUTO: 3.74 M/UL (ref 3.8–5.2)
SODIUM SERPL-SCNC: 134 MMOL/L (ref 136–145)
WBC # BLD AUTO: 4.5 K/UL (ref 3.6–11)

## 2017-07-12 PROCEDURE — 74011000250 HC RX REV CODE- 250: Performed by: OBSTETRICS & GYNECOLOGY

## 2017-07-12 PROCEDURE — 36415 COLL VENOUS BLD VENIPUNCTURE: CPT | Performed by: OBSTETRICS & GYNECOLOGY

## 2017-07-12 PROCEDURE — 96375 TX/PRO/DX INJ NEW DRUG ADDON: CPT

## 2017-07-12 PROCEDURE — 74011250636 HC RX REV CODE- 250/636: Performed by: OBSTETRICS & GYNECOLOGY

## 2017-07-12 PROCEDURE — 80053 COMPREHEN METABOLIC PANEL: CPT | Performed by: OBSTETRICS & GYNECOLOGY

## 2017-07-12 PROCEDURE — 96361 HYDRATE IV INFUSION ADD-ON: CPT

## 2017-07-12 PROCEDURE — 96413 CHEMO IV INFUSION 1 HR: CPT

## 2017-07-12 PROCEDURE — 74011000258 HC RX REV CODE- 258: Performed by: OBSTETRICS & GYNECOLOGY

## 2017-07-12 PROCEDURE — 96366 THER/PROPH/DIAG IV INF ADDON: CPT

## 2017-07-12 PROCEDURE — 85025 COMPLETE CBC W/AUTO DIFF WBC: CPT | Performed by: OBSTETRICS & GYNECOLOGY

## 2017-07-12 PROCEDURE — 77030012965 HC NDL HUBR BBMI -A

## 2017-07-12 RX ORDER — SODIUM CHLORIDE 0.9 % (FLUSH) 0.9 %
10-40 SYRINGE (ML) INJECTION AS NEEDED
Status: ACTIVE | OUTPATIENT
Start: 2017-07-12 | End: 2017-07-13

## 2017-07-12 RX ORDER — HEPARIN 100 UNIT/ML
500 SYRINGE INTRAVENOUS AS NEEDED
Status: ACTIVE | OUTPATIENT
Start: 2017-07-12 | End: 2017-07-13

## 2017-07-12 RX ORDER — SODIUM CHLORIDE 9 MG/ML
10 INJECTION INTRAMUSCULAR; INTRAVENOUS; SUBCUTANEOUS AS NEEDED
Status: ACTIVE | OUTPATIENT
Start: 2017-07-12 | End: 2017-07-13

## 2017-07-12 RX ADMIN — Medication 500 UNITS: at 13:28

## 2017-07-12 RX ADMIN — DEXAMETHASONE SODIUM PHOSPHATE 8 MG: 4 INJECTION, SOLUTION INTRA-ARTICULAR; INTRALESIONAL; INTRAMUSCULAR; INTRAVENOUS; SOFT TISSUE at 12:23

## 2017-07-12 RX ADMIN — GRANISETRON HYDROCHLORIDE 1 MG: 1 INJECTION INTRAVENOUS at 12:20

## 2017-07-12 RX ADMIN — SODIUM CHLORIDE 150 MG: 900 INJECTION, SOLUTION INTRAVENOUS at 12:26

## 2017-07-12 RX ADMIN — Medication 10 ML: at 10:15

## 2017-07-12 RX ADMIN — SODIUM CHLORIDE 10 ML: 9 INJECTION, SOLUTION INTRAMUSCULAR; INTRAVENOUS; SUBCUTANEOUS at 10:15

## 2017-07-12 RX ADMIN — FAMOTIDINE 20 MG: 10 INJECTION, SOLUTION INTRAVENOUS at 12:17

## 2017-07-12 RX ADMIN — SODIUM CHLORIDE 1264 MG: 900 INJECTION, SOLUTION INTRAVENOUS at 12:48

## 2017-07-12 RX ADMIN — SODIUM CHLORIDE 2000 ML: 900 INJECTION, SOLUTION INTRAVENOUS at 10:16

## 2017-07-12 RX ADMIN — Medication 10 ML: at 13:28

## 2017-07-18 ENCOUNTER — APPOINTMENT (OUTPATIENT)
Dept: INFUSION THERAPY | Age: 57
End: 2017-07-18
Payer: OTHER GOVERNMENT

## 2017-07-18 ENCOUNTER — TELEPHONE (OUTPATIENT)
Dept: GYNECOLOGY | Age: 57
End: 2017-07-18

## 2017-07-20 ENCOUNTER — OFFICE VISIT (OUTPATIENT)
Dept: GYNECOLOGY | Age: 57
End: 2017-07-20

## 2017-07-20 ENCOUNTER — HOSPITAL ENCOUNTER (OUTPATIENT)
Dept: INFUSION THERAPY | Age: 57
Discharge: HOME | End: 2017-07-20
Payer: OTHER GOVERNMENT

## 2017-07-20 VITALS
DIASTOLIC BLOOD PRESSURE: 64 MMHG | TEMPERATURE: 97.6 F | SYSTOLIC BLOOD PRESSURE: 103 MMHG | HEART RATE: 60 BPM | RESPIRATION RATE: 18 BRPM

## 2017-07-20 VITALS
HEART RATE: 57 BPM | WEIGHT: 117.2 LBS | BODY MASS INDEX: 20.77 KG/M2 | DIASTOLIC BLOOD PRESSURE: 67 MMHG | SYSTOLIC BLOOD PRESSURE: 103 MMHG

## 2017-07-20 DIAGNOSIS — R11.2 CINV (CHEMOTHERAPY-INDUCED NAUSEA AND VOMITING): ICD-10-CM

## 2017-07-20 DIAGNOSIS — C56.9 OVARIAN CANCER, UNSPECIFIED LATERALITY (HCC): ICD-10-CM

## 2017-07-20 DIAGNOSIS — D64.81 ANEMIA DUE TO CHEMOTHERAPY: ICD-10-CM

## 2017-07-20 DIAGNOSIS — T45.1X5A ANEMIA DUE TO CHEMOTHERAPY: ICD-10-CM

## 2017-07-20 DIAGNOSIS — C56.3 MALIGNANT NEOPLASM OF BOTH OVARIES (HCC): Primary | ICD-10-CM

## 2017-07-20 DIAGNOSIS — D70.1 CHEMOTHERAPY-INDUCED NEUTROPENIA (HCC): ICD-10-CM

## 2017-07-20 DIAGNOSIS — C78.6 CARCINOMATOSIS PERITONEI (HCC): ICD-10-CM

## 2017-07-20 DIAGNOSIS — R10.84 GENERALIZED ABDOMINAL PAIN: ICD-10-CM

## 2017-07-20 DIAGNOSIS — E87.1 HYPONATREMIA: ICD-10-CM

## 2017-07-20 DIAGNOSIS — F41.8 ANXIETY ABOUT HEALTH: ICD-10-CM

## 2017-07-20 DIAGNOSIS — T45.1X5A CINV (CHEMOTHERAPY-INDUCED NAUSEA AND VOMITING): ICD-10-CM

## 2017-07-20 DIAGNOSIS — G89.3 CANCER ASSOCIATED PAIN: ICD-10-CM

## 2017-07-20 DIAGNOSIS — Z93.2 ILEOSTOMY IN PLACE (HCC): ICD-10-CM

## 2017-07-20 DIAGNOSIS — T45.1X5A CHEMOTHERAPY-INDUCED NEUTROPENIA (HCC): ICD-10-CM

## 2017-07-20 LAB
ALBUMIN SERPL BCP-MCNC: 3.1 G/DL (ref 3.5–5)
ALBUMIN/GLOB SERPL: 0.8 {RATIO} (ref 1.1–2.2)
ALP SERPL-CCNC: 142 U/L (ref 45–117)
ALT SERPL-CCNC: 73 U/L (ref 12–78)
ANION GAP BLD CALC-SCNC: 6 MMOL/L (ref 5–15)
APPEARANCE UR: CLEAR
AST SERPL W P-5'-P-CCNC: 41 U/L (ref 15–37)
BACTERIA URNS QL MICRO: NEGATIVE /HPF
BASOPHILS # BLD AUTO: 0 K/UL (ref 0–0.1)
BASOPHILS # BLD: 0 % (ref 0–1)
BILIRUB SERPL-MCNC: 0.3 MG/DL (ref 0.2–1)
BILIRUB UR QL: NEGATIVE
BUN SERPL-MCNC: 19 MG/DL (ref 6–20)
BUN/CREAT SERPL: 21 (ref 12–20)
CALCIUM SERPL-MCNC: 8.9 MG/DL (ref 8.5–10.1)
CHLORIDE SERPL-SCNC: 97 MMOL/L (ref 97–108)
CO2 SERPL-SCNC: 30 MMOL/L (ref 21–32)
COLOR UR: NORMAL
CREAT SERPL-MCNC: 0.92 MG/DL (ref 0.55–1.02)
EOSINOPHIL # BLD: 0.1 K/UL (ref 0–0.4)
EOSINOPHIL NFR BLD: 2 % (ref 0–7)
EPITH CASTS URNS QL MICRO: NORMAL /LPF
ERYTHROCYTE [DISTWIDTH] IN BLOOD BY AUTOMATED COUNT: 14.8 % (ref 11.5–14.5)
GLOBULIN SER CALC-MCNC: 3.8 G/DL (ref 2–4)
GLUCOSE SERPL-MCNC: 93 MG/DL (ref 65–100)
GLUCOSE UR STRIP.AUTO-MCNC: NEGATIVE MG/DL
HCT VFR BLD AUTO: 29.2 % (ref 35–47)
HGB BLD-MCNC: 9.5 G/DL (ref 11.5–16)
HGB UR QL STRIP: NEGATIVE
HYALINE CASTS URNS QL MICRO: NORMAL /LPF (ref 0–5)
KETONES UR QL STRIP.AUTO: NEGATIVE MG/DL
LEUKOCYTE ESTERASE UR QL STRIP.AUTO: NEGATIVE
LYMPHOCYTES # BLD AUTO: 40 % (ref 12–49)
LYMPHOCYTES # BLD: 1.7 K/UL (ref 0.8–3.5)
MAGNESIUM SERPL-MCNC: 1.9 MG/DL (ref 1.6–2.4)
MCH RBC QN AUTO: 30.6 PG (ref 26–34)
MCHC RBC AUTO-ENTMCNC: 32.5 G/DL (ref 30–36.5)
MCV RBC AUTO: 94.2 FL (ref 80–99)
MONOCYTES # BLD: 0.3 K/UL (ref 0–1)
MONOCYTES NFR BLD AUTO: 8 % (ref 5–13)
NEUTS SEG # BLD: 2.2 K/UL (ref 1.8–8)
NEUTS SEG NFR BLD AUTO: 50 % (ref 32–75)
NITRITE UR QL STRIP.AUTO: NEGATIVE
PH UR STRIP: 6 [PH] (ref 5–8)
PLATELET # BLD AUTO: 84 K/UL (ref 150–400)
POTASSIUM SERPL-SCNC: 4 MMOL/L (ref 3.5–5.1)
PROT SERPL-MCNC: 6.9 G/DL (ref 6.4–8.2)
PROT UR STRIP-MCNC: NEGATIVE MG/DL
RBC # BLD AUTO: 3.1 M/UL (ref 3.8–5.2)
RBC #/AREA URNS HPF: NORMAL /HPF (ref 0–5)
SODIUM SERPL-SCNC: 133 MMOL/L (ref 136–145)
SP GR UR REFRACTOMETRY: 1.01 (ref 1–1.03)
UA: UC IF INDICATED,UAUC: NORMAL
UROBILINOGEN UR QL STRIP.AUTO: 0.2 EU/DL (ref 0.2–1)
WBC # BLD AUTO: 4.3 K/UL (ref 3.6–11)
WBC URNS QL MICRO: NORMAL /HPF (ref 0–4)

## 2017-07-20 PROCEDURE — 81001 URINALYSIS AUTO W/SCOPE: CPT | Performed by: NURSE PRACTITIONER

## 2017-07-20 PROCEDURE — 80053 COMPREHEN METABOLIC PANEL: CPT | Performed by: NURSE PRACTITIONER

## 2017-07-20 PROCEDURE — 74011000250 HC RX REV CODE- 250: Performed by: NURSE PRACTITIONER

## 2017-07-20 PROCEDURE — 36415 COLL VENOUS BLD VENIPUNCTURE: CPT | Performed by: NURSE PRACTITIONER

## 2017-07-20 PROCEDURE — 83735 ASSAY OF MAGNESIUM: CPT | Performed by: NURSE PRACTITIONER

## 2017-07-20 PROCEDURE — 86304 IMMUNOASSAY TUMOR CA 125: CPT | Performed by: NURSE PRACTITIONER

## 2017-07-20 PROCEDURE — 96360 HYDRATION IV INFUSION INIT: CPT

## 2017-07-20 PROCEDURE — 85025 COMPLETE CBC W/AUTO DIFF WBC: CPT | Performed by: NURSE PRACTITIONER

## 2017-07-20 PROCEDURE — 77030012965 HC NDL HUBR BBMI -A

## 2017-07-20 PROCEDURE — 74011250636 HC RX REV CODE- 250/636: Performed by: NURSE PRACTITIONER

## 2017-07-20 RX ORDER — SODIUM CHLORIDE 9 MG/ML
10 INJECTION INTRAMUSCULAR; INTRAVENOUS; SUBCUTANEOUS AS NEEDED
Status: ACTIVE | OUTPATIENT
Start: 2017-07-20 | End: 2017-07-21

## 2017-07-20 RX ORDER — FENTANYL 100 UG/H
1 PATCH TRANSDERMAL
Qty: 10 PATCH | Refills: 0 | Status: SHIPPED | OUTPATIENT
Start: 2017-07-20 | End: 2017-08-15 | Stop reason: SDUPTHER

## 2017-07-20 RX ORDER — MORPHINE SULFATE ORAL SOLUTION 10 MG/5ML
10 SOLUTION ORAL
Qty: 500 ML | Refills: 0 | Status: SHIPPED | OUTPATIENT
Start: 2017-07-20 | End: 2017-08-02 | Stop reason: SDUPTHER

## 2017-07-20 RX ORDER — SODIUM CHLORIDE 0.9 % (FLUSH) 0.9 %
10-40 SYRINGE (ML) INJECTION AS NEEDED
Status: ACTIVE | OUTPATIENT
Start: 2017-07-20 | End: 2017-07-21

## 2017-07-20 RX ORDER — HEPARIN 100 UNIT/ML
500 SYRINGE INTRAVENOUS AS NEEDED
Status: ACTIVE | OUTPATIENT
Start: 2017-07-20 | End: 2017-07-21

## 2017-07-20 RX ADMIN — SODIUM CHLORIDE 1000 ML: 900 INJECTION, SOLUTION INTRAVENOUS at 11:25

## 2017-07-20 RX ADMIN — Medication 500 UNITS: at 12:28

## 2017-07-20 RX ADMIN — SODIUM CHLORIDE 10 ML: 9 INJECTION INTRAMUSCULAR; INTRAVENOUS; SUBCUTANEOUS at 11:25

## 2017-07-20 RX ADMIN — Medication 10 ML: at 11:25

## 2017-07-20 RX ADMIN — Medication 10 ML: at 12:28

## 2017-07-20 NOTE — PROGRESS NOTES
Pre chemo, hydration today at 11am, chemo c4d1 gemzar/avastin 7/25. Pt continues to have pain in her upper abdomen.

## 2017-07-20 NOTE — PROGRESS NOTES
Outpatient Infusion Center Progress Note    1110 Pt admit to [facility] for hydration and labs ambulatory in stable condition. Assessment completed. No new concerns voiced. Port accessed with positive blood return. Labs drawn per protocol and sent for processing. Port flushed and NS bolus started. Visit Vitals    /64 (BP 1 Location: Right arm, BP Patient Position: Sitting)    Pulse 60    Temp 97.6 °F (36.4 °C)    Resp 18       Medications:  1 L NS over 1 hr  NS flushes  Heparin     1230 Pt tolerated treatment well. D/c home ambulatory in no distress. Pt aware of next appointment scheduled for 7/25/17 at 10:00 for Cycle 4 Day 1 Gemzar/Avastin. Recent Results (from the past 12 hour(s))   CBC WITH AUTOMATED DIFF    Collection Time: 07/20/17 11:17 AM   Result Value Ref Range    WBC 4.3 3.6 - 11.0 K/uL    RBC 3.10 (L) 3.80 - 5.20 M/uL    HGB 9.5 (L) 11.5 - 16.0 g/dL    HCT 29.2 (L) 35.0 - 47.0 %    MCV 94.2 80.0 - 99.0 FL    MCH 30.6 26.0 - 34.0 PG    MCHC 32.5 30.0 - 36.5 g/dL    RDW 14.8 (H) 11.5 - 14.5 %    PLATELET 84 (L) 288 - 400 K/uL    NEUTROPHILS 50 32 - 75 %    LYMPHOCYTES 40 12 - 49 %    MONOCYTES 8 5 - 13 %    EOSINOPHILS 2 0 - 7 %    BASOPHILS 0 0 - 1 %    ABS. NEUTROPHILS 2.2 1.8 - 8.0 K/UL    ABS. LYMPHOCYTES 1.7 0.8 - 3.5 K/UL    ABS. MONOCYTES 0.3 0.0 - 1.0 K/UL    ABS. EOSINOPHILS 0.1 0.0 - 0.4 K/UL    ABS.  BASOPHILS 0.0 0.0 - 0.1 K/UL   METABOLIC PANEL, COMPREHENSIVE    Collection Time: 07/20/17 11:17 AM   Result Value Ref Range    Sodium 133 (L) 136 - 145 mmol/L    Potassium 4.0 3.5 - 5.1 mmol/L    Chloride 97 97 - 108 mmol/L    CO2 30 21 - 32 mmol/L    Anion gap 6 5 - 15 mmol/L    Glucose 93 65 - 100 mg/dL    BUN 19 6 - 20 MG/DL    Creatinine 0.92 0.55 - 1.02 MG/DL    BUN/Creatinine ratio 21 (H) 12 - 20      GFR est AA >60 >60 ml/min/1.73m2    GFR est non-AA >60 >60 ml/min/1.73m2    Calcium 8.9 8.5 - 10.1 MG/DL    Bilirubin, total 0.3 0.2 - 1.0 MG/DL    ALT (SGPT) 73 12 - 78 U/L    AST (SGOT) 41 (H) 15 - 37 U/L    Alk.  phosphatase 142 (H) 45 - 117 U/L    Protein, total 6.9 6.4 - 8.2 g/dL    Albumin 3.1 (L) 3.5 - 5.0 g/dL    Globulin 3.8 2.0 - 4.0 g/dL    A-G Ratio 0.8 (L) 1.1 - 2.2     MAGNESIUM    Collection Time: 07/20/17 11:17 AM   Result Value Ref Range    Magnesium 1.9 1.6 - 2.4 mg/dL   URINALYSIS W/ REFLEX CULTURE    Collection Time: 07/20/17 12:33 PM   Result Value Ref Range    Color YELLOW/STRAW      Appearance CLEAR CLEAR      Specific gravity 1.013 1.003 - 1.030      pH (UA) 6.0 5.0 - 8.0      Protein NEGATIVE  NEG mg/dL    Glucose NEGATIVE  NEG mg/dL    Ketone NEGATIVE  NEG mg/dL    Bilirubin NEGATIVE  NEG      Blood NEGATIVE  NEG      Urobilinogen 0.2 0.2 - 1.0 EU/dL    Nitrites NEGATIVE  NEG      Leukocyte Esterase NEGATIVE  NEG      WBC 0-4 0 - 4 /hpf    RBC 0-5 0 - 5 /hpf    Epithelial cells FEW FEW /lpf    Bacteria NEGATIVE  NEG /hpf    UA:UC IF INDICATED CULTURE NOT INDICATED BY UA RESULT CNI      Hyaline cast 0-2 0 - 5 /lpf

## 2017-07-20 NOTE — PROGRESS NOTES
[address] [place] [address] [address]ias [address], [address] [facility]  [phone]  MD Ofelia Sun MD  Office Note  Patient ID:  Name:  Constantino Mims  MRN:  [id number]  :  1960/56 y.o. Date:  2017      HISTORY OF PRESENT ILLNESS:  Constantino Mims is a 61 y.o.  postmenopausal female with a diagnosis of stage IV ovarian cancer. She underwent exploratory laparotomy with suboptimal debulking in 2015. She had extensive disease. She was readmitted about a week later with obstruction and subsequently had a cecal perforation, requiring resection, end ileostomy, and mucous fistula. During the hospitalization she was also diagnosed with pulmonary embolus and had chest tubes placed for bilateral pleural effusions. She had a prolonged hospital course and actually received a dose of cisplatin chemotherapy while in the hospital to help dry up the effusions. She completed 6 cycles of Taxol/Carboplatin chemotherapy following her initial debulking. She  did relatively well, considering she had an ileostomy to deal with during chemotherapy. I took her to the OR on 16 for interval debulking and reversal of her ileostomy. She had extensive disease, but we were able to resect the majority of her disease. One week later she developed an anastomotic leak requiring reexploration and recreation of an ileostomy. She had another prolonged hospitalization, but recovered well since surgery. We then reinitiated Taxol/Carboplatin chemotherapy, but did reduce the dose of Taxol to 135 mg/m2. She completed 4 mores cycles. Her CA-125 normalized and we stopped treatment. She now has evidence of recurrent disease and was receiving Doxil/Avastin. She has completed 3 cycles of that regimen. She has been admitted to [facility] several time since starting this regimen with evidence of partial small bowel obstruction.   The symptoms have resolved each time with conservative management. She had increased ascites on her last scan, but no significant change in her disease otherwise. Her CA-125 also went up. All of this suggests progression of disease. We switched her chemotherapy to [facility]. She has completed 3 cycles so far. She has been having a lot of dysphagia and has seen her gastroenterologist in [facility]. He has backed her down to a liquid diet. Her ostomy had been working well and was showing no signs of obstruction, until earlier this week. She was briefly admitted to [facility] with pain and a CT demonstrated partial SBO. Her symptoms resolved within 24 hours and her ostomy began working again and her pain resolved. She presents today for a pre-chemo visit. Her recent CT showed some improvement in ascites. Her CA-125 is down slightly as well.         Problem List:  Patient Active Problem List    Diagnosis Date Noted    Hydronephrosis of left kidney 06/28/2017    Abdominal pain 06/27/2017    Hypomagnesemia 06/13/2017    Erosive esophagitis 06/07/2017    Ulcerative esophagitis 06/07/2017    Isolated proteinuria 05/25/2017    Malignant ascites 05/08/2017    Hydronephrosis of right kidney 05/08/2017    Protein calorie malnutrition ([address] 04/28/2017    Bowel obstruction ([address] 04/27/2017    Counseling regarding end of life decision making 04/25/2017    Anticoagulation adequate with anticoagulant therapy 03/28/2017    Chemotherapy-induced neutropenia ([address] 03/22/2017    Ileus ([address] 03/21/2017    Thrombosis of pelvic vein 03/21/2017    Hx of pulmonary embolus 03/21/2017    Dehydration 03/21/2017    Cancer associated pain 05/13/2016    Generalized abdominal pain 05/13/2016    Anxiety about health 05/13/2016    CINV (chemotherapy-induced nausea and vomiting) 03/22/2016    Anemia due to chemotherapy 02/22/2016    Ileostomy in place [facility]) 02/15/2016    Carcinomatosis peritonei ([address] 10/29/2015    Small bowel obstruction [facility] 10/29/2015    Malignant neoplasm of both ovaries ([facility] 10/12/2015     PMH:  Past Medical History:   Diagnosis Date    Abdominal carcinomatosis ([address] 10/2015    Arthritis     left shoulder    BRCA negative 12/2015    Chronic pain     Depression     HX    Environmental allergies     GERD (gastroesophageal reflux disease)     Hypertension     NEW OVER PAST 5-6 MONTHS    Ovarian cancer ([facility] 10/2015    serous adenocarcinoma, high grade, stage IIIC    Pulmonary embolism ([facility] 10/2015      PSH:  Past Surgical History:   Procedure Laterality Date    CARDIAC SURG PROCEDURE UNLIST  12/11/15    cardiac cath/normal     HX BREAST BIOPSY  1995    benign right breast bx    HX DILATION AND CURETTAGE  2/2011    POLYPECTOMY    HX GI  2015    PERFORATED BOWEL; ILEOSTOMY    HX GYN  10/2015    EXP LAPAROTOMY    HX HEENT  LAST 2005    oral tissue graft X3    HX HEENT  1970    tonsillectomy    HX LAPAROTOMY  10/2015    tumor sampling    HX LAPAROTOMY  4/2016    hysterectomy, BSO, radical debulking    HX LAPAROTOMY  5/2016    resection ileocolic anastomosis, leak, ileostomy    HX OTHER SURGICAL  11/2015    tunneled portacath      Social History:  Social History   Substance Use Topics    Smoking status: Never Smoker    Smokeless tobacco: Never Used    Alcohol use No      Family History:  Family History   Problem Relation Age of Onset    Cancer Maternal Uncle      skin    Hypertension Mother     High Cholesterol Mother     Anxiety Mother     Heart Disease Mother     High Cholesterol Father     Hypertension Father     Stroke Father     Arthritis-osteo Sister     Migraines Sister     Other Sister      FIBROMYALGIA    Depression Brother     Other Brother      DRUG ABUSE HEPATITIS C    Migraines Sister     Arrhythmia Sister     Depression Sister     Lung Disease Paternal Aunt      COPD    Cancer Paternal Uncle      LUNG    Lung Disease Paternal Uncle      COPD    Lung Disease Maternal Grandmother      COPD    Anesth Problems Neg Hx       Medications: (reviewed)  Current Outpatient Prescriptions   Medication Sig    morphine 10 mg/5 mL oral solution Take 5 mL by mouth every four (4) hours as needed for Pain.  fentaNYL (DURAGESIC) 100 mcg/hr PATCH 1 Patch by TransDERmal route every seventy-two (72) hours. Max Daily Amount: 1 Patch.  NEXIUM PACKET 40 mg granules for oral suspension two (2) times a day.  metoclopramide (REGLAN) 5 mg/5 mL syrup Take 10 mL by mouth four (4) times daily as needed. Take before meals as needed    promethazine (PHENERGAN) 6.25 mg/5 mL syrup Take 20 mL by mouth four (4) times daily as needed for Nausea.  sucralfate (CARAFATE) 100 mg/mL suspension Take 5 mL by mouth four (4) times daily.  polyethylene glycol (MIRALAX) 17 gram/dose powder Take 17 g by mouth daily as needed.  apixaban (ELIQUIS) 2.5 mg tablet Take 1 Tab by mouth two (2) times a day.  zolpidem (AMBIEN) 10 mg tablet Take 1 Tab by mouth nightly as needed for Sleep. Max Daily Amount: 10 mg.    ondansetron (ZOFRAN ODT) 4 mg disintegrating tablet Take 1 Tab by mouth every eight (8) hours as needed for Nausea.  metoprolol (LOPRESSOR) 100 mg tablet Take 50 mg by mouth two (2) times a day.  lidocaine-prilocaine (EMLA) topical cream Apply small amount over port area and cover with a band aid 1 hour before chemo treatment    magnesium oxide (MAG-OX) 400 mg tablet Take 1 Tab by mouth two (2) times a day. Indications: HYPOMAGNESEMIA    Cetirizine (ZYRTEC) 10 mg cap Take  by mouth.  esomeprazole (NEXIUM) 40 mg capsule Take 1 Cap by mouth daily.  multivit-min-FA-coenzyme Q10 (AQUADEKS) 100-350-5 mcg-mcg-mg chew Take 1 Tab by mouth daily. Indications: VITAMIN DEFICIENCY PREVENTION    Calcium-Vitamin D3-Vitamin K 239-198-02 mg-unit-mcg chew Take 1 Tab by mouth two (2) times a day.  multivitamin (ONE A DAY) tablet Take 1 Tab by mouth daily.      No current facility-administered medications for this visit. Facility-Administered Medications Ordered in Other Visits   Medication Dose Route Frequency    sodium chloride 0.9 % injection 10 mL  10 mL IntraVENous PRN    heparin (porcine) pf 500 Units  500 Units IntraVENous PRN    sodium chloride (NS) flush 10-40 mL  10-40 mL IntraVENous PRN     Allergies: (reviewed)  Allergies   Allergen Reactions    Ativan [Lorazepam] Other (comments)     SEVERE CONFUSION          OBJECTIVE:    Physical Exam:  VITAL SIGNS: Vitals:    07/20/17 1331   BP: 103/67   Pulse: (!) 57   Weight: 53.2 kg (117 lb 3.2 oz)     Body mass index is 20.77 kg/(m^2). GENERAL AVA: Conversant, alert, oriented. No acute distress. HEENT: HEENT. No thyroid enlargement. No JVD. Neck: Supple without restrictions. RESPIRATORY: Clear to auscultation and percussion to the bases. No CVAT. CARDIOVASC: RRR without murmur/rub. GASTROINT: soft, non-tender, without masses or organomegaly; ileostomy in RLQ   MUSCULOSKEL: no joint tenderness, deformity or swelling   EXTREMITIES: extremities normal, atraumatic, no cyanosis or edema   PELVIC: Deferred   RECTAL: Deferred   JOSE SURVEY: No suspicious lymphadenopathy or edema noted. NEURO: Grossly intact. No acute deficit.        Lab Results   Component Value Date/Time    WBC 4.3 07/20/2017 11:17 AM    HGB 9.5 07/20/2017 11:17 AM    HCT 29.2 07/20/2017 11:17 AM    PLATELET 84 73/49/0511 11:17 AM    MCV 94.2 07/20/2017 11:17 AM     Lab Results   Component Value Date/Time    Sodium 133 07/20/2017 11:17 AM    Potassium 4.0 07/20/2017 11:17 AM    Chloride 97 07/20/2017 11:17 AM    CO2 30 07/20/2017 11:17 AM    Anion gap 6 07/20/2017 11:17 AM    Glucose 93 07/20/2017 11:17 AM    BUN 19 07/20/2017 11:17 AM    Creatinine 0.92 07/20/2017 11:17 AM    BUN/Creatinine ratio 21 07/20/2017 11:17 AM    GFR est AA >60 07/20/2017 11:17 AM    GFR est non-AA >60 07/20/2017 11:17 AM    Calcium 8.9 07/20/2017 11:17 AM       CT of chest/abdomen/pelvis (6/27/17)  LUNG BASES: Clear. INCIDENTALLY IMAGED HEART AND MEDIASTINUM: Unremarkable. LIVER: Small hypodense lesions, one in the left hepatic lobe and one in the  right hepatic lobe are stable. GALLBLADDER: Unremarkable. SPLEEN: No mass. PANCREAS: No mass or ductal dilatation. ADRENALS: Unremarkable. KIDNEYS: Hydronephrosis is moderate and stable on the right and mild to moderate  on the left and new, to the level of the mid ureters. There is no definite  obstructing calculus on either side. STOMACH: Unremarkable. SMALL BOWEL: Previously described small bowel centralization is stable. Small  bowel loops are dilated but roughly stable in appearance, with a maximum caliber  of 3.1 cm. Pattern of dilatation is similar to the prior study. Small bowel is  fluid-filled to the level of the ostomy in the right anterior abdominal wall. COLON: Visualized colon shows no distention, but the colon is predominantly  absent. APPENDIX: Surgically absent  PERITONEUM: Small amount of ascites, stable to slightly decreased since the  prior study. RETROPERITONEUM: No lymphadenopathy or aortic aneurysm. IVC filter stable. REPRODUCTIVE ORGANS: Surgically absent. URINARY BLADDER: No mass or calculus. BONES: No destructive bone lesion. ADDITIONAL COMMENTS: N/A     IMPRESSION:  1. Mild to moderate small bowel distention with centralization of loops as  previously described, similar in appearance to prior study 5/7/2017.  2. Small to moderate amount of ascites, stable. 3. Postoperative changes as described above, stable. 4. Stable hypodense liver lesions. 5. Stable right hydronephrosis to the mid ureter without obvious obstructing  calculus. 6. New left hydronephrosis to the mid ureter without obvious obstructing  calculus. IMPRESSION/PLAN:  Kumar Schumacher is a 61 y.o. female with a diagnosis of stage IV ovarian cancer. She is s/p suboptimal debulking, followed by a second laparotomy for cecal perforation.   She completed 6 cycles of Taxol/Carboplatin chemotherapy, in addition to the one dose of cisplatin given during her initial hospitalization. Based upon her CT and her CA-125, she responded well, though there was still disease remaining. She underwent interval debulking with resection of the majority of her disease, although there was still small volume carcinomatosis remaining. I recommended continuing with Taxol/Carboplatin, as she did respond well, despite still having residual disease at her interval debulking. I dropped the dose of Taxol to 135 mg/m2 to reduce the chance of neuropathy, but kept the carboplatin dose at an AUC of 6. She completed 4 more cycles and her CA-125 normalized. We then stopped therapy. She subsequently developed recurrent disease and I recommended Doxil/Avastin, considering it had been less than 6 months since we stopped Taxol/Carboplatin. She completed 3 cycles of that regimen but found to have progression of disease. I went over different options with her, specifically Gemzar and Topotecan. I recommended Gemzar, but I also suggested that we continue the Avastin, as that might help with the ascites. She has completed 3 cycles so far. Overall, her CT done prior to cycle 3 looked a little better and her CA-125 is slightly improved. We will continue with the current regimen and obtain a CT after this cycle. Adequate PO intake encouraged. We will monitor the hydronephrosis since her kidney function appears normal.  She may ultimately need stenting.        Signed By: Ronni Myers MD     7/20/2017/10:35 AM

## 2017-07-21 ENCOUNTER — TELEPHONE (OUTPATIENT)
Dept: GYNECOLOGY | Age: 57
End: 2017-07-21

## 2017-07-21 LAB — CANCER AG125 SERPL-ACNC: 65 U/ML (ref 0–30)

## 2017-07-21 NOTE — PROGRESS NOTES
Called and informed pt if her CA-125 result and that it is lower this time and she was very pleased.

## 2017-07-25 ENCOUNTER — HOSPITAL ENCOUNTER (OUTPATIENT)
Dept: INFUSION THERAPY | Age: 57
Discharge: HOME | End: 2017-07-25
Payer: OTHER GOVERNMENT

## 2017-07-25 VITALS
HEIGHT: 63 IN | TEMPERATURE: 97 F | RESPIRATION RATE: 18 BRPM | WEIGHT: 116.8 LBS | SYSTOLIC BLOOD PRESSURE: 86 MMHG | BODY MASS INDEX: 20.7 KG/M2 | HEART RATE: 72 BPM | DIASTOLIC BLOOD PRESSURE: 54 MMHG

## 2017-07-25 LAB
APPEARANCE UR: CLEAR
BACTERIA URNS QL MICRO: NEGATIVE /HPF
BASOPHILS # BLD AUTO: 0 K/UL (ref 0–0.1)
BASOPHILS # BLD: 0 % (ref 0–1)
BILIRUB UR QL CFM: NEGATIVE
COLOR UR: ABNORMAL
EOSINOPHIL # BLD: 0.2 K/UL (ref 0–0.4)
EOSINOPHIL NFR BLD: 2 % (ref 0–7)
EPITH CASTS URNS QL MICRO: ABNORMAL /LPF
ERYTHROCYTE [DISTWIDTH] IN BLOOD BY AUTOMATED COUNT: 14.9 % (ref 11.5–14.5)
GLUCOSE UR STRIP.AUTO-MCNC: NEGATIVE MG/DL
HCT VFR BLD AUTO: 32 % (ref 35–47)
HGB BLD-MCNC: 10.3 G/DL (ref 11.5–16)
HGB UR QL STRIP: NEGATIVE
HYALINE CASTS URNS QL MICRO: ABNORMAL /LPF (ref 0–5)
KETONES UR QL STRIP.AUTO: NEGATIVE MG/DL
LEUKOCYTE ESTERASE UR QL STRIP.AUTO: ABNORMAL
LYMPHOCYTES # BLD AUTO: 38 % (ref 12–49)
LYMPHOCYTES # BLD: 2.4 K/UL (ref 0.8–3.5)
MCH RBC QN AUTO: 30.6 PG (ref 26–34)
MCHC RBC AUTO-ENTMCNC: 32.2 G/DL (ref 30–36.5)
MCV RBC AUTO: 95 FL (ref 80–99)
MONOCYTES # BLD: 0.6 K/UL (ref 0–1)
MONOCYTES NFR BLD AUTO: 10 % (ref 5–13)
NEUTS SEG # BLD: 3 K/UL (ref 1.8–8)
NEUTS SEG NFR BLD AUTO: 50 % (ref 32–75)
NITRITE UR QL STRIP.AUTO: NEGATIVE
PH UR STRIP: 5 [PH] (ref 5–8)
PLATELET # BLD AUTO: 397 K/UL (ref 150–400)
PROT UR STRIP-MCNC: ABNORMAL MG/DL
RBC # BLD AUTO: 3.37 M/UL (ref 3.8–5.2)
RBC #/AREA URNS HPF: ABNORMAL /HPF (ref 0–5)
SP GR UR REFRACTOMETRY: 1.02 (ref 1–1.03)
UA: UC IF INDICATED,UAUC: ABNORMAL
UROBILINOGEN UR QL STRIP.AUTO: 0.2 EU/DL (ref 0.2–1)
WBC # BLD AUTO: 6.1 K/UL (ref 3.6–11)
WBC URNS QL MICRO: ABNORMAL /HPF (ref 0–4)

## 2017-07-25 PROCEDURE — 96417 CHEMO IV INFUS EACH ADDL SEQ: CPT

## 2017-07-25 PROCEDURE — 96413 CHEMO IV INFUSION 1 HR: CPT

## 2017-07-25 PROCEDURE — 74011250636 HC RX REV CODE- 250/636: Performed by: OBSTETRICS & GYNECOLOGY

## 2017-07-25 PROCEDURE — 81001 URINALYSIS AUTO W/SCOPE: CPT | Performed by: OBSTETRICS & GYNECOLOGY

## 2017-07-25 PROCEDURE — 74011000250 HC RX REV CODE- 250: Performed by: OBSTETRICS & GYNECOLOGY

## 2017-07-25 PROCEDURE — 96375 TX/PRO/DX INJ NEW DRUG ADDON: CPT

## 2017-07-25 PROCEDURE — 96361 HYDRATE IV INFUSION ADD-ON: CPT

## 2017-07-25 PROCEDURE — 77030012965 HC NDL HUBR BBMI -A

## 2017-07-25 PROCEDURE — 74011000258 HC RX REV CODE- 258: Performed by: OBSTETRICS & GYNECOLOGY

## 2017-07-25 PROCEDURE — 74011250636 HC RX REV CODE- 250/636

## 2017-07-25 PROCEDURE — 96367 TX/PROPH/DG ADDL SEQ IV INF: CPT

## 2017-07-25 PROCEDURE — 36415 COLL VENOUS BLD VENIPUNCTURE: CPT | Performed by: OBSTETRICS & GYNECOLOGY

## 2017-07-25 PROCEDURE — 85025 COMPLETE CBC W/AUTO DIFF WBC: CPT | Performed by: OBSTETRICS & GYNECOLOGY

## 2017-07-25 RX ORDER — DEXAMETHASONE SODIUM PHOSPHATE 4 MG/ML
8 INJECTION, SOLUTION INTRA-ARTICULAR; INTRALESIONAL; INTRAMUSCULAR; INTRAVENOUS; SOFT TISSUE ONCE
Status: COMPLETED | OUTPATIENT
Start: 2017-07-25 | End: 2017-07-25

## 2017-07-25 RX ORDER — SODIUM CHLORIDE 0.9 % (FLUSH) 0.9 %
10 SYRINGE (ML) INJECTION AS NEEDED
Status: ACTIVE | OUTPATIENT
Start: 2017-07-25 | End: 2017-07-26

## 2017-07-25 RX ORDER — GRANISETRON HYDROCHLORIDE 1 MG/ML
1 INJECTION INTRAVENOUS ONCE
Status: COMPLETED | OUTPATIENT
Start: 2017-07-25 | End: 2017-07-25

## 2017-07-25 RX ORDER — SODIUM CHLORIDE 9 MG/ML
10 INJECTION INTRAMUSCULAR; INTRAVENOUS; SUBCUTANEOUS AS NEEDED
Status: ACTIVE | OUTPATIENT
Start: 2017-07-25 | End: 2017-07-26

## 2017-07-25 RX ORDER — HEPARIN 100 UNIT/ML
500 SYRINGE INTRAVENOUS AS NEEDED
Status: ACTIVE | OUTPATIENT
Start: 2017-07-25 | End: 2017-07-26

## 2017-07-25 RX ADMIN — SODIUM CHLORIDE 150 MG: 900 INJECTION, SOLUTION INTRAVENOUS at 12:26

## 2017-07-25 RX ADMIN — SODIUM CHLORIDE 2000 ML: 900 INJECTION, SOLUTION INTRAVENOUS at 10:15

## 2017-07-25 RX ADMIN — SODIUM CHLORIDE 1264 MG: 900 INJECTION, SOLUTION INTRAVENOUS at 13:25

## 2017-07-25 RX ADMIN — Medication 500 UNITS: at 12:14

## 2017-07-25 RX ADMIN — DEXAMETHASONE SODIUM PHOSPHATE 8 MG: 4 INJECTION, SOLUTION INTRA-ARTICULAR; INTRALESIONAL; INTRAMUSCULAR; INTRAVENOUS; SOFT TISSUE at 12:13

## 2017-07-25 RX ADMIN — FAMOTIDINE 20 MG: 10 INJECTION INTRAVENOUS at 12:08

## 2017-07-25 RX ADMIN — GRANISETRON HYDROCHLORIDE 1 MG: 1 INJECTION INTRAVENOUS at 12:11

## 2017-07-25 RX ADMIN — SODIUM CHLORIDE 10 ML: 9 INJECTION INTRAMUSCULAR; INTRAVENOUS; SUBCUTANEOUS at 12:14

## 2017-07-25 RX ADMIN — Medication 10 ML: at 14:03

## 2017-07-25 RX ADMIN — BEVACIZUMAB 840 MG: 400 INJECTION, SOLUTION INTRAVENOUS at 12:50

## 2017-07-25 NOTE — PROGRESS NOTES
[facility]     Dr. Chace Michaud. [place], HAYDER    Infusion Date: 7/25/2017     HPI Keven Schwab is a 61 y.o.  postmenopausal female with a diagnosis of stage IV ovarian cancer. She underwent exploratory laparotomy with suboptimal debulking. She had extensive disease. She was readmitted about a week later with obstruction and subsequently had a cecal perforation, requiring resection, end ileostomy, and mucous fistula. During the hospitalization she was also diagnosed with pulmonary embolus and had chest tubes placed for bilateral pleural effusions. She had a prolonged hospital course and actually received a dose of cisplatin chemotherapy while in the hospital to help dry up the effusions. She completed 6 cycles of Taxol/Carboplatin chemotherapy following her initial debulking. She did relatively well, considering she had an ileostomy to deal with during chemotherapy. I took her to the OR on 4/29/16 for interval debulking and reversal of her ileostomy. She had extensive disease, but we were able to resect the majority of her disease. One week later she developed an anastomotic leak requiring reexploration and recreation of an ileostomy. She had another prolonged hospitalization, but recovered well since surgery. We then reinitiated Taxol/Carboplatin chemotherapy, but did reduce the dose of Taxol to 135 mg/m2. She completed 4 mores cycles. Her CA-125 normalized and we stopped treatment.      On 2/7, she presented to Dr. Rolando Castaneda office with her  to discuss her follow up/surveillance CT scan. Unfortunately, it demonstrated progression of disease, although minimally worse than her prior scan 2 months previously. She was actually feeling better than she did at that time. After review of scans and disease, the decision was made to begin Doxil/Avastin Q28 days for 6 cycles. She began this on 2/27/2017.  In April 2017, she had completed 3 cycles of Doxil/Avastin and had CT scans at that time showed she had evidence of recurrent disease. She was switched at that time to [facility] She has been admitted to [facility] several times for partial SBO and all have resolved with consecutive management           Subjective:   Pt presents today for cycle 4/D1 of Gemzar/Avastin. Pt has been tolerating this regimen \"failry well\" with needing assistance of supplemental hydration   She said her pain seems to be a little worse on some days, but better other's. Says current pain management is holding her. Pt is very pleased her CA-125 is going down (65  from 95.5 on  and 102 on )  She is setting up and appointment with a new PCP next week so she will have someone local and may be able to then get hydration close to home on days she needs it. Review of Systems:  General: wt has been stable and she continues with nutritional supplements  HEENT: Denies visual changes, dysphagia or headache  Resp: Denies SOB, DUDLEY, wheezing or cough  CV: Denies CP, palpitations  GI/: Acknowledges continued abd pain (see above). Denies N/V. Continues with protein shakes. She says ostomy is functioning well and at times, she needs to use the Reglan and go \"full liquid for a few days\" and says it then gets better. [place]el: Denies muscle ache or joint pain  Neuro: Denies dizzyness or syncope    Objective:     Blood pressure 93/60, pulse 63, temperature 97.1 °F (36.2 °C), resp. rate 18, height 5' 2.99\" (1.6 m), weight 116 lb 12.8 oz (53 kg). Temp (24hrs), Av.1 °F (36.2 °C), Min:97.1 °F (36.2 °C), Max:97.1 °F (36.2 °C)      _____________________  Physical Exam:     General: A&O X 3. With pleasant affect. HEENT: Oral mucosa pink, slightly dry with no sores or lesions noted.  No cervical adenopathy appreciated  Port site easily accessed without redness, tenderness or swelling  Cardiovascular: Regular without M/R/G  Lungs:CTA bilat without wheezing or rales  Abdomen: Soft with less tenderness to palpation to lower/mid abdomen and + bowel sounds throughout. Some mild firmness, unchanged, to left mid abd. Ext: + pedal pulses without edema bilat. bilat  Neuro: grossly intact      : 7/20/2017= 65  (6/28/17=95.5;  6/8/17= 102;5/16/17= 145.6:  4/27= 100; 4/2151=284.2 3/22=80; 2/23= 63.9)      Recent Results (from the past 12 hour(s))   CBC WITH AUTOMATED DIFF    Collection Time: 07/25/17 10:12 AM   Result Value Ref Range    WBC 6.1 3.6 - 11.0 K/uL    RBC 3.37 (L) 3.80 - 5.20 M/uL    HGB 10.3 (L) 11.5 - 16.0 g/dL    HCT 32.0 (L) 35.0 - 47.0 %    MCV 95.0 80.0 - 99.0 FL    MCH 30.6 26.0 - 34.0 PG    MCHC 32.2 30.0 - 36.5 g/dL    RDW 14.9 (H) 11.5 - 14.5 %    PLATELET 525 067 - 225 K/uL    NEUTROPHILS 50 32 - 75 %    LYMPHOCYTES 38 12 - 49 %    MONOCYTES 10 5 - 13 %    EOSINOPHILS 2 0 - 7 %    BASOPHILS 0 0 - 1 %    ABS. NEUTROPHILS 3.0 1.8 - 8.0 K/UL    ABS. LYMPHOCYTES 2.4 0.8 - 3.5 K/UL    ABS. MONOCYTES 0.6 0.0 - 1.0 K/UL    ABS. EOSINOPHILS 0.2 0.0 - 0.4 K/UL    ABS. BASOPHILS 0.0 0.0 - 0.1 K/UL   URINALYSIS W/ REFLEX CULTURE    Collection Time: 07/25/17 10:12 AM   Result Value Ref Range    Color DARK YELLOW      Appearance CLEAR CLEAR      Specific gravity 1.025 1.003 - 1.030      pH (UA) 5.0 5.0 - 8.0      Protein TRACE (A) NEG mg/dL    Glucose NEGATIVE  NEG mg/dL    Ketone NEGATIVE  NEG mg/dL    Blood NEGATIVE  NEG      Urobilinogen 0.2 0.2 - 1.0 EU/dL    Nitrites NEGATIVE  NEG      Leukocyte Esterase SMALL (A) NEG      WBC PENDING /hpf    RBC PENDING /hpf    Epithelial cells PENDING /lpf    Bacteria PENDING /hpf    UA:UC IF INDICATED PENDING            PATHOLOGY from 5/10/2017 Upper Endoscopy:  1. Gastric polyp, polypectomy:   Benign polypoid gastric oxyntic mucosa with some features of hyperplastic polyp   2. Lower esophagus, biopsy:   Acute erosive esophagitis of squamous mucosa with ulceration and severe squamous atypia (see comment)   3.  Proximal esophagus, biopsy:   Acute esophagitis of squamous mucosa with severe squamous atypia           Imaging:  CT Abd/Pelvis 5/7/2017:  FINDINGS:   LUNG BASES: Left lung base subpleural scarring is unchanged. No pneumonia. INCIDENTALLY IMAGED HEART AND MEDIASTINUM: Cardiac size is within normal limits. No pericardial effusion. Small hiatal hernia is unchanged. LIVER: Small segment 8 and segment 3 hypoenhancing liver lesions are unchanged. GALLBLADDER: Unremarkable. SPLEEN: No mass. PANCREAS: No mass or ductal dilatation. ADRENALS: Unremarkable. KIDNEYS: Right hydronephrosis is mild and new. Proximal right ureter is mildly  dilated. There is transition to nondilated ureter in the mid ureter. No renal  mass or left hydronephrosis. STOMACH: Unremarkable. SMALL BOWEL: Small bowel loops in the lower pelvis measuring up to 2.7 cm in  diameter. Contrast extends through the small bowel into the right lower quadrant  ileostomy. Small bowel centralization is unchanged. COLON: Majority of the colon has been resected. Distal sigmoid colon and rectum  are nondistended. APPENDIX: Resected. PERITONEUM: Small volume of ascites is slightly increased. Subtle non masslike  enhancement of the peritoneum is unchanged. No pneumoperitoneum. RETROPERITONEUM: No lymphadenopathy. IVC filter is unchanged. REPRODUCTIVE ORGANS: Uterus is been resected. Ovaries have been resected. No  pelvic mass. URINARY BLADDER: No mass or calculus. BONES: No destructive bone lesion. ADDITIONAL COMMENTS: N/A     IMPRESSION  IMPRESSION:     1. New mild right hydronephrosis is likely secondary to ureteral obstruction in  the mid ureter. No ureteral calculus. 2. Mild small bowel distention may represent early partial small bowel  obstruction. No complete bowel obstruction. 3. Increased small volume of ascites. Unchanged non masslike peritoneal  enhancement. 4. Unchanged small liver lesions.           CT scan of Abdomen/Pelvis with contrast 6/27/2017  FINDINGS:   LUNG BASES: Clear.  INCIDENTALLY IMAGED HEART AND MEDIASTINUM: Unremarkable. LIVER: Small hypodense lesions, one in the left hepatic lobe and one in the  right hepatic lobe are stable. GALLBLADDER: Unremarkable. SPLEEN: No mass. PANCREAS: No mass or ductal dilatation. ADRENALS: Unremarkable. KIDNEYS: Hydronephrosis is moderate and stable on the right and mild to moderate  on the left and new, to the level of the mid ureters. There is no definite  obstructing calculus on either side. STOMACH: Unremarkable. SMALL BOWEL: Previously described small bowel centralization is stable. Small  bowel loops are dilated but roughly stable in appearance, with a maximum caliber  of 3.1 cm. Pattern of dilatation is similar to the prior study. Small bowel is  fluid-filled to the level of the ostomy in the right anterior abdominal wall. COLON: Visualized colon shows no distention, but the colon is predominantly  absent. APPENDIX: Surgically absent  PERITONEUM: Small amount of ascites, stable to slightly decreased since the  prior study. RETROPERITONEUM: No lymphadenopathy or aortic aneurysm. IVC filter stable. REPRODUCTIVE ORGANS: Surgically absent. URINARY BLADDER: No mass or calculus. BONES: No destructive bone lesion. ADDITIONAL COMMENTS: N/A       IMPRESSION:     1. Mild to moderate small bowel distention with centralization of loops as  previously described, similar in appearance to prior study 5/7/2017.  2. Small to moderate amount of ascites, stable. 3. Postoperative changes as described above, stable. 4. Stable hypodense liver lesions. 5. Stable right hydronephrosis to the mid ureter without obvious obstructing  calculus.   6. New left hydronephrosis to the mid ureter without obvious obstructing  calculus.       Assessment:     Patient Active Problem List   Diagnosis Code    Malignant neoplasm of both ovaries ([address] C56.1, C56.2    Carcinomatosis peritonei ([address] C78.6, C80.1    Small bowel obstruction ([address] K56.69    Ileostomy in place [facility]) Z93.2    Anemia due to chemotherapy D64.81, T45.1X5A    CINV (chemotherapy-induced nausea and vomiting) R11.2, T45.1X5A    Cancer associated pain G89.3    Generalized abdominal pain R10.84    Anxiety about health F41.8    Ileus (HCC) K56.7    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    Hx of pulmonary embolus Z86.711    Dehydration E86.0    Chemotherapy-induced neutropenia (HCC) D70.1    Anticoagulation adequate with anticoagulant therapy Z79.01    Counseling regarding end of life decision making Z71.89    Bowel obstruction (HCC) K56.60    Protein calorie malnutrition (HCC) E46    Malignant ascites R18.0    Hydronephrosis of right kidney N13.30    Isolated proteinuria R80.0    Erosive esophagitis K22.10    Ulcerative esophagitis K22.10    Hypomagnesemia E83.42    Abdominal pain R10.9    Hydronephrosis of left kidney N13.30         Plan:   Proceed with C4D1 of Gemzar/Avastin  Continue to monitor hgb and will transfuses for hgb less than 8  Reviewed bleeding precautions with her and especially with Eliquis/Avastin. Encouaged to continue protein shakes  Since pain is finally well controlled, will continue with current pain management   She will follow up with GI doctor prn. Reglan prn as needed and she will let me know if she needs to begin it. Hydration encouraged  Continue Eliquis for previous PE. She will continue phazyme for gas as needed  Continue Miralax to try to prevent her post chemotherapy constipation  She will call for any concerns or questions      Alma Both, NP  7/25/2017          ______________________  Medications:    Current Outpatient Prescriptions   Medication Sig Dispense Refill    morphine 10 mg/5 mL oral solution Take 5 mL by mouth every four (4) hours as needed for Pain. 500 mL 0    fentaNYL (DURAGESIC) 100 mcg/hr PATCH 1 Patch by TransDERmal route every seventy-two (72) hours. Max Daily Amount: 1 Patch.  10 Patch 0    NEXIUM PACKET 40 mg granules for oral suspension two (2) times a day.  metoclopramide (REGLAN) 5 mg/5 mL syrup Take 10 mL by mouth four (4) times daily as needed. Take before meals as needed 500 mL 3    promethazine (PHENERGAN) 6.25 mg/5 mL syrup Take 20 mL by mouth four (4) times daily as needed for Nausea. 400 mL 4    magnesium oxide (MAG-OX) 400 mg tablet Take 1 Tab by mouth two (2) times a day. Indications: HYPOMAGNESEMIA 60 Tab 2    sucralfate (CARAFATE) 100 mg/mL suspension Take 5 mL by mouth four (4) times daily. 414 mL 3    polyethylene glycol (MIRALAX) 17 gram/dose powder Take 17 g by mouth daily as needed. 238 g 3    Cetirizine (ZYRTEC) 10 mg cap Take  by mouth.  apixaban (ELIQUIS) 2.5 mg tablet Take 1 Tab by mouth two (2) times a day. 60 Tab 6    zolpidem (AMBIEN) 10 mg tablet Take 1 Tab by mouth nightly as needed for Sleep. Max Daily Amount: 10 mg. 30 Tab 1    ondansetron (ZOFRAN ODT) 4 mg disintegrating tablet Take 1 Tab by mouth every eight (8) hours as needed for Nausea. 30 Tab 2    esomeprazole (NEXIUM) 40 mg capsule Take 1 Cap by mouth daily. 90 Cap 2    multivit-min-FA-coenzyme Q10 (AQUADEKS) 100-350-5 mcg-mcg-mg chew Take 1 Tab by mouth daily. Indications: VITAMIN DEFICIENCY PREVENTION 60 Tab 1    Calcium-Vitamin D3-Vitamin K 500-500-40 mg-unit-mcg chew Take 1 Tab by mouth two (2) times a day.  metoprolol (LOPRESSOR) 100 mg tablet Take 50 mg by mouth two (2) times a day.  lidocaine-prilocaine (EMLA) topical cream Apply small amount over port area and cover with a band aid 1 hour before chemo treatment 30 g 3    multivitamin (ONE A DAY) tablet Take 1 Tab by mouth daily.        Current Facility-Administered Medications   Medication Dose Route Frequency Provider Last Rate Last Dose    sodium chloride 0.9 % injection 10 mL  10 mL IntraVENous PRN Jai Garcia MD        heparin (porcine) pf 500 Units  500 Units IntraVENous PRN Jai Garcia MD        sodium chloride (NS) flush 10 mL 10 mL IntraVENous PRN Silvina Pickerel., MD        dexamethasone (DECADRON) 4 mg/mL injection 8 mg  8 mg IntraVENous ONCE Silvina Pickerel., MD        famotidine (PF) (PEPCID) 20 mg in sodium chloride 0.9 % 10 mL injection  20 mg IntraVENous ONCE Silvina Pickerel., MD        fosaprepitant (EMEND) 150 mg in 0.9% sodium chloride 150 mL IVPB  150 mg IntraVENous ONCE Silvina Pickerel., MD        gemcitabine (GEMZAR) 1,264 mg in 0.9% sodium chloride 250 mL, overfill volume 25 mL chemo infusion  1,264 mg IntraVENous ONCE Silvina Pickerel., MD        granisetron (KYTRIL) injection 1 mg  1 mg IntraVENous ONCE Silvina Pickerel., MD        sodium chloride 0.9 % bolus infusion 2,000 mL  2,000 mL IntraVENous ONCE Silvina Pickerel., MD        bevacizumab (AVASTIN) 840 mg in 0.9% sodium chloride 100 mL, overfill volume 10 mL IVPB  840 mg IntraVENous ONCE Silvina Pickerel., MD        sodium chloride 0.9 % bolus infusion 2,000 mL  2,000 mL IntraVENous ONCE Silvina Pickerel., MD        sodium chloride 0.9 % bolus infusion 1,000 mL  1,000 mL IntraVENous ONCE Grace Marin NP

## 2017-07-25 NOTE — PROGRESS NOTES
Outpatient Infusion Center - Chemotherapy Progress Note    1000 Pt admit to [facility] for Gemzar/Avastin. Chemotherapy Flowsheet 7/25/2017   Cycle C4D1   Date 7/25/2017   Drug / Regimen Gemzar/Avastin   Pre Hydration Given   Pre Meds Given   Notes Given     Pt ambulatory in stable condition. Assessment completed. No new concerns voiced. Port accessed with positive blood return. Labs drawn per order and sent. Line attached to NS bolus for pre hydration. Recent Results (from the past 12 hour(s))   CBC WITH AUTOMATED DIFF    Collection Time: 07/25/17 10:12 AM   Result Value Ref Range    WBC 6.1 3.6 - 11.0 K/uL    RBC 3.37 (L) 3.80 - 5.20 M/uL    HGB 10.3 (L) 11.5 - 16.0 g/dL    HCT 32.0 (L) 35.0 - 47.0 %    MCV 95.0 80.0 - 99.0 FL    MCH 30.6 26.0 - 34.0 PG    MCHC 32.2 30.0 - 36.5 g/dL    RDW 14.9 (H) 11.5 - 14.5 %    PLATELET 349 443 - 651 K/uL    NEUTROPHILS 50 32 - 75 %    LYMPHOCYTES 38 12 - 49 %    MONOCYTES 10 5 - 13 %    EOSINOPHILS 2 0 - 7 %    BASOPHILS 0 0 - 1 %    ABS. NEUTROPHILS 3.0 1.8 - 8.0 K/UL    ABS. LYMPHOCYTES 2.4 0.8 - 3.5 K/UL    ABS. MONOCYTES 0.6 0.0 - 1.0 K/UL    ABS. EOSINOPHILS 0.2 0.0 - 0.4 K/UL    ABS.  BASOPHILS 0.0 0.0 - 0.1 K/UL   URINALYSIS W/ REFLEX CULTURE    Collection Time: 07/25/17 10:12 AM   Result Value Ref Range    Color DARK YELLOW      Appearance CLEAR CLEAR      Specific gravity 1.025 1.003 - 1.030      pH (UA) 5.0 5.0 - 8.0      Protein TRACE (A) NEG mg/dL    Glucose NEGATIVE  NEG mg/dL    Ketone NEGATIVE  NEG mg/dL    Blood NEGATIVE  NEG      Urobilinogen 0.2 0.2 - 1.0 EU/dL    Nitrites NEGATIVE  NEG      Leukocyte Esterase SMALL (A) NEG      WBC 0-4 0 - 4 /hpf    RBC 0-5 0 - 5 /hpf    Epithelial cells FEW FEW /lpf    Bacteria NEGATIVE  NEG /hpf    UA:UC IF INDICATED CULTURE NOT INDICATED BY UA RESULT CNI      Hyaline cast 2-5 0 - 5 /lpf   BILIRUBIN, CONFIRM    Collection Time: 07/25/17 10:12 AM   Result Value Ref Range    Bilirubin UA, confirm NEGATIVE  NEG         Visit Vitals    BP (!) 86/54    Pulse 72    Temp 97 °F (36.1 °C)    Resp 18    Ht 5' 2.99\" (1.6 m)    Wt 53 kg (116 lb 12.8 oz)    BMI 20.7 kg/m2     Medications:  NS 2 L Bolus  Famotidine  Granisetron  Decadron  Emend  Avastin  Gemzar    1405 Pt tolerated treatment well. Port maintained positive blood return throughout treatment, flushed with positive blood return at conclusion and de-accessed. D/c home ambulatory in no distress. Pt aware of next [facility] appointment scheduled for 08/01/17 at 1000.

## 2017-07-26 ENCOUNTER — TELEPHONE (OUTPATIENT)
Dept: GYNECOLOGY | Age: 57
End: 2017-07-26

## 2017-07-28 RX ORDER — DEXAMETHASONE SODIUM PHOSPHATE 4 MG/ML
8 INJECTION, SOLUTION INTRA-ARTICULAR; INTRALESIONAL; INTRAMUSCULAR; INTRAVENOUS; SOFT TISSUE ONCE
Status: COMPLETED | OUTPATIENT
Start: 2017-08-01 | End: 2017-08-01

## 2017-07-28 RX ORDER — GRANISETRON HYDROCHLORIDE 1 MG/ML
1 INJECTION INTRAVENOUS ONCE
Status: COMPLETED | OUTPATIENT
Start: 2017-08-01 | End: 2017-08-01

## 2017-08-01 ENCOUNTER — HOSPITAL ENCOUNTER (OUTPATIENT)
Dept: INFUSION THERAPY | Age: 57
Discharge: HOME | End: 2017-08-01
Payer: OTHER GOVERNMENT

## 2017-08-01 VITALS
WEIGHT: 113.6 LBS | TEMPERATURE: 97 F | HEART RATE: 68 BPM | BODY MASS INDEX: 20.13 KG/M2 | HEIGHT: 63 IN | SYSTOLIC BLOOD PRESSURE: 102 MMHG | DIASTOLIC BLOOD PRESSURE: 56 MMHG | OXYGEN SATURATION: 98 % | RESPIRATION RATE: 18 BRPM

## 2017-08-01 LAB
ALBUMIN SERPL BCP-MCNC: 3.3 G/DL (ref 3.5–5)
ALBUMIN/GLOB SERPL: 0.8 {RATIO} (ref 1.1–2.2)
ALP SERPL-CCNC: 176 U/L (ref 45–117)
ALT SERPL-CCNC: 96 U/L (ref 12–78)
ANION GAP BLD CALC-SCNC: 6 MMOL/L (ref 5–15)
AST SERPL W P-5'-P-CCNC: 81 U/L (ref 15–37)
BASO+EOS+MONOS # BLD AUTO: 0.3 K/UL (ref 0.2–1.2)
BASO+EOS+MONOS # BLD AUTO: 7 % (ref 3.2–16.9)
BILIRUB SERPL-MCNC: 0.2 MG/DL (ref 0.2–1)
BUN SERPL-MCNC: 26 MG/DL (ref 6–20)
BUN/CREAT SERPL: 22 (ref 12–20)
CALCIUM SERPL-MCNC: 9.4 MG/DL (ref 8.5–10.1)
CHLORIDE SERPL-SCNC: 92 MMOL/L (ref 97–108)
CO2 SERPL-SCNC: 35 MMOL/L (ref 21–32)
CREAT SERPL-MCNC: 1.17 MG/DL (ref 0.55–1.02)
DIFFERENTIAL METHOD BLD: ABNORMAL
ERYTHROCYTE [DISTWIDTH] IN BLOOD BY AUTOMATED COUNT: 14.7 % (ref 11.8–15.8)
GLOBULIN SER CALC-MCNC: 4.2 G/DL (ref 2–4)
GLUCOSE SERPL-MCNC: 82 MG/DL (ref 65–100)
HCT VFR BLD AUTO: 29.5 % (ref 35–47)
HGB BLD-MCNC: 9.8 G/DL (ref 11.5–16)
LYMPHOCYTES # BLD AUTO: 44 % (ref 12–49)
LYMPHOCYTES # BLD: 1.8 K/UL (ref 0.8–3.5)
MCH RBC QN AUTO: 31.3 PG (ref 26–34)
MCHC RBC AUTO-ENTMCNC: 33.2 G/DL (ref 30–36.5)
MCV RBC AUTO: 94.2 FL (ref 80–99)
NEUTS SEG # BLD: 1.9 K/UL (ref 1.8–8)
NEUTS SEG NFR BLD AUTO: 49 % (ref 32–75)
PLATELET # BLD AUTO: 329 K/UL (ref 150–400)
POTASSIUM SERPL-SCNC: 3.5 MMOL/L (ref 3.5–5.1)
PROT SERPL-MCNC: 7.5 G/DL (ref 6.4–8.2)
RBC # BLD AUTO: 3.13 M/UL (ref 3.8–5.2)
SODIUM SERPL-SCNC: 133 MMOL/L (ref 136–145)
WBC # BLD AUTO: 4 K/UL (ref 3.6–11)

## 2017-08-01 PROCEDURE — 74011000258 HC RX REV CODE- 258: Performed by: OBSTETRICS & GYNECOLOGY

## 2017-08-01 PROCEDURE — 74011250636 HC RX REV CODE- 250/636: Performed by: OBSTETRICS & GYNECOLOGY

## 2017-08-01 PROCEDURE — 96360 HYDRATION IV INFUSION INIT: CPT

## 2017-08-01 PROCEDURE — 96361 HYDRATE IV INFUSION ADD-ON: CPT

## 2017-08-01 PROCEDURE — 80053 COMPREHEN METABOLIC PANEL: CPT | Performed by: OBSTETRICS & GYNECOLOGY

## 2017-08-01 PROCEDURE — 74011000250 HC RX REV CODE- 250: Performed by: OBSTETRICS & GYNECOLOGY

## 2017-08-01 PROCEDURE — 96413 CHEMO IV INFUSION 1 HR: CPT

## 2017-08-01 PROCEDURE — 36415 COLL VENOUS BLD VENIPUNCTURE: CPT | Performed by: OBSTETRICS & GYNECOLOGY

## 2017-08-01 PROCEDURE — 77030012965 HC NDL HUBR BBMI -A

## 2017-08-01 PROCEDURE — 85025 COMPLETE CBC W/AUTO DIFF WBC: CPT | Performed by: OBSTETRICS & GYNECOLOGY

## 2017-08-01 PROCEDURE — 96367 TX/PROPH/DG ADDL SEQ IV INF: CPT

## 2017-08-01 PROCEDURE — 96375 TX/PRO/DX INJ NEW DRUG ADDON: CPT

## 2017-08-01 RX ADMIN — SODIUM CHLORIDE 150 MG: 900 INJECTION, SOLUTION INTRAVENOUS at 12:28

## 2017-08-01 RX ADMIN — SODIUM CHLORIDE 1264 MG: 900 INJECTION, SOLUTION INTRAVENOUS at 13:02

## 2017-08-01 RX ADMIN — FAMOTIDINE 20 MG: 10 INJECTION INTRAVENOUS at 11:35

## 2017-08-01 RX ADMIN — GRANISETRON HYDROCHLORIDE 1 MG: 1 INJECTION INTRAVENOUS at 11:35

## 2017-08-01 RX ADMIN — SODIUM CHLORIDE 2000 ML: 900 INJECTION, SOLUTION INTRAVENOUS at 11:34

## 2017-08-01 RX ADMIN — DEXAMETHASONE SODIUM PHOSPHATE 8 MG: 4 INJECTION, SOLUTION INTRA-ARTICULAR; INTRALESIONAL; INTRAMUSCULAR; INTRAVENOUS; SOFT TISSUE at 11:35

## 2017-08-01 NOTE — PROGRESS NOTES
Outpatient Infusion Center - Chemotherapy Progress Note     1010 Pt admit to [address] [place] [address] for Cycle 4 Day 8 Gemzar ambulatory in stable condition. Assessment completed. No new concerns voiced. Port accessed with positive blood return. Labs drawn per protocol and sent for processing. Port flushed and NS bolus started.      Chemotherapy Flowsheet 8/1/2017   Cycle C4D8   Date 8/1/2017   Drug / Regimen Gemzar   Pre Hydration given   Pre Meds given   Notes given      Patient Vitals for the past 12 hrs:   Temp Pulse Resp BP SpO2   08/01/17 1406 97 °F (36.1 °C) 68 18 102/56 -   08/01/17 1013 97.8 °F (36.6 °C) 70 18 123/69 98 %        Medications:  2 L NS over 2 hours  Emend  Kytril  Decadron  Pepcid   Gemzar  NS flushes  Heparin      1410 Pt tolerated treatment well. Port maintained positive blood return throughout treatment. Flushed, heparinized and de-accessed per protocol. D/c home ambulatory in no distress. Pt aware of next appointment scheduled for 8/10/17 at 10:00 for Cycle 5 D1 Gemzar/Avastin.     Patient Vitals for the past 12 hrs:   Temp Pulse Resp BP SpO2   08/01/17 1406 97 °F (36.1 °C) 68 18 102/56 -   08/01/17 1013 97.8 °F (36.6 °C) 70 18 123/69 98 %

## 2017-08-02 DIAGNOSIS — T45.1X5A CINV (CHEMOTHERAPY-INDUCED NAUSEA AND VOMITING): ICD-10-CM

## 2017-08-02 DIAGNOSIS — C56.9 OVARIAN CANCER, UNSPECIFIED LATERALITY (HCC): ICD-10-CM

## 2017-08-02 DIAGNOSIS — Z93.2 ILEOSTOMY IN PLACE (HCC): ICD-10-CM

## 2017-08-02 DIAGNOSIS — F41.8 ANXIETY ABOUT HEALTH: ICD-10-CM

## 2017-08-02 DIAGNOSIS — T45.1X5A CHEMOTHERAPY-INDUCED NEUTROPENIA (HCC): ICD-10-CM

## 2017-08-02 DIAGNOSIS — C78.6 CARCINOMATOSIS PERITONEI (HCC): ICD-10-CM

## 2017-08-02 DIAGNOSIS — T45.1X5A ANEMIA DUE TO CHEMOTHERAPY: ICD-10-CM

## 2017-08-02 DIAGNOSIS — D64.81 ANEMIA DUE TO CHEMOTHERAPY: ICD-10-CM

## 2017-08-02 DIAGNOSIS — R11.2 CINV (CHEMOTHERAPY-INDUCED NAUSEA AND VOMITING): ICD-10-CM

## 2017-08-02 DIAGNOSIS — D70.1 CHEMOTHERAPY-INDUCED NEUTROPENIA (HCC): ICD-10-CM

## 2017-08-02 DIAGNOSIS — R10.84 GENERALIZED ABDOMINAL PAIN: ICD-10-CM

## 2017-08-02 DIAGNOSIS — G89.3 CANCER ASSOCIATED PAIN: ICD-10-CM

## 2017-08-02 DIAGNOSIS — E87.1 HYPONATREMIA: ICD-10-CM

## 2017-08-02 RX ORDER — MORPHINE SULFATE ORAL SOLUTION 10 MG/5ML
20 SOLUTION ORAL
Qty: 500 ML | Refills: 0 | Status: SHIPPED | OUTPATIENT
Start: 2017-08-02 | End: 2017-08-11

## 2017-08-07 RX ORDER — SODIUM CHLORIDE 9 MG/ML
1000 INJECTION, SOLUTION INTRAVENOUS ONCE
Status: DISPENSED | OUTPATIENT
Start: 2017-08-10 | End: 2017-08-11

## 2017-08-09 ENCOUNTER — APPOINTMENT (OUTPATIENT)
Dept: GENERAL RADIOLOGY | Age: 57
DRG: 389 | End: 2017-08-09
Attending: NURSE PRACTITIONER
Payer: OTHER GOVERNMENT

## 2017-08-09 ENCOUNTER — HOSPITAL ENCOUNTER (INPATIENT)
Age: 57
LOS: 1 days | Discharge: HOME | DRG: 389 | End: 2017-08-11
Attending: OBSTETRICS & GYNECOLOGY | Admitting: OBSTETRICS & GYNECOLOGY
Payer: OTHER GOVERNMENT

## 2017-08-09 DIAGNOSIS — G89.3 CANCER ASSOCIATED PAIN: ICD-10-CM

## 2017-08-09 DIAGNOSIS — R10.84 GENERALIZED ABDOMINAL PAIN: ICD-10-CM

## 2017-08-09 DIAGNOSIS — K56.60 INTESTINAL OBSTRUCTION, UNSPECIFIED TYPE: ICD-10-CM

## 2017-08-09 DIAGNOSIS — R11.2 INTRACTABLE VOMITING WITH NAUSEA, UNSPECIFIED VOMITING TYPE: ICD-10-CM

## 2017-08-09 DIAGNOSIS — C80.0 CARCINOMATOSIS (HCC): ICD-10-CM

## 2017-08-09 DIAGNOSIS — F41.8 ANXIETY ABOUT HEALTH: ICD-10-CM

## 2017-08-09 PROBLEM — C56.9 OVARIAN CANCER (HCC): Status: ACTIVE | Noted: 2017-08-09

## 2017-08-09 LAB
ALBUMIN SERPL BCP-MCNC: 3.5 G/DL (ref 3.5–5)
ALBUMIN/GLOB SERPL: 0.9 {RATIO} (ref 1.1–2.2)
ALP SERPL-CCNC: 216 U/L (ref 45–117)
ALT SERPL-CCNC: 93 U/L (ref 12–78)
ANION GAP BLD CALC-SCNC: 8 MMOL/L (ref 5–15)
AST SERPL W P-5'-P-CCNC: 53 U/L (ref 15–37)
BASOPHILS # BLD AUTO: 0 K/UL (ref 0–0.1)
BASOPHILS # BLD: 0 % (ref 0–1)
BILIRUB SERPL-MCNC: 0.3 MG/DL (ref 0.2–1)
BUN SERPL-MCNC: 22 MG/DL (ref 6–20)
BUN/CREAT SERPL: 20 (ref 12–20)
CALCIUM SERPL-MCNC: 9 MG/DL (ref 8.5–10.1)
CHLORIDE SERPL-SCNC: 95 MMOL/L (ref 97–108)
CO2 SERPL-SCNC: 28 MMOL/L (ref 21–32)
CREAT SERPL-MCNC: 1.09 MG/DL (ref 0.55–1.02)
EOSINOPHIL # BLD: 0 K/UL (ref 0–0.4)
EOSINOPHIL NFR BLD: 0 % (ref 0–7)
ERYTHROCYTE [DISTWIDTH] IN BLOOD BY AUTOMATED COUNT: 15.5 % (ref 11.5–14.5)
GLOBULIN SER CALC-MCNC: 4 G/DL (ref 2–4)
GLUCOSE SERPL-MCNC: 112 MG/DL (ref 65–100)
HCT VFR BLD AUTO: 28.5 % (ref 35–47)
HGB BLD-MCNC: 9.2 G/DL (ref 11.5–16)
LYMPHOCYTES # BLD AUTO: 21 % (ref 12–49)
LYMPHOCYTES # BLD: 1.4 K/UL (ref 0.8–3.5)
MAGNESIUM SERPL-MCNC: 1.9 MG/DL (ref 1.6–2.4)
MCH RBC QN AUTO: 30.6 PG (ref 26–34)
MCHC RBC AUTO-ENTMCNC: 32.3 G/DL (ref 30–36.5)
MCV RBC AUTO: 94.7 FL (ref 80–99)
MONOCYTES # BLD: 0.6 K/UL (ref 0–1)
MONOCYTES NFR BLD AUTO: 10 % (ref 5–13)
NEUTS SEG # BLD: 4.4 K/UL (ref 1.8–8)
NEUTS SEG NFR BLD AUTO: 69 % (ref 32–75)
PLATELET # BLD AUTO: 114 K/UL (ref 150–400)
POTASSIUM SERPL-SCNC: 3.7 MMOL/L (ref 3.5–5.1)
PROT SERPL-MCNC: 7.5 G/DL (ref 6.4–8.2)
RBC # BLD AUTO: 3.01 M/UL (ref 3.8–5.2)
SODIUM SERPL-SCNC: 131 MMOL/L (ref 136–145)
WBC # BLD AUTO: 6.4 K/UL (ref 3.6–11)

## 2017-08-09 PROCEDURE — 74011250636 HC RX REV CODE- 250/636: Performed by: NURSE PRACTITIONER

## 2017-08-09 PROCEDURE — 74011000258 HC RX REV CODE- 258: Performed by: OBSTETRICS & GYNECOLOGY

## 2017-08-09 PROCEDURE — 86304 IMMUNOASSAY TUMOR CA 125: CPT | Performed by: NURSE PRACTITIONER

## 2017-08-09 PROCEDURE — 74022 RADEX COMPL AQT ABD SERIES: CPT

## 2017-08-09 PROCEDURE — 80053 COMPREHEN METABOLIC PANEL: CPT | Performed by: NURSE PRACTITIONER

## 2017-08-09 PROCEDURE — 74011250636 HC RX REV CODE- 250/636: Performed by: OBSTETRICS & GYNECOLOGY

## 2017-08-09 PROCEDURE — 36415 COLL VENOUS BLD VENIPUNCTURE: CPT | Performed by: NURSE PRACTITIONER

## 2017-08-09 PROCEDURE — 85025 COMPLETE CBC W/AUTO DIFF WBC: CPT | Performed by: NURSE PRACTITIONER

## 2017-08-09 PROCEDURE — 99218 HC RM OBSERVATION: CPT

## 2017-08-09 PROCEDURE — 83735 ASSAY OF MAGNESIUM: CPT | Performed by: NURSE PRACTITIONER

## 2017-08-09 PROCEDURE — 74011000258 HC RX REV CODE- 258: Performed by: NURSE PRACTITIONER

## 2017-08-09 PROCEDURE — 74011250637 HC RX REV CODE- 250/637: Performed by: NURSE PRACTITIONER

## 2017-08-09 PROCEDURE — 74011000250 HC RX REV CODE- 250: Performed by: NURSE PRACTITIONER

## 2017-08-09 RX ORDER — FENTANYL 100 UG/H
1 PATCH TRANSDERMAL
Status: DISCONTINUED | OUTPATIENT
Start: 2017-08-11 | End: 2017-08-11 | Stop reason: HOSPADM

## 2017-08-09 RX ORDER — SODIUM CHLORIDE 0.9 % (FLUSH) 0.9 %
5-10 SYRINGE (ML) INJECTION AS NEEDED
Status: DISCONTINUED | OUTPATIENT
Start: 2017-08-09 | End: 2017-08-11 | Stop reason: HOSPADM

## 2017-08-09 RX ORDER — MORPHINE SULFATE ORAL SOLUTION 10 MG/5ML
20 SOLUTION ORAL
Status: DISCONTINUED | OUTPATIENT
Start: 2017-08-09 | End: 2017-08-11 | Stop reason: SDUPTHER

## 2017-08-09 RX ORDER — MORPHINE SULFATE 2 MG/ML
2-4 INJECTION, SOLUTION INTRAMUSCULAR; INTRAVENOUS
Status: DISCONTINUED | OUTPATIENT
Start: 2017-08-09 | End: 2017-08-09 | Stop reason: SDUPTHER

## 2017-08-09 RX ORDER — HYDROMORPHONE HYDROCHLORIDE 1 MG/ML
1-2 INJECTION, SOLUTION INTRAMUSCULAR; INTRAVENOUS; SUBCUTANEOUS
Status: DISCONTINUED | OUTPATIENT
Start: 2017-08-09 | End: 2017-08-10

## 2017-08-09 RX ORDER — ZOLPIDEM TARTRATE 5 MG/1
10 TABLET ORAL
Status: DISCONTINUED | OUTPATIENT
Start: 2017-08-09 | End: 2017-08-11 | Stop reason: HOSPADM

## 2017-08-09 RX ORDER — SODIUM CHLORIDE 0.9 % (FLUSH) 0.9 %
5-10 SYRINGE (ML) INJECTION EVERY 8 HOURS
Status: DISCONTINUED | OUTPATIENT
Start: 2017-08-09 | End: 2017-08-11 | Stop reason: HOSPADM

## 2017-08-09 RX ORDER — METOPROLOL TARTRATE 50 MG/1
50 TABLET ORAL 2 TIMES DAILY
Status: DISCONTINUED | OUTPATIENT
Start: 2017-08-09 | End: 2017-08-11 | Stop reason: HOSPADM

## 2017-08-09 RX ORDER — PANTOPRAZOLE SODIUM 40 MG/1
40 GRANULE, DELAYED RELEASE ORAL 2 TIMES DAILY
Status: DISCONTINUED | OUTPATIENT
Start: 2017-08-09 | End: 2017-08-11 | Stop reason: HOSPADM

## 2017-08-09 RX ORDER — NALOXONE HYDROCHLORIDE 0.4 MG/ML
0.4 INJECTION, SOLUTION INTRAMUSCULAR; INTRAVENOUS; SUBCUTANEOUS AS NEEDED
Status: DISCONTINUED | OUTPATIENT
Start: 2017-08-09 | End: 2017-08-11 | Stop reason: HOSPADM

## 2017-08-09 RX ORDER — SODIUM CHLORIDE 0.9 % (FLUSH) 0.9 %
SYRINGE (ML) INJECTION
Status: COMPLETED
Start: 2017-08-09 | End: 2017-08-09

## 2017-08-09 RX ORDER — METOCLOPRAMIDE HYDROCHLORIDE 5 MG/ML
10 INJECTION INTRAMUSCULAR; INTRAVENOUS EVERY 6 HOURS
Status: DISCONTINUED | OUTPATIENT
Start: 2017-08-09 | End: 2017-08-11 | Stop reason: HOSPADM

## 2017-08-09 RX ORDER — DIPHENHYDRAMINE HYDROCHLORIDE 50 MG/ML
12.5 INJECTION, SOLUTION INTRAMUSCULAR; INTRAVENOUS
Status: DISCONTINUED | OUTPATIENT
Start: 2017-08-09 | End: 2017-08-11 | Stop reason: HOSPADM

## 2017-08-09 RX ORDER — DEXAMETHASONE SODIUM PHOSPHATE 4 MG/ML
8 INJECTION, SOLUTION INTRA-ARTICULAR; INTRALESIONAL; INTRAMUSCULAR; INTRAVENOUS; SOFT TISSUE ONCE
Status: COMPLETED | OUTPATIENT
Start: 2017-08-15 | End: 2017-08-15

## 2017-08-09 RX ORDER — ADHESIVE BANDAGE
30 BANDAGE TOPICAL DAILY PRN
Status: DISCONTINUED | OUTPATIENT
Start: 2017-08-09 | End: 2017-08-11 | Stop reason: HOSPADM

## 2017-08-09 RX ORDER — GRANISETRON HYDROCHLORIDE 1 MG/ML
1 INJECTION INTRAVENOUS ONCE
Status: COMPLETED | OUTPATIENT
Start: 2017-08-15 | End: 2017-08-15

## 2017-08-09 RX ORDER — SUCRALFATE 1 G/10ML
0.5 SUSPENSION ORAL 4 TIMES DAILY
Status: DISCONTINUED | OUTPATIENT
Start: 2017-08-09 | End: 2017-08-11 | Stop reason: HOSPADM

## 2017-08-09 RX ADMIN — POTASSIUM CHLORIDE: 2 INJECTION, SOLUTION, CONCENTRATE INTRAVENOUS at 19:18

## 2017-08-09 RX ADMIN — MORPHINE SULFATE 4 MG: 2 INJECTION, SOLUTION INTRAMUSCULAR; INTRAVENOUS at 19:14

## 2017-08-09 RX ADMIN — PROMETHAZINE HYDROCHLORIDE 25 MG: 25 INJECTION INTRAMUSCULAR; INTRAVENOUS at 19:23

## 2017-08-09 RX ADMIN — SUCRALFATE 0.5 G: 1 SUSPENSION ORAL at 19:22

## 2017-08-09 RX ADMIN — METOPROLOL TARTRATE 50 MG: 50 TABLET ORAL at 21:32

## 2017-08-09 RX ADMIN — SUCRALFATE 0.5 G: 1 SUSPENSION ORAL at 21:32

## 2017-08-09 RX ADMIN — APIXABAN 2.5 MG: 2.5 TABLET, FILM COATED ORAL at 21:32

## 2017-08-09 RX ADMIN — METOCLOPRAMIDE 10 MG: 5 INJECTION, SOLUTION INTRAMUSCULAR; INTRAVENOUS at 19:23

## 2017-08-09 RX ADMIN — Medication 10 ML: at 19:00

## 2017-08-09 RX ADMIN — PROCHLORPERAZINE EDISYLATE 10 MG: 5 INJECTION INTRAMUSCULAR; INTRAVENOUS at 20:50

## 2017-08-09 RX ADMIN — PANTOPRAZOLE SODIUM 40 MG: 40 GRANULE, DELAYED RELEASE ORAL at 21:32

## 2017-08-09 RX ADMIN — HYDROMORPHONE HYDROCHLORIDE 2 MG: 1 INJECTION, SOLUTION INTRAMUSCULAR; INTRAVENOUS; SUBCUTANEOUS at 22:34

## 2017-08-09 NOTE — IP AVS SNAPSHOT
0433 [facility] Patti [address]ie 13 
[phone] Patient: Ruby Cherry MRN: [id number] :1960 You are allergic to the following Allergen Reactions Ativan (Lorazepam) Other (comments) SEVERE CONFUSION Recent Documentation Weight Breastfeeding? BMI OB Status Smoking Status 51.3 kg No 20.02 kg/m2 Unknown Never Smoker Emergency Contacts Name Discharge Info Relation Home Work Mobile [address] CAREGIVER [3] Spouse [3] [phone] About your hospitalization You were admitted on:  2017 You last received care in the:  [facility] You were discharged on:  2017 Unit phone number:  [phone] Why you were hospitalized Your primary diagnosis was:  Not on File Your diagnoses also included: Bowel Obstruction (Hcc), Ovarian Cancer (Hcc), Nausea & Vomiting, Carcinomatosis (Hcc), Thrombosis Of Pelvic Vein, Ulcerative Esophagitis, Dehydration, Ileostomy In Place (Hcc), Hx Of Pulmonary Embolus, Hydronephrosis Of Left Kidney, Cancer Associated Pain, Anemia Due To Chemotherapy, Intractable Vomiting With Nausea, Febrile Illness Providers Seen During Your Hospitalizations Provider Role Specialty Primary office phone Jeramy Kahn MD Attending Provider Gynecologic Oncology [phone] Your Primary Care Physician (PCP) Primary Care Physician Office Phone Office Fax [address]644 [address] [phone] Follow-up Information Follow up With Details Comments Contact Info Yael Tran MD   [address] [address] [phone] On 8/15/2017 On 8/15/2017 Your Appointments Tuesday August 15, 2017 10:00 AM EDT INFUSION 180 [address] with RM 102B - CHAIR [address] ([address])  [address] [facility] 
 [address] [phone]  
[phone] Go to [address], ground floor. Tuesday August 22, 2017 10:00 AM EDT INFUSION 180 RI with RM 102B - CHAIR [facility] - [address] ([address]) [address] [facility] [place] [address] [phone]  
[phone] Go to [address], ground floor. Thursday August 31, 2017  1:30 PM EDT CHEMOTHERAPY with Tone Adams MD  
[facility] [address] [facility]) [address] [place] [address] [address] [phone]  
[phone] Thursday August 31, 2017  2:00 PM EDT INFUSION 60 with RM 102A- CHAIR [address] ([address]) [facility] [address] [phone]  
[phone] Go to [address], ground floor. Tuesday September 05, 2017 10:00 AM EDT INFUSION 180 RI with RM 102B - CHAIR [address] ([address]) [address] [facility] [address] [phone]  
[phone] Go to [address], ground floor. Tuesday September 12, 2017 10:00 AM EDT INFUSION 180 RI with RM 102B - CHAIR [address] ([address]) [address] [facility] [address] [phone]  
[phone] Go to [address], ground floor. Current Discharge Medication List  
  
START taking these medications Dose & Instructions Dispensing Information Comments Morning Noon Evening Bedtime HYDROmorphone 1 mg/mL Liqd oral solution Commonly known as:  DILAUDID Your last dose was: Your next dose is:    
   
   
 Dose:  4 mg Take 4 mL by mouth every four (4) hours as needed. Max Daily Amount: 24 mg. Quantity:  475 mL Refills:  0  
     
   
   
   
  
 scopolamine 1.5 mg (1 mg over 3 days) Pt3d Commonly known as:  TRANSDERM-SCOP Your last dose was: Your next dose is:    
   
   
 Dose:  1.5 mg  
1 Patch by TransDERmal route every seventy-two (72) hours as needed. Quantity:  10 Patch Refills:  1 CONTINUE these medications which have CHANGED Dose & Instructions Dispensing Information Comments Morning Noon Evening Bedtime NexIUM Packet 40 mg granules for oral suspension Generic drug:  esomeprazole What changed:  Another medication with the same name was removed. Continue taking this medication, and follow the directions you see here. Your last dose was: Your next dose is:    
   
   
 two (2) times a day. Refills:  0 CONTINUE these medications which have NOT CHANGED Dose & Instructions Dispensing Information Comments Morning Noon Evening Bedtime  
 apixaban 2.5 mg tablet Commonly known as:  Jadyn Mao Your last dose was: Your next dose is:    
   
   
 Dose:  2.5 mg Take 1 Tab by mouth two (2) times a day. Quantity:  60 Tab Refills:  6 Calcium-Vitamin D3-Vitamin K 495-140-03 mg-unit-mcg Chew Your last dose was: Your next dose is:    
   
   
 Dose:  1 Tab Take 1 Tab by mouth two (2) times a day. Refills:  0  
     
   
   
   
  
 fentaNYL 100 mcg/hr PATCH Commonly known as:  Gloria Pinzon Your last dose was: Your next dose is:    
   
   
 Dose:  1 Patch 1 Patch by TransDERmal route every seventy-two (72) hours. Max Daily Amount: 1 Patch. Quantity:  10 Patch Refills:  0  
     
   
   
   
  
 lidocaine-prilocaine topical cream  
Commonly known as:  EMLA Your last dose was: Your next dose is:    
   
   
 Apply small amount over port area and cover with a band aid 1 hour before chemo treatment Quantity:  30 g Refills:  3  
     
   
   
   
  
 magnesium oxide 400 mg tablet Commonly known as:  MAG-OX Your last dose was: Your next dose is:    
   
   
 Dose:  400 mg Take 1 Tab by mouth two (2) times a day. Indications: HYPOMAGNESEMIA Quantity:  60 Tab Refills:  2  
     
   
   
   
  
 metoclopramide 5 mg/5 mL syrup Commonly known as:  REGLAN Your last dose was: Your next dose is:    
   
   
 Dose:  10 mg Take 10 mL by mouth four (4) times daily as needed. Take before meals as needed Quantity:  500 mL Refills:  3  
     
   
   
   
  
 metoprolol tartrate 100 mg IR tablet Commonly known as:  LOPRESSOR Your last dose was: Your next dose is:    
   
   
 Dose:  50 mg Take 50 mg by mouth two (2) times a day. Refills:  0  
     
   
   
   
  
 multivitamin tablet Commonly known as:  ONE A DAY Your last dose was: Your next dose is:    
   
   
 Dose:  1 Tab Take 1 Tab by mouth daily. Refills:  0  
     
   
   
   
  
 mv. min cmb#[id number] 100-350-5 mcg-mcg-mg Chew Commonly known as:  AQUADEKS Your last dose was: Your next dose is:    
   
   
 Dose:  1 Tab Take 1 Tab by mouth daily. Indications: VITAMIN DEFICIENCY PREVENTION Quantity:  60 Tab Refills:  1 Substitution to equivalent ok  
    
   
   
   
  
 ondansetron 4 mg disintegrating tablet Commonly known as:  ZOFRAN ODT Your last dose was: Your next dose is:    
   
   
 Dose:  4 mg Take 1 Tab by mouth every eight (8) hours as needed for Nausea. Quantity:  30 Tab Refills:  2  
     
   
   
   
  
 polyethylene glycol 17 gram/dose powder Commonly known as:  Merry Hussar Your last dose was: Your next dose is:    
   
   
 Dose:  17 g Take 17 g by mouth daily as needed. Quantity:  238 g Refills:  3  
     
   
   
   
  
 promethazine 6.25 mg/5 mL syrup Commonly known as:  PHENERGAN  
   
 Your last dose was: Your next dose is:    
   
   
 Dose:  25 mg Take 20 mL by mouth four (4) times daily as needed for Nausea. Quantity:  400 mL Refills:  4  
     
   
   
   
  
 sucralfate 100 mg/mL suspension Commonly known as:  [place] Your last dose was: Your next dose is:    
   
   
 Dose:  1 tsp Take 5 mL by mouth four (4) times daily. Quantity:  414 mL Refills:  3  
     
   
   
   
  
 zolpidem 10 mg tablet Commonly known as:  AMBIEN Your last dose was: Your next dose is:    
   
   
 Dose:  10 mg Take 1 Tab by mouth nightly as needed for Sleep. Max Daily Amount: 10 mg.  
 Quantity:  30 Tab Refills:  1 ZyrTEC 10 mg Cap Generic drug:  Cetirizine Your last dose was: Your next dose is: Take  by mouth. Refills:  0 STOP taking these medications   
 morphine 10 mg/5 mL oral solution Where to Get Your Medications Information on where to get these meds will be given to you by the nurse or doctor. ! Ask your nurse or doctor about these medications HYDROmorphone 1 mg/mL Liqd oral solution  
 scopolamine 1.5 mg (1 mg over 3 days) Pt3d Discharge Instructions [address] [place] [address] MD Giana Del Toro MD 
[facility], NP Patient Discharge Instructions Zaida Lidia / [id number] : 1960 Admitted 2017 Discharged: 2017 Take Home Medications No current facility-administered medications on file prior to encounter. Current Outpatient Prescriptions on File Prior to Encounter Medication Sig Dispense Refill  morphine 10 mg/5 mL oral solution Take 10 mL by mouth every four (4) hours as needed for Pain.  Max Daily Amount: 120 mg. 500 mL 0  
 fentaNYL (DURAGESIC) 100 mcg/hr PATCH 1 Patch by TransDERmal route every seventy-two (72) hours. Max Daily Amount: 1 Patch. 10 Patch 0  
 NEXIUM PACKET 40 mg granules for oral suspension two (2) times a day.  metoclopramide (REGLAN) 5 mg/5 mL syrup Take 10 mL by mouth four (4) times daily as needed. Take before meals as needed 500 mL 3  
 promethazine (PHENERGAN) 6.25 mg/5 mL syrup Take 20 mL by mouth four (4) times daily as needed for Nausea. 400 mL 4  
 magnesium oxide (MAG-OX) 400 mg tablet Take 1 Tab by mouth two (2) times a day. Indications: HYPOMAGNESEMIA 60 Tab 2  polyethylene glycol (MIRALAX) 17 gram/dose powder Take 17 g by mouth daily as needed. 238 g 3  
 apixaban (ELIQUIS) 2.5 mg tablet Take 1 Tab by mouth two (2) times a day. 60 Tab 6  
 zolpidem (AMBIEN) 10 mg tablet Take 1 Tab by mouth nightly as needed for Sleep. Max Daily Amount: 10 mg. 30 Tab 1  
 ondansetron (ZOFRAN ODT) 4 mg disintegrating tablet Take 1 Tab by mouth every eight (8) hours as needed for Nausea. 30 Tab 2  
 multivit-min-FA-coenzyme Q10 (AQUADEKS) 100-350-5 mcg-mcg-mg chew Take 1 Tab by mouth daily. Indications: VITAMIN DEFICIENCY PREVENTION 60 Tab 1  
 Calcium-Vitamin D3-Vitamin K 500-500-40 mg-unit-mcg chew Take 1 Tab by mouth two (2) times a day.  metoprolol (LOPRESSOR) 100 mg tablet Take 50 mg by mouth two (2) times a day.  lidocaine-prilocaine (EMLA) topical cream Apply small amount over port area and cover with a band aid 1 hour before chemo treatment 30 g 3  
 multivitamin (ONE A DAY) tablet Take 1 Tab by mouth daily.  sucralfate (CARAFATE) 100 mg/mL suspension Take 5 mL by mouth four (4) times daily. 414 mL 3  
 Cetirizine (ZYRTEC) 10 mg cap Take  by mouth.  esomeprazole (NEXIUM) 40 mg capsule Take 1 Cap by mouth daily. 90 Cap 2  
 
 
· It is important that you take the medication exactly as they are prescribed.   
· Keep your medication in the bottles provided by the pharmacist and keep a list of the medication names, dosages, and times to be taken in your wallet. · Do not take other medications without consulting your doctor. What to do at [facility] If your ostomy begins to slow in its output or gas, please let us know. Recommended activity: No driving while on pain medication Walk at least 6 times per day Showers okay Activity as able, stairs okay. Continue your pre-admission diet and follow up with gastroenterologist as scheduled. If you experience any of the following persisting symptoms: 
 
Nausea/vomiting, fever > 101 F, diarrhea/constipation, increasing pain despite medication, increasing vaginal discharge or any concerns, please call our office. Follow-up with Dr. Brooke Armstrong in 2-3 weeks. Follow up with your previously scheduled infusion on Tuesday, 8/15. Call [phone] for an appointment. Information obtained by : 
I understand that if any problems occur once I am at home I am to contact my physician. I understand and acknowledge receipt of the instructions indicated above. Physician's or R.N.'s Signature                                                                  Date/Time Patient or Representative Signature                                                          Date/Time unamiahart Activation Thank you for requesting access to [employer]. Please follow the instructions below to securely access and download your online medical record. [employer] allows you to send messages to your doctor, view your test results, renew your prescriptions, schedule appointments, and more. How Do I Sign Up? 1. In your internet browser, go to [email or web address] 
2. Click on the First Time User? Click Here link in the Sign In box.  You will be redirect to the New Member Sign Up page. 3. Enter your AuctionPay Access Code exactly as it appears below. You will not need to use this code after youve completed the sign-up process. If you do not sign up before the expiration date, you must request a new code. PaperGhart Access Code: Activation code not generated Current AuctionPay Status: Active (This is the date your Fromlabt access code will ) 4. Enter the last four digits of your Social Security Number (xxxx) and Date of Birth (mm/dd/yyyy) as indicated and click Submit. You will be taken to the next sign-up page. 5. Create a AuctionPay ID. This will be your AuctionPay login ID and cannot be changed, so think of one that is secure and easy to remember. 6. Create a AuctionPay password. You can change your password at any time. 7. Enter your Password Reset Question and Answer. This can be used at a later time if you forget your password. 8. Enter your e-mail address. You will receive e-mail notification when new information is available in [address] 9. Click Sign Up. You can now view and download portions of your medical record. 10. Click the Download Summary menu link to download a portable copy of your medical information. Additional Information If you have questions, please visit the Frequently Asked Questions section of the AuctionPay website at [email or web address] Remember, AuctionPay is NOT to be used for urgent needs. For medical emergencies, dial 911. Discharge Orders None AuctionPay Announcement We are excited to announce that we are making your provider's discharge notes available to you in AuctionPay. You will see these notes when they are completed and signed by the physician that discharged you from your recent hospital stay.   If you have any questions or concerns about any information you see in AuctionPay, please call the [facility] Information Department where you were seen or reach out to your Primary Care Provider for more information about your plan of care. Introducing [facility]! Dear Skip Guadarrama: 
Thank you for requesting a [employer] account. Our records indicate that you already have an active [employer] account. You can access your account anytime at [email or web address] Did you know that you can access your hospital and ER discharge instructions at any time in [employer]? You can also review all of your test results from your hospital stay or ER visit. Additional Information If you have questions, please visit the Frequently Asked Questions section of the N[employer] website at [email or web address] Remember, N[employer] Blocks is NOT to be used for urgent needs. For medical emergencies, dial 911. Now available from your iPhone and Android! General Information Please provide this summary of care documentation to your next provider. Patient Signature:  ____________________________________________________________ Date:  ____________________________________________________________  
  
[facility] Provider Signature:  ____________________________________________________________ Date:  ____________________________________________________________

## 2017-08-09 NOTE — H&P
Natalie Adams. Asha Cannon NP       Doroteo Sam       [id number]  1960    Admitted 2017 Date 2017   HPI  Doroteo Sam is a 61 y.o.  postmenopausal female with a diagnosis of stage IV ovarian cancer. She underwent exploratory laparotomy with suboptimal debulking. She had extensive disease. She was readmitted about a week later with obstruction and subsequently had a cecal perforation, requiring resection, end ileostomy, and mucous fistula. During the hospitalization she was also diagnosed with pulmonary embolus and had chest tubes placed for bilateral pleural effusions. She had a prolonged hospital course and actually received a dose of cisplatin chemotherapy while in the hospital to help dry up the effusions. She completed 6 cycles of Taxol/Carboplatin chemotherapy following her initial debulking. She did relatively well, considering she had an ileostomy to deal with during chemotherapy. I took her to the OR on 16 for interval debulking and reversal of her ileostomy. She had extensive disease, but we were able to resect the majority of her disease. One week later she developed an anastomotic leak requiring reexploration and recreation of an ileostomy. She had another prolonged hospitalization, but recovered well since surgery. We then reinitiated Taxol/Carboplatin chemotherapy, but did reduce the dose of Taxol to 135 mg/m2. She completed 4 mores cycles. Her CA-125 normalized and we stopped treatment.      On , she presented to Dr. Chilo Rodriguez office with her  to discuss her follow up/surveillance CT scan. Unfortunately, it demonstrated progression of disease, although minimally worse than her prior scan 2 months previously. She was actually feeling better than she did at that time. After review of scans and disease, the decision was made to begin Doxil/Avastin Q28 days for 6 cycles. She began this on 2017.  In April 2017, she had completed 3 cycles of Doxil/Avastin and had CT scans at that time showed she had evidence of recurrent disease. She was switched at that time to [facility] and began this regimen on 5/23/2017 and has completed 4 cycles of this with the last being on 8/1/2017. With this regimen, her  has decreased to 65 on 7/20/17 from 145.6 on 5/16/2017.       SUBJECTIVE:  Pt called the office late this afternoon stating for the last few days, she has been noticing increasing discomfort in her abdomen. She has not had nausea and thought since she had an appointment with Dr. Brooke Armstrong tomorrow, she could just wait until then. She said she woke with some pain this morning and then went to lunch with a friend and after this, she noticed her ostomy was no longer putting out stool since 11AM today and she was very nauseated and began to get clammy. She actually called when she was already in the car with her  for the drive down from [place]. She has been admitted to [facility] several times for partial SBO and all have resolved with consecutive management and we will admit her for observation to evaluate if this is possible again this time.          Patient Active Problem List    Diagnosis Date Noted    Ovarian cancer ([address] 08/09/2017    Nausea & vomiting 08/09/2017    Carcinomatosis ([address] 08/09/2017    Hydronephrosis of left kidney 06/28/2017    Abdominal pain 06/27/2017    Hypomagnesemia 06/13/2017    Erosive esophagitis 06/07/2017    Ulcerative esophagitis 06/07/2017    Isolated proteinuria 05/25/2017    Malignant ascites 05/08/2017    Hydronephrosis of right kidney 05/08/2017    Protein calorie malnutrition ([address] 04/28/2017    Bowel obstruction ([address] 04/27/2017    Counseling regarding end of life decision making 04/25/2017    Anticoagulation adequate with anticoagulant therapy 03/28/2017    Chemotherapy-induced neutropenia (HCC) 03/22/2017    Ileus ([address] 03/21/2017    Thrombosis of pelvic vein 03/21/2017    Hx of pulmonary embolus 03/21/2017    Dehydration 03/21/2017    Cancer associated pain 05/13/2016    Generalized abdominal pain 05/13/2016    Anxiety about health 05/13/2016    CINV (chemotherapy-induced nausea and vomiting) 03/22/2016    Anemia due to chemotherapy 02/22/2016    Ile[facility] in place [facility]) 02/15/2016    Carcinomatosis peritonei ([address] 10/29/2015    Small bowel obstruction ([address] 10/29/2015    Malignant neoplasm of both ovaries ([address] 10/12/2015     Past Medical History:   Diagnosis Date    Abdominal carcinomatosis ([address] 10/2015    Arthritis     left shoulder    BRCA negative 12/2015    Chronic pain     Depression     HX    Environmental allergies     GERD (gastroesophageal reflux disease)     Hypertension     NEW OVER PAST 5-6 MONTHS    Ovarian cancer ([facility] 10/2015    serous adenocarcinoma, high grade, stage IIIC    Pulmonary embolism ([facility] 10/2015      Past Surgical History:   Procedure Laterality Date    CARDIAC SURG PROCEDURE UNLIST  12/11/15    cardiac cath/normal     HX BREAST BIOPSY  1995    benign right breast bx    HX DILATION AND CURETTAGE  2/2011    POLYPECTOMY    HX GI  2015    PERFORATED BOWEL; ILEOSTOMY    HX GYN  10/2015    EXP LAPAROTOMY    HX HEENT  LAST 2005    oral tissue graft X3    HX HEENT  1970    tonsillectomy    HX LAPAROTOMY  10/2015    tumor sampling    HX LAPAROTOMY  4/2016    hysterectomy, BSO, radical debulking    HX LAPAROTOMY  5/2016    resection ileocolic anastomosis, leak, ileostomy    HX OTHER SURGICAL  11/2015    tunneled portacath      Social History   Substance Use Topics    Smoking status: Never Smoker    Smokeless tobacco: Never Used    Alcohol use No      Family History   Problem Relation Age of Onset    Cancer Maternal Uncle      skin    Hypertension Mother     High Cholesterol Mother     Anxiety Mother     Heart Disease Mother     High Cholesterol Father     Hypertension Father    [facility] Stroke Father     Arthritis-osteo Sister     Migraines Sister     Other Sister      FIBROMYALGIA    Depression Brother     Other Brother      DRUG ABUSE HEPATITIS C    Migraines Sister     Arrhythmia Sister     Depression Sister     Lung Disease Paternal Aunt      COPD    Cancer Paternal Uncle      LUNG    Lung Disease Paternal Uncle      COPD    Lung Disease Maternal Grandmother      COPD    Anesth Problems Neg Hx       Current Facility-Administered Medications   Medication Dose Route Frequency    apixaban (ELIQUIS) tablet 2.5 mg  2.5 mg Oral BID    fentaNYL (DURAGESIC) 100 mcg/hr patch 1 Patch  1 Patch TransDERmal Q72H    metoprolol tartrate (LOPRESSOR) tablet 50 mg  50 mg Oral BID    morphine 10 mg/5 mL oral solution 20 mg  20 mg Oral Q4H PRN    pantoprazole (PROTONIX) granules for oral suspension 40 mg  40 mg Oral BID    sucralfate (CARAFATE) 100 mg/mL oral suspension 0.5 g  0.5 g Oral QID    zolpidem (AMBIEN) tablet 10 mg  10 mg Oral QHS PRN    dextrose 5 % - 0.45% NaCl 1,000 mL with potassium chloride 10 mEq, magnesium sulfate 1 g, thiamine 100 mg, mvi, adult no. 4 with vit K 10 mL infusion   IntraVENous CONTINUOUS    sodium chloride 0.9 % bolus infusion 1,000 mL  1,000 mL IntraVENous ONCE    metoclopramide HCl (REGLAN) injection 10 mg  10 mg IntraVENous Q6H    sodium chloride (NS) flush 5-10 mL  5-10 mL IntraVENous Q8H    sodium chloride (NS) flush 5-10 mL  5-10 mL IntraVENous PRN    naloxone (NARCAN) injection 0.4 mg  0.4 mg IntraVENous PRN    diphenhydrAMINE (BENADRYL) injection 12.5 mg  12.5 mg IntraVENous Q4H PRN    magnesium hydroxide (MILK OF MAGNESIA) 400 mg/5 mL oral suspension 30 mL  30 mL Oral DAILY PRN    prochlorperazine (COMPAZINE) with saline injection 10 mg  10 mg IntraVENous Q6H PRN     Facility-Administered Medications Ordered in Other Encounters   Medication Dose Route Frequency    [START ON 8/15/2017] dexamethasone (DECADRON) 4 mg/mL injection 8 mg  8 mg IntraVENous ONCE    [START ON 8/15/2017] famotidine (PF) (PEPCID) 20 mg in sodium chloride 0.9 % 10 mL injection  20 mg IntraVENous ONCE    [START ON 8/15/2017] fosaprepitant (EMEND) 150 mg in 0.9% sodium chloride 150 mL IVPB  150 mg IntraVENous ONCE    [START ON 8/15/2017] gemcitabine (GEMZAR) 1,264 mg in 0.9% sodium chloride 250 mL, overfill volume 25 mL chemo infusion  1,264 mg IntraVENous ONCE    [START ON 8/15/2017] granisetron (KYTRIL) injection 1 mg  1 mg IntraVENous ONCE    [START ON 8/15/2017] sodium chloride 0.9 % bolus infusion 2,000 mL  2,000 mL IntraVENous ONCE    [START ON 8/15/2017] bevacizumab (AVASTIN) 840 mg in 0.9% sodium chloride 100 mL, overfill volume 10 mL IVPB  840 mg IntraVENous ONCE    [START ON 8/10/2017] 0.9% sodium chloride infusion 1,000 mL  1,000 mL IntraVENous ONCE     Allergies   Allergen Reactions    Ativan [Lorazepam] Other (comments)     SEVERE CONFUSION        Review of Systems  General: Denies wt loss. Acknowledges generalized fatigue  HEENT: Denies visual changes, dysphagia or headache  Resp: Denies SOB, DUDLEY, wheezing or cough  CV: Denies CP, palpitations  GI/: States nausea is 'there a lot of the time, but today, it really got bad this afternoon\". Has not vomited yet. Says ostomy output has been decreased the last few days, but had still been functioning until this afternoon  MuskSkel: Denies muscle ache or joint pain  Neuro: Denies dizzyness or syncope      OBJECTIVE:    Visit Vitals    /80    Pulse 76    Temp 98.1 °F (36.7 °C)    Resp 16    Wt 113 lb (51.3 kg)    SpO2 99%    BMI 20.02 kg/m2         Physical Exam  General: A&O X3 appearing uncomfortable with mild diaphoresis   HEENT: Sclera anicteric, Mucosa pale, dry  Neck: No JVD without adenopathy appreciated  Port site without redness, tenderness or swelling. Heart: Regular without M/R/G  Lungs: CTA Bilat without wheezing or rales   Abd: with multiple abd scars from previous surgeries.  Somewhat soft with fanit bowel sounds noted. Mild tenderness to palpation. Ostomy bag empty without gas at present. Ext: Without edema and + pedal pulses bilat  Neuro: grossly intact      Data Review  Labs pending      Imaging  KUB in process now. CT ABD/Pelvis 6/27/2017  FINDINGS:   LUNG BASES: Clear. INCIDENTALLY IMAGED HEART AND MEDIASTINUM: Unremarkable. LIVER: Small hypodense lesions, one in the left hepatic lobe and one in the  right hepatic lobe are stable. GALLBLADDER: Unremarkable. SPLEEN: No mass. PANCREAS: No mass or ductal dilatation. ADRENALS: Unremarkable. KIDNEYS: Hydronephrosis is moderate and stable on the right and mild to moderate  on the left and new, to the level of the mid ureters. There is no definite  obstructing calculus on either side. STOMACH: Unremarkable. SMALL BOWEL: Previously described small bowel centralization is stable. Small  bowel loops are dilated but roughly stable in appearance, with a maximum caliber  of 3.1 cm. Pattern of dilatation is similar to the prior study. Small bowel is  fluid-filled to the level of the ostomy in the right anterior abdominal wall. COLON: Visualized colon shows no distention, but the colon is predominantly  absent. APPENDIX: Surgically absent  PERITONEUM: Small amount of ascites, stable to slightly decreased since the  prior study. RETROPERITONEUM: No lymphadenopathy or aortic aneurysm. IVC filter stable. REPRODUCTIVE ORGANS: Surgically absent. URINARY BLADDER: No mass or calculus. BONES: No destructive bone lesion. ADDITIONAL COMMENTS: N/A     IMPRESSION  IMPRESSION:     1. Mild to moderate small bowel distention with centralization of loops as  previously described, similar in appearance to prior study 5/7/2017.  2. Small to moderate amount of ascites, stable. 3. Postoperative changes as described above, stable. 4. Stable hypodense liver lesions.   5. Stable right hydronephrosis to the mid ureter without obvious obstructing  calculus. 6. New left hydronephrosis to the mid ureter without obvious obstructing    IMPRESSION:    Patient Active Problem List   Diagnosis Code    Malignant neoplasm of both ovaries ([address] C56.1, C56.2    Carcinomatosis peritonei ([address] C78.6, C80.1    Small bowel obstruction (HCC) K56.69    Ileostomy in place ([address] Z93.2    Anemia due to chemotherapy D64.81, T45.1X5A    CINV (chemotherapy-induced nausea and vomiting) R11.2, T45.1X5A    Cancer associated pain G89.3    Generalized abdominal pain R10.84    Anxiety about health F41.8    Ileus (HCC) K56.7    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    Hx of pulmonary embolus Z86.711    Dehydration E86.0    Chemotherapy-induced neutropenia (HCC) D70.1    Anticoagulation adequate with anticoagulant therapy Z79.01    Counseling regarding end of life decision making Z71.89    Bowel obstruction (HCC) K56.60    Protein calorie malnutrition (HCC) E46    Malignant ascites R18.0    Hydronephrosis of right kidney N13.30    Isolated proteinuria R80.0    Erosive esophagitis K22.10    Ulcerative esophagitis K22.10    Hypomagnesemia E83.42    Abdominal pain R10.9    Hydronephrosis of left kidney N13.30    Ovarian cancer (HCC) C56.9    Nausea & vomiting R11.2    Carcinomatosis (HCC) C80.0       Assessment/Plan:  Pt with metastatic ovarian cancer presents with clinical picture of small bowel obstruction. Hopefully it is only partial as before since her ostomy was working until [facility] today and there are jeremías bowel sound currently. Will check KUB now and if necessary, will obtain CT  Will begin IV fluid with a liter bolus as pt states she feels dehydrated.   Check CBC, CMP, Mag, CA-125  Nausea/Pain medication  Eliquis BID as prior to admission  Discussed with Dr. Bolivar Ewing By: Jaron Mireles NP     8/9/2017/5:18 PM

## 2017-08-09 NOTE — IP AVS SNAPSHOT
[address] [address] 
[phone] Patient: Claudia Case MRN: [id number] :1960 Current Discharge Medication List  
  
START taking these medications Dose & Instructions Dispensing Information Comments Morning Noon Evening Bedtime HYDROmorphone 1 mg/mL Liqd oral solution Commonly known as:  DILAUDID Your last dose was: Your next dose is:    
   
   
 Dose:  4 mg Take 4 mL by mouth every four (4) hours as needed. Max Daily Amount: 24 mg. Quantity:  475 mL Refills:  0  
     
   
   
   
  
 scopolamine 1.5 mg (1 mg over 3 days) Pt3d Commonly known as:  TRANSDERM-SCOP Your last dose was: Your next dose is:    
   
   
 Dose:  1.5 mg  
1 Patch by TransDERmal route every seventy-two (72) hours as needed. Quantity:  10 Patch Refills:  1 CONTINUE these medications which have CHANGED Dose & Instructions Dispensing Information Comments Morning Noon Evening Bedtime NexIUM Packet 40 mg granules for oral suspension Generic drug:  esomeprazole What changed:  Another medication with the same name was removed. Continue taking this medication, and follow the directions you see here. Your last dose was: Your next dose is:    
   
   
 two (2) times a day. Refills:  0 CONTINUE these medications which have NOT CHANGED Dose & Instructions Dispensing Information Comments Morning Noon Evening Bedtime  
 apixaban 2.5 mg tablet Commonly known as:  Yancy Palmerton Your last dose was: Your next dose is:    
   
   
 Dose:  2.5 mg Take 1 Tab by mouth two (2) times a day. Quantity:  60 Tab Refills:  6 Calcium-Vitamin D3-Vitamin K [id number] mg-unit-mcg Chew Your last dose was: Your next dose is:    
   
   
 Dose:  1 Tab Take 1 Tab by mouth two (2) times a day. Refills:  0  
     
   
   
   
  
 fentaNYL 100 mcg/hr PATCH Commonly known as:  Portia Veras Your last dose was: Your next dose is:    
   
   
 Dose:  1 Patch 1 Patch by TransDERmal route every seventy-two (72) hours. Max Daily Amount: 1 Patch. Quantity:  10 Patch Refills:  0  
     
   
   
   
  
 lidocaine-prilocaine topical cream  
Commonly known as:  EMLA Your last dose was: Your next dose is:    
   
   
 Apply small amount over port area and cover with a band aid 1 hour before chemo treatment Quantity:  30 g Refills:  3  
     
   
   
   
  
 magnesium oxide 400 mg tablet Commonly known as:  MAG-OX Your last dose was: Your next dose is:    
   
   
 Dose:  400 mg Take 1 Tab by mouth two (2) times a day. Indications: HYPOMAGNESEMIA Quantity:  60 Tab Refills:  2  
     
   
   
   
  
 metoclopramide 5 mg/5 mL syrup Commonly known as:  REGLAN Your last dose was: Your next dose is:    
   
   
 Dose:  10 mg Take 10 mL by mouth four (4) times daily as needed. Take before meals as needed Quantity:  500 mL Refills:  3  
     
   
   
   
  
 metoprolol tartrate 100 mg IR tablet Commonly known as:  LOPRESSOR Your last dose was: Your next dose is:    
   
   
 Dose:  50 mg Take 50 mg by mouth two (2) times a day. Refills:  0  
     
   
   
   
  
 multivitamin tablet Commonly known as:  ONE A DAY Your last dose was: Your next dose is:    
   
   
 Dose:  1 Tab Take 1 Tab by mouth daily. Refills:  0  
     
   
   
   
  
 mv. min cmb#[id number] 100-350-5 mcg-mcg-mg Chew Commonly known as:  AQUADEKS Your last dose was: Your next dose is:    
   
   
 Dose:  1 Tab Take 1 Tab by mouth daily. Indications: VITAMIN DEFICIENCY PREVENTION Quantity:  60 Tab Refills:  1 Substitution to equivalent ok ondansetron 4 mg disintegrating tablet Commonly known as:  ZOFRAN ODT Your last dose was: Your next dose is:    
   
   
 Dose:  4 mg Take 1 Tab by mouth every eight (8) hours as needed for Nausea. Quantity:  30 Tab Refills:  2  
     
   
   
   
  
 polyethylene glycol 17 gram/dose powder Commonly known as:  Vernel Redder Your last dose was: Your next dose is:    
   
   
 Dose:  17 g Take 17 g by mouth daily as needed. Quantity:  238 g Refills:  3  
     
   
   
   
  
 promethazine 6.25 mg/5 mL syrup Commonly known as:  PHENERGAN Your last dose was: Your next dose is:    
   
   
 Dose:  25 mg Take 20 mL by mouth four (4) times daily as needed for Nausea. Quantity:  400 mL Refills:  4  
     
   
   
   
  
 sucralfate 100 mg/mL suspension Commonly known as:  Aide Constant Your last dose was: Your next dose is:    
   
   
 Dose:  1 tsp Take 5 mL by mouth four (4) times daily. Quantity:  414 mL Refills:  3  
     
   
   
   
  
 zolpidem 10 mg tablet Commonly known as:  AMBIEN Your last dose was: Your next dose is:    
   
   
 Dose:  10 mg Take 1 Tab by mouth nightly as needed for Sleep. Max Daily Amount: 10 mg.  
 Quantity:  30 Tab Refills:  1 ZyrTEC 10 mg Cap Generic drug:  Cetirizine Your last dose was: Your next dose is: Take  by mouth. Refills:  0 STOP taking these medications   
 morphine 10 mg/5 mL oral solution Where to Get Your Medications Information on where to get these meds will be given to you by the nurse or doctor. ! Ask your nurse or doctor about these medications HYDROmorphone 1 mg/mL Liqd oral solution  
 scopolamine 1.5 mg (1 mg over 3 days) Pt3d

## 2017-08-10 ENCOUNTER — APPOINTMENT (OUTPATIENT)
Dept: GENERAL RADIOLOGY | Age: 57
DRG: 389 | End: 2017-08-10
Attending: NURSE PRACTITIONER
Payer: OTHER GOVERNMENT

## 2017-08-10 ENCOUNTER — HOSPITAL ENCOUNTER (OUTPATIENT)
Dept: INFUSION THERAPY | Age: 57
Discharge: HOME | End: 2017-08-10
Payer: OTHER GOVERNMENT

## 2017-08-10 ENCOUNTER — APPOINTMENT (OUTPATIENT)
Dept: CT IMAGING | Age: 57
DRG: 389 | End: 2017-08-10
Attending: NURSE PRACTITIONER
Payer: OTHER GOVERNMENT

## 2017-08-10 PROBLEM — R11.2 INTRACTABLE VOMITING WITH NAUSEA: Status: ACTIVE | Noted: 2017-08-10

## 2017-08-10 PROBLEM — R50.9 FEBRILE ILLNESS: Status: ACTIVE | Noted: 2017-08-10

## 2017-08-10 LAB
BASOPHILS # BLD AUTO: 0 K/UL (ref 0–0.1)
BASOPHILS # BLD: 0 % (ref 0–1)
EOSINOPHIL # BLD: 0 K/UL (ref 0–0.4)
EOSINOPHIL NFR BLD: 0 % (ref 0–7)
ERYTHROCYTE [DISTWIDTH] IN BLOOD BY AUTOMATED COUNT: 16 % (ref 11.5–14.5)
HCT VFR BLD AUTO: 28 % (ref 35–47)
HGB BLD-MCNC: 9.2 G/DL (ref 11.5–16)
LYMPHOCYTES # BLD AUTO: 18 % (ref 12–49)
LYMPHOCYTES # BLD: 1.4 K/UL (ref 0.8–3.5)
MCH RBC QN AUTO: 30.6 PG (ref 26–34)
MCHC RBC AUTO-ENTMCNC: 32.9 G/DL (ref 30–36.5)
MCV RBC AUTO: 93 FL (ref 80–99)
MONOCYTES # BLD: 0.8 K/UL (ref 0–1)
MONOCYTES NFR BLD AUTO: 10 % (ref 5–13)
NEUTS SEG # BLD: 5.5 K/UL (ref 1.8–8)
NEUTS SEG NFR BLD AUTO: 72 % (ref 32–75)
PLATELET # BLD AUTO: 122 K/UL (ref 150–400)
RBC # BLD AUTO: 3.01 M/UL (ref 3.8–5.2)
WBC # BLD AUTO: 7.7 K/UL (ref 3.6–11)

## 2017-08-10 PROCEDURE — 74011250637 HC RX REV CODE- 250/637: Performed by: NURSE PRACTITIONER

## 2017-08-10 PROCEDURE — 74011636320 HC RX REV CODE- 636/320: Performed by: OBSTETRICS & GYNECOLOGY

## 2017-08-10 PROCEDURE — 77030027138 HC INCENT SPIROMETER -A

## 2017-08-10 PROCEDURE — 99218 HC RM OBSERVATION: CPT

## 2017-08-10 PROCEDURE — 74011000258 HC RX REV CODE- 258: Performed by: NURSE PRACTITIONER

## 2017-08-10 PROCEDURE — 74020 XR ABD (AP AND ERECT OR DECUB): CPT

## 2017-08-10 PROCEDURE — 36415 COLL VENOUS BLD VENIPUNCTURE: CPT | Performed by: NURSE PRACTITIONER

## 2017-08-10 PROCEDURE — 74011250636 HC RX REV CODE- 250/636: Performed by: OBSTETRICS & GYNECOLOGY

## 2017-08-10 PROCEDURE — 74011250636 HC RX REV CODE- 250/636: Performed by: INTERNAL MEDICINE

## 2017-08-10 PROCEDURE — 74011000250 HC RX REV CODE- 250: Performed by: NURSE PRACTITIONER

## 2017-08-10 PROCEDURE — 74011250636 HC RX REV CODE- 250/636: Performed by: NURSE PRACTITIONER

## 2017-08-10 PROCEDURE — 74011000258 HC RX REV CODE- 258: Performed by: OBSTETRICS & GYNECOLOGY

## 2017-08-10 PROCEDURE — 65210000002 HC RM PRIVATE GYN

## 2017-08-10 PROCEDURE — 76450000000

## 2017-08-10 PROCEDURE — 85025 COMPLETE CBC W/AUTO DIFF WBC: CPT | Performed by: NURSE PRACTITIONER

## 2017-08-10 PROCEDURE — 74177 CT ABD & PELVIS W/CONTRAST: CPT

## 2017-08-10 RX ORDER — HYDROMORPHONE HCL IN 0.9% NACL 15 MG/30ML
PATIENT CONTROLLED ANALGESIA VIAL INTRAVENOUS CONTINUOUS
Status: DISCONTINUED | OUTPATIENT
Start: 2017-08-10 | End: 2017-08-11

## 2017-08-10 RX ORDER — HYDROMORPHONE HYDROCHLORIDE 1 MG/ML
2 INJECTION, SOLUTION INTRAMUSCULAR; INTRAVENOUS; SUBCUTANEOUS
Status: DISCONTINUED | OUTPATIENT
Start: 2017-08-10 | End: 2017-08-11 | Stop reason: HOSPADM

## 2017-08-10 RX ORDER — SODIUM CHLORIDE 0.9 % (FLUSH) 0.9 %
SYRINGE (ML) INJECTION
Status: DISPENSED
Start: 2017-08-10 | End: 2017-08-10

## 2017-08-10 RX ORDER — SCOLOPAMINE TRANSDERMAL SYSTEM 1 MG/1
1.5 PATCH, EXTENDED RELEASE TRANSDERMAL
Status: DISCONTINUED | OUTPATIENT
Start: 2017-08-10 | End: 2017-08-11 | Stop reason: HOSPADM

## 2017-08-10 RX ORDER — SODIUM CHLORIDE 0.9 % (FLUSH) 0.9 %
10 SYRINGE (ML) INJECTION
Status: COMPLETED | OUTPATIENT
Start: 2017-08-10 | End: 2017-08-10

## 2017-08-10 RX ADMIN — SODIUM CHLORIDE 1000 ML: 900 INJECTION, SOLUTION INTRAVENOUS at 11:23

## 2017-08-10 RX ADMIN — HYDROMORPHONE HYDROCHLORIDE 2 MG: 1 INJECTION, SOLUTION INTRAMUSCULAR; INTRAVENOUS; SUBCUTANEOUS at 15:11

## 2017-08-10 RX ADMIN — METOCLOPRAMIDE 10 MG: 5 INJECTION, SOLUTION INTRAMUSCULAR; INTRAVENOUS at 06:28

## 2017-08-10 RX ADMIN — PANTOPRAZOLE SODIUM 40 MG: 40 GRANULE, DELAYED RELEASE ORAL at 20:41

## 2017-08-10 RX ADMIN — SUCRALFATE 0.5 G: 1 SUSPENSION ORAL at 11:15

## 2017-08-10 RX ADMIN — METOCLOPRAMIDE 10 MG: 5 INJECTION, SOLUTION INTRAMUSCULAR; INTRAVENOUS at 19:19

## 2017-08-10 RX ADMIN — HYDROMORPHONE HYDROCHLORIDE 2 MG: 1 INJECTION, SOLUTION INTRAMUSCULAR; INTRAVENOUS; SUBCUTANEOUS at 08:34

## 2017-08-10 RX ADMIN — HYDROMORPHONE HYDROCHLORIDE 2 MG: 1 INJECTION, SOLUTION INTRAMUSCULAR; INTRAVENOUS; SUBCUTANEOUS at 18:29

## 2017-08-10 RX ADMIN — PROCHLORPERAZINE EDISYLATE 10 MG: 5 INJECTION INTRAMUSCULAR; INTRAVENOUS at 02:51

## 2017-08-10 RX ADMIN — Medication 10 ML: at 10:05

## 2017-08-10 RX ADMIN — PIPERACILLIN SODIUM,TAZOBACTAM SODIUM 3.38 G: 3; .375 INJECTION, POWDER, FOR SOLUTION INTRAVENOUS at 14:01

## 2017-08-10 RX ADMIN — Medication 10 ML: at 00:55

## 2017-08-10 RX ADMIN — Medication: at 13:18

## 2017-08-10 RX ADMIN — IOHEXOL 50 ML: 240 INJECTION, SOLUTION INTRATHECAL; INTRAVASCULAR; INTRAVENOUS; ORAL at 10:06

## 2017-08-10 RX ADMIN — PANTOPRAZOLE SODIUM 40 MG: 40 GRANULE, DELAYED RELEASE ORAL at 11:16

## 2017-08-10 RX ADMIN — IOPAMIDOL 100 ML: 755 INJECTION, SOLUTION INTRAVENOUS at 10:05

## 2017-08-10 RX ADMIN — HYDROMORPHONE HYDROCHLORIDE 1 MG: 1 INJECTION, SOLUTION INTRAMUSCULAR; INTRAVENOUS; SUBCUTANEOUS at 06:28

## 2017-08-10 RX ADMIN — METOPROLOL TARTRATE 50 MG: 50 TABLET ORAL at 11:16

## 2017-08-10 RX ADMIN — METOPROLOL TARTRATE 50 MG: 50 TABLET ORAL at 20:42

## 2017-08-10 RX ADMIN — SUCRALFATE 0.5 G: 1 SUSPENSION ORAL at 15:18

## 2017-08-10 RX ADMIN — METOCLOPRAMIDE 10 MG: 5 INJECTION, SOLUTION INTRAMUSCULAR; INTRAVENOUS at 14:01

## 2017-08-10 RX ADMIN — SODIUM CHLORIDE 100 ML: 900 INJECTION, SOLUTION INTRAVENOUS at 10:06

## 2017-08-10 RX ADMIN — HYDROMORPHONE HYDROCHLORIDE 2 MG: 1 INJECTION, SOLUTION INTRAMUSCULAR; INTRAVENOUS; SUBCUTANEOUS at 20:35

## 2017-08-10 RX ADMIN — PIPERACILLIN SODIUM,TAZOBACTAM SODIUM 3.38 G: 3; .375 INJECTION, POWDER, FOR SOLUTION INTRAVENOUS at 06:28

## 2017-08-10 RX ADMIN — APIXABAN 2.5 MG: 2.5 TABLET, FILM COATED ORAL at 11:16

## 2017-08-10 RX ADMIN — HYDROMORPHONE HYDROCHLORIDE 1 MG: 1 INJECTION, SOLUTION INTRAMUSCULAR; INTRAVENOUS; SUBCUTANEOUS at 07:16

## 2017-08-10 RX ADMIN — APIXABAN 2.5 MG: 2.5 TABLET, FILM COATED ORAL at 20:42

## 2017-08-10 RX ADMIN — SUCRALFATE 0.5 G: 1 SUSPENSION ORAL at 19:17

## 2017-08-10 RX ADMIN — PROMETHAZINE HYDROCHLORIDE 25 MG: 25 INJECTION INTRAMUSCULAR; INTRAVENOUS at 11:11

## 2017-08-10 RX ADMIN — POTASSIUM CHLORIDE: 2 INJECTION, SOLUTION, CONCENTRATE INTRAVENOUS at 05:23

## 2017-08-10 RX ADMIN — HYDROMORPHONE HYDROCHLORIDE 2 MG: 1 INJECTION, SOLUTION INTRAMUSCULAR; INTRAVENOUS; SUBCUTANEOUS at 17:11

## 2017-08-10 RX ADMIN — HYDROMORPHONE HYDROCHLORIDE 2 MG: 1 INJECTION, SOLUTION INTRAMUSCULAR; INTRAVENOUS; SUBCUTANEOUS at 10:38

## 2017-08-10 RX ADMIN — HYDROMORPHONE HYDROCHLORIDE 1 MG: 1 INJECTION, SOLUTION INTRAMUSCULAR; INTRAVENOUS; SUBCUTANEOUS at 00:54

## 2017-08-10 RX ADMIN — PROMETHAZINE HYDROCHLORIDE 25 MG: 25 INJECTION INTRAMUSCULAR; INTRAVENOUS at 21:00

## 2017-08-10 RX ADMIN — METOCLOPRAMIDE 10 MG: 5 INJECTION, SOLUTION INTRAMUSCULAR; INTRAVENOUS at 00:54

## 2017-08-10 RX ADMIN — HYDROMORPHONE HYDROCHLORIDE 2 MG: 1 INJECTION, SOLUTION INTRAMUSCULAR; INTRAVENOUS; SUBCUTANEOUS at 13:25

## 2017-08-10 NOTE — CONSULTS
Palliative Medicine Consult  [place]: [phone] ([phone])    Patient Name: Constantino Mims  YOB: 1960    Date of Initial Consult: 8/10/17  Reason for Consult: overwhelming symptoms  Requesting Provider: Dr. Quincy Saravia  Primary Care Physician: Charissa Clement MD      SUMMARY:     Constantino Mims is a 63y.o. year old with a past history of stage IV ovarian cancer followed by Dr Quincy Saravia who has had previous debulking surgery for extensive disease and chemo, prior complications with fecal peritonitis, cecal perforation, SBO s/p ileostomy, multiple admissions in the past for the same, disease progression with change in chemo who was admitted on 8/9/17 with progressive abdominal pain. Treatment his- most recent chemo- Gemzar/Avastin and began this regimen on 5/23/2017 and has completed 4 cycles of this with the last being on 8/1/2017. With this regimen, her  has decreased to 65 on 7/20/17 from 145.6 on 5/16/2017. Current medical issues leading to Palliative Medicine involvement include: Pain management. Patient is on Fentnayl 100 mcg patch with liquid morphine 10 mg . She reports morphine was never effective but she didn't complain. But pain severe over the past few weeks with most severe yesterday along with minimal output via ileostomy. KUB did not rule out SBO. PALLIATIVE DIAGNOSES:   1. Abdominal pain  2. Bilateral flank pain  3. Nausea, vomiting       PLAN:   1. Pain- characterized as below, discussed options in detail, she wants IV medicine given pain is severe. Has tolerated pca before. - Start dilaudid pca 0.4 mg bolus, 10 mins lockout. No basal   - Continue Fentanyl 100 cg patch   - IV dilaudid 2 mg q1 h for break through pain. - Please call me at [phone] if pain not at a desirable level so we can increase the dose. 2. Likely SBO- awaiting CT from this morning. Already on scopolamine patch, recommend Prednisone to help with inflammation and pain.  I would have given her some Toradol but given creatinine over 1.0, not advisable  3. Nausea- start Zofran 4 mg IV q6h scheduled. Consider Reglan. 4. Initial consult note routed to primary continuity provider  5. Communicated plan of care with: Palliative IDT RN       GOALS OF CARE / TREATMENT PREFERENCES:   [====Goals of Care====]  GOALS OF CARE:  Patient / health care proxy stated goals:   Optimal symptom relief. TREATMENT PREFERENCES:   Code Status: Full Code    Advance Care Planning:  Advance Care Planning 8/10/2017   Patient's Healthcare Decision Maker is: Legal Next of Kya 68   Primary Decision Maker Name DAVID CAMP   Primary Decision Maker Phone Number [phone]; [phone]   Primary Decision Maker Relationship to Patient Spouse   Confirm Advance Directive Yes, on file   Patient Would Like to Complete Advance Directive -   Does the patient have other document types -       Other:    The palliative care team has discussed with patient / health care proxy about goals of care / treatment preferences for patient.  [====Goals of Care====]         HISTORY:         HPI/SUBJECTIVE:    The patient is:   [x] Verbal and participatory  [] Non-participatory due to:     Patient has chronic abdominal pain from multiple surgeries, etc and has been controlled with Fentanyl patch. Hardly used Morphine elixir. Has been having more pain in the abdomen over the past 3 weeks, has been using morphine every 4 hours with minimal to no relief. Started having sharp cramps which radiated to the flanks yesterday along with minimal output in ileostomy.      Clinical Pain Assessment (nonverbal scale for severity on nonverbal patients):   [++++ Clinical Pain Assessment++++]  [++++Pain Severity++++]: Pain: 6   [++++Pain Character++++]: cramping  [++++Pain Duration++++]: weeks, more since yesterday  [++++Pain Effect++++]: functional  [++++Pain Factors++++]: unknown, not relieved with re positioning  [++++Pain Frequency++++]: constant  [++++Pain Location++++]: abdomen radiating to the flanks  [++++ Clinical Pain Assessment++++]     FUNCTIONAL ASSESSMENT:     Palliative Performance Scale (PPS):  PPS: 80       PSYCHOSOCIAL/SPIRITUAL SCREENING:     Advance Care Planning:  Advance Care Planning 8/10/2017   Patient's Healthcare Decision Maker is: Legal Next of Kya 68   Primary Decision Maker Name DAVID CAMP   Primary Decision Maker Phone Number [phone]; [phone]   Primary Decision Maker Relationship to Patient Spouse   Confirm Advance Directive Yes, on file   Patient Would Like to Complete Advance Directive -   Does the patient have other document types -        Any spiritual / Hindu concerns:  [] Yes /  [] No    Caregiver Burnout:  [] Yes /  [] No /  [] No Caregiver Present      Anticipatory grief assessment:   [] Normal  / [] Maladaptive       ESAS Anxiety: Anxiety: 0    ESAS Depression: Depression: 0        REVIEW OF SYSTEMS:     Positive and pertinent negative findings in ROS are noted above in HPI. The following systems were [] reviewed / [] unable to be reviewed as noted in HPI  Other findings are noted below. Systems: constitutional, ears/nose/mouth/throat, respiratory, gastrointestinal, genitourinary, musculoskeletal, integumentary, neurologic, psychiatric, endocrine. Positive findings noted below. Modified ESAS Completed by: provider   Fatigue: 1 Drowsiness: 0   Depression: 0 Pain: 6   Anxiety: 0 Nausea: 4   Anorexia: 2 Dyspnea: 0     Constipation: Yes              PHYSICAL EXAM:     From RN flowsheet:  Wt Readings from Last 3 Encounters:   08/09/17 113 lb (51.3 kg)   08/01/17 113 lb 9.6 oz (51.5 kg)   07/25/17 116 lb 12.8 oz (53 kg)     Blood pressure 127/82, pulse 81, temperature 99.5 °F (37.5 °C), resp. rate 18, weight 113 lb (51.3 kg), SpO2 98 %, not currently breastfeeding.     Pain Scale 1: Numeric (0 - 10)  Pain Intensity 1: 6  Pain Onset 1: poA  Pain Location 1: Abdomen, Back  Pain Orientation 1: Posterior, Anterior  Pain Description 1: Cramping, Constant  Pain Intervention(s) 1: Medication (see MAR)  Last bowel movement, if known:     Constitutional: alert, oriented  Eyes: pupils equal, anicteric  ENMT: no nasal discharge, moist mucous membranes  Cardiovascular: regular rhythm, distal pulses intact  Respiratory: breathing not labored, symmetric  Gastrointestinal: soft non-tender, +bowel sounds, distended, iliostomy bag in place  Musculoskeletal: no deformity, no tenderness to palpation  Skin: warm, dry  Neurologic: following commands, moving all extremities  Psychiatric: full affect, no hallucinations  Other:       HISTORY:     Active Problems:    Ileostomy in place ([address] (2/15/2016)      Anemia due to chemotherapy (2/22/2016)      Cancer associated pain (5/13/2016)      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)      Hx of pulmonary embolus (3/21/2017)      Dehydration (3/21/2017)      Bowel obstruction ([address] (4/27/2017)      Ulcerative esophagitis (6/7/2017)      Hydronephrosis of left kidney (6/28/2017)      Ovarian cancer ([address] (8/9/2017)      Nausea & vomiting (8/9/2017)      Carcinomatosis ([address] (8/9/2017)      Intractable vomiting with nausea (8/10/2017)      Febrile illness (8/10/2017)      Past Medical History:   Diagnosis Date    Abdominal carcinomatosis ([address] 10/2015    Arthritis     left shoulder    BRCA negative 12/2015    Chronic pain     Depression     HX    Environmental allergies     GERD (gastroesophageal reflux disease)     Hypertension     NEW OVER PAST 5-6 MONTHS    Ovarian cancer ([address] 10/2015    serous adenocarcinoma, high grade, stage IIIC    Pulmonary embolism ([address] 10/2015      Past Surgical History:   Procedure Laterality Date    CARDIAC SURG PROCEDURE UNLIST  12/11/15    cardiac cath/normal     HX BREAST BIOPSY  1995    benign right breast bx    HX DILATION AND CURETTAGE  2/2011    POLYPECTOMY    HX GI  2015    PERFORATED BOWEL; ILEOSTOMY    HX GYN  10/2015    EXP LAPAROTOMY    HX HEENT  LAST 2005    oral tissue graft X3    HX HEENT  1970    tonsillectomy    HX LAPAROTOMY  10/2015    tumor sampling    HX LAPAROTOMY  4/2016    hysterectomy, BSO, radical debulking    HX LAPAROTOMY  5/2016    resection ileocolic anastomosis, leak, ileostomy    HX OTHER SURGICAL  11/2015    tunneled portacath      Family History   Problem Relation Age of Onset    Cancer Maternal Uncle      skin    Hypertension Mother     High Cholesterol Mother     Anxiety Mother     Heart Disease Mother     High Cholesterol Father     Hypertension Father     Stroke Father     Arthritis-osteo Sister     Migraines Sister     Other Sister      FIBROMYALGIA    Depression Brother     Other Brother      DRUG ABUSE HEPATITIS C    Migraines Sister     Arrhythmia Sister     Depression Sister     Lung Disease Paternal Aunt      COPD    Cancer Paternal Uncle      LUNG    Lung Disease Paternal Uncle      COPD    Lung Disease Maternal Grandmother      COPD    Anesth Problems Neg Hx       History reviewed, no pertinent family history.   Social History   Substance Use Topics    Smoking status: Never Smoker    Smokeless tobacco: Never Used    Alcohol use No     Allergies   Allergen Reactions    Ativan [Lorazepam] Other (comments)     SEVERE CONFUSION      Current Facility-Administered Medications   Medication Dose Route Frequency    piperacillin-tazobactam (ZOSYN) 3.375 g in 0.9% sodium chloride (MBP/ADV) 100 mL  3.375 g IntraVENous Q8H    sodium chloride 0.9 % bolus infusion 1,000 mL  1,000 mL IntraVENous ONCE    acetaminophen (TYLENOL) solution 500 mg  500 mg Oral Q4H PRN    scopolamine (TRANSDERM-SCOP) 1.5 mg  1.5 mg TransDERmal Q72H    HYDROmorphone (PF) (DILAUDID) injection 2 mg  2 mg IntraVENous Q1H PRN    sodium chloride (NS) 0.9 % flush        HYDROmorphone (PF) 15 mg/30 ml (DILAUDID) PCA   IntraVENous CONTINUOUS    apixaban (ELIQUIS) tablet 2.5 mg  2.5 mg Oral BID    [START ON 8/11/2017] fentaNYL (DURAGESIC) 100 mcg/hr patch 1 Patch  1 Patch TransDERmal Q72H    metoprolol tartrate (LOPRESSOR) tablet 50 mg  50 mg Oral BID    morphine 10 mg/5 mL oral solution 20 mg  20 mg Oral Q4H PRN    pantoprazole (PROTONIX) granules for oral suspension 40 mg  40 mg Oral BID    sucralfate (CARAFATE) 100 mg/mL oral suspension 0.5 g  0.5 g Oral QID    zolpidem (AMBIEN) tablet 10 mg  10 mg Oral QHS PRN    dextrose 5 % - 0.45% NaCl 1,000 mL with potassium chloride 10 mEq, magnesium sulfate 1 g, thiamine 100 mg, mvi, adult no. 4 with vit K 10 mL infusion   IntraVENous CONTINUOUS    metoclopramide HCl (REGLAN) injection 10 mg  10 mg IntraVENous Q6H    sodium chloride (NS) flush 5-10 mL  5-10 mL IntraVENous Q8H    sodium chloride (NS) flush 5-10 mL  5-10 mL IntraVENous PRN    naloxone (NARCAN) injection 0.4 mg  0.4 mg IntraVENous PRN    diphenhydrAMINE (BENADRYL) injection 12.5 mg  12.5 mg IntraVENous Q4H PRN    magnesium hydroxide (MILK OF MAGNESIA) 400 mg/5 mL oral suspension 30 mL  30 mL Oral DAILY PRN    prochlorperazine (COMPAZINE) with saline injection 10 mg  10 mg IntraVENous Q6H PRN    promethazine (PHENERGAN) 25 mg in 0.9% sodium chloride 50 mL IVPB  25 mg IntraVENous Q8H PRN     Facility-Administered Medications Ordered in Other Encounters   Medication Dose Route Frequency    [START ON 8/15/2017] dexamethasone (DECADRON) 4 mg/mL injection 8 mg  8 mg IntraVENous ONCE    [START ON 8/15/2017] famotidine (PF) (PEPCID) 20 mg in sodium chloride 0.9 % 10 mL injection  20 mg IntraVENous ONCE    [START ON 8/15/2017] fosaprepitant (EMEND) 150 mg in 0.9% sodium chloride 150 mL IVPB  150 mg IntraVENous ONCE    [START ON 8/15/2017] gemcitabine (GEMZAR) 1,264 mg in 0.9% sodium chloride 250 mL, overfill volume 25 mL chemo infusion  1,264 mg IntraVENous ONCE    [START ON 8/15/2017] granisetron (KYTRIL) injection 1 mg  1 mg IntraVENous ONCE    [START ON 8/15/2017] sodium chloride 0.9 % bolus infusion 2,000 mL  2,000 mL IntraVENous ONCE    [START ON 8/15/2017] bevacizumab (AVASTIN) 840 mg in 0.9% sodium chloride 100 mL, overfill volume 10 mL IVPB  840 mg IntraVENous ONCE    0.9% sodium chloride infusion 1,000 mL  1,000 mL IntraVENous ONCE          LAB AND IMAGING FINDINGS:     Lab Results   Component Value Date/Time    WBC 7.7 08/10/2017 10:30 AM    HGB 9.2 08/10/2017 10:30 AM    PLATELET 467 28/92/1427 10:30 AM     Lab Results   Component Value Date/Time    Sodium 131 08/09/2017 07:30 PM    Potassium 3.7 08/09/2017 07:30 PM    Chloride 95 08/09/2017 07:30 PM    CO2 28 08/09/2017 07:30 PM    BUN 22 08/09/2017 07:30 PM    Creatinine 1.09 08/09/2017 07:30 PM    Calcium 9.0 08/09/2017 07:30 PM    Magnesium 1.9 08/09/2017 07:30 PM    Phosphorus 3.6 05/08/2017 04:53 AM      Lab Results   Component Value Date/Time    AST (SGOT) 53 08/09/2017 07:30 PM    Alk. phosphatase 216 08/09/2017 07:30 PM    Protein, total 7.5 08/09/2017 07:30 PM    Albumin 3.5 08/09/2017 07:30 PM    Globulin 4.0 08/09/2017 07:30 PM     Lab Results   Component Value Date/Time    INR 1.1 05/08/2017 10:42 AM    Prothrombin time 11.2 05/08/2017 10:42 AM    aPTT 28.9 05/13/2016 05:39 AM      No results found for: IRON, FE, TIBC, IBCT, PSAT, FERR   No results found for: PH, PCO2, PO2  No components found for: GLPOC   No results found for: CPK, CKMB             Total time:   Counseling / coordination time, spent as noted above:   > 50% counseling / coordination?:     Prolonged service was provided for  []30 min   []75 min in face to face time in the presence of the patient, spent as noted above. Time Start:   Time End:   Note: this can only be billed with 26371 (initial) or 54708 (follow up). If multiple start / stop times, list each separately.

## 2017-08-10 NOTE — PROGRESS NOTES
Notified Dr. Bere Dela Cruz, pt. States pain not controled with PO 4mg morphine.  Received orders for dilaudid 1-2mg Q1hr PRN

## 2017-08-10 NOTE — PROGRESS NOTES
Bedside shift change report given to Sahil Gibson RN (oncoming nurse) by Chon Fatima RN (offgoing nurse). Report included the following information SBAR.

## 2017-08-10 NOTE — PHYSICIAN ADVISORY
Upon my review and after discussion with Attending team, inpatient admission is now appropriate. Being treated for stage IV ovarian cancer with chemotherapy and now presents with increasing abdominal discomfort. During night had fever to 102.4 and increasing pain. IV antibiotics started and pain management adjusted The severity of illness and intensity of service, make inpatient admission and management necessary. Mel Valencia, [address] [place] [address].  Care Management

## 2017-08-10 NOTE — PROGRESS NOTES
Bedside and Verbal shift change report given to Betnley Oseguera RN (oncoming nurse) by Joselito Pitts RN (offgoing nurse). Report included the following information SBAR, Kardex, Intake/Output, MAR, Accordion and Recent Results.

## 2017-08-10 NOTE — PROGRESS NOTES
Timbo Rojas. HAYDER Don    Admit Date: 2017  Chet Ford       [id number]  1960      Admitted 2017 Date 2017   HPI  Chet Ford is a 61 y.o.  postmenopausal female with a diagnosis of stage IV ovarian cancer. She underwent exploratory laparotomy with suboptimal debulking. She had extensive disease. She was readmitted about a week later with obstruction and subsequently had a cecal perforation, requiring resection, end ileostomy, and mucous fistula. During the hospitalization she was also diagnosed with pulmonary embolus and had chest tubes placed for bilateral pleural effusions. She had a prolonged hospital course and actually received a dose of cisplatin chemotherapy while in the hospital to help dry up the effusions. She completed 6 cycles of Taxol/Carboplatin chemotherapy following her initial debulking. She did relatively well, considering she had an ileostomy to deal with during chemotherapy. I took her to the OR on 16 for interval debulking and reversal of her ileostomy. She had extensive disease, but we were able to resect the majority of her disease. One week later she developed an anastomotic leak requiring reexploration and recreation of an ileostomy. She had another prolonged hospitalization, but recovered well since surgery. We then reinitiated Taxol/Carboplatin chemotherapy, but did reduce the dose of Taxol to 135 mg/m2. She completed 4 mores cycles. Her CA-125 normalized and we stopped treatment.      On , she presented to Dr. Alondra Paul office with her  to discuss her follow up/surveillance CT scan. Unfortunately, it demonstrated progression of disease, although minimally worse than her prior scan 2 months previously. She was actually feeling better than she did at that time. After review of scans and disease, the decision was made to begin Doxil/Avastin Q28 days for 6 cycles.  She began this on 2017. In 2017, she had completed 3 cycles of Doxil/Avastin and had CT scans at that time showed she had evidence of recurrent disease. She was switched at that time to [facility] and began this regimen on 2017 and has completed 4 cycles of this with the last being on 2017. With this regimen, her  has decreased to 65 on 17 from 145.6 on 2017. Subjective:   Pt was noted with temp of 102 early this morning. Zosyn started. She says pain is \"worse than it has been\" and says it is not controlled with Fentanyl patch and Dilaudid as it had been. Several episodes of nausea during night. None currently  Noted KUB results from last evening   remains at side supportive          Objective:     Blood pressure 127/73, pulse 97, temperature (!) 102.4 °F (39.1 °C), resp. rate 16, weight 113 lb (51.3 kg), SpO2 92 %, not currently breastfeeding. Temp (24hrs), Av.5 °F (37.5 °C), Min:98.1 °F (36.7 °C), Max:102.4 °F (39.1 °C)      _____________________  Physical Exam:     General: A&O X 3  Flushed, tired appearing   Port site: without swelling or redness. Accessed last evening without difficulty  Cardiovascular: Regular without M/R/G  Lungs:CTA bilat without wheezing or rales  Abdomen: Soft with mild tenderness. Not distended with faint bowel sounds. Ext: + pedal pulses without edema bilat.  SCD's and Compression boots in place bilat  Neuro: grossly intact    Recent Results (from the past 24 hour(s))   CBC WITH AUTOMATED DIFF    Collection Time: 17  7:30 PM   Result Value Ref Range    WBC 6.4 3.6 - 11.0 K/uL    RBC 3.01 (L) 3.80 - 5.20 M/uL    HGB 9.2 (L) 11.5 - 16.0 g/dL    HCT 28.5 (L) 35.0 - 47.0 %    MCV 94.7 80.0 - 99.0 FL    MCH 30.6 26.0 - 34.0 PG    MCHC 32.3 30.0 - 36.5 g/dL    RDW 15.5 (H) 11.5 - 14.5 %    PLATELET 915 (L) 494 - 400 K/uL    NEUTROPHILS 69 32 - 75 %    LYMPHOCYTES 21 12 - 49 %    MONOCYTES 10 5 - 13 %    EOSINOPHILS 0 0 - 7 % BASOPHILS 0 0 - 1 %    ABS. NEUTROPHILS 4.4 1.8 - 8.0 K/UL    ABS. LYMPHOCYTES 1.4 0.8 - 3.5 K/UL    ABS. MONOCYTES 0.6 0.0 - 1.0 K/UL    ABS. EOSINOPHILS 0.0 0.0 - 0.4 K/UL    ABS. BASOPHILS 0.0 0.0 - 0.1 K/UL   METABOLIC PANEL, COMPREHENSIVE    Collection Time: 08/09/17  7:30 PM   Result Value Ref Range    Sodium 131 (L) 136 - 145 mmol/L    Potassium 3.7 3.5 - 5.1 mmol/L    Chloride 95 (L) 97 - 108 mmol/L    CO2 28 21 - 32 mmol/L    Anion gap 8 5 - 15 mmol/L    Glucose 112 (H) 65 - 100 mg/dL    BUN 22 (H) 6 - 20 MG/DL    Creatinine 1.09 (H) 0.55 - 1.02 MG/DL    BUN/Creatinine ratio 20 12 - 20      GFR est AA >60 >60 ml/min/1.73m2    GFR est non-AA 52 (L) >60 ml/min/1.73m2    Calcium 9.0 8.5 - 10.1 MG/DL    Bilirubin, total 0.3 0.2 - 1.0 MG/DL    ALT (SGPT) 93 (H) 12 - 78 U/L    AST (SGOT) 53 (H) 15 - 37 U/L    Alk. phosphatase 216 (H) 45 - 117 U/L    Protein, total 7.5 6.4 - 8.2 g/dL    Albumin 3.5 3.5 - 5.0 g/dL    Globulin 4.0 2.0 - 4.0 g/dL    A-G Ratio 0.9 (L) 1.1 - 2.2     MAGNESIUM    Collection Time: 08/09/17  7:30 PM   Result Value Ref Range    Magnesium 1.9 1.6 - 2.4 mg/dL     Imaging: KUB 8/8/2017 PM  The bowel gas pattern is atypical but not distinctly obstructive. There is no  free intraperitoneal air. There is no evident organomegaly or significant  intraabdominal calcification. There is IVC filter.     The lungs are free of acute disease. Heart size is normal and there is no overt  pulmonary edema. Left costophrenic angle remains blunted. Port catheter is in  place.     IMPRESSION: Atypical but nonspecific bowel gas pattern. No acute cardiopulmonary  disease.           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)      Hx of pulmonary embolus (3/21/2017)      Dehydration (3/21/2017)      Bowel obstruction ([facility] [address] (4/27/2017)      Ulcerative esophagitis (6/7/2017)      Hydronephrosis of left kidney (6/28/2017)      Ovarian cancer ([facility] [address] (8/9/2017)      Nausea & vomiting (8/9/2017)      Carcinomatosis ([facility] [address] (8/9/2017)      Intractable vomiting with nausea (8/10/2017)        Plan:   CT of abd/pelvis today  Await CA-125 results  Scopolamine patch  Will adjust pain medication as needed throughout the day. Currently will increase Dilauded to 2 mg Q1 hour   Will consult palliative medicine for pain management assistance  Mobilize throughout the day as able  Continue Eliaquis    Will give another bolus this morning due to increase in BUN  Continue maintaince  IV fluid with MVI  Further plan after CT resulted  Seen with Dr. Krysta Chatman NP  8/10/2017    Data Review:    Recent Labs      08/09/17 1930   WBC  6.4   HGB  9.2*   HCT  28.5*   PLT  114*     Recent Labs      08/09/17 1930   NA  131*   K  3.7   CL  95*   CO2  28   GLU  112*   BUN  22*   CREA  1.09*   CA  9.0   MG  1.9   ALB  3.5   SGOT  53*   ALT  93*     No results for input(s): AML, LPSE in the last 72 hours.         ______________________  Medications:    Current Facility-Administered Medications   Medication Dose Route Frequency Provider Last Rate Last Dose    piperacillin-tazobactam (ZOSYN) 3.375 g in 0.9% sodium chloride (MBP/ADV) 100 mL  3.375 g IntraVENous Q8H Gaby Concepcion MD 25 mL/hr at 08/10/17 0628 3.375 g at 08/10/17 4737    sodium chloride 0.9 % bolus infusion 1,000 mL  1,000 mL IntraVENous ONCE Genny E Florencia, NP        acetaminophen (TYLENOL) solution 500 mg  500 mg Oral Q4H PRN Genny E Florencia, NP        scopolamine (TRANSDERM-SCOP) 1.5 mg  1.5 mg TransDERmal Q72H Genny E Florencia, NP        HYDROmorphone (PF) (DILAUDID) injection 2 mg  2 mg IntraVENous Q1H PRN Genny E Florencia, NP        apixaban (ELIQUIS) tablet 2.5 mg  2.5 mg Oral BID Genny E Wall, NP   2.5 mg at 08/09/17 2132    [START ON 8/11/2017] fentaNYL (DURAGESIC) 100 mcg/hr patch 1 Patch  1 Patch TransDERmal Q72H Genny ROMÁN Don, NP        metoprolol tartrate (LOPRESSOR) tablet 50 mg  50 mg Oral BID Ana Rosa Karen Florencia, NP   50 mg at 08/09/17 2132    morphine 10 mg/5 mL oral solution 20 mg  20 mg Oral Q4H PRN Genny Don, NP        pantoprazole (PROTONIX) granules for oral suspension 40 mg  40 mg Oral BID Genny Don, NP   40 mg at 08/09/17 2132    sucralfate (CARAFATE) 100 mg/mL oral suspension 0.5 g  0.5 g Oral QID Genny Don, NP   0.5 g at 08/09/17 2132    zolpidem (AMBIEN) tablet 10 mg  10 mg Oral QHS PRN Genny Don, NP        dextrose 5 % - 0.45% NaCl 1,000 mL with potassium chloride 10 mEq, magnesium sulfate 1 g, thiamine 100 mg, mvi, adult no. 4 with vit K 10 mL infusion   IntraVENous CONTINUOUS Genny Don,  mL/hr at 08/10/17 0523      metoclopramide HCl (REGLAN) injection 10 mg  10 mg IntraVENous Q6H Genny E Florencia, NP   10 mg at 08/10/17 0628    sodium chloride (NS) flush 5-10 mL  5-10 mL IntraVENous Q8H Genny E Florencia, NP   10 mL at 08/10/17 0055    sodium chloride (NS) flush 5-10 mL  5-10 mL IntraVENous PRN Genny E Florencia, NP        naloxone (NARCAN) injection 0.4 mg  0.4 mg IntraVENous PRN Genny Don, NP        diphenhydrAMINE (BENADRYL) injection 12.5 mg  12.5 mg IntraVENous Q4H PRN Genny E Florencia, NP        magnesium hydroxide (MILK OF MAGNESIA) 400 mg/5 mL oral suspension 30 mL  30 mL Oral DAILY PRN Genny Don, NP        prochlorperazine (COMPAZINE) with saline injection 10 mg  10 mg IntraVENous Q6H PRN Genny E Florencia, NP   10 mg at 08/10/17 0251    promethazine (PHENERGAN) 25 mg in 0.9% sodium chloride 50 mL IVPB  25 mg IntraVENous Q8H PRN Tej Chapman  mL/hr at 08/09/17 1923 25 mg at 08/09/17 1923     Facility-Administered Medications Ordered in Other Encounters   Medication Dose Route Frequency Provider Last Rate Last Dose    [START ON 8/15/2017] dexamethasone (DECADRON) 4 mg/mL injection 8 mg  8 mg IntraVENous ONCE Tej Chapman MD        [START ON 8/15/2017] famotidine (PF) (PEPCID) 20 mg in sodium chloride 0.9 % 10 mL injection  20 mg IntraVENous ONCE Dami Pabon MD        [START ON 8/15/2017] fosaprepitant (EMEND) 150 mg in 0.9% sodium chloride 150 mL IVPB  150 mg IntraVENous Sam Juares MD        [START ON 8/15/2017] gemcitabine (GEMZAR) 1,264 mg in 0.9% sodium chloride 250 mL, overfill volume 25 mL chemo infusion  1,264 mg IntraVENous ONCE MD Triny Garcia ON 8/15/2017] granisetron (KYTRIL) injection 1 mg  1 mg IntraVENous ONCE MD Triny Garcia ON 8/15/2017] sodium chloride 0.9 % bolus infusion 2,000 mL  2,000 mL IntraVENous ONCE Dami Pabon MD       [address] [place] [address] Lynda Marks ON 8/15/2017] bevacizumab (AVASTIN) 840 mg in 0.9% sodium chloride 100 mL, overfill volume 10 mL IVPB  840 mg IntraVENous Sam Juares MD        0.9% sodium chloride infusion 1,000 mL  1,000 mL IntraVENous Sam Juares MD

## 2017-08-10 NOTE — PROGRESS NOTES
Care Management Interventions  Mode of Transport at Discharge: Other (see comment) (private vehicle)  Discharge Durable Medical Equipment: No (has a rolling walker and a bedside commode)  Physical Therapy Consult: No  Occupational Therapy Consult: No  Speech Therapy Consult: No  Current Support Network: Lives with Spouse (independent with ADLs)  Confirm Follow Up Transport: Family  Plan discussed with Pt/Family/Caregiver: Yes  Freedom of Choice Offered: Yes  Discharge Location  Discharge Placement: Home    CM reviewed chart. Pt is independent with her ADLs and IADLs. Pt lives with her  in a 1-level private residence with 3 steps to enter. Pt has a rolling, bedside commode, and shower chair at home if needed. [facility] past.  Pt has also used [facility] for home infusions. Pt gets her prescriptions filled at her local [facility] and/or [facility]. Pt's  will provide transportation. Cm will continue to follow for any needs that arise.   CLARISSA SalinasW, ACM

## 2017-08-10 NOTE — PROGRESS NOTES
Notified Shon Kahn of pt. Temp 102.4 and MEWs score of 3. Discussed WBC and Hgb with Aly. Orders received for Zosyn.

## 2017-08-11 VITALS
DIASTOLIC BLOOD PRESSURE: 57 MMHG | TEMPERATURE: 98.7 F | SYSTOLIC BLOOD PRESSURE: 110 MMHG | RESPIRATION RATE: 16 BRPM | BODY MASS INDEX: 20.02 KG/M2 | HEART RATE: 88 BPM | OXYGEN SATURATION: 97 % | WEIGHT: 113 LBS

## 2017-08-11 LAB
ALBUMIN SERPL BCP-MCNC: 2.8 G/DL (ref 3.5–5)
ALBUMIN/GLOB SERPL: 0.8 {RATIO} (ref 1.1–2.2)
ALP SERPL-CCNC: 170 U/L (ref 45–117)
ALT SERPL-CCNC: 49 U/L (ref 12–78)
ANION GAP BLD CALC-SCNC: 6 MMOL/L (ref 5–15)
AST SERPL W P-5'-P-CCNC: 22 U/L (ref 15–37)
BASOPHILS # BLD AUTO: 0 K/UL (ref 0–0.1)
BASOPHILS # BLD: 0 % (ref 0–1)
BILIRUB SERPL-MCNC: 0.4 MG/DL (ref 0.2–1)
BUN SERPL-MCNC: 11 MG/DL (ref 6–20)
BUN/CREAT SERPL: 12 (ref 12–20)
CALCIUM SERPL-MCNC: 8.6 MG/DL (ref 8.5–10.1)
CANCER AG125 SERPL-ACNC: 83 U/ML (ref 0–30)
CHLORIDE SERPL-SCNC: 102 MMOL/L (ref 97–108)
CO2 SERPL-SCNC: 27 MMOL/L (ref 21–32)
CREAT SERPL-MCNC: 0.95 MG/DL (ref 0.55–1.02)
EOSINOPHIL # BLD: 0.1 K/UL (ref 0–0.4)
EOSINOPHIL NFR BLD: 1 % (ref 0–7)
ERYTHROCYTE [DISTWIDTH] IN BLOOD BY AUTOMATED COUNT: 16.5 % (ref 11.5–14.5)
GLOBULIN SER CALC-MCNC: 3.5 G/DL (ref 2–4)
GLUCOSE SERPL-MCNC: 108 MG/DL (ref 65–100)
HCT VFR BLD AUTO: 25.3 % (ref 35–47)
HGB BLD-MCNC: 8.1 G/DL (ref 11.5–16)
LYMPHOCYTES # BLD AUTO: 17 % (ref 12–49)
LYMPHOCYTES # BLD: 1.1 K/UL (ref 0.8–3.5)
MCH RBC QN AUTO: 30.6 PG (ref 26–34)
MCHC RBC AUTO-ENTMCNC: 32 G/DL (ref 30–36.5)
MCV RBC AUTO: 95.5 FL (ref 80–99)
MONOCYTES # BLD: 0.7 K/UL (ref 0–1)
MONOCYTES NFR BLD AUTO: 11 % (ref 5–13)
NEUTS SEG # BLD: 4.8 K/UL (ref 1.8–8)
NEUTS SEG NFR BLD AUTO: 71 % (ref 32–75)
PLATELET # BLD AUTO: 148 K/UL (ref 150–400)
POTASSIUM SERPL-SCNC: 3.6 MMOL/L (ref 3.5–5.1)
PROT SERPL-MCNC: 6.3 G/DL (ref 6.4–8.2)
RBC # BLD AUTO: 2.65 M/UL (ref 3.8–5.2)
SODIUM SERPL-SCNC: 135 MMOL/L (ref 136–145)
WBC # BLD AUTO: 6.7 K/UL (ref 3.6–11)

## 2017-08-11 PROCEDURE — 74011250636 HC RX REV CODE- 250/636: Performed by: OBSTETRICS & GYNECOLOGY

## 2017-08-11 PROCEDURE — 74011000258 HC RX REV CODE- 258: Performed by: NURSE PRACTITIONER

## 2017-08-11 PROCEDURE — 74011000250 HC RX REV CODE- 250: Performed by: NURSE PRACTITIONER

## 2017-08-11 PROCEDURE — 74011250637 HC RX REV CODE- 250/637: Performed by: NURSE PRACTITIONER

## 2017-08-11 PROCEDURE — 74011250637 HC RX REV CODE- 250/637: Performed by: INTERNAL MEDICINE

## 2017-08-11 PROCEDURE — 74011000258 HC RX REV CODE- 258: Performed by: OBSTETRICS & GYNECOLOGY

## 2017-08-11 PROCEDURE — 36415 COLL VENOUS BLD VENIPUNCTURE: CPT | Performed by: NURSE PRACTITIONER

## 2017-08-11 PROCEDURE — 85025 COMPLETE CBC W/AUTO DIFF WBC: CPT | Performed by: NURSE PRACTITIONER

## 2017-08-11 PROCEDURE — 80053 COMPREHEN METABOLIC PANEL: CPT | Performed by: NURSE PRACTITIONER

## 2017-08-11 PROCEDURE — 74011250636 HC RX REV CODE- 250/636: Performed by: NURSE PRACTITIONER

## 2017-08-11 RX ORDER — HYDROMORPHONE HYDROCHLORIDE 5 MG/5ML
4 SOLUTION ORAL
Qty: 475 ML | Refills: 0 | Status: SHIPPED | OUTPATIENT
Start: 2017-08-11 | End: 2017-08-15 | Stop reason: SDUPTHER

## 2017-08-11 RX ORDER — SCOLOPAMINE TRANSDERMAL SYSTEM 1 MG/1
1.5 PATCH, EXTENDED RELEASE TRANSDERMAL
Qty: 10 PATCH | Refills: 1 | Status: SHIPPED | OUTPATIENT
Start: 2017-08-11 | End: 2017-12-21 | Stop reason: SDUPTHER

## 2017-08-11 RX ORDER — HEPARIN 100 UNIT/ML
300 SYRINGE INTRAVENOUS AS NEEDED
Status: DISCONTINUED | OUTPATIENT
Start: 2017-08-11 | End: 2017-08-11 | Stop reason: HOSPADM

## 2017-08-11 RX ORDER — HYDROMORPHONE HYDROCHLORIDE 5 MG/5ML
4 SOLUTION ORAL
Status: DISCONTINUED | OUTPATIENT
Start: 2017-08-11 | End: 2017-08-11 | Stop reason: HOSPADM

## 2017-08-11 RX ORDER — HYDROMORPHONE HYDROCHLORIDE 4 MG/1
4 TABLET ORAL
Status: DISCONTINUED | OUTPATIENT
Start: 2017-08-11 | End: 2017-08-11

## 2017-08-11 RX ADMIN — METOPROLOL TARTRATE 50 MG: 50 TABLET ORAL at 09:38

## 2017-08-11 RX ADMIN — Medication 10 ML: at 06:56

## 2017-08-11 RX ADMIN — Medication 10 ML: at 00:48

## 2017-08-11 RX ADMIN — SUCRALFATE 0.5 G: 1 SUSPENSION ORAL at 09:39

## 2017-08-11 RX ADMIN — Medication 300 UNITS: at 15:17

## 2017-08-11 RX ADMIN — PANTOPRAZOLE SODIUM 40 MG: 40 GRANULE, DELAYED RELEASE ORAL at 09:39

## 2017-08-11 RX ADMIN — HYDROMORPHONE HYDROCHLORIDE 4 MG: 5 LIQUID ORAL at 15:16

## 2017-08-11 RX ADMIN — METOCLOPRAMIDE 10 MG: 5 INJECTION, SOLUTION INTRAMUSCULAR; INTRAVENOUS at 11:57

## 2017-08-11 RX ADMIN — PIPERACILLIN SODIUM,TAZOBACTAM SODIUM 3.38 G: 3; .375 INJECTION, POWDER, FOR SOLUTION INTRAVENOUS at 06:35

## 2017-08-11 RX ADMIN — METOCLOPRAMIDE 10 MG: 5 INJECTION, SOLUTION INTRAMUSCULAR; INTRAVENOUS at 06:37

## 2017-08-11 RX ADMIN — SUCRALFATE 0.5 G: 1 SUSPENSION ORAL at 13:03

## 2017-08-11 RX ADMIN — HYDROMORPHONE HYDROCHLORIDE 2 MG: 1 INJECTION, SOLUTION INTRAMUSCULAR; INTRAVENOUS; SUBCUTANEOUS at 06:50

## 2017-08-11 RX ADMIN — APIXABAN 2.5 MG: 2.5 TABLET, FILM COATED ORAL at 09:38

## 2017-08-11 RX ADMIN — PIPERACILLIN SODIUM,TAZOBACTAM SODIUM 3.38 G: 3; .375 INJECTION, POWDER, FOR SOLUTION INTRAVENOUS at 00:46

## 2017-08-11 RX ADMIN — METOCLOPRAMIDE 10 MG: 5 INJECTION, SOLUTION INTRAMUSCULAR; INTRAVENOUS at 00:46

## 2017-08-11 RX ADMIN — POTASSIUM CHLORIDE: 2 INJECTION, SOLUTION, CONCENTRATE INTRAVENOUS at 00:54

## 2017-08-11 RX ADMIN — SUCRALFATE 0.5 G: 1 SUSPENSION ORAL at 00:44

## 2017-08-11 NOTE — DISCHARGE INSTRUCTIONS
[facility]  MD Hari Nelson MD Arlene Bloomer, HAYDER    Patient Discharge Instructions    Cody Cleary / [id number] : 1960    Admitted 2017 Discharged: 2017     Take Home Medications     No current facility-administered medications on file prior to encounter. Current Outpatient Prescriptions on File Prior to Encounter   Medication Sig Dispense Refill    morphine 10 mg/5 mL oral solution Take 10 mL by mouth every four (4) hours as needed for Pain. Max Daily Amount: 120 mg. 500 mL 0    fentaNYL (DURAGESIC) 100 mcg/hr PATCH 1 Patch by TransDERmal route every seventy-two (72) hours. Max Daily Amount: 1 Patch. 10 Patch 0    NEXIUM PACKET 40 mg granules for oral suspension two (2) times a day.  metoclopramide (REGLAN) 5 mg/5 mL syrup Take 10 mL by mouth four (4) times daily as needed. Take before meals as needed 500 mL 3    promethazine (PHENERGAN) 6.25 mg/5 mL syrup Take 20 mL by mouth four (4) times daily as needed for Nausea. 400 mL 4    magnesium oxide (MAG-OX) 400 mg tablet Take 1 Tab by mouth two (2) times a day. Indications: HYPOMAGNESEMIA 60 Tab 2    polyethylene glycol (MIRALAX) 17 gram/dose powder Take 17 g by mouth daily as needed. 238 g 3    apixaban (ELIQUIS) 2.5 mg tablet Take 1 Tab by mouth two (2) times a day. 60 Tab 6    zolpidem (AMBIEN) 10 mg tablet Take 1 Tab by mouth nightly as needed for Sleep. Max Daily Amount: 10 mg. 30 Tab 1    ondansetron (ZOFRAN ODT) 4 mg disintegrating tablet Take 1 Tab by mouth every eight (8) hours as needed for Nausea. 30 Tab 2    multivit-min-FA-coenzyme Q10 (AQUADEKS) 100-350-5 mcg-mcg-mg chew Take 1 Tab by mouth daily. Indications: VITAMIN DEFICIENCY PREVENTION 60 Tab 1    Calcium-Vitamin D3-Vitamin K 500-500-40 mg-unit-mcg chew Take 1 Tab by mouth two (2) times a day.  metoprolol (LOPRESSOR) 100 mg tablet Take 50 mg by mouth two (2) times a day.       lidocaine-prilocaine (EMLA) topical cream Apply small amount over port area and cover with a band aid 1 hour before chemo treatment 30 g 3    multivitamin (ONE A DAY) tablet Take 1 Tab by mouth daily.  sucralfate (CARAFATE) 100 mg/mL suspension Take 5 mL by mouth four (4) times daily. 414 mL 3    Cetirizine (ZYRTEC) 10 mg cap Take  by mouth.  esomeprazole (NEXIUM) 40 mg capsule Take 1 Cap by mouth daily. 90 Cap 2       · It is important that you take the medication exactly as they are prescribed. · Keep your medication in the bottles provided by the pharmacist and keep a list of the medication names, dosages, and times to be taken in your wallet. · Do not take other medications without consulting your doctor. What to do at Home    If your ostomy begins to slow in its output or gas, please let us know. Recommended activity:   No driving while on pain medication  Walk at least 6 times per day  Showers okay  Activity as able, stairs okay. Continue your pre-admission diet and follow up with gastroenterologist as scheduled. If you experience any of the following persisting symptoms:    Nausea/vomiting, fever > 101 F, diarrhea/constipation, increasing pain despite medication, increasing vaginal discharge or any concerns, please call our office. Follow-up with Dr. Catarina Moreno in 2-3 weeks. Follow up with your previously scheduled infusion on Tuesday, 8/15. Call [phone] for an appointment. Information obtained by :  I understand that if any problems occur once I am at home I am to contact my physician. I understand and acknowledge receipt of the instructions indicated above.                                                                                                                                            Physician's or R.N.'s Signature                                                                  Date/Time Patient or Representative Signature                                                          Date/Time    [employer] Activation    Thank you for requesting access to [employer]. Please follow the instructions below to securely access and download your online medical record. [employer] allows you to send messages to your doctor, view your test results, renew your prescriptions, schedule appointments, and more. How Do I Sign Up? 1. In your internet browser, go to [email or web address]  2. Click on the First Time User? Click Here link in the Sign In box. You will be redirect to the New Member Sign Up page. 3. Enter your [employer] Access Code exactly as it appears below. You will not need to use this code after youve completed the sign-up process. If you do not sign up before the expiration date, you must request a new code. [employer] Access Code: Activation code not generated  Current [employer] Status: Active (This is the date your [employer] access code will )    4. Enter the last four digits of your Social Security Number (xxxx) and Date of Birth (mm/dd/yyyy) as indicated and click Submit. You will be taken to the next sign-up page. 5. Create a [employer] ID. This will be your [employer] login ID and cannot be changed, so think of one that is secure and easy to remember. 6. Create a [employer] password. You can change your password at any time. 7. Enter your Password Reset Question and Answer. This can be used at a later time if you forget your password. 8. Enter your e-mail address. You will receive e-mail notification when new information is available in [address] 9. Click Sign Up. You can now view and download portions of your medical record. 10. Click the Download Summary menu link to download a portable copy of your medical information.     Additional Information    If you have questions, please visit the Frequently Asked Questions section of the [employer] website at [email or web address] Remember, [employer] is NOT to be used for urgent needs. For medical emergencies, dial 911.

## 2017-08-11 NOTE — CONSULTS
Palliative Medicine Consult  [place]: [phone] ([phone])    Patient Name: Jono Ceron  YOB: 1960    Date of Initial Consult: 8/10/17  Reason for Consult: overwhelming symptoms  Requesting Provider: Dr. Charmaine Lawler  Primary Care Physician: Yola Berumen MD      SUMMARY:     Jono Ceron is a 63y.o. year old with a past history of stage IV ovarian cancer followed by Dr Charmaine Lawler who has had previous debulking surgery for extensive disease and chemo, prior complications with fecal peritonitis, cecal perforation, SBO s/p ileostomy, multiple admissions in the past for the same, disease progression with change in chemo who was admitted on 8/9/17 with progressive abdominal pain. Treatment his- most recent chemo- Gemzar/Avastin and began this regimen on 5/23/2017 and has completed 4 cycles of this with the last being on 8/1/2017. With this regimen, her  has decreased to 65 on 7/20/17 from 145.6 on 5/16/2017. Current medical issues leading to Palliative Medicine involvement include: Pain management. Patient is on Fentnayl 100 mcg patch with liquid morphine 10 mg . She reports morphine was never effective but she didn't complain. But pain severe over the past few weeks with most severe yesterday along with minimal output via ileostomy. KUB did not rule out SBO. PALLIATIVE DIAGNOSES:   1. Abdominal pain  2. Bilateral flank pain  3. Nausea, vomiting       PLAN:   1. Pain- acute episodes resolved with ostomy output improving.   - Used about 3 mg IV Dilaudid via pca and several IV breakthrough doses of dilaudid yesterday. Has not needed any today, used 2 boluses since 4 am with good relief. - ok to discontinue pca   - Per patient, prn Morphine IR at home was not helping, we agreed to switch to po liquid dilaudid 2-4 mg every 4 hours as needed. Patient needs to d/w Dr. Charmaine Lawler if she continues to require more than 4 doses of breakthrough medicine as it is an indication to increase her fentanyl patch.  She will reach out to him if that is the case. 2. DISCHARGE recommendations  - Continue Fentnayl 100 mcg patch  - Stop Morphine IR 15 mg q4h  - Start Dilaudid liquid 2-4 mg every 4 hours as needed for breakthrough pain- patient will need script for this    3. Initial consult note routed to primary continuity provider  4. Communicated plan of care with: Palliative IDT, RN       GOALS OF CARE / TREATMENT PREFERENCES:   [====Goals of Care====]  GOALS OF CARE:  Patient / health care proxy stated goals:   Optimal symptom relief. TREATMENT PREFERENCES:   Code Status: Full Code    Advance Care Planning:  Advance Care Planning 8/10/2017   Patient's Healthcare Decision Maker is: Legal Next of Kya Pantoja   Primary Decision Maker Name DAVID CAMP   Primary Decision Maker Phone Number [phone]; [phone]   Primary Decision Maker Relationship to Patient Spouse   Confirm Advance Directive Yes, on file   Patient Would Like to Complete Advance Directive -   Does the patient have other document types -       Other:    The palliative care team has discussed with patient / health care proxy about goals of care / treatment preferences for patient.  [====Goals of Care====]         HISTORY:         HPI/SUBJECTIVE:    The patient is:   [x] Verbal and participatory  [] Non-participatory due to:     Patient has chronic abdominal pain from multiple surgeries, etc and has been controlled with Fentanyl patch. Hardly used Morphine elixir. Has been having more pain in the abdomen over the past 3 weeks, has been using morphine every 4 hours with minimal to no relief. Started having sharp cramps which radiated to the flanks yesterday along with minimal output in ileostomy.      Clinical Pain Assessment (nonverbal scale for severity on nonverbal patients):   [++++ Clinical Pain Assessment++++]  [++++Pain Severity++++]: Pain: 6   [++++Pain Character++++]: cramping  [++++Pain Duration++++]: weeks, more since yesterday  [++++Pain Effect++++]: functional  [++++Pain Factors++++]: unknown, not relieved with re positioning  [++++Pain Frequency++++]: constant  [++++Pain Location++++]: abdomen radiating to the flanks  [++++ Clinical Pain Assessment++++]     FUNCTIONAL ASSESSMENT:     Palliative Performance Scale (PPS):  PPS: 80       PSYCHOSOCIAL/SPIRITUAL SCREENING:     Advance Care Planning:  Advance Care Planning 8/10/2017   Patient's Healthcare Decision Maker is: Legal Next of Kya Pantoja   Primary Decision Maker Name DAVID CAMP   Primary Decision Maker Phone Number [phone]; [phone]   Primary Decision Maker Relationship to Patient Spouse   Confirm Advance Directive Yes, on file   Patient Would Like to Complete Advance Directive -   Does the patient have other document types -        Any spiritual / Jain concerns:  [] Yes /  [] No    Caregiver Burnout:  [] Yes /  [] No /  [] No Caregiver Present      Anticipatory grief assessment:   [] Normal  / [] Maladaptive       ESAS Anxiety: Anxiety: 0    ESAS Depression: Depression: 0        REVIEW OF SYSTEMS:     Positive and pertinent negative findings in ROS are noted above in HPI. The following systems were [] reviewed / [] unable to be reviewed as noted in HPI  Other findings are noted below. Systems: constitutional, ears/nose/mouth/throat, respiratory, gastrointestinal, genitourinary, musculoskeletal, integumentary, neurologic, psychiatric, endocrine. Positive findings noted below. Modified ESAS Completed by: provider   Fatigue: 1 Drowsiness: 0   Depression: 0 Pain: 6   Anxiety: 0 Nausea: 4   Anorexia: 2 Dyspnea: 0     Constipation: Yes     Stool Occurrence(s): 1        PHYSICAL EXAM:     From RN flowsheet:  Wt Readings from Last 3 Encounters:   08/09/17 113 lb (51.3 kg)   08/01/17 113 lb 9.6 oz (51.5 kg)   07/25/17 116 lb 12.8 oz (53 kg)     Blood pressure 110/57, pulse 88, temperature 98.7 °F (37.1 °C), resp. rate 16, weight 113 lb (51.3 kg), SpO2 97 %, not currently breastfeeding.     Pain Scale 1: Numeric (0 - 10)  Pain Intensity 1: 2  Pain Onset 1: poA  Pain Location 1: Abdomen  Pain Orientation 1: Anterior  Pain Description 1: Cramping, Constant  Pain Intervention(s) 1: Declines  Last bowel movement, if known:     Constitutional: alert, oriented  Eyes: pupils equal, anicteric  ENMT: no nasal discharge, moist mucous membranes  Cardiovascular: regular rhythm, distal pulses intact  Respiratory: breathing not labored, symmetric  Gastrointestinal: soft non-tender, +bowel sounds, distended, iliostomy bag in place  Musculoskeletal: no deformity, no tenderness to palpation  Skin: warm, dry  Neurologic: following commands, moving all extremities  Psychiatric: full affect, no hallucinations  Other:       HISTORY:     Active Problems:    Ileostomy in place ([address] (2/15/2016)      Anemia due to chemotherapy (2/22/2016)      Cancer associated pain (5/13/2016)      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)      Hx of pulmonary embolus (3/21/2017)      Dehydration (3/21/2017)      Bowel obstruction ([address] (4/27/2017)      Ulcerative esophagitis (6/7/2017)      Hydronephrosis of left kidney (6/28/2017)      Ovarian cancer ([address] (8/9/2017)      Nausea & vomiting (8/9/2017)      Carcinomatosis ([address] (8/9/2017)      Intractable vomiting with nausea (8/10/2017)      Febrile illness (8/10/2017)      Past Medical History:   Diagnosis Date    Abdominal carcinomatosis ([address] 10/2015    Arthritis     left shoulder    BRCA negative 12/2015    Chronic pain     Depression     HX    Environmental allergies     GERD (gastroesophageal reflux disease)     Hypertension     NEW OVER PAST 5-6 MONTHS    Ovarian cancer ([address] 10/2015    serous adenocarcinoma, high grade, stage IIIC    Pulmonary embolism ([address] 10/2015      Past Surgical History:   Procedure Laterality Date    CARDIAC SURG PROCEDURE UNLIST  12/11/15    cardiac cath/normal     HX BREAST BIOPSY  1995    benign right breast bx    HX DILATION AND CURETTAGE  2/2011 POLYPECTOMY    HX GI  2015    PERFORATED BOWEL; ILEOSTOMY    HX GYN  10/2015    EXP LAPAROTOMY    HX HEENT  LAST 2005    oral tissue graft X3    HX HEENT  1970    tonsillectomy    HX LAPAROTOMY  10/2015    tumor sampling    HX LAPAROTOMY  4/2016    hysterectomy, BSO, radical debulking    HX LAPAROTOMY  5/2016    resection ileocolic anastomosis, leak, ileostomy    HX OTHER SURGICAL  11/2015    tunneled portacath      Family History   Problem Relation Age of Onset    Cancer Maternal Uncle      skin    Hypertension Mother     High Cholesterol Mother     Anxiety Mother     Heart Disease Mother     High Cholesterol Father     Hypertension Father     Stroke Father     Arthritis-osteo Sister     Migraines Sister     Other Sister      FIBROMYALGIA    Depression Brother     Other Brother      DRUG ABUSE HEPATITIS C    Migraines Sister     Arrhythmia Sister     Depression Sister     Lung Disease Paternal Aunt      COPD    Cancer Paternal Uncle      LUNG    Lung Disease Paternal Uncle      COPD    Lung Disease Maternal Grandmother      COPD    Anesth Problems Neg Hx       History reviewed, no pertinent family history.   Social History   Substance Use Topics    Smoking status: Never Smoker    Smokeless tobacco: Never Used    Alcohol use No     Allergies   Allergen Reactions    Ativan [Lorazepam] Other (comments)     SEVERE CONFUSION      Current Facility-Administered Medications   Medication Dose Route Frequency    HYDROmorphone (DILAUDID) tablet 4 mg  4 mg Oral Q4H PRN    acetaminophen (TYLENOL) solution 500 mg  500 mg Oral Q4H PRN    scopolamine (TRANSDERM-SCOP) 1.5 mg  1.5 mg TransDERmal Q72H    HYDROmorphone (PF) (DILAUDID) injection 2 mg  2 mg IntraVENous Q1H PRN    apixaban (ELIQUIS) tablet 2.5 mg  2.5 mg Oral BID    fentaNYL (DURAGESIC) 100 mcg/hr patch 1 Patch  1 Patch TransDERmal Q72H    metoprolol tartrate (LOPRESSOR) tablet 50 mg  50 mg Oral BID    morphine 10 mg/5 mL oral solution 20 mg  20 mg Oral Q4H PRN    pantoprazole (PROTONIX) granules for oral suspension 40 mg  40 mg Oral BID    sucralfate (CARAFATE) 100 mg/mL oral suspension 0.5 g  0.5 g Oral QID    zolpidem (AMBIEN) tablet 10 mg  10 mg Oral QHS PRN    dextrose 5 % - 0.45% NaCl 1,000 mL with potassium chloride 10 mEq, magnesium sulfate 1 g, thiamine 100 mg, mvi, adult no. 4 with vit K 10 mL infusion   IntraVENous CONTINUOUS    metoclopramide HCl (REGLAN) injection 10 mg  10 mg IntraVENous Q6H    sodium chloride (NS) flush 5-10 mL  5-10 mL IntraVENous Q8H    sodium chloride (NS) flush 5-10 mL  5-10 mL IntraVENous PRN    naloxone (NARCAN) injection 0.4 mg  0.4 mg IntraVENous PRN    diphenhydrAMINE (BENADRYL) injection 12.5 mg  12.5 mg IntraVENous Q4H PRN    magnesium hydroxide (MILK OF MAGNESIA) 400 mg/5 mL oral suspension 30 mL  30 mL Oral DAILY PRN    prochlorperazine (COMPAZINE) with saline injection 10 mg  10 mg IntraVENous Q6H PRN    promethazine (PHENERGAN) 25 mg in 0.9% sodium chloride 50 mL IVPB  25 mg IntraVENous Q8H PRN     Facility-Administered Medications Ordered in Other Encounters   Medication Dose Route Frequency    [START ON 8/15/2017] dexamethasone (DECADRON) 4 mg/mL injection 8 mg  8 mg IntraVENous ONCE    [START ON 8/15/2017] famotidine (PF) (PEPCID) 20 mg in sodium chloride 0.9 % 10 mL injection  20 mg IntraVENous ONCE    [START ON 8/15/2017] fosaprepitant (EMEND) 150 mg in 0.9% sodium chloride 150 mL IVPB  150 mg IntraVENous ONCE    [START ON 8/15/2017] gemcitabine (GEMZAR) 1,264 mg in 0.9% sodium chloride 250 mL, overfill volume 25 mL chemo infusion  1,264 mg IntraVENous ONCE    [START ON 8/15/2017] granisetron (KYTRIL) injection 1 mg  1 mg IntraVENous ONCE    [START ON 8/15/2017] sodium chloride 0.9 % bolus infusion 2,000 mL  2,000 mL IntraVENous ONCE    [START ON 8/15/2017] bevacizumab (AVASTIN) 840 mg in 0.9% sodium chloride 100 mL, overfill volume 10 mL IVPB  840 mg IntraVENous ONCE LAB AND IMAGING FINDINGS:     Lab Results   Component Value Date/Time    WBC 6.7 08/11/2017 09:45 AM    HGB 8.1 08/11/2017 09:45 AM    PLATELET 647 89/11/4763 09:45 AM     Lab Results   Component Value Date/Time    Sodium 135 08/11/2017 09:45 AM    Potassium 3.6 08/11/2017 09:45 AM    Chloride 102 08/11/2017 09:45 AM    CO2 27 08/11/2017 09:45 AM    BUN 11 08/11/2017 09:45 AM    Creatinine 0.95 08/11/2017 09:45 AM    Calcium 8.6 08/11/2017 09:45 AM    Magnesium 1.9 08/09/2017 07:30 PM    Phosphorus 3.6 05/08/2017 04:53 AM      Lab Results   Component Value Date/Time    AST (SGOT) 22 08/11/2017 09:45 AM    Alk. phosphatase 170 08/11/2017 09:45 AM    Protein, total 6.3 08/11/2017 09:45 AM    Albumin 2.8 08/11/2017 09:45 AM    Globulin 3.5 08/11/2017 09:45 AM     Lab Results   Component Value Date/Time    INR 1.1 05/08/2017 10:42 AM    Prothrombin time 11.2 05/08/2017 10:42 AM    aPTT 28.9 05/13/2016 05:39 AM      No results found for: IRON, FE, TIBC, IBCT, PSAT, FERR   No results found for: PH, PCO2, PO2  No components found for: GLPOC   No results found for: CPK, CKMB             Total time:   Counseling / coordination time, spent as noted above:   > 50% counseling / coordination?:     Prolonged service was provided for  []30 min   []75 min in face to face time in the presence of the patient, spent as noted above. Time Start:   Time End:   Note: this can only be billed with 03032 (initial) or 33927 (follow up). If multiple start / stop times, list each separately.

## 2017-08-11 NOTE — CDMP QUERY
Please clarify if this patient is being treated/managed for:    =>Pneumonia POA in the setting of SBO, pain,   fever and CT showing right middle lobe and in the right lower lobe there are areas of  airspace process treated with IV zosyn and 1L NS bolus   =>Other Explanation of clinical findings  =>Unable to Determine (no explanation of clinical findings)    The medical record reflects the following clinical findings, treatment, and risk factors:  Risk Factors: 62 yo F admitted with SBO   Clinical Indicators: 8/10   Temp 102.4  pulse 97   8/10 CT abd/pelvis shows \" In the right middle lobe and in the right lower lobe there are areas of  airspace process not seen previously. These may be areas of developing  pneumonia. \"   Treatment: IV zosyn and 1L NS bolus     Please clarify and document your clinical opinion in the progress notes and discharge summary including the definitive and/or presumptive diagnosis, (suspected or probable), related to the above clinical findings. Please include clinical findings supporting your diagnosis.       Thank you for your time   [facility] RN/BSN, [address]  Desk:   [phone]   Other:  [phone]

## 2017-08-11 NOTE — PROGRESS NOTES
Bedside shift change report given to Jon De La Cruz RN (oncoming nurse) by Christy Conteh (offgoing nurse). Report included the following information SBAR, Procedure Summary, Intake/Output, MAR and Accordion.

## 2017-08-11 NOTE — DISCHARGE SUMMARY
[address], [address]  [phone]  Freya Mercy, MD Claudeen Lute, MD Dianne E. Raelene Shorten, HAYDER      Discharge Summary  Patient ID:  Alta Rodriguez  [id number]  61 y.o.  1960    Admit date: 8/9/2017    PCP: Avelino Read MD    Admit MD: Hector Pop., MD    Discharge MD: Dr. Claudeen Lute    Discharge date and time: No discharge date for patient encounter.     Admission Diagnoses:     sbo  Bowel obstruction (HCC)  Nausea & vomiting  Ovarian cancer (HCC)  Carcinomatosis (HCC)  Febrile illness  Bowel obstruction (HCC)  Nausea & vomiting  Ovarian cancer [facility])  Patient Active Problem List   Diagnosis Code    Malignant neoplasm of both ovaries ([facility] C56.1, C56.2    Carcinomatosis peritonei ([facility] [facility] C78.6, C80.1    Small bowel obstruction (HCC) K56.69    Ileostomy in place ([facility] [address] [facility] Z93.2    Anemia due to chemotherapy D64.81, T45.1X5A    CINV (chemotherapy-induced nausea and vomiting) R11.2, T45.1X5A    Cancer associated pain G89.3    Generalized abdominal pain R10.84    Anxiety about health F41.8    Ileus (HCC) K56.7    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    Hx of pulmonary embolus Z86.711    Dehydration E86.0    Chemotherapy-induced neutropenia (HCC) D70.1    Anticoagulation adequate with anticoagulant therapy Z79.01    Counseling regarding end of life decision making Z71.89    Bowel obstruction (HCC) K56.60    Protein calorie malnutrition (HCC) E46    Malignant ascites R18.0    Hydronephrosis of right kidney N13.30    Isolated proteinuria R80.0    Erosive esophagitis K22.10    Ulcerative esophagitis K22.10    Hypomagnesemia E83.42    Abdominal pain R10.9    Hydronephrosis of left kidney N13.30    Ovarian cancer (HCC) C56.9    Nausea & vomiting R11.2    Carcinomatosis (HCC) C80.0    Intractable vomiting with nausea R11.2    Febrile illness R50.9       Discharge Diagnoses:    Resolved bowel obstruction  Patient Active Problem List   Diagnosis Code    Malignant neoplasm of both ovaries ([facility] [address] C56.1, C56.2    Carcinomatosis peritonei ([facility] [address] C78.6, C80.1    Small bowel obstruction (HCC) K56.69    Ileostomy in place ([facility] [address] Z93.2    Anemia due to chemotherapy D64.81, T45.1X5A    CINV (chemotherapy-induced nausea and vomiting) R11.2, T45.1X5A    Cancer associated pain G89.3    Generalized abdominal pain R10.84    Anxiety about health F41.8    Ileus (HCC) K56.7    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    Hx of pulmonary embolus Z86.711    Dehydration E86.0    Chemotherapy-induced neutropenia (HCC) D70.1    Anticoagulation adequate with anticoagulant therapy Z79.01    Counseling regarding end of life decision making Z71.89    Bowel obstruction (HCC) K56.60    Protein calorie malnutrition (HCC) E46    Malignant ascites R18.0    Hydronephrosis of right kidney N13.30    Isolated proteinuria R80.0    Erosive esophagitis K22.10    Ulcerative esophagitis K22.10    Hypomagnesemia E83.42    Abdominal pain R10.9    Hydronephrosis of left kidney N13.30    Ovarian cancer (HCC) C56.9    Nausea & vomiting R11.2    Carcinomatosis (HCC) C80.0    Intractable vomiting with nausea R11.2    Febrile illness R50.9           Hospital Course:   See daily notes for details. Pt called the office late on 8/9/17 stating for the last few days, she has been noticing increasing discomfort in her abdomen. She has not had nausea and thought since she had an appointment with Dr. Sandra Granados on 8/10, she could just wait until then. She said she woke on 8/9 with some pain and then went to lunch with a friend and after this, she noticed her ostomy was no longer putting out stool since 11AM that day and she was very nauseated and began to get clammy. She actually called when she was already in the car with her  for the drive down from [facility].    She had been admitted to [facility] several times for partial SBO in the past and all were resolved with consecutive management. We admitted her and we obtain a KUB that was inconclusive. Began IV hydration with MVI, IV Reglan, pain and nausea medication and she was continued on her Eliquis. She was made NPO. Palliative Medicine was consulted for assistance with pain management. Pt was placed on PCA (see their notes)  She was noted to have a temp of 102.4 at 0400 on 8/10. She was started on Zosyn for precautions and a CT was obtained with contrast. This showed no signes of infections, but did show some slowing at the area just before the ostomy. A second KUB was obtained (per radiologies recommendation) to see if the oral contrast was moving. At this point it showed persistent small bowel air-fluid levels in midabdomen. After KUB though, her ostomy began putting out stool and gas and pt was not nauseated anymore. She said her pain began to ease up as well. The following morning, she continued to feel better and with without nausea or temp. We began a GI lite diet and monitored her throughout the day. Palliative had made some pain medication adjustments and pt was pleased with this. She was discharged home in good condition and will follow up as scheduled. We will continue with current chemo and follow closely    Pathology:None    Consults: Palliative Medicine    Significant Diagnostic Studies:  Lab Results   Component Value Date/Time    WBC 6.7 08/11/2017 09:45 AM    ABS.  NEUTROPHILS 4.8 08/11/2017 09:45 AM    HGB 8.1 08/11/2017 09:45 AM    HCT 25.3 08/11/2017 09:45 AM    MCV 95.5 08/11/2017 09:45 AM    MCH 30.6 08/11/2017 09:45 AM    PLATELET 727 67/83/4262 09:45 AM     Lab Results   Component Value Date/Time    Sodium 135 08/11/2017 09:45 AM    Potassium 3.6 08/11/2017 09:45 AM    Chloride 102 08/11/2017 09:45 AM    CO2 27 08/11/2017 09:45 AM    Glucose 108 08/11/2017 09:45 AM    BUN 11 08/11/2017 09:45 AM    Creatinine 0.95 08/11/2017 09:45 AM    Calcium 8.6 08/11/2017 09:45 AM Albumin 2.8 08/11/2017 09:45 AM    Bilirubin, total 0.4 08/11/2017 09:45 AM    AST (SGOT) 22 08/11/2017 09:45 AM    ALT (SGPT) 49 08/11/2017 09:45 AM    Alk. phosphatase 170 08/11/2017 09:45 AM       Condition on Discharge: good    Disposition: home    Patient Instructions:   Current Discharge Medication List      START taking these medications    Details   HYDROmorphone (DILAUDID) 1 mg/mL liqd oral solution Take 4 mL by mouth every four (4) hours as needed. Max Daily Amount: 24 mg. Qty: 475 mL, Refills: 0      scopolamine (TRANSDERM-SCOP) 1.5 mg (1 mg over 3 days) pt3d 1 Patch by TransDERmal route every seventy-two (72) hours as needed. Qty: 10 Patch, Refills: 1         CONTINUE these medications which have NOT CHANGED    Details   fentaNYL (DURAGESIC) 100 mcg/hr PATCH 1 Patch by TransDERmal route every seventy-two (72) hours. Max Daily Amount: 1 Patch. Qty: 10 Patch, Refills: 0    Associated Diagnoses: Ovarian cancer, unspecified laterality ([address] Ileostomy in place [facility]); Carcinomatosis peritonei ([address] Cancer associated pain; Anxiety about health; Generalized abdominal pain; CINV (chemotherapy-induced nausea and vomiting); Chemotherapy-induced neutropenia ([address] Hyponatremia; Anemia due to chemotherapy      NEXIUM PACKET 40 mg granules for oral suspension two (2) times a day. metoclopramide (REGLAN) 5 mg/5 mL syrup Take 10 mL by mouth four (4) times daily as needed. Take before meals as needed  Qty: 500 mL, Refills: 3    Associated Diagnoses: Ovarian cancer, unspecified laterality ([address] Carcinomatosis peritonei ([address] Small bowel obstruction ([address] Cancer associated pain; Thrombosis of pelvic vein; Malignant ascites; Erosive esophagitis; Ulcerative esophagitis      promethazine (PHENERGAN) 6.25 mg/5 mL syrup Take 20 mL by mouth four (4) times daily as needed for Nausea. Qty: 400 mL, Refills: 4    Associated Diagnoses: Ovarian cancer, unspecified laterality ([address]  Carcinomatosis peritonei ([facility] [address] Small bowel obstruction ([facility] Cancer associated pain; Thrombosis of pelvic vein; Malignant ascites; Erosive esophagitis; Ulcerative esophagitis      magnesium oxide (MAG-OX) 400 mg tablet Take 1 Tab by mouth two (2) times a day. Indications: HYPOMAGNESEMIA  Qty: 60 Tab, Refills: 2    Associated Diagnoses: Ovarian cancer, unspecified laterality ([facility] Hypomagnesemia      polyethylene glycol (MIRALAX) 17 gram/dose powder Take 17 g by mouth daily as needed. Qty: 238 g, Refills: 3      apixaban (ELIQUIS) 2.5 mg tablet Take 1 Tab by mouth two (2) times a day. Qty: 60 Tab, Refills: 6      zolpidem (AMBIEN) 10 mg tablet Take 1 Tab by mouth nightly as needed for Sleep. Max Daily Amount: 10 mg.  Qty: 30 Tab, Refills: 1    Associated Diagnoses: Ovarian cancer, unspecified laterality (HCC)      ondansetron (ZOFRAN ODT) 4 mg disintegrating tablet Take 1 Tab by mouth every eight (8) hours as needed for Nausea. Qty: 30 Tab, Refills: 2    Associated Diagnoses: Ovarian cancer, unspecified laterality ([facility] Generalized abdominal pain; CINV (chemotherapy-induced nausea and vomiting); Cancer associated pain; Anxiety about health; Carcinomatosis peritonei ([facility] Ileostomy in place [facility])      multivit-min-FA-coenzyme Q10 (AQUADEKS) 100-350-5 mcg-mcg-mg chew Take 1 Tab by mouth daily. Indications: VITAMIN DEFICIENCY PREVENTION  Qty: 60 Tab, Refills: 1    Comments: Substitution to equivalent ok      Calcium-Vitamin D3-Vitamin K 362-328-56 mg-unit-mcg chew Take 1 Tab by mouth two (2) times a day. metoprolol (LOPRESSOR) 100 mg tablet Take 50 mg by mouth two (2) times a day. lidocaine-prilocaine (EMLA) topical cream Apply small amount over port area and cover with a band aid 1 hour before chemo treatment  Qty: 30 g, Refills: 3      multivitamin (ONE A DAY) tablet Take 1 Tab by mouth daily. sucralfate (CARAFATE) 100 mg/mL suspension Take 5 mL by mouth four (4) times daily.   Qty: 414 mL, Refills: 3      Cetirizine (ZYRTEC) 10 mg cap Take  by mouth. STOP taking these medications       morphine 10 mg/5 mL oral solution Comments:   Reason for Stopping:         esomeprazole (NEXIUM) 40 mg capsule Comments:   Reason for Stopping:             Activity: no drivingr while on analgesics, no heavy lifting   Walk four or more times daily  Showers okay  Stool softner for constipation  Diet: Resume pre-admission diet and follow up with Gastroenterologist as scheduled. Encourage fluids      Follow-up with Dr. Kel Sanabria in 2-3 weeks or sooner if needed.   Call to make the appointment     Angie Rice NP

## 2017-08-11 NOTE — PROGRESS NOTES
Problem: Falls - Risk of  Goal: *Absence of Falls  Document Tyree Fall Risk and appropriate interventions in the flowsheet.    Outcome: Progressing Towards Goal  Fall Risk Interventions:              Medication Interventions: Patient to call before getting OOB, Teach patient to arise slowly

## 2017-08-15 ENCOUNTER — HOSPITAL ENCOUNTER (OUTPATIENT)
Dept: INFUSION THERAPY | Age: 57
Discharge: HOME | End: 2017-08-15
Payer: OTHER GOVERNMENT

## 2017-08-15 VITALS
DIASTOLIC BLOOD PRESSURE: 72 MMHG | BODY MASS INDEX: 19.63 KG/M2 | TEMPERATURE: 98 F | HEIGHT: 63 IN | HEART RATE: 70 BPM | RESPIRATION RATE: 16 BRPM | WEIGHT: 110.8 LBS | SYSTOLIC BLOOD PRESSURE: 160 MMHG

## 2017-08-15 DIAGNOSIS — C78.6 CARCINOMATOSIS PERITONEI (HCC): ICD-10-CM

## 2017-08-15 DIAGNOSIS — F41.8 ANXIETY ABOUT HEALTH: ICD-10-CM

## 2017-08-15 DIAGNOSIS — C56.9 OVARIAN CANCER, UNSPECIFIED LATERALITY (HCC): ICD-10-CM

## 2017-08-15 DIAGNOSIS — R10.84 GENERALIZED ABDOMINAL PAIN: ICD-10-CM

## 2017-08-15 DIAGNOSIS — T45.1X5A CHEMOTHERAPY-INDUCED NEUTROPENIA (HCC): ICD-10-CM

## 2017-08-15 DIAGNOSIS — D70.1 CHEMOTHERAPY-INDUCED NEUTROPENIA (HCC): ICD-10-CM

## 2017-08-15 DIAGNOSIS — Z93.2 ILEOSTOMY IN PLACE (HCC): ICD-10-CM

## 2017-08-15 DIAGNOSIS — G89.3 CANCER ASSOCIATED PAIN: ICD-10-CM

## 2017-08-15 DIAGNOSIS — E87.1 HYPONATREMIA: ICD-10-CM

## 2017-08-15 DIAGNOSIS — C56.3 MALIGNANT NEOPLASM OF BOTH OVARIES (HCC): Primary | ICD-10-CM

## 2017-08-15 DIAGNOSIS — D64.81 ANEMIA DUE TO CHEMOTHERAPY: ICD-10-CM

## 2017-08-15 DIAGNOSIS — T45.1X5A ANEMIA DUE TO CHEMOTHERAPY: ICD-10-CM

## 2017-08-15 DIAGNOSIS — T45.1X5A CINV (CHEMOTHERAPY-INDUCED NAUSEA AND VOMITING): ICD-10-CM

## 2017-08-15 DIAGNOSIS — R11.2 CINV (CHEMOTHERAPY-INDUCED NAUSEA AND VOMITING): ICD-10-CM

## 2017-08-15 LAB
ALBUMIN SERPL BCP-MCNC: 2.9 G/DL (ref 3.5–5)
ALBUMIN SERPL BCP-MCNC: 3.7 G/DL (ref 3.5–5)
ALBUMIN/GLOB SERPL: 0.9 {RATIO} (ref 1.1–2.2)
ALBUMIN/GLOB SERPL: 0.9 {RATIO} (ref 1.1–2.2)
ALP SERPL-CCNC: 164 U/L (ref 45–117)
ALP SERPL-CCNC: 203 U/L (ref 45–117)
ALT SERPL-CCNC: 25 U/L (ref 12–78)
ALT SERPL-CCNC: 30 U/L (ref 12–78)
ANION GAP BLD CALC-SCNC: 10 MMOL/L (ref 5–15)
ANION GAP BLD CALC-SCNC: 8 MMOL/L (ref 5–15)
APPEARANCE UR: CLEAR
AST SERPL W P-5'-P-CCNC: 15 U/L (ref 15–37)
AST SERPL W P-5'-P-CCNC: 22 U/L (ref 15–37)
BACTERIA URNS QL MICRO: NEGATIVE /HPF
BASOPHILS # BLD AUTO: 0 K/UL (ref 0–0.1)
BASOPHILS # BLD AUTO: 0 K/UL (ref 0–0.1)
BASOPHILS # BLD: 0 % (ref 0–1)
BASOPHILS # BLD: 0 % (ref 0–1)
BILIRUB SERPL-MCNC: 0.1 MG/DL (ref 0.2–1)
BILIRUB SERPL-MCNC: 0.2 MG/DL (ref 0.2–1)
BILIRUB UR QL: NEGATIVE
BUN SERPL-MCNC: 11 MG/DL (ref 6–20)
BUN SERPL-MCNC: 12 MG/DL (ref 6–20)
BUN/CREAT SERPL: 12 (ref 12–20)
BUN/CREAT SERPL: 15 (ref 12–20)
CALCIUM SERPL-MCNC: 10.2 MG/DL (ref 8.5–10.1)
CALCIUM SERPL-MCNC: 8.6 MG/DL (ref 8.5–10.1)
CHLORIDE SERPL-SCNC: 104 MMOL/L (ref 97–108)
CHLORIDE SERPL-SCNC: 97 MMOL/L (ref 97–108)
CO2 SERPL-SCNC: 29 MMOL/L (ref 21–32)
CO2 SERPL-SCNC: 29 MMOL/L (ref 21–32)
COLOR UR: ABNORMAL
CREAT SERPL-MCNC: 0.74 MG/DL (ref 0.55–1.02)
CREAT SERPL-MCNC: 0.98 MG/DL (ref 0.55–1.02)
DIFFERENTIAL METHOD BLD: ABNORMAL
EOSINOPHIL # BLD: 0 K/UL (ref 0–0.4)
EOSINOPHIL # BLD: 0 K/UL (ref 0–0.4)
EOSINOPHIL NFR BLD: 0 % (ref 0–7)
EOSINOPHIL NFR BLD: 0 % (ref 0–7)
EPITH CASTS URNS QL MICRO: ABNORMAL /LPF
ERYTHROCYTE [DISTWIDTH] IN BLOOD BY AUTOMATED COUNT: 16.4 % (ref 11.5–14.5)
ERYTHROCYTE [DISTWIDTH] IN BLOOD BY AUTOMATED COUNT: 16.5 % (ref 11.5–14.5)
GLOBULIN SER CALC-MCNC: 3.2 G/DL (ref 2–4)
GLOBULIN SER CALC-MCNC: 4.1 G/DL (ref 2–4)
GLUCOSE SERPL-MCNC: 97 MG/DL (ref 65–100)
GLUCOSE SERPL-MCNC: 98 MG/DL (ref 65–100)
GLUCOSE UR STRIP.AUTO-MCNC: NEGATIVE MG/DL
HCT VFR BLD AUTO: 27.1 % (ref 35–47)
HCT VFR BLD AUTO: 31.4 % (ref 35–47)
HGB BLD-MCNC: 10.2 G/DL (ref 11.5–16)
HGB BLD-MCNC: 8.6 G/DL (ref 11.5–16)
HGB UR QL STRIP: NEGATIVE
HYALINE CASTS URNS QL MICRO: ABNORMAL /LPF (ref 0–5)
KETONES UR QL STRIP.AUTO: NEGATIVE MG/DL
LEUKOCYTE ESTERASE UR QL STRIP.AUTO: NEGATIVE
LYMPHOCYTES # BLD AUTO: 24 % (ref 12–49)
LYMPHOCYTES # BLD AUTO: 26 % (ref 12–49)
LYMPHOCYTES # BLD: 1.9 K/UL (ref 0.8–3.5)
LYMPHOCYTES # BLD: 2.5 K/UL (ref 0.8–3.5)
MCH RBC QN AUTO: 30.1 PG (ref 26–34)
MCH RBC QN AUTO: 30.5 PG (ref 26–34)
MCHC RBC AUTO-ENTMCNC: 31.7 G/DL (ref 30–36.5)
MCHC RBC AUTO-ENTMCNC: 32.5 G/DL (ref 30–36.5)
MCV RBC AUTO: 94 FL (ref 80–99)
MCV RBC AUTO: 94.8 FL (ref 80–99)
MONOCYTES # BLD: 0.6 K/UL (ref 0–1)
MONOCYTES # BLD: 0.6 K/UL (ref 0–1)
MONOCYTES NFR BLD AUTO: 6 % (ref 5–13)
MONOCYTES NFR BLD AUTO: 7 % (ref 5–13)
NEUTS SEG # BLD: 5.6 K/UL (ref 1.8–8)
NEUTS SEG # BLD: 6.6 K/UL (ref 1.8–8)
NEUTS SEG NFR BLD AUTO: 68 % (ref 32–75)
NEUTS SEG NFR BLD AUTO: 69 % (ref 32–75)
NITRITE UR QL STRIP.AUTO: NEGATIVE
PH UR STRIP: 6.5 [PH] (ref 5–8)
PLATELET # BLD AUTO: 473 K/UL (ref 150–400)
PLATELET # BLD AUTO: 610 K/UL (ref 150–400)
PLATELET COMMENTS,PCOM: ABNORMAL
POTASSIUM SERPL-SCNC: 4.1 MMOL/L (ref 3.5–5.1)
POTASSIUM SERPL-SCNC: 4.1 MMOL/L (ref 3.5–5.1)
PROT SERPL-MCNC: 6.1 G/DL (ref 6.4–8.2)
PROT SERPL-MCNC: 7.8 G/DL (ref 6.4–8.2)
PROT UR STRIP-MCNC: 30 MG/DL
RBC # BLD AUTO: 2.86 M/UL (ref 3.8–5.2)
RBC # BLD AUTO: 3.34 M/UL (ref 3.8–5.2)
RBC #/AREA URNS HPF: ABNORMAL /HPF (ref 0–5)
RBC MORPH BLD: ABNORMAL
SODIUM SERPL-SCNC: 136 MMOL/L (ref 136–145)
SODIUM SERPL-SCNC: 141 MMOL/L (ref 136–145)
SP GR UR REFRACTOMETRY: 1.02 (ref 1–1.03)
UA: UC IF INDICATED,UAUC: ABNORMAL
UROBILINOGEN UR QL STRIP.AUTO: 0.2 EU/DL (ref 0.2–1)
WBC # BLD AUTO: 8.1 K/UL (ref 3.6–11)
WBC # BLD AUTO: 9.7 K/UL (ref 3.6–11)
WBC MORPH BLD: ABNORMAL
WBC URNS QL MICRO: ABNORMAL /HPF (ref 0–4)

## 2017-08-15 PROCEDURE — 36591 DRAW BLOOD OFF VENOUS DEVICE: CPT

## 2017-08-15 PROCEDURE — 80053 COMPREHEN METABOLIC PANEL: CPT | Performed by: NURSE PRACTITIONER

## 2017-08-15 PROCEDURE — 74011250636 HC RX REV CODE- 250/636: Performed by: OBSTETRICS & GYNECOLOGY

## 2017-08-15 PROCEDURE — 77030012965 HC NDL HUBR BBMI -A

## 2017-08-15 PROCEDURE — 36415 COLL VENOUS BLD VENIPUNCTURE: CPT | Performed by: NURSE PRACTITIONER

## 2017-08-15 PROCEDURE — 96413 CHEMO IV INFUSION 1 HR: CPT

## 2017-08-15 PROCEDURE — 74011250636 HC RX REV CODE- 250/636: Performed by: NURSE PRACTITIONER

## 2017-08-15 PROCEDURE — 96367 TX/PROPH/DG ADDL SEQ IV INF: CPT

## 2017-08-15 PROCEDURE — 74011000258 HC RX REV CODE- 258: Performed by: OBSTETRICS & GYNECOLOGY

## 2017-08-15 PROCEDURE — 74011250636 HC RX REV CODE- 250/636

## 2017-08-15 PROCEDURE — 96361 HYDRATE IV INFUSION ADD-ON: CPT

## 2017-08-15 PROCEDURE — 96375 TX/PRO/DX INJ NEW DRUG ADDON: CPT

## 2017-08-15 PROCEDURE — 74011000250 HC RX REV CODE- 250: Performed by: OBSTETRICS & GYNECOLOGY

## 2017-08-15 PROCEDURE — 96417 CHEMO IV INFUS EACH ADDL SEQ: CPT

## 2017-08-15 PROCEDURE — 81001 URINALYSIS AUTO W/SCOPE: CPT | Performed by: OBSTETRICS & GYNECOLOGY

## 2017-08-15 PROCEDURE — 85025 COMPLETE CBC W/AUTO DIFF WBC: CPT | Performed by: NURSE PRACTITIONER

## 2017-08-15 RX ORDER — SODIUM CHLORIDE 0.9 % (FLUSH) 0.9 %
5-10 SYRINGE (ML) INJECTION AS NEEDED
Status: ACTIVE | OUTPATIENT
Start: 2017-08-15 | End: 2017-08-16

## 2017-08-15 RX ORDER — METOCLOPRAMIDE HYDROCHLORIDE 5 MG/ML
10 INJECTION INTRAMUSCULAR; INTRAVENOUS ONCE
Status: COMPLETED | OUTPATIENT
Start: 2017-08-15 | End: 2017-08-15

## 2017-08-15 RX ORDER — FENTANYL 100 UG/H
1 PATCH TRANSDERMAL
Qty: 10 PATCH | Refills: 0 | Status: SHIPPED | OUTPATIENT
Start: 2017-08-15 | End: 2017-09-12 | Stop reason: SDUPTHER

## 2017-08-15 RX ORDER — SODIUM CHLORIDE 9 MG/ML
10 INJECTION INTRAMUSCULAR; INTRAVENOUS; SUBCUTANEOUS AS NEEDED
Status: ACTIVE | OUTPATIENT
Start: 2017-08-15 | End: 2017-08-16

## 2017-08-15 RX ORDER — HYDROMORPHONE HYDROCHLORIDE 5 MG/5ML
4 SOLUTION ORAL
Qty: 475 ML | Refills: 0 | Status: SHIPPED | OUTPATIENT
Start: 2017-08-15 | End: 2017-12-14 | Stop reason: SDUPTHER

## 2017-08-15 RX ORDER — HEPARIN 100 UNIT/ML
500 SYRINGE INTRAVENOUS AS NEEDED
Status: ACTIVE | OUTPATIENT
Start: 2017-08-15 | End: 2017-08-16

## 2017-08-15 RX ADMIN — SODIUM CHLORIDE 10 ML: 9 INJECTION INTRAMUSCULAR; INTRAVENOUS; SUBCUTANEOUS at 10:05

## 2017-08-15 RX ADMIN — BEVACIZUMAB 840 MG: 100 INJECTION, SOLUTION INTRAVENOUS at 15:07

## 2017-08-15 RX ADMIN — FAMOTIDINE 20 MG: 10 INJECTION INTRAVENOUS at 13:31

## 2017-08-15 RX ADMIN — SODIUM CHLORIDE 2000 ML: 900 INJECTION, SOLUTION INTRAVENOUS at 10:12

## 2017-08-15 RX ADMIN — DEXAMETHASONE SODIUM PHOSPHATE 8 MG: 4 INJECTION, SOLUTION INTRA-ARTICULAR; INTRALESIONAL; INTRAMUSCULAR; INTRAVENOUS; SOFT TISSUE at 13:34

## 2017-08-15 RX ADMIN — METOCLOPRAMIDE 10 MG: 5 INJECTION, SOLUTION INTRAMUSCULAR; INTRAVENOUS at 10:13

## 2017-08-15 RX ADMIN — SODIUM CHLORIDE 150 MG: 900 INJECTION, SOLUTION INTRAVENOUS at 13:39

## 2017-08-15 RX ADMIN — Medication 10 ML: at 10:05

## 2017-08-15 RX ADMIN — SODIUM CHLORIDE 1264 MG: 900 INJECTION, SOLUTION INTRAVENOUS at 14:16

## 2017-08-15 RX ADMIN — GRANISETRON HYDROCHLORIDE 1 MG: 1 INJECTION INTRAVENOUS at 13:28

## 2017-08-15 NOTE — PROGRESS NOTES
[facility] - Chemotherapy Progress Note    1000 Pt admit to [facility] for Gemzar/Avastin C5D1 ambulatory in stable condition. Assessment completed. Pt states she doesn't feel too good; discharged from hospital on Friday, inpatient for partial bowel obstruction; stated treatment consisted of fluids and bowel rest.  No new concerns voiced. Port accessed with positive blood return. Labs drawn per order and sent. Line flushed, hydration infusing. 1130 Labs resulted; approached by HAYDER Briones, labs out of range when compared to last weeks results; orders to re-collect labs; discussed w/ pt, verbalized understanding, labs re-collected and sent for processing    1250 Labs resulted; Called and notified HAYDER Briones of re-collected lab results, WNL, okay to proceed w/ treatment; chemo ordered, pre-meds given     Visit Vitals    /72    Pulse 70    Temp 98 °F (36.7 °C)    Resp 16    Ht 5' 2.99\" (1.6 m)    Wt 50.3 kg (110 lb 12.8 oz)    BMI 19.63 kg/m2       Medications:  Hydration 2L  Emend 150 mg  Pepcid 20 mg  Kytril 1 mg  Decadron 8 mg  Gemzar  Avastin    1600 Pt tolerated treatment well. Port maintained positive blood return throughout treatment, flushed with positive blood return at conclusion, heparinized and de-accessed. D/c home ambulatory in no distress. Pt aware of next [facility] appointment scheduled for 08/22/2017. Please review labs in CC.

## 2017-08-15 NOTE — PROGRESS NOTES
Ally Davalos. Wall, NP    Infusion Date: 8/15/2017     MAHSA Estrella is a 61 y.o.  postmenopausal female with a diagnosis of stage IV ovarian cancer. She underwent exploratory laparotomy with suboptimal debulking. She had extensive disease. She was readmitted about a week later with obstruction and subsequently had a cecal perforation, requiring resection, end ileostomy, and mucous fistula. During the hospitalization she was also diagnosed with pulmonary embolus and had chest tubes placed for bilateral pleural effusions. She had a prolonged hospital course and actually received a dose of cisplatin chemotherapy while in the hospital to help dry up the effusions. She completed 6 cycles of Taxol/Carboplatin chemotherapy following her initial debulking. She did relatively well, considering she had an ileostomy to deal with during chemotherapy. I took her to the OR on 4/29/16 for interval debulking and reversal of her ileostomy. She had extensive disease, but we were able to resect the majority of her disease. One week later she developed an anastomotic leak requiring reexploration and recreation of an ileostomy. She had another prolonged hospitalization, but recovered well since surgery. We then reinitiated Taxol/Carboplatin chemotherapy, but did reduce the dose of Taxol to 135 mg/m2. She completed 4 mores cycles. Her CA-125 normalized and we stopped treatment.      On 2/7, she presented to Dr. Deborah Kumari office with her  to discuss her follow up/surveillance CT scan. Unfortunately, it demonstrated progression of disease, although minimally worse than her prior scan 2 months previously. She was actually feeling better than she did at that time. After review of scans and disease, the decision was made to begin Doxil/Avastin Q28 days for 6 cycles. She began this on 2/27/2017.  In April 2017, she had completed 3 cycles of Doxil/Avastin and had CT scans at that time showed she had evidence of recurrent disease. She was switched at that time to [facility]. She has been admitted to [facility] several times for partial SBO and all have resolved with consecutive management           Subjective:   Pt presents today for cycle 5 D1 of Gemzar/Avastin. Pt was admitted for 2 days last week with partial bowel obstruction that was able to be alleviated with conservative measures. She says her ostomy has been funtioning well since discharge, but suddenly last night (1AM this morning) pt was awoken with \"severe abd pain\". She said it was sudden and then   Pt has been tolerating this regimen \"failry well\" with needing assistance of supplemental hydration   She said her pain seems to be a little worse on some days, but better other's. Says current pain management is holding her. Her CA-125 had been going down (65 7/20 from 95.5 on 6/28 and 102 on 6/8), but unfortunately, the 8/9 draw was noted to have increased some to 83. Pt has had more pain lately and while she had been in the hospital, Palliative Medicine was consulted and made some recommendations for her and she was discharged on Dilaudid elixir and pt says this with the Fentanyl patch, has help mange her pain better. Review of Systems:  General: wt is noted to be down a kilogram from 8/9 even though she continues with nutritional supplements  HEENT: Denies visual changes, dysphagia or headache  Resp: Denies SOB, DUDLEY, wheezing or cough  CV: Denies CP, palpitations  GI/: Acknowledges continued abd pain (see above) but better controlled with new regimen. Denies N/V at present (had some in the hospital). Managing it with Zofran. Continues with protein shakes.   She says ostomy is functioning well since discharge last Friday   MuskSkel: Denies muscle ache or joint pain  Neuro: Denies dizzyness or syncope    Objective:     Blood pressure 160/72, pulse 70, temperature 98 °F (36.7 °C), resp. rate 16, height 5' 2.99\" (1.6 m), weight 110 lb 12.8 oz (50.3 kg). Temp (24hrs), Av °F (36.7 °C), Min:98 °F (36.7 °C), Max:98 °F (36.7 °C)      _____________________  Physical Exam:     General: A&O X 3. With pleasant affect, but tired appearing. HEENT: Oral mucosa pink, slightly dry with no sores or lesions noted. No cervical adenopathy appreciated  Port site easily accessed without redness, tenderness or swelling  Cardiovascular: Regular without M/R/G  Lungs:CTA bilat without wheezing or rales  Abdomen: Soft with mild tenderness to palpation to left lower/mid abdomen and + bowel sounds throughout. Some mild firmness, unchanged, to left mid abd. Ext: + pedal pulses without edema bilat. bilat  Neuro: grossly intact      : 2017= 83 (2017= 65; 17=95.5;  17= 102; 17= 145.6:  = 100; 26=480.2 3/22=80; = 63.9)      Recent Results (from the past 12 hour(s))   CBC WITH AUTOMATED DIFF    Collection Time: 08/15/17 10:05 AM   Result Value Ref Range    WBC 9.7 3.6 - 11.0 K/uL    RBC 3.34 (L) 3.80 - 5.20 M/uL    HGB 10.2 (L) 11.5 - 16.0 g/dL    HCT 31.4 (L) 35.0 - 47.0 %    MCV 94.0 80.0 - 99.0 FL    MCH 30.5 26.0 - 34.0 PG    MCHC 32.5 30.0 - 36.5 g/dL    RDW 16.4 (H) 11.5 - 14.5 %    PLATELET 706 (H) 440 - 400 K/uL    NEUTROPHILS 68 32 - 75 %    LYMPHOCYTES 26 12 - 49 %    MONOCYTES 6 5 - 13 %    EOSINOPHILS 0 0 - 7 %    BASOPHILS 0 0 - 1 %    ABS. NEUTROPHILS 6.6 1.8 - 8.0 K/UL    ABS. LYMPHOCYTES 2.5 0.8 - 3.5 K/UL    ABS. MONOCYTES 0.6 0.0 - 1.0 K/UL    ABS. EOSINOPHILS 0.0 0.0 - 0.4 K/UL    ABS.  BASOPHILS 0.0 0.0 - 0.1 K/UL    DF SMEAR SCANNED      PLATELET COMMENTS LARGE PLATELETS      RBC COMMENTS ANISOCYTOSIS  1+        WBC COMMENTS REACTIVE LYMPHS     METABOLIC PANEL, COMPREHENSIVE    Collection Time: 08/15/17 10:05 AM   Result Value Ref Range    Sodium 136 136 - 145 mmol/L    Potassium 4.1 3.5 - 5.1 mmol/L    Chloride 97 97 - 108 mmol/L    CO2 29 21 - 32 mmol/L    Anion gap 10 5 - 15 mmol/L    Glucose 98 65 - 100 mg/dL    BUN 12 6 - 20 MG/DL    Creatinine 0.98 0.55 - 1.02 MG/DL    BUN/Creatinine ratio 12 12 - 20      GFR est AA >60 >60 ml/min/1.73m2    GFR est non-AA 59 (L) >60 ml/min/1.73m2    Calcium 10.2 (H) 8.5 - 10.1 MG/DL    Bilirubin, total 0.2 0.2 - 1.0 MG/DL    ALT (SGPT) 30 12 - 78 U/L    AST (SGOT) 22 15 - 37 U/L    Alk. phosphatase 203 (H) 45 - 117 U/L    Protein, total 7.8 6.4 - 8.2 g/dL    Albumin 3.7 3.5 - 5.0 g/dL    Globulin 4.1 (H) 2.0 - 4.0 g/dL    A-G Ratio 0.9 (L) 1.1 - 2.2     URINALYSIS W/ REFLEX CULTURE    Collection Time: 08/15/17 10:05 AM   Result Value Ref Range    Color YELLOW/STRAW      Appearance CLEAR CLEAR      Specific gravity 1.021 1.003 - 1.030      pH (UA) 6.5 5.0 - 8.0      Protein 30 (A) NEG mg/dL    Glucose NEGATIVE  NEG mg/dL    Ketone NEGATIVE  NEG mg/dL    Bilirubin NEGATIVE  NEG      Blood NEGATIVE  NEG      Urobilinogen 0.2 0.2 - 1.0 EU/dL    Nitrites NEGATIVE  NEG      Leukocyte Esterase NEGATIVE  NEG      WBC 0-4 0 - 4 /hpf    RBC 0-5 0 - 5 /hpf    Epithelial cells FEW FEW /lpf    Bacteria NEGATIVE  NEG /hpf    UA:UC IF INDICATED CULTURE NOT INDICATED BY UA RESULT CNI      Hyaline cast 0-2 0 - 5 /lpf   CBC WITH AUTOMATED DIFF    Collection Time: 08/15/17 11:56 AM   Result Value Ref Range    WBC 8.1 3.6 - 11.0 K/uL    RBC 2.86 (L) 3.80 - 5.20 M/uL    HGB 8.6 (L) 11.5 - 16.0 g/dL    HCT 27.1 (L) 35.0 - 47.0 %    MCV 94.8 80.0 - 99.0 FL    MCH 30.1 26.0 - 34.0 PG    MCHC 31.7 30.0 - 36.5 g/dL    RDW 16.5 (H) 11.5 - 14.5 %    PLATELET 636 (H) 630 - 400 K/uL    NEUTROPHILS 69 32 - 75 %    LYMPHOCYTES 24 12 - 49 %    MONOCYTES 7 5 - 13 %    EOSINOPHILS 0 0 - 7 %    BASOPHILS 0 0 - 1 %    ABS. NEUTROPHILS 5.6 1.8 - 8.0 K/UL    ABS. LYMPHOCYTES 1.9 0.8 - 3.5 K/UL    ABS. MONOCYTES 0.6 0.0 - 1.0 K/UL    ABS. EOSINOPHILS 0.0 0.0 - 0.4 K/UL    ABS.  BASOPHILS 0.0 0.0 - 0.1 K/UL   METABOLIC PANEL, COMPREHENSIVE    Collection Time: 08/15/17 11:56 AM   Result Value Ref Range    Sodium 141 136 - 145 mmol/L    Potassium 4.1 3.5 - 5.1 mmol/L    Chloride 104 97 - 108 mmol/L    CO2 29 21 - 32 mmol/L    Anion gap 8 5 - 15 mmol/L    Glucose 97 65 - 100 mg/dL    BUN 11 6 - 20 MG/DL    Creatinine 0.74 0.55 - 1.02 MG/DL    BUN/Creatinine ratio 15 12 - 20      GFR est AA >60 >60 ml/min/1.73m2    GFR est non-AA >60 >60 ml/min/1.73m2    Calcium 8.6 8.5 - 10.1 MG/DL    Bilirubin, total 0.1 (L) 0.2 - 1.0 MG/DL    ALT (SGPT) 25 12 - 78 U/L    AST (SGOT) 15 15 - 37 U/L    Alk. phosphatase 164 (H) 45 - 117 U/L    Protein, total 6.1 (L) 6.4 - 8.2 g/dL    Albumin 2.9 (L) 3.5 - 5.0 g/dL    Globulin 3.2 2.0 - 4.0 g/dL    A-G Ratio 0.9 (L) 1.1 - 2.2             PATHOLOGY from 5/10/2017 Upper Endoscopy:  1. Gastric polyp, polypectomy:   Benign polypoid gastric oxyntic mucosa with some features of hyperplastic polyp   2. Lower esophagus, biopsy:   Acute erosive esophagitis of squamous mucosa with ulceration and severe squamous atypia (see comment)   3. Proximal esophagus, biopsy:   Acute esophagitis of squamous mucosa with severe squamous atypia           Imaging:  CT Abd/Pelvis 5/7/2017:  FINDINGS:   LUNG BASES: Left lung base subpleural scarring is unchanged. No pneumonia. INCIDENTALLY IMAGED HEART AND MEDIASTINUM: Cardiac size is within normal limits. No pericardial effusion. Small hiatal hernia is unchanged. LIVER: Small segment 8 and segment 3 hypoenhancing liver lesions are unchanged. GALLBLADDER: Unremarkable. SPLEEN: No mass. PANCREAS: No mass or ductal dilatation. ADRENALS: Unremarkable. KIDNEYS: Right hydronephrosis is mild and new. Proximal right ureter is mildly  dilated. There is transition to nondilated ureter in the mid ureter. No renal  mass or left hydronephrosis. STOMACH: Unremarkable. SMALL BOWEL: Small bowel loops in the lower pelvis measuring up to 2.7 cm in  diameter.  Contrast extends through the small bowel into the right lower quadrant  ileostomy. Small bowel centralization is unchanged. COLON: Majority of the colon has been resected. Distal sigmoid colon and rectum  are nondistended. APPENDIX: Resected. PERITONEUM: Small volume of ascites is slightly increased. Subtle non masslike  enhancement of the peritoneum is unchanged. No pneumoperitoneum. RETROPERITONEUM: No lymphadenopathy. IVC filter is unchanged. REPRODUCTIVE ORGANS: Uterus is been resected. Ovaries have been resected. No  pelvic mass. URINARY BLADDER: No mass or calculus. BONES: No destructive bone lesion. ADDITIONAL COMMENTS: N/A     IMPRESSION  IMPRESSION:     1. New mild right hydronephrosis is likely secondary to ureteral obstruction in  the mid ureter. No ureteral calculus. 2. Mild small bowel distention may represent early partial small bowel  obstruction. No complete bowel obstruction. 3. Increased small volume of ascites. Unchanged non masslike peritoneal  enhancement. 4. Unchanged small liver lesions. CT scan of Abdomen/Pelvis with contrast 6/27/2017  FINDINGS:   LUNG BASES: Clear. INCIDENTALLY IMAGED HEART AND MEDIASTINUM: Unremarkable. LIVER: Small hypodense lesions, one in the left hepatic lobe and one in the  right hepatic lobe are stable. GALLBLADDER: Unremarkable. SPLEEN: No mass. PANCREAS: No mass or ductal dilatation. ADRENALS: Unremarkable. KIDNEYS: Hydronephrosis is moderate and stable on the right and mild to moderate  on the left and new, to the level of the mid ureters. There is no definite  obstructing calculus on either side. STOMACH: Unremarkable. SMALL BOWEL: Previously described small bowel centralization is stable. Small  bowel loops are dilated but roughly stable in appearance, with a maximum caliber  of 3.1 cm. Pattern of dilatation is similar to the prior study. Small bowel is  fluid-filled to the level of the ostomy in the right anterior abdominal wall.   COLON: Visualized colon shows no distention, but the colon is predominantly  absent. APPENDIX: Surgically absent  PERITONEUM: Small amount of ascites, stable to slightly decreased since the  prior study. RETROPERITONEUM: No lymphadenopathy or aortic aneurysm. IVC filter stable. REPRODUCTIVE ORGANS: Surgically absent. URINARY BLADDER: No mass or calculus. BONES: No destructive bone lesion. ADDITIONAL COMMENTS: N/A       IMPRESSION:     1. Mild to moderate small bowel distention with centralization of loops as  previously described, similar in appearance to prior study 5/7/2017.  2. Small to moderate amount of ascites, stable. 3. Postoperative changes as described above, stable. 4. Stable hypodense liver lesions. 5. Stable right hydronephrosis to the mid ureter without obvious obstructing  calculus.   6. New left hydronephrosis to the mid ureter without obvious obstructing  calculus.       Assessment:     Patient Active Problem List   Diagnosis Code    Malignant neoplasm of both ovaries ([facility] [address] C56.1, C56.2    Carcinomatosis peritonei ([facility] [address] C78.6, C80.1    Small bowel obstruction (HCC) K56.69    Ileostomy in place ([facility] [address] Z93.2    Anemia due to chemotherapy D64.81, T45.1X5A    CINV (chemotherapy-induced nausea and vomiting) R11.2, T45.1X5A    Cancer associated pain G89.3    Generalized abdominal pain R10.84    Anxiety about health F41.8    Ileus (HCC) K56.7    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    Hx of pulmonary embolus Z86.711    Dehydration E86.0    Chemotherapy-induced neutropenia (HCC) D70.1    Anticoagulation adequate with anticoagulant therapy Z79.01    Counseling regarding end of life decision making Z71.89    Bowel obstruction (HCC) K56.60    Protein calorie malnutrition (HCC) E46    Malignant ascites R18.0    Hydronephrosis of right kidney N13.30    Isolated proteinuria R80.0    Erosive esophagitis K22.10    Ulcerative esophagitis K22.10    Hypomagnesemia E83.42    Abdominal pain R10.9    Hydronephrosis of left kidney N13.30    Ovarian cancer ([facility] [address] C56.9    Nausea & vomiting R11.2    Carcinomatosis (HCC) C80.0    Intractable vomiting with nausea R11.2    Febrile illness R50.9         Plan:   Proceed with C5D1 of Gemzar/Avastin  Continue to monitor hgb and will transfuses for hgb less than 8  Will monitor urine protein. Discussed the 30 mg/dl with Dr. Sameer Jimenez and he would like to continue today and re-evaluate at next visit  Reviewed bleeding precautions with her and especially with Eliquis/Avastin. Encouaged to continue protein shakes  Pt says the Dilaudid elixir is working better than MS. New Rx given for Fentanyl patch and dilaudid elixir to have available in [facility]   She will continue with GI doctor prn. Reglan prn as needed and she will let me know if she needs to begin it. Hydration encouraged  Continue Zofran for nausea PRN   Continue Eliquis for previous PE. She will continue phazyme for gas as needed  Continue Miralax to try to prevent her post chemotherapy constipation  She will call for any concerns or questions      Joey Martins NP  8/15/2017          ______________________  Medications:    Current Outpatient Prescriptions   Medication Sig Dispense Refill    HYDROmorphone (DILAUDID) 1 mg/mL liqd oral solution Take 4 mL by mouth every four (4) hours as needed. Max Daily Amount: 24 mg. 475 mL 0    fentaNYL (DURAGESIC) 100 mcg/hr PATCH 1 Patch by TransDERmal route every seventy-two (72) hours. Max Daily Amount: 1 Patch. 10 Patch 0    scopolamine (TRANSDERM-SCOP) 1.5 mg (1 mg over 3 days) pt3d 1 Patch by TransDERmal route every seventy-two (72) hours as needed. 10 Patch 1    NEXIUM PACKET 40 mg granules for oral suspension two (2) times a day.  metoclopramide (REGLAN) 5 mg/5 mL syrup Take 10 mL by mouth four (4) times daily as needed. Take before meals as needed 500 mL 3    promethazine (PHENERGAN) 6.25 mg/5 mL syrup Take 20 mL by mouth four (4) times daily as needed for Nausea.  400 mL 4    magnesium oxide (MAG-OX) 400 mg tablet Take 1 Tab by mouth two (2) times a day. Indications: HYPOMAGNESEMIA 60 Tab 2    sucralfate (CARAFATE) 100 mg/mL suspension Take 5 mL by mouth four (4) times daily. 414 mL 3    polyethylene glycol (MIRALAX) 17 gram/dose powder Take 17 g by mouth daily as needed. 238 g 3    Cetirizine (ZYRTEC) 10 mg cap Take  by mouth.  apixaban (ELIQUIS) 2.5 mg tablet Take 1 Tab by mouth two (2) times a day. 60 Tab 6    zolpidem (AMBIEN) 10 mg tablet Take 1 Tab by mouth nightly as needed for Sleep. Max Daily Amount: 10 mg. 30 Tab 1    ondansetron (ZOFRAN ODT) 4 mg disintegrating tablet Take 1 Tab by mouth every eight (8) hours as needed for Nausea. 30 Tab 2    multivit-min-FA-coenzyme Q10 (AQUADEKS) 100-350-5 mcg-mcg-mg chew Take 1 Tab by mouth daily. Indications: VITAMIN DEFICIENCY PREVENTION 60 Tab 1    Calcium-Vitamin D3-Vitamin K 500-500-40 mg-unit-mcg chew Take 1 Tab by mouth two (2) times a day.  metoprolol (LOPRESSOR) 100 mg tablet Take 50 mg by mouth two (2) times a day.  lidocaine-prilocaine (EMLA) topical cream Apply small amount over port area and cover with a band aid 1 hour before chemo treatment 30 g 3    multivitamin (ONE A DAY) tablet Take 1 Tab by mouth daily.        Current Facility-Administered Medications   Medication Dose Route Frequency Provider Last Rate Last Dose    sodium chloride 0.9 % injection 10 mL  10 mL IntraVENous PRN Atilio Denson MD   10 mL at 08/15/17 1005    sodium chloride (NS) flush 5-10 mL  5-10 mL IntraVENous PRN Atilio Denson MD   10 mL at 08/15/17 1005    heparin (porcine) pf 500 Units  500 Units IntraVENous PRN Atilio Denson MD        dexamethasone (DECADRON) 4 mg/mL injection 8 mg  8 mg IntraVENous ONCE Atilio Denson MD        famotidine (PF) (PEPCID) 20 mg in sodium chloride 0.9 % 10 mL injection  20 mg IntraVENous ONCE Atilio Denson MD        fosaprepitant (EMEND) 150 mg in 0.9% sodium chloride 150 mL IVPB  150 mg IntraVENous ONCE Mayo Millard MD        gemcitabine [facility]) 1,264 mg in 0.9% sodium chloride 250 mL, overfill volume 25 mL chemo infusion  1,264 mg IntraVENous ONCE Mayo Millard MD        granisetron (KYTRIL) injection 1 mg  1 mg IntraVENous ONCE Mayo Millard MD        bevacizumab (AVASTIN) 840 mg in 0.9% sodium chloride 100 mL, overfill volume 10 mL IVPB  840 mg IntraVENous Margoth Ramirez MD

## 2017-08-17 RX ORDER — DEXAMETHASONE SODIUM PHOSPHATE 4 MG/ML
8 INJECTION, SOLUTION INTRA-ARTICULAR; INTRALESIONAL; INTRAMUSCULAR; INTRAVENOUS; SOFT TISSUE ONCE
Status: COMPLETED | OUTPATIENT
Start: 2017-08-22 | End: 2017-08-22

## 2017-08-17 RX ORDER — GRANISETRON HYDROCHLORIDE 1 MG/ML
1 INJECTION INTRAVENOUS ONCE
Status: COMPLETED | OUTPATIENT
Start: 2017-08-22 | End: 2017-08-22

## 2017-08-22 ENCOUNTER — HOSPITAL ENCOUNTER (OUTPATIENT)
Dept: INFUSION THERAPY | Age: 57
Discharge: HOME | End: 2017-08-22
Payer: OTHER GOVERNMENT

## 2017-08-22 VITALS
TEMPERATURE: 97.3 F | DIASTOLIC BLOOD PRESSURE: 63 MMHG | RESPIRATION RATE: 18 BRPM | WEIGHT: 110.6 LBS | HEIGHT: 63 IN | HEART RATE: 59 BPM | OXYGEN SATURATION: 97 % | SYSTOLIC BLOOD PRESSURE: 106 MMHG | BODY MASS INDEX: 19.6 KG/M2

## 2017-08-22 LAB
ALBUMIN SERPL-MCNC: 3.1 G/DL (ref 3.5–5)
ALBUMIN/GLOB SERPL: 0.8 {RATIO} (ref 1.1–2.2)
ALP SERPL-CCNC: 133 U/L (ref 45–117)
ALT SERPL-CCNC: 37 U/L (ref 12–78)
ANION GAP SERPL CALC-SCNC: 6 MMOL/L (ref 5–15)
AST SERPL-CCNC: 28 U/L (ref 15–37)
BASOPHILS # BLD: 0 K/UL (ref 0–0.1)
BASOPHILS NFR BLD: 0 % (ref 0–1)
BILIRUB SERPL-MCNC: 0.2 MG/DL (ref 0.2–1)
BUN SERPL-MCNC: 14 MG/DL (ref 6–20)
BUN/CREAT SERPL: 16 (ref 12–20)
CALCIUM SERPL-MCNC: 9.2 MG/DL (ref 8.5–10.1)
CHLORIDE SERPL-SCNC: 103 MMOL/L (ref 97–108)
CO2 SERPL-SCNC: 28 MMOL/L (ref 21–32)
CREAT SERPL-MCNC: 0.89 MG/DL (ref 0.55–1.02)
EOSINOPHIL # BLD: 0 K/UL (ref 0–0.4)
EOSINOPHIL NFR BLD: 1 % (ref 0–7)
ERYTHROCYTE [DISTWIDTH] IN BLOOD BY AUTOMATED COUNT: 16.1 % (ref 11.5–14.5)
GLOBULIN SER CALC-MCNC: 4.1 G/DL (ref 2–4)
GLUCOSE SERPL-MCNC: 82 MG/DL (ref 65–100)
HCT VFR BLD AUTO: 28.3 % (ref 35–47)
HGB BLD-MCNC: 8.9 G/DL (ref 11.5–16)
LYMPHOCYTES # BLD: 1.5 K/UL (ref 0.8–3.5)
LYMPHOCYTES NFR BLD: 37 % (ref 12–49)
MCH RBC QN AUTO: 30.2 PG (ref 26–34)
MCHC RBC AUTO-ENTMCNC: 31.4 G/DL (ref 30–36.5)
MCV RBC AUTO: 95.9 FL (ref 80–99)
MONOCYTES # BLD: 0.4 K/UL (ref 0–1)
MONOCYTES NFR BLD: 10 % (ref 5–13)
NEUTS SEG # BLD: 2.1 K/UL (ref 1.8–8)
NEUTS SEG NFR BLD: 52 % (ref 32–75)
PLATELET # BLD AUTO: 363 K/UL (ref 150–400)
POTASSIUM SERPL-SCNC: 3.9 MMOL/L (ref 3.5–5.1)
PROT SERPL-MCNC: 7.2 G/DL (ref 6.4–8.2)
RBC # BLD AUTO: 2.95 M/UL (ref 3.8–5.2)
SODIUM SERPL-SCNC: 137 MMOL/L (ref 136–145)
WBC # BLD AUTO: 4.1 K/UL (ref 3.6–11)

## 2017-08-22 PROCEDURE — 96367 TX/PROPH/DG ADDL SEQ IV INF: CPT

## 2017-08-22 PROCEDURE — 96361 HYDRATE IV INFUSION ADD-ON: CPT

## 2017-08-22 PROCEDURE — 74011000250 HC RX REV CODE- 250: Performed by: OBSTETRICS & GYNECOLOGY

## 2017-08-22 PROCEDURE — 96413 CHEMO IV INFUSION 1 HR: CPT

## 2017-08-22 PROCEDURE — 80053 COMPREHEN METABOLIC PANEL: CPT | Performed by: OBSTETRICS & GYNECOLOGY

## 2017-08-22 PROCEDURE — 36415 COLL VENOUS BLD VENIPUNCTURE: CPT | Performed by: OBSTETRICS & GYNECOLOGY

## 2017-08-22 PROCEDURE — 74011250636 HC RX REV CODE- 250/636: Performed by: OBSTETRICS & GYNECOLOGY

## 2017-08-22 PROCEDURE — 86304 IMMUNOASSAY TUMOR CA 125: CPT | Performed by: NURSE PRACTITIONER

## 2017-08-22 PROCEDURE — 74011000258 HC RX REV CODE- 258: Performed by: OBSTETRICS & GYNECOLOGY

## 2017-08-22 PROCEDURE — 77030012965 HC NDL HUBR BBMI -A

## 2017-08-22 PROCEDURE — 85025 COMPLETE CBC W/AUTO DIFF WBC: CPT | Performed by: OBSTETRICS & GYNECOLOGY

## 2017-08-22 PROCEDURE — 96375 TX/PRO/DX INJ NEW DRUG ADDON: CPT

## 2017-08-22 RX ORDER — SODIUM CHLORIDE 0.9 % (FLUSH) 0.9 %
10 SYRINGE (ML) INJECTION AS NEEDED
Status: ACTIVE | OUTPATIENT
Start: 2017-08-22 | End: 2017-08-23

## 2017-08-22 RX ORDER — SODIUM CHLORIDE 9 MG/ML
10 INJECTION INTRAMUSCULAR; INTRAVENOUS; SUBCUTANEOUS AS NEEDED
Status: ACTIVE | OUTPATIENT
Start: 2017-08-22 | End: 2017-08-23

## 2017-08-22 RX ORDER — HEPARIN 100 UNIT/ML
500 SYRINGE INTRAVENOUS AS NEEDED
Status: ACTIVE | OUTPATIENT
Start: 2017-08-22 | End: 2017-08-23

## 2017-08-22 RX ADMIN — SODIUM CHLORIDE 150 MG: 900 INJECTION, SOLUTION INTRAVENOUS at 13:08

## 2017-08-22 RX ADMIN — Medication 10 ML: at 14:21

## 2017-08-22 RX ADMIN — GRANISETRON HYDROCHLORIDE 1 MG: 1 INJECTION INTRAVENOUS at 11:33

## 2017-08-22 RX ADMIN — SODIUM CHLORIDE 1264 MG: 900 INJECTION, SOLUTION INTRAVENOUS at 13:42

## 2017-08-22 RX ADMIN — DEXAMETHASONE SODIUM PHOSPHATE 8 MG: 4 INJECTION, SOLUTION INTRA-ARTICULAR; INTRALESIONAL; INTRAMUSCULAR; INTRAVENOUS; SOFT TISSUE at 12:22

## 2017-08-22 RX ADMIN — FAMOTIDINE 20 MG: 10 INJECTION INTRAVENOUS at 11:59

## 2017-08-22 RX ADMIN — SODIUM CHLORIDE 2000 ML: 900 INJECTION, SOLUTION INTRAVENOUS at 10:02

## 2017-08-22 RX ADMIN — SODIUM CHLORIDE 10 ML: 9 INJECTION INTRAMUSCULAR; INTRAVENOUS; SUBCUTANEOUS at 09:59

## 2017-08-22 RX ADMIN — SODIUM CHLORIDE, PRESERVATIVE FREE 500 UNITS: 5 INJECTION INTRAVENOUS at 14:21

## 2017-08-22 NOTE — PROGRESS NOTES
Camilo Soria. Wall, NP    Infusion Date: 8/22/2017     MAHSA Hernandez is a 61 y.o.  postmenopausal female with a diagnosis of stage IV ovarian cancer. She underwent exploratory laparotomy with suboptimal debulking. She had extensive disease. She was readmitted about a week later with obstruction and subsequently had a cecal perforation, requiring resection, end ileostomy, and mucous fistula. During the hospitalization she was also diagnosed with pulmonary embolus and had chest tubes placed for bilateral pleural effusions. She had a prolonged hospital course and actually received a dose of cisplatin chemotherapy while in the hospital to help dry up the effusions. She completed 6 cycles of Taxol/Carboplatin chemotherapy following her initial debulking. She did relatively well, considering she had an ileostomy to deal with during chemotherapy. I took her to the OR on 4/29/16 for interval debulking and reversal of her ileostomy. She had extensive disease, but we were able to resect the majority of her disease. One week later she developed an anastomotic leak requiring reexploration and recreation of an ileostomy. She had another prolonged hospitalization, but recovered well since surgery. We then reinitiated Taxol/Carboplatin chemotherapy, but did reduce the dose of Taxol to 135 mg/m2. She completed 4 mores cycles. Her CA-125 normalized and we stopped treatment.      On 2/7, she presented to Dr. Dominick Ball office with her  to discuss her follow up/surveillance CT scan. Unfortunately, it demonstrated progression of disease, although minimally worse than her prior scan 2 months previously. She was actually feeling better than she did at that time. After review of scans and disease, the decision was made to begin Doxil/Avastin Q28 days for 6 cycles. She began this on 2/27/2017.  In April 2017, she had completed 3 cycles of Doxil/Avastin and had CT scans at that time showed she had evidence of recurrent disease. She was switched at that time to [facility]. She has been admitted to [facility] several times for partial SBO and all have resolved with consecutive management           Subjective:   Pt presents today for cycle 5 D8 of Gemzar/Avastin. (Gemzar only today)  She says she is feeling \"pretty good\", but feels this chemo is beginning to [employer] on me\".  is supportive at side. She says her bag is \"working most of the time\" and she has had to continue taking her Reglan almost 2-3 times a day to keep it moving with the addition of Miralax on day 3-7 after chemo. She says she is trying to drink as much as she can, but feels \"I just can not keep up enough\"  Feels the dilaudid with the Fentanyl patch is holding her pain well  She knows it is not the ususal day to have her CA-125 drawn, but says she would like to see where it is since it was slightly elevated last time and it has her concerned. Review of Systems:  General: wt is noted to be down some again from 8/15 even though she continues with nutritional supplements and eating \"best I can\"  HEENT: Denies visual changes, dysphagia or headache  Resp: Denies SOB, DUDLEY, wheezing or cough  CV: Denies CP, palpitations  GI/: Acknowledges continued abd pain (see above) but better controlled with new regimen. Denies N/V at present. Does get it at times, but says the Zofran is still working well for her. Continues with protein shakes. She says ostomy is functioning well since discharge with the Reglan and Miralax prn  MuskSkel: Denies muscle ache or joint pain  Neuro: Denies dizzyness or syncope    Objective:     Blood pressure 106/63, pulse (!) 59, temperature 97.3 °F (36.3 °C), resp. rate 18, height 5' 3\" (1.6 m), weight 110 lb 9.6 oz (50.2 kg), SpO2 97 %.   Temp (24hrs), Av.3 °F (36.3 °C), Min:97.2 °F (36.2 °C), Max:97.3 °F (36.3 °C)      _____________________  Physical Exam:     General: A&O X 3. With pleasant affect, but, again, tired appearing. HEENT: Oral mucosa pink, slightly dry with no sores or lesions noted. No cervical adenopathy appreciated  Port site easily accessed without redness, tenderness or swelling  Cardiovascular: Regular without M/R/G  Lungs:CTA bilat without wheezing or rales  Abdomen: Soft with mild tenderness to palpation to left lower/mid abdomen and + bowel sounds throughout. Mild firmness persists, unchanged, to left mid abd. Ext: + pedal pulses without edema bilat. bilat  Neuro: grossly intact      :8/22/2017: Pending    (8/9/2017= 83; 7/20/2017= 65; 6/28/17=95.5;  6/8/17= 102; 5/16/17= 145.6:  4/27= 100; 4/2138=972.2 3/22=80; 2/23= 63.9)      Recent Results (from the past 12 hour(s))   CBC WITH AUTOMATED DIFF    Collection Time: 08/22/17  9:56 AM   Result Value Ref Range    WBC 4.1 3.6 - 11.0 K/uL    RBC 2.95 (L) 3.80 - 5.20 M/uL    HGB 8.9 (L) 11.5 - 16.0 g/dL    HCT 28.3 (L) 35.0 - 47.0 %    MCV 95.9 80.0 - 99.0 FL    MCH 30.2 26.0 - 34.0 PG    MCHC 31.4 30.0 - 36.5 g/dL    RDW 16.1 (H) 11.5 - 14.5 %    PLATELET 172 668 - 101 K/uL    NEUTROPHILS 52 32 - 75 %    LYMPHOCYTES 37 12 - 49 %    MONOCYTES 10 5 - 13 %    EOSINOPHILS 1 0 - 7 %    BASOPHILS 0 0 - 1 %    ABS. NEUTROPHILS 2.1 1.8 - 8.0 K/UL    ABS. LYMPHOCYTES 1.5 0.8 - 3.5 K/UL    ABS. MONOCYTES 0.4 0.0 - 1.0 K/UL    ABS. EOSINOPHILS 0.0 0.0 - 0.4 K/UL    ABS.  BASOPHILS 0.0 0.0 - 0.1 K/UL   METABOLIC PANEL, COMPREHENSIVE    Collection Time: 08/22/17  9:56 AM   Result Value Ref Range    Sodium 137 136 - 145 mmol/L    Potassium 3.9 3.5 - 5.1 mmol/L    Chloride 103 97 - 108 mmol/L    CO2 28 21 - 32 mmol/L    Anion gap 6 5 - 15 mmol/L    Glucose 82 65 - 100 mg/dL    BUN 14 6 - 20 MG/DL    Creatinine 0.89 0.55 - 1.02 MG/DL    BUN/Creatinine ratio 16 12 - 20      GFR est AA >60 >60 ml/min/1.73m2    GFR est non-AA >60 >60 ml/min/1.73m2    Calcium 9.2 8.5 - 10.1 MG/DL    Bilirubin, total 0.2 0.2 - 1.0 MG/DL    ALT (SGPT) 37 12 - 78 U/L    AST (SGOT) 28 15 - 37 U/L    Alk. phosphatase 133 (H) 45 - 117 U/L    Protein, total 7.2 6.4 - 8.2 g/dL    Albumin 3.1 (L) 3.5 - 5.0 g/dL    Globulin 4.1 (H) 2.0 - 4.0 g/dL    A-G Ratio 0.8 (L) 1.1 - 2.2             PATHOLOGY from 5/10/2017 Upper Endoscopy:  1. Gastric polyp, polypectomy:   Benign polypoid gastric oxyntic mucosa with some features of hyperplastic polyp   2. Lower esophagus, biopsy:   Acute erosive esophagitis of squamous mucosa with ulceration and severe squamous atypia (see comment)   3. Proximal esophagus, biopsy:   Acute esophagitis of squamous mucosa with severe squamous atypia           Imaging:  CT Abd/Pelvis 5/7/2017:  FINDINGS:   LUNG BASES: Left lung base subpleural scarring is unchanged. No pneumonia. INCIDENTALLY IMAGED HEART AND MEDIASTINUM: Cardiac size is within normal limits. No pericardial effusion. Small hiatal hernia is unchanged. LIVER: Small segment 8 and segment 3 hypoenhancing liver lesions are unchanged. GALLBLADDER: Unremarkable. SPLEEN: No mass. PANCREAS: No mass or ductal dilatation. ADRENALS: Unremarkable. KIDNEYS: Right hydronephrosis is mild and new. Proximal right ureter is mildly  dilated. There is transition to nondilated ureter in the mid ureter. No renal  mass or left hydronephrosis. STOMACH: Unremarkable. SMALL BOWEL: Small bowel loops in the lower pelvis measuring up to 2.7 cm in  diameter. Contrast extends through the small bowel into the right lower quadrant  ileostomy. Small bowel centralization is unchanged. COLON: Majority of the colon has been resected. Distal sigmoid colon and rectum  are nondistended. APPENDIX: Resected. PERITONEUM: Small volume of ascites is slightly increased. Subtle non masslike  enhancement of the peritoneum is unchanged. No pneumoperitoneum. RETROPERITONEUM: No lymphadenopathy. IVC filter is unchanged. REPRODUCTIVE ORGANS: Uterus is been resected. Ovaries have been resected. No  pelvic mass. URINARY BLADDER: No mass or calculus. BONES: No destructive bone lesion. ADDITIONAL COMMENTS: N/A     IMPRESSION  IMPRESSION:     1. New mild right hydronephrosis is likely secondary to ureteral obstruction in  the mid ureter. No ureteral calculus. 2. Mild small bowel distention may represent early partial small bowel  obstruction. No complete bowel obstruction. 3. Increased small volume of ascites. Unchanged non masslike peritoneal  enhancement. 4. Unchanged small liver lesions. CT scan of Abdomen/Pelvis with contrast 6/27/2017  FINDINGS:   LUNG BASES: Clear. INCIDENTALLY IMAGED HEART AND MEDIASTINUM: Unremarkable. LIVER: Small hypodense lesions, one in the left hepatic lobe and one in the  right hepatic lobe are stable. GALLBLADDER: Unremarkable. SPLEEN: No mass. PANCREAS: No mass or ductal dilatation. ADRENALS: Unremarkable. KIDNEYS: Hydronephrosis is moderate and stable on the right and mild to moderate  on the left and new, to the level of the mid ureters. There is no definite  obstructing calculus on either side. STOMACH: Unremarkable. SMALL BOWEL: Previously described small bowel centralization is stable. Small  bowel loops are dilated but roughly stable in appearance, with a maximum caliber  of 3.1 cm. Pattern of dilatation is similar to the prior study. Small bowel is  fluid-filled to the level of the ostomy in the right anterior abdominal wall. COLON: Visualized colon shows no distention, but the colon is predominantly  absent. APPENDIX: Surgically absent  PERITONEUM: Small amount of ascites, stable to slightly decreased since the  prior study. RETROPERITONEUM: No lymphadenopathy or aortic aneurysm. IVC filter stable. REPRODUCTIVE ORGANS: Surgically absent. URINARY BLADDER: No mass or calculus. BONES: No destructive bone lesion. ADDITIONAL COMMENTS: N/A       IMPRESSION:     1.  Mild to moderate small bowel distention with centralization of loops as  previously described, similar in appearance to prior study 5/7/2017.  2. Small to moderate amount of ascites, stable. 3. Postoperative changes as described above, stable. 4. Stable hypodense liver lesions. 5. Stable right hydronephrosis to the mid ureter without obvious obstructing  calculus. 6. New left hydronephrosis to the mid ureter without obvious obstructing  calculus.       Assessment:     Patient Active Problem List   Diagnosis Code    Malignant neoplasm of both ovaries ([address] C56.1, C56.2    Carcinomatosis peritonei ([address] C78.6, C80.1    Small bowel obstruction (HCC) K56.69    Ileostomy in place ([address] Z93.2    Anemia due to chemotherapy D64.81, T45.1X5A    CINV (chemotherapy-induced nausea and vomiting) R11.2, T45.1X5A    Cancer associated pain G89.3    Generalized abdominal pain R10.84    Anxiety about health F41.8    Ileus (HCC) K56.7    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    Hx of pulmonary embolus Z86.711    Dehydration E86.0    Chemotherapy-induced neutropenia (HCC) D70.1    Anticoagulation adequate with anticoagulant therapy Z79.01    Counseling regarding end of life decision making Z71.89    Bowel obstruction (HCC) K56.60    Protein calorie malnutrition (HCC) E46    Malignant ascites R18.0    Hydronephrosis of right kidney N13.30    Isolated proteinuria R80.0    Erosive esophagitis K22.10    Ulcerative esophagitis K22.10    Hypomagnesemia E83.42    Abdominal pain R10.9    Hydronephrosis of left kidney N13.30    Ovarian cancer (HCC) C56.9    Nausea & vomiting R11.2    Carcinomatosis (HCC) C80.0    Intractable vomiting with nausea R11.2    Febrile illness R50.9         Plan:   Proceed with C5D8 of Gemzar/Avastin (Gemzar only today  Continue to monitor hgb and will transfuses for hgb less than 8  Will monitor urine protein. Will re-evaluate at next Avastin visit  Again, reviewed bleeding precautions with her and especially with Eliquis/Avastin.      Encouaged to continue protein shakes   She will continue with GI doctor prn. Reglan prn as needed and she will let me know if she needs to begin it. Hydration encouraged  Continue Zofran for nausea PRN   Continue Eliquis for previous PE. She will continue phazyme for gas as needed  Continue Miralax to try to prevent her post chemotherapy constipation  She will call for any concerns or questions  Hydration encouraged best she is able. Gen Mercado, HAYDER  8/22/2017          ______________________  Medications:    Current Outpatient Prescriptions   Medication Sig Dispense Refill    HYDROmorphone (DILAUDID) 1 mg/mL liqd oral solution Take 4 mL by mouth every four (4) hours as needed. Max Daily Amount: 24 mg. 475 mL 0    fentaNYL (DURAGESIC) 100 mcg/hr PATCH 1 Patch by TransDERmal route every seventy-two (72) hours. Max Daily Amount: 1 Patch. 10 Patch 0    scopolamine (TRANSDERM-SCOP) 1.5 mg (1 mg over 3 days) pt3d 1 Patch by TransDERmal route every seventy-two (72) hours as needed. 10 Patch 1    NEXIUM PACKET 40 mg granules for oral suspension two (2) times a day.  metoclopramide (REGLAN) 5 mg/5 mL syrup Take 10 mL by mouth four (4) times daily as needed. Take before meals as needed 500 mL 3    promethazine (PHENERGAN) 6.25 mg/5 mL syrup Take 20 mL by mouth four (4) times daily as needed for Nausea. 400 mL 4    magnesium oxide (MAG-OX) 400 mg tablet Take 1 Tab by mouth two (2) times a day. Indications: HYPOMAGNESEMIA 60 Tab 2    sucralfate (CARAFATE) 100 mg/mL suspension Take 5 mL by mouth four (4) times daily. 414 mL 3    polyethylene glycol (MIRALAX) 17 gram/dose powder Take 17 g by mouth daily as needed. 238 g 3    Cetirizine (ZYRTEC) 10 mg cap Take  by mouth.  apixaban (ELIQUIS) 2.5 mg tablet Take 1 Tab by mouth two (2) times a day. 60 Tab 6    zolpidem (AMBIEN) 10 mg tablet Take 1 Tab by mouth nightly as needed for Sleep.  Max Daily Amount: 10 mg. 30 Tab 1    ondansetron (ZOFRAN ODT) 4 mg disintegrating tablet Take 1 Tab by mouth every eight (8) hours as needed for Nausea. 30 Tab 2    multivit-min-FA-coenzyme Q10 (AQUADEKS) 100-350-5 mcg-mcg-mg chew Take 1 Tab by mouth daily. Indications: VITAMIN DEFICIENCY PREVENTION 60 Tab 1    Calcium-Vitamin D3-Vitamin K 500-500-40 mg-unit-mcg chew Take 1 Tab by mouth two (2) times a day.  metoprolol (LOPRESSOR) 100 mg tablet Take 50 mg by mouth two (2) times a day.  lidocaine-prilocaine (EMLA) topical cream Apply small amount over port area and cover with a band aid 1 hour before chemo treatment 30 g 3    multivitamin (ONE A DAY) tablet Take 1 Tab by mouth daily.        Current Facility-Administered Medications   Medication Dose Route Frequency Provider Last Rate Last Dose    heparin (porcine) pf 500 Units  500 Units IntraVENous PRN Silvina Garcia MD   500 Units at 08/22/17 1421    sodium chloride 0.9 % injection 10 mL  10 mL IntraVENous PRN Silvina Garcia MD   10 mL at 08/22/17 0971    sodium chloride (NS) flush 10 mL  10 mL IntraVENous PRN Silvina Garcia MD   10 mL at 08/22/17 1420

## 2017-08-22 NOTE — PROGRESS NOTES
0576 Pt admit to [facility] for C5D8 Gemzar/Hydration ambulatory in stable condition. Assessment completed. No new concerns voiced. Port accessed and flushed with positive blood return. Labs drawn per order and sent for processing. Hydration started. Labs reviewed and medication ordered. Visit Vitals    /63    Pulse (!) 59    Temp 97.3 °F (36.3 °C)    Resp 18    Ht 5' 3\" (1.6 m)    Wt 50.2 kg (110 lb 9.6 oz)    SpO2 97%    BMI 19.59 kg/m2       Medications:  Normal Saline 2000ml  Pepcid IV Push  Kytril IV Push  Decadron IV Push  Emend  Gemzar  Heparin Flush          Chemotherapy Flowsheet 8/22/2017   Cycle C5D8   Date 8/22/2017   Drug / Regimen Gemzar   Pre Hydration given   Pre Meds given   Notes given             1425 Pt tolerated treatment well. Port maintained positive blood return throughout treatment, flushed with positive blood return at conclusion, port heparinized and de-accessed. D/c home ambulatory in no distress. Pt aware of next appointment scheduled for 8/31/17. Recent Results (from the past 12 hour(s))   CBC WITH AUTOMATED DIFF    Collection Time: 08/22/17  9:56 AM   Result Value Ref Range    WBC 4.1 3.6 - 11.0 K/uL    RBC 2.95 (L) 3.80 - 5.20 M/uL    HGB 8.9 (L) 11.5 - 16.0 g/dL    HCT 28.3 (L) 35.0 - 47.0 %    MCV 95.9 80.0 - 99.0 FL    MCH 30.2 26.0 - 34.0 PG    MCHC 31.4 30.0 - 36.5 g/dL    RDW 16.1 (H) 11.5 - 14.5 %    PLATELET 414 492 - 660 K/uL    NEUTROPHILS 52 32 - 75 %    LYMPHOCYTES 37 12 - 49 %    MONOCYTES 10 5 - 13 %    EOSINOPHILS 1 0 - 7 %    BASOPHILS 0 0 - 1 %    ABS. NEUTROPHILS 2.1 1.8 - 8.0 K/UL    ABS. LYMPHOCYTES 1.5 0.8 - 3.5 K/UL    ABS. MONOCYTES 0.4 0.0 - 1.0 K/UL    ABS. EOSINOPHILS 0.0 0.0 - 0.4 K/UL    ABS.  BASOPHILS 0.0 0.0 - 0.1 K/UL   METABOLIC PANEL, COMPREHENSIVE    Collection Time: 08/22/17  9:56 AM   Result Value Ref Range    Sodium 137 136 - 145 mmol/L    Potassium 3.9 3.5 - 5.1 mmol/L    Chloride 103 97 - 108 mmol/L    CO2 28 21 - 32 mmol/L    Anion gap 6 5 - 15 mmol/L    Glucose 82 65 - 100 mg/dL    BUN 14 6 - 20 MG/DL    Creatinine 0.89 0.55 - 1.02 MG/DL    BUN/Creatinine ratio 16 12 - 20      GFR est AA >60 >60 ml/min/1.73m2    GFR est non-AA >60 >60 ml/min/1.73m2    Calcium 9.2 8.5 - 10.1 MG/DL    Bilirubin, total 0.2 0.2 - 1.0 MG/DL    ALT (SGPT) 37 12 - 78 U/L    AST (SGOT) 28 15 - 37 U/L    Alk.  phosphatase 133 (H) 45 - 117 U/L    Protein, total 7.2 6.4 - 8.2 g/dL    Albumin 3.1 (L) 3.5 - 5.0 g/dL    Globulin 4.1 (H) 2.0 - 4.0 g/dL    A-G Ratio 0.8 (L) 1.1 - 2.2

## 2017-08-23 LAB — CANCER AG125 SERPL-ACNC: 59 U/ML (ref 0–30)

## 2017-08-31 ENCOUNTER — OFFICE VISIT (OUTPATIENT)
Dept: GYNECOLOGY | Age: 57
End: 2017-08-31

## 2017-08-31 ENCOUNTER — HOSPITAL ENCOUNTER (OUTPATIENT)
Dept: INFUSION THERAPY | Age: 57
Discharge: HOME | End: 2017-08-31
Payer: OTHER GOVERNMENT

## 2017-08-31 VITALS
SYSTOLIC BLOOD PRESSURE: 101 MMHG | BODY MASS INDEX: 19.67 KG/M2 | HEIGHT: 63 IN | HEART RATE: 59 BPM | DIASTOLIC BLOOD PRESSURE: 56 MMHG | WEIGHT: 111 LBS

## 2017-08-31 VITALS
HEART RATE: 56 BPM | DIASTOLIC BLOOD PRESSURE: 70 MMHG | RESPIRATION RATE: 16 BRPM | OXYGEN SATURATION: 99 % | TEMPERATURE: 98 F | SYSTOLIC BLOOD PRESSURE: 112 MMHG

## 2017-08-31 DIAGNOSIS — G89.3 CANCER ASSOCIATED PAIN: ICD-10-CM

## 2017-08-31 DIAGNOSIS — C78.6 CARCINOMATOSIS PERITONEI (HCC): ICD-10-CM

## 2017-08-31 DIAGNOSIS — C56.9 OVARIAN CANCER, UNSPECIFIED LATERALITY (HCC): ICD-10-CM

## 2017-08-31 DIAGNOSIS — K22.10 EROSIVE ESOPHAGITIS: ICD-10-CM

## 2017-08-31 DIAGNOSIS — K56.609 SMALL BOWEL OBSTRUCTION (HCC): ICD-10-CM

## 2017-08-31 DIAGNOSIS — R18.0 MALIGNANT ASCITES: ICD-10-CM

## 2017-08-31 DIAGNOSIS — I82.890 THROMBOSIS OF PELVIC VEIN: ICD-10-CM

## 2017-08-31 DIAGNOSIS — K22.10 ULCERATIVE ESOPHAGITIS: ICD-10-CM

## 2017-08-31 LAB
ALBUMIN SERPL-MCNC: 3.3 G/DL (ref 3.5–5)
ALBUMIN/GLOB SERPL: 0.9 {RATIO} (ref 1.1–2.2)
ALP SERPL-CCNC: 130 U/L (ref 45–117)
ALT SERPL-CCNC: 37 U/L (ref 12–78)
ANION GAP SERPL CALC-SCNC: 8 MMOL/L (ref 5–15)
APPEARANCE UR: CLEAR
AST SERPL-CCNC: 29 U/L (ref 15–37)
BACTERIA URNS QL MICRO: NEGATIVE /HPF
BASOPHILS # BLD: 0 K/UL (ref 0–0.1)
BASOPHILS NFR BLD: 0 % (ref 0–1)
BILIRUB SERPL-MCNC: 0.2 MG/DL (ref 0.2–1)
BILIRUB UR QL: NEGATIVE
BUN SERPL-MCNC: 18 MG/DL (ref 6–20)
BUN/CREAT SERPL: 18 (ref 12–20)
CALCIUM SERPL-MCNC: 8.8 MG/DL (ref 8.5–10.1)
CHLORIDE SERPL-SCNC: 99 MMOL/L (ref 97–108)
CO2 SERPL-SCNC: 29 MMOL/L (ref 21–32)
COLOR UR: ABNORMAL
CREAT SERPL-MCNC: 1.01 MG/DL (ref 0.55–1.02)
EOSINOPHIL # BLD: 0.1 K/UL (ref 0–0.4)
EOSINOPHIL NFR BLD: 3 % (ref 0–7)
EPITH CASTS URNS QL MICRO: ABNORMAL /LPF
ERYTHROCYTE [DISTWIDTH] IN BLOOD BY AUTOMATED COUNT: 17.2 % (ref 11.5–14.5)
GLOBULIN SER CALC-MCNC: 3.8 G/DL (ref 2–4)
GLUCOSE SERPL-MCNC: 81 MG/DL (ref 65–100)
GLUCOSE UR STRIP.AUTO-MCNC: NEGATIVE MG/DL
HCT VFR BLD AUTO: 27.3 % (ref 35–47)
HGB BLD-MCNC: 8.7 G/DL (ref 11.5–16)
HGB UR QL STRIP: NEGATIVE
HYALINE CASTS URNS QL MICRO: ABNORMAL /LPF (ref 0–5)
KETONES UR QL STRIP.AUTO: ABNORMAL MG/DL
LEUKOCYTE ESTERASE UR QL STRIP.AUTO: ABNORMAL
LYMPHOCYTES # BLD: 2.2 K/UL (ref 0.8–3.5)
LYMPHOCYTES NFR BLD: 44 % (ref 12–49)
MCH RBC QN AUTO: 30.6 PG (ref 26–34)
MCHC RBC AUTO-ENTMCNC: 31.9 G/DL (ref 30–36.5)
MCV RBC AUTO: 96.1 FL (ref 80–99)
MONOCYTES # BLD: 0.7 K/UL (ref 0–1)
MONOCYTES NFR BLD: 13 % (ref 5–13)
MUCOUS THREADS URNS QL MICRO: ABNORMAL /LPF
NEUTS SEG # BLD: 2 K/UL (ref 1.8–8)
NEUTS SEG NFR BLD: 40 % (ref 32–75)
NITRITE UR QL STRIP.AUTO: NEGATIVE
PH UR STRIP: 5.5 [PH] (ref 5–8)
PLATELET # BLD AUTO: 158 K/UL (ref 150–400)
POTASSIUM SERPL-SCNC: 4.1 MMOL/L (ref 3.5–5.1)
PROT SERPL-MCNC: 7.1 G/DL (ref 6.4–8.2)
PROT UR STRIP-MCNC: NEGATIVE MG/DL
RBC # BLD AUTO: 2.84 M/UL (ref 3.8–5.2)
RBC #/AREA URNS HPF: ABNORMAL /HPF (ref 0–5)
SODIUM SERPL-SCNC: 136 MMOL/L (ref 136–145)
SP GR UR REFRACTOMETRY: 1.02 (ref 1–1.03)
UA: UC IF INDICATED,UAUC: ABNORMAL
UROBILINOGEN UR QL STRIP.AUTO: 0.2 EU/DL (ref 0.2–1)
WBC # BLD AUTO: 5.1 K/UL (ref 3.6–11)
WBC URNS QL MICRO: ABNORMAL /HPF (ref 0–4)

## 2017-08-31 PROCEDURE — 36415 COLL VENOUS BLD VENIPUNCTURE: CPT | Performed by: OBSTETRICS & GYNECOLOGY

## 2017-08-31 PROCEDURE — 85025 COMPLETE CBC W/AUTO DIFF WBC: CPT | Performed by: OBSTETRICS & GYNECOLOGY

## 2017-08-31 PROCEDURE — 74011000250 HC RX REV CODE- 250: Performed by: OBSTETRICS & GYNECOLOGY

## 2017-08-31 PROCEDURE — 80053 COMPREHEN METABOLIC PANEL: CPT | Performed by: OBSTETRICS & GYNECOLOGY

## 2017-08-31 PROCEDURE — 87086 URINE CULTURE/COLONY COUNT: CPT | Performed by: OBSTETRICS & GYNECOLOGY

## 2017-08-31 PROCEDURE — 96360 HYDRATION IV INFUSION INIT: CPT

## 2017-08-31 PROCEDURE — 36591 DRAW BLOOD OFF VENOUS DEVICE: CPT

## 2017-08-31 PROCEDURE — 86304 IMMUNOASSAY TUMOR CA 125: CPT | Performed by: OBSTETRICS & GYNECOLOGY

## 2017-08-31 PROCEDURE — 81001 URINALYSIS AUTO W/SCOPE: CPT | Performed by: OBSTETRICS & GYNECOLOGY

## 2017-08-31 PROCEDURE — 74011250636 HC RX REV CODE- 250/636: Performed by: OBSTETRICS & GYNECOLOGY

## 2017-08-31 PROCEDURE — 77030012965 HC NDL HUBR BBMI -A

## 2017-08-31 RX ORDER — SODIUM CHLORIDE 9 MG/ML
10 INJECTION INTRAMUSCULAR; INTRAVENOUS; SUBCUTANEOUS AS NEEDED
Status: ACTIVE | OUTPATIENT
Start: 2017-08-31 | End: 2017-09-01

## 2017-08-31 RX ORDER — PROMETHAZINE HYDROCHLORIDE 6.25 MG/5ML
25 SYRUP ORAL
Qty: 400 ML | Refills: 4 | Status: SHIPPED | OUTPATIENT
Start: 2017-08-31 | End: 2017-11-27 | Stop reason: SDUPTHER

## 2017-08-31 RX ORDER — HEPARIN 100 UNIT/ML
500 SYRINGE INTRAVENOUS AS NEEDED
Status: ACTIVE | OUTPATIENT
Start: 2017-08-31 | End: 2017-09-01

## 2017-08-31 RX ORDER — SODIUM CHLORIDE 0.9 % (FLUSH) 0.9 %
10-40 SYRINGE (ML) INJECTION AS NEEDED
Status: ACTIVE | OUTPATIENT
Start: 2017-08-31 | End: 2017-09-01

## 2017-08-31 RX ADMIN — Medication 500 UNITS: at 15:24

## 2017-08-31 RX ADMIN — SODIUM CHLORIDE 1000 ML: 900 INJECTION, SOLUTION INTRAVENOUS at 14:23

## 2017-08-31 RX ADMIN — SODIUM CHLORIDE 10 ML: 9 INJECTION INTRAMUSCULAR; INTRAVENOUS; SUBCUTANEOUS at 14:23

## 2017-08-31 RX ADMIN — Medication 10 ML: at 14:22

## 2017-08-31 NOTE — PROGRESS NOTES
[facility]patient [facility]fusion [facility] Progress Note    2443 Pt admit to [place] for hydration and labs ambulatory in stable condition. Assessment completed. No new concerns voiced. Port accessed with positive blood return. Labs drawn per protocol and sent for processing. Port flushed and NS bolus started. Visit Vitals    /70    Pulse (!) 56    Temp 98 °F (36.7 °C)    Resp 16    SpO2 99%       Medications:  1 L NS over 1 hr  NS flushes  Heparin     1525 Pt tolerated treatment well. D/c home ambulatory in no distress. Pt aware of next appointment scheduled for 9/05/17 for Chemotherapy. Recent Results (from the past 12 hour(s))   CBC WITH AUTOMATED DIFF    Collection Time: 08/31/17  2:20 PM   Result Value Ref Range    WBC 5.1 3.6 - 11.0 K/uL    RBC 2.84 (L) 3.80 - 5.20 M/uL    HGB 8.7 (L) 11.5 - 16.0 g/dL    HCT 27.3 (L) 35.0 - 47.0 %    MCV 96.1 80.0 - 99.0 FL    MCH 30.6 26.0 - 34.0 PG    MCHC 31.9 30.0 - 36.5 g/dL    RDW 17.2 (H) 11.5 - 14.5 %    PLATELET 799 095 - 405 K/uL    NEUTROPHILS 40 32 - 75 %    LYMPHOCYTES 44 12 - 49 %    MONOCYTES 13 5 - 13 %    EOSINOPHILS 3 0 - 7 %    BASOPHILS 0 0 - 1 %    ABS. NEUTROPHILS 2.0 1.8 - 8.0 K/UL    ABS. LYMPHOCYTES 2.2 0.8 - 3.5 K/UL    ABS. MONOCYTES 0.7 0.0 - 1.0 K/UL    ABS. EOSINOPHILS 0.1 0.0 - 0.4 K/UL    ABS. BASOPHILS 0.0 0.0 - 0.1 K/UL   METABOLIC PANEL, COMPREHENSIVE    Collection Time: 08/31/17  2:20 PM   Result Value Ref Range    Sodium 136 136 - 145 mmol/L    Potassium 4.1 3.5 - 5.1 mmol/L    Chloride 99 97 - 108 mmol/L    CO2 29 21 - 32 mmol/L    Anion gap 8 5 - 15 mmol/L    Glucose 81 65 - 100 mg/dL    BUN 18 6 - 20 MG/DL    Creatinine 1.01 0.55 - 1.02 MG/DL    BUN/Creatinine ratio 18 12 - 20      GFR est AA >60 >60 ml/min/1.73m2    GFR est non-AA 56 (L) >60 ml/min/1.73m2    Calcium 8.8 8.5 - 10.1 MG/DL    Bilirubin, total 0.2 0.2 - 1.0 MG/DL    ALT (SGPT) 37 12 - 78 U/L    AST (SGOT) 29 15 - 37 U/L    Alk.  phosphatase 130 (H) 45 - 117 U/L    Protein, total 7.1 6.4 - 8.2 g/dL    Albumin 3.3 (L) 3.5 - 5.0 g/dL    Globulin 3.8 2.0 - 4.0 g/dL    A-G Ratio 0.9 (L) 1.1 - 2.2     URINALYSIS W/ REFLEX CULTURE    Collection Time: 08/31/17  3:10 PM   Result Value Ref Range    Color YELLOW/STRAW      Appearance CLEAR CLEAR      Specific gravity 1.025 1.003 - 1.030      pH (UA) 5.5 5.0 - 8.0      Protein NEGATIVE  NEG mg/dL    Glucose NEGATIVE  NEG mg/dL    Ketone TRACE (A) NEG mg/dL    Bilirubin NEGATIVE  NEG      Blood NEGATIVE  NEG      Urobilinogen 0.2 0.2 - 1.0 EU/dL    Nitrites NEGATIVE  NEG      Leukocyte Esterase TRACE (A) NEG      WBC 5-10 0 - 4 /hpf    RBC 0-5 0 - 5 /hpf    Epithelial cells FEW FEW /lpf    Bacteria NEGATIVE  NEG /hpf    UA:UC IF INDICATED URINE CULTURE ORDERED (A) CNI      Mucus 1+ (A) NEG /lpf    Hyaline cast 5-10 0 - 5 /lpf

## 2017-08-31 NOTE — PROGRESS NOTES
pre chemo appt, labs and hydration today at 2 pm, C6D1 gemzar/avastin on 9/5/17 at [facility]

## 2017-08-31 NOTE — PROGRESS NOTES
[address] [place] [address] Mathias Moritz [address], [address] [facility]  [phone]  MD Jeanette Awan MD  Office Note  Patient ID:  Name:  Jono Ceron  MRN:  [id number]  :  1960/56 y.o. Date:  2017      HISTORY OF PRESENT ILLNESS:  Jono Ceron is a 61 y.o.  postmenopausal female with a diagnosis of stage IV ovarian cancer. She underwent exploratory laparotomy with suboptimal debulking in 2015. She had extensive disease. She was readmitted about a week later with obstruction and subsequently had a cecal perforation, requiring resection, end ileostomy, and mucous fistula. During the hospitalization she was also diagnosed with pulmonary embolus and had chest tubes placed for bilateral pleural effusions. She had a prolonged hospital course and actually received a dose of cisplatin chemotherapy while in the hospital to help dry up the effusions. She completed 6 cycles of Taxol/Carboplatin chemotherapy following her initial debulking. She  did relatively well, considering she had an ileostomy to deal with during chemotherapy. I took her to the OR on 16 for interval debulking and reversal of her ileostomy. She had extensive disease, but we were able to resect the majority of her disease. One week later she developed an anastomotic leak requiring reexploration and recreation of an ileostomy. She had another prolonged hospitalization, but recovered well since surgery. We then reinitiated Taxol/Carboplatin chemotherapy, but did reduce the dose of Taxol to 135 mg/m2. She completed 4 mores cycles. Her CA-125 normalized and we stopped treatment. She now has evidence of recurrent disease and was receiving Doxil/Avastin. She has completed 3 cycles of that regimen. She has been admitted to [facility] several time since starting this regimen with evidence of partial small bowel obstruction.   The symptoms have resolved each time with conservative management. She had increased ascites on her last scan, but no significant change in her disease otherwise. Her CA-125 also went up. All of this suggests progression of disease. We switched her chemotherapy to [facility]. She has completed 5 cycles so far. She has been having a lot of dysphagia and has seen her gastroenterologist in [facility]. He has backed her down to a liquid diet. Her ostomy had been working well and was showing no signs of obstruction, until earlier this week. She was briefly admitted to [facility] with pain and a CT demonstrated partial SBO. Her symptoms resolved within 24 hours and her ostomy began working again and her pain resolved. She presents today for a pre-chemo visit. She feels like she is doing much better at this time. Her pain has improved and she has a lot more energy than before.       Problem List:  Patient Active Problem List    Diagnosis Date Noted    Intractable vomiting with nausea 08/10/2017    Febrile illness 08/10/2017    Ovarian cancer ([address] 08/09/2017    Nausea & vomiting 08/09/2017    Carcinomatosis ([address] 08/09/2017    Hydronephrosis of left kidney 06/28/2017    Abdominal pain 06/27/2017    Hypomagnesemia 06/13/2017    Erosive esophagitis 06/07/2017    Ulcerative esophagitis 06/07/2017    Isolated proteinuria 05/25/2017    Malignant ascites 05/08/2017    Hydronephrosis of right kidney 05/08/2017    Protein calorie malnutrition ([address] 04/28/2017    Bowel obstruction ([address] 04/27/2017    Counseling regarding end of life decision making 04/25/2017    Anticoagulation adequate with anticoagulant therapy 03/28/2017    Chemotherapy-induced neutropenia (HCC) 03/22/2017    Ileus ([address] 03/21/2017    Thrombosis of pelvic vein 03/21/2017    Hx of pulmonary embolus 03/21/2017    Dehydration 03/21/2017    Cancer associated pain 05/13/2016    Generalized abdominal pain 05/13/2016    Anxiety about health 05/13/2016    CINV (chemotherapy-induced nausea and vomiting) 03/22/2016    Anemia due to chemotherapy 02/22/2016    Ileostomy in place [facility]) 02/15/2016    Carcinomatosis peritonei ([address] 10/29/2015    Small bowel obstruction ([address] 10/29/2015    Malignant neoplasm of both ovaries ([address] 10/12/2015     PMH:  Past Medical History:   Diagnosis Date    Abdominal carcinomatosis ([address] 10/2015    Arthritis     left shoulder    BRCA negative 12/2015    Chronic pain     Depression     HX    Environmental allergies     GERD (gastroesophageal reflux disease)     Hypertension     NEW OVER PAST 5-6 MONTHS    Ovarian cancer ([address] 10/2015    serous adenocarcinoma, high grade, stage IIIC    Pulmonary embolism ([address] 10/2015      PSH:  Past Surgical History:   Procedure Laterality Date    CARDIAC SURG PROCEDURE UNLIST  12/11/15    cardiac cath/normal     HX BREAST BIOPSY  1995    benign right breast bx    HX DILATION AND CURETTAGE  2/2011    POLYPECTOMY    HX GI  2015    PERFORATED BOWEL; ILEOSTOMY    HX GYN  10/2015    EXP LAPAROTOMY    HX HEENT  LAST 2005    oral tissue graft X3    HX HEENT  1970    tonsillectomy    HX LAPAROTOMY  10/2015    tumor sampling    HX LAPAROTOMY  4/2016    hysterectomy, BSO, radical debulking    HX LAPAROTOMY  5/2016    resection ileocolic anastomosis, leak, ileostomy    HX OTHER SURGICAL  11/2015    tunneled portacath      Social History:  Social History   Substance Use Topics    Smoking status: Never Smoker    Smokeless tobacco: Never Used    Alcohol use No      Family History:  Family History   Problem Relation Age of Onset    Cancer Maternal Uncle      skin    Hypertension Mother     High Cholesterol Mother     Anxiety Mother     Heart Disease Mother     High Cholesterol Father     Hypertension Father     Stroke Father     Arthritis-osteo Sister    [address] [facility] Migraines Sister     Other Sister      FIBROMYALGIA    Depression Brother     Other Brother      DRUG ABUSE HEPATITIS C    Migraines Sister     Arrhythmia Sister     Depression Sister     Lung Disease Paternal Aunt      COPD    Cancer Paternal Uncle      LUNG    Lung Disease Paternal Uncle      COPD    Lung Disease Maternal Grandmother      COPD    Anesth Problems Neg Hx       Medications: (reviewed)  Current Outpatient Prescriptions   Medication Sig    promethazine (PHENERGAN) 6.25 mg/5 mL syrup Take 20 mL by mouth four (4) times daily as needed for Nausea.  HYDROmorphone (DILAUDID) 1 mg/mL liqd oral solution Take 4 mL by mouth every four (4) hours as needed. Max Daily Amount: 24 mg.    fentaNYL (DURAGESIC) 100 mcg/hr PATCH 1 Patch by TransDERmal route every seventy-two (72) hours. Max Daily Amount: 1 Patch.  NEXIUM PACKET 40 mg granules for oral suspension two (2) times a day.  metoclopramide (REGLAN) 5 mg/5 mL syrup Take 10 mL by mouth four (4) times daily as needed. Take before meals as needed    magnesium oxide (MAG-OX) 400 mg tablet Take 1 Tab by mouth two (2) times a day. Indications: HYPOMAGNESEMIA    sucralfate (CARAFATE) 100 mg/mL suspension Take 5 mL by mouth four (4) times daily.  polyethylene glycol (MIRALAX) 17 gram/dose powder Take 17 g by mouth daily as needed.  apixaban (ELIQUIS) 2.5 mg tablet Take 1 Tab by mouth two (2) times a day.  Calcium-Vitamin D3-Vitamin K 820-896-97 mg-unit-mcg chew Take 1 Tab by mouth two (2) times a day.  metoprolol (LOPRESSOR) 100 mg tablet Take 50 mg by mouth two (2) times a day.  scopolamine (TRANSDERM-SCOP) 1.5 mg (1 mg over 3 days) pt3d 1 Patch by TransDERmal route every seventy-two (72) hours as needed.  zolpidem (AMBIEN) 10 mg tablet Take 1 Tab by mouth nightly as needed for Sleep. Max Daily Amount: 10 mg.    ondansetron (ZOFRAN ODT) 4 mg disintegrating tablet Take 1 Tab by mouth every eight (8) hours as needed for Nausea.     lidocaine-prilocaine (EMLA) topical cream Apply small amount over port area and cover with a band aid 1 hour before chemo treatment     No current facility-administered medications for this visit. Facility-Administered Medications Ordered in Other Visits   Medication Dose Route Frequency    sodium chloride 0.9 % bolus infusion 1,000 mL  1,000 mL IntraVENous ONCE     Allergies: (reviewed)  Allergies   Allergen Reactions    Ativan [Lorazepam] Other (comments)     SEVERE CONFUSION          OBJECTIVE:    Physical Exam:  VITAL SIGNS: Vitals:    08/31/17 1311   BP: 101/56   Pulse: (!) 59   Weight: 50.3 kg (111 lb)   Height: 5' 3\" (1.6 m)     Body mass index is 19.66 kg/(m^2). GENERAL AVA: Conversant, alert, oriented. No acute distress. HEENT: HEENT. No thyroid enlargement. No JVD. Neck: Supple without restrictions. RESPIRATORY: Clear to auscultation and percussion to the bases. No CVAT. CARDIOVASC: RRR without murmur/rub. GASTROINT: soft, non-tender, without masses or organomegaly; ileostomy in RLQ   MUSCULOSKEL: no joint tenderness, deformity or swelling   EXTREMITIES: extremities normal, atraumatic, no cyanosis or edema   PELVIC: Deferred   RECTAL: Deferred   JOSE SURVEY: No suspicious lymphadenopathy or edema noted. NEURO: Grossly intact. No acute deficit. Lab Results   Component Value Date/Time    WBC 4.1 08/22/2017 09:56 AM    HGB 8.9 08/22/2017 09:56 AM    HCT 28.3 08/22/2017 09:56 AM    PLATELET 081 59/26/1467 09:56 AM    MCV 95.9 08/22/2017 09:56 AM     Lab Results   Component Value Date/Time    Sodium 137 08/22/2017 09:56 AM    Potassium 3.9 08/22/2017 09:56 AM    Chloride 103 08/22/2017 09:56 AM    CO2 28 08/22/2017 09:56 AM    Anion gap 6 08/22/2017 09:56 AM    Glucose 82 08/22/2017 09:56 AM    BUN 14 08/22/2017 09:56 AM    Creatinine 0.89 08/22/2017 09:56 AM    BUN/Creatinine ratio 16 08/22/2017 09:56 AM    GFR est AA >60 08/22/2017 09:56 AM    GFR est non-AA >60 08/22/2017 09:56 AM    Calcium 9.2 08/22/2017 09:56 AM       CT of chest/abdomen/pelvis (6/27/17)  LUNG BASES: Clear.   INCIDENTALLY IMAGED HEART AND MEDIASTINUM: Unremarkable. LIVER: Small hypodense lesions, one in the left hepatic lobe and one in the  right hepatic lobe are stable. GALLBLADDER: Unremarkable. SPLEEN: No mass. PANCREAS: No mass or ductal dilatation. ADRENALS: Unremarkable. KIDNEYS: Hydronephrosis is moderate and stable on the right and mild to moderate  on the left and new, to the level of the mid ureters. There is no definite  obstructing calculus on either side. STOMACH: Unremarkable. SMALL BOWEL: Previously described small bowel centralization is stable. Small  bowel loops are dilated but roughly stable in appearance, with a maximum caliber  of 3.1 cm. Pattern of dilatation is similar to the prior study. Small bowel is  fluid-filled to the level of the ostomy in the right anterior abdominal wall. COLON: Visualized colon shows no distention, but the colon is predominantly  absent. APPENDIX: Surgically absent  PERITONEUM: Small amount of ascites, stable to slightly decreased since the  prior study. RETROPERITONEUM: No lymphadenopathy or aortic aneurysm. IVC filter stable. REPRODUCTIVE ORGANS: Surgically absent. URINARY BLADDER: No mass or calculus. BONES: No destructive bone lesion. ADDITIONAL COMMENTS: N/A     IMPRESSION:  1. Mild to moderate small bowel distention with centralization of loops as  previously described, similar in appearance to prior study 5/7/2017.  2. Small to moderate amount of ascites, stable. 3. Postoperative changes as described above, stable. 4. Stable hypodense liver lesions. 5. Stable right hydronephrosis to the mid ureter without obvious obstructing  calculus. 6. New left hydronephrosis to the mid ureter without obvious obstructing  calculus. IMPRESSION/PLAN:  Chaparro Abbott is a 61 y.o. female with a diagnosis of stage IV ovarian cancer. She is s/p suboptimal debulking, followed by a second laparotomy for cecal perforation.   She completed 6 cycles of Taxol/Carboplatin chemotherapy, in addition to the one dose of cisplatin given during her initial hospitalization. Based upon her CT and her CA-125, she responded well, though there was still disease remaining. She underwent interval debulking with resection of the majority of her disease, although there was still small volume carcinomatosis remaining. I recommended continuing with Taxol/Carboplatin, as she did respond well, despite still having residual disease at her interval debulking. I dropped the dose of Taxol to 135 mg/m2 to reduce the chance of neuropathy, but kept the carboplatin dose at an AUC of 6. She completed 4 more cycles and her CA-125 normalized. We then stopped therapy. She subsequently developed recurrent disease and I recommended Doxil/Avastin, considering it had been less than 6 months since we stopped Taxol/Carboplatin. She completed 3 cycles of that regimen but found to have progression of disease. I went over different options with her, specifically Gemzar and Topotecan. I recommended Gemzar, but I also suggested that we continue the Avastin, as that might help with the ascites. She has completed 5 cycles so far. Her CA-125 continues to respond. We will continue with the current regimen and will repeat a CT after this cycle.       Signed By: Hector Velasquez MD     8/31/2017/10:35 AM

## 2017-09-01 ENCOUNTER — TELEPHONE (OUTPATIENT)
Dept: GYNECOLOGY | Age: 57
End: 2017-09-01

## 2017-09-01 LAB
BACTERIA SPEC CULT: NORMAL
CANCER AG125 SERPL-ACNC: 87 U/ML (ref 0–30)
CC UR VC: NORMAL
SERVICE CMNT-IMP: NORMAL

## 2017-09-03 RX ORDER — SODIUM CHLORIDE 9 MG/ML
10 INJECTION INTRAMUSCULAR; INTRAVENOUS; SUBCUTANEOUS AS NEEDED
Status: CANCELLED | OUTPATIENT
Start: 2017-09-03 | End: 2017-09-04

## 2017-09-03 RX ORDER — HEPARIN 100 UNIT/ML
500 SYRINGE INTRAVENOUS AS NEEDED
Status: CANCELLED | OUTPATIENT
Start: 2017-09-03 | End: 2017-09-04

## 2017-09-03 RX ORDER — SODIUM CHLORIDE 0.9 % (FLUSH) 0.9 %
10-40 SYRINGE (ML) INJECTION AS NEEDED
Status: CANCELLED | OUTPATIENT
Start: 2017-09-03 | End: 2017-09-04

## 2017-09-03 RX ORDER — SODIUM CHLORIDE 9 MG/ML
25 INJECTION, SOLUTION INTRAVENOUS AS NEEDED
Status: CANCELLED | OUTPATIENT
Start: 2017-09-03 | End: 2017-09-04

## 2017-09-05 ENCOUNTER — HOSPITAL ENCOUNTER (OUTPATIENT)
Dept: INFUSION THERAPY | Age: 57
Discharge: HOME | End: 2017-09-05
Payer: OTHER GOVERNMENT

## 2017-09-05 VITALS
HEIGHT: 63 IN | WEIGHT: 111.8 LBS | OXYGEN SATURATION: 99 % | DIASTOLIC BLOOD PRESSURE: 70 MMHG | HEART RATE: 61 BPM | RESPIRATION RATE: 18 BRPM | SYSTOLIC BLOOD PRESSURE: 111 MMHG | BODY MASS INDEX: 19.81 KG/M2 | TEMPERATURE: 97.4 F

## 2017-09-05 PROCEDURE — 74011000250 HC RX REV CODE- 250: Performed by: OBSTETRICS & GYNECOLOGY

## 2017-09-05 PROCEDURE — 74011250636 HC RX REV CODE- 250/636: Performed by: OBSTETRICS & GYNECOLOGY

## 2017-09-05 PROCEDURE — 74011250636 HC RX REV CODE- 250/636

## 2017-09-05 PROCEDURE — 74011000258 HC RX REV CODE- 258: Performed by: OBSTETRICS & GYNECOLOGY

## 2017-09-05 PROCEDURE — 96417 CHEMO IV INFUS EACH ADDL SEQ: CPT

## 2017-09-05 PROCEDURE — 96375 TX/PRO/DX INJ NEW DRUG ADDON: CPT

## 2017-09-05 PROCEDURE — 77030012965 HC NDL HUBR BBMI -A

## 2017-09-05 PROCEDURE — 96361 HYDRATE IV INFUSION ADD-ON: CPT

## 2017-09-05 PROCEDURE — 96413 CHEMO IV INFUSION 1 HR: CPT

## 2017-09-05 PROCEDURE — 96366 THER/PROPH/DIAG IV INF ADDON: CPT

## 2017-09-05 RX ORDER — SODIUM CHLORIDE 9 MG/ML
25 INJECTION, SOLUTION INTRAVENOUS AS NEEDED
Status: DISPENSED | OUTPATIENT
Start: 2017-09-05 | End: 2017-09-06

## 2017-09-05 RX ORDER — GRANISETRON HYDROCHLORIDE 1 MG/ML
1 INJECTION INTRAVENOUS ONCE
Status: COMPLETED | OUTPATIENT
Start: 2017-09-05 | End: 2017-09-05

## 2017-09-05 RX ORDER — HEPARIN 100 UNIT/ML
500 SYRINGE INTRAVENOUS AS NEEDED
Status: ACTIVE | OUTPATIENT
Start: 2017-09-05 | End: 2017-09-06

## 2017-09-05 RX ORDER — SODIUM CHLORIDE 9 MG/ML
10 INJECTION INTRAMUSCULAR; INTRAVENOUS; SUBCUTANEOUS AS NEEDED
Status: ACTIVE | OUTPATIENT
Start: 2017-09-05 | End: 2017-09-06

## 2017-09-05 RX ORDER — DEXAMETHASONE SODIUM PHOSPHATE 4 MG/ML
8 INJECTION, SOLUTION INTRA-ARTICULAR; INTRALESIONAL; INTRAMUSCULAR; INTRAVENOUS; SOFT TISSUE ONCE
Status: COMPLETED | OUTPATIENT
Start: 2017-09-05 | End: 2017-09-05

## 2017-09-05 RX ORDER — SODIUM CHLORIDE 0.9 % (FLUSH) 0.9 %
10-40 SYRINGE (ML) INJECTION AS NEEDED
Status: ACTIVE | OUTPATIENT
Start: 2017-09-05 | End: 2017-09-06

## 2017-09-05 RX ADMIN — BEVACIZUMAB 755 MG: 400 INJECTION, SOLUTION INTRAVENOUS at 13:04

## 2017-09-05 RX ADMIN — Medication 10 ML: at 14:55

## 2017-09-05 RX ADMIN — SODIUM CHLORIDE 150 MG: 900 INJECTION, SOLUTION INTRAVENOUS at 12:06

## 2017-09-05 RX ADMIN — SODIUM CHLORIDE 10 ML: 9 INJECTION INTRAMUSCULAR; INTRAVENOUS; SUBCUTANEOUS at 10:35

## 2017-09-05 RX ADMIN — SODIUM CHLORIDE 2000 ML: 900 INJECTION, SOLUTION INTRAVENOUS at 11:05

## 2017-09-05 RX ADMIN — FAMOTIDINE 20 MG: 10 INJECTION INTRAVENOUS at 12:48

## 2017-09-05 RX ADMIN — Medication 500 UNITS: at 14:55

## 2017-09-05 RX ADMIN — SODIUM CHLORIDE 25 ML/HR: 900 INJECTION, SOLUTION INTRAVENOUS at 10:38

## 2017-09-05 RX ADMIN — DEXAMETHASONE SODIUM PHOSPHATE 8 MG: 4 INJECTION, SOLUTION INTRA-ARTICULAR; INTRALESIONAL; INTRAMUSCULAR; INTRAVENOUS; SOFT TISSUE at 12:38

## 2017-09-05 RX ADMIN — GRANISETRON HYDROCHLORIDE 1 MG: 1 INJECTION INTRAVENOUS at 12:57

## 2017-09-05 RX ADMIN — SODIUM CHLORIDE 1200 MG: 900 INJECTION, SOLUTION INTRAVENOUS at 13:44

## 2017-09-05 RX ADMIN — Medication 10 ML: at 10:35

## 2017-09-05 NOTE — PROGRESS NOTES
Matthew Perdomo. Wall, NP    Infusion Date: 9/5/2017     MAHSA Whitney is a 61 y.o.  postmenopausal female with a diagnosis of stage IV ovarian cancer. She underwent exploratory laparotomy with suboptimal debulking. She had extensive disease. She was readmitted about a week later with obstruction and subsequently had a cecal perforation, requiring resection, end ileostomy, and mucous fistula. During the hospitalization she was also diagnosed with pulmonary embolus and had chest tubes placed for bilateral pleural effusions. She had a prolonged hospital course and actually received a dose of cisplatin chemotherapy while in the hospital to help dry up the effusions. She completed 6 cycles of Taxol/Carboplatin chemotherapy following her initial debulking. She did relatively well, considering she had an ileostomy to deal with during chemotherapy. I took her to the OR on 4/29/16 for interval debulking and reversal of her ileostomy. She had extensive disease, but we were able to resect the majority of her disease. One week later she developed an anastomotic leak requiring reexploration and recreation of an ileostomy. She had another prolonged hospitalization, but recovered well since surgery. We then reinitiated Taxol/Carboplatin chemotherapy, but did reduce the dose of Taxol to 135 mg/m2. She completed 4 mores cycles. Her CA-125 normalized and we stopped treatment.      On 2/7, she presented to Dr. Clarissa Del Rio office with her  to discuss her follow up/surveillance CT scan. Unfortunately, it demonstrated progression of disease, although minimally worse than her prior scan 2 months previously. She was actually feeling better than she did at that time. After review of scans and disease, the decision was made to begin Doxil/Avastin Q28 days for 6 cycles. She began this on 2/27/2017.  In April 2017, she had completed 3 cycles of Doxil/Avastin and had CT scans at that time showed she had evidence of recurrent disease. She was switched at that time to [facility]. She has been admitted to [facility] several times for partial SBO and all have resolved with consecutive management           Subjective:   Pt presents today for cycle 6 D1 of Gemzar/Avastin. Pt had \"1-2 days of pain and decreased output to my bag, but I took the Reglan and really decreased my eating and it improved on its own\". Says bag functioning well today  Pt continues to feel \"run down\" from this treatment. She states \"it is just getting to me at this point and I am ready for it to be over with\". She is happy about beginning her last cycle.  remains supportive at side. Pt is slightly upset by her rising CA-125. Feels the dilaudid with the Fentanyl patch is holding her pain well        Review of Systems:  General: wt is more stable this week. HEENT: Denies visual changes, dysphagia or headache  Resp: Denies SOB, DUDLEY, wheezing or cough  CV: Denies CP, palpitations  GI/: Acknowledges continued abd pain (see above) but better controlled with new regimen. Denies N/V at present. Does get it at times, but says the Zofran is still working well for her. Continues with protein shakes. She says ostomy is functioning well with prn Reglan and Miralax prn  MuskSkel: Denies muscle ache or joint pain  Neuro: Denies dizzyness or syncope    Objective:     Blood pressure 111/70, pulse 61, temperature 97.4 °F (36.3 °C), resp. rate 18, height 5' 3\" (1.6 m), weight 111 lb 12.8 oz (50.7 kg), SpO2 99 %, not currently breastfeeding. Temp (24hrs), Av.4 °F (36.3 °C), Min:97.4 °F (36.3 °C), Max:97.4 °F (36.3 °C)      _____________________  Physical Exam:     General: A&O X 3. With pleasant affect, but, again, tired appearing. HEENT: Oral mucosa pink, slightly dry with no sores or lesions noted.  No cervical adenopathy appreciated  Port site easily accessed without redness, tenderness or swelling  Cardiovascular: Regular without M/R/G  Lungs:CTA bilat without wheezing or rales  Abdomen: Soft with mild tenderness to palpation to left lower/mid abdomen and + bowel sounds throughout. Mild firmness persists, unchanged, to left mid abd. Ext: + pedal pulses without edema bilat. bilat  Neuro: grossly intact      :8/22/2017: Pending    (8/9/2017= 83; 7/20/2017= 65; 6/28/17=95.5;  6/8/17= 102; 5/16/17= 145.6:  4/27= 100; 4/2196=067.2 3/22=80; 2/23= 63.9)      No results found for this or any previous visit (from the past 12 hour(s)). PATHOLOGY from 5/10/2017 Upper Endoscopy:  1. Gastric polyp, polypectomy:   Benign polypoid gastric oxyntic mucosa with some features of hyperplastic polyp   2. Lower esophagus, biopsy:   Acute erosive esophagitis of squamous mucosa with ulceration and severe squamous atypia (see comment)   3. Proximal esophagus, biopsy:   Acute esophagitis of squamous mucosa with severe squamous atypia           Imaging:  CT Abd/Pelvis 5/7/2017:  FINDINGS:   LUNG BASES: Left lung base subpleural scarring is unchanged. No pneumonia. INCIDENTALLY IMAGED HEART AND MEDIASTINUM: Cardiac size is within normal limits. No pericardial effusion. Small hiatal hernia is unchanged. LIVER: Small segment 8 and segment 3 hypoenhancing liver lesions are unchanged. GALLBLADDER: Unremarkable. SPLEEN: No mass. PANCREAS: No mass or ductal dilatation. ADRENALS: Unremarkable. KIDNEYS: Right hydronephrosis is mild and new. Proximal right ureter is mildly  dilated. There is transition to nondilated ureter in the mid ureter. No renal  mass or left hydronephrosis. STOMACH: Unremarkable. SMALL BOWEL: Small bowel loops in the lower pelvis measuring up to 2.7 cm in  diameter. Contrast extends through the small bowel into the right lower quadrant  ileostomy. Small bowel centralization is unchanged. COLON: Majority of the colon has been resected.  Distal sigmoid colon and rectum  are nondistended. APPENDIX: Resected. PERITONEUM: Small volume of ascites is slightly increased. Subtle non masslike  enhancement of the peritoneum is unchanged. No pneumoperitoneum. RETROPERITONEUM: No lymphadenopathy. IVC filter is unchanged. REPRODUCTIVE ORGANS: Uterus is been resected. Ovaries have been resected. No  pelvic mass. URINARY BLADDER: No mass or calculus. BONES: No destructive bone lesion. ADDITIONAL COMMENTS: N/A     IMPRESSION  IMPRESSION:     1. New mild right hydronephrosis is likely secondary to ureteral obstruction in  the mid ureter. No ureteral calculus. 2. Mild small bowel distention may represent early partial small bowel  obstruction. No complete bowel obstruction. 3. Increased small volume of ascites. Unchanged non masslike peritoneal  enhancement. 4. Unchanged small liver lesions. CT scan of Abdomen/Pelvis with contrast 6/27/2017  FINDINGS:   LUNG BASES: Clear. INCIDENTALLY IMAGED HEART AND MEDIASTINUM: Unremarkable. LIVER: Small hypodense lesions, one in the left hepatic lobe and one in the  right hepatic lobe are stable. GALLBLADDER: Unremarkable. SPLEEN: No mass. PANCREAS: No mass or ductal dilatation. ADRENALS: Unremarkable. KIDNEYS: Hydronephrosis is moderate and stable on the right and mild to moderate  on the left and new, to the level of the mid ureters. There is no definite  obstructing calculus on either side. STOMACH: Unremarkable. SMALL BOWEL: Previously described small bowel centralization is stable. Small  bowel loops are dilated but roughly stable in appearance, with a maximum caliber  of 3.1 cm. Pattern of dilatation is similar to the prior study. Small bowel is  fluid-filled to the level of the ostomy in the right anterior abdominal wall. COLON: Visualized colon shows no distention, but the colon is predominantly  absent.   APPENDIX: Surgically absent  PERITONEUM: Small amount of ascites, stable to slightly decreased since the  prior study.  RETROPERITONEUM: No lymphadenopathy or aortic aneurysm. IVC filter stable. REPRODUCTIVE ORGANS: Surgically absent. URINARY BLADDER: No mass or calculus. BONES: No destructive bone lesion. ADDITIONAL COMMENTS: N/A       IMPRESSION:     1. Mild to moderate small bowel distention with centralization of loops as  previously described, similar in appearance to prior study 5/7/2017.  2. Small to moderate amount of ascites, stable. 3. Postoperative changes as described above, stable. 4. Stable hypodense liver lesions. 5. Stable right hydronephrosis to the mid ureter without obvious obstructing  calculus.   6. New left hydronephrosis to the mid ureter without obvious obstructing  calculus.       Assessment:     Patient Active Problem List   Diagnosis Code    Malignant neoplasm of both ovaries ([facility] [address] C56.1, C56.2    Carcinomatosis peritonei ([address] C78.6, C80.1    Small bowel obstruction (HCC) K56.69    Ileostomy in place ([facility] [address] Z93.2    Anemia due to chemotherapy D64.81, T45.1X5A    CINV (chemotherapy-induced nausea and vomiting) R11.2, T45.1X5A    Cancer associated pain G89.3    Generalized abdominal pain R10.84    Anxiety about health F41.8    Ileus (HCC) K56.7    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    Hx of pulmonary embolus Z86.711    Dehydration E86.0    Chemotherapy-induced neutropenia (HCC) D70.1    Anticoagulation adequate with anticoagulant therapy Z79.01    Counseling regarding end of life decision making Z71.89    Bowel obstruction (HCC) K56.60    Protein calorie malnutrition (HCC) E46    Malignant ascites R18.0    Hydronephrosis of right kidney N13.30    Isolated proteinuria R80.0    Erosive esophagitis K22.10    Ulcerative esophagitis K22.10    Hypomagnesemia E83.42    Abdominal pain R10.9    Hydronephrosis of left kidney N13.30    Ovarian cancer (HCC) C56.9    Nausea & vomiting R11.2    Carcinomatosis (HCC) C80.0    Intractable vomiting with nausea R11.2    Febrile illness R50.9         Plan:   Proceed with C6D1 of Gemzar/Avastin   Continue to monitor hgb and will transfuses for hgb less than 8  Will monitor urine protein. Again, reviewed bleeding precautions with her and especially with Eliquis/Avastin. Encouaged to continue protein shakes  She will continue with GI doctor prn. Reglan prn as needed and she will let me know if she needs to begin it. Hydration encouraged  Continue Zofran for nausea PRN   Continue Eliquis for previous PE. She will continue phazyme for gas as needed  Continue Miralax to try to prevent her post chemotherapy constipation  She will call for any concerns or questions  Hydration encouraged best she is able. Timur Morales NP  9/5/2017          ______________________  Medications:    Current Outpatient Prescriptions   Medication Sig Dispense Refill    promethazine (PHENERGAN) 6.25 mg/5 mL syrup Take 20 mL by mouth four (4) times daily as needed for Nausea. 400 mL 4    HYDROmorphone (DILAUDID) 1 mg/mL liqd oral solution Take 4 mL by mouth every four (4) hours as needed. Max Daily Amount: 24 mg. 475 mL 0    fentaNYL (DURAGESIC) 100 mcg/hr PATCH 1 Patch by TransDERmal route every seventy-two (72) hours. Max Daily Amount: 1 Patch. 10 Patch 0    scopolamine (TRANSDERM-SCOP) 1.5 mg (1 mg over 3 days) pt3d 1 Patch by TransDERmal route every seventy-two (72) hours as needed. 10 Patch 1    NEXIUM PACKET 40 mg granules for oral suspension two (2) times a day.  metoclopramide (REGLAN) 5 mg/5 mL syrup Take 10 mL by mouth four (4) times daily as needed. Take before meals as needed 500 mL 3    magnesium oxide (MAG-OX) 400 mg tablet Take 1 Tab by mouth two (2) times a day. Indications: HYPOMAGNESEMIA 60 Tab 2    sucralfate (CARAFATE) 100 mg/mL suspension Take 5 mL by mouth four (4) times daily. 414 mL 3    polyethylene glycol (MIRALAX) 17 gram/dose powder Take 17 g by mouth daily as needed.  238 g 3    apixaban (ELIQUIS) 2.5 mg tablet Take 1 Tab by mouth two (2) times a day. 60 Tab 6    zolpidem (AMBIEN) 10 mg tablet Take 1 Tab by mouth nightly as needed for Sleep. Max Daily Amount: 10 mg. 30 Tab 1    ondansetron (ZOFRAN ODT) 4 mg disintegrating tablet Take 1 Tab by mouth every eight (8) hours as needed for Nausea. 30 Tab 2    Calcium-Vitamin D3-Vitamin K 428-733-35 mg-unit-mcg chew Take 1 Tab by mouth two (2) times a day.  metoprolol (LOPRESSOR) 100 mg tablet Take 50 mg by mouth two (2) times a day.       lidocaine-prilocaine (EMLA) topical cream Apply small amount over port area and cover with a band aid 1 hour before chemo treatment 30 g 3     Current Facility-Administered Medications   Medication Dose Route Frequency Provider Last Rate Last Dose    0.9% sodium chloride infusion  25 mL/hr IntraVENous PRN Odell Anderson MD   Stopped at 09/05/17 1455    sodium chloride (NS) flush 10-40 mL  10-40 mL IntraVENous PRN Odell Anderson MD   10 mL at 09/05/17 1455    heparin (porcine) pf 500 Units  500 Units IntraVENous PRN Odell Anderson MD   500 Units at 09/05/17 1455    sodium chloride 0.9 % injection 10 mL  10 mL IntraVENous PRN Odell Anderson MD   10 mL at 09/05/17 1037

## 2017-09-05 NOTE — PROGRESS NOTES
1015 Pt admit to [facility] for Cycle 6 D 1 Gemzar/Avastin ambulatory in stable condition. Accompanied by supportive family. Assessment completed, functioning ileostomy, continues to have neuropathy, nausea, tinnitis in left ear, fatigue. No new concerns voiced. Port accessed with positive blood return. Labs drawn prior to admission, reviewed. chemo doses checked. Normal Saline 2 Liters infused per order. Premeds given. Seen chairside by CARLA Don NP    Visit Vitals    BP 94/60 (BP 1 Location: Right arm, BP Patient Position: Sitting)    Pulse 68    Temp 97.4 °F (36.3 °C)    Resp 18    Ht 5' 3\" (1.6 m)    Wt 50.7 kg (111 lb 12.8 oz)    SpO2 97%    BMI 19.8 kg/m2       Medications:  Normal Saline 2 Liters  Pepcid 20 MG  Kytril 1 MG  Decadron 8 MG  Emend 150 MG  Gemzar 1200 MG  Avastin 755 MG    1455 Pt tolerated treatment well. Port maintained positive blood return throughout treatment, flushed with positive blood return at conclusion and heparinized per protocol. Michelle Trujillo removed. D/c home ambulatory in no distress, accompanied by family. Pt aware of next appointment scheduled for 9/12/17.

## 2017-09-07 RX ORDER — DEXAMETHASONE SODIUM PHOSPHATE 4 MG/ML
8 INJECTION, SOLUTION INTRA-ARTICULAR; INTRALESIONAL; INTRAMUSCULAR; INTRAVENOUS; SOFT TISSUE ONCE
Status: COMPLETED | OUTPATIENT
Start: 2017-09-12 | End: 2017-09-12

## 2017-09-07 RX ORDER — GRANISETRON HYDROCHLORIDE 1 MG/ML
1 INJECTION INTRAVENOUS ONCE
Status: COMPLETED | OUTPATIENT
Start: 2017-09-12 | End: 2017-09-12

## 2017-09-12 ENCOUNTER — HOSPITAL ENCOUNTER (OUTPATIENT)
Dept: INFUSION THERAPY | Age: 57
Discharge: HOME | End: 2017-09-12
Payer: OTHER GOVERNMENT

## 2017-09-12 VITALS
HEART RATE: 65 BPM | BODY MASS INDEX: 19.67 KG/M2 | DIASTOLIC BLOOD PRESSURE: 78 MMHG | TEMPERATURE: 98.3 F | HEIGHT: 63 IN | RESPIRATION RATE: 18 BRPM | SYSTOLIC BLOOD PRESSURE: 135 MMHG | WEIGHT: 111 LBS

## 2017-09-12 DIAGNOSIS — T45.1X5A ANEMIA DUE TO CHEMOTHERAPY: ICD-10-CM

## 2017-09-12 DIAGNOSIS — R10.84 GENERALIZED ABDOMINAL PAIN: ICD-10-CM

## 2017-09-12 DIAGNOSIS — T45.1X5A CINV (CHEMOTHERAPY-INDUCED NAUSEA AND VOMITING): ICD-10-CM

## 2017-09-12 DIAGNOSIS — C78.6 CARCINOMATOSIS PERITONEI (HCC): ICD-10-CM

## 2017-09-12 DIAGNOSIS — T45.1X5A CHEMOTHERAPY-INDUCED NEUTROPENIA (HCC): ICD-10-CM

## 2017-09-12 DIAGNOSIS — G89.3 CANCER ASSOCIATED PAIN: ICD-10-CM

## 2017-09-12 DIAGNOSIS — C56.9 OVARIAN CANCER, UNSPECIFIED LATERALITY (HCC): ICD-10-CM

## 2017-09-12 DIAGNOSIS — F41.8 ANXIETY ABOUT HEALTH: ICD-10-CM

## 2017-09-12 DIAGNOSIS — D64.81 ANEMIA DUE TO CHEMOTHERAPY: ICD-10-CM

## 2017-09-12 DIAGNOSIS — C56.3 MALIGNANT NEOPLASM OF BOTH OVARIES (HCC): ICD-10-CM

## 2017-09-12 DIAGNOSIS — R11.2 CINV (CHEMOTHERAPY-INDUCED NAUSEA AND VOMITING): ICD-10-CM

## 2017-09-12 DIAGNOSIS — E87.1 HYPONATREMIA: ICD-10-CM

## 2017-09-12 DIAGNOSIS — Z93.2 ILEOSTOMY IN PLACE (HCC): ICD-10-CM

## 2017-09-12 DIAGNOSIS — D70.1 CHEMOTHERAPY-INDUCED NEUTROPENIA (HCC): ICD-10-CM

## 2017-09-12 LAB
ALBUMIN SERPL-MCNC: 3.3 G/DL (ref 3.5–5)
ALBUMIN/GLOB SERPL: 0.8 {RATIO} (ref 1.1–2.2)
ALP SERPL-CCNC: 117 U/L (ref 45–117)
ALT SERPL-CCNC: 40 U/L (ref 12–78)
ANION GAP SERPL CALC-SCNC: 5 MMOL/L (ref 5–15)
AST SERPL-CCNC: 39 U/L (ref 15–37)
BASOPHILS # BLD: 0 K/UL (ref 0–0.1)
BASOPHILS NFR BLD: 0 % (ref 0–1)
BILIRUB SERPL-MCNC: 0.2 MG/DL (ref 0.2–1)
BUN SERPL-MCNC: 19 MG/DL (ref 6–20)
BUN/CREAT SERPL: 22 (ref 12–20)
CALCIUM SERPL-MCNC: 9.4 MG/DL (ref 8.5–10.1)
CHLORIDE SERPL-SCNC: 103 MMOL/L (ref 97–108)
CO2 SERPL-SCNC: 30 MMOL/L (ref 21–32)
CREAT SERPL-MCNC: 0.88 MG/DL (ref 0.55–1.02)
EOSINOPHIL # BLD: 0 K/UL (ref 0–0.4)
EOSINOPHIL NFR BLD: 1 % (ref 0–7)
ERYTHROCYTE [DISTWIDTH] IN BLOOD BY AUTOMATED COUNT: 16.9 % (ref 11.5–14.5)
GLOBULIN SER CALC-MCNC: 4.2 G/DL (ref 2–4)
GLUCOSE SERPL-MCNC: 81 MG/DL (ref 65–100)
HCT VFR BLD AUTO: 28.7 % (ref 35–47)
HGB BLD-MCNC: 9 G/DL (ref 11.5–16)
LYMPHOCYTES # BLD: 1.4 K/UL (ref 0.8–3.5)
LYMPHOCYTES NFR BLD: 36 % (ref 12–49)
MAGNESIUM SERPL-MCNC: 1.8 MG/DL (ref 1.6–2.4)
MCH RBC QN AUTO: 30.5 PG (ref 26–34)
MCHC RBC AUTO-ENTMCNC: 31.4 G/DL (ref 30–36.5)
MCV RBC AUTO: 97.3 FL (ref 80–99)
MONOCYTES # BLD: 0.6 K/UL (ref 0–1)
MONOCYTES NFR BLD: 15 % (ref 5–13)
NEUTS SEG # BLD: 1.9 K/UL (ref 1.8–8)
NEUTS SEG NFR BLD: 48 % (ref 32–75)
PLATELET # BLD AUTO: 289 K/UL (ref 150–400)
POTASSIUM SERPL-SCNC: 4.1 MMOL/L (ref 3.5–5.1)
PROT SERPL-MCNC: 7.5 G/DL (ref 6.4–8.2)
RBC # BLD AUTO: 2.95 M/UL (ref 3.8–5.2)
SODIUM SERPL-SCNC: 138 MMOL/L (ref 136–145)
WBC # BLD AUTO: 4 K/UL (ref 3.6–11)

## 2017-09-12 PROCEDURE — 85025 COMPLETE CBC W/AUTO DIFF WBC: CPT | Performed by: OBSTETRICS & GYNECOLOGY

## 2017-09-12 PROCEDURE — 80053 COMPREHEN METABOLIC PANEL: CPT | Performed by: OBSTETRICS & GYNECOLOGY

## 2017-09-12 PROCEDURE — 96360 HYDRATION IV INFUSION INIT: CPT

## 2017-09-12 PROCEDURE — 74011250636 HC RX REV CODE- 250/636: Performed by: OBSTETRICS & GYNECOLOGY

## 2017-09-12 PROCEDURE — 96361 HYDRATE IV INFUSION ADD-ON: CPT

## 2017-09-12 PROCEDURE — 96375 TX/PRO/DX INJ NEW DRUG ADDON: CPT

## 2017-09-12 PROCEDURE — 36415 COLL VENOUS BLD VENIPUNCTURE: CPT | Performed by: OBSTETRICS & GYNECOLOGY

## 2017-09-12 PROCEDURE — 96413 CHEMO IV INFUSION 1 HR: CPT

## 2017-09-12 PROCEDURE — 83735 ASSAY OF MAGNESIUM: CPT | Performed by: OBSTETRICS & GYNECOLOGY

## 2017-09-12 PROCEDURE — 74011000250 HC RX REV CODE- 250: Performed by: OBSTETRICS & GYNECOLOGY

## 2017-09-12 PROCEDURE — 74011000258 HC RX REV CODE- 258: Performed by: OBSTETRICS & GYNECOLOGY

## 2017-09-12 PROCEDURE — 77030012965 HC NDL HUBR BBMI -A

## 2017-09-12 RX ORDER — HEPARIN 100 UNIT/ML
500 SYRINGE INTRAVENOUS AS NEEDED
Status: ACTIVE | OUTPATIENT
Start: 2017-09-12 | End: 2017-09-13

## 2017-09-12 RX ORDER — SODIUM CHLORIDE 0.9 % (FLUSH) 0.9 %
5-10 SYRINGE (ML) INJECTION AS NEEDED
Status: ACTIVE | OUTPATIENT
Start: 2017-09-12 | End: 2017-09-13

## 2017-09-12 RX ORDER — SODIUM CHLORIDE 9 MG/ML
10 INJECTION INTRAMUSCULAR; INTRAVENOUS; SUBCUTANEOUS AS NEEDED
Status: ACTIVE | OUTPATIENT
Start: 2017-09-12 | End: 2017-09-13

## 2017-09-12 RX ORDER — FENTANYL 100 UG/H
1 PATCH TRANSDERMAL
Qty: 10 PATCH | Refills: 0 | Status: SHIPPED | OUTPATIENT
Start: 2017-09-12 | End: 2017-10-17 | Stop reason: SDUPTHER

## 2017-09-12 RX ADMIN — SODIUM CHLORIDE 1200 MG: 900 INJECTION, SOLUTION INTRAVENOUS at 12:39

## 2017-09-12 RX ADMIN — Medication 10 ML: at 10:17

## 2017-09-12 RX ADMIN — SODIUM CHLORIDE 2000 ML: 900 INJECTION, SOLUTION INTRAVENOUS at 10:17

## 2017-09-12 RX ADMIN — SODIUM CHLORIDE 10 ML: 9 INJECTION INTRAMUSCULAR; INTRAVENOUS; SUBCUTANEOUS at 10:17

## 2017-09-12 RX ADMIN — Medication 500 UNITS: at 13:36

## 2017-09-12 RX ADMIN — SODIUM CHLORIDE 150 MG: 900 INJECTION, SOLUTION INTRAVENOUS at 12:10

## 2017-09-12 RX ADMIN — DEXAMETHASONE SODIUM PHOSPHATE 8 MG: 4 INJECTION, SOLUTION INTRA-ARTICULAR; INTRALESIONAL; INTRAMUSCULAR; INTRAVENOUS; SOFT TISSUE at 11:41

## 2017-09-12 RX ADMIN — FAMOTIDINE 20 MG: 10 INJECTION INTRAVENOUS at 11:39

## 2017-09-12 RX ADMIN — GRANISETRON HYDROCHLORIDE 1 MG: 1 INJECTION INTRAVENOUS at 11:36

## 2017-09-12 NOTE — PROGRESS NOTES
[facility]     Dr. Roman Duque. Florencia, NP    Infusion Date: 9/5/2017     MAHSA Villavicencio is a 61 y.o.  postmenopausal female with a diagnosis of stage IV ovarian cancer. She underwent exploratory laparotomy with suboptimal debulking. She had extensive disease. She was readmitted about a week later with obstruction and subsequently had a cecal perforation, requiring resection, end ileostomy, and mucous fistula. During the hospitalization she was also diagnosed with pulmonary embolus and had chest tubes placed for bilateral pleural effusions. She had a prolonged hospital course and actually received a dose of cisplatin chemotherapy while in the hospital to help dry up the effusions. She completed 6 cycles of Taxol/Carboplatin chemotherapy following her initial debulking. She did relatively well, considering she had an ileostomy to deal with during chemotherapy. I took her to the OR on 4/29/16 for interval debulking and reversal of her ileostomy. She had extensive disease, but we were able to resect the majority of her disease. One week later she developed an anastomotic leak requiring reexploration and recreation of an ileostomy. She had another prolonged hospitalization, but recovered well since surgery. We then reinitiated Taxol/Carboplatin chemotherapy, but did reduce the dose of Taxol to 135 mg/m2. She completed 4 mores cycles. Her CA-125 normalized and we stopped treatment.      On 2/7, she presented to Dr. Pathak  office with her  to discuss her follow up/surveillance CT scan. Unfortunately, it demonstrated progression of disease, although minimally worse than her prior scan 2 months previously. She was actually feeling better than she did at that time. After review of scans and disease, the decision was made to begin Doxil/Avastin Q28 days for 6 cycles. She began this on 2/27/2017.  In April 2017, she had completed 3 cycles of Doxil/Avastin and had CT scans at that time showed she had evidence of recurrent disease. She was switched at that time to [facility]. She has been admitted to [facility] several times for partial SBO and all have resolved with consecutive management    Subjective:   Pt presents today for cycle 6 D8 of Gemzar/Avastin. (Gemzar only today). This is her last treatment    Pt said she has had a good week pain wise and feels she has been able to eat without nausea  Says this past week, she has found herself teary. Says she does not feel \"depressed' but just teary every so often. She is not sure why. She does again say she feels this chemo is wearing her down and will be happy it is over with after today.  remains supportive at side and very understanding with her. Pt is slightly upset by previous rising CA-125. None drawn today  Feels the dilaudid with the Fentanyl patch is holding her pain well. She needs normal refill of fentanyl patch today        Review of Systems:  General: Slight drop in wt this week, but pt feels \"good\" about her strength  HEENT: Denies visual changes, dysphagia or headache  Resp: Denies SOB, DUDLEY, wheezing or cough  CV: Denies CP, palpitations  GI/: Acknowledges some abd pain, but feels it is now well controlled. Denies N/V this past week. Zofran still works well for her when nausea does present itself. Continues with protein shakes. She says ostomy is functioning well with prn Reglan and Miralax prn  MuskSkel: Denies muscle ache or joint pain  Neuro: Denies dizzyness or syncope    Objective:     Blood pressure 111/70, pulse 61, temperature 97.4 °F (36.3 °C), resp. rate 18, height 5' 3\" (1.6 m), weight 111 lb 12.8 oz (50.7 kg), SpO2 99 %, not currently breastfeeding. Temp (24hrs), Av.3 °F (36.8 °C), Min:98.3 °F (36.8 °C), Max:98.3 °F (36.8 °C)      _____________________  Physical Exam:     General: A&O X 3. With pleasant affect and remains tired appearing.    HEENT: Oral mucosa pink, slightly dry with no sores or lesions noted. No cervical adenopathy appreciated  Port site easily accessed without redness, tenderness or swelling  Cardiovascular: Regular without M/R/G  Lungs:CTA bilat without wheezing or rales  Abdomen: Soft with less tenderness to palpation to left lower/mid abdomen and + bowel sounds throughout. Mild firmness persists, unchanged, to left mid abd. Stool in ostomy bag   Ext: + pedal pulses without edema bilat. bilat  Neuro: grossly intact      :8/31/2017= 87 (8/22/2017: 59)   (8/9/2017= 83; 7/20/2017= 65; 6/28/17=95.5;  6/8/17= 102; 5/16/17= 145.6:  4/27= 100; 4/2183=206.2 3/22=80; 2/23= 63.9)      Recent Results (from the past 12 hour(s))   CBC WITH AUTOMATED DIFF    Collection Time: 09/12/17 10:10 AM   Result Value Ref Range    WBC 4.0 3.6 - 11.0 K/uL    RBC 2.95 (L) 3.80 - 5.20 M/uL    HGB 9.0 (L) 11.5 - 16.0 g/dL    HCT 28.7 (L) 35.0 - 47.0 %    MCV 97.3 80.0 - 99.0 FL    MCH 30.5 26.0 - 34.0 PG    MCHC 31.4 30.0 - 36.5 g/dL    RDW 16.9 (H) 11.5 - 14.5 %    PLATELET 422 155 - 290 K/uL    NEUTROPHILS 48 32 - 75 %    LYMPHOCYTES 36 12 - 49 %    MONOCYTES 15 (H) 5 - 13 %    EOSINOPHILS 1 0 - 7 %    BASOPHILS 0 0 - 1 %    ABS. NEUTROPHILS 1.9 1.8 - 8.0 K/UL    ABS. LYMPHOCYTES 1.4 0.8 - 3.5 K/UL    ABS. MONOCYTES 0.6 0.0 - 1.0 K/UL    ABS. EOSINOPHILS 0.0 0.0 - 0.4 K/UL    ABS.  BASOPHILS 0.0 0.0 - 0.1 K/UL   METABOLIC PANEL, COMPREHENSIVE    Collection Time: 09/12/17 10:10 AM   Result Value Ref Range    Sodium 138 136 - 145 mmol/L    Potassium 4.1 3.5 - 5.1 mmol/L    Chloride 103 97 - 108 mmol/L    CO2 30 21 - 32 mmol/L    Anion gap 5 5 - 15 mmol/L    Glucose 81 65 - 100 mg/dL    BUN 19 6 - 20 MG/DL    Creatinine 0.88 0.55 - 1.02 MG/DL    BUN/Creatinine ratio 22 (H) 12 - 20      GFR est AA >60 >60 ml/min/1.73m2    GFR est non-AA >60 >60 ml/min/1.73m2    Calcium 9.4 8.5 - 10.1 MG/DL    Bilirubin, total 0.2 0.2 - 1.0 MG/DL    ALT (SGPT) 40 12 - 78 U/L    AST (SGOT) 39 (H) 15 - 37 U/L    Alk. phosphatase 117 45 - 117 U/L    Protein, total 7.5 6.4 - 8.2 g/dL    Albumin 3.3 (L) 3.5 - 5.0 g/dL    Globulin 4.2 (H) 2.0 - 4.0 g/dL    A-G Ratio 0.8 (L) 1.1 - 2.2     MAGNESIUM    Collection Time: 09/12/17 10:10 AM   Result Value Ref Range    Magnesium 1.8 1.6 - 2.4 mg/dL           PATHOLOGY from 5/10/2017 Upper Endoscopy:  1. Gastric polyp, polypectomy:   Benign polypoid gastric oxyntic mucosa with some features of hyperplastic polyp   2. Lower esophagus, biopsy:   Acute erosive esophagitis of squamous mucosa with ulceration and severe squamous atypia (see comment)   3. Proximal esophagus, biopsy:   Acute esophagitis of squamous mucosa with severe squamous atypia           Imaging:  CT Abd/Pelvis 5/7/2017:  FINDINGS:   LUNG BASES: Left lung base subpleural scarring is unchanged. No pneumonia. INCIDENTALLY IMAGED HEART AND MEDIASTINUM: Cardiac size is within normal limits. No pericardial effusion. Small hiatal hernia is unchanged. LIVER: Small segment 8 and segment 3 hypoenhancing liver lesions are unchanged. GALLBLADDER: Unremarkable. SPLEEN: No mass. PANCREAS: No mass or ductal dilatation. ADRENALS: Unremarkable. KIDNEYS: Right hydronephrosis is mild and new. Proximal right ureter is mildly  dilated. There is transition to nondilated ureter in the mid ureter. No renal  mass or left hydronephrosis. STOMACH: Unremarkable. SMALL BOWEL: Small bowel loops in the lower pelvis measuring up to 2.7 cm in  diameter. Contrast extends through the small bowel into the right lower quadrant  ileostomy. Small bowel centralization is unchanged. COLON: Majority of the colon has been resected. Distal sigmoid colon and rectum  are nondistended. APPENDIX: Resected. PERITONEUM: Small volume of ascites is slightly increased. Subtle non masslike  enhancement of the peritoneum is unchanged. No pneumoperitoneum. RETROPERITONEUM: No lymphadenopathy. IVC filter is unchanged.   REPRODUCTIVE ORGANS: Uterus is been resected. Ovaries have been resected. No  pelvic mass. URINARY BLADDER: No mass or calculus. BONES: No destructive bone lesion. ADDITIONAL COMMENTS: N/A    IMPRESSION:     1. New mild right hydronephrosis is likely secondary to ureteral obstruction in  the mid ureter. No ureteral calculus. 2. Mild small bowel distention may represent early partial small bowel  obstruction. No complete bowel obstruction. 3. Increased small volume of ascites. Unchanged non masslike peritoneal  enhancement. 4. Unchanged small liver lesions. CT scan of Abdomen/Pelvis with contrast 6/27/2017  FINDINGS:   LUNG BASES: Clear. INCIDENTALLY IMAGED HEART AND MEDIASTINUM: Unremarkable. LIVER: Small hypodense lesions, one in the left hepatic lobe and one in the  right hepatic lobe are stable. GALLBLADDER: Unremarkable. SPLEEN: No mass. PANCREAS: No mass or ductal dilatation. ADRENALS: Unremarkable. KIDNEYS: Hydronephrosis is moderate and stable on the right and mild to moderate  on the left and new, to the level of the mid ureters. There is no definite  obstructing calculus on either side. STOMACH: Unremarkable. SMALL BOWEL: Previously described small bowel centralization is stable. Small  bowel loops are dilated but roughly stable in appearance, with a maximum caliber  of 3.1 cm. Pattern of dilatation is similar to the prior study. Small bowel is  fluid-filled to the level of the ostomy in the right anterior abdominal wall. COLON: Visualized colon shows no distention, but the colon is predominantly  absent. APPENDIX: Surgically absent  PERITONEUM: Small amount of ascites, stable to slightly decreased since the  prior study. RETROPERITONEUM: No lymphadenopathy or aortic aneurysm. IVC filter stable. REPRODUCTIVE ORGANS: Surgically absent. URINARY BLADDER: No mass or calculus. BONES: No destructive bone lesion. ADDITIONAL COMMENTS: N/A       IMPRESSION:     1.  Mild to moderate small bowel distention with centralization of loops as  previously described, similar in appearance to prior study 5/7/2017.  2. Small to moderate amount of ascites, stable. 3. Postoperative changes as described above, stable. 4. Stable hypodense liver lesions. 5. Stable right hydronephrosis to the mid ureter without obvious obstructing  calculus. 6. New left hydronephrosis to the mid ureter without obvious obstructing  calculus.       Assessment:     Patient Active Problem List   Diagnosis Code    Malignant neoplasm of both ovaries ([address] C56.1, C56.2    Carcinomatosis peritonei ([address] C78.6, C80.1    Small bowel obstruction ([address] K56.69    Ileostomy in place ([address] Z93.2    Anemia due to chemotherapy D64.81, T45.1X5A    CINV (chemotherapy-induced nausea and vomiting) R11.2, T45.1X5A    Cancer associated pain G89.3    Generalized abdominal pain R10.84    Anxiety about health F41.8    Ileus (HCC) K56.7    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    Hx of pulmonary embolus Z86.711    Dehydration E86.0    Chemotherapy-induced neutropenia (HCC) D70.1    Anticoagulation adequate with anticoagulant therapy Z79.01    Counseling regarding end of life decision making Z71.89    Bowel obstruction (HCC) K56.60    Protein calorie malnutrition (HCC) E46    Malignant ascites R18.0    Hydronephrosis of right kidney N13.30    Isolated proteinuria R80.0    Erosive esophagitis K22.10    Ulcerative esophagitis K22.10    Hypomagnesemia E83.42    Abdominal pain R10.9    Hydronephrosis of left kidney N13.30    Ovarian cancer (HCC) C56.9    Nausea & vomiting R11.2    Carcinomatosis (HCC) C80.0    Intractable vomiting with nausea R11.2    Febrile illness R50.9         Plan:   Proceed with C6D8 of Gemzar/Avastin (Gemzar only today)  CT scan scheduled for 9/22 with follow up apt on 9/26  Will check labs prior to 9/26 visit  We discussed her \"tearyness\". Encouraged her to let us know if it continues or gets worse.   We had a long discussion regarding the stress of her disease and chemotherapy. Told her we would always be here for her if needed and I have offered her meditation, massage and counseling with the [facility]. She will call if she decides to take advantage of these  Continue to monitor hgb and will transfuses for hgb less than 8     Encouaged to continue protein shakes  She will continue with GI doctor prn. Reglan prn as needed and she will let me know if she needs to begin it. Hydration encouraged  Continue Zofran for nausea PRN   Continue Eliquis for previous PE. She will continue phazyme for gas as needed  Continue Miralax to try to prevent her post chemotherapy constipation  She will call for any concerns or questions  Hydration encouraged best she is able at home and she will call if she feels she needs IV hydration between now and her 9/26 visit  Greater than 45 minutes spend with pt in counseling  [facility],   9/12/2017          ______________________  Medications:    Current Outpatient Prescriptions   Medication Sig Dispense Refill    fentaNYL (DURAGESIC) 100 mcg/hr PATCH 1 Patch by TransDERmal route every seventy-two (72) hours. Max Daily Amount: 1 Patch. 10 Patch 0    promethazine (PHENERGAN) 6.25 mg/5 mL syrup Take 20 mL by mouth four (4) times daily as needed for Nausea. 400 mL 4    HYDROmorphone (DILAUDID) 1 mg/mL liqd oral solution Take 4 mL by mouth every four (4) hours as needed. Max Daily Amount: 24 mg. 475 mL 0    scopolamine (TRANSDERM-SCOP) 1.5 mg (1 mg over 3 days) pt3d 1 Patch by TransDERmal route every seventy-two (72) hours as needed. 10 Patch 1    NEXIUM PACKET 40 mg granules for oral suspension two (2) times a day.  metoclopramide (REGLAN) 5 mg/5 mL syrup Take 10 mL by mouth four (4) times daily as needed. Take before meals as needed 500 mL 3    magnesium oxide (MAG-OX) 400 mg tablet Take 1 Tab by mouth two (2) times a day.  Indications: HYPOMAGNESEMIA 60 Tab 2    sucralfate (CARAFATE) 100 mg/mL suspension Take 5 mL by mouth four (4) times daily. 414 mL 3    polyethylene glycol (MIRALAX) 17 gram/dose powder Take 17 g by mouth daily as needed. 238 g 3    apixaban (ELIQUIS) 2.5 mg tablet Take 1 Tab by mouth two (2) times a day. 60 Tab 6    zolpidem (AMBIEN) 10 mg tablet Take 1 Tab by mouth nightly as needed for Sleep. Max Daily Amount: 10 mg. 30 Tab 1    ondansetron (ZOFRAN ODT) 4 mg disintegrating tablet Take 1 Tab by mouth every eight (8) hours as needed for Nausea. 30 Tab 2    Calcium-Vitamin D3-Vitamin K [id number] mg-unit-mcg chew Take 1 Tab by mouth two (2) times a day.  metoprolol (LOPRESSOR) 100 mg tablet Take 50 mg by mouth two (2) times a day.       lidocaine-prilocaine (EMLA) topical cream Apply small amount over port area and cover with a band aid 1 hour before chemo treatment 30 g 3     Facility-Administered Medications Ordered in Other Encounters   Medication Dose Route Frequency Provider Last Rate Last Dose    sodium chloride 0.9 % injection 10 mL  10 mL IntraVENous PRN Conner Man MD   10 mL at 09/12/17 1017    heparin (porcine) pf 500 Units  500 Units IntraVENous PRN Conner Man MD   500 Units at 09/12/17 1336    sodium chloride (NS) flush 5-10 mL  5-10 mL IntraVENous PRN Conner Man MD   10 mL at 09/12/17 1017

## 2017-09-12 NOTE — PROGRESS NOTES
[facility]patient [facility]fusion [facility] - Chemotherapy Progress Note    1000 Pt admit to [address] [place] [address] for C6D8 Gemzar/Hydration ambulatory in stable condition. Assessment completed. No new concerns voiced. Port accessed with positive blood return. Labs drawn per order and sent. Line flushed, NS hydration infusing. Visit Vitals    /78    Pulse 65    Temp 98.3 °F (36.8 °C)    Resp 18    Ht 5' 3\" (1.6 m)    Wt 50.3 kg (111 lb)    BMI 19.66 kg/m2       Medications:  NS 2 L  Pepcid IVP  Kytril IVP  Decadron IVP  Emend  Gemzar  Heparin    1340 Pt tolerated treatment well. Port maintained positive blood return throughout treatment, flushed with positive blood return at conclusion, heparinized, and de-accessed. D/c home ambulatory in no distress. Pt has no further appointments scheduled at this time. Recent Results (from the past 12 hour(s))   CBC WITH AUTOMATED DIFF    Collection Time: 09/12/17 10:10 AM   Result Value Ref Range    WBC 4.0 3.6 - 11.0 K/uL    RBC 2.95 (L) 3.80 - 5.20 M/uL    HGB 9.0 (L) 11.5 - 16.0 g/dL    HCT 28.7 (L) 35.0 - 47.0 %    MCV 97.3 80.0 - 99.0 FL    MCH 30.5 26.0 - 34.0 PG    MCHC 31.4 30.0 - 36.5 g/dL    RDW 16.9 (H) 11.5 - 14.5 %    PLATELET 511 678 - 588 K/uL    NEUTROPHILS 48 32 - 75 %    LYMPHOCYTES 36 12 - 49 %    MONOCYTES 15 (H) 5 - 13 %    EOSINOPHILS 1 0 - 7 %    BASOPHILS 0 0 - 1 %    ABS. NEUTROPHILS 1.9 1.8 - 8.0 K/UL    ABS. LYMPHOCYTES 1.4 0.8 - 3.5 K/UL    ABS. MONOCYTES 0.6 0.0 - 1.0 K/UL    ABS. EOSINOPHILS 0.0 0.0 - 0.4 K/UL    ABS.  BASOPHILS 0.0 0.0 - 0.1 K/UL   METABOLIC PANEL, COMPREHENSIVE    Collection Time: 09/12/17 10:10 AM   Result Value Ref Range    Sodium 138 136 - 145 mmol/L    Potassium 4.1 3.5 - 5.1 mmol/L    Chloride 103 97 - 108 mmol/L    CO2 30 21 - 32 mmol/L    Anion gap 5 5 - 15 mmol/L    Glucose 81 65 - 100 mg/dL    BUN 19 6 - 20 MG/DL    Creatinine 0.88 0.55 - 1.02 MG/DL    BUN/Creatinine ratio 22 (H) 12 - 20      GFR est AA >60 >60 ml/min/1.73m2    GFR est non-AA >60 >60 ml/min/1.73m2    Calcium 9.4 8.5 - 10.1 MG/DL    Bilirubin, total 0.2 0.2 - 1.0 MG/DL    ALT (SGPT) 40 12 - 78 U/L    AST (SGOT) 39 (H) 15 - 37 U/L    Alk.  phosphatase 117 45 - 117 U/L    Protein, total 7.5 6.4 - 8.2 g/dL    Albumin 3.3 (L) 3.5 - 5.0 g/dL    Globulin 4.2 (H) 2.0 - 4.0 g/dL    A-G Ratio 0.8 (L) 1.1 - 2.2     MAGNESIUM    Collection Time: 09/12/17 10:10 AM   Result Value Ref Range    Magnesium 1.8 1.6 - 2.4 mg/dL

## 2017-09-13 ENCOUNTER — APPOINTMENT (OUTPATIENT)
Dept: CT IMAGING | Age: 57
DRG: 389 | End: 2017-09-13
Attending: NURSE PRACTITIONER
Payer: OTHER GOVERNMENT

## 2017-09-13 ENCOUNTER — TELEPHONE (OUTPATIENT)
Dept: GYNECOLOGY | Age: 57
End: 2017-09-13

## 2017-09-13 ENCOUNTER — HOSPITAL ENCOUNTER (INPATIENT)
Age: 57
LOS: 4 days | Discharge: HOME | DRG: 389 | End: 2017-09-18
Attending: OBSTETRICS & GYNECOLOGY | Admitting: OBSTETRICS & GYNECOLOGY
Payer: OTHER GOVERNMENT

## 2017-09-13 PROBLEM — K56.600 PARTIAL BOWEL OBSTRUCTION (HCC): Status: ACTIVE | Noted: 2017-09-13

## 2017-09-13 LAB
ALBUMIN SERPL-MCNC: 3.3 G/DL (ref 3.5–5)
ALBUMIN/GLOB SERPL: 0.9 {RATIO} (ref 1.1–2.2)
ALP SERPL-CCNC: 108 U/L (ref 45–117)
ALT SERPL-CCNC: 37 U/L (ref 12–78)
ANION GAP SERPL CALC-SCNC: 7 MMOL/L (ref 5–15)
AST SERPL-CCNC: 36 U/L (ref 15–37)
BASOPHILS # BLD: 0 K/UL (ref 0–0.1)
BASOPHILS NFR BLD: 0 % (ref 0–1)
BILIRUB SERPL-MCNC: 0.3 MG/DL (ref 0.2–1)
BUN SERPL-MCNC: 20 MG/DL (ref 6–20)
BUN/CREAT SERPL: 24 (ref 12–20)
CALCIUM SERPL-MCNC: 8.7 MG/DL (ref 8.5–10.1)
CHLORIDE SERPL-SCNC: 103 MMOL/L (ref 97–108)
CO2 SERPL-SCNC: 28 MMOL/L (ref 21–32)
CREAT SERPL-MCNC: 0.82 MG/DL (ref 0.55–1.02)
DIFFERENTIAL METHOD BLD: ABNORMAL
EOSINOPHIL # BLD: 0 K/UL (ref 0–0.4)
EOSINOPHIL NFR BLD: 0 % (ref 0–7)
ERYTHROCYTE [DISTWIDTH] IN BLOOD BY AUTOMATED COUNT: 16.9 % (ref 11.5–14.5)
GLOBULIN SER CALC-MCNC: 3.7 G/DL (ref 2–4)
GLUCOSE SERPL-MCNC: 103 MG/DL (ref 65–100)
HCT VFR BLD AUTO: 27.9 % (ref 35–47)
HGB BLD-MCNC: 8.9 G/DL (ref 11.5–16)
INR PPP: 1 (ref 0.9–1.1)
LYMPHOCYTES # BLD: 0.7 K/UL (ref 0.8–3.5)
LYMPHOCYTES NFR BLD: 13 % (ref 12–49)
MAGNESIUM SERPL-MCNC: 1.8 MG/DL (ref 1.6–2.4)
MCH RBC QN AUTO: 30.7 PG (ref 26–34)
MCHC RBC AUTO-ENTMCNC: 31.9 G/DL (ref 30–36.5)
MCV RBC AUTO: 96.2 FL (ref 80–99)
MONOCYTES # BLD: 0.5 K/UL (ref 0–1)
MONOCYTES NFR BLD: 9 % (ref 5–13)
NEUTS SEG # BLD: 4.3 K/UL (ref 1.8–8)
NEUTS SEG NFR BLD: 78 % (ref 32–75)
PLATELET # BLD AUTO: 223 K/UL (ref 150–400)
POTASSIUM SERPL-SCNC: 3.4 MMOL/L (ref 3.5–5.1)
PREALB SERPL-MCNC: 23 MG/DL (ref 20–40)
PROT SERPL-MCNC: 7 G/DL (ref 6.4–8.2)
PROTHROMBIN TIME: 10.1 SEC (ref 9–11.1)
RBC # BLD AUTO: 2.9 M/UL (ref 3.8–5.2)
RBC MORPH BLD: ABNORMAL
SODIUM SERPL-SCNC: 138 MMOL/L (ref 136–145)
WBC # BLD AUTO: 5.5 K/UL (ref 3.6–11)

## 2017-09-13 PROCEDURE — 80053 COMPREHEN METABOLIC PANEL: CPT | Performed by: NURSE PRACTITIONER

## 2017-09-13 PROCEDURE — 83735 ASSAY OF MAGNESIUM: CPT | Performed by: NURSE PRACTITIONER

## 2017-09-13 PROCEDURE — 74011250637 HC RX REV CODE- 250/637: Performed by: OBSTETRICS & GYNECOLOGY

## 2017-09-13 PROCEDURE — 74011250637 HC RX REV CODE- 250/637: Performed by: NURSE PRACTITIONER

## 2017-09-13 PROCEDURE — 36415 COLL VENOUS BLD VENIPUNCTURE: CPT | Performed by: NURSE PRACTITIONER

## 2017-09-13 PROCEDURE — 85025 COMPLETE CBC W/AUTO DIFF WBC: CPT | Performed by: NURSE PRACTITIONER

## 2017-09-13 PROCEDURE — 99218 HC RM OBSERVATION: CPT

## 2017-09-13 PROCEDURE — 81001 URINALYSIS AUTO W/SCOPE: CPT | Performed by: NURSE PRACTITIONER

## 2017-09-13 PROCEDURE — 74011000258 HC RX REV CODE- 258: Performed by: NURSE PRACTITIONER

## 2017-09-13 PROCEDURE — 86304 IMMUNOASSAY TUMOR CA 125: CPT | Performed by: NURSE PRACTITIONER

## 2017-09-13 PROCEDURE — 74011000250 HC RX REV CODE- 250: Performed by: NURSE PRACTITIONER

## 2017-09-13 PROCEDURE — 77030003560 HC NDL HUBR BARD -A

## 2017-09-13 PROCEDURE — 85610 PROTHROMBIN TIME: CPT | Performed by: NURSE PRACTITIONER

## 2017-09-13 PROCEDURE — 74011250636 HC RX REV CODE- 250/636: Performed by: NURSE PRACTITIONER

## 2017-09-13 PROCEDURE — 84134 ASSAY OF PREALBUMIN: CPT | Performed by: NURSE PRACTITIONER

## 2017-09-13 RX ORDER — SODIUM CHLORIDE 0.9 % (FLUSH) 0.9 %
10 SYRINGE (ML) INJECTION
Status: COMPLETED | OUTPATIENT
Start: 2017-09-13 | End: 2017-09-13

## 2017-09-13 RX ORDER — HYDROMORPHONE HYDROCHLORIDE 1 MG/ML
.5-1 INJECTION, SOLUTION INTRAMUSCULAR; INTRAVENOUS; SUBCUTANEOUS
Status: DISCONTINUED | OUTPATIENT
Start: 2017-09-13 | End: 2017-09-13

## 2017-09-13 RX ORDER — HYDROMORPHONE HYDROCHLORIDE 1 MG/ML
.5-1 INJECTION, SOLUTION INTRAMUSCULAR; INTRAVENOUS; SUBCUTANEOUS
Status: DISCONTINUED | OUTPATIENT
Start: 2017-09-13 | End: 2017-09-18 | Stop reason: HOSPADM

## 2017-09-13 RX ORDER — DIPHENHYDRAMINE HYDROCHLORIDE 50 MG/ML
12.5 INJECTION, SOLUTION INTRAMUSCULAR; INTRAVENOUS
Status: DISCONTINUED | OUTPATIENT
Start: 2017-09-13 | End: 2017-09-18 | Stop reason: HOSPADM

## 2017-09-13 RX ORDER — METOCLOPRAMIDE HYDROCHLORIDE 5 MG/ML
10 INJECTION INTRAMUSCULAR; INTRAVENOUS EVERY 6 HOURS
Status: COMPLETED | OUTPATIENT
Start: 2017-09-13 | End: 2017-09-16

## 2017-09-13 RX ORDER — FENTANYL 100 UG/H
1 PATCH TRANSDERMAL
Status: DISCONTINUED | OUTPATIENT
Start: 2017-09-13 | End: 2017-09-15

## 2017-09-13 RX ORDER — POTASSIUM CHLORIDE 14.9 MG/ML
10 INJECTION INTRAVENOUS ONCE
Status: COMPLETED | OUTPATIENT
Start: 2017-09-13 | End: 2017-09-13

## 2017-09-13 RX ORDER — SODIUM CHLORIDE 0.9 % (FLUSH) 0.9 %
5-10 SYRINGE (ML) INJECTION AS NEEDED
Status: DISCONTINUED | OUTPATIENT
Start: 2017-09-13 | End: 2017-09-18 | Stop reason: HOSPADM

## 2017-09-13 RX ORDER — NALOXONE HYDROCHLORIDE 0.4 MG/ML
0.4 INJECTION, SOLUTION INTRAMUSCULAR; INTRAVENOUS; SUBCUTANEOUS AS NEEDED
Status: DISCONTINUED | OUTPATIENT
Start: 2017-09-13 | End: 2017-09-18 | Stop reason: HOSPADM

## 2017-09-13 RX ORDER — CYCLOBENZAPRINE HCL 10 MG
5 TABLET ORAL
Status: DISCONTINUED | OUTPATIENT
Start: 2017-09-13 | End: 2017-09-18 | Stop reason: HOSPADM

## 2017-09-13 RX ORDER — ONDANSETRON 2 MG/ML
4 INJECTION INTRAMUSCULAR; INTRAVENOUS EVERY 6 HOURS
Status: COMPLETED | OUTPATIENT
Start: 2017-09-13 | End: 2017-09-16

## 2017-09-13 RX ORDER — SUCRALFATE 1 G/10ML
0.5 SUSPENSION ORAL
Status: DISCONTINUED | OUTPATIENT
Start: 2017-09-13 | End: 2017-09-18 | Stop reason: HOSPADM

## 2017-09-13 RX ORDER — SODIUM CHLORIDE 0.9 % (FLUSH) 0.9 %
5-10 SYRINGE (ML) INJECTION EVERY 8 HOURS
Status: DISCONTINUED | OUTPATIENT
Start: 2017-09-13 | End: 2017-09-18 | Stop reason: HOSPADM

## 2017-09-13 RX ORDER — SCOLOPAMINE TRANSDERMAL SYSTEM 1 MG/1
1.5 PATCH, EXTENDED RELEASE TRANSDERMAL
Status: DISCONTINUED | OUTPATIENT
Start: 2017-09-13 | End: 2017-09-18 | Stop reason: HOSPADM

## 2017-09-13 RX ORDER — METOPROLOL TARTRATE 25 MG/1
50 TABLET, FILM COATED ORAL 2 TIMES DAILY
Status: DISCONTINUED | OUTPATIENT
Start: 2017-09-13 | End: 2017-09-18 | Stop reason: HOSPADM

## 2017-09-13 RX ADMIN — Medication 10 ML: at 17:00

## 2017-09-13 RX ADMIN — METOCLOPRAMIDE 10 MG: 5 INJECTION, SOLUTION INTRAMUSCULAR; INTRAVENOUS at 23:48

## 2017-09-13 RX ADMIN — ONDANSETRON 4 MG: 2 INJECTION INTRAMUSCULAR; INTRAVENOUS at 23:45

## 2017-09-13 RX ADMIN — HYDROMORPHONE HYDROCHLORIDE 1 MG: 1 INJECTION, SOLUTION INTRAMUSCULAR; INTRAVENOUS; SUBCUTANEOUS at 20:17

## 2017-09-13 RX ADMIN — PROMETHAZINE HYDROCHLORIDE 25 MG: 25 INJECTION INTRAMUSCULAR; INTRAVENOUS at 17:09

## 2017-09-13 RX ADMIN — HYDROMORPHONE HYDROCHLORIDE 1 MG: 1 INJECTION, SOLUTION INTRAMUSCULAR; INTRAVENOUS; SUBCUTANEOUS at 16:54

## 2017-09-13 RX ADMIN — POTASSIUM CHLORIDE: 2 INJECTION, SOLUTION, CONCENTRATE INTRAVENOUS at 17:01

## 2017-09-13 RX ADMIN — Medication 10 ML: at 16:00

## 2017-09-13 RX ADMIN — FAMOTIDINE 20 MG: 10 INJECTION, SOLUTION INTRAVENOUS at 22:28

## 2017-09-13 RX ADMIN — SUCRALFATE 0.5 G: 1 SUSPENSION ORAL at 22:25

## 2017-09-13 RX ADMIN — METOPROLOL TARTRATE 50 MG: 50 TABLET ORAL at 17:47

## 2017-09-13 RX ADMIN — POTASSIUM CHLORIDE 10 MEQ: 14.9 INJECTION, SOLUTION INTRAVENOUS at 19:24

## 2017-09-13 RX ADMIN — HYDROMORPHONE HYDROCHLORIDE 1 MG: 1 INJECTION, SOLUTION INTRAMUSCULAR; INTRAVENOUS; SUBCUTANEOUS at 23:50

## 2017-09-13 RX ADMIN — APIXABAN 2.5 MG: 2.5 TABLET, FILM COATED ORAL at 17:46

## 2017-09-13 RX ADMIN — PROMETHAZINE HYDROCHLORIDE 25 MG: 25 INJECTION INTRAMUSCULAR; INTRAVENOUS at 22:21

## 2017-09-13 RX ADMIN — ONDANSETRON 4 MG: 2 INJECTION INTRAMUSCULAR; INTRAVENOUS at 17:46

## 2017-09-13 RX ADMIN — METOCLOPRAMIDE 10 MG: 5 INJECTION, SOLUTION INTRAMUSCULAR; INTRAVENOUS at 17:46

## 2017-09-13 NOTE — H&P
[address] [place] [address]  Dr. Birgit Kimble. Wall, HAYDER       Vergil Libman       [id number]  1960    Admitted (Not on file) Date 2017   HPI Vergil Libman is a 61 y.o.  postmenopausal female with a diagnosis of stage IV ovarian cancer. She underwent exploratory laparotomy with suboptimal debulking. She had extensive disease. She was readmitted about a week later with obstruction and subsequently had a cecal perforation, requiring resection, end ileostomy, and mucous fistula. During the hospitalization she was also diagnosed with pulmonary embolus and had chest tubes placed for bilateral pleural effusions. She had a prolonged hospital course and actually received a dose of cisplatin chemotherapy while in the hospital to help dry up the effusions. She completed 6 cycles of Taxol/Carboplatin chemotherapy following her initial debulking. She did relatively well, considering she had an ileostomy to deal with during chemotherapy. I took her to the OR on 16 for interval debulking and reversal of her ileostomy. She had extensive disease, but we were able to resect the majority of her disease. One week later she developed an anastomotic leak requiring reexploration and recreation of an ileostomy. She had another prolonged hospitalization, but recovered well since surgery. We then reinitiated Taxol/Carboplatin chemotherapy, but did reduce the dose of Taxol to 135 mg/m2. She completed 4 mores cycles. Her CA-125 normalized and we stopped treatment.      On , she presented to Dr. Shama Soler office with her  to discuss her follow up/surveillance CT scan. Unfortunately, it demonstrated progression of disease, although minimally worse than her prior scan 2 months previously. She was actually feeling better than she did at that time. After review of scans and disease, the decision was made to begin Doxil/Avastin Q28 days for 6 cycles.  She began this on 2/27/2017. In April 2017, she had completed 3 cycles of Doxil/Avastin and had CT scans at that time showed she had evidence of recurrent disease. She was switched at that time to [facility] and began this regimen on 5/23/2017 and has completed 4 cycles of this with the last being on 8/1/2017. With this regimen, her  has decreased to 65 on 7/20/17 from 145.6 on 5/16/2017.       SUBJECTIVE:  Pt had last chemo treatment yesterday and did well during the day and last night. Then at 5:30 this morning, she awoke with pain in her abdomen. Took dilaudid and flexeril (which has worked in the past) and the pain subsided for a while, then aroun 11AM, she began to be nauseated and took phenergan ant Reglan with no relief. She then began to vomit around 1:45 at which time she called the office and was directed to come to the hospital.     On admission, she was noted pale, tired appearing and vomiting. She said she has had no gas or output from her ostomy since 11AM today. She says her abdomen is \"more tender that it has been lately\". She has been admitted to [facility] several times for partial SBO and all have resolved with consecutive management and we will again admit her for observation to evaluate if this is possible again this time.            Patient Active Problem List    Diagnosis Date Noted    Intractable vomiting with nausea 08/10/2017    Febrile illness 08/10/2017    Ovarian cancer ([facility] 08/09/2017    Nausea & vomiting 08/09/2017    Carcinomatosis ([facility] 08/09/2017    Hydronephrosis of left kidney 06/28/2017    Abdominal pain 06/27/2017    Hypomagnesemia 06/13/2017    Erosive esophagitis 06/07/2017    Ulcerative esophagitis 06/07/2017    Isolated proteinuria 05/25/2017    Malignant ascites 05/08/2017    Hydronephrosis of right kidney 05/08/2017    Protein calorie malnutrition ([address] 04/28/2017    Bowel obstruction ([facility] 04/27/2017    Counseling regarding end of life decision making 04/25/2017    Anticoagulation adequate with anticoagulant therapy 03/28/2017    Chemotherapy-induced neutropenia (HCC) 03/22/2017    Ileus ([address] 03/21/2017    Thrombosis of pelvic vein 03/21/2017    Hx of pulmonary embolus 03/21/2017    Dehydration 03/21/2017    Cancer associated pain 05/13/2016    Generalized abdominal pain 05/13/2016    Anxiety about health 05/13/2016    CINV (chemotherapy-induced nausea and vomiting) 03/22/2016    Anemia due to chemotherapy 02/22/2016    Ileostomy in place [facility]) 02/15/2016    Carcinomatosis peritonei ([address] 10/29/2015    Small bowel obstruction ([address] 10/29/2015    Malignant neoplasm of both ovaries ([address] 10/12/2015     Past Medical History:   Diagnosis Date    Abdominal carcinomatosis ([address] 10/2015    Arthritis     left shoulder    BRCA negative 12/2015    Chronic pain     Depression     HX    Environmental allergies     GERD (gastroesophageal reflux disease)     Hypertension     NEW OVER PAST 5-6 MONTHS    Ovarian cancer ([address] 10/2015    serous adenocarcinoma, high grade, stage IIIC    Pulmonary embolism ([address] 10/2015      Past Surgical History:   Procedure Laterality Date    CARDIAC SURG PROCEDURE UNLIST  12/11/15    cardiac cath/normal     HX BREAST BIOPSY  1995    benign right breast bx    HX DILATION AND CURETTAGE  2/2011    POLYPECTOMY    HX GI  2015    PERFORATED BOWEL; ILEOSTOMY    HX GYN  10/2015    EXP LAPAROTOMY    HX HEENT  LAST 2005    oral tissue graft X3    HX HEENT  1970    tonsillectomy    HX LAPAROTOMY  10/2015    tumor sampling    HX LAPAROTOMY  4/2016    hysterectomy, BSO, radical debulking    HX LAPAROTOMY  5/2016    resection ileocolic anastomosis, leak, ileostomy    HX OTHER SURGICAL  11/2015    tunneled portacath      Social History   Substance Use Topics    Smoking status: Never Smoker    Smokeless tobacco: Never Used    Alcohol use No      Family History   Problem Relation Age of Onset    Cancer Maternal Uncle skin    Hypertension Mother     High Cholesterol Mother     Anxiety Mother     Heart Disease Mother     High Cholesterol Father     Hypertension Father     Stroke Father    Donna Cole Sister     Migraines Sister     Other Sister      FIBROMYALGIA    Depression Brother     Other Brother      DRUG ABUSE HEPATITIS C    Migraines Sister     Arrhythmia Sister     Depression Sister     Lung Disease Paternal Aunt      COPD    Cancer Paternal Uncle      LUNG    Lung Disease Paternal Uncle      COPD    Lung Disease Maternal Grandmother      COPD    Anesth Problems Neg Hx       No current facility-administered medications for this encounter. Current Outpatient Prescriptions   Medication Sig    fentaNYL (DURAGESIC) 100 mcg/hr PATCH 1 Patch by TransDERmal route every seventy-two (72) hours. Max Daily Amount: 1 Patch.  promethazine (PHENERGAN) 6.25 mg/5 mL syrup Take 20 mL by mouth four (4) times daily as needed for Nausea.  HYDROmorphone (DILAUDID) 1 mg/mL liqd oral solution Take 4 mL by mouth every four (4) hours as needed. Max Daily Amount: 24 mg.    NEXIUM PACKET 40 mg granules for oral suspension two (2) times a day.  metoclopramide (REGLAN) 5 mg/5 mL syrup Take 10 mL by mouth four (4) times daily as needed. Take before meals as needed    polyethylene glycol (MIRALAX) 17 gram/dose powder Take 17 g by mouth daily as needed.  apixaban (ELIQUIS) 2.5 mg tablet Take 1 Tab by mouth two (2) times a day.  zolpidem (AMBIEN) 10 mg tablet Take 1 Tab by mouth nightly as needed for Sleep. Max Daily Amount: 10 mg.    ondansetron (ZOFRAN ODT) 4 mg disintegrating tablet Take 1 Tab by mouth every eight (8) hours as needed for Nausea.  Calcium-Vitamin D3-Vitamin K [id number] mg-unit-mcg chew Take 1 Tab by mouth two (2) times a day.  metoprolol (LOPRESSOR) 100 mg tablet Take 50 mg by mouth two (2) times a day.     scopolamine (TRANSDERM-SCOP) 1.5 mg (1 mg over 3 days) pt3d 1 Patch by TransDERmal route every seventy-two (72) hours as needed.  magnesium oxide (MAG-OX) 400 mg tablet Take 1 Tab by mouth two (2) times a day. Indications: HYPOMAGNESEMIA    sucralfate (CARAFATE) 100 mg/mL suspension Take 5 mL by mouth four (4) times daily.  lidocaine-prilocaine (EMLA) topical cream Apply small amount over port area and cover with a band aid 1 hour before chemo treatment     Allergies   Allergen Reactions    Ativan [Lorazepam] Other (comments)     SEVERE CONFUSION        Review of Systems  General: Denies wt loss. Acknowledges generalized fatigue, but said it had been better lately  HEENT: Denies visual changes, dysphagia or headache  Resp: Denies SOB, DUDLEY, wheezing or cough  CV: Denies CP, palpitations  GI/: See HPI. [place]: Denies muscle ache or joint pain  Neuro: Denies dizzyness or syncope      OBJECTIVE:    Visit Vitals    BP (!) 169/91 (BP 1 Location: Left arm, BP Patient Position: Sitting)    Pulse 61    Temp 98.5 °F (36.9 °C)    Resp 16    SpO2 97%         Physical Exam  General: A&O X3 appearing uncomfortable, tired, vomiting with mild diaphoresis   HEENT: Sclera anicteric, Mucosa pale, dry  Neck: No JVD without adenopathy appreciated  Port site without redness, tenderness or swelling. Accessed without difficulty  Heart: Regular without M/R/G  Lungs: CTA Bilat without wheezing or rales   Abd: with multiple abd scars from previous surgeries. Somewhat firm with fanit bowel sounds noted. Mild tenderness to palpation. Ostomy bag empty without gas at present. Ext: Without edema and + pedal pulses bilat  Neuro: grossly intact      Data Review  Labs pending         CT ABD/Pelvis 8/10/2017  FINDINGS:   LUNG BASES: There is a patch of airspace process identified in the right middle  lobe along the minor fissure. This is incompletely visualized, however. There  may be more extensive airspace process in the remainder of the right middle  lobe.  There is also patchy airspace process in the base of the right lower lobe. These findings were not present on the previous CT. No pleural effusion. Small  hiatal hernia noted. LIVER: 8.4 mm lucent lesion noted hepatic segment 8 unchanged from previous  exam. This is isodense with liver on delayed imaging. Small well-defined lucency  measuring 5.8 mm in tip of hepatic segment 3 is unchanged. There is no biliary  dilatation. GALLBLADDER: Unremarkable. SPLEEN: No mass.     PANCREAS: No mass or ductal dilatation. ADRENALS: Unremarkable. KIDNEYS: Hydronephrosis of both kidneys again noted. No renal mass is seen other  than a tiny lucency in the periphery of the right mid kidney unchanged.     GI: No dilatation or wall thickening. There is a colostomy in the right lower  quadrant.     APPENDIX: Not identified  PERITONEUM: There is ascites with loculations along the peritoneal surface in  the left upper quadrant which may be metastatic deposits. In addition there is a  somewhat rounded clumped appearance of the bowel which may be secondary to  peritoneal deposits. RETROPERITONEUM: No lymphadenopathy or aortic aneurysm.        URINARY BLADDER: No mass or calculus. PELVIS: There is been resection of the colon. The rectum is identified. This  terminates blindly. BONES: No destructive bone lesion. ADDITIONAL COMMENTS: N/A    IMPRESSION:     1. In the right middle lobe and in the right lower lobe there are areas of  airspace process not seen previously. These may be areas of developing  pneumonia. 2. Hydronephrosis unchanged. 3. Ascites with areas of loculation and possible peritoneal neoplasm spread  unchanged from the previous study as well.   4. Lucencies in the liver unchanged from previous study.     IMPRESSION:    Patient Active Problem List   Diagnosis Code    Malignant neoplasm of both ovaries ([address] C56.1, C56.2    Carcinomatosis peritonei ([address] C78.6, C80.1    Small bowel obstruction ([address] K56.69    Ileostomy in place ([address] Z93.2    Anemia due to chemotherapy D64.81, T45.1X5A    CINV (chemotherapy-induced nausea and vomiting) R11.2, T45.1X5A    Cancer associated pain G89.3    Generalized abdominal pain R10.84    Anxiety about health F41.8    Ileus (HCC) K56.7    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    Hx of pulmonary embolus Z86.711    Dehydration E86.0    Chemotherapy-induced neutropenia (HCC) D70.1    Anticoagulation adequate with anticoagulant therapy Z79.01    Counseling regarding end of life decision making Z71.89    Bowel obstruction (HCC) K56.60    Protein calorie malnutrition (HCC) E46    Malignant ascites R18.0    Hydronephrosis of right kidney N13.30    Isolated proteinuria R80.0    Erosive esophagitis K22.10    Ulcerative esophagitis K22.10    Hypomagnesemia E83.42    Abdominal pain R10.9    Hydronephrosis of left kidney N13.30    Ovarian cancer (HCC) C56.9    Nausea & vomiting R11.2    Carcinomatosis (HCC) C80.0    Intractable vomiting with nausea R11.2    Febrile illness R50.9       Assessment/Plan:  Pt with metastatic ovarian cancer presents with clinical picture of probable small bowel obstruction. Hopefully it is only partial as before since her ostomy was working   Will obtain CT (pt was scheduled to have one next week as out pt).  Plan on CT of abdomen/Pelvis, but currently, pt would like to wait until the morning because of feeling so poorly  Will begin IV fluid at 125/hr  Check CBC, CMP, Mag, CA-125, prealbumin  Nausea/Pain medication  Eliquis BID as prior to admission  Discussed with Dr. Zachary Aguilar    Signed By: Sweta Galarza NP     9/13/2017/5:18 PM

## 2017-09-13 NOTE — TELEPHONE ENCOUNTER
Pt , she is requesting to speak to [facility], she may be reached at [phone]

## 2017-09-13 NOTE — IP AVS SNAPSHOT
[address] 
[phone] Patient: Cody Cleary MRN: [id number] :1960 Current Discharge Medication List  
  
CONTINUE these medications which have NOT CHANGED Dose & Instructions Dispensing Information Comments Morning Noon Evening Bedtime  
 apixaban 2.5 mg tablet Commonly known as:  Rj Torrez Your last dose was: Your next dose is:    
   
   
 Dose:  2.5 mg Take 1 Tab by mouth two (2) times a day. Quantity:  60 Tab Refills:  6 Calcium-Vitamin D3-Vitamin K [id number] mg-unit-mcg Chew Your last dose was: Your next dose is:    
   
   
 Dose:  1 Tab Take 1 Tab by mouth two (2) times a day. Refills:  0  
     
   
   
   
  
 fentaNYL 100 mcg/hr PATCH Commonly known as:  Roxene Ream Your last dose was: Your next dose is:    
   
   
 Dose:  1 Patch 1 Patch by TransDERmal route every seventy-two (72) hours. Max Daily Amount: 1 Patch. Quantity:  10 Patch Refills:  0 HYDROmorphone 1 mg/mL Liqd oral solution Commonly known as:  DILAUDID Your last dose was: Your next dose is:    
   
   
 Dose:  4 mg Take 4 mL by mouth every four (4) hours as needed. Max Daily Amount: 24 mg. Quantity:  475 mL Refills:  0  
     
   
   
   
  
 lidocaine-prilocaine topical cream  
Commonly known as:  EMLA Your last dose was: Your next dose is:    
   
   
 Apply small amount over port area and cover with a band aid 1 hour before chemo treatment Quantity:  30 g Refills:  3  
     
   
   
   
  
 magnesium oxide 400 mg tablet Commonly known as:  MAG-OX Your last dose was: Your next dose is:    
   
   
 Dose:  400 mg Take 1 Tab by mouth two (2) times a day. Indications: HYPOMAGNESEMIA Quantity:  60 Tab Refills:  2  
     
   
   
   
  
 metoclopramide 5 mg/5 mL syrup Commonly known as:  REGLAN Your last dose was: Your next dose is:    
   
   
 Dose:  10 mg Take 10 mL by mouth four (4) times daily as needed. Take before meals as needed Quantity:  500 mL Refills:  3  
     
   
   
   
  
 metoprolol tartrate 100 mg IR tablet Commonly known as:  LOPRESSOR Your last dose was: Your next dose is:    
   
   
 Dose:  50 mg Take 50 mg by mouth two (2) times a day. Refills:  0 NexIUM Packet 40 mg granules for oral suspension Generic drug:  esomeprazole Your last dose was: Your next dose is:    
   
   
 two (2) times a day. Refills:  0  
     
   
   
   
  
 ondansetron 4 mg disintegrating tablet Commonly known as:  ZOFRAN ODT Your last dose was: Your next dose is:    
   
   
 Dose:  4 mg Take 1 Tab by mouth every eight (8) hours as needed for Nausea. Quantity:  30 Tab Refills:  2  
     
   
   
   
  
 polyethylene glycol 17 gram/dose powder Commonly known as:  Lawerance Amas Your last dose was: Your next dose is:    
   
   
 Dose:  17 g Take 17 g by mouth daily as needed. Quantity:  238 g Refills:  3  
     
   
   
   
  
 promethazine 6.25 mg/5 mL syrup Commonly known as:  PHENERGAN Your last dose was: Your next dose is:    
   
   
 Dose:  25 mg Take 20 mL by mouth four (4) times daily as needed for Nausea. Quantity:  400 mL Refills:  4  
     
   
   
   
  
 scopolamine 1.5 mg (1 mg over 3 days) Pt3d Commonly known as:  TRANSDERM-SCOP Your last dose was: Your next dose is:    
   
   
 Dose:  1.5 mg  
1 Patch by TransDERmal route every seventy-two (72) hours as needed. Quantity:  10 Patch Refills:  1  
     
   
   
   
  
 sucralfate 100 mg/mL suspension Commonly known as:  Richard Flores Your last dose was: Your next dose is:    
   
   
 Dose:  1 tsp Take 5 mL by mouth four (4) times daily. Quantity:  414 mL Refills:  3  
     
   
   
   
  
 zolpidem 10 mg tablet Commonly known as:  AMBIEN Your last dose was: Your next dose is:    
   
   
 Dose:  10 mg Take 1 Tab by mouth nightly as needed for Sleep. Max Daily Amount: 10 mg.  
 Quantity:  30 Tab Refills:  1

## 2017-09-13 NOTE — IP AVS SNAPSHOT
Tam[address] [place] [address] 
[phone] Patient: Melodie Ellison MRN: [id number] :1960 You are allergic to the following Allergen Reactions Ativan (Lorazepam) Other (comments) SEVERE CONFUSION Recent Documentation OB Status Smoking Status Unknown Never Smoker Emergency Contacts Name Discharge Info Relation Home Work Mobile [address] CAREGIVER [3] Spouse [3] [phone] About your hospitalization You were admitted on:  2017 You last received care in the:  [facility] You were discharged on:  2017 Unit phone number:  [phone] Why you were hospitalized Your primary diagnosis was:  Not on File Your diagnoses also included: Malignant Neoplasm Of Both Ovaries (Hcc), Carcinomatosis Peritonei (Hcc), Small Bowel Obstruction (Hcc), Ileostomy In Place (Hcc), Anemia Due To Chemotherapy, Cancer Associated Pain, Cinv (Chemotherapy-Induced Nausea And Vomiting), Generalized Abdominal Pain, Ileus (Hcc), Thrombosis Of Pelvic Vein, Hx Of Pulmonary Embolus, Anticoagulation Adequate With Anticoagulant Therapy, Dehydration, Protein Calorie Malnutrition (Hcc), Malignant Ascites, Ulcerative Esophagitis, Ovarian Cancer (Hcc), Nausea & Vomiting, Carcinomatosis (Hcc), Partial Bowel Obstruction (Hcc), Nausea And Vomiting Providers Seen During Your Hospitalizations Provider Role Specialty Primary office phone Bernie Kathleen MD Attending Provider Gynecologic Oncology [phone] Your Primary Care Physician (PCP) Primary Care Physician Office Phone Office Fax 1761 [address] [phone] Follow-up Information Follow up With Details Comments Contact Info Jason Menendez MD   [address] [place] [address] [phone] Your Appointments Friday September 22, 2017 11:00 AM EDT INFUSION 30 with PERFECTO INFUSION NURSE [address] [place] [address] ([address]) [address] [address] [phone] [facility], ground floor. Tuesday September 26, 2017  2:30 PM EDT  
CT SCAN FOLLOW UP with Dami Pabon MD  
[facility] Gynecologic Oncology [address] [facility]) [address] [facility] [address] [place] [address] [address]  
[phone] Current Discharge Medication List  
  
CONTINUE these medications which have NOT CHANGED Dose & Instructions Dispensing Information Comments Morning Noon Evening Bedtime  
 apixaban 2.5 mg tablet Commonly known as:  Nicky Seed Your last dose was: Your next dose is:    
   
   
 Dose:  2.5 mg Take 1 Tab by mouth two (2) times a day. Quantity:  60 Tab Refills:  6 Calcium-Vitamin D3-Vitamin K [id number] mg-unit-mcg Chew Your last dose was: Your next dose is:    
   
   
 Dose:  1 Tab Take 1 Tab by mouth two (2) times a day. Refills:  0  
     
   
   
   
  
 fentaNYL 100 mcg/hr PATCH Commonly known as:  Gleda Duet Your last dose was: Your next dose is:    
   
   
 Dose:  1 Patch 1 Patch by TransDERmal route every seventy-two (72) hours. Max Daily Amount: 1 Patch. Quantity:  10 Patch Refills:  0 HYDROmorphone 1 mg/mL Liqd oral solution Commonly known as:  DILAUDID Your last dose was: Your next dose is:    
   
   
 Dose:  4 mg Take 4 mL by mouth every four (4) hours as needed. Max Daily Amount: 24 mg. Quantity:  475 mL Refills:  0  
     
   
   
   
  
 lidocaine-prilocaine topical cream  
Commonly known as:  EMLA Your last dose was: Your next dose is: Apply small amount over port area and cover with a band aid 1 hour before chemo treatment Quantity:  30 g Refills:  3  
     
   
   
   
  
 magnesium oxide 400 mg tablet Commonly known as:  MAG-OX Your last dose was: Your next dose is:    
   
   
 Dose:  400 mg Take 1 Tab by mouth two (2) times a day. Indications: HYPOMAGNESEMIA Quantity:  60 Tab Refills:  2  
     
   
   
   
  
 metoclopramide 5 mg/5 mL syrup Commonly known as:  REGLAN Your last dose was: Your next dose is:    
   
   
 Dose:  10 mg Take 10 mL by mouth four (4) times daily as needed. Take before meals as needed Quantity:  500 mL Refills:  3  
     
   
   
   
  
 metoprolol tartrate 100 mg IR tablet Commonly known as:  LOPRESSOR Your last dose was: Your next dose is:    
   
   
 Dose:  50 mg Take 50 mg by mouth two (2) times a day. Refills:  0 NexIUM Packet 40 mg granules for oral suspension Generic drug:  esomeprazole Your last dose was: Your next dose is:    
   
   
 two (2) times a day. Refills:  0  
     
   
   
   
  
 ondansetron 4 mg disintegrating tablet Commonly known as:  ZOFRAN ODT Your last dose was: Your next dose is:    
   
   
 Dose:  4 mg Take 1 Tab by mouth every eight (8) hours as needed for Nausea. Quantity:  30 Tab Refills:  2  
     
   
   
   
  
 polyethylene glycol 17 gram/dose powder Commonly known as:  Vernel Redder Your last dose was: Your next dose is:    
   
   
 Dose:  17 g Take 17 g by mouth daily as needed. Quantity:  238 g Refills:  3  
     
   
   
   
  
 promethazine 6.25 mg/5 mL syrup Commonly known as:  PHENERGAN Your last dose was: Your next dose is:    
   
   
 Dose:  25 mg Take 20 mL by mouth four (4) times daily as needed for Nausea. Quantity:  400 mL Refills:  4 scopolamine 1.5 mg (1 mg over 3 days) Pt3d Commonly known as:  TRANSDERM-SCOP Your last dose was: Your next dose is:    
   
   
 Dose:  1.5 mg  
1 Patch by TransDERmal route every seventy-two (72) hours as needed. Quantity:  10 Patch Refills:  1  
     
   
   
   
  
 sucralfate 100 mg/mL suspension Commonly known as:  Sesar Mesa Your last dose was: Your next dose is:    
   
   
 Dose:  1 tsp Take 5 mL by mouth four (4) times daily. Quantity:  414 mL Refills:  3  
     
   
   
   
  
 zolpidem 10 mg tablet Commonly known as:  AMBIEN Your last dose was: Your next dose is:    
   
   
 Dose:  10 mg Take 1 Tab by mouth nightly as needed for Sleep. Max Daily Amount: 10 mg.  
 Quantity:  30 Tab Refills:  1 Discharge Instructions [address] [place] [address] MD Vicki Lerma MD Sandi Gores, NP Patient Discharge Instructions Mel Thomas / [id number] : 1960 Admitted 2017 Discharged: 2017 Take Home Medications No current facility-administered medications on file prior to encounter. Current Outpatient Prescriptions on File Prior to Encounter Medication Sig Dispense Refill  fentaNYL (DURAGESIC) 100 mcg/hr PATCH 1 Patch by TransDERmal route every seventy-two (72) hours. Max Daily Amount: 1 Patch. 10 Patch 0  
 promethazine (PHENERGAN) 6.25 mg/5 mL syrup Take 20 mL by mouth four (4) times daily as needed for Nausea. 400 mL 4  
 HYDROmorphone (DILAUDID) 1 mg/mL liqd oral solution Take 4 mL by mouth every four (4) hours as needed. Max Daily Amount: 24 mg. 475 mL 0  
 NEXIUM PACKET 40 mg granules for oral suspension two (2) times a day.  metoclopramide (REGLAN) 5 mg/5 mL syrup Take 10 mL by mouth four (4) times daily as needed.  Take before meals as needed 500 mL 3  
 polyethylene glycol (MIRALAX) 17 gram/dose powder Take 17 g by mouth daily as needed. 238 g 3  
 apixaban (ELIQUIS) 2.5 mg tablet Take 1 Tab by mouth two (2) times a day. 60 Tab 6  
 zolpidem (AMBIEN) 10 mg tablet Take 1 Tab by mouth nightly as needed for Sleep. Max Daily Amount: 10 mg. 30 Tab 1  
 ondansetron (ZOFRAN ODT) 4 mg disintegrating tablet Take 1 Tab by mouth every eight (8) hours as needed for Nausea. 30 Tab 2  
 Calcium-Vitamin D3-Vitamin K [phone] mg-unit-mcg chew Take 1 Tab by mouth two (2) times a day.  metoprolol (LOPRESSOR) 100 mg tablet Take 50 mg by mouth two (2) times a day.  scopolamine (TRANSDERM-SCOP) 1.5 mg (1 mg over 3 days) pt3d 1 Patch by TransDERmal route every seventy-two (72) hours as needed. 10 Patch 1  
 magnesium oxide (MAG-OX) 400 mg tablet Take 1 Tab by mouth two (2) times a day. Indications: HYPOMAGNESEMIA 60 Tab 2  
 sucralfate (CARAFATE) 100 mg/mL suspension Take 5 mL by mouth four (4) times daily. 414 mL 3  
 lidocaine-prilocaine (EMLA) topical cream Apply small amount over port area and cover with a band aid 1 hour before chemo treatment 30 g 3  
 
 
· It is important that you take the medication exactly as they are prescribed. · Keep your medication in the bottles provided by the pharmacist and keep a list of the medication names, dosages, and times to be taken in your wallet. · Do not take other medications without consulting your doctor. What to do at [facility] Recommended diet:as tolerated, and as prior to admission, stay hydrated. You should take a stool softener such as colace for constipation. If constipation persists for >24 hours you should take a dose of Milk of Magnesia. Call if your constipation persists. Recommended activity: No driving while on pain medication Walk at least 6 times per day Activity as able. Keep as active as possible If you experience any of the following persisting symptoms: 
 
Nausea/vomiting, fever > 101 F, diarrhea/constipation, increasing pain despite medication, increasing vaginal discharge or any concerns, please call our office. Follow-up with  on 9/26th at 2:30 as previously scheduled. Call for any concerns or questions Information obtained by : 
I understand that if any problems occur once I am at home I am to contact my physician. I understand and acknowledge receipt of the instructions indicated above. Physician's or R.N.'s Signature                                                                  Date/Time Patient or Representative Signature                                                          Date/Time Discharge Orders None [employer] Announcement We are excited to announce that we are making your provider's discharge notes available to you in [employer]. You will see these notes when they are completed and signed by the physician that discharged you from your recent hospital stay. If you have any questions or concerns about any information you see in [employer], please call the Health Information Department where you were seen or reach out to your Primary Care Provider for more information about your plan of care. Introducing [facility]! Dear Hank Lau: 
Thank you for requesting a [employer] account. Our records indicate that you already have an active [employer] account. You can access your account anytime at [email or web address] Did you know that you can access your hospital and ER discharge instructions at any time in [employer]? You can also review all of your test results from your hospital stay or ER visit. Additional Information If you have questions, please visit the Frequently Asked Questions section of the [employer] website at [email or web address] Remember, MyChart is NOT to be used for urgent needs. For medical emergencies, dial 911. Now available from your iPhone and Android! General Information Please provide this summary of care documentation to your next provider. Patient Signature:  ____________________________________________________________ Date:  ____________________________________________________________  
  
[employer] Provider Signature:  ____________________________________________________________ Date:  ____________________________________________________________

## 2017-09-14 ENCOUNTER — APPOINTMENT (OUTPATIENT)
Dept: CT IMAGING | Age: 57
DRG: 389 | End: 2017-09-14
Attending: NURSE PRACTITIONER
Payer: OTHER GOVERNMENT

## 2017-09-14 ENCOUNTER — APPOINTMENT (OUTPATIENT)
Dept: GENERAL RADIOLOGY | Age: 57
DRG: 389 | End: 2017-09-14
Attending: OBSTETRICS & GYNECOLOGY
Payer: OTHER GOVERNMENT

## 2017-09-14 PROBLEM — R11.2 NAUSEA AND VOMITING: Status: ACTIVE | Noted: 2017-09-14

## 2017-09-14 LAB
ALBUMIN SERPL-MCNC: 3.1 G/DL (ref 3.5–5)
ALBUMIN/GLOB SERPL: 0.8 {RATIO} (ref 1.1–2.2)
ALP SERPL-CCNC: 106 U/L (ref 45–117)
ALT SERPL-CCNC: 33 U/L (ref 12–78)
ANION GAP SERPL CALC-SCNC: 9 MMOL/L (ref 5–15)
APPEARANCE UR: CLEAR
AST SERPL-CCNC: 27 U/L (ref 15–37)
BACTERIA URNS QL MICRO: NEGATIVE /HPF
BASOPHILS # BLD: 0 K/UL (ref 0–0.1)
BASOPHILS NFR BLD: 0 % (ref 0–1)
BILIRUB SERPL-MCNC: 0.4 MG/DL (ref 0.2–1)
BILIRUB UR QL: NEGATIVE
BUN SERPL-MCNC: 14 MG/DL (ref 6–20)
BUN/CREAT SERPL: 16 (ref 12–20)
CALCIUM SERPL-MCNC: 9.2 MG/DL (ref 8.5–10.1)
CHLORIDE SERPL-SCNC: 100 MMOL/L (ref 97–108)
CO2 SERPL-SCNC: 28 MMOL/L (ref 21–32)
COLOR UR: NORMAL
CREAT SERPL-MCNC: 0.88 MG/DL (ref 0.55–1.02)
EOSINOPHIL # BLD: 0 K/UL (ref 0–0.4)
EOSINOPHIL NFR BLD: 0 % (ref 0–7)
EPITH CASTS URNS QL MICRO: NORMAL /LPF
ERYTHROCYTE [DISTWIDTH] IN BLOOD BY AUTOMATED COUNT: 16.9 % (ref 11.5–14.5)
GLOBULIN SER CALC-MCNC: 3.7 G/DL (ref 2–4)
GLUCOSE SERPL-MCNC: 125 MG/DL (ref 65–100)
GLUCOSE UR STRIP.AUTO-MCNC: NEGATIVE MG/DL
HCT VFR BLD AUTO: 29.1 % (ref 35–47)
HGB BLD-MCNC: 9.5 G/DL (ref 11.5–16)
HGB UR QL STRIP: NEGATIVE
HYALINE CASTS URNS QL MICRO: NORMAL /LPF (ref 0–5)
KETONES UR QL STRIP.AUTO: NEGATIVE MG/DL
LEUKOCYTE ESTERASE UR QL STRIP.AUTO: NEGATIVE
LYMPHOCYTES # BLD: 1 K/UL (ref 0.8–3.5)
LYMPHOCYTES NFR BLD: 17 % (ref 12–49)
MAGNESIUM SERPL-MCNC: 1.8 MG/DL (ref 1.6–2.4)
MCH RBC QN AUTO: 31.3 PG (ref 26–34)
MCHC RBC AUTO-ENTMCNC: 32.6 G/DL (ref 30–36.5)
MCV RBC AUTO: 95.7 FL (ref 80–99)
MONOCYTES # BLD: 0.2 K/UL (ref 0–1)
MONOCYTES NFR BLD: 3 % (ref 5–13)
NEUTS SEG # BLD: 4.7 K/UL (ref 1.8–8)
NEUTS SEG NFR BLD: 80 % (ref 32–75)
NITRITE UR QL STRIP.AUTO: NEGATIVE
PH UR STRIP: 5.5 [PH] (ref 5–8)
PLATELET # BLD AUTO: 222 K/UL (ref 150–400)
POTASSIUM SERPL-SCNC: 4.6 MMOL/L (ref 3.5–5.1)
PROT SERPL-MCNC: 6.8 G/DL (ref 6.4–8.2)
PROT UR STRIP-MCNC: NEGATIVE MG/DL
RBC # BLD AUTO: 3.04 M/UL (ref 3.8–5.2)
RBC #/AREA URNS HPF: NORMAL /HPF (ref 0–5)
SODIUM SERPL-SCNC: 137 MMOL/L (ref 136–145)
SP GR UR REFRACTOMETRY: 1.02 (ref 1–1.03)
UROBILINOGEN UR QL STRIP.AUTO: 0.2 EU/DL (ref 0.2–1)
WBC # BLD AUTO: 5.8 K/UL (ref 3.6–11)
WBC URNS QL MICRO: NORMAL /HPF (ref 0–4)

## 2017-09-14 PROCEDURE — 85025 COMPLETE CBC W/AUTO DIFF WBC: CPT | Performed by: NURSE PRACTITIONER

## 2017-09-14 PROCEDURE — 83735 ASSAY OF MAGNESIUM: CPT | Performed by: NURSE PRACTITIONER

## 2017-09-14 PROCEDURE — 65210000002 HC RM PRIVATE GYN

## 2017-09-14 PROCEDURE — 99218 HC RM OBSERVATION: CPT

## 2017-09-14 PROCEDURE — 74011000250 HC RX REV CODE- 250

## 2017-09-14 PROCEDURE — 77030018846 HC SOL IRR STRL H20 ICUM -A

## 2017-09-14 PROCEDURE — 74011250637 HC RX REV CODE- 250/637: Performed by: OBSTETRICS & GYNECOLOGY

## 2017-09-14 PROCEDURE — 74011000258 HC RX REV CODE- 258: Performed by: NURSE PRACTITIONER

## 2017-09-14 PROCEDURE — 74011250636 HC RX REV CODE- 250/636: Performed by: OBSTETRICS & GYNECOLOGY

## 2017-09-14 PROCEDURE — 74011250637 HC RX REV CODE- 250/637: Performed by: NURSE PRACTITIONER

## 2017-09-14 PROCEDURE — 80053 COMPREHEN METABOLIC PANEL: CPT | Performed by: NURSE PRACTITIONER

## 2017-09-14 PROCEDURE — 74011250636 HC RX REV CODE- 250/636: Performed by: NURSE PRACTITIONER

## 2017-09-14 PROCEDURE — 77030008771 HC TU NG SALEM SUMP -A

## 2017-09-14 PROCEDURE — 74011000250 HC RX REV CODE- 250: Performed by: NURSE PRACTITIONER

## 2017-09-14 PROCEDURE — 36415 COLL VENOUS BLD VENIPUNCTURE: CPT | Performed by: NURSE PRACTITIONER

## 2017-09-14 PROCEDURE — 43752 NASAL/OROGASTRIC W/TUBE PLMT: CPT

## 2017-09-14 PROCEDURE — 74011636320 HC RX REV CODE- 636/320: Performed by: RADIOLOGY

## 2017-09-14 RX ORDER — LIDOCAINE HYDROCHLORIDE 20 MG/ML
JELLY TOPICAL AS NEEDED
Status: DISCONTINUED | OUTPATIENT
Start: 2017-09-14 | End: 2017-09-18 | Stop reason: HOSPADM

## 2017-09-14 RX ORDER — LIDOCAINE HYDROCHLORIDE 20 MG/ML
JELLY TOPICAL
Status: COMPLETED
Start: 2017-09-14 | End: 2017-09-14

## 2017-09-14 RX ORDER — MAGNESIUM SULFATE 1 G/100ML
1 INJECTION INTRAVENOUS ONCE
Status: COMPLETED | OUTPATIENT
Start: 2017-09-14 | End: 2017-09-14

## 2017-09-14 RX ORDER — SODIUM CHLORIDE 0.9 % (FLUSH) 0.9 %
10 SYRINGE (ML) INJECTION
Status: COMPLETED | OUTPATIENT
Start: 2017-09-14 | End: 2017-09-14

## 2017-09-14 RX ORDER — DEXAMETHASONE SODIUM PHOSPHATE 4 MG/ML
4 INJECTION, SOLUTION INTRA-ARTICULAR; INTRALESIONAL; INTRAMUSCULAR; INTRAVENOUS; SOFT TISSUE ONCE
Status: COMPLETED | OUTPATIENT
Start: 2017-09-14 | End: 2017-09-14

## 2017-09-14 RX ADMIN — METOCLOPRAMIDE 10 MG: 5 INJECTION, SOLUTION INTRAMUSCULAR; INTRAVENOUS at 06:03

## 2017-09-14 RX ADMIN — POTASSIUM CHLORIDE: 2 INJECTION, SOLUTION, CONCENTRATE INTRAVENOUS at 01:31

## 2017-09-14 RX ADMIN — APIXABAN 2.5 MG: 2.5 TABLET, FILM COATED ORAL at 19:22

## 2017-09-14 RX ADMIN — HYDROMORPHONE HYDROCHLORIDE 1 MG: 1 INJECTION, SOLUTION INTRAMUSCULAR; INTRAVENOUS; SUBCUTANEOUS at 17:32

## 2017-09-14 RX ADMIN — METOCLOPRAMIDE 10 MG: 5 INJECTION, SOLUTION INTRAMUSCULAR; INTRAVENOUS at 11:23

## 2017-09-14 RX ADMIN — POTASSIUM CHLORIDE: 2 INJECTION, SOLUTION, CONCENTRATE INTRAVENOUS at 10:00

## 2017-09-14 RX ADMIN — SUCRALFATE 0.5 G: 1 SUSPENSION ORAL at 04:45

## 2017-09-14 RX ADMIN — MAGNESIUM SULFATE HEPTAHYDRATE 1 G: 1 INJECTION, SOLUTION INTRAVENOUS at 12:05

## 2017-09-14 RX ADMIN — LIDOCAINE HYDROCHLORIDE: 20 JELLY TOPICAL at 10:55

## 2017-09-14 RX ADMIN — IOHEXOL 17500 MG: 350 INJECTION, SOLUTION INTRAVENOUS at 10:54

## 2017-09-14 RX ADMIN — PROMETHAZINE HYDROCHLORIDE 25 MG: 25 INJECTION INTRAMUSCULAR; INTRAVENOUS at 08:27

## 2017-09-14 RX ADMIN — ONDANSETRON 4 MG: 2 INJECTION INTRAMUSCULAR; INTRAVENOUS at 12:05

## 2017-09-14 RX ADMIN — METOCLOPRAMIDE 10 MG: 5 INJECTION, SOLUTION INTRAMUSCULAR; INTRAVENOUS at 17:33

## 2017-09-14 RX ADMIN — PROMETHAZINE HYDROCHLORIDE 25 MG: 25 INJECTION INTRAMUSCULAR; INTRAVENOUS at 12:55

## 2017-09-14 RX ADMIN — FAMOTIDINE 20 MG: 10 INJECTION, SOLUTION INTRAVENOUS at 09:21

## 2017-09-14 RX ADMIN — HYDROMORPHONE HYDROCHLORIDE 1 MG: 1 INJECTION, SOLUTION INTRAMUSCULAR; INTRAVENOUS; SUBCUTANEOUS at 20:47

## 2017-09-14 RX ADMIN — PROMETHAZINE HYDROCHLORIDE 25 MG: 25 INJECTION INTRAMUSCULAR; INTRAVENOUS at 22:21

## 2017-09-14 RX ADMIN — PROMETHAZINE HYDROCHLORIDE 25 MG: 25 INJECTION INTRAMUSCULAR; INTRAVENOUS at 04:39

## 2017-09-14 RX ADMIN — POTASSIUM CHLORIDE: 2 INJECTION, SOLUTION, CONCENTRATE INTRAVENOUS at 19:40

## 2017-09-14 RX ADMIN — BENZOCAINE AND MENTHOL 1 LOZENGE: 15; 3.6 LOZENGE ORAL at 12:05

## 2017-09-14 RX ADMIN — BENZOCAINE AND MENTHOL 1 LOZENGE: 15; 3.6 LOZENGE ORAL at 18:05

## 2017-09-14 RX ADMIN — HYDROMORPHONE HYDROCHLORIDE 1 MG: 1 INJECTION, SOLUTION INTRAMUSCULAR; INTRAVENOUS; SUBCUTANEOUS at 14:47

## 2017-09-14 RX ADMIN — ACETAMINOPHEN 650 MG: 650 SOLUTION ORAL at 19:22

## 2017-09-14 RX ADMIN — Medication 10 ML: at 14:00

## 2017-09-14 RX ADMIN — HYDROMORPHONE HYDROCHLORIDE 1 MG: 1 INJECTION, SOLUTION INTRAMUSCULAR; INTRAVENOUS; SUBCUTANEOUS at 04:19

## 2017-09-14 RX ADMIN — CHLORASEPTIC 1 SPRAY: 1.5 LIQUID ORAL at 12:05

## 2017-09-14 RX ADMIN — HYDROMORPHONE HYDROCHLORIDE 1 MG: 1 INJECTION, SOLUTION INTRAMUSCULAR; INTRAVENOUS; SUBCUTANEOUS at 11:22

## 2017-09-14 RX ADMIN — ONDANSETRON 4 MG: 2 INJECTION INTRAMUSCULAR; INTRAVENOUS at 17:33

## 2017-09-14 RX ADMIN — PROMETHAZINE HYDROCHLORIDE 25 MG: 25 INJECTION INTRAMUSCULAR; INTRAVENOUS at 17:44

## 2017-09-14 RX ADMIN — CHLORASEPTIC 1 SPRAY: 1.5 LIQUID ORAL at 18:05

## 2017-09-14 RX ADMIN — DEXAMETHASONE SODIUM PHOSPHATE 4 MG: 4 INJECTION INTRA-ARTICULAR; INTRALESIONAL; INTRAMUSCULAR; INTRAVENOUS; SOFT TISSUE at 09:20

## 2017-09-14 RX ADMIN — FAMOTIDINE 20 MG: 10 INJECTION, SOLUTION INTRAVENOUS at 20:51

## 2017-09-14 RX ADMIN — ONDANSETRON 4 MG: 2 INJECTION INTRAMUSCULAR; INTRAVENOUS at 06:01

## 2017-09-14 RX ADMIN — Medication 10 ML: at 11:00

## 2017-09-14 RX ADMIN — HYDROMORPHONE HYDROCHLORIDE 1 MG: 1 INJECTION, SOLUTION INTRAMUSCULAR; INTRAVENOUS; SUBCUTANEOUS at 08:35

## 2017-09-14 NOTE — PROGRESS NOTES
Toby Trujillo. Abraham Duong, HAYDER       Niurka Silvestre       [id number]  1960    Admitted 2017 Date 2017   HPI  Niurka Silvestre is a 61 y.o.  postmenopausal female with a diagnosis of stage IV ovarian cancer. She underwent exploratory laparotomy with suboptimal debulking. She had extensive disease. She was readmitted about a week later with obstruction and subsequently had a cecal perforation, requiring resection, end ileostomy, and mucous fistula. During the hospitalization she was also diagnosed with pulmonary embolus and had chest tubes placed for bilateral pleural effusions. She had a prolonged hospital course and actually received a dose of cisplatin chemotherapy while in the hospital to help dry up the effusions. She completed 6 cycles of Taxol/Carboplatin chemotherapy following her initial debulking. She did relatively well, considering she had an ileostomy to deal with during chemotherapy. I took her to the OR on 16 for interval debulking and reversal of her ileostomy. She had extensive disease, but we were able to resect the majority of her disease. One week later she developed an anastomotic leak requiring reexploration and recreation of an ileostomy. She had another prolonged hospitalization, but recovered well since surgery. We then reinitiated Taxol/Carboplatin chemotherapy, but did reduce the dose of Taxol to 135 mg/m2. She completed 4 mores cycles. Her CA-125 normalized and we stopped treatment.      On , she presented to Dr. Webster  office with her  to discuss her follow up/surveillance CT scan. Unfortunately, it demonstrated progression of disease, although minimally worse than her prior scan 2 months previously. She was actually feeling better than she did at that time. After review of scans and disease, the decision was made to begin Doxil/Avastin Q28 days for 6 cycles. She began this on 2017. In April 2017, she had completed 3 cycles of Doxil/Avastin and had CT scans at that time showed she had evidence of recurrent disease. She was switched at that time to [facility] and began this regimen on 5/23/2017 and has completed 4 cycles of this with the last being on 8/1/2017. With this regimen, her  has decreased to 65 on 7/20/17 from 145.6 on 5/16/2017.       SUBJECTIVE:  Pt had several episodes of vomiting during night even with scheduled Zofran, Scopolamine patch, phenergan and pain medication. She initially was trying to drink sips of the contrast for the CT scan, then vomited 800 cc's (much more that she had taken in). She remains pale, tired appearing and vomiting. Still no gas or output from her ostomy since 11AM yesterday. She says her abdomen is a little less tender than last evening         Allergies   Allergen Reactions    Ativan [Lorazepam] Other (comments)     SEVERE CONFUSION        Review of Systems  General:said \"it was a rough night\"  HEENT: Denies visual changes, dysphagia or headache  Resp: Denies SOB, DUDLEY, wheezing or cough  CV: Denies CP, palpitations  GI/: See HPI. Musk[place]el: Denies muscle ache or joint pain  Neuro: Denies dizzyness or syncope      OBJECTIVE:    Visit Vitals    /81 (BP 1 Location: Left arm, BP Patient Position: At rest)    Pulse 61    Temp 99 °F (37.2 °C)    Resp 14    SpO2 98%         Physical Exam  General: A&O X3 appearing tired and nauseated   HEENT: Sclera anicteric, Mucosa pale, dry  Neck: No JVD without adenopathy appreciated  Port site without redness, tenderness or swelling. Accessed without difficulty  Heart: Regular without M/R/G  Lungs: CTA Bilat without wheezing or rales   Abd: Remains somewhat firm with fanit bowel sounds noted. Mild tenderness to palpation. Ostomy bag remians empty without gas at present.    Ext: Without edema and + pedal pulses bilat  Neuro: grossly intact      Recent Results (from the past 12 hour(s)) URINALYSIS W/MICROSCOPIC    Collection Time: 09/13/17 11:54 PM   Result Value Ref Range    Color YELLOW/STRAW      Appearance CLEAR CLEAR      Specific gravity 1.021 1.003 - 1.030      pH (UA) 5.5 5.0 - 8.0      Protein NEGATIVE  NEG mg/dL    Glucose NEGATIVE  NEG mg/dL    Ketone NEGATIVE  NEG mg/dL    Bilirubin NEGATIVE  NEG      Blood NEGATIVE  NEG      Urobilinogen 0.2 0.2 - 1.0 EU/dL    Nitrites NEGATIVE  NEG      Leukocyte Esterase NEGATIVE  NEG      WBC 0-4 0 - 4 /hpf    RBC 0-5 0 - 5 /hpf    Epithelial cells FEW FEW /lpf    Bacteria NEGATIVE  NEG /hpf    Hyaline cast 0-2 0 - 5 /lpf   METABOLIC PANEL, COMPREHENSIVE    Collection Time: 09/14/17  4:34 AM   Result Value Ref Range    Sodium 137 136 - 145 mmol/L    Potassium 4.6 3.5 - 5.1 mmol/L    Chloride 100 97 - 108 mmol/L    CO2 28 21 - 32 mmol/L    Anion gap 9 5 - 15 mmol/L    Glucose 125 (H) 65 - 100 mg/dL    BUN 14 6 - 20 MG/DL    Creatinine 0.88 0.55 - 1.02 MG/DL    BUN/Creatinine ratio 16 12 - 20      GFR est AA >60 >60 ml/min/1.73m2    GFR est non-AA >60 >60 ml/min/1.73m2    Calcium 9.2 8.5 - 10.1 MG/DL    Bilirubin, total 0.4 0.2 - 1.0 MG/DL    ALT (SGPT) 33 12 - 78 U/L    AST (SGOT) 27 15 - 37 U/L    Alk. phosphatase 106 45 - 117 U/L    Protein, total 6.8 6.4 - 8.2 g/dL    Albumin 3.1 (L) 3.5 - 5.0 g/dL    Globulin 3.7 2.0 - 4.0 g/dL    A-G Ratio 0.8 (L) 1.1 - 2.2     CBC WITH AUTOMATED DIFF    Collection Time: 09/14/17  4:34 AM   Result Value Ref Range    WBC 5.8 3.6 - 11.0 K/uL    RBC 3.04 (L) 3.80 - 5.20 M/uL    HGB 9.5 (L) 11.5 - 16.0 g/dL    HCT 29.1 (L) 35.0 - 47.0 %    MCV 95.7 80.0 - 99.0 FL    MCH 31.3 26.0 - 34.0 PG    MCHC 32.6 30.0 - 36.5 g/dL    RDW 16.9 (H) 11.5 - 14.5 %    PLATELET 899 569 - 201 K/uL    NEUTROPHILS 80 (H) 32 - 75 %    LYMPHOCYTES 17 12 - 49 %    MONOCYTES 3 (L) 5 - 13 %    EOSINOPHILS 0 0 - 7 %    BASOPHILS 0 0 - 1 %    ABS. NEUTROPHILS 4.7 1.8 - 8.0 K/UL    ABS. LYMPHOCYTES 1.0 0.8 - 3.5 K/UL    ABS.  MONOCYTES 0.2 0.0 - 1.0 K/UL    ABS. EOSINOPHILS 0.0 0.0 - 0.4 K/UL    ABS. BASOPHILS 0.0 0.0 - 0.1 K/UL   MAGNESIUM    Collection Time: 09/14/17  4:34 AM   Result Value Ref Range    Magnesium 1.8 1.6 - 2.4 mg/dL              CT ABD/Pelvis 8/10/2017  FINDINGS:   LUNG BASES: There is a patch of airspace process identified in the right middle  lobe along the minor fissure. This is incompletely visualized, however. There  may be more extensive airspace process in the remainder of the right middle  lobe. There is also patchy airspace process in the base of the right lower lobe. These findings were not present on the previous CT. No pleural effusion. Small  hiatal hernia noted. LIVER: 8.4 mm lucent lesion noted hepatic segment 8 unchanged from previous  exam. This is isodense with liver on delayed imaging. Small well-defined lucency  measuring 5.8 mm in tip of hepatic segment 3 is unchanged. There is no biliary  dilatation. GALLBLADDER: Unremarkable. SPLEEN: No mass.     PANCREAS: No mass or ductal dilatation. ADRENALS: Unremarkable. KIDNEYS: Hydronephrosis of both kidneys again noted. No renal mass is seen other  than a tiny lucency in the periphery of the right mid kidney unchanged.     GI: No dilatation or wall thickening. There is a colostomy in the right lower  quadrant.     APPENDIX: Not identified  PERITONEUM: There is ascites with loculations along the peritoneal surface in  the left upper quadrant which may be metastatic deposits. In addition there is a  somewhat rounded clumped appearance of the bowel which may be secondary to  peritoneal deposits. RETROPERITONEUM: No lymphadenopathy or aortic aneurysm.        URINARY BLADDER: No mass or calculus. PELVIS: There is been resection of the colon. The rectum is identified. This  terminates blindly. BONES: No destructive bone lesion. ADDITIONAL COMMENTS: N/A    IMPRESSION:     1.  In the right middle lobe and in the right lower lobe there are areas of  airspace process not seen previously. These may be areas of developing  pneumonia. 2. Hydronephrosis unchanged. 3. Ascites with areas of loculation and possible peritoneal neoplasm spread  unchanged from the previous study as well. 4. Lucencies in the liver unchanged from previous study.     IMPRESSION:    Patient Active Problem List   Diagnosis Code    Malignant neoplasm of both ovaries ([facility] C56.1, C56.2    Carcinomatosis peritonei ([facility] C78.6, C80.1    Small bowel obstruction (HCC) K56.69    Ileostomy in place ([facility] Z93.2    Anemia due to chemotherapy D64.81, T45.1X5A    CINV (chemotherapy-induced nausea and vomiting) R11.2, T45.1X5A    Cancer associated pain G89.3    Generalized abdominal pain R10.84    Anxiety about health F41.8    Ileus (HCC) K56.7    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    Hx of pulmonary embolus Z86.711    Dehydration E86.0    Chemotherapy-induced neutropenia (HCC) D70.1    Anticoagulation adequate with anticoagulant therapy Z79.01    Counseling regarding end of life decision making Z71.89    Bowel obstruction (HCC) K56.60    Protein calorie malnutrition (HCC) E46    Malignant ascites R18.0    Hydronephrosis of right kidney N13.30    Isolated proteinuria R80.0    Erosive esophagitis K22.10    Ulcerative esophagitis K22.10    Hypomagnesemia E83.42    Abdominal pain R10.9    Hydronephrosis of left kidney N13.30    Ovarian cancer (HCC) C56.9    Nausea & vomiting R11.2    Carcinomatosis (HCC) C80.0    Intractable vomiting with nausea R11.2    Febrile illness R50.9    Partial bowel obstruction (HCC) K56.69       Assessment/Plan:  Will proceed with NG tube placement under fluro due to pt's history of having a difficult time with placements of NGT's  Will then try to have oral contrast placed down NG tube and proceed with CT scan at that time. Will give one dose of 4 mg Decadron to try to help with this  Ambulate as much as possible.    Continue NPO for now and since prealbumin currently 23, will hold on parental nutrition for now and continue with IV hydration with MVI 1 amp q 24 hours. Will give 1 liter bolus of NS due to emeses   Continue with nausea/Pain medication  Eliquis BID as prior to admission Via NGT  Rounded with Dr. Kaia Estrella    Signed By: Luis Fisher NP     9/14/2017/5:18 PM         Current Facility-Administered Medications:     iohexol (OMNIPAQUE) solution 50 mL, 50 mL, Oral, RAD ONCE, Parth Toth MD    [facility]) tablet 2.5 mg, 2.5 mg, Oral, BID, Genny Don NP, 2.5 mg at 09/13/17 1746    fentaNYL (DURAGESIC) 100 mcg/hr patch 1 Patch, 1 Patch, TransDERmal, Q72H, Genny Don NP, Stopped at 09/13/17 1600    metoprolol tartrate (LOPRESSOR) tablet 50 mg, 50 mg, Oral, BID, Genny Don NP, 50 mg at 09/13/17 1747    scopolamine (TRANSDERM-SCOP) 1.5 mg, 1.5 mg, TransDERmal, Q72H, Genny Don NP, 1.5 mg at 09/13/17 1746    metoclopramide HCl (REGLAN) injection 10 mg, 10 mg, IntraVENous, Q6H, Genny Don NP, 10 mg at 09/14/17 0603    sodium chloride (NS) flush 5-10 mL, 5-10 mL, IntraVENous, Q8H, Genny Don NP, 10 mL at 09/13/17 1600    sodium chloride (NS) flush 5-10 mL, 5-10 mL, IntraVENous, PRN, Genny Don NP    acetaminophen (TYLENOL) solution 650 mg, 650 mg, Oral, Q4H PRN, Genny Don NP    naloxone (NARCAN) injection 0.4 mg, 0.4 mg, IntraVENous, PRN, Genny Don NP    diphenhydrAMINE (BENADRYL) injection 12.5 mg, 12.5 mg, IntraVENous, Q4H PRN, Genny Don NP    dextrose 5 % - 0.45% NaCl 1,000 mL with potassium chloride 20 mEq, thiamine 100 mg, mvi, adult no. 4 with vit K 10 mL infusion, , IntraVENous, CONTINUOUS, Genny Don NP, Last Rate: 125 mL/hr at 09/14/17 0131    famotidine (PF) (PEPCID) 20 mg in sodium chloride 0.9 % 10 mL injection, 20 mg, IntraVENous, Q12H, Genny Don NP, 20 mg at 09/14/17 0921    promethazine (PHENERGAN) 25 mg in 0.9% sodium chloride 50 mL IVPB, 25 mg, IntraVENous, Q4H PRN, Genny FRANCE HAYDER Don, Last Rate: 200 mL/hr at 09/14/17 0827, 25 mg at 09/14/17 0827    ondansetron (ZOFRAN) injection 4 mg, 4 mg, IntraVENous, Q6H, Genny Don NP, 4 mg at 09/14/17 0601    HYDROmorphone (PF) (DILAUDID) injection 0.5-1 mg, 0.5-1 mg, IntraVENous, Q3H PRN, Genny Don NP, 1 mg at 09/14/17 0835    cyclobenzaprine (FLEXERIL) tablet 5 mg, 5 mg, Oral, TID PRN, Genny Don NP    sucralfate (CARAFATE) 100 mg/mL oral suspension 0.5 g, 0.5 g, Oral, QID PRN, Elba Wharton MD, 0.5 g at 09/14/17 2839

## 2017-09-14 NOTE — PROGRESS NOTES
Bedside and Verbal shift change report given to Marylen Deis, RN (oncoming nurse) by Liam Dewey RN (offgoing nurse). Report included the following information SBAR, Kardex, Procedure Summary, Intake/Output, MAR, Accordion and Recent Results.

## 2017-09-14 NOTE — PHYSICIAN ADVISORY
Letter of Status Determination:   Recommend hospitalization status upgraded from   OBSERVATION  to INPATIENT  Status     Pt Name:  Melodie Ellison   MR#   72 [facility] # [id number] /  [id number]   [facility]#  [id number]   [address] [place] [address]  356/01  @ [address]   Hospitalization date  9/13/2017  3:03 PM   Current Attending Physician  Bernie Kathleen MD   Principal diagnosis  <principal problem not specified>   possible bowel obstruction; Ovarian cancer ([facility] Partial bowel obstruction ([facility] Nausea & vomiting   Clinicals  61 y.o. y.o  female hospitalized with above diagnosis   The pt suffers from complex metastatic ovarian cancer related disease who presented with intractable nausea and vomiting . CT was also suggestive of early PNA in her lung fields. Her nausea continues despite aggressive management with antiemetics. [place] (MCG) criteria   Does  NOT apply    STATUS DETERMINATION  Based on documented presenting clinical data, comorbid conditions, high risk of adverse events and deterioration, it is our recommendation that the patient's status should be upgraded from OBSERVATION to INPATIENT status. The final decision of the patient's hospitalization status depends on the attending physician's judgment. Additional comments     Payor:  / Plan: [address] DEPENDENTS / Product Type: Mike Hernandez /         Brianne Parmar MD MPH FACP     Physician Advisor    [address]   [facility] Staff, [address] [place] [address]    Cell  [phone]        [phone]    .

## 2017-09-15 ENCOUNTER — APPOINTMENT (OUTPATIENT)
Dept: CT IMAGING | Age: 57
DRG: 389 | End: 2017-09-15
Attending: NURSE PRACTITIONER
Payer: OTHER GOVERNMENT

## 2017-09-15 LAB
ALBUMIN SERPL-MCNC: 2.7 G/DL (ref 3.5–5)
ALBUMIN/GLOB SERPL: 0.8 {RATIO} (ref 1.1–2.2)
ALP SERPL-CCNC: 94 U/L (ref 45–117)
ALT SERPL-CCNC: 26 U/L (ref 12–78)
ANION GAP SERPL CALC-SCNC: 7 MMOL/L (ref 5–15)
AST SERPL-CCNC: 23 U/L (ref 15–37)
BASOPHILS # BLD: 0 K/UL (ref 0–0.1)
BASOPHILS NFR BLD: 0 % (ref 0–1)
BILIRUB SERPL-MCNC: 0.7 MG/DL (ref 0.2–1)
BUN SERPL-MCNC: 12 MG/DL (ref 6–20)
BUN/CREAT SERPL: 16 (ref 12–20)
CALCIUM SERPL-MCNC: 8.3 MG/DL (ref 8.5–10.1)
CANCER AG125 SERPL-ACNC: 85 U/ML (ref 0–30)
CHLORIDE SERPL-SCNC: 97 MMOL/L (ref 97–108)
CO2 SERPL-SCNC: 29 MMOL/L (ref 21–32)
CREAT SERPL-MCNC: 0.76 MG/DL (ref 0.55–1.02)
EOSINOPHIL # BLD: 0 K/UL (ref 0–0.4)
EOSINOPHIL NFR BLD: 0 % (ref 0–7)
ERYTHROCYTE [DISTWIDTH] IN BLOOD BY AUTOMATED COUNT: 16.9 % (ref 11.5–14.5)
GLOBULIN SER CALC-MCNC: 3.6 G/DL (ref 2–4)
GLUCOSE SERPL-MCNC: 109 MG/DL (ref 65–100)
HCT VFR BLD AUTO: 25.7 % (ref 35–47)
HGB BLD-MCNC: 8.4 G/DL (ref 11.5–16)
LYMPHOCYTES # BLD: 1.4 K/UL (ref 0.8–3.5)
LYMPHOCYTES NFR BLD: 24 % (ref 12–49)
MAGNESIUM SERPL-MCNC: 2.1 MG/DL (ref 1.6–2.4)
MCH RBC QN AUTO: 31 PG (ref 26–34)
MCHC RBC AUTO-ENTMCNC: 32.7 G/DL (ref 30–36.5)
MCV RBC AUTO: 94.8 FL (ref 80–99)
MONOCYTES # BLD: 0.1 K/UL (ref 0–1)
MONOCYTES NFR BLD: 1 % (ref 5–13)
NEUTS SEG # BLD: 4.4 K/UL (ref 1.8–8)
NEUTS SEG NFR BLD: 75 % (ref 32–75)
PLATELET # BLD AUTO: 162 K/UL (ref 150–400)
POTASSIUM SERPL-SCNC: 4 MMOL/L (ref 3.5–5.1)
PROT SERPL-MCNC: 6.3 G/DL (ref 6.4–8.2)
RBC # BLD AUTO: 2.71 M/UL (ref 3.8–5.2)
SODIUM SERPL-SCNC: 133 MMOL/L (ref 136–145)
WBC # BLD AUTO: 5.9 K/UL (ref 3.6–11)

## 2017-09-15 PROCEDURE — 74177 CT ABD & PELVIS W/CONTRAST: CPT

## 2017-09-15 PROCEDURE — 30233N1 TRANSFUSION OF NONAUTOLOGOUS RED BLOOD CELLS INTO PERIPHERAL VEIN, PERCUTANEOUS APPROACH: ICD-10-PCS | Performed by: OBSTETRICS & GYNECOLOGY

## 2017-09-15 PROCEDURE — 74011250637 HC RX REV CODE- 250/637: Performed by: OBSTETRICS & GYNECOLOGY

## 2017-09-15 PROCEDURE — 77030010522

## 2017-09-15 PROCEDURE — 74011250637 HC RX REV CODE- 250/637: Performed by: NURSE PRACTITIONER

## 2017-09-15 PROCEDURE — 74011000250 HC RX REV CODE- 250: Performed by: NURSE PRACTITIONER

## 2017-09-15 PROCEDURE — 77030010541

## 2017-09-15 PROCEDURE — 74011000258 HC RX REV CODE- 258: Performed by: NURSE PRACTITIONER

## 2017-09-15 PROCEDURE — 77030010520

## 2017-09-15 PROCEDURE — 86923 COMPATIBILITY TEST ELECTRIC: CPT | Performed by: NURSE PRACTITIONER

## 2017-09-15 PROCEDURE — P9016 RBC LEUKOCYTES REDUCED: HCPCS | Performed by: NURSE PRACTITIONER

## 2017-09-15 PROCEDURE — 83735 ASSAY OF MAGNESIUM: CPT | Performed by: NURSE PRACTITIONER

## 2017-09-15 PROCEDURE — 86900 BLOOD TYPING SEROLOGIC ABO: CPT | Performed by: NURSE PRACTITIONER

## 2017-09-15 PROCEDURE — 65210000002 HC RM PRIVATE GYN

## 2017-09-15 PROCEDURE — 36415 COLL VENOUS BLD VENIPUNCTURE: CPT | Performed by: NURSE PRACTITIONER

## 2017-09-15 PROCEDURE — 74011000258 HC RX REV CODE- 258: Performed by: OBSTETRICS & GYNECOLOGY

## 2017-09-15 PROCEDURE — 36430 TRANSFUSION BLD/BLD COMPNT: CPT

## 2017-09-15 PROCEDURE — 77030011641 HC PASTE OST ADH BMS -A

## 2017-09-15 PROCEDURE — 74011250636 HC RX REV CODE- 250/636: Performed by: NURSE PRACTITIONER

## 2017-09-15 PROCEDURE — 85025 COMPLETE CBC W/AUTO DIFF WBC: CPT | Performed by: NURSE PRACTITIONER

## 2017-09-15 PROCEDURE — 80053 COMPREHEN METABOLIC PANEL: CPT | Performed by: NURSE PRACTITIONER

## 2017-09-15 PROCEDURE — 74011636320 HC RX REV CODE- 636/320: Performed by: OBSTETRICS & GYNECOLOGY

## 2017-09-15 RX ORDER — FENTANYL 100 UG/H
1 PATCH TRANSDERMAL
Status: DISCONTINUED | OUTPATIENT
Start: 2017-09-15 | End: 2017-09-18 | Stop reason: HOSPADM

## 2017-09-15 RX ORDER — SODIUM CHLORIDE 9 MG/ML
250 INJECTION, SOLUTION INTRAVENOUS AS NEEDED
Status: DISCONTINUED | OUTPATIENT
Start: 2017-09-15 | End: 2017-09-18 | Stop reason: HOSPADM

## 2017-09-15 RX ORDER — SODIUM CHLORIDE 0.9 % (FLUSH) 0.9 %
10 SYRINGE (ML) INJECTION
Status: COMPLETED | OUTPATIENT
Start: 2017-09-15 | End: 2017-09-15

## 2017-09-15 RX ADMIN — METOCLOPRAMIDE 10 MG: 5 INJECTION, SOLUTION INTRAMUSCULAR; INTRAVENOUS at 05:04

## 2017-09-15 RX ADMIN — Medication 10 ML: at 02:56

## 2017-09-15 RX ADMIN — Medication 10 ML: at 07:09

## 2017-09-15 RX ADMIN — POTASSIUM CHLORIDE: 2 INJECTION, SOLUTION, CONCENTRATE INTRAVENOUS at 14:28

## 2017-09-15 RX ADMIN — HYDROMORPHONE HYDROCHLORIDE 1 MG: 1 INJECTION, SOLUTION INTRAMUSCULAR; INTRAVENOUS; SUBCUTANEOUS at 02:54

## 2017-09-15 RX ADMIN — FAMOTIDINE 20 MG: 10 INJECTION, SOLUTION INTRAVENOUS at 10:44

## 2017-09-15 RX ADMIN — PROMETHAZINE HYDROCHLORIDE 25 MG: 25 INJECTION INTRAMUSCULAR; INTRAVENOUS at 07:11

## 2017-09-15 RX ADMIN — HYDROMORPHONE HYDROCHLORIDE 1 MG: 1 INJECTION, SOLUTION INTRAMUSCULAR; INTRAVENOUS; SUBCUTANEOUS at 10:50

## 2017-09-15 RX ADMIN — METOCLOPRAMIDE 10 MG: 5 INJECTION, SOLUTION INTRAMUSCULAR; INTRAVENOUS at 18:24

## 2017-09-15 RX ADMIN — CHLORASEPTIC 1 SPRAY: 1.5 LIQUID ORAL at 22:18

## 2017-09-15 RX ADMIN — IOPAMIDOL 100 ML: 755 INJECTION, SOLUTION INTRAVENOUS at 15:26

## 2017-09-15 RX ADMIN — APIXABAN 2.5 MG: 2.5 TABLET, FILM COATED ORAL at 10:42

## 2017-09-15 RX ADMIN — HYDROMORPHONE HYDROCHLORIDE 1 MG: 1 INJECTION, SOLUTION INTRAMUSCULAR; INTRAVENOUS; SUBCUTANEOUS at 19:03

## 2017-09-15 RX ADMIN — IOHEXOL 50 ML: 240 INJECTION, SOLUTION INTRATHECAL; INTRAVASCULAR; INTRAVENOUS; ORAL at 15:26

## 2017-09-15 RX ADMIN — Medication 10 ML: at 21:46

## 2017-09-15 RX ADMIN — PROMETHAZINE HYDROCHLORIDE 25 MG: 25 INJECTION INTRAMUSCULAR; INTRAVENOUS at 02:58

## 2017-09-15 RX ADMIN — ONDANSETRON 4 MG: 2 INJECTION INTRAMUSCULAR; INTRAVENOUS at 05:04

## 2017-09-15 RX ADMIN — APIXABAN 2.5 MG: 2.5 TABLET, FILM COATED ORAL at 18:21

## 2017-09-15 RX ADMIN — METOCLOPRAMIDE 10 MG: 5 INJECTION, SOLUTION INTRAMUSCULAR; INTRAVENOUS at 12:19

## 2017-09-15 RX ADMIN — POTASSIUM CHLORIDE: 2 INJECTION, SOLUTION, CONCENTRATE INTRAVENOUS at 04:13

## 2017-09-15 RX ADMIN — ONDANSETRON 4 MG: 2 INJECTION INTRAMUSCULAR; INTRAVENOUS at 18:24

## 2017-09-15 RX ADMIN — HYDROMORPHONE HYDROCHLORIDE 1 MG: 1 INJECTION, SOLUTION INTRAMUSCULAR; INTRAVENOUS; SUBCUTANEOUS at 22:18

## 2017-09-15 RX ADMIN — ONDANSETRON 4 MG: 2 INJECTION INTRAMUSCULAR; INTRAVENOUS at 12:19

## 2017-09-15 RX ADMIN — METOCLOPRAMIDE 10 MG: 5 INJECTION, SOLUTION INTRAMUSCULAR; INTRAVENOUS at 00:07

## 2017-09-15 RX ADMIN — HYDROMORPHONE HYDROCHLORIDE 1 MG: 1 INJECTION, SOLUTION INTRAMUSCULAR; INTRAVENOUS; SUBCUTANEOUS at 14:19

## 2017-09-15 RX ADMIN — Medication 10 ML: at 06:00

## 2017-09-15 RX ADMIN — Medication 10 ML: at 05:25

## 2017-09-15 RX ADMIN — SODIUM CHLORIDE 100 ML: 900 INJECTION, SOLUTION INTRAVENOUS at 15:26

## 2017-09-15 RX ADMIN — HYDROMORPHONE HYDROCHLORIDE 1 MG: 1 INJECTION, SOLUTION INTRAMUSCULAR; INTRAVENOUS; SUBCUTANEOUS at 07:08

## 2017-09-15 RX ADMIN — ONDANSETRON 4 MG: 2 INJECTION INTRAMUSCULAR; INTRAVENOUS at 00:05

## 2017-09-15 RX ADMIN — METOPROLOL TARTRATE 50 MG: 50 TABLET ORAL at 18:21

## 2017-09-15 RX ADMIN — Medication 10 ML: at 10:50

## 2017-09-15 RX ADMIN — Medication 10 ML: at 15:26

## 2017-09-15 RX ADMIN — PROMETHAZINE HYDROCHLORIDE 25 MG: 25 INJECTION INTRAMUSCULAR; INTRAVENOUS at 11:37

## 2017-09-15 RX ADMIN — FAMOTIDINE 20 MG: 10 INJECTION, SOLUTION INTRAVENOUS at 21:46

## 2017-09-15 NOTE — PROGRESS NOTES
Chastity Holland. Eliseo Vizcainoford, HAYDER       Melodie Ellison       [id number]  1960    Admitted 2017 Date 9/15/2017   HPI  Melodie Ellison is a 61 y.o.  postmenopausal female with a diagnosis of stage IV ovarian cancer. She underwent exploratory laparotomy with suboptimal debulking. She had extensive disease. She was readmitted about a week later with obstruction and subsequently had a cecal perforation, requiring resection, end ileostomy, and mucous fistula. During the hospitalization she was also diagnosed with pulmonary embolus and had chest tubes placed for bilateral pleural effusions. She had a prolonged hospital course and actually received a dose of cisplatin chemotherapy while in the hospital to help dry up the effusions. She completed 6 cycles of Taxol/Carboplatin chemotherapy following her initial debulking. She did relatively well, considering she had an ileostomy to deal with during chemotherapy. I took her to the OR on 16 for interval debulking and reversal of her ileostomy. She had extensive disease, but we were able to resect the majority of her disease. One week later she developed an anastomotic leak requiring reexploration and recreation of an ileostomy. She had another prolonged hospitalization, but recovered well since surgery. We then reinitiated Taxol/Carboplatin chemotherapy, but did reduce the dose of Taxol to 135 mg/m2. She completed 4 mores cycles. Her CA-125 normalized and we stopped treatment.      On , she presented to Dr. Jessica Charles office with her  to discuss her follow up/surveillance CT scan. Unfortunately, it demonstrated progression of disease, although minimally worse than her prior scan 2 months previously. She was actually feeling better than she did at that time. After review of scans and disease, the decision was made to begin Doxil/Avastin Q28 days for 6 cycles. She began this on 2017. In April 2017, she had completed 3 cycles of Doxil/Avastin and had CT scans at that time showed she had evidence of recurrent disease. She was switched at that time to [facility] and began this regimen on 5/23/2017 and has completed 4 cycles of this with the last being on 8/1/2017. With this regimen, her  has decreased to 65 on 7/20/17 from 145.6 on 5/16/2017.       SUBJECTIVE:  NG tube placed yesterday with 1250 output since. Pt acknowledges she feels \"a little\" better this morning. Ostomy began function about 4AM this morning. Pt says she feels some relief of her tightness  Denies nausea at present   Noted temp of 101.1 last evening. No evidence of infection. Normal WBC/ANC ? Tumor fever,  She has been up ambulating and voiding QS  Remains pale this morning   Noted HGB down to 8.4         Allergies   Allergen Reactions    Ativan [Lorazepam] Other (comments)     SEVERE CONFUSION        Review of Systems  General:Feeling better this morning, but still tired   HEENT: Denies visual changes, dysphagia or headache  Resp: Denies SOB, DUDLEY, wheezing or cough  CV: Denies CP, palpitations  GI/: See HPI. [place]: Denies muscle ache or joint pain  Neuro: Denies dizzyness or syncope      OBJECTIVE:    Visit Vitals    /84 (BP 1 Location: Left arm, BP Patient Position: Sitting)    Pulse 86    Temp 99 °F (37.2 °C)    Resp 15    SpO2 97%         Physical Exam  General: A&O X3 appearing tired, but better  HEENT: Sclera anicteric, Mucosa pale, dry  Neck: No JVD without adenopathy appreciated  Port site without redness, tenderness or swelling. Accessed without difficulty  Heart: Regular without M/R/G  Lungs: CTA Bilat without wheezing or rales   Abd: Less firm and with improved bowel sounds. Less tenderness to palpation.  Ostomy bag now with liquid tan output  Ext: Without edema and + pedal pulses bilat  Neuro: grossly intact      Recent Results (from the past 12 hour(s))   METABOLIC PANEL, COMPREHENSIVE    Collection Time: 09/15/17  5:22 AM   Result Value Ref Range    Sodium 133 (L) 136 - 145 mmol/L    Potassium 4.0 3.5 - 5.1 mmol/L    Chloride 97 97 - 108 mmol/L    CO2 29 21 - 32 mmol/L    Anion gap 7 5 - 15 mmol/L    Glucose 109 (H) 65 - 100 mg/dL    BUN 12 6 - 20 MG/DL    Creatinine 0.76 0.55 - 1.02 MG/DL    BUN/Creatinine ratio 16 12 - 20      GFR est AA >60 >60 ml/min/1.73m2    GFR est non-AA >60 >60 ml/min/1.73m2    Calcium 8.3 (L) 8.5 - 10.1 MG/DL    Bilirubin, total 0.7 0.2 - 1.0 MG/DL    ALT (SGPT) 26 12 - 78 U/L    AST (SGOT) 23 15 - 37 U/L    Alk. phosphatase 94 45 - 117 U/L    Protein, total 6.3 (L) 6.4 - 8.2 g/dL    Albumin 2.7 (L) 3.5 - 5.0 g/dL    Globulin 3.6 2.0 - 4.0 g/dL    A-G Ratio 0.8 (L) 1.1 - 2.2     CBC WITH AUTOMATED DIFF    Collection Time: 09/15/17  5:22 AM   Result Value Ref Range    WBC 5.9 3.6 - 11.0 K/uL    RBC 2.71 (L) 3.80 - 5.20 M/uL    HGB 8.4 (L) 11.5 - 16.0 g/dL    HCT 25.7 (L) 35.0 - 47.0 %    MCV 94.8 80.0 - 99.0 FL    MCH 31.0 26.0 - 34.0 PG    MCHC 32.7 30.0 - 36.5 g/dL    RDW 16.9 (H) 11.5 - 14.5 %    PLATELET 893 211 - 616 K/uL    NEUTROPHILS 75 32 - 75 %    LYMPHOCYTES 24 12 - 49 %    MONOCYTES 1 (L) 5 - 13 %    EOSINOPHILS 0 0 - 7 %    BASOPHILS 0 0 - 1 %    ABS. NEUTROPHILS 4.4 1.8 - 8.0 K/UL    ABS. LYMPHOCYTES 1.4 0.8 - 3.5 K/UL    ABS. MONOCYTES 0.1 0.0 - 1.0 K/UL    ABS. EOSINOPHILS 0.0 0.0 - 0.4 K/UL    ABS. BASOPHILS 0.0 0.0 - 0.1 K/UL   MAGNESIUM    Collection Time: 09/15/17  5:22 AM   Result Value Ref Range    Magnesium 2.1 1.6 - 2.4 mg/dL              CT ABD/Pelvis 8/10/2017  FINDINGS:   LUNG BASES: There is a patch of airspace process identified in the right middle  lobe along the minor fissure. This is incompletely visualized, however. There  may be more extensive airspace process in the remainder of the right middle  lobe. There is also patchy airspace process in the base of the right lower lobe.   These findings were not present on the previous CT. No pleural effusion. Small  hiatal hernia noted. LIVER: 8.4 mm lucent lesion noted hepatic segment 8 unchanged from previous  exam. This is isodense with liver on delayed imaging. Small well-defined lucency  measuring 5.8 mm in tip of hepatic segment 3 is unchanged. There is no biliary  dilatation. GALLBLADDER: Unremarkable. SPLEEN: No mass.     PANCREAS: No mass or ductal dilatation. ADRENALS: Unremarkable. KIDNEYS: Hydronephrosis of both kidneys again noted. No renal mass is seen other  than a tiny lucency in the periphery of the right mid kidney unchanged.     GI: No dilatation or wall thickening. There is a colostomy in the right lower  quadrant.     APPENDIX: Not identified  PERITONEUM: There is ascites with loculations along the peritoneal surface in  the left upper quadrant which may be metastatic deposits. In addition there is a  somewhat rounded clumped appearance of the bowel which may be secondary to  peritoneal deposits. RETROPERITONEUM: No lymphadenopathy or aortic aneurysm.        URINARY BLADDER: No mass or calculus. PELVIS: There is been resection of the colon. The rectum is identified. This  terminates blindly. BONES: No destructive bone lesion. ADDITIONAL COMMENTS: N/A    IMPRESSION:     1. In the right middle lobe and in the right lower lobe there are areas of  airspace process not seen previously. These may be areas of developing  pneumonia. 2. Hydronephrosis unchanged. 3. Ascites with areas of loculation and possible peritoneal neoplasm spread  unchanged from the previous study as well.   4. Lucencies in the liver unchanged from previous study.     IMPRESSION:    Patient Active Problem List   Diagnosis Code    Malignant neoplasm of both ovaries ([address] C56.1, C56.2    Carcinomatosis peritonei ([address] C78.6, C80.1    Small bowel obstruction ([address] K56.69    Ileostomy in place ([address] Z93.2    Anemia due to chemotherapy D64.81, T45.1X5A    CINV (chemotherapy-induced nausea and vomiting) R11.2, T45.1X5A    Cancer associated pain G89.3    Generalized abdominal pain R10.84    Anxiety about health F41.8    Ileus (HCC) K56.7    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    Hx of pulmonary embolus Z86.711    Dehydration E86.0    Chemotherapy-induced neutropenia (HCC) D70.1    Anticoagulation adequate with anticoagulant therapy Z79.01    Counseling regarding end of life decision making Z71.89    Bowel obstruction (HCC) K56.60    Protein calorie malnutrition (HCC) E46    Malignant ascites R18.0    Hydronephrosis of right kidney N13.30    Isolated proteinuria R80.0    Erosive esophagitis K22.10    Ulcerative esophagitis K22.10    Hypomagnesemia E83.42    Abdominal pain R10.9    Hydronephrosis of left kidney N13.30    Ovarian cancer (HCC) C56.9    Nausea & vomiting R11.2    Carcinomatosis (HCC) C80.0    Intractable vomiting with nausea R11.2    Febrile illness R50.9    Partial bowel obstruction (HCC) K56.69    Nausea and vomiting R11.2       Assessment/Plan:  Continue NGT to low intermittent suction   Will attempt CT scan again today with oral contrast to NG tube and will wait 1 /12 to 2 hours after ingestion to do scans due to slow motility  Monitor temps and will pan culture for any further temps  Since hgb has dropped and pt is symptomatic, will transfuse with 1 unit and monitor   Mobilize throughout the day   Continue nausea/ pain medication    Eliquis BID as prior to admission Via NGT  Rounded with Dr. Valle Seen    Signed By: Cam Ivy NP     9/15/2017/5:18 PM     Seen and evaluated with NP. Agree with above assessment and plan. Looks a bit better today. Large NGT output, but now with some ileostomy output and improved BS. Will plan on a CT later today. I suspect it will show partial SBO. Hopefully this will resolve with conservative management and not require surgical intervention. Isolated fever last night, but normal WBC this morning and no other fevers.   Will monitor. Cholo Savage MD           Current Facility-Administered Medications:     lidocaine (XYLOCAINE) 2 % jelly, , Mucous Membrane, PRN, Parish Sommer MD    phenol throat spray (CHLORASEPTIC) 1 Spray, 1 Spray, Oral, PRN, Genny Don, NP, 1 Spray at 09/14/17 1805    benzocaine-menthol (CEPACOL) lozenge 1 Lozenge, 1 Lozenge, Mucous Membrane, PRN, Genny Don, NP, 1 Lozenge at 09/14/17 1805    apixaban (ELIQUIS) tablet 2.5 mg, 2.5 mg, Oral, BID, Genny Don, NP, 2.5 mg at 09/14/17 1922    fentaNYL (DURAGESIC) 100 mcg/hr patch 1 Patch, 1 Patch, TransDERmal, Q72H, Genny Don NP, Stopped at 09/13/17 1600    metoprolol tartrate (LOPRESSOR) tablet 50 mg, 50 mg, Oral, BID, Genny Don, NP, 50 mg at 09/13/17 1747    scopolamine (TRANSDERM-SCOP) 1.5 mg, 1.5 mg, TransDERmal, Q72H, Genny Don, NP, 1.5 mg at 09/13/17 1746    metoclopramide HCl (REGLAN) injection 10 mg, 10 mg, IntraVENous, Q6H, Genny Don, NP, 10 mg at 09/15/17 0504    sodium chloride (NS) flush 5-10 mL, 5-10 mL, IntraVENous, Q8H, Genny Don, NP, 10 mL at 09/15/17 0709    sodium chloride (NS) flush 5-10 mL, 5-10 mL, IntraVENous, PRN, Genny Don, NP, 10 mL at 09/15/17 0256    acetaminophen (TYLENOL) solution 650 mg, 650 mg, Oral, Q4H PRN, Genny Don, NP, 650 mg at 09/14/17 1922    naloxone (NARCAN) injection 0.4 mg, 0.4 mg, IntraVENous, PRN, Genny Don, NP    diphenhydrAMINE (BENADRYL) injection 12.5 mg, 12.5 mg, IntraVENous, Q4H PRN, Genny Don NP    dextrose 5 % - 0.45% NaCl 1,000 mL with potassium chloride 20 mEq, thiamine 100 mg, mvi, adult no. 4 with vit K 10 mL infusion, , IntraVENous, CONTINUOUS, Genny Don NP, Last Rate: 125 mL/hr at 09/15/17 0413    famotidine (PF) (PEPCID) 20 mg in sodium chloride 0.9 % 10 mL injection, 20 mg, IntraVENous, Q12H, Genny Don NP, 20 mg at 09/14/17 2051    promethazine (PHENERGAN) 25 mg in 0.9% sodium chloride 50 mL IVPB, 25 mg, IntraVENous, Q4H PRN, Genny Don NP, Last Rate: 200 mL/hr at 09/15/17 0711, 25 mg at 09/15/17 0711    ondansetron (ZOFRAN) injection 4 mg, 4 mg, IntraVENous, Q6H, Genny Don NP, 4 mg at 09/15/17 0504    HYDROmorphone (PF) (DILAUDID) injection 0.5-1 mg, 0.5-1 mg, IntraVENous, Q3H PRN, Genny Don NP, 1 mg at 09/15/17 0708    cyclobenzaprine (FLEXERIL) tablet 5 mg, 5 mg, Oral, TID PRN, Genny Don NP    sucralfate (CARAFATE) 100 mg/mL oral suspension 0.5 g, 0.5 g, Oral, QID PRN, Gaby Concepcion MD, 0.5 g at 09/14/17 1014

## 2017-09-15 NOTE — PROGRESS NOTES
Bedside and Verbal shift change report given to Katlyn Painter RN (oncoming nurse) by Gisela Stephen RN (offgoing nurse). Report included the following information SBAR, Kardex, OR Summary, Procedure Summary, Intake/Output, MAR, Accordion and Recent Results.

## 2017-09-16 LAB
ABO + RH BLD: NORMAL
ALBUMIN SERPL-MCNC: 2.6 G/DL (ref 3.5–5)
ALBUMIN/GLOB SERPL: 0.9 {RATIO} (ref 1.1–2.2)
ALP SERPL-CCNC: 88 U/L (ref 45–117)
ALT SERPL-CCNC: 27 U/L (ref 12–78)
ANION GAP SERPL CALC-SCNC: 8 MMOL/L (ref 5–15)
AST SERPL-CCNC: 18 U/L (ref 15–37)
BASOPHILS # BLD: 0 K/UL (ref 0–0.1)
BASOPHILS NFR BLD: 0 % (ref 0–1)
BILIRUB SERPL-MCNC: 0.5 MG/DL (ref 0.2–1)
BLD PROD TYP BPU: NORMAL
BLOOD GROUP ANTIBODIES SERPL: NORMAL
BPU ID: NORMAL
BUN SERPL-MCNC: 10 MG/DL (ref 6–20)
BUN/CREAT SERPL: 13 (ref 12–20)
CALCIUM SERPL-MCNC: 8.5 MG/DL (ref 8.5–10.1)
CHLORIDE SERPL-SCNC: 99 MMOL/L (ref 97–108)
CO2 SERPL-SCNC: 27 MMOL/L (ref 21–32)
CREAT SERPL-MCNC: 0.8 MG/DL (ref 0.55–1.02)
CROSSMATCH RESULT,%XM: NORMAL
EOSINOPHIL # BLD: 0.2 K/UL (ref 0–0.4)
EOSINOPHIL NFR BLD: 3 % (ref 0–7)
ERYTHROCYTE [DISTWIDTH] IN BLOOD BY AUTOMATED COUNT: 17.3 % (ref 11.5–14.5)
GLOBULIN SER CALC-MCNC: 3 G/DL (ref 2–4)
GLUCOSE SERPL-MCNC: 96 MG/DL (ref 65–100)
HCT VFR BLD AUTO: 25.3 % (ref 35–47)
HGB BLD-MCNC: 8.3 G/DL (ref 11.5–16)
LYMPHOCYTES # BLD: 1.3 K/UL (ref 0.8–3.5)
LYMPHOCYTES NFR BLD: 23 % (ref 12–49)
MAGNESIUM SERPL-MCNC: 1.8 MG/DL (ref 1.6–2.4)
MCH RBC QN AUTO: 30.7 PG (ref 26–34)
MCHC RBC AUTO-ENTMCNC: 32.8 G/DL (ref 30–36.5)
MCV RBC AUTO: 93.7 FL (ref 80–99)
MONOCYTES # BLD: 0.1 K/UL (ref 0–1)
MONOCYTES NFR BLD: 1 % (ref 5–13)
NEUTS SEG # BLD: 4.1 K/UL (ref 1.8–8)
NEUTS SEG NFR BLD: 73 % (ref 32–75)
PLATELET # BLD AUTO: 143 K/UL (ref 150–400)
POTASSIUM SERPL-SCNC: 3.9 MMOL/L (ref 3.5–5.1)
PROT SERPL-MCNC: 5.6 G/DL (ref 6.4–8.2)
RBC # BLD AUTO: 2.7 M/UL (ref 3.8–5.2)
SODIUM SERPL-SCNC: 134 MMOL/L (ref 136–145)
SPECIMEN EXP DATE BLD: NORMAL
STATUS OF UNIT,%ST: NORMAL
UNIT DIVISION, %UDIV: 0
WBC # BLD AUTO: 5.7 K/UL (ref 3.6–11)

## 2017-09-16 PROCEDURE — 74011250637 HC RX REV CODE- 250/637: Performed by: OBSTETRICS & GYNECOLOGY

## 2017-09-16 PROCEDURE — 36415 COLL VENOUS BLD VENIPUNCTURE: CPT | Performed by: NURSE PRACTITIONER

## 2017-09-16 PROCEDURE — 83735 ASSAY OF MAGNESIUM: CPT | Performed by: NURSE PRACTITIONER

## 2017-09-16 PROCEDURE — 74011000258 HC RX REV CODE- 258: Performed by: NURSE PRACTITIONER

## 2017-09-16 PROCEDURE — 85025 COMPLETE CBC W/AUTO DIFF WBC: CPT | Performed by: NURSE PRACTITIONER

## 2017-09-16 PROCEDURE — 65210000002 HC RM PRIVATE GYN

## 2017-09-16 PROCEDURE — 80053 COMPREHEN METABOLIC PANEL: CPT | Performed by: NURSE PRACTITIONER

## 2017-09-16 PROCEDURE — 74011250636 HC RX REV CODE- 250/636: Performed by: NURSE PRACTITIONER

## 2017-09-16 PROCEDURE — 74011000250 HC RX REV CODE- 250: Performed by: NURSE PRACTITIONER

## 2017-09-16 PROCEDURE — 74011250637 HC RX REV CODE- 250/637: Performed by: NURSE PRACTITIONER

## 2017-09-16 RX ORDER — SODIUM CHLORIDE 0.9 % (FLUSH) 0.9 %
SYRINGE (ML) INJECTION
Status: COMPLETED
Start: 2017-09-16 | End: 2017-09-16

## 2017-09-16 RX ADMIN — APIXABAN 2.5 MG: 2.5 TABLET, FILM COATED ORAL at 09:51

## 2017-09-16 RX ADMIN — FAMOTIDINE 20 MG: 10 INJECTION, SOLUTION INTRAVENOUS at 09:51

## 2017-09-16 RX ADMIN — METOPROLOL TARTRATE 50 MG: 50 TABLET ORAL at 09:51

## 2017-09-16 RX ADMIN — METOCLOPRAMIDE 10 MG: 5 INJECTION, SOLUTION INTRAMUSCULAR; INTRAVENOUS at 05:52

## 2017-09-16 RX ADMIN — ONDANSETRON 4 MG: 2 INJECTION INTRAMUSCULAR; INTRAVENOUS at 05:52

## 2017-09-16 RX ADMIN — BENZOCAINE AND MENTHOL 1 LOZENGE: 15; 3.6 LOZENGE ORAL at 15:15

## 2017-09-16 RX ADMIN — Medication 10 ML: at 19:34

## 2017-09-16 RX ADMIN — Medication 10 ML: at 05:52

## 2017-09-16 RX ADMIN — POTASSIUM CHLORIDE: 2 INJECTION, SOLUTION, CONCENTRATE INTRAVENOUS at 09:49

## 2017-09-16 RX ADMIN — ONDANSETRON 4 MG: 2 INJECTION INTRAMUSCULAR; INTRAVENOUS at 00:07

## 2017-09-16 RX ADMIN — SUCRALFATE 0.5 G: 1 SUSPENSION ORAL at 10:05

## 2017-09-16 RX ADMIN — Medication 10 ML: at 21:11

## 2017-09-16 RX ADMIN — METOCLOPRAMIDE 10 MG: 5 INJECTION, SOLUTION INTRAMUSCULAR; INTRAVENOUS at 12:12

## 2017-09-16 RX ADMIN — ACETAMINOPHEN 650 MG: 650 SOLUTION ORAL at 21:19

## 2017-09-16 RX ADMIN — POTASSIUM CHLORIDE: 2 INJECTION, SOLUTION, CONCENTRATE INTRAVENOUS at 01:29

## 2017-09-16 RX ADMIN — APIXABAN 2.5 MG: 2.5 TABLET, FILM COATED ORAL at 19:34

## 2017-09-16 RX ADMIN — METOCLOPRAMIDE 10 MG: 5 INJECTION, SOLUTION INTRAMUSCULAR; INTRAVENOUS at 00:07

## 2017-09-16 RX ADMIN — METOPROLOL TARTRATE 50 MG: 50 TABLET ORAL at 19:34

## 2017-09-16 RX ADMIN — BENZOCAINE AND MENTHOL 1 LOZENGE: 15; 3.6 LOZENGE ORAL at 10:04

## 2017-09-16 RX ADMIN — BENZOCAINE AND MENTHOL 1 LOZENGE: 15; 3.6 LOZENGE ORAL at 19:35

## 2017-09-16 RX ADMIN — FAMOTIDINE 20 MG: 10 INJECTION, SOLUTION INTRAVENOUS at 21:10

## 2017-09-16 RX ADMIN — ACETAMINOPHEN 650 MG: 650 SOLUTION ORAL at 10:04

## 2017-09-16 RX ADMIN — ONDANSETRON 4 MG: 2 INJECTION INTRAMUSCULAR; INTRAVENOUS at 12:12

## 2017-09-16 NOTE — PROGRESS NOTES
Bedside and Verbal shift change report given to [address] [place] [address] Ave (oncoming nurse) by Adam Mattson RN (offgoing nurse). Report included the following information SBAR, Intake/Output, MAR, Accordion and Recent Results.

## 2017-09-16 NOTE — PROGRESS NOTES
Bedside shift change report given to April RN (oncoming nurse) by Gela Arndt RN (offgoing nurse). Report included the following information SBAR.

## 2017-09-16 NOTE — PROGRESS NOTES
[facility] Gynecologic Oncology  [address] [place] [address] Rosalinda Cain, HAYDER    Patient Name: Evangelina Rodriguez  MRN: [id number]  AGE: 61 y.o.  : 1960    Date: 2017    Date of Admission: 2017  Date of Surgery: * No surgery found *  Unit: 356/01    Admission Diagnosis:    Nausea and Vomiting   Metastatic/persistent EOC  Problem List:     Patient Active Problem List   Diagnosis Code    Malignant neoplasm of both ovaries ([address] C56.1, C56.2    Carcinomatosis peritonei ([address] C78.6, C80.1    Small bowel obstruction ([address] K56.69    Ileostomy in place ([address] Z93.2    Anemia due to chemotherapy D64.81, T45.1X5A    CINV (chemotherapy-induced nausea and vomiting) R11.2, T45.1X5A    Cancer associated pain G89.3    Generalized abdominal pain R10.84    Anxiety about health F41.8    Ileus ([address] K56.7    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    Hx of pulmonary embolus Z86.711    Dehydration E86.0    Chemotherapy-induced neutropenia (HCC) D70.1    Anticoagulation adequate with anticoagulant therapy Z79.01    Counseling regarding end of life decision making Z71.89    Bowel obstruction (HCC) K56.60    Protein calorie malnutrition (HCC) E46    Malignant ascites R18.0    Hydronephrosis of right kidney N13.30    Isolated proteinuria R80.0    Erosive esophagitis K22.10    Ulcerative esophagitis K22.10    Hypomagnesemia E83.42    Abdominal pain R10.9    Hydronephrosis of left kidney N13.30    Ovarian cancer (HCC) C56.9    Nausea & vomiting R11.2    Carcinomatosis (HCC) C80.0    Intractable vomiting with nausea R11.2    Febrile illness R50.9    Partial bowel obstruction (HCC) K56.69    Nausea and vomiting R11.2     DENA Rodriguez is a 61 y.o.  postmenopausal female with a diagnosis of stage IV ovarian cancer. She underwent exploratory laparotomy with suboptimal debulking. She had extensive disease.  She was readmitted about a week later with obstruction and subsequently had a cecal perforation, requiring resection, end ileostomy, and mucous fistula. During the hospitalization she was also diagnosed with pulmonary embolus and had chest tubes placed for bilateral pleural effusions. She had a prolonged hospital course and actually received a dose of cisplatin chemotherapy while in the hospital to help dry up the effusions. She completed 6 cycles of Taxol/Carboplatin chemotherapy following her initial debulking. She did relatively well, considering she had an ileostomy to deal with during chemotherapy. I took her to the OR on 4/29/16 for interval debulking and reversal of her ileostomy. She had extensive disease, but we were able to resect the majority of her disease. One week later she developed an anastomotic leak requiring reexploration and recreation of an ileostomy. She had another prolonged hospitalization, but recovered well since surgery. We then reinitiated Taxol/Carboplatin chemotherapy, but did reduce the dose of Taxol to 135 mg/m2. She completed 4 mores cycles. Her CA-125 normalized and we stopped treatment.      On 2/7, she presented to Dr. Richie Berger office with her  to discuss her follow up/surveillance CT scan. Unfortunately, it demonstrated progression of disease, although minimally worse than her prior scan 2 months previously. She was actually feeling better than she did at that time. After review of scans and disease, the decision was made to begin Doxil/Avastin Q28 days for 6 cycles. She began this on 2/27/2017. In April 2017, she had completed 3 cycles of Doxil/Avastin and had CT scans at that time showed she had evidence of recurrent disease. She was switched at that time to [facility] and began this regimen on 5/23/2017 and has completed 4 cycles of this with the last being on 8/1/2017. With this regimen, her  has decreased to 65 on 7/20/17 from 145.6 on 5/16/2017.     The patient has been admitted secondary to N/V. NG placed with hydration. CT: 9/1IMPRESSION:   1. Overall stable appearance of the bowel with mildly dilated clumped bowel  loops in the lower mid abdomen. This may be attributable to peritoneal  carcinomatosis. A right lower abdomen ostomy is again demonstrated. There is no  evidence for complete bowel obstruction.     2. Stable to slightly increased loculated ascites anteriorly.     3. Stable bilateral hydronephrosis.     4. Stable low-density liver lesions.     5. Small right pleural effusion and increased patchy airspace opacities in the  right middle and lower lobes suggesting nonspecific infection or inflammation. 5/2017      Subjective:   Ongoing nursing care reviewed. DVT/pulmonary prophylaxis ongoing. No acute distress. NG indwelling   Activity: OOB with assistance. Diet: NPO with NG. Nausea/vomiting: None. BM/flatus: Postive via LLQ stoma    Review of Systems:    Constitutional: Negative for fever, chills and diaphoresis. Respiratory: Negative for reported cough, shortness of breath and wheezing. Cardiovascular: Negative for reported chest pain and leg swelling. Gastrointestinal: Negative for nausea, vomiting. Neurological:  No acute deficit. Events of Prior Day: Patient interviewed. Chart reviewed. Ongoing nursing care. GI rest with NG decompression. Resumption of ostomy output. Vitals:     Vitals:    09/15/17 2130 09/15/17 2157 09/15/17 2307 09/16/17 0021   BP: 138/77 143/82 130/75 114/63   Pulse: 65 70 68 64   Resp: 16 16 16 16   Temp: 98.9 °F (37.2 °C) 98.3 °F (36.8 °C) 98.8 °F (37.1 °C) 98.9 °F (37.2 °C)   SpO2: 97% 99% 98% 96%        There is no height or weight on file to calculate BMI. I/O:     Date 09/15/17 0700 - 09/16/17 0659 09/16/17 0700 - 09/17/17 0659   Shift [id number] 1900-0659 24 Hour Total 0700-1859 1900-0659 24 Hour Total   I  N  T  A  K  E   I.V. 975 1712.5 2687. 5         Volume (dextrose 5 % - 0.45% NaCl 1,000 mL with potassium chloride 20 mEq, thiamine 100 mg, mvi, adult no.4 with vit K 10 mL infusion) 975 1712.5 2687.5       Blood  288.3 288. 3         Volume (TRANSFUSE PACKED RBC'S)  288.3 288. 3       Shift Total 975 2000.8 2975.8      O  U  T  P  U  T   Urine 800 1100 1900         Urine Voided 800 1100 1900       Emesis/NG output 650 300 950         Output (ml) (Nasogastric Tube 09/14/17) 650 300 950       Stool          Output (ml) (Ileostomy Right )        Shift Total 1600 2250 3850      NET -625 -249.2 -874.2      Weight (kg)             Examination:   Gen: Conversant, alert, oriented. Without acute distress. Cardiac: Regular rate and rhythm    Lungs: Clear to auscultation   Abdomen:    Soft, nontender. BS appreciated    Wound:  No hernia    CVAT: Negative   Extremities: No deep tenderness, warm, pulses appreciated, no edema. MSE: Grossly intact    Labs: Laboratory chart enteries reviewed. Recent Results (from the past 24 hour(s))   TYPE + CROSSMATCH    Collection Time: 09/15/17 11:06 AM   Result Value Ref Range    Crossmatch Expiration 09/18/2017     ABO/Rh(D) A NEGATIVE     Antibody screen NEG     Unit number [id number]     Blood component type RC LR AS3,1     Unit division 00     Status of unit TRANSFUSED     Crossmatch result Compatible    MAGNESIUM    Collection Time: 09/16/17  4:28 AM   Result Value Ref Range    Magnesium 1.8 1.6 - 2.4 mg/dL   CBC WITH AUTOMATED DIFF    Collection Time: 09/16/17  4:28 AM   Result Value Ref Range    WBC 5.7 3.6 - 11.0 K/uL    RBC 2.70 (L) 3.80 - 5.20 M/uL    HGB 8.3 (L) 11.5 - 16.0 g/dL    HCT 25.3 (L) 35.0 - 47.0 %    MCV 93.7 80.0 - 99.0 FL    MCH 30.7 26.0 - 34.0 PG    MCHC 32.8 30.0 - 36.5 g/dL    RDW 17.3 (H) 11.5 - 14.5 %    PLATELET 622 (L) 674 - 400 K/uL    NEUTROPHILS 73 32 - 75 %    LYMPHOCYTES 23 12 - 49 %    MONOCYTES 1 (L) 5 - 13 %    EOSINOPHILS 3 0 - 7 %    BASOPHILS 0 0 - 1 %    ABS. NEUTROPHILS 4.1 1.8 - 8.0 K/UL    ABS. LYMPHOCYTES 1.3 0.8 - 3.5 K/UL    ABS.  MONOCYTES 0.1 0.0 - 1.0 K/UL ABS. EOSINOPHILS 0.2 0.0 - 0.4 K/UL    ABS. BASOPHILS 0.0 0.0 - 0.1 K/UL   METABOLIC PANEL, COMPREHENSIVE    Collection Time: 09/16/17  4:28 AM   Result Value Ref Range    Sodium 134 (L) 136 - 145 mmol/L    Potassium 3.9 3.5 - 5.1 mmol/L    Chloride 99 97 - 108 mmol/L    CO2 27 21 - 32 mmol/L    Anion gap 8 5 - 15 mmol/L    Glucose 96 65 - 100 mg/dL    BUN 10 6 - 20 MG/DL    Creatinine 0.80 0.55 - 1.02 MG/DL    BUN/Creatinine ratio 13 12 - 20      GFR est AA >60 >60 ml/min/1.73m2    GFR est non-AA >60 >60 ml/min/1.73m2    Calcium 8.5 8.5 - 10.1 MG/DL    Bilirubin, total 0.5 0.2 - 1.0 MG/DL    ALT (SGPT) 27 12 - 78 U/L    AST (SGOT) 18 15 - 37 U/L    Alk. phosphatase 88 45 - 117 U/L    Protein, total 5.6 (L) 6.4 - 8.2 g/dL    Albumin 2.6 (L) 3.5 - 5.0 g/dL    Globulin 3.0 2.0 - 4.0 g/dL    A-G Ratio 0.9 (L) 1.1 - 2.2     . Impression/Plan:    Oncologic: Recurrent EOC with secondary carcinomatosis        Cardiopulmonary: Stable   Endocrine: N/A   Heme/CV: Hemodynamically stable      Renal:  Stable     FEN/GI: ??resolving GI dysfunction. ID/Wound: Afebrile. .Normal WBC. Plan:   Diet: NPO, clamp NG--trial   Continue pulmonary/thromboembolic prophylaxis. IVF: Supportive   Antibiotics: None   Analgesia: Adequate     Will see how NG clamping is handled before advancing PO intake. Discuss with the patient. All questions answered.     Raffi French MD    [facility]    Defined Sensitive Document    9/16/2017  7:54 AM

## 2017-09-17 LAB
ALBUMIN SERPL-MCNC: 2.6 G/DL (ref 3.5–5)
ALBUMIN/GLOB SERPL: 0.8 {RATIO} (ref 1.1–2.2)
ALP SERPL-CCNC: 95 U/L (ref 45–117)
ALT SERPL-CCNC: 20 U/L (ref 12–78)
ANION GAP SERPL CALC-SCNC: 7 MMOL/L (ref 5–15)
AST SERPL-CCNC: 17 U/L (ref 15–37)
BASOPHILS # BLD: 0 K/UL (ref 0–0.1)
BASOPHILS NFR BLD: 0 % (ref 0–1)
BILIRUB SERPL-MCNC: 0.4 MG/DL (ref 0.2–1)
BUN SERPL-MCNC: 5 MG/DL (ref 6–20)
BUN/CREAT SERPL: 6 (ref 12–20)
CALCIUM SERPL-MCNC: 8.7 MG/DL (ref 8.5–10.1)
CHLORIDE SERPL-SCNC: 101 MMOL/L (ref 97–108)
CO2 SERPL-SCNC: 26 MMOL/L (ref 21–32)
CREAT SERPL-MCNC: 0.84 MG/DL (ref 0.55–1.02)
EOSINOPHIL # BLD: 0.1 K/UL (ref 0–0.4)
EOSINOPHIL NFR BLD: 3 % (ref 0–7)
ERYTHROCYTE [DISTWIDTH] IN BLOOD BY AUTOMATED COUNT: 16.6 % (ref 11.5–14.5)
GLOBULIN SER CALC-MCNC: 3.1 G/DL (ref 2–4)
GLUCOSE SERPL-MCNC: 106 MG/DL (ref 65–100)
HCT VFR BLD AUTO: 25.2 % (ref 35–47)
HGB BLD-MCNC: 8.3 G/DL (ref 11.5–16)
LYMPHOCYTES # BLD: 1.1 K/UL (ref 0.8–3.5)
LYMPHOCYTES NFR BLD: 23 % (ref 12–49)
MAGNESIUM SERPL-MCNC: 1.4 MG/DL (ref 1.6–2.4)
MCH RBC QN AUTO: 30.9 PG (ref 26–34)
MCHC RBC AUTO-ENTMCNC: 32.9 G/DL (ref 30–36.5)
MCV RBC AUTO: 93.7 FL (ref 80–99)
MONOCYTES # BLD: 0.2 K/UL (ref 0–1)
MONOCYTES NFR BLD: 4 % (ref 5–13)
NEUTS SEG # BLD: 3.3 K/UL (ref 1.8–8)
NEUTS SEG NFR BLD: 70 % (ref 32–75)
PLATELET # BLD AUTO: 123 K/UL (ref 150–400)
POTASSIUM SERPL-SCNC: 4.1 MMOL/L (ref 3.5–5.1)
PROT SERPL-MCNC: 5.7 G/DL (ref 6.4–8.2)
RBC # BLD AUTO: 2.69 M/UL (ref 3.8–5.2)
SODIUM SERPL-SCNC: 134 MMOL/L (ref 136–145)
WBC # BLD AUTO: 4.7 K/UL (ref 3.6–11)

## 2017-09-17 PROCEDURE — 80053 COMPREHEN METABOLIC PANEL: CPT | Performed by: NURSE PRACTITIONER

## 2017-09-17 PROCEDURE — 74011250636 HC RX REV CODE- 250/636: Performed by: NURSE PRACTITIONER

## 2017-09-17 PROCEDURE — 36415 COLL VENOUS BLD VENIPUNCTURE: CPT | Performed by: NURSE PRACTITIONER

## 2017-09-17 PROCEDURE — 65210000002 HC RM PRIVATE GYN

## 2017-09-17 PROCEDURE — 74011000258 HC RX REV CODE- 258: Performed by: NURSE PRACTITIONER

## 2017-09-17 PROCEDURE — 85025 COMPLETE CBC W/AUTO DIFF WBC: CPT | Performed by: NURSE PRACTITIONER

## 2017-09-17 PROCEDURE — 74011250637 HC RX REV CODE- 250/637: Performed by: OBSTETRICS & GYNECOLOGY

## 2017-09-17 PROCEDURE — 74011000250 HC RX REV CODE- 250: Performed by: NURSE PRACTITIONER

## 2017-09-17 PROCEDURE — 83735 ASSAY OF MAGNESIUM: CPT | Performed by: NURSE PRACTITIONER

## 2017-09-17 PROCEDURE — 74011250637 HC RX REV CODE- 250/637: Performed by: NURSE PRACTITIONER

## 2017-09-17 RX ADMIN — Medication 10 ML: at 06:20

## 2017-09-17 RX ADMIN — POTASSIUM CHLORIDE: 2 INJECTION, SOLUTION, CONCENTRATE INTRAVENOUS at 17:25

## 2017-09-17 RX ADMIN — FAMOTIDINE 20 MG: 10 INJECTION, SOLUTION INTRAVENOUS at 09:10

## 2017-09-17 RX ADMIN — Medication 10 ML: at 22:00

## 2017-09-17 RX ADMIN — METOPROLOL TARTRATE 50 MG: 50 TABLET ORAL at 09:10

## 2017-09-17 RX ADMIN — POTASSIUM CHLORIDE: 2 INJECTION, SOLUTION, CONCENTRATE INTRAVENOUS at 00:54

## 2017-09-17 RX ADMIN — SUCRALFATE 0.5 G: 1 SUSPENSION ORAL at 21:53

## 2017-09-17 RX ADMIN — CHLORASEPTIC 1 SPRAY: 1.5 LIQUID ORAL at 06:21

## 2017-09-17 RX ADMIN — FAMOTIDINE 20 MG: 10 INJECTION, SOLUTION INTRAVENOUS at 21:38

## 2017-09-17 RX ADMIN — APIXABAN 2.5 MG: 2.5 TABLET, FILM COATED ORAL at 17:25

## 2017-09-17 RX ADMIN — METOPROLOL TARTRATE 50 MG: 50 TABLET ORAL at 17:25

## 2017-09-17 RX ADMIN — APIXABAN 2.5 MG: 2.5 TABLET, FILM COATED ORAL at 09:10

## 2017-09-17 RX ADMIN — Medication 10 ML: at 21:39

## 2017-09-17 RX ADMIN — POTASSIUM CHLORIDE: 2 INJECTION, SOLUTION, CONCENTRATE INTRAVENOUS at 09:09

## 2017-09-17 RX ADMIN — Medication 10 ML: at 09:17

## 2017-09-17 NOTE — PROGRESS NOTES
Bedside and Verbal shift change report given to DANIELA Goodwin RN (oncoming nurse) by Cayla Dewey RN (offgoing nurse). Report included the following information SBAR, Kardex, Procedure Summary, Intake/Output and MAR. Patient alert, care assumed.

## 2017-09-17 NOTE — PROGRESS NOTES
Bedside shift change report given to April RN (oncoming nurse) by Anna Martinez RN (offgoing nurse). Report included the following information SBAR.

## 2017-09-17 NOTE — PROGRESS NOTES
[facility] Gynecologic Oncology  [address] [facility] Deb Romano MD  [employer], NP    Patient Name: Vergil Libman  MRN: [id number]  AGE: 61 y.o.  : 1960    Date: 2017    Date of Admission: 2017  Date of Surgery: * No surgery found *  Unit: 356/01    Admission Diagnosis:    Nausea and Vomiting   Metastatic/persistent EOC  Problem List:     Patient Active Problem List   Diagnosis Code    Malignant neoplasm of both ovaries ([address] C56.1, C56.2    Carcinomatosis peritonei ([address] C78.6, C80.1    Small bowel obstruction ([address] K56.69    Ileostomy in place ([address] Z93.2    Anemia due to chemotherapy D64.81, T45.1X5A    CINV (chemotherapy-induced nausea and vomiting) R11.2, T45.1X5A    Cancer associated pain G89.3    Generalized abdominal pain R10.84    Anxiety about health F41.8    Ileus ([address] K56.7    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    Hx of pulmonary embolus Z86.711    Dehydration E86.0    Chemotherapy-induced neutropenia (HCC) D70.1    Anticoagulation adequate with anticoagulant therapy Z79.01    Counseling regarding end of life decision making Z71.89    Bowel obstruction (HCC) K56.60    Protein calorie malnutrition (HCC) E46    Malignant ascites R18.0    Hydronephrosis of right kidney N13.30    Isolated proteinuria R80.0    Erosive esophagitis K22.10    Ulcerative esophagitis K22.10    Hypomagnesemia E83.42    Abdominal pain R10.9    Hydronephrosis of left kidney N13.30    Ovarian cancer (HCC) C56.9    Nausea & vomiting R11.2    Carcinomatosis (HCC) C80.0    Intractable vomiting with nausea R11.2    Febrile illness R50.9    Partial bowel obstruction (HCC) K56.69    Nausea and vomiting R11.2     PI  Vergil Libman is a 61 y.o.  postmenopausal female with a diagnosis of stage IV ovarian cancer. She underwent exploratory laparotomy with suboptimal debulking. She had extensive disease.  She was readmitted about a week later with obstruction and subsequently had a cecal perforation, requiring resection, end ileostomy, and mucous fistula. During the hospitalization she was also diagnosed with pulmonary embolus and had chest tubes placed for bilateral pleural effusions. She had a prolonged hospital course and actually received a dose of cisplatin chemotherapy while in the hospital to help dry up the effusions. She completed 6 cycles of Taxol/Carboplatin chemotherapy following her initial debulking. She did relatively well, considering she had an ileostomy to deal with during chemotherapy. I took her to the OR on 4/29/16 for interval debulking and reversal of her ileostomy. She had extensive disease, but we were able to resect the majority of her disease. One week later she developed an anastomotic leak requiring reexploration and recreation of an ileostomy. She had another prolonged hospitalization, but recovered well since surgery. We then reinitiated Taxol/Carboplatin chemotherapy, but did reduce the dose of Taxol to 135 mg/m2. She completed 4 mores cycles. Her CA-125 normalized and we stopped treatment.      On 2/7, she presented to Dr. Karen Maria office with her  to discuss her follow up/surveillance CT scan. Unfortunately, it demonstrated progression of disease, although minimally worse than her prior scan 2 months previously. She was actually feeling better than she did at that time. After review of scans and disease, the decision was made to begin Doxil/Avastin Q28 days for 6 cycles. She began this on 2/27/2017. In April 2017, she had completed 3 cycles of Doxil/Avastin and had CT scans at that time showed she had evidence of recurrent disease. She was switched at that time to [facility] and began this regimen on 5/23/2017 and has completed 4 cycles of this with the last being on 8/1/2017. With this regimen, her  has decreased to 65 on 7/20/17 from 145.6 on 5/16/2017.     The patient has been admitted secondary to N/V. NG placed with hydration. CT: 9/1IMPRESSION:   1. Overall stable appearance of the bowel with mildly dilated clumped bowel  loops in the lower mid abdomen. This may be attributable to peritoneal  carcinomatosis. A right lower abdomen ostomy is again demonstrated. There is no  evidence for complete bowel obstruction.     2. Stable to slightly increased loculated ascites anteriorly.     3. Stable bilateral hydronephrosis.     4. Stable low-density liver lesions.     5. Small right pleural effusion and increased patchy airspace opacities in the  right middle and lower lobes suggesting nonspecific infection or inflammation. 5/2017      Subjective:   Ongoing nursing care reviewed. DVT/pulmonary prophylaxis ongoing. No acute distress. NG indwelling over PM, clamped, removed this AM   Activity: OOB with assistance. Diet: NPO with NG clamped   Nausea/vomiting: None. BM/flatus: Postive via LLQ stoma    Review of Systems:    Constitutional: Negative for fever, chills and diaphoresis. Respiratory: Negative for reported cough, shortness of breath and wheezing. Cardiovascular: Negative for reported chest pain and leg swelling. Gastrointestinal: Negative for nausea, vomiting. Neurological:  No acute deficit. Events of Prior Day: Patient interviewed. Chart reviewed. Ongoing nursing care. GI rest with NG clamped. Resumption of ostomy output. Vitals:     Vitals:    09/16/17 0842 09/16/17 1448 09/16/17 2045 09/17/17 0041   BP: 138/78 134/79 165/87 137/70   Pulse: 72 64 80 62   Resp: 17 17 16 18   Temp: 98.7 °F (37.1 °C) 98.4 °F (36.9 °C) 100.3 °F (37.9 °C) 98.9 °F (37.2 °C)   SpO2: 98% 98% 98% 97%        There is no height or weight on file to calculate BMI. I/O:       Date 09/16/17 0700 - 09/17/17 0659 09/17/17 0700 - 09/18/17 0659   Shift [id number] 1900-0659 24 Hour Total 4890-5300 2808-5146 24 Hour Total   I  N  T  A  K  E   P.O. 100  100         P. O. 100  100       I.V. 1000 1341.7 2341.7         I.V. 1000  1000 Volume (dextrose 5 % - 0.45% NaCl 1,000 mL with potassium chloride 20 mEq, thiamine 100 mg, mvi, adult no. 4 with vit K 10 mL infusion)  1341.7 1341.7       Shift Total 1100 1341.7 2441.7      O  U  T  P  U  T   Urine 1200 2400 3600         Urine Voided 1200 2400 3600       Emesis/NG output  0 0         Output (ml) ([REMOVED] Nasogastric Tube 09/14/17)  0 0       Stool  700 700         Output (ml) (Ileostomy Right )  700 700       Shift Total 1200 3100 4300      NET -100 -1758.3 -1858.3      Weight (kg)             Examination:   Gen: Conversant, alert, oriented. Without acute distress. Cardiac: Regular rate and rhythm    Lungs: Clear to auscultation   Abdomen:    Soft, nontender. BS appreciated    Wound:  No hernia    CVAT: Negative   Extremities: No deep tenderness, warm, pulses appreciated, no edema. MSE: Grossly intact    Labs: Laboratory chart enteries reviewed. Recent Results (from the past 24 hour(s))   MAGNESIUM    Collection Time: 09/17/17  3:46 AM   Result Value Ref Range    Magnesium 1.4 (L) 1.6 - 2.4 mg/dL   CBC WITH AUTOMATED DIFF    Collection Time: 09/17/17  3:46 AM   Result Value Ref Range    WBC 4.7 3.6 - 11.0 K/uL    RBC 2.69 (L) 3.80 - 5.20 M/uL    HGB 8.3 (L) 11.5 - 16.0 g/dL    HCT 25.2 (L) 35.0 - 47.0 %    MCV 93.7 80.0 - 99.0 FL    MCH 30.9 26.0 - 34.0 PG    MCHC 32.9 30.0 - 36.5 g/dL    RDW 16.6 (H) 11.5 - 14.5 %    PLATELET 974 (L) 738 - 400 K/uL    NEUTROPHILS 70 32 - 75 %    LYMPHOCYTES 23 12 - 49 %    MONOCYTES 4 (L) 5 - 13 %    EOSINOPHILS 3 0 - 7 %    BASOPHILS 0 0 - 1 %    ABS. NEUTROPHILS 3.3 1.8 - 8.0 K/UL    ABS. LYMPHOCYTES 1.1 0.8 - 3.5 K/UL    ABS. MONOCYTES 0.2 0.0 - 1.0 K/UL    ABS. EOSINOPHILS 0.1 0.0 - 0.4 K/UL    ABS.  BASOPHILS 0.0 0.0 - 0.1 K/UL   METABOLIC PANEL, COMPREHENSIVE    Collection Time: 09/17/17  3:46 AM   Result Value Ref Range    Sodium 134 (L) 136 - 145 mmol/L    Potassium 4.1 3.5 - 5.1 mmol/L    Chloride 101 97 - 108 mmol/L    CO2 26 21 - 32 mmol/L Anion gap 7 5 - 15 mmol/L    Glucose 106 (H) 65 - 100 mg/dL    BUN 5 (L) 6 - 20 MG/DL    Creatinine 0.84 0.55 - 1.02 MG/DL    BUN/Creatinine ratio 6 (L) 12 - 20      GFR est AA >60 >60 ml/min/1.73m2    GFR est non-AA >60 >60 ml/min/1.73m2    Calcium 8.7 8.5 - 10.1 MG/DL    Bilirubin, total 0.4 0.2 - 1.0 MG/DL    ALT (SGPT) 20 12 - 78 U/L    AST (SGOT) 17 15 - 37 U/L    Alk. phosphatase 95 45 - 117 U/L    Protein, total 5.7 (L) 6.4 - 8.2 g/dL    Albumin 2.6 (L) 3.5 - 5.0 g/dL    Globulin 3.1 2.0 - 4.0 g/dL    A-G Ratio 0.8 (L) 1.1 - 2.2     . Impression/Plan:    Oncologic: Recurrent EOC with secondary carcinomatosis        Cardiopulmonary: Stable   Endocrine: N/A   Heme/CV: Hemodynamically stable      Renal:  Stable     FEN/GI: Resolving GI dysfunction   ID/Wound: Afebrile. .Normal WBC. Plan:   Diet: NG pulled   Continue pulmonary/thromboembolic prophylaxis. IVF: Supportive   Antibiotics: None   Analgesia: Adequate     Advance diet with caution    Discuss with the patient. All questions answered.     Elroy Weeks MD    [facility] Gyn Oncology    Defined Sensitive Document    9/17/2017  7:54 AM

## 2017-09-18 VITALS
OXYGEN SATURATION: 98 % | HEART RATE: 59 BPM | RESPIRATION RATE: 16 BRPM | DIASTOLIC BLOOD PRESSURE: 77 MMHG | SYSTOLIC BLOOD PRESSURE: 138 MMHG | TEMPERATURE: 98.3 F

## 2017-09-18 LAB
ALBUMIN SERPL-MCNC: 2.6 G/DL (ref 3.5–5)
ALBUMIN/GLOB SERPL: 0.7 {RATIO} (ref 1.1–2.2)
ALP SERPL-CCNC: 108 U/L (ref 45–117)
ALT SERPL-CCNC: 20 U/L (ref 12–78)
ANION GAP SERPL CALC-SCNC: 8 MMOL/L (ref 5–15)
AST SERPL-CCNC: 13 U/L (ref 15–37)
BASOPHILS # BLD: 0 K/UL (ref 0–0.1)
BASOPHILS NFR BLD: 0 % (ref 0–1)
BILIRUB SERPL-MCNC: 0.3 MG/DL (ref 0.2–1)
BUN SERPL-MCNC: 3 MG/DL (ref 6–20)
BUN/CREAT SERPL: 4 (ref 12–20)
CALCIUM SERPL-MCNC: 9.2 MG/DL (ref 8.5–10.1)
CHLORIDE SERPL-SCNC: 104 MMOL/L (ref 97–108)
CO2 SERPL-SCNC: 25 MMOL/L (ref 21–32)
CREAT SERPL-MCNC: 0.76 MG/DL (ref 0.55–1.02)
EOSINOPHIL # BLD: 0.1 K/UL (ref 0–0.4)
EOSINOPHIL NFR BLD: 1 % (ref 0–7)
ERYTHROCYTE [DISTWIDTH] IN BLOOD BY AUTOMATED COUNT: 16 % (ref 11.5–14.5)
GLOBULIN SER CALC-MCNC: 3.5 G/DL (ref 2–4)
GLUCOSE SERPL-MCNC: 101 MG/DL (ref 65–100)
HCT VFR BLD AUTO: 25.9 % (ref 35–47)
HGB BLD-MCNC: 8.6 G/DL (ref 11.5–16)
LYMPHOCYTES # BLD: 1.3 K/UL (ref 0.8–3.5)
LYMPHOCYTES NFR BLD: 36 % (ref 12–49)
MAGNESIUM SERPL-MCNC: 1.7 MG/DL (ref 1.6–2.4)
MCH RBC QN AUTO: 30.9 PG (ref 26–34)
MCHC RBC AUTO-ENTMCNC: 33.2 G/DL (ref 30–36.5)
MCV RBC AUTO: 93.2 FL (ref 80–99)
MONOCYTES # BLD: 0.4 K/UL (ref 0–1)
MONOCYTES NFR BLD: 12 % (ref 5–13)
NEUTS SEG # BLD: 1.9 K/UL (ref 1.8–8)
NEUTS SEG NFR BLD: 51 % (ref 32–75)
PLATELET # BLD AUTO: 104 K/UL (ref 150–400)
POTASSIUM SERPL-SCNC: 4.3 MMOL/L (ref 3.5–5.1)
PROT SERPL-MCNC: 6.1 G/DL (ref 6.4–8.2)
RBC # BLD AUTO: 2.78 M/UL (ref 3.8–5.2)
SODIUM SERPL-SCNC: 137 MMOL/L (ref 136–145)
WBC # BLD AUTO: 3.8 K/UL (ref 3.6–11)

## 2017-09-18 PROCEDURE — 85025 COMPLETE CBC W/AUTO DIFF WBC: CPT | Performed by: NURSE PRACTITIONER

## 2017-09-18 PROCEDURE — 36415 COLL VENOUS BLD VENIPUNCTURE: CPT | Performed by: NURSE PRACTITIONER

## 2017-09-18 PROCEDURE — 74011000258 HC RX REV CODE- 258: Performed by: NURSE PRACTITIONER

## 2017-09-18 PROCEDURE — 74011250637 HC RX REV CODE- 250/637: Performed by: OBSTETRICS & GYNECOLOGY

## 2017-09-18 PROCEDURE — 74011000250 HC RX REV CODE- 250: Performed by: NURSE PRACTITIONER

## 2017-09-18 PROCEDURE — 74011250636 HC RX REV CODE- 250/636: Performed by: NURSE PRACTITIONER

## 2017-09-18 PROCEDURE — 80053 COMPREHEN METABOLIC PANEL: CPT | Performed by: NURSE PRACTITIONER

## 2017-09-18 PROCEDURE — 83735 ASSAY OF MAGNESIUM: CPT | Performed by: NURSE PRACTITIONER

## 2017-09-18 PROCEDURE — 74011250637 HC RX REV CODE- 250/637: Performed by: NURSE PRACTITIONER

## 2017-09-18 RX ORDER — METOCLOPRAMIDE HYDROCHLORIDE 5 MG/ML
10 INJECTION INTRAMUSCULAR; INTRAVENOUS EVERY 6 HOURS
Status: DISCONTINUED | OUTPATIENT
Start: 2017-09-18 | End: 2017-09-18 | Stop reason: HOSPADM

## 2017-09-18 RX ADMIN — HYDROMORPHONE HYDROCHLORIDE 1 MG: 1 INJECTION, SOLUTION INTRAMUSCULAR; INTRAVENOUS; SUBCUTANEOUS at 13:05

## 2017-09-18 RX ADMIN — FAMOTIDINE 20 MG: 10 INJECTION, SOLUTION INTRAVENOUS at 08:56

## 2017-09-18 RX ADMIN — APIXABAN 2.5 MG: 2.5 TABLET, FILM COATED ORAL at 08:56

## 2017-09-18 RX ADMIN — METOCLOPRAMIDE 10 MG: 5 INJECTION, SOLUTION INTRAMUSCULAR; INTRAVENOUS at 12:58

## 2017-09-18 RX ADMIN — METOCLOPRAMIDE 10 MG: 5 INJECTION, SOLUTION INTRAMUSCULAR; INTRAVENOUS at 08:56

## 2017-09-18 RX ADMIN — PROMETHAZINE HYDROCHLORIDE 25 MG: 25 INJECTION INTRAMUSCULAR; INTRAVENOUS at 08:54

## 2017-09-18 RX ADMIN — POTASSIUM CHLORIDE: 2 INJECTION, SOLUTION, CONCENTRATE INTRAVENOUS at 02:27

## 2017-09-18 RX ADMIN — METOPROLOL TARTRATE 50 MG: 50 TABLET ORAL at 08:56

## 2017-09-18 RX ADMIN — Medication 10 ML: at 06:00

## 2017-09-18 NOTE — PROGRESS NOTES
Care Management Interventions  Mode of Transport at Discharge: Other (see comment) (private vehicle)  Discharge Durable Medical Equipment: No (has a rolling walker and bedside commode)  Physical Therapy Consult: No  Occupational Therapy Consult: No  Speech Therapy Consult: No  Current Support Network: Lives with Spouse  Confirm Follow Up Transport: Family  Plan discussed with Pt/Family/Caregiver: Yes  Freedom of Choice Offered: Yes  Discharge Location  Discharge Placement: Home    CM reviewed chart.  Pt is independent with her ADLs and IADLs.  Pt lives with her  in a 1-level private residence with 3 steps to enter.  Pt has a rolling, bedside commode, and shower chair at home if needed.  Pt has used [facility] [facility] and [facility] in the past.  Pt has also used [facility] for home infusions.  Pt gets her prescriptions filled at her local [facility] and/or [employer] [facility].  Pt's  will provide transportation.  Cm will continue to follow for any needs that arise.   Celso Oliva, CLARISSAW, ACM

## 2017-09-18 NOTE — DISCHARGE INSTRUCTIONS
[address] [place] [address]  MD Kirstin Desai MD Elveria Golder, HAYDER    Patient Discharge Instructions    Sravan Bhat / [id number] : 1960    Admitted 2017 Discharged: 2017     Take Home Medications     No current facility-administered medications on file prior to encounter. Current Outpatient Prescriptions on File Prior to Encounter   Medication Sig Dispense Refill    fentaNYL (DURAGESIC) 100 mcg/hr PATCH 1 Patch by TransDERmal route every seventy-two (72) hours. Max Daily Amount: 1 Patch. 10 Patch 0    promethazine (PHENERGAN) 6.25 mg/5 mL syrup Take 20 mL by mouth four (4) times daily as needed for Nausea. 400 mL 4    HYDROmorphone (DILAUDID) 1 mg/mL liqd oral solution Take 4 mL by mouth every four (4) hours as needed. Max Daily Amount: 24 mg. 475 mL 0    NEXIUM PACKET 40 mg granules for oral suspension two (2) times a day.  metoclopramide (REGLAN) 5 mg/5 mL syrup Take 10 mL by mouth four (4) times daily as needed. Take before meals as needed 500 mL 3    polyethylene glycol (MIRALAX) 17 gram/dose powder Take 17 g by mouth daily as needed. 238 g 3    apixaban (ELIQUIS) 2.5 mg tablet Take 1 Tab by mouth two (2) times a day. 60 Tab 6    zolpidem (AMBIEN) 10 mg tablet Take 1 Tab by mouth nightly as needed for Sleep. Max Daily Amount: 10 mg. 30 Tab 1    ondansetron (ZOFRAN ODT) 4 mg disintegrating tablet Take 1 Tab by mouth every eight (8) hours as needed for Nausea. 30 Tab 2    Calcium-Vitamin D3-Vitamin K 892-878-42 mg-unit-mcg chew Take 1 Tab by mouth two (2) times a day.  metoprolol (LOPRESSOR) 100 mg tablet Take 50 mg by mouth two (2) times a day.  scopolamine (TRANSDERM-SCOP) 1.5 mg (1 mg over 3 days) pt3d 1 Patch by TransDERmal route every seventy-two (72) hours as needed. 10 Patch 1    magnesium oxide (MAG-OX) 400 mg tablet Take 1 Tab by mouth two (2) times a day.  Indications: HYPOMAGNESEMIA 60 Tab 2    sucralfate (CARAFATE) 100 mg/mL suspension Take 5 mL by mouth four (4) times daily. 414 mL 3    lidocaine-prilocaine (EMLA) topical cream Apply small amount over port area and cover with a band aid 1 hour before chemo treatment 30 g 3       · It is important that you take the medication exactly as they are prescribed. · Keep your medication in the bottles provided by the pharmacist and keep a list of the medication names, dosages, and times to be taken in your wallet. · Do not take other medications without consulting your doctor. What to do at Home    Recommended diet:as tolerated, and as prior to admission, stay hydrated. You should take a stool softener such as colace for constipation. If constipation persists for >24 hours you should take a dose of Milk of Magnesia. Call if your constipation persists. Recommended activity:   No driving while on pain medication  Walk at least 6 times per day  Activity as able. Keep as active as possible      If you experience any of the following persisting symptoms:    Nausea/vomiting, fever > 101 F, diarrhea/constipation, increasing pain despite medication, increasing vaginal discharge or any concerns, please call our office. Follow-up with  on 9/26th at 2:30 as previously scheduled. Call for any concerns or questions    Information obtained by :  I understand that if any problems occur once I am at home I am to contact my physician. I understand and acknowledge receipt of the instructions indicated above.                                                                                                                                            Physician's or R.N.'s Signature                                                                  Date/Time                                                                                                                                              Patient or Representative Signature Date/Time

## 2017-09-18 NOTE — PROGRESS NOTES
Bedside and Verbal shift change report given to JEAN Foote (oncoming nurse) by Lex Hunt RN (offgoing nurse). Report included the following information SBAR, Kardex, OR Summary, Procedure Summary, Intake/Output, MAR, Accordion and Recent Results.

## 2017-09-18 NOTE — PROGRESS NOTES
Awa Sow. Carolyn Mcdanielonofre, HAYDER       Bev Clifford       [id number]  1960    Admitted 2017 Date 2017   HPI  Bev Clifford is a 61 y.o.  postmenopausal female with a diagnosis of stage IV ovarian cancer. She underwent exploratory laparotomy with suboptimal debulking. She had extensive disease. She was readmitted about a week later with obstruction and subsequently had a cecal perforation, requiring resection, end ileostomy, and mucous fistula. During the hospitalization she was also diagnosed with pulmonary embolus and had chest tubes placed for bilateral pleural effusions. She had a prolonged hospital course and actually received a dose of cisplatin chemotherapy while in the hospital to help dry up the effusions. She completed 6 cycles of Taxol/Carboplatin chemotherapy following her initial debulking. She did relatively well, considering she had an ileostomy to deal with during chemotherapy. I took her to the OR on 16 for interval debulking and reversal of her ileostomy. She had extensive disease, but we were able to resect the majority of her disease. One week later she developed an anastomotic leak requiring reexploration and recreation of an ileostomy. She had another prolonged hospitalization, but recovered well since surgery. We then reinitiated Taxol/Carboplatin chemotherapy, but did reduce the dose of Taxol to 135 mg/m2. She completed 4 mores cycles. Her CA-125 normalized and we stopped treatment.      On , she presented to Dr. Miller Ratliff office with her  to discuss her follow up/surveillance CT scan. Unfortunately, it demonstrated progression of disease, although minimally worse than her prior scan 2 months previously. She was actually feeling better than she did at that time. After review of scans and disease, the decision was made to begin Doxil/Avastin Q28 days for 6 cycles. She began this on 2017. In April 2017, she had completed 3 cycles of Doxil/Avastin and had CT scans at that time showed she had evidence of recurrent disease. She was switched at that time to [facility] and began this regimen on 5/23/2017 and has completed 4 cycles of this with the last being on 8/1/2017. With this regimen, her  has decreased to 65 on 7/20/17 from 145.6 on 5/16/2017.       SUBJECTIVE:  NG removed over weekend and pt has no beeen able to tolerate clear liquids without nausea or vomiting  Ostomy functioning \"like normal\" now  Reviewed CT from Friday with pt and her   Not a lot of increase in hgb from transfusion on Friday. Allergies   Allergen Reactions    Ativan [Lorazepam] Other (comments)     SEVERE CONFUSION        Review of Systems  General: continues feeling better this morning, but still tired   HEENT: Denies visual changes, dysphagia or headache  Resp: Denies SOB, DUDLEY, wheezing or cough  CV: Denies CP, palpitations  GI/: Denies nausea or vomiting and yevgeniy ostomy has been putting out regular gas and liquid to semi-formed stool. Says abdomen less tender as well  MuskSkel: Denies muscle ache or joint pain  Neuro: Denies dizzyness or syncope      OBJECTIVE:    Visit Vitals    /77 (BP 1 Location: Left arm, BP Patient Position: At rest)    Pulse (!) 59    Temp 98.3 °F (36.8 °C)    Resp 16    SpO2 98%         Physical Exam  General: A&O X3 appearing tired, but better  HEENT: Sclera anicteric, Mucosa pale, dry  Neck: No JVD without adenopathy appreciated  Port site without redness, tenderness or swelling. Remains accessed for IV fluid  Heart: Regular without M/R/G  Lungs: CTA Bilat without wheezing or rales   Abd:Remains less firm/less tender and ostomy has been putting out all weekend.     Ext: Without edema and + pedal pulses bilat  Neuro: grossly intact      Recent Results (from the past 12 hour(s))   MAGNESIUM    Collection Time: 09/18/17  4:44 AM   Result Value Ref Range Magnesium 1.7 1.6 - 2.4 mg/dL   CBC WITH AUTOMATED DIFF    Collection Time: 09/18/17  4:44 AM   Result Value Ref Range    WBC 3.8 3.6 - 11.0 K/uL    RBC 2.78 (L) 3.80 - 5.20 M/uL    HGB 8.6 (L) 11.5 - 16.0 g/dL    HCT 25.9 (L) 35.0 - 47.0 %    MCV 93.2 80.0 - 99.0 FL    MCH 30.9 26.0 - 34.0 PG    MCHC 33.2 30.0 - 36.5 g/dL    RDW 16.0 (H) 11.5 - 14.5 %    PLATELET 144 (L) 751 - 400 K/uL    NEUTROPHILS 51 32 - 75 %    LYMPHOCYTES 36 12 - 49 %    MONOCYTES 12 5 - 13 %    EOSINOPHILS 1 0 - 7 %    BASOPHILS 0 0 - 1 %    ABS. NEUTROPHILS 1.9 1.8 - 8.0 K/UL    ABS. LYMPHOCYTES 1.3 0.8 - 3.5 K/UL    ABS. MONOCYTES 0.4 0.0 - 1.0 K/UL    ABS. EOSINOPHILS 0.1 0.0 - 0.4 K/UL    ABS. BASOPHILS 0.0 0.0 - 0.1 K/UL   METABOLIC PANEL, COMPREHENSIVE    Collection Time: 09/18/17  4:44 AM   Result Value Ref Range    Sodium 137 136 - 145 mmol/L    Potassium 4.3 3.5 - 5.1 mmol/L    Chloride 104 97 - 108 mmol/L    CO2 25 21 - 32 mmol/L    Anion gap 8 5 - 15 mmol/L    Glucose 101 (H) 65 - 100 mg/dL    BUN 3 (L) 6 - 20 MG/DL    Creatinine 0.76 0.55 - 1.02 MG/DL    BUN/Creatinine ratio 4 (L) 12 - 20      GFR est AA >60 >60 ml/min/1.73m2    GFR est non-AA >60 >60 ml/min/1.73m2    Calcium 9.2 8.5 - 10.1 MG/DL    Bilirubin, total 0.3 0.2 - 1.0 MG/DL    ALT (SGPT) 20 12 - 78 U/L    AST (SGOT) 13 (L) 15 - 37 U/L    Alk. phosphatase 108 45 - 117 U/L    Protein, total 6.1 (L) 6.4 - 8.2 g/dL    Albumin 2.6 (L) 3.5 - 5.0 g/dL    Globulin 3.5 2.0 - 4.0 g/dL    A-G Ratio 0.7 (L) 1.1 - 2.2                CT ABD/Pelvi9/15/2017  FINDINGS:   The visualized lung bases demonstrate a small right pleural effusion. There are  increased patchy airspace opacities in the right middle and lower lobes. The  left lung and pleural space are clear. The heart size is normal.     There is a small hiatal hernia. An enteric tube terminates in the stomach.     There are stable low-density liver lesions measuring 0.7 x 1.2 cm in the right  liver, previously 0.8 x 1.1 cm. The left liver lesion is stable measuring 0.7 cm  (series 3, image 28).    The spleen is stable with a small calcification. The gallbladder is present  without biliary duct dilatation. The pancreas and adrenal glands are  unremarkable. The kidneys enhance symmetrically with stable bilateral  hydronephrosis.     There are stable clumped and mildly dilated bowel loops in the mid lower  abdomen. Surgical changes are again seen with near complete colectomy with a  right lower abdomen ostomy again demonstrated. The rectum is blind-ending. There  is a stable to slightly increased amount of loculated ascites anteriorly. There  is no free air.     There are no enlarged lymph nodes. The aorta tapers without aneurysm. IVC filter  is present.     The urinary bladder is unremarkable.     There is no aggressive bony lesion.     IMPRESSION:   1. Overall stable appearance of the bowel with mildly dilated clumped bowel  loops in the lower mid abdomen. This may be attributable to peritoneal  carcinomatosis. A right lower abdomen ostomy is again demonstrated.  There is no  evidence for complete bowel obstruction.     2. Stable to slightly increased loculated ascites anteriorly.     3. Stable bilateral hydronephrosis.     4. Stable low-density liver lesions.     5. Small right pleural effusion and increased patchy airspace opacities in the  right middle and lower lobes suggesting nonspecific infection or inflammation.     IMPRESSION:    Patient Active Problem List   Diagnosis Code    Malignant neoplasm of both ovaries (HCC) C56.1, C56.2    Carcinomatosis peritonei ([address] C78.6, C80.1    Small bowel obstruction (HCC) K56.69    Ileostomy in place ([address] Z93.2    Anemia due to chemotherapy D64.81, T45.1X5A    CINV (chemotherapy-induced nausea and vomiting) R11.2, T45.1X5A    Cancer associated pain G89.3    Generalized abdominal pain R10.84    Anxiety about health F41.8    Ileus (HCC) K56.7    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  Hx of pulmonary embolus Z86.711    Dehydration E86.0    Chemotherapy-induced neutropenia (HCC) D70.1    Anticoagulation adequate with anticoagulant therapy Z79.01    Counseling regarding end of life decision making Z71.89    Bowel obstruction (HCC) K56.60    Protein calorie malnutrition (HCC) E46    Malignant ascites R18.0    Hydronephrosis of right kidney N13.30    Isolated proteinuria R80.0    Erosive esophagitis K22.10    Ulcerative esophagitis K22.10    Hypomagnesemia E83.42    Abdominal pain R10.9    Hydronephrosis of left kidney N13.30    Ovarian cancer (HCC) C56.9    Nausea & vomiting R11.2    Carcinomatosis (HCC) C80.0    Intractable vomiting with nausea R11.2    Febrile illness R50.9    Partial bowel obstruction (HCC) K56.69    Nausea and vomiting R11.2       Assessment/Plan:  Progress with diet  Pt is disappointed with CT results showing \"only stable disease\" as she had wanted it to show improvement. Discussed at length that this is still better than worsening and it means for the time being, we are able to hold her cancer at [place] [id number]. Will proceed with full liquid and advance to regular  If pt tolerates, will allow her to go home as she strongly desires. Today  She and Dr. Darshana Medina had long talk and we will proceed with Gemzar since it is holding the disease stable at this point. Oral PARB's not a good option for this pt as she is not BRACA positive and has multiple GI issues already. She too salas not wish to try this. Follow up with Dr. Darshana Medina on 9/26 as previously scheduled or sooner if needed.    Continue nausea/ pain medication    Signed By: Annabelle Arora NP     9/18/2017/5:18 PM              Current Facility-Administered Medications:     metoclopramide HCl (REGLAN) injection 10 mg, 10 mg, IntraVENous, Q6H, Genny Don NP, 10 mg at 09/18/17 0856    0.9% sodium chloride infusion 250 mL, 250 mL, IntraVENous, PRN, Genny Don NP    fentaNYL (DURAGESIC) 100 mcg/hr patch 1 Patch, 1 Patch, TransDERmal, Q72H, Hakan Martins MD, 1 Patch at 09/15/17 1201    lidocaine (XYLOCAINE) 2 % jelly, , Mucous Membrane, PRN, Scott Clayton MD    phenol throat spray (CHLORASEPTIC) 1 Spray, 1 Spray, Oral, PRN, Genny Don NP, 1 Spray at 09/17/17 0621    benzocaine-menthol (CEPACOL) lozenge 1 Lozenge, 1 Lozenge, Mucous Membrane, PRN, Genny Don NP, 1 Lozenge at 09/16/17 1935    apixaban (ELIQUIS) tablet 2.5 mg, 2.5 mg, Oral, BID, Genny Don NP, 2.5 mg at 09/18/17 0856    metoprolol tartrate (LOPRESSOR) tablet 50 mg, 50 mg, Oral, BID, Genny Don, NP, 50 mg at 09/18/17 0856    scopolamine (TRANSDERM-SCOP) 1.5 mg, 1.5 mg, TransDERmal, Q72H, Genny Don NP, 1.5 mg at 09/16/17 1936    sodium chloride (NS) flush 5-10 mL, 5-10 mL, IntraVENous, Q8H, Genny Don NP, 10 mL at 09/18/17 0600    sodium chloride (NS) flush 5-10 mL, 5-10 mL, IntraVENous, PRN, Genny Don, NP, 10 mL at 09/17/17 2139    acetaminophen (TYLENOL) solution 650 mg, 650 mg, Oral, Q4H PRN, Genny Don NP, 650 mg at 09/16/17 2119    naloxone (NARCAN) injection 0.4 mg, 0.4 mg, IntraVENous, PRN, Genny Don NP    diphenhydrAMINE (BENADRYL) injection 12.5 mg, 12.5 mg, IntraVENous, Q4H PRN, Genny Don NP    dextrose 5 % - 0.45% NaCl 1,000 mL with potassium chloride 20 mEq, thiamine 100 mg, mvi, adult no. 4 with vit K 10 mL infusion, , IntraVENous, CONTINUOUS, Genny Don NP, Last Rate: 125 mL/hr at 09/18/17 0227    famotidine (PF) (PEPCID) 20 mg in sodium chloride 0.9 % 10 mL injection, 20 mg, IntraVENous, Q12H, Genny Don, HAYDER, 20 mg at 09/18/17 0856    promethazine (PHENERGAN) 25 mg in 0.9% sodium chloride 50 mL IVPB, 25 mg, IntraVENous, Q4H PRN, Genny Don NP, Last Rate: 200 mL/hr at 09/18/17 0854, 25 mg at 09/18/17 0854    HYDROmorphone (PF) (DILAUDID) injection 0.5-1 mg, 0.5-1 mg, IntraVENous, Q3H PRN, Genny Don NP, 1 mg at 09/15/17 2218    cyclobenzaprine (FLEXERIL) tablet 5 mg, 5 mg, Oral, TID PRN, Genny Don NP    sucralfate (CARAFATE) 100 mg/mL oral suspension 0.5 g, 0.5 g, Oral, QID PRN, Choco Hyatt MD, 0.5 g at 09/17/17 2094

## 2017-09-18 NOTE — DISCHARGE SUMMARY
[address] [place] [address] [address] [facility]  [phone]  Dick Bon, MD Michail Schaumann, MD Dianne E. Renae Bake, HAYDER      Discharge Summary  Patient ID:  Adrian Gastelum  [id number]  61 y.o.  1960    Admit date: 9/13/2017    PCP: Cady Lindsey MD    Admit MD: Parth Toth MD    Discharge MD: Dr. Parth Ventura.  MD    Discharge date and time: 9/18/2017    Admission Diagnoses:     possible bowel obstruction  Ovarian cancer ([address]  Partial bowel obstruction (HCC)  Nausea & vomiting  Ovarian cancer ([address]  Small bowel obstruction (HCC)  Nausea and vomiting  Patient Active Problem List   Diagnosis Code    Malignant neoplasm of both ovaries ([facility] C56.1, C56.2    Carcinomatosis peritonei ([address] C78.6, C80.1    Small bowel obstruction ([address] K56.69    Ileostomy in place ([facility] Z93.2    Anemia due to chemotherapy D64.81, T45.1X5A    CINV (chemotherapy-induced nausea and vomiting) R11.2, T45.1X5A    Cancer associated pain G89.3    Generalized abdominal pain R10.84    Anxiety about health F41.8    Ileus (HCC) K56.7    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    Hx of pulmonary embolus Z86.711    Dehydration E86.0    Chemotherapy-induced neutropenia (HCC) D70.1    Anticoagulation adequate with anticoagulant therapy Z79.01    Counseling regarding end of life decision making Z71.89    Bowel obstruction (HCC) K56.60    Protein calorie malnutrition (HCC) E46    Malignant ascites R18.0    Hydronephrosis of right kidney N13.30    Isolated proteinuria R80.0    Erosive esophagitis K22.10    Ulcerative esophagitis K22.10    Hypomagnesemia E83.42    Abdominal pain R10.9    Hydronephrosis of left kidney N13.30    Ovarian cancer (HCC) C56.9    Nausea & vomiting R11.2    Carcinomatosis (HCC) C80.0    Intractable vomiting with nausea R11.2    Febrile illness R50.9    Partial bowel obstruction (HCC) K56.69    Nausea and vomiting R11.2       Discharge Diagnoses:    Patient Active Problem List   Diagnosis Code    Malignant neoplasm of both ovaries ([facility] C56.1, C56.2    Carcinomatosis peritonei ([facility] C78.6, C80.1    Small bowel obstruction ([facility] K56.69    Ileostomy in place ([facility] [facility] Z93.2    Anemia due to chemotherapy D64.81, T45.1X5A    CINV (chemotherapy-induced nausea and vomiting) R11.2, T45.1X5A    Cancer associated pain G89.3    Generalized abdominal pain R10.84    Anxiety about health F41.8    Ileus (HCC) K56.7    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    Hx of pulmonary embolus Z86.711    Dehydration E86.0    Chemotherapy-induced neutropenia (HCC) D70.1    Anticoagulation adequate with anticoagulant therapy Z79.01    Counseling regarding end of life decision making Z71.89    Bowel obstruction (HCC) K56.60    Protein calorie malnutrition (HCC) E46    Malignant ascites R18.0    Hydronephrosis of right kidney N13.30    Isolated proteinuria R80.0    Erosive esophagitis K22.10    Ulcerative esophagitis K22.10    Hypomagnesemia E83.42    Abdominal pain R10.9    Hydronephrosis of left kidney N13.30    Ovarian cancer (HCC) C56.9    Nausea & vomiting R11.2    Carcinomatosis (HCC) C80.0    Intractable vomiting with nausea R11.2    Febrile illness R50.9    Partial bowel obstruction (HCC) K56.69    Nausea and vomiting R11.2             Hospital Course:   Pt with known stage IV ovarian cancer and ostomy with multiple admissions for partial SBO and SBO's called the office on 9/13 complaining of increased abd pain and vomiting with no output in her ostomy. (see daily notes for details of daily progress)  She was admitted and placed on GI rest with antiemetics and pain medication. Over the night, her vomiting continued and the need for NG tube placement was necessitated. She initially drained 500 cc's at insertion and continued to drain. On HD #3, her ostomy began to put out some, but nausea persisted.   She was therefore sent for a CT and was found to have stable disease without obvious obstruction. On HD # 4 NG was clamped and her ostomy continued with good output. Pt was ambulating well and taking ice chips without any nausea  HD #5, NG tube removed and pt continued with good output from ostomy. She began clear liquids and tolerated it well. HD #6 she advanced her diet and did well. Ostomy was not putting out \"normal daily amount\" per pt and pt was felt stable for discharge home   Followup, meds and education provided as below. Pathology: None    Consults:     Significant Diagnostic Studies:  Lab Results   Component Value Date/Time    WBC 3.8 09/18/2017 04:44 AM    ABS. NEUTROPHILS 1.9 09/18/2017 04:44 AM    HGB 8.6 09/18/2017 04:44 AM    HCT 25.9 09/18/2017 04:44 AM    MCV 93.2 09/18/2017 04:44 AM    MCH 30.9 09/18/2017 04:44 AM    PLATELET 474 93/69/2317 04:44 AM     Lab Results   Component Value Date/Time    Sodium 137 09/18/2017 04:44 AM    Potassium 4.3 09/18/2017 04:44 AM    Chloride 104 09/18/2017 04:44 AM    CO2 25 09/18/2017 04:44 AM    Glucose 101 09/18/2017 04:44 AM    BUN 3 09/18/2017 04:44 AM    Creatinine 0.76 09/18/2017 04:44 AM    Calcium 9.2 09/18/2017 04:44 AM    Albumin 2.6 09/18/2017 04:44 AM    Bilirubin, total 0.3 09/18/2017 04:44 AM    AST (SGOT) 13 09/18/2017 04:44 AM    ALT (SGPT) 20 09/18/2017 04:44 AM    Alk. phosphatase 108 09/18/2017 04:44 AM       CT Abd/Pelvis 9/15/2017:  FINDINGS:   The visualized lung bases demonstrate a small right pleural effusion. There are  increased patchy airspace opacities in the right middle and lower lobes. The  left lung and pleural space are clear. The heart size is normal.     There is a small hiatal hernia. An enteric tube terminates in the stomach.     There are stable low-density liver lesions measuring 0.7 x 1.2 cm in the right  liver, previously 0.8 x 1.1 cm. The left liver lesion is stable measuring 0.7 cm  (series 3, image 28).      The spleen is stable with a small calcification. The gallbladder is present  without biliary duct dilatation. The pancreas and adrenal glands are  unremarkable. The kidneys enhance symmetrically with stable bilateral  hydronephrosis.     There are stable clumped and mildly dilated bowel loops in the mid lower  abdomen. Surgical changes are again seen with near complete colectomy with a  right lower abdomen ostomy again demonstrated. The rectum is blind-ending. There  is a stable to slightly increased amount of loculated ascites anteriorly. There  is no free air.     There are no enlarged lymph nodes. The aorta tapers without aneurysm. IVC filter  is present.     The urinary bladder is unremarkable.     There is no aggressive bony lesion.       IMPRESSION:   1. Overall stable appearance of the bowel with mildly dilated clumped bowel  loops in the lower mid abdomen. This may be attributable to peritoneal  carcinomatosis. A right lower abdomen ostomy is again demonstrated. There is no  evidence for complete bowel obstruction.     2. Stable to slightly increased loculated ascites anteriorly.     3. Stable bilateral hydronephrosis.     4. Stable low-density liver lesions.     5. Small right pleural effusion and increased patchy airspace opacities in the  right middle and lower lobes suggesting nonspecific infection or inflammation. Condition on Discharge: Stable    Disposition: home    Patient Instructions:   Current Discharge Medication List      CONTINUE these medications which have NOT CHANGED    Details   fentaNYL (DURAGESIC) 100 mcg/hr PATCH 1 Patch by TransDERmal route every seventy-two (72) hours. Max Daily Amount: 1 Patch. Qty: 10 Patch, Refills: 0    Associated Diagnoses: Ovarian cancer, unspecified laterality ([facility] Ileostomy in place [facility]); Carcinomatosis peritonei ([facility] [facility] Cancer associated pain; Anxiety about health; Generalized abdominal pain; CINV (chemotherapy-induced nausea and vomiting);  Chemotherapy-induced neutropenia ([address] Hyponatremia; Anemia due to chemotherapy; Malignant neoplasm of both ovaries (HCC)      promethazine (PHENERGAN) 6.25 mg/5 mL syrup Take 20 mL by mouth four (4) times daily as needed for Nausea. Qty: 400 mL, Refills: 4    Associated Diagnoses: Ovarian cancer, unspecified laterality ([address] Carcinomatosis peritonei ([address] Small bowel obstruction ([address] Cancer associated pain; Thrombosis of pelvic vein; Malignant ascites; Erosive esophagitis; Ulcerative esophagitis      HYDROmorphone (DILAUDID) 1 mg/mL liqd oral solution Take 4 mL by mouth every four (4) hours as needed. Max Daily Amount: 24 mg. Qty: 475 mL, Refills: 0    Associated Diagnoses: Malignant neoplasm of both ovaries ([address] Carcinomatosis peritonei ([address] Cancer associated pain; Generalized abdominal pain; Ovarian cancer, unspecified laterality ([address] Ileostomy in place [facility]); Anxiety about health; CINV (chemotherapy-induced nausea and vomiting); Chemotherapy-induced neutropenia ([address] Hyponatremia; Anemia due to chemotherapy      NEXIUM PACKET 40 mg granules for oral suspension two (2) times a day. metoclopramide (REGLAN) 5 mg/5 mL syrup Take 10 mL by mouth four (4) times daily as needed. Take before meals as needed  Qty: 500 mL, Refills: 3    Associated Diagnoses: Ovarian cancer, unspecified laterality ([address] Carcinomatosis peritonei ([address] Small bowel obstruction ([address] Cancer associated pain; Thrombosis of pelvic vein; Malignant ascites; Erosive esophagitis; Ulcerative esophagitis      polyethylene glycol (MIRALAX) 17 gram/dose powder Take 17 g by mouth daily as needed. Qty: 238 g, Refills: 3      apixaban (ELIQUIS) 2.5 mg tablet Take 1 Tab by mouth two (2) times a day. Qty: 60 Tab, Refills: 6      zolpidem (AMBIEN) 10 mg tablet Take 1 Tab by mouth nightly as needed for Sleep.  Max Daily Amount: 10 mg.  Qty: 30 Tab, Refills: 1    Associated Diagnoses: Ovarian cancer, unspecified laterality (HCC)      ondansetron (ZOFRAN ODT) 4 mg disintegrating tablet Take 1 Tab by mouth every eight (8) hours as needed for Nausea. Qty: 30 Tab, Refills: 2    Associated Diagnoses: Ovarian cancer, unspecified laterality ([facility] [address] Generalized abdominal pain; CINV (chemotherapy-induced nausea and vomiting); Cancer associated pain; Anxiety about health; Carcinomatosis peritonei ([facility] [address] Ileostomy in place [facility])      Calcium-Vitamin D3-Vitamin K [id number] mg-unit-mcg chew Take 1 Tab by mouth two (2) times a day. metoprolol (LOPRESSOR) 100 mg tablet Take 50 mg by mouth two (2) times a day.      scopolamine (TRANSDERM-SCOP) 1.5 mg (1 mg over 3 days) pt3d 1 Patch by TransDERmal route every seventy-two (72) hours as needed. Qty: 10 Patch, Refills: 1      magnesium oxide (MAG-OX) 400 mg tablet Take 1 Tab by mouth two (2) times a day. Indications: HYPOMAGNESEMIA  Qty: 60 Tab, Refills: 2    Associated Diagnoses: Ovarian cancer, unspecified laterality ([facility] [address] Hypomagnesemia      sucralfate (CARAFATE) 100 mg/mL suspension Take 5 mL by mouth four (4) times daily. Qty: 414 mL, Refills: 3      lidocaine-prilocaine (EMLA) topical cream Apply small amount over port area and cover with a band aid 1 hour before chemo treatment  Qty: 30 g, Refills: 3           Activity: no driving while on analgesics  Keep as active as possible  Stool softner/Miralax/reglan prn for constipation  Diet: Resume previous Diet and Encourage fluids      Follow-up with Dr. Shon Kahn on 9/26/2017 as previously scheduled.   She will call if she needs to be seen any sooner    Signed:  Navya Valenzuela NP  9/18/2017/1:51 PM

## 2017-09-19 ENCOUNTER — TELEPHONE (OUTPATIENT)
Dept: GYNECOLOGY | Age: 57
End: 2017-09-19

## 2017-09-19 NOTE — TELEPHONE ENCOUNTER
Spoke with pt and she would like to see Dr. Sandra Souza on 9/26/17 in AM and go for labs/c7d1 chemo afterwards, so she stays on schedule. I will try to arrange this and let her know.

## 2017-09-21 RX ORDER — DEXAMETHASONE SODIUM PHOSPHATE 4 MG/ML
8 INJECTION, SOLUTION INTRA-ARTICULAR; INTRALESIONAL; INTRAMUSCULAR; INTRAVENOUS; SOFT TISSUE ONCE
Status: COMPLETED | OUTPATIENT
Start: 2017-09-26 | End: 2017-09-26

## 2017-09-21 RX ORDER — GRANISETRON HYDROCHLORIDE 1 MG/ML
1 INJECTION INTRAVENOUS ONCE
Status: COMPLETED | OUTPATIENT
Start: 2017-09-26 | End: 2017-09-26

## 2017-09-26 ENCOUNTER — HOSPITAL ENCOUNTER (OUTPATIENT)
Dept: INFUSION THERAPY | Age: 57
Discharge: HOME | End: 2017-09-26
Payer: OTHER GOVERNMENT

## 2017-09-26 ENCOUNTER — OFFICE VISIT (OUTPATIENT)
Dept: GYNECOLOGY | Age: 57
End: 2017-09-26

## 2017-09-26 VITALS
BODY MASS INDEX: 18.78 KG/M2 | WEIGHT: 106 LBS | SYSTOLIC BLOOD PRESSURE: 98 MMHG | HEIGHT: 63 IN | DIASTOLIC BLOOD PRESSURE: 66 MMHG

## 2017-09-26 VITALS
SYSTOLIC BLOOD PRESSURE: 106 MMHG | OXYGEN SATURATION: 99 % | HEART RATE: 66 BPM | BODY MASS INDEX: 18.78 KG/M2 | DIASTOLIC BLOOD PRESSURE: 59 MMHG | RESPIRATION RATE: 18 BRPM | TEMPERATURE: 98 F | HEIGHT: 63 IN | WEIGHT: 106 LBS

## 2017-09-26 DIAGNOSIS — F41.8 ANXIETY ABOUT HEALTH: ICD-10-CM

## 2017-09-26 DIAGNOSIS — D70.1 CHEMOTHERAPY-INDUCED NEUTROPENIA (HCC): ICD-10-CM

## 2017-09-26 DIAGNOSIS — D64.81 ANEMIA DUE TO CHEMOTHERAPY: ICD-10-CM

## 2017-09-26 DIAGNOSIS — T45.1X5A CHEMOTHERAPY-INDUCED NEUTROPENIA (HCC): ICD-10-CM

## 2017-09-26 DIAGNOSIS — C56.9 OVARIAN CANCER, UNSPECIFIED LATERALITY (HCC): Primary | ICD-10-CM

## 2017-09-26 DIAGNOSIS — T45.1X5A ANEMIA DUE TO CHEMOTHERAPY: ICD-10-CM

## 2017-09-26 DIAGNOSIS — E46 PROTEIN CALORIE MALNUTRITION (HCC): ICD-10-CM

## 2017-09-26 DIAGNOSIS — C80.0 CARCINOMATOSIS (HCC): ICD-10-CM

## 2017-09-26 DIAGNOSIS — C78.6 CARCINOMATOSIS PERITONEI (HCC): ICD-10-CM

## 2017-09-26 DIAGNOSIS — G89.3 CANCER ASSOCIATED PAIN: ICD-10-CM

## 2017-09-26 LAB
ALBUMIN SERPL-MCNC: 3.3 G/DL (ref 3.5–5)
ALBUMIN/GLOB SERPL: 0.8 {RATIO} (ref 1.1–2.2)
ALP SERPL-CCNC: 124 U/L (ref 45–117)
ALT SERPL-CCNC: 16 U/L (ref 12–78)
ANION GAP SERPL CALC-SCNC: 6 MMOL/L (ref 5–15)
APPEARANCE UR: CLEAR
AST SERPL-CCNC: 20 U/L (ref 15–37)
BACTERIA URNS QL MICRO: NEGATIVE /HPF
BASO+EOS+MONOS # BLD AUTO: 0.6 K/UL (ref 0.2–1.2)
BASO+EOS+MONOS # BLD AUTO: 7 % (ref 3.2–16.9)
BILIRUB SERPL-MCNC: 0.2 MG/DL (ref 0.2–1)
BILIRUB UR QL: NEGATIVE
BUN SERPL-MCNC: 28 MG/DL (ref 6–20)
BUN/CREAT SERPL: 26 (ref 12–20)
CALCIUM SERPL-MCNC: 8.7 MG/DL (ref 8.5–10.1)
CHLORIDE SERPL-SCNC: 96 MMOL/L (ref 97–108)
CO2 SERPL-SCNC: 28 MMOL/L (ref 21–32)
COLOR UR: ABNORMAL
CREAT SERPL-MCNC: 1.08 MG/DL (ref 0.55–1.02)
DIFFERENTIAL METHOD BLD: ABNORMAL
EPITH CASTS URNS QL MICRO: ABNORMAL /LPF
ERYTHROCYTE [DISTWIDTH] IN BLOOD BY AUTOMATED COUNT: 15.9 % (ref 11.8–15.8)
GLOBULIN SER CALC-MCNC: 4.2 G/DL (ref 2–4)
GLUCOSE SERPL-MCNC: 86 MG/DL (ref 65–100)
GLUCOSE UR STRIP.AUTO-MCNC: NEGATIVE MG/DL
HCT VFR BLD AUTO: 31.7 % (ref 35–47)
HGB BLD-MCNC: 10.3 G/DL (ref 11.5–16)
HGB UR QL STRIP: NEGATIVE
KETONES UR QL STRIP.AUTO: NEGATIVE MG/DL
LEUKOCYTE ESTERASE UR QL STRIP.AUTO: NEGATIVE
LYMPHOCYTES # BLD: 2.1 K/UL (ref 0.8–3.5)
LYMPHOCYTES NFR BLD: 26 % (ref 12–49)
MCH RBC QN AUTO: 31.4 PG (ref 26–34)
MCHC RBC AUTO-ENTMCNC: 32.5 G/DL (ref 30–36.5)
MCV RBC AUTO: 96.6 FL (ref 80–99)
NEUTS SEG # BLD: 5.5 K/UL (ref 1.8–8)
NEUTS SEG NFR BLD: 67 % (ref 32–75)
NITRITE UR QL STRIP.AUTO: NEGATIVE
PH UR STRIP: 6 [PH] (ref 5–8)
PLATELET # BLD AUTO: 416 K/UL (ref 150–400)
POTASSIUM SERPL-SCNC: 4.5 MMOL/L (ref 3.5–5.1)
PROT SERPL-MCNC: 7.5 G/DL (ref 6.4–8.2)
PROT UR STRIP-MCNC: ABNORMAL MG/DL
RBC # BLD AUTO: 3.28 M/UL (ref 3.8–5.2)
RBC #/AREA URNS HPF: ABNORMAL /HPF (ref 0–5)
SODIUM SERPL-SCNC: 130 MMOL/L (ref 136–145)
SP GR UR REFRACTOMETRY: 1.02 (ref 1–1.03)
UA: UC IF INDICATED,UAUC: ABNORMAL
UROBILINOGEN UR QL STRIP.AUTO: 0.2 EU/DL (ref 0.2–1)
WBC # BLD AUTO: 8.2 K/UL (ref 3.6–11)
WBC URNS QL MICRO: ABNORMAL /HPF (ref 0–4)

## 2017-09-26 PROCEDURE — 80053 COMPREHEN METABOLIC PANEL: CPT | Performed by: OBSTETRICS & GYNECOLOGY

## 2017-09-26 PROCEDURE — 74011000258 HC RX REV CODE- 258: Performed by: OBSTETRICS & GYNECOLOGY

## 2017-09-26 PROCEDURE — 86304 IMMUNOASSAY TUMOR CA 125: CPT | Performed by: OBSTETRICS & GYNECOLOGY

## 2017-09-26 PROCEDURE — 74011250636 HC RX REV CODE- 250/636: Performed by: OBSTETRICS & GYNECOLOGY

## 2017-09-26 PROCEDURE — 85025 COMPLETE CBC W/AUTO DIFF WBC: CPT | Performed by: OBSTETRICS & GYNECOLOGY

## 2017-09-26 PROCEDURE — 36415 COLL VENOUS BLD VENIPUNCTURE: CPT | Performed by: OBSTETRICS & GYNECOLOGY

## 2017-09-26 PROCEDURE — 96366 THER/PROPH/DIAG IV INF ADDON: CPT

## 2017-09-26 PROCEDURE — 96417 CHEMO IV INFUS EACH ADDL SEQ: CPT

## 2017-09-26 PROCEDURE — 96361 HYDRATE IV INFUSION ADD-ON: CPT

## 2017-09-26 PROCEDURE — 96413 CHEMO IV INFUSION 1 HR: CPT

## 2017-09-26 PROCEDURE — 74011000250 HC RX REV CODE- 250: Performed by: OBSTETRICS & GYNECOLOGY

## 2017-09-26 PROCEDURE — 96375 TX/PRO/DX INJ NEW DRUG ADDON: CPT

## 2017-09-26 PROCEDURE — 77030012965 HC NDL HUBR BBMI -A

## 2017-09-26 PROCEDURE — 81001 URINALYSIS AUTO W/SCOPE: CPT | Performed by: OBSTETRICS & GYNECOLOGY

## 2017-09-26 PROCEDURE — 74011250636 HC RX REV CODE- 250/636

## 2017-09-26 RX ORDER — SODIUM CHLORIDE 0.9 % (FLUSH) 0.9 %
10-40 SYRINGE (ML) INJECTION AS NEEDED
Status: ACTIVE | OUTPATIENT
Start: 2017-09-26 | End: 2017-09-27

## 2017-09-26 RX ORDER — SODIUM CHLORIDE 9 MG/ML
25 INJECTION, SOLUTION INTRAVENOUS AS NEEDED
Status: DISPENSED | OUTPATIENT
Start: 2017-09-26 | End: 2017-09-27

## 2017-09-26 RX ORDER — HEPARIN 100 UNIT/ML
500 SYRINGE INTRAVENOUS AS NEEDED
Status: ACTIVE | OUTPATIENT
Start: 2017-09-26 | End: 2017-09-27

## 2017-09-26 RX ORDER — SODIUM CHLORIDE 9 MG/ML
10 INJECTION INTRAMUSCULAR; INTRAVENOUS; SUBCUTANEOUS AS NEEDED
Status: ACTIVE | OUTPATIENT
Start: 2017-09-26 | End: 2017-09-27

## 2017-09-26 RX ADMIN — FAMOTIDINE 20 MG: 10 INJECTION INTRAVENOUS at 13:01

## 2017-09-26 RX ADMIN — Medication 20 ML: at 10:11

## 2017-09-26 RX ADMIN — Medication 10 ML: at 15:55

## 2017-09-26 RX ADMIN — DEXAMETHASONE SODIUM PHOSPHATE 8 MG: 4 INJECTION, SOLUTION INTRA-ARTICULAR; INTRALESIONAL; INTRAMUSCULAR; INTRAVENOUS; SOFT TISSUE at 13:10

## 2017-09-26 RX ADMIN — SODIUM CHLORIDE 25 ML/HR: 900 INJECTION, SOLUTION INTRAVENOUS at 12:15

## 2017-09-26 RX ADMIN — GRANISETRON HYDROCHLORIDE 1 MG: 1 INJECTION INTRAVENOUS at 13:05

## 2017-09-26 RX ADMIN — SODIUM CHLORIDE 150 MG: 900 INJECTION, SOLUTION INTRAVENOUS at 13:52

## 2017-09-26 RX ADMIN — SODIUM CHLORIDE 1200 MG: 900 INJECTION, SOLUTION INTRAVENOUS at 14:25

## 2017-09-26 RX ADMIN — BEVACIZUMAB 750 MG: 400 INJECTION, SOLUTION INTRAVENOUS at 15:05

## 2017-09-26 RX ADMIN — SODIUM CHLORIDE 2000 ML: 900 INJECTION, SOLUTION INTRAVENOUS at 10:15

## 2017-09-26 RX ADMIN — SODIUM CHLORIDE 10 ML: 9 INJECTION INTRAMUSCULAR; INTRAVENOUS; SUBCUTANEOUS at 10:08

## 2017-09-26 RX ADMIN — Medication 500 UNITS: at 15:55

## 2017-09-26 NOTE — PROGRESS NOTES
Troy Byers. Flore[place], HAYDER      Date of visit: 9/26/2017   MAHSA Chase is a 61 y.o.  postmenopausal female with a diagnosis of stage IV ovarian cancer. She underwent exploratory laparotomy with suboptimal debulking. She had extensive disease. She was readmitted about a week later with obstruction and subsequently had a cecal perforation, requiring resection, end ileostomy, and mucous fistula. During the hospitalization she was also diagnosed with pulmonary embolus and had chest tubes placed for bilateral pleural effusions. She had a prolonged hospital course and actually received a dose of cisplatin chemotherapy while in the hospital to help dry up the effusions. She completed 6 cycles of Taxol/Carboplatin chemotherapy following her initial debulking. She did relatively well, considering she had an ileostomy to deal with during chemotherapy. I took her to the OR on 4/29/16 for interval debulking and reversal of her ileostomy. She had extensive disease, but we were able to resect the majority of her disease. One week later she developed an anastomotic leak requiring reexploration and recreation of an ileostomy. She had another prolonged hospitalization, but recovered well since surgery. We then reinitiated Taxol/Carboplatin chemotherapy, but did reduce the dose of Taxol to 135 mg/m2. She completed 4 mores cycles. Her CA-125 normalized and we stopped treatment.      On 2/7, she presented to Dr. Robert Edwards office with her  to discuss her follow up/surveillance CT scan. Unfortunately, it demonstrated progression of disease, although minimally worse than her prior scan 2 months previously. She was actually feeling better than she did at that time. After review of scans and disease, the decision was made to begin Doxil/Avastin Q28 days for 6 cycles. She began this on 2/27/2017.  In April 2017, she had completed 3 cycles of Doxil/Avastin and had CT scans at that time showed she had evidence of recurrent disease. She was switched at that time to [facility] (formerly [facility]) and began this regimen on 5/23/2017 and has completed 4 cycles of this with the last being on 8/1/2017. With this regimen, her  has decreased to 65 on 7/20/17 from 145.6 on 5/16/2017. After her 9/12/17 treatment, she again had a bowel obstruction and required admission to the hospital with NG tube placement for decompression. It did resolve with conservative management  At this time, she did have a CT scan and it showed no significant change in disease so it was decided to proceed with current treatment as it seemed to be holding disease     Subjective: Today, pt presents for continuation of her Gemzar/Avastin (C7D1). She says she is feeling good other than some increase in pain. She did have a lot of family visit and she said it was a wonderful time, but not sure this is because of so much activity or just her disease. She says her ostomy has been putting out mostly liquid, but she has had to use the Reglan almost daily with Miralax. She says this morning there is nothing in her bag, only small amount of gas present. Other than this, she says she is feeling good.  is present and supportive as always. Current Outpatient Prescriptions   Medication Sig    fentaNYL (DURAGESIC) 100 mcg/hr PATCH 1 Patch by TransDERmal route every seventy-two (72) hours. Max Daily Amount: 1 Patch.  promethazine (PHENERGAN) 6.25 mg/5 mL syrup Take 20 mL by mouth four (4) times daily as needed for Nausea.  HYDROmorphone (DILAUDID) 1 mg/mL liqd oral solution Take 4 mL by mouth every four (4) hours as needed. Max Daily Amount: 24 mg.    scopolamine (TRANSDERM-SCOP) 1.5 mg (1 mg over 3 days) pt3d 1 Patch by TransDERmal route every seventy-two (72) hours as needed.  NEXIUM PACKET 40 mg granules for oral suspension two (2) times a day.     metoclopramide (REGLAN) 5 mg/5 mL syrup Take 10 mL by mouth four (4) times daily as needed. Take before meals as needed    magnesium oxide (MAG-OX) 400 mg tablet Take 1 Tab by mouth two (2) times a day. Indications: HYPOMAGNESEMIA    sucralfate (CARAFATE) 100 mg/mL suspension Take 5 mL by mouth four (4) times daily.  polyethylene glycol (MIRALAX) 17 gram/dose powder Take 17 g by mouth daily as needed.  apixaban (ELIQUIS) 2.5 mg tablet Take 1 Tab by mouth two (2) times a day.  zolpidem (AMBIEN) 10 mg tablet Take 1 Tab by mouth nightly as needed for Sleep. Max Daily Amount: 10 mg.    ondansetron (ZOFRAN ODT) 4 mg disintegrating tablet Take 1 Tab by mouth every eight (8) hours as needed for Nausea.  metoprolol (LOPRESSOR) 100 mg tablet Take 50 mg by mouth two (2) times a day.  lidocaine-prilocaine (EMLA) topical cream Apply small amount over port area and cover with a band aid 1 hour before chemo treatment    Calcium-Vitamin D3-Vitamin K [id number] mg-unit-mcg chew Take 1 Tab by mouth two (2) times a day. No current facility-administered medications for this visit. Facility-Administered Medications Ordered in Other Visits   Medication Dose Route Frequency    dexamethasone (DECADRON) 4 mg/mL injection 8 mg  8 mg IntraVENous ONCE    famotidine (PF) (PEPCID) 20 mg in sodium chloride 0.9 % 10 mL injection  20 mg IntraVENous ONCE    fosaprepitant (EMEND) 150 mg in 0.9% sodium chloride 150 mL IVPB  150 mg IntraVENous ONCE    gemcitabine (GEMZAR) 1,200 mg in 0.9% sodium chloride 250 mL, overfill volume 25 mL chemo infusion  1,200 mg IntraVENous ONCE    granisetron (KYTRIL) injection 1 mg  1 mg IntraVENous ONCE    sodium chloride 0.9 % bolus infusion 2,000 mL  2,000 mL IntraVENous ONCE    bevacizumab (AVASTIN) 750 mg in 0.9% sodium chloride 100 mL, overfill volume 10 mL IVPB  750 mg IntraVENous ONCE        Review of Systems:  General: Wt stable, fatigue as above.   HEENT: Denies visual changes, dysphagia or headache  Resp: Denies SOB, DUDLEY, wheezing or cough  CV: Denies CP, palpitations  GI/: Denies N/V, diarrhea, constipation or dysuria  MuskSkel: Denies muscle ache or joint pain  Neuro: Denies dizzyness or syncope    Objective:     Visit Vitals    BP 98/66 (BP 1 Location: Right arm, BP Patient Position: Sitting)    Ht 5' 2.99\" (1.6 m)    Wt 106 lb (48.1 kg)    BMI 18.78 kg/m2         Physical Exam:  General: A&O X3 in NAD and in good spirits. Still slightly tired appearing  HEENT: Sclera anicteric, Mucosa pale, moist  Neck: No JVD without adenopathy appreciated  Port site without redness, tenderness or swelling. EMLA cream in place under band aid   Heart: Regular without M/R/G  Lungs: CTA Bilat without wheezing or rales   Abd: Remains slightly firm with bowel sounds audible . No tenderness to palpation. Ostomy bag remians empty without gas at present.    Ext: Without edema and + pedal pulses bilat  Neuro: grossly intact        Assessment:     Patient Active Problem List   Diagnosis Code    Malignant neoplasm of both ovaries ([address] C56.1, C56.2    Carcinomatosis peritonei ([address] C78.6, C80.1    Small bowel obstruction (HCC) K56.69    Ileostomy in place ([address] Z93.2    Anemia due to chemotherapy D64.81, T45.1X5A    CINV (chemotherapy-induced nausea and vomiting) R11.2, T45.1X5A    Cancer associated pain G89.3    Generalized abdominal pain R10.84    Anxiety about health F41.8    Ileus (HCC) K56.7    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    Hx of pulmonary embolus Z86.711    Dehydration E86.0    Chemotherapy-induced neutropenia (HCC) D70.1    Anticoagulation adequate with anticoagulant therapy Z79.01    Counseling regarding end of life decision making Z71.89    Bowel obstruction (HCC) K56.60    Protein calorie malnutrition (HCC) E46    Malignant ascites R18.0    Hydronephrosis of right kidney N13.30    Isolated proteinuria R80.0    Erosive esophagitis K22.10    Ulcerative esophagitis K22.10    Hypomagnesemia E83.42    Abdominal pain R10.9    Hydronephrosis of left kidney N13.30    Ovarian cancer (HCC) C56.9    Nausea & vomiting R11.2    Carcinomatosis (HCC) C80.0    Intractable vomiting with nausea R11.2    Febrile illness R50.9    Partial bowel obstruction (HCC) K56.69    Nausea and vomiting R11.2         Plan:   She will take MOM today and if not effective, will call and let us know. May go to Citrate of magnesia then in small steps  She will keep a journal of pain medication. When and how much used to \"get a better idea of home much I am taking and need\". Says she does not need a re-fill today  Will draw labs and if all good, will proceed with treatment today and also with added hydration. Again discussed use of cancer resource center and use of message/meditation as pt is feeling \"consumed\" with worrying  about wether or not her ostomy is going to stop functioning. She said she will keep it in the back of her mind. We will review labs and treat accordingly. Hydration encouraged  Encouraged to call for any concerns or questions  Dr. Baljit Fraser in to see pt and discuss plan with pt and her .   Jeanenne Gottron, HAYDER

## 2017-09-26 NOTE — PATIENT INSTRUCTIONS
Constipation: Care Instructions  Your Care Instructions  Constipation means that you have a hard time passing stools (bowel movements). People pass stools from 3 times a day to once every 3 days. What is normal for you may be different. Constipation may occur with pain in the rectum and cramping. The pain may get worse when you try to pass stools. Sometimes there are small amounts of bright red blood on toilet paper or the surface of stools. This is because of enlarged veins near the rectum (hemorrhoids). A few changes in your diet and lifestyle may help you avoid ongoing constipation. Your doctor may also prescribe medicine to help loosen your stool. Some medicines can cause constipation. These include pain medicines and antidepressants. Tell your doctor about all the medicines you take. Your doctor may want to make a medicine change to ease your symptoms. Follow-up care is a key part of your treatment and safety. Be sure to make and go to all appointments, and call your doctor if you are having problems. It's also a good idea to know your test results and keep a list of the medicines you take. How can you care for yourself at home? · Drink plenty of fluids, enough so that your urine is light yellow or clear like water. If you have kidney, heart, or liver disease and have to limit fluids, talk with your doctor before you increase the amount of fluids you drink. · Include high-fiber foods in your diet each day. These include fruits, vegetables, beans, and whole grains. · Get at least 30 minutes of exercise on most days of the week. Walking is a good choice. You also may want to do other activities, such as running, swimming, cycling, or playing tennis or team sports. · Take a fiber supplement, such as Citrucel or Metamucil, every day. Read and follow all instructions on the label. · Schedule time each day for a bowel movement. A daily routine may help.  Take your time having your bowel movement. · Support your feet with a small step stool when you sit on the toilet. This helps flex your hips and places your pelvis in a squatting position. · Your doctor may recommend an over-the-counter laxative to relieve your constipation. Examples are Milk of Magnesia and MiraLax. Read and follow all instructions on the label. Do not use laxatives on a long-term basis. When should you call for help? Call your doctor now or seek immediate medical care if:  · You have new or worse belly pain. · You have new or worse nausea or vomiting. · You have blood in your stools. Watch closely for changes in your health, and be sure to contact your doctor if:  · Your constipation is getting worse. · You do not get better as expected. Where can you learn more? Go to http://narda-manuel.info/. Enter 21 [phone] in the search box to learn more about \"Constipation: Care Instructions. \"  Current as of: March 20, 2017  Content Version: 11.3  © 6272-6663 [employer]. Care instructions adapted under license by [employer] (which disclaims liability or warranty for this information). If you have questions about a medical condition or this instruction, always ask your healthcare professional. [place] [address] any warranty or liability for your use of this information.

## 2017-09-26 NOTE — PROGRESS NOTES
C7D1 Gemzar/Avastin/hydration and labs today in opic. Pt c/o upper abdominal pain today. She has taken Dilaudid today.

## 2017-09-26 NOTE — PROGRESS NOTES
1015 Pt admit to [facility] for Cycle 7 D 1 Gemzar/Avastin ambulatory in stable condition. Accompanied by supportive , Dariela Mccormick. Seen at MD office this AM prior to admit. Assessment completed, functioning ileostomy, continues to have neuropathy, nausea, tinnitis in left ear, fatigue, intermittent cramping abdominal pain. No new concerns voiced. Port accessed with positive blood return. Labs drawn per order. chemo doses checked. Normal Saline 2 Liters infused per order. 1145 labs resulted, contacted Magdalene Wells NP to review. 1244 follow up call to NP placed, need OK to treat, patient states that she is still uncomfortable after taking dose of 30 ML (1/2 dose) milk of magnesia and dilaudid. [address], [address] ordered from pharmacy, patient taking additional 15 ML of MOM, [facility] Saline at [facility], premeds given      Visit Vitals    /63 (BP 1 Location: Right arm, BP Patient Position: Sitting)    Pulse 66    Temp 97.8 °F (36.6 °C)    Resp 18    Ht 5' 3\" (1.6 m)    Wt 48.1 kg (106 lb)    SpO2 99%    BMI 18.78 kg/m2       Medications:  Normal Saline 2 Liters  Pepcid 20 MG  Kytril 1 MG  Decadron 8 MG  Emend 150 MG  Gemzar 1200 MG  Avastin 750 MG    1555 Pt tolerated treatment well. Port maintained positive blood return throughout treatment, flushed with positive blood return at conclusion and heparinized per protocol. [place] removed. D/c home ambulatory, accompanied by , abdominal pain continues, ostomy output remains small and liquid brown, verbalized understanding to contact provider if constipation does not resolve or symptoms worsen. Pt aware of next appointment scheduled for 10/3/17.     Recent Results (from the past 12 hour(s))   CBC WITH 3 PART DIFF    Collection Time: 09/26/17 10:18 AM   Result Value Ref Range    WBC 8.2 3.6 - 11.0 K/uL    RBC 3.28 (L) 3.80 - 5.20 M/uL    HGB 10.3 (L) 11.5 - 16.0 g/dL    HCT 31.7 (L) 35.0 - 47.0 %    MCV 96.6 80.0 - 99.0 FL    MCH 31.4 26.0 - 34.0 PG    MCHC 32.5 30.0 - 36.5 g/dL    RDW 15.9 (H) 11.8 - 15.8 %    PLATELET 757 (H) 771 - 400 K/uL    NEUTROPHILS 67 32 - 75 %    MIXED CELLS 7 3.2 - 16.9 %    LYMPHOCYTES 26 12 - 49 %    ABS. NEUTROPHILS 5.5 1.8 - 8.0 K/UL    ABS. MIXED CELLS 0.6 0.2 - 1.2 K/uL    ABS. LYMPHOCYTES 2.1 0.8 - 3.5 K/UL    DF AUTOMATED     METABOLIC PANEL, COMPREHENSIVE    Collection Time: 09/26/17 10:18 AM   Result Value Ref Range    Sodium 130 (L) 136 - 145 mmol/L    Potassium 4.5 3.5 - 5.1 mmol/L    Chloride 96 (L) 97 - 108 mmol/L    CO2 28 21 - 32 mmol/L    Anion gap 6 5 - 15 mmol/L    Glucose 86 65 - 100 mg/dL    BUN 28 (H) 6 - 20 MG/DL    Creatinine 1.08 (H) 0.55 - 1.02 MG/DL    BUN/Creatinine ratio 26 (H) 12 - 20      GFR est AA >60 >60 ml/min/1.73m2    GFR est non-AA 52 (L) >60 ml/min/1.73m2    Calcium 8.7 8.5 - 10.1 MG/DL    Bilirubin, total 0.2 0.2 - 1.0 MG/DL    ALT (SGPT) 16 12 - 78 U/L    AST (SGOT) 20 15 - 37 U/L    Alk.  phosphatase 124 (H) 45 - 117 U/L    Protein, total 7.5 6.4 - 8.2 g/dL    Albumin 3.3 (L) 3.5 - 5.0 g/dL    Globulin 4.2 (H) 2.0 - 4.0 g/dL    A-G Ratio 0.8 (L) 1.1 - 2.2     URINALYSIS W/ REFLEX CULTURE    Collection Time: 09/26/17 10:18 AM   Result Value Ref Range    Color YELLOW/STRAW      Appearance CLEAR CLEAR      Specific gravity 1.023 1.003 - 1.030      pH (UA) 6.0 5.0 - 8.0      Protein TRACE (A) NEG mg/dL    Glucose NEGATIVE  NEG mg/dL    Ketone NEGATIVE  NEG mg/dL    Bilirubin NEGATIVE  NEG      Blood NEGATIVE  NEG      Urobilinogen 0.2 0.2 - 1.0 EU/dL    Nitrites NEGATIVE  NEG      Leukocyte Esterase NEGATIVE  NEG      WBC 0-4 0 - 4 /hpf    RBC 0-5 0 - 5 /hpf    Epithelial cells FEW FEW /lpf    Bacteria NEGATIVE  NEG /hpf    UA:UC IF INDICATED CULTURE NOT INDICATED BY UA RESULT CNI

## 2017-09-27 LAB — CANCER AG125 SERPL-ACNC: 128 U/ML (ref 0–30)

## 2017-09-28 ENCOUNTER — TELEPHONE (OUTPATIENT)
Dept: GYNECOLOGY | Age: 57
End: 2017-09-28

## 2017-09-28 RX ORDER — GRANISETRON HYDROCHLORIDE 1 MG/ML
1 INJECTION INTRAVENOUS ONCE
Status: ACTIVE | OUTPATIENT
Start: 2017-10-03 | End: 2017-10-04

## 2017-09-28 RX ORDER — DEXAMETHASONE SODIUM PHOSPHATE 4 MG/ML
8 INJECTION, SOLUTION INTRA-ARTICULAR; INTRALESIONAL; INTRAMUSCULAR; INTRAVENOUS; SOFT TISSUE ONCE
Status: ACTIVE | OUTPATIENT
Start: 2017-10-03 | End: 2017-10-04

## 2017-10-02 ENCOUNTER — TELEPHONE (OUTPATIENT)
Dept: GYNECOLOGY | Age: 57
End: 2017-10-02

## 2017-10-03 ENCOUNTER — OFFICE VISIT (OUTPATIENT)
Dept: GYNECOLOGY | Age: 57
End: 2017-10-03

## 2017-10-03 ENCOUNTER — HOSPITAL ENCOUNTER (OUTPATIENT)
Dept: INFUSION THERAPY | Age: 57
Discharge: HOME | End: 2017-10-03
Payer: OTHER GOVERNMENT

## 2017-10-03 VITALS
DIASTOLIC BLOOD PRESSURE: 86 MMHG | HEART RATE: 72 BPM | BODY MASS INDEX: 17.89 KG/M2 | WEIGHT: 101 LBS | HEIGHT: 63 IN | SYSTOLIC BLOOD PRESSURE: 126 MMHG

## 2017-10-03 VITALS
TEMPERATURE: 97.7 F | RESPIRATION RATE: 18 BRPM | HEART RATE: 72 BPM | DIASTOLIC BLOOD PRESSURE: 83 MMHG | SYSTOLIC BLOOD PRESSURE: 144 MMHG | OXYGEN SATURATION: 95 %

## 2017-10-03 DIAGNOSIS — C56.9 MALIGNANT NEOPLASM OF OVARY, UNSPECIFIED LATERALITY (HCC): Primary | ICD-10-CM

## 2017-10-03 DIAGNOSIS — E44.0 MODERATE PROTEIN-CALORIE MALNUTRITION (HCC): ICD-10-CM

## 2017-10-03 LAB
ALBUMIN SERPL-MCNC: 3.3 G/DL (ref 3.5–5)
ALBUMIN/GLOB SERPL: 0.7 {RATIO} (ref 1.1–2.2)
ALP SERPL-CCNC: 160 U/L (ref 45–117)
ALT SERPL-CCNC: 54 U/L (ref 12–78)
ANION GAP SERPL CALC-SCNC: 10 MMOL/L (ref 5–15)
AST SERPL-CCNC: 52 U/L (ref 15–37)
BASOPHILS # BLD: 0 K/UL (ref 0–0.1)
BASOPHILS NFR BLD: 0 % (ref 0–1)
BILIRUB SERPL-MCNC: 0.2 MG/DL (ref 0.2–1)
BUN SERPL-MCNC: 28 MG/DL (ref 6–20)
BUN/CREAT SERPL: 27 (ref 12–20)
CALCIUM SERPL-MCNC: 10.3 MG/DL (ref 8.5–10.1)
CHLORIDE SERPL-SCNC: 89 MMOL/L (ref 97–108)
CO2 SERPL-SCNC: 36 MMOL/L (ref 21–32)
CREAT SERPL-MCNC: 1.02 MG/DL (ref 0.55–1.02)
EOSINOPHIL # BLD: 0 K/UL (ref 0–0.4)
EOSINOPHIL NFR BLD: 0 % (ref 0–7)
ERYTHROCYTE [DISTWIDTH] IN BLOOD BY AUTOMATED COUNT: 15.4 % (ref 11.5–14.5)
GLOBULIN SER CALC-MCNC: 5 G/DL (ref 2–4)
GLUCOSE SERPL-MCNC: 106 MG/DL (ref 65–100)
HCT VFR BLD AUTO: 33.7 % (ref 35–47)
HGB BLD-MCNC: 11 G/DL (ref 11.5–16)
LYMPHOCYTES # BLD: 2.2 K/UL (ref 0.8–3.5)
LYMPHOCYTES NFR BLD: 39 % (ref 12–49)
MAGNESIUM SERPL-MCNC: 1.9 MG/DL (ref 1.6–2.4)
MCH RBC QN AUTO: 30.7 PG (ref 26–34)
MCHC RBC AUTO-ENTMCNC: 32.6 G/DL (ref 30–36.5)
MCV RBC AUTO: 94.1 FL (ref 80–99)
MONOCYTES # BLD: 0.7 K/UL (ref 0–1)
MONOCYTES NFR BLD: 12 % (ref 5–13)
NEUTS SEG # BLD: 2.8 K/UL (ref 1.8–8)
NEUTS SEG NFR BLD: 49 % (ref 32–75)
PHOSPHATE SERPL-MCNC: 3.5 MG/DL (ref 2.6–4.7)
PLATELET # BLD AUTO: 325 K/UL (ref 150–400)
POTASSIUM SERPL-SCNC: 3.4 MMOL/L (ref 3.5–5.1)
PROT SERPL-MCNC: 8.3 G/DL (ref 6.4–8.2)
RBC # BLD AUTO: 3.58 M/UL (ref 3.8–5.2)
SODIUM SERPL-SCNC: 135 MMOL/L (ref 136–145)
WBC # BLD AUTO: 5.7 K/UL (ref 3.6–11)

## 2017-10-03 PROCEDURE — 74011250636 HC RX REV CODE- 250/636: Performed by: NURSE PRACTITIONER

## 2017-10-03 PROCEDURE — 83735 ASSAY OF MAGNESIUM: CPT | Performed by: OBSTETRICS & GYNECOLOGY

## 2017-10-03 PROCEDURE — 96360 HYDRATION IV INFUSION INIT: CPT

## 2017-10-03 PROCEDURE — 36591 DRAW BLOOD OFF VENOUS DEVICE: CPT

## 2017-10-03 PROCEDURE — 36415 COLL VENOUS BLD VENIPUNCTURE: CPT | Performed by: OBSTETRICS & GYNECOLOGY

## 2017-10-03 PROCEDURE — 74011000250 HC RX REV CODE- 250: Performed by: NURSE PRACTITIONER

## 2017-10-03 PROCEDURE — 96361 HYDRATE IV INFUSION ADD-ON: CPT

## 2017-10-03 PROCEDURE — 84100 ASSAY OF PHOSPHORUS: CPT | Performed by: OBSTETRICS & GYNECOLOGY

## 2017-10-03 PROCEDURE — 77030012965 HC NDL HUBR BBMI -A

## 2017-10-03 PROCEDURE — 80053 COMPREHEN METABOLIC PANEL: CPT | Performed by: OBSTETRICS & GYNECOLOGY

## 2017-10-03 PROCEDURE — 74011250636 HC RX REV CODE- 250/636: Performed by: OBSTETRICS & GYNECOLOGY

## 2017-10-03 PROCEDURE — 85025 COMPLETE CBC W/AUTO DIFF WBC: CPT | Performed by: OBSTETRICS & GYNECOLOGY

## 2017-10-03 RX ORDER — SODIUM CHLORIDE 9 MG/ML
10 INJECTION INTRAMUSCULAR; INTRAVENOUS; SUBCUTANEOUS AS NEEDED
Status: ACTIVE | OUTPATIENT
Start: 2017-10-03 | End: 2017-10-04

## 2017-10-03 RX ORDER — SODIUM CHLORIDE 0.9 % (FLUSH) 0.9 %
5-10 SYRINGE (ML) INJECTION AS NEEDED
Status: ACTIVE | OUTPATIENT
Start: 2017-10-03 | End: 2017-10-04

## 2017-10-03 RX ORDER — HEPARIN 100 UNIT/ML
500 SYRINGE INTRAVENOUS AS NEEDED
Status: ACTIVE | OUTPATIENT
Start: 2017-10-03 | End: 2017-10-04

## 2017-10-03 RX ADMIN — SODIUM CHLORIDE 2000 ML: 900 INJECTION, SOLUTION INTRAVENOUS at 14:20

## 2017-10-03 RX ADMIN — Medication 500 UNITS: at 16:20

## 2017-10-03 RX ADMIN — SODIUM CHLORIDE 10 ML: 9 INJECTION, SOLUTION INTRAMUSCULAR; INTRAVENOUS; SUBCUTANEOUS at 16:20

## 2017-10-03 RX ADMIN — Medication 10 ML: at 16:20

## 2017-10-03 NOTE — PROGRESS NOTES
[address] [place] [address] Mathias Moritz [address], [address]  [phone]  MD Elliott Haywood MD  Office Note  Patient ID:  Name:  Steve Ramos  MRN:  [id number]  :  1960/56 y.o. Date:  10/3/2017      HISTORY OF PRESENT ILLNESS:  Steve Ramos is a 61 y.o.  postmenopausal female with a diagnosis of stage IV ovarian cancer. She underwent exploratory laparotomy with suboptimal debulking in 2015. She had extensive disease. She was readmitted about a week later with obstruction and subsequently had a cecal perforation, requiring resection, end ileostomy, and mucous fistula. During the hospitalization she was also diagnosed with pulmonary embolus and had chest tubes placed for bilateral pleural effusions. She had a prolonged hospital course and actually received a dose of cisplatin chemotherapy while in the hospital to help dry up the effusions. She completed 6 cycles of Taxol/Carboplatin chemotherapy following her initial debulking. She  did relatively well, considering she had an ileostomy to deal with during chemotherapy. I took her to the OR on 16 for interval debulking and reversal of her ileostomy. She had extensive disease, but we were able to resect the majority of her disease. One week later she developed an anastomotic leak requiring reexploration and recreation of an ileostomy. She had another prolonged hospitalization, but recovered well since surgery. We then reinitiated Taxol/Carboplatin chemotherapy, but did reduce the dose of Taxol to 135 mg/m2. She completed 4 mores cycles. Her CA-125 normalized and we stopped treatment. She now has evidence of recurrent disease and for a while was receiving Doxil/Avastin. She completed 3 cycles of that regimen. She was admitted to [facility] several time since during that regimen with evidence of partial small bowel obstruction.   The symptoms have resolved each time with conservative management. She had increased ascites on her last scan, but no significant change in her disease otherwise. Her CA-125 also went up. All of this suggests progression of disease. We switched her chemotherapy to [facility]. She has completed 6 cycles so far and is in the middle of her 7th cycle. Her CA-125 has risen over the last few draws. She has been hospitalized almost every cycle for the last few cycles for GI issues. Last week was one of the roughest she has had in the last couple of years. She presents today with her  to discuss options.     Problem List:  Patient Active Problem List    Diagnosis Date Noted    Nausea and vomiting 09/14/2017    Partial bowel obstruction 09/13/2017    Intractable vomiting with nausea 08/10/2017    Febrile illness 08/10/2017    Ovarian cancer ([address] 08/09/2017    Nausea & vomiting 08/09/2017    Carcinomatosis ([address] 08/09/2017    Hydronephrosis of left kidney 06/28/2017    Abdominal pain 06/27/2017    Hypomagnesemia 06/13/2017    Erosive esophagitis 06/07/2017    Ulcerative esophagitis 06/07/2017    Isolated proteinuria 05/25/2017    Malignant ascites 05/08/2017    Hydronephrosis of right kidney 05/08/2017    Protein calorie malnutrition ([address] 04/28/2017    Bowel obstruction 04/27/2017    Counseling regarding end of life decision making 04/25/2017    Anticoagulation adequate with anticoagulant therapy 03/28/2017    Chemotherapy-induced neutropenia (HCC) 03/22/2017    Ileus ([address] 03/21/2017    Thrombosis of pelvic vein 03/21/2017    Hx of pulmonary embolus 03/21/2017    Dehydration 03/21/2017    Cancer associated pain 05/13/2016    Generalized abdominal pain 05/13/2016    Anxiety about health 05/13/2016    CINV (chemotherapy-induced nausea and vomiting) 03/22/2016    Anemia due to chemotherapy 02/22/2016    Ileostomy in place [facility]) 02/15/2016    Carcinomatosis peritonei ([address] 10/29/2015    Small bowel obstruction 10/29/2015    Malignant neoplasm of both ovaries ([address] 10/12/2015     PMH:  Past Medical History:   Diagnosis Date    Abdominal carcinomatosis ([address] 10/2015    Arthritis     left shoulder    BRCA negative 12/2015    Chronic pain     Depression     HX    Environmental allergies     GERD (gastroesophageal reflux disease)     Hypertension     NEW OVER PAST 5-6 MONTHS    Ovarian cancer ([address] 10/2015    serous adenocarcinoma, high grade, stage IIIC    Pulmonary embolism ([address] 10/2015      PSH:  Past Surgical History:   Procedure Laterality Date    CARDIAC SURG PROCEDURE UNLIST  12/11/15    cardiac cath/normal     HX BREAST BIOPSY  1995    benign right breast bx    HX DILATION AND CURETTAGE  2/2011    POLYPECTOMY    HX GI  2015    PERFORATED BOWEL; ILEOSTOMY    HX GYN  10/2015    EXP LAPAROTOMY    HX HEENT  LAST 2005    oral tissue graft X3    HX HEENT  1970    tonsillectomy    HX LAPAROTOMY  10/2015    tumor sampling    HX LAPAROTOMY  4/2016    hysterectomy, BSO, radical debulking    HX LAPAROTOMY  5/2016    resection ileocolic anastomosis, leak, ileostomy    HX OTHER SURGICAL  11/2015    tunneled portacath      Social History:  Social History   Substance Use Topics    Smoking status: Never Smoker    Smokeless tobacco: Never Used    Alcohol use No      Family History:  Family History   Problem Relation Age of Onset    Cancer Maternal Uncle      skin    Hypertension Mother     High Cholesterol Mother     Anxiety Mother     Heart Disease Mother     High Cholesterol Father     Hypertension Father     Stroke Father     Arthritis-osteo Sister     Migraines Sister     Other Sister      FIBROMYALGIA    Depression Brother     Other Brother      DRUG ABUSE HEPATITIS C    Migraines Sister     Arrhythmia Sister     Depression Sister     Lung Disease Paternal Aunt      COPD    Cancer Paternal Uncle      LUNG    Lung Disease Paternal Uncle      COPD    Lung Disease Maternal Grandmother      COPD    Anesth Problems Neg Hx       Medications: (reviewed)  Current Outpatient Prescriptions   Medication Sig    fentaNYL (DURAGESIC) 100 mcg/hr PATCH 1 Patch by TransDERmal route every seventy-two (72) hours. Max Daily Amount: 1 Patch.  promethazine (PHENERGAN) 6.25 mg/5 mL syrup Take 20 mL by mouth four (4) times daily as needed for Nausea.  HYDROmorphone (DILAUDID) 1 mg/mL liqd oral solution Take 4 mL by mouth every four (4) hours as needed. Max Daily Amount: 24 mg.    scopolamine (TRANSDERM-SCOP) 1.5 mg (1 mg over 3 days) pt3d 1 Patch by TransDERmal route every seventy-two (72) hours as needed.  NEXIUM PACKET 40 mg granules for oral suspension two (2) times a day.  metoclopramide (REGLAN) 5 mg/5 mL syrup Take 10 mL by mouth four (4) times daily as needed. Take before meals as needed    sucralfate (CARAFATE) 100 mg/mL suspension Take 5 mL by mouth four (4) times daily.  polyethylene glycol (MIRALAX) 17 gram/dose powder Take 17 g by mouth daily as needed.  apixaban (ELIQUIS) 2.5 mg tablet Take 1 Tab by mouth two (2) times a day.  zolpidem (AMBIEN) 10 mg tablet Take 1 Tab by mouth nightly as needed for Sleep. Max Daily Amount: 10 mg.    ondansetron (ZOFRAN ODT) 4 mg disintegrating tablet Take 1 Tab by mouth every eight (8) hours as needed for Nausea.  Calcium-Vitamin D3-Vitamin K 406-337-33 mg-unit-mcg chew Take 1 Tab by mouth two (2) times a day.  metoprolol (LOPRESSOR) 100 mg tablet Take 50 mg by mouth two (2) times a day.  lidocaine-prilocaine (EMLA) topical cream Apply small amount over port area and cover with a band aid 1 hour before chemo treatment    magnesium oxide (MAG-OX) 400 mg tablet Take 1 Tab by mouth two (2) times a day. Indications: HYPOMAGNESEMIA     No current facility-administered medications for this visit.       Facility-Administered Medications Ordered in Other Visits   Medication Dose Route Frequency    sodium chloride 0.9 % injection 10 mL  10 mL IntraVENous PRN    heparin (porcine) pf 500 Units  500 Units IntraVENous PRN    sodium chloride (NS) flush 5-10 mL  5-10 mL IntraVENous PRN    dexamethasone (DECADRON) 4 mg/mL injection 8 mg  8 mg IntraVENous ONCE    famotidine (PF) (PEPCID) 20 mg in sodium chloride 0.9 % 10 mL injection  20 mg IntraVENous ONCE    fosaprepitant (EMEND) 150 mg in 0.9% sodium chloride 150 mL IVPB  150 mg IntraVENous ONCE    gemcitabine (GEMZAR) 1,200 mg in 0.9% sodium chloride 250 mL, overfill volume 25 mL chemo infusion  1,200 mg IntraVENous ONCE    granisetron (KYTRIL) injection 1 mg  1 mg IntraVENous ONCE    sodium chloride 0.9 % bolus infusion 2,000 mL  2,000 mL IntraVENous ONCE     Allergies: (reviewed)  Allergies   Allergen Reactions    Ativan [Lorazepam] Other (comments)     SEVERE CONFUSION          OBJECTIVE:    Physical Exam:  VITAL SIGNS: Vitals:    10/03/17 1324   BP: 126/86   Pulse: 72   Weight: 101 lb (45.8 kg)   Height: 5' 2.99\" (1.6 m)     Body mass index is 17.9 kg/(m^2). GENERAL AVA: Conversant, alert, oriented. No acute distress. HEENT: HEENT. No thyroid enlargement. No JVD. Neck: Supple without restrictions. RESPIRATORY: Clear to auscultation and percussion to the bases. No CVAT. CARDIOVASC: RRR without murmur/rub. GASTROINT: soft, non-tender, without masses or organomegaly; ileostomy in RLQ   MUSCULOSKEL: no joint tenderness, deformity or swelling   EXTREMITIES: extremities normal, atraumatic, no cyanosis or edema   PELVIC: Deferred   RECTAL: Deferred   JOSE SURVEY: No suspicious lymphadenopathy or edema noted. NEURO: Grossly intact. No acute deficit.        Lab Results   Component Value Date/Time    WBC 8.2 09/26/2017 10:18 AM    HGB 10.3 09/26/2017 10:18 AM    HCT 31.7 09/26/2017 10:18 AM    PLATELET 438 11/00/1988 10:18 AM    MCV 96.6 09/26/2017 10:18 AM     Lab Results   Component Value Date/Time    Sodium 130 09/26/2017 10:18 AM    Potassium 4.5 09/26/2017 10:18 AM    Chloride 96 09/26/2017 10:18 AM    CO2 28 09/26/2017 10:18 AM    Anion gap 6 09/26/2017 10:18 AM    Glucose 86 09/26/2017 10:18 AM    BUN 28 09/26/2017 10:18 AM    Creatinine 1.08 09/26/2017 10:18 AM    BUN/Creatinine ratio 26 09/26/2017 10:18 AM    GFR est AA >60 09/26/2017 10:18 AM    GFR est non-AA 52 09/26/2017 10:18 AM    Calcium 8.7 09/26/2017 10:18 AM       CT of chest/abdomen/pelvis (6/27/17)  LUNG BASES: Clear. INCIDENTALLY IMAGED HEART AND MEDIASTINUM: Unremarkable. LIVER: Small hypodense lesions, one in the left hepatic lobe and one in the  right hepatic lobe are stable. GALLBLADDER: Unremarkable. SPLEEN: No mass. PANCREAS: No mass or ductal dilatation. ADRENALS: Unremarkable. KIDNEYS: Hydronephrosis is moderate and stable on the right and mild to moderate  on the left and new, to the level of the mid ureters. There is no definite  obstructing calculus on either side. STOMACH: Unremarkable. SMALL BOWEL: Previously described small bowel centralization is stable. Small  bowel loops are dilated but roughly stable in appearance, with a maximum caliber  of 3.1 cm. Pattern of dilatation is similar to the prior study. Small bowel is  fluid-filled to the level of the ostomy in the right anterior abdominal wall. COLON: Visualized colon shows no distention, but the colon is predominantly  absent. APPENDIX: Surgically absent  PERITONEUM: Small amount of ascites, stable to slightly decreased since the  prior study. RETROPERITONEUM: No lymphadenopathy or aortic aneurysm. IVC filter stable. REPRODUCTIVE ORGANS: Surgically absent. URINARY BLADDER: No mass or calculus. BONES: No destructive bone lesion. ADDITIONAL COMMENTS: N/A     IMPRESSION:  1. Mild to moderate small bowel distention with centralization of loops as  previously described, similar in appearance to prior study 5/7/2017.  2. Small to moderate amount of ascites, stable. 3. Postoperative changes as described above, stable.   4. Stable hypodense liver lesions. 5. Stable right hydronephrosis to the mid ureter without obvious obstructing  calculus. 6. New left hydronephrosis to the mid ureter without obvious obstructing  calculus. IMPRESSION/PLAN:  Kera Mondragon is a 61 y.o. female with a diagnosis of stage IV ovarian cancer. She is s/p suboptimal debulking, followed by a second laparotomy for cecal perforation. She completed 6 cycles of Taxol/Carboplatin chemotherapy, in addition to the one dose of cisplatin given during her initial hospitalization. Based upon her CT and her CA-125, she responded well, though there was still disease remaining. She underwent interval debulking with resection of the majority of her disease, although there was still small volume carcinomatosis remaining. I recommended continuing with Taxol/Carboplatin, as she did respond well, despite still having residual disease at her interval debulking. I dropped the dose of Taxol to 135 mg/m2 to reduce the chance of neuropathy, but kept the carboplatin dose at an AUC of 6. She completed 4 more cycles and her CA-125 normalized. We then stopped therapy. She subsequently developed recurrent disease and I recommended Doxil/Avastin, considering it had been less than 6 months since we stopped Taxol/Carboplatin. She completed 3 cycles of that regimen but found to have progression of disease. She was then switched to [employer]. She has completed 6 cycles so far. Due to the difficulty she has had with Gemzar, and the fact that her CA-125 is rising, I recommended that we stop the Gemzar at this point. We previously discussed the possibility of switching to Topotecan. It has been over a year since she last received a platinum agent. I suggested that we give single agent Carboplatin a try. I think she would tolerate it better than the Topotecan and it would be just as likely to work. She and her  agree with that plan.   I am also going to have her see the nutritionist for a consultation.         Signed By: Rufus Gomez MD     10/3/2017/10:35 AM

## 2017-10-03 NOTE — PROGRESS NOTES
[facility] Progress Note    Date: October 3, 2017    Name: Ramos Peguero    MRN: [id number]         : 1960    1400. Ms. Smith Arrived ambulatory and in no distress for 2L NS Hydration. Assessment was completed, patient complains of fatigue. She reports she saw Dr. Dexter Partida today & he will be changing her chemo. Chemo to be held today. L chest wall port accessed without difficulty, labs drawn and in process. + blood return        Ms. Smith's vitals were reviewed. Patient Vitals for the past 12 hrs:   Temp Pulse Resp BP SpO2   10/03/17 1625 97.7 °F (36.5 °C) 72 18 144/83 95 %   10/03/17 1404 97.4 °F (36.3 °C) 72 18 (!) 140/97 95 %           Medications:  NS 2L bolus    1630. Ms. Smith tolerated treatment well and was discharged from [place] [address] in stable condition. Port de-accessed, flushed & heparinized, + blood return. She is to return on 10/12/17 for her next appointment. Sharon Manley RN  October 3, 2017     Recent Results (from the past 12 hour(s))   CBC WITH AUTOMATED DIFF    Collection Time: 10/03/17  2:19 PM   Result Value Ref Range    WBC 5.7 3.6 - 11.0 K/uL    RBC 3.58 (L) 3.80 - 5.20 M/uL    HGB 11.0 (L) 11.5 - 16.0 g/dL    HCT 33.7 (L) 35.0 - 47.0 %    MCV 94.1 80.0 - 99.0 FL    MCH 30.7 26.0 - 34.0 PG    MCHC 32.6 30.0 - 36.5 g/dL    RDW 15.4 (H) 11.5 - 14.5 %    PLATELET 159 335 - 436 K/uL    NEUTROPHILS 49 32 - 75 %    LYMPHOCYTES 39 12 - 49 %    MONOCYTES 12 5 - 13 %    EOSINOPHILS 0 0 - 7 %    BASOPHILS 0 0 - 1 %    ABS. NEUTROPHILS 2.8 1.8 - 8.0 K/UL    ABS. LYMPHOCYTES 2.2 0.8 - 3.5 K/UL    ABS. MONOCYTES 0.7 0.0 - 1.0 K/UL    ABS. EOSINOPHILS 0.0 0.0 - 0.4 K/UL    ABS.  BASOPHILS 0.0 0.0 - 0.1 K/UL   METABOLIC PANEL, COMPREHENSIVE    Collection Time: 10/03/17  2:19 PM   Result Value Ref Range    Sodium 135 (L) 136 - 145 mmol/L    Potassium 3.4 (L) 3.5 - 5.1 mmol/L    Chloride 89 (L) 97 - 108 mmol/L    CO2 36 (H) 21 - 32 mmol/L    Anion gap 10 5 - 15 mmol/L    Glucose 106 (H) 65 - 100 mg/dL    BUN 28 (H) 6 - 20 MG/DL    Creatinine 1.02 0.55 - 1.02 MG/DL    BUN/Creatinine ratio 27 (H) 12 - 20      GFR est AA >60 >60 ml/min/1.73m2    GFR est non-AA 56 (L) >60 ml/min/1.73m2    Calcium 10.3 (H) 8.5 - 10.1 MG/DL    Bilirubin, total 0.2 0.2 - 1.0 MG/DL    ALT (SGPT) 54 12 - 78 U/L    AST (SGOT) 52 (H) 15 - 37 U/L    Alk.  phosphatase 160 (H) 45 - 117 U/L    Protein, total 8.3 (H) 6.4 - 8.2 g/dL    Albumin 3.3 (L) 3.5 - 5.0 g/dL    Globulin 5.0 (H) 2.0 - 4.0 g/dL    A-G Ratio 0.7 (L) 1.1 - 2.2     MAGNESIUM    Collection Time: 10/03/17  2:19 PM   Result Value Ref Range    Magnesium 1.9 1.6 - 2.4 mg/dL   PHOSPHORUS    Collection Time: 10/03/17  2:19 PM   Result Value Ref Range    Phosphorus 3.5 2.6 - 4.7 MG/DL

## 2017-10-09 ENCOUNTER — TELEPHONE (OUTPATIENT)
Dept: GYNECOLOGY | Age: 57
End: 2017-10-09

## 2017-10-09 NOTE — TELEPHONE ENCOUNTER
Called the pt and she has not heard from anyone about scheduling with the dietician. She would also like to speak with Jailene regarding her chemo schedule. I gave the pt the contact number to schedule with Meghan Aguirer RD and that Giacomo Escamilla has gone for the day and she will contact her tomorrow.

## 2017-10-10 ENCOUNTER — TELEPHONE (OUTPATIENT)
Dept: ONCOLOGY | Age: 57
End: 2017-10-10

## 2017-10-10 RX ORDER — SODIUM CHLORIDE 9 MG/ML
1000 INJECTION, SOLUTION INTRAVENOUS ONCE
Status: COMPLETED | OUTPATIENT
Start: 2017-10-12 | End: 2017-10-12

## 2017-10-10 NOTE — TELEPHONE ENCOUNTER
ONCOLOGY NURSE NAVIGATOR    Rec'ed VM from June yesterday requesting help and direction to a counselor. Returned call and left VM for her today.     Cuca Padilla MS RN AOMERLYS

## 2017-10-10 NOTE — TELEPHONE ENCOUNTER
ONCOLOGY NURSE NAVIGATOR    Returned call to June --  Seeking appt with counselor in [facility]. Offered and confirmed appt with Terese Feliciano LCSW, at 1130am in the [facility].     Cuca Padilla MS, RN AOMERLYS

## 2017-10-12 ENCOUNTER — HOSPITAL ENCOUNTER (OUTPATIENT)
Dept: INFUSION THERAPY | Age: 57
Discharge: HOME | End: 2017-10-12
Payer: OTHER GOVERNMENT

## 2017-10-12 ENCOUNTER — TELEPHONE (OUTPATIENT)
Dept: GYNECOLOGY | Age: 57
End: 2017-10-12

## 2017-10-12 ENCOUNTER — TELEPHONE (OUTPATIENT)
Dept: ONCOLOGY | Age: 57
End: 2017-10-12

## 2017-10-12 VITALS
HEART RATE: 51 BPM | TEMPERATURE: 97.1 F | SYSTOLIC BLOOD PRESSURE: 137 MMHG | RESPIRATION RATE: 18 BRPM | DIASTOLIC BLOOD PRESSURE: 68 MMHG | OXYGEN SATURATION: 99 %

## 2017-10-12 LAB
ALBUMIN SERPL-MCNC: 3.3 G/DL (ref 3.5–5)
ALBUMIN/GLOB SERPL: 0.8 {RATIO} (ref 1.1–2.2)
ALP SERPL-CCNC: 92 U/L (ref 45–117)
ALT SERPL-CCNC: 19 U/L (ref 12–78)
ANION GAP SERPL CALC-SCNC: 7 MMOL/L (ref 5–15)
APPEARANCE UR: CLEAR
AST SERPL-CCNC: 17 U/L (ref 15–37)
BACTERIA URNS QL MICRO: NEGATIVE /HPF
BASOPHILS # BLD: 0 K/UL (ref 0–0.1)
BASOPHILS NFR BLD: 0 % (ref 0–1)
BILIRUB SERPL-MCNC: 0.2 MG/DL (ref 0.2–1)
BILIRUB UR QL: NEGATIVE
BUN SERPL-MCNC: 26 MG/DL (ref 6–20)
BUN/CREAT SERPL: 26 (ref 12–20)
CALCIUM SERPL-MCNC: 9.2 MG/DL (ref 8.5–10.1)
CHLORIDE SERPL-SCNC: 100 MMOL/L (ref 97–108)
CO2 SERPL-SCNC: 29 MMOL/L (ref 21–32)
COLOR UR: NORMAL
CREAT SERPL-MCNC: 1 MG/DL (ref 0.55–1.02)
EOSINOPHIL # BLD: 0 K/UL (ref 0–0.4)
EOSINOPHIL NFR BLD: 1 % (ref 0–7)
EPITH CASTS URNS QL MICRO: NORMAL /LPF
ERYTHROCYTE [DISTWIDTH] IN BLOOD BY AUTOMATED COUNT: 16.7 % (ref 11.5–14.5)
GLOBULIN SER CALC-MCNC: 4.1 G/DL (ref 2–4)
GLUCOSE SERPL-MCNC: 107 MG/DL (ref 65–100)
GLUCOSE UR STRIP.AUTO-MCNC: NEGATIVE MG/DL
HCT VFR BLD AUTO: 31.8 % (ref 35–47)
HGB BLD-MCNC: 10.1 G/DL (ref 11.5–16)
HGB UR QL STRIP: NEGATIVE
HYALINE CASTS URNS QL MICRO: NORMAL /LPF (ref 0–5)
KETONES UR QL STRIP.AUTO: NEGATIVE MG/DL
LEUKOCYTE ESTERASE UR QL STRIP.AUTO: NEGATIVE
LYMPHOCYTES # BLD: 2.1 K/UL (ref 0.8–3.5)
LYMPHOCYTES NFR BLD: 32 % (ref 12–49)
MAGNESIUM SERPL-MCNC: 1.6 MG/DL (ref 1.6–2.4)
MCH RBC QN AUTO: 30.6 PG (ref 26–34)
MCHC RBC AUTO-ENTMCNC: 31.8 G/DL (ref 30–36.5)
MCV RBC AUTO: 96.4 FL (ref 80–99)
MONOCYTES # BLD: 0.5 K/UL (ref 0–1)
MONOCYTES NFR BLD: 7 % (ref 5–13)
NEUTS SEG # BLD: 4.1 K/UL (ref 1.8–8)
NEUTS SEG NFR BLD: 60 % (ref 32–75)
NITRITE UR QL STRIP.AUTO: NEGATIVE
PH UR STRIP: 6.5 [PH] (ref 5–8)
PLATELET # BLD AUTO: 347 K/UL (ref 150–400)
POTASSIUM SERPL-SCNC: 4.3 MMOL/L (ref 3.5–5.1)
PROT SERPL-MCNC: 7.4 G/DL (ref 6.4–8.2)
PROT UR STRIP-MCNC: NEGATIVE MG/DL
RBC # BLD AUTO: 3.3 M/UL (ref 3.8–5.2)
RBC #/AREA URNS HPF: NORMAL /HPF (ref 0–5)
SODIUM SERPL-SCNC: 136 MMOL/L (ref 136–145)
SP GR UR REFRACTOMETRY: 1.02 (ref 1–1.03)
UA: UC IF INDICATED,UAUC: NORMAL
UROBILINOGEN UR QL STRIP.AUTO: 0.2 EU/DL (ref 0.2–1)
WBC # BLD AUTO: 6.6 K/UL (ref 3.6–11)
WBC URNS QL MICRO: NORMAL /HPF (ref 0–4)

## 2017-10-12 PROCEDURE — 86304 IMMUNOASSAY TUMOR CA 125: CPT | Performed by: OBSTETRICS & GYNECOLOGY

## 2017-10-12 PROCEDURE — 36415 COLL VENOUS BLD VENIPUNCTURE: CPT | Performed by: OBSTETRICS & GYNECOLOGY

## 2017-10-12 PROCEDURE — 74011000250 HC RX REV CODE- 250: Performed by: OBSTETRICS & GYNECOLOGY

## 2017-10-12 PROCEDURE — 83735 ASSAY OF MAGNESIUM: CPT | Performed by: OBSTETRICS & GYNECOLOGY

## 2017-10-12 PROCEDURE — 80053 COMPREHEN METABOLIC PANEL: CPT | Performed by: OBSTETRICS & GYNECOLOGY

## 2017-10-12 PROCEDURE — 85025 COMPLETE CBC W/AUTO DIFF WBC: CPT | Performed by: OBSTETRICS & GYNECOLOGY

## 2017-10-12 PROCEDURE — 77030012965 HC NDL HUBR BBMI -A

## 2017-10-12 PROCEDURE — 74011250636 HC RX REV CODE- 250/636: Performed by: OBSTETRICS & GYNECOLOGY

## 2017-10-12 PROCEDURE — 81001 URINALYSIS AUTO W/SCOPE: CPT | Performed by: OBSTETRICS & GYNECOLOGY

## 2017-10-12 PROCEDURE — 36591 DRAW BLOOD OFF VENOUS DEVICE: CPT

## 2017-10-12 PROCEDURE — 96360 HYDRATION IV INFUSION INIT: CPT

## 2017-10-12 RX ORDER — SODIUM CHLORIDE 0.9 % (FLUSH) 0.9 %
10-40 SYRINGE (ML) INJECTION AS NEEDED
Status: ACTIVE | OUTPATIENT
Start: 2017-10-12 | End: 2017-10-13

## 2017-10-12 RX ORDER — SODIUM CHLORIDE 9 MG/ML
10 INJECTION INTRAMUSCULAR; INTRAVENOUS; SUBCUTANEOUS AS NEEDED
Status: ACTIVE | OUTPATIENT
Start: 2017-10-12 | End: 2017-10-13

## 2017-10-12 RX ORDER — HEPARIN 100 UNIT/ML
500 SYRINGE INTRAVENOUS AS NEEDED
Status: ACTIVE | OUTPATIENT
Start: 2017-10-12 | End: 2017-10-13

## 2017-10-12 RX ADMIN — SODIUM CHLORIDE 1000 ML/HR: 900 INJECTION, SOLUTION INTRAVENOUS at 15:08

## 2017-10-12 RX ADMIN — Medication 10 ML: at 15:08

## 2017-10-12 RX ADMIN — Medication 500 UNITS: at 16:12

## 2017-10-12 RX ADMIN — Medication 10 ML: at 16:12

## 2017-10-12 RX ADMIN — SODIUM CHLORIDE 10 ML: 9 INJECTION INTRAMUSCULAR; INTRAVENOUS; SUBCUTANEOUS at 15:08

## 2017-10-12 NOTE — PROGRESS NOTES
[facility] Progress Note    8952 Pt admit to [facility] for hydration ambulatory in stable condition. Assessment completed. No new concerns voiced. Port accessed with positive blood return. Labs drawn and sent for processing. Port flushed and NS bolus started. Visit Vitals    /72 (BP 1 Location: Right arm, BP Patient Position: Sitting)    Pulse (!) 58    Temp 97.1 °F (36.2 °C)    Resp 18    SpO2 99%       Medications:  1 L NS over 1 hour  NS flushes  Heparin     1615 Pt tolerated treatment well. Port maintained positive blood return throughout treatment. Port flushed, heparinized, and deaccessed per protocol. D/c home ambulatory in no distress. Pt aware of next appointment scheduled for 10/17/17 at 9:00.    UA results pending. Recent Results (from the past 12 hour(s))   CBC WITH AUTOMATED DIFF    Collection Time: 10/12/17  3:08 PM   Result Value Ref Range    WBC 6.6 3.6 - 11.0 K/uL    RBC 3.30 (L) 3.80 - 5.20 M/uL    HGB 10.1 (L) 11.5 - 16.0 g/dL    HCT 31.8 (L) 35.0 - 47.0 %    MCV 96.4 80.0 - 99.0 FL    MCH 30.6 26.0 - 34.0 PG    MCHC 31.8 30.0 - 36.5 g/dL    RDW 16.7 (H) 11.5 - 14.5 %    PLATELET 166 663 - 188 K/uL    NEUTROPHILS 60 32 - 75 %    LYMPHOCYTES 32 12 - 49 %    MONOCYTES 7 5 - 13 %    EOSINOPHILS 1 0 - 7 %    BASOPHILS 0 0 - 1 %    ABS. NEUTROPHILS 4.1 1.8 - 8.0 K/UL    ABS. LYMPHOCYTES 2.1 0.8 - 3.5 K/UL    ABS. MONOCYTES 0.5 0.0 - 1.0 K/UL    ABS. EOSINOPHILS 0.0 0.0 - 0.4 K/UL    ABS.  BASOPHILS 0.0 0.0 - 0.1 K/UL   METABOLIC PANEL, COMPREHENSIVE    Collection Time: 10/12/17  3:08 PM   Result Value Ref Range    Sodium 136 136 - 145 mmol/L    Potassium 4.3 3.5 - 5.1 mmol/L    Chloride 100 97 - 108 mmol/L    CO2 29 21 - 32 mmol/L    Anion gap 7 5 - 15 mmol/L    Glucose 107 (H) 65 - 100 mg/dL    BUN 26 (H) 6 - 20 MG/DL    Creatinine 1.00 0.55 - 1.02 MG/DL    BUN/Creatinine ratio 26 (H) 12 - 20      GFR est AA >60 >60 ml/min/1.73m2    GFR est non-AA 57 (L) >60 ml/min/1.73m2 Calcium 9.2 8.5 - 10.1 MG/DL    Bilirubin, total 0.2 0.2 - 1.0 MG/DL    ALT (SGPT) 19 12 - 78 U/L    AST (SGOT) 17 15 - 37 U/L    Alk.  phosphatase 92 45 - 117 U/L    Protein, total 7.4 6.4 - 8.2 g/dL    Albumin 3.3 (L) 3.5 - 5.0 g/dL    Globulin 4.1 (H) 2.0 - 4.0 g/dL    A-G Ratio 0.8 (L) 1.1 - 2.2     MAGNESIUM    Collection Time: 10/12/17  3:08 PM   Result Value Ref Range    Magnesium 1.6 1.6 - 2.4 mg/dL

## 2017-10-12 NOTE — TELEPHONE ENCOUNTER
This soon to be 60 yo pt was referred by Rosa Schneider RN, NN. Pt has stage 4 Ovarian Cancer and was diagnosed in 2015. See Dr Rupa Hernadez notes for more detail on surgeries and chemotherapies. Pt reports multiple complications from her first surgery causing her to be hospitalized for about 6 wks stating,\"I almost \". Past History of Depression- Pt says she has seen a counselor before back in  when her son, a Maia , was stationed in [place]. Reports a history of taking Zoloft, not sure of dosage. Pt attempted to reconnect with her therapist but says the person is not longer seeing pts due to her own health issues thus came to the [facility]. Discussed how being in chemo and lack of activity can make the chemicals in body out of whack along with just having to process her terminal illness. Discussed that antidepressant may need to be explored if mood not able to lift. No SI reported. Pt says on certain days she if fine but lately she can barely get out of bed and having trouble staying motivated in her fight. She is aware of the seriousness of her illness but tends to ruminate over how much time she has left. Isauro Spell to be around for her sons wedding in [place] in 2018. Son willing to move up date if necessary. 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 They have 3 kids, both sons in the Critical access hospital (1  in [place] and other in [facility] soon to be ) and a daughter who lives in [facility]. Pt reports close relationship with whole family including her sister in [place]. Normalized pt's fear with this long journey with cancer. Again she is aware that time is limited, but confirmed how frustrating the unknown can be. Pt appropriately tearful at times. Expressed some fear of EOL ( having uncontrolled pain being in and out of hospital).  Fear comes from close friend who passed in  of Cervical cancer who had much discomfort. Briefly explored any conversations had with spouse about her wishes which she said she has an AMD but had not really had much specific conversation. Overview of Palliative Medicine provided as well to ease fears of uncontrolled pain that it is a service that could be supplemented if need. Uncontrolled pain not reported at this time. Pt aware  can come forall services here as well. Pt was very involved in [employer] in [facility] but says they were not supportive post diagnosis and long term hospitalization. Pt does have very close friends whom she has a weekly Sunday morning Holiness time with. Plan- See nutritionist this afternoon. Discussed meditation, massage options along with aromatherapy. Continue quilt making with friend. Schedule morning breakfast or lunch with  on Thurs or Friday. Explore ways of volunteering without compromising her immune system. Visiting son this weekend.   Follow up session at [facility] on 10/26 at 11:30am.

## 2017-10-13 LAB — CANCER AG125 SERPL-ACNC: 112 U/ML (ref 0–30)

## 2017-10-16 RX ORDER — GRANISETRON HYDROCHLORIDE 1 MG/ML
1 INJECTION INTRAVENOUS ONCE
Status: COMPLETED | OUTPATIENT
Start: 2017-10-17 | End: 2017-10-17

## 2017-10-17 ENCOUNTER — HOSPITAL ENCOUNTER (OUTPATIENT)
Dept: INFUSION THERAPY | Age: 57
Discharge: HOME | End: 2017-10-17
Payer: OTHER GOVERNMENT

## 2017-10-17 VITALS
WEIGHT: 105 LBS | HEART RATE: 58 BPM | SYSTOLIC BLOOD PRESSURE: 121 MMHG | TEMPERATURE: 97.7 F | RESPIRATION RATE: 18 BRPM | HEIGHT: 63 IN | DIASTOLIC BLOOD PRESSURE: 75 MMHG | BODY MASS INDEX: 18.61 KG/M2

## 2017-10-17 DIAGNOSIS — C78.6 CARCINOMATOSIS PERITONEI (HCC): ICD-10-CM

## 2017-10-17 DIAGNOSIS — C56.3 MALIGNANT NEOPLASM OF BOTH OVARIES (HCC): ICD-10-CM

## 2017-10-17 DIAGNOSIS — G89.3 CANCER ASSOCIATED PAIN: ICD-10-CM

## 2017-10-17 DIAGNOSIS — R10.84 GENERALIZED ABDOMINAL PAIN: ICD-10-CM

## 2017-10-17 DIAGNOSIS — T45.1X5A ANEMIA DUE TO CHEMOTHERAPY: ICD-10-CM

## 2017-10-17 DIAGNOSIS — E44.0 MODERATE PROTEIN-CALORIE MALNUTRITION (HCC): ICD-10-CM

## 2017-10-17 DIAGNOSIS — T45.1X5A CINV (CHEMOTHERAPY-INDUCED NAUSEA AND VOMITING): ICD-10-CM

## 2017-10-17 DIAGNOSIS — F41.8 ANXIETY ABOUT HEALTH: ICD-10-CM

## 2017-10-17 DIAGNOSIS — D64.81 ANEMIA DUE TO CHEMOTHERAPY: ICD-10-CM

## 2017-10-17 DIAGNOSIS — D70.1 CHEMOTHERAPY-INDUCED NEUTROPENIA (HCC): ICD-10-CM

## 2017-10-17 DIAGNOSIS — E87.1 HYPONATREMIA: ICD-10-CM

## 2017-10-17 DIAGNOSIS — C56.9 OVARIAN CANCER, UNSPECIFIED LATERALITY (HCC): ICD-10-CM

## 2017-10-17 DIAGNOSIS — R63.0 NO APPETITE: ICD-10-CM

## 2017-10-17 DIAGNOSIS — R64 CACHEXIA (HCC): ICD-10-CM

## 2017-10-17 DIAGNOSIS — R11.2 CINV (CHEMOTHERAPY-INDUCED NAUSEA AND VOMITING): ICD-10-CM

## 2017-10-17 DIAGNOSIS — T45.1X5A CHEMOTHERAPY-INDUCED NEUTROPENIA (HCC): ICD-10-CM

## 2017-10-17 DIAGNOSIS — R63.4 WEIGHT LOSS, UNINTENTIONAL: ICD-10-CM

## 2017-10-17 DIAGNOSIS — C56.9 MALIGNANT NEOPLASM OF OVARY, UNSPECIFIED LATERALITY (HCC): ICD-10-CM

## 2017-10-17 DIAGNOSIS — Z93.2 ILEOSTOMY IN PLACE (HCC): ICD-10-CM

## 2017-10-17 DIAGNOSIS — F50.00 AN (ANOREXIA NERVOSA): Primary | ICD-10-CM

## 2017-10-17 DIAGNOSIS — C80.0 CARCINOMATOSIS (HCC): ICD-10-CM

## 2017-10-17 PROCEDURE — 74011250636 HC RX REV CODE- 250/636: Performed by: OBSTETRICS & GYNECOLOGY

## 2017-10-17 PROCEDURE — 96413 CHEMO IV INFUSION 1 HR: CPT

## 2017-10-17 PROCEDURE — 77030012965 HC NDL HUBR BBMI -A

## 2017-10-17 PROCEDURE — 74011000250 HC RX REV CODE- 250: Performed by: OBSTETRICS & GYNECOLOGY

## 2017-10-17 PROCEDURE — 96375 TX/PRO/DX INJ NEW DRUG ADDON: CPT

## 2017-10-17 PROCEDURE — 96377 APPLICATON ON-BODY INJECTOR: CPT

## 2017-10-17 PROCEDURE — 74011000258 HC RX REV CODE- 258: Performed by: OBSTETRICS & GYNECOLOGY

## 2017-10-17 PROCEDURE — 96367 TX/PROPH/DG ADDL SEQ IV INF: CPT

## 2017-10-17 RX ORDER — FENTANYL 100 UG/H
1 PATCH TRANSDERMAL
Qty: 10 PATCH | Refills: 0 | Status: SHIPPED | OUTPATIENT
Start: 2017-10-17 | End: 2017-11-09 | Stop reason: SDUPTHER

## 2017-10-17 RX ORDER — SODIUM CHLORIDE 9 MG/ML
10 INJECTION INTRAMUSCULAR; INTRAVENOUS; SUBCUTANEOUS AS NEEDED
Status: ACTIVE | OUTPATIENT
Start: 2017-10-17 | End: 2017-10-18

## 2017-10-17 RX ORDER — MEGESTROL ACETATE 40 MG/ML
200 SUSPENSION ORAL
Qty: 450 ML | Refills: 2 | Status: SHIPPED | OUTPATIENT
Start: 2017-10-17 | End: 2017-10-17 | Stop reason: CLARIF

## 2017-10-17 RX ORDER — HEPARIN 100 UNIT/ML
500 SYRINGE INTRAVENOUS AS NEEDED
Status: ACTIVE | OUTPATIENT
Start: 2017-10-17 | End: 2017-10-18

## 2017-10-17 RX ORDER — SODIUM CHLORIDE 9 MG/ML
25 INJECTION, SOLUTION INTRAVENOUS AS NEEDED
Status: DISPENSED | OUTPATIENT
Start: 2017-10-17 | End: 2017-10-18

## 2017-10-17 RX ORDER — SODIUM CHLORIDE 0.9 % (FLUSH) 0.9 %
5-10 SYRINGE (ML) INJECTION AS NEEDED
Status: ACTIVE | OUTPATIENT
Start: 2017-10-17 | End: 2017-10-18

## 2017-10-17 RX ADMIN — DEXAMETHASONE SODIUM PHOSPHATE 20 MG: 4 INJECTION, SOLUTION INTRA-ARTICULAR; INTRALESIONAL; INTRAMUSCULAR; INTRAVENOUS; SOFT TISSUE at 09:56

## 2017-10-17 RX ADMIN — SODIUM CHLORIDE 25 ML/HR: 900 INJECTION, SOLUTION INTRAVENOUS at 09:30

## 2017-10-17 RX ADMIN — PEGFILGRASTIM 6 MG: KIT SUBCUTANEOUS at 12:36

## 2017-10-17 RX ADMIN — Medication 500 UNITS: at 12:09

## 2017-10-17 RX ADMIN — GRANISETRON HYDROCHLORIDE 1 MG: 1 INJECTION INTRAVENOUS at 09:29

## 2017-10-17 RX ADMIN — SODIUM CHLORIDE 10 ML: 9 INJECTION INTRAMUSCULAR; INTRAVENOUS; SUBCUTANEOUS at 09:30

## 2017-10-17 RX ADMIN — CARBOPLATIN 350 MG: 10 INJECTION, SOLUTION INTRAVENOUS at 10:58

## 2017-10-17 RX ADMIN — Medication 10 ML: at 12:08

## 2017-10-17 NOTE — PROGRESS NOTES
Jeanette Carver. Wall, NP    Infusion Date: 10/17/2017     MAHSA Alejandro is a 61 y.o.  postmenopausal female with a diagnosis of stage IV ovarian cancer. She underwent exploratory laparotomy with suboptimal debulking. She had extensive disease. She was readmitted about a week later with obstruction and subsequently had a cecal perforation, requiring resection, end ileostomy, and mucous fistula. During the hospitalization she was also diagnosed with pulmonary embolus and had chest tubes placed for bilateral pleural effusions. She had a prolonged hospital course and actually received a dose of cisplatin chemotherapy while in the hospital to help dry up the effusions. She completed 6 cycles of Taxol/Carboplatin chemotherapy following her initial debulking. She did relatively well, considering she had an ileostomy to deal with during chemotherapy. I took her to the OR on 4/29/16 for interval debulking and reversal of her ileostomy. She had extensive disease, but we were able to resect the majority of her disease. One week later she developed an anastomotic leak requiring reexploration and recreation of an ileostomy. She had another prolonged hospitalization, but recovered well since surgery. We then reinitiated Taxol/Carboplatin chemotherapy, but did reduce the dose of Taxol to 135 mg/m2. She completed 4 mores cycles. Her CA-125 normalized and we stopped treatment.      On 2/7, she presented to Dr. Delia Maher office with her  to discuss her follow up/surveillance CT scan. Unfortunately, it demonstrated progression of disease, although minimally worse than her prior scan 2 months previously. She was actually feeling better than she did at that time. After review of scans and disease, the decision was made to begin Doxil/Avastin Q28 days for 6 cycles. She began this on 2/27/2017.  In April 2017, she had completed 3 cycles of Doxil/Avastin and had CT scans at that time showed she had evidence of recurrent disease. She was switched at that time to [facility]. She has been admitted to [facility] several times for partial SBO and all have resolved with consecutive management but this, in combination of her continued rise in CA-125 had begun to take an emotional toll on her. After having \"one of the worst weeks in years\" recently, she met with Dr. Tahir Tobin with her  present to discuss options. After a long thoughtful discussion, Dr. Tahir Tobin suggested that we give single agent Carboplatin a try since it had been over a year since she has tried it. Subjective:   She presents today for her first cycle of single agent Carbo. Pt said she has had a good week with very little nausea or bowel issues. She does admit her appetite is down and she is struggling to make herself eat. Says her wt is stable at home at about 102, but she feels better when she is mor like 110. She was referred to our oncology nutritionist and said this was helpful, but she is not gaining wt as she had hoped. Previously, pt had found herself teary and feeling down. She was referred to our counsleor and met with her at length. She said it was \"fine., no really eye opening revelations, but she is happy she went\".  remains supportive at side and very understanding with her. Feels the dilaudid with the Fentanyl patch is holding her pain well. She needs normal refill of fentanyl patch again today and it is an appropriate amount of time since last refill         Review of Systems:  General: Wt low, but at stable, but pt feels \"pretty good today'  HEENT: Denies visual changes, dysphagia or headache  Resp: Denies SOB, DUDLEY, wheezing or cough  CV: Denies CP, palpitations  GI/: Acknowledges some abd pain, but feels it is now well controlled. Denies N/V this past week. Zofran still works well for her when nausea does present itself.   Continues with protein shakes and recommendation from nutritionist.  She says ostomy is functioning well with prn Reglan and Miralax prn  MuskSkel: Denies muscle ache or joint pain  Neuro: Denies dizzyness or syncope    Objective:     Blood pressure 128/78, pulse (!) 58, temperature 96.8 °F (36 °C), resp. rate 18, height 5' 3\" (1.6 m), weight 105 lb (47.6 kg). Temp (24hrs), Av.8 °F (36 °C), Min:96.8 °F (36 °C), Max:96.8 °F (36 °C)      _____________________  Physical Exam:     General: A&O X 3. With pleasant affect and a little less tired appearing. HEENT: Oral mucosa pink, slightly dry with no sores or lesions noted. No cervical adenopathy appreciated  Port site easily accessed without redness, tenderness or swelling  Cardiovascular: Regular without M/R/G  Lungs:CTA bilat without wheezing or rales  Abdomen: Soft with less tenderness to palpation to left lower/mid abdomen and + bowel sounds throughout. Mild firmness persists, unchanged, to left mid abd. Stool in ostomy bag   Ext: + pedal pulses without edema bilat. bilat  Neuro: grossly intact      LABS: 10/12/2017  WBC 6.6: HGB 10.1; HCT 31.8; Plt's 347: ANC 4.1; Na136; K+ 4.3; Cl 100;   CO2 29; BUN 26; Creat 1.00; Calcium 9.2; albumin 3.3; AST 17; ALT 19; Alk phos 92    :10/12/2017= 112 (2017: =128; 2017=85; 2017=87)   (2017= 83; 2017= 65; 17=95.5;  17= 102; 17= 145.6:  = 100; 22=309.2 3/22=80; = 63.9)          PATHOLOGY from 5/10/2017 Upper Endoscopy:  1. Gastric polyp, polypectomy:   Benign polypoid gastric oxyntic mucosa with some features of hyperplastic polyp   2. Lower esophagus, biopsy:   Acute erosive esophagitis of squamous mucosa with ulceration and severe squamous atypia (see comment)   3.  Proximal esophagus, biopsy:   Acute esophagitis of squamous mucosa with severe squamous atypia           Imaging:  CT Abd/Pelvis 9/15/2017:  FINDINGS:   The visualized lung bases demonstrate a small right pleural effusion. There are  increased patchy airspace opacities in the right middle and lower lobes. The  left lung and pleural space are clear. The heart size is normal.     There is a small hiatal hernia. An enteric tube terminates in the stomach.     There are stable low-density liver lesions measuring 0.7 x 1.2 cm in the right  liver, previously 0.8 x 1.1 cm. The left liver lesion is stable measuring 0.7 cm  (series 3, image 28).    The spleen is stable with a small calcification. The gallbladder is present  without biliary duct dilatation. The pancreas and adrenal glands are  unremarkable. The kidneys enhance symmetrically with stable bilateral  hydronephrosis.     There are stable clumped and mildly dilated bowel loops in the mid lower  abdomen. Surgical changes are again seen with near complete colectomy with a  right lower abdomen ostomy again demonstrated. The rectum is blind-ending. There  is a stable to slightly increased amount of loculated ascites anteriorly. There  is no free air.     There are no enlarged lymph nodes. The aorta tapers without aneurysm. IVC filter  is present.     The urinary bladder is unremarkable. There is no aggressive bony lesion.       IMPRESSION:   1. Overall stable appearance of the bowel with mildly dilated clumped bowel  loops in the lower mid abdomen. This may be attributable to peritoneal  carcinomatosis. A right lower abdomen ostomy is again demonstrated. There is no  evidence for complete bowel obstruction.     2. Stable to slightly increased loculated ascites anteriorly.     3. Stable bilateral hydronephrosis.     4. Stable low-density liver lesions.     5. Small right pleural effusion and increased patchy airspace opacities in the  right middle and lower lobes suggesting nonspecific infection or inflammation. .          CT scan of Abdomen/Pelvis with contrast 6/27/2017  FINDINGS:   LUNG BASES: Clear. INCIDENTALLY IMAGED HEART AND MEDIASTINUM: Unremarkable.   LIVER: Small hypodense lesions, one in the left hepatic lobe and one in the  right hepatic lobe are stable. GALLBLADDER: Unremarkable. SPLEEN: No mass. PANCREAS: No mass or ductal dilatation. ADRENALS: Unremarkable. KIDNEYS: Hydronephrosis is moderate and stable on the right and mild to moderate  on the left and new, to the level of the mid ureters. There is no definite  obstructing calculus on either side. STOMACH: Unremarkable. SMALL BOWEL: Previously described small bowel centralization is stable. Small  bowel loops are dilated but roughly stable in appearance, with a maximum caliber  of 3.1 cm. Pattern of dilatation is similar to the prior study. Small bowel is  fluid-filled to the level of the ostomy in the right anterior abdominal wall. COLON: Visualized colon shows no distention, but the colon is predominantly  absent. APPENDIX: Surgically absent  PERITONEUM: Small amount of ascites, stable to slightly decreased since the  prior study. RETROPERITONEUM: No lymphadenopathy or aortic aneurysm. IVC filter stable. REPRODUCTIVE ORGANS: Surgically absent. URINARY BLADDER: No mass or calculus. BONES: No destructive bone lesion. ADDITIONAL COMMENTS: N/A       IMPRESSION:     1. Mild to moderate small bowel distention with centralization of loops as  previously described, similar in appearance to prior study 5/7/2017.  2. Small to moderate amount of ascites, stable. 3. Postoperative changes as described above, stable. 4. Stable hypodense liver lesions. 5. Stable right hydronephrosis to the mid ureter without obvious obstructing  calculus.   6. New left hydronephrosis to the mid ureter without obvious obstructing  calculus.       Assessment:     Patient Active Problem List   Diagnosis Code    Malignant neoplasm of both ovaries ([address] C56.1, C56.2    Carcinomatosis peritonei ([address] C78.6, C80.1    Small bowel obstruction K56.609    Ileostomy in place ([address] Z93.2    Anemia due to chemotherapy D64.81, T45.1X5A    CINV (chemotherapy-induced nausea and vomiting) R11.2, T45.1X5A    Cancer associated pain G89.3    Generalized abdominal pain R10.84    Anxiety about health F41.8    Ileus (HCC) K56.7    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    Hx of pulmonary embolus Z86.711    Dehydration E86.0    Chemotherapy-induced neutropenia (HCC) D70.1, T45.1X5A    Anticoagulation adequate with anticoagulant therapy Z79.01    Counseling regarding end of life decision making Z71.89    Bowel obstruction K56.609    Protein calorie malnutrition ([address] E46    Malignant ascites R18.0    Hydronephrosis of right kidney N13.30    Isolated proteinuria R80.0    Erosive esophagitis K22.10    Ulcerative esophagitis K22.10    Hypomagnesemia E83.42    Abdominal pain R10.9    Hydronephrosis of left kidney N13.30    Ovarian cancer (HCC) C56.9    Nausea & vomiting R11.2    Carcinomatosis (HCC) C80.0    Intractable vomiting with nausea R11.2    Febrile illness R50.9    Partial bowel obstruction K56.600    Nausea and vomiting R11.2         Plan:   Proceed with cycle 1 of Carbo  Refill of Fentanyl patch  Continue with recommendations of nutritionist and call for any questions  Follow up with counselor if needed or desired. Offered meditation again an dpt said she may consider this and will let me know if she does. Discussed possible use of Marinol for appetite improvement. It does come in liquid form. Pt says she would like to try a little longer with suggestions from nutrition, but if eating continues to be a burden, she will try it. Continue to monitor hgb and will transfuses for hgb less than 8     Encouaged to continue protein shakes  She will continue with GI doctor prn. Reglan prn as needed and she will let me know if she needs to begin it. Hydration encouraged  Continue Zofran for nausea PRN   Continue Eliquis for previous PE.   She will continue phazyme for gas as needed  Continue Miralax to try to prevent her post chemotherapy constipation  She will call for any concerns or questions  Hydration encouraged best she is able at home and she will call if she feels she       Eloisa HAYDER Brown  10/17/2017          ______________________  Medications:    Current Outpatient Prescriptions   Medication Sig Dispense Refill    fentaNYL (DURAGESIC) 100 mcg/hr PATCH 1 Patch by TransDERmal route every seventy-two (72) hours. Max Daily Amount: 1 Patch. 10 Patch 0    dronabinol (SYNDROS) 5 mg/mL soln Take 2.1 mg by mouth two (2) times a day. Max Daily Amount: 4.2 mg. Take 30 minutes before lunch and dinner 30 mL 1    promethazine (PHENERGAN) 6.25 mg/5 mL syrup Take 20 mL by mouth four (4) times daily as needed for Nausea. 400 mL 4    HYDROmorphone (DILAUDID) 1 mg/mL liqd oral solution Take 4 mL by mouth every four (4) hours as needed. Max Daily Amount: 24 mg. 475 mL 0    scopolamine (TRANSDERM-SCOP) 1.5 mg (1 mg over 3 days) pt3d 1 Patch by TransDERmal route every seventy-two (72) hours as needed. 10 Patch 1    NEXIUM PACKET 40 mg granules for oral suspension two (2) times a day.  metoclopramide (REGLAN) 5 mg/5 mL syrup Take 10 mL by mouth four (4) times daily as needed. Take before meals as needed 500 mL 3    magnesium oxide (MAG-OX) 400 mg tablet Take 1 Tab by mouth two (2) times a day. Indications: HYPOMAGNESEMIA 60 Tab 2    sucralfate (CARAFATE) 100 mg/mL suspension Take 5 mL by mouth four (4) times daily. 414 mL 3    polyethylene glycol (MIRALAX) 17 gram/dose powder Take 17 g by mouth daily as needed. 238 g 3    apixaban (ELIQUIS) 2.5 mg tablet Take 1 Tab by mouth two (2) times a day. 60 Tab 6    zolpidem (AMBIEN) 10 mg tablet Take 1 Tab by mouth nightly as needed for Sleep. Max Daily Amount: 10 mg. 30 Tab 1    ondansetron (ZOFRAN ODT) 4 mg disintegrating tablet Take 1 Tab by mouth every eight (8) hours as needed for Nausea. 30 Tab 2    Calcium-Vitamin D3-Vitamin K [id number] mg-unit-mcg chew Take 1 Tab by mouth two (2) times a day.  metoprolol (LOPRESSOR) 100 mg tablet Take 50 mg by mouth two (2) times a day.       lidocaine-prilocaine (EMLA) topical cream Apply small amount over port area and cover with a band aid 1 hour before chemo treatment 30 g 3     Current Facility-Administered Medications   Medication Dose Route Frequency Provider Last Rate Last Dose    0.9% sodium chloride infusion  25 mL/hr IntraVENous PRN Tania Hart MD 25 mL/hr at 10/17/17 0930 25 mL/hr at 10/17/17 0930    sodium chloride (NS) flush 5-10 mL  5-10 mL IntraVENous PRN Tania Hart MD        heparin (porcine) pf 500 Units  500 Units IntraVENous PRN Tania Hart MD        sodium chloride 0.9 % injection 10 mL  10 mL IntraVENous PRN Tania Hart MD   10 mL at 10/17/17 0930

## 2017-10-17 NOTE — PROGRESS NOTES
[facility] Note:    0900 Pt arrived at [facility] and in no distress for C1. Assessment stable, no new complaints voiced. Pt remains queasy. Has phenergan. Appetite decreased. Talked with nutritionist last week. Has ileostomy. Seen by Lee Cavazos NP  Patient given information on pump infusion and time. Reviewed basic info again. Medications received:  Decadron  Kytril  Carboplatin  Neulasta on site    1240 Tolerated treatment well, no adverse reaction noted. D/Cd from [facility] and in no distress accompanied by . Next appt 11/14  0900  Visit Vitals    /78    Pulse (!) 58    Temp 96.8 °F (36 °C)    Resp 18    Ht 5' 3\" (1.6 m)    Wt 47.6 kg (105 lb)    BMI 18.6 kg/m2     No results found for this or any previous visit (from the past 12 hour(s)). Labs pta and reviewed.

## 2017-10-18 ENCOUNTER — APPOINTMENT (OUTPATIENT)
Age: 57
Setting detail: DERMATOLOGY
End: 2017-11-07

## 2017-10-18 DIAGNOSIS — B07.0 PLANTAR WART: ICD-10-CM

## 2017-10-18 PROCEDURE — OTHER DIAGNOSIS COMMENT: OTHER

## 2017-10-18 PROCEDURE — 99213 OFFICE O/P EST LOW 20 MIN: CPT

## 2017-10-18 PROCEDURE — OTHER COUNSELING: OTHER

## 2017-10-18 PROCEDURE — OTHER MIPS QUALITY: OTHER

## 2017-10-18 ASSESSMENT — LOCATION DETAILED DESCRIPTION DERM
LOCATION DETAILED: RIGHT MEDIAL PLANTAR MIDFOOT
LOCATION DETAILED: LEFT PLANTAR FOREFOOT OVERLYING 4TH METATARSAL
LOCATION DETAILED: LEFT PLANTAR FOREFOOT OVERLYING 3RD METATARSAL

## 2017-10-18 ASSESSMENT — LOCATION SIMPLE DESCRIPTION DERM
LOCATION SIMPLE: RIGHT PLANTAR SURFACE
LOCATION SIMPLE: LEFT PLANTAR SURFACE

## 2017-10-18 ASSESSMENT — LOCATION ZONE DERM: LOCATION ZONE: FEET

## 2017-10-18 NOTE — PROCEDURE: DIAGNOSIS COMMENT
Detail Level: Simple
Comment: Discussed using Akina wart compound. Patient is currently undergoing chemotherapy and her oncologist prefers not to use destructive methods. She will check with her oncologist to ensure the ingredients in Akina wart compound are okay to take concurrently with chemotherapy.

## 2017-10-24 ENCOUNTER — APPOINTMENT (OUTPATIENT)
Dept: INFUSION THERAPY | Age: 57
End: 2017-10-24
Payer: OTHER GOVERNMENT

## 2017-10-25 ENCOUNTER — TELEPHONE (OUTPATIENT)
Dept: GYNECOLOGY | Age: 57
End: 2017-10-25

## 2017-10-25 NOTE — TELEPHONE ENCOUNTER
Pt called saying she awoke yesterday (10/24) at [facility] with 4/10 abdominal pain. Said during the day, it went to 6/10 and she took the Dilaudid Q4h for relief (she continues with Fentanyl 100 mcg patch). She said she was awake until 4 AM this morning with 4-6/10 pain even with the above pain meds. She says her ostomy is functioning well, but stool seems \"thicker\" than usual.  Because of this, she took a Reglan at 2 AM and says it is functioning \"more like normal\" right now. She says she has had some nausea, but no vomiting. Ate breakfast yesterday, but only liquids since then. She awoke at 8AM this morning (after only going to sleep at 4) with pain at 4/10 and some low back pain (denies dysuria, fevers chills). Continues with \"faint\" nausea and denies any vomiting. She has not eaten this morning, but says she is drinking as much as she can. We discussed options   1) come in for pain management   2) try heating pad and tylenol between dilaudid, but not more than Q6 hours/24 hours. 3)schedule Dilaudid around the clock for 24 hours (has almost already been doing this)  4) asked pt for any other suggestions she had (none at present)  5) come in for hydration. The decision for now is to try the heating pad (precautions reviewed), tylenol between scheduled dilaudid and schedule hydration for tomorrow around the time she is coming down for her counseling appointment. I will discuss with Dr. Ho Moore to see if he has any other suggestions. Encouraged he to call if things got any worse and we would manage them. Will call later today to see how she is doing.

## 2017-10-25 NOTE — TELEPHONE ENCOUNTER
Pt , she is requesting a call back from [facility], she would only state she is having a problem with abdominal pain and would like to speak to [facility], she may be reached at [phone]

## 2017-10-26 ENCOUNTER — TELEPHONE (OUTPATIENT)
Dept: ONCOLOGY | Age: 57
End: 2017-10-26

## 2017-10-26 ENCOUNTER — HOSPITAL ENCOUNTER (OUTPATIENT)
Dept: INFUSION THERAPY | Age: 57
Discharge: HOME | End: 2017-10-26
Payer: OTHER GOVERNMENT

## 2017-10-26 VITALS
HEIGHT: 63 IN | SYSTOLIC BLOOD PRESSURE: 152 MMHG | HEART RATE: 71 BPM | DIASTOLIC BLOOD PRESSURE: 89 MMHG | WEIGHT: 98 LBS | OXYGEN SATURATION: 98 % | TEMPERATURE: 98.1 F | RESPIRATION RATE: 18 BRPM | BODY MASS INDEX: 17.36 KG/M2

## 2017-10-26 DIAGNOSIS — G89.3 CANCER ASSOCIATED PAIN: ICD-10-CM

## 2017-10-26 DIAGNOSIS — C56.3 MALIGNANT NEOPLASM OF BOTH OVARIES (HCC): ICD-10-CM

## 2017-10-26 DIAGNOSIS — R10.84 GENERALIZED ABDOMINAL PAIN: ICD-10-CM

## 2017-10-26 DIAGNOSIS — C78.6 CARCINOMATOSIS PERITONEI (HCC): Primary | ICD-10-CM

## 2017-10-26 LAB
ALBUMIN SERPL-MCNC: 4 G/DL (ref 3.5–5)
ALBUMIN/GLOB SERPL: 1.1 {RATIO} (ref 1.1–2.2)
ALP SERPL-CCNC: 182 U/L (ref 45–117)
ALT SERPL-CCNC: 19 U/L (ref 12–78)
ANION GAP SERPL CALC-SCNC: 7 MMOL/L (ref 5–15)
APPEARANCE UR: CLEAR
AST SERPL-CCNC: 31 U/L (ref 15–37)
BACTERIA URNS QL MICRO: NEGATIVE /HPF
BASOPHILS # BLD: 0 K/UL (ref 0–0.1)
BASOPHILS NFR BLD: 0 % (ref 0–1)
BILIRUB SERPL-MCNC: 0.4 MG/DL (ref 0.2–1)
BILIRUB UR QL: NEGATIVE
BUN SERPL-MCNC: 25 MG/DL (ref 6–20)
BUN/CREAT SERPL: 20 (ref 12–20)
CALCIUM SERPL-MCNC: 9.3 MG/DL (ref 8.5–10.1)
CHLORIDE SERPL-SCNC: 91 MMOL/L (ref 97–108)
CO2 SERPL-SCNC: 34 MMOL/L (ref 21–32)
COLOR UR: ABNORMAL
CREAT SERPL-MCNC: 1.24 MG/DL (ref 0.55–1.02)
EOSINOPHIL # BLD: 0 K/UL (ref 0–0.4)
EOSINOPHIL NFR BLD: 0 % (ref 0–7)
EPITH CASTS URNS QL MICRO: ABNORMAL /LPF
ERYTHROCYTE [DISTWIDTH] IN BLOOD BY AUTOMATED COUNT: 15.4 % (ref 11.5–14.5)
GLOBULIN SER CALC-MCNC: 3.6 G/DL (ref 2–4)
GLUCOSE SERPL-MCNC: 108 MG/DL (ref 65–100)
GLUCOSE UR STRIP.AUTO-MCNC: NEGATIVE MG/DL
HCT VFR BLD AUTO: 35.2 % (ref 35–47)
HGB BLD-MCNC: 11.3 G/DL (ref 11.5–16)
HGB UR QL STRIP: NEGATIVE
KETONES UR QL STRIP.AUTO: NEGATIVE MG/DL
LEUKOCYTE ESTERASE UR QL STRIP.AUTO: NEGATIVE
LYMPHOCYTES # BLD: 1.9 K/UL (ref 0.8–3.5)
LYMPHOCYTES NFR BLD: 11 % (ref 12–49)
MAGNESIUM SERPL-MCNC: 2 MG/DL (ref 1.6–2.4)
MCH RBC QN AUTO: 30.7 PG (ref 26–34)
MCHC RBC AUTO-ENTMCNC: 32.1 G/DL (ref 30–36.5)
MCV RBC AUTO: 95.7 FL (ref 80–99)
MONOCYTES # BLD: 0.8 K/UL (ref 0–1)
MONOCYTES NFR BLD: 5 % (ref 5–13)
NEUTS SEG # BLD: 14.8 K/UL (ref 1.8–8)
NEUTS SEG NFR BLD: 84 % (ref 32–75)
NITRITE UR QL STRIP.AUTO: NEGATIVE
PH UR STRIP: 7 [PH] (ref 5–8)
PLATELET # BLD AUTO: 258 K/UL (ref 150–400)
POTASSIUM SERPL-SCNC: 3.5 MMOL/L (ref 3.5–5.1)
PROT SERPL-MCNC: 7.6 G/DL (ref 6.4–8.2)
PROT UR STRIP-MCNC: 30 MG/DL
RBC # BLD AUTO: 3.68 M/UL (ref 3.8–5.2)
RBC #/AREA URNS HPF: ABNORMAL /HPF (ref 0–5)
SODIUM SERPL-SCNC: 132 MMOL/L (ref 136–145)
SP GR UR REFRACTOMETRY: 1.03 (ref 1–1.03)
UROBILINOGEN UR QL STRIP.AUTO: 0.2 EU/DL (ref 0.2–1)
WBC # BLD AUTO: 17.6 K/UL (ref 3.6–11)
WBC URNS QL MICRO: ABNORMAL /HPF (ref 0–4)

## 2017-10-26 PROCEDURE — 96374 THER/PROPH/DIAG INJ IV PUSH: CPT

## 2017-10-26 PROCEDURE — 87086 URINE CULTURE/COLONY COUNT: CPT | Performed by: NURSE PRACTITIONER

## 2017-10-26 PROCEDURE — 96360 HYDRATION IV INFUSION INIT: CPT

## 2017-10-26 PROCEDURE — 83735 ASSAY OF MAGNESIUM: CPT | Performed by: OBSTETRICS & GYNECOLOGY

## 2017-10-26 PROCEDURE — 77030012965 HC NDL HUBR BBMI -A

## 2017-10-26 PROCEDURE — 81001 URINALYSIS AUTO W/SCOPE: CPT | Performed by: NURSE PRACTITIONER

## 2017-10-26 PROCEDURE — 36415 COLL VENOUS BLD VENIPUNCTURE: CPT | Performed by: OBSTETRICS & GYNECOLOGY

## 2017-10-26 PROCEDURE — 85025 COMPLETE CBC W/AUTO DIFF WBC: CPT | Performed by: OBSTETRICS & GYNECOLOGY

## 2017-10-26 PROCEDURE — 36591 DRAW BLOOD OFF VENOUS DEVICE: CPT

## 2017-10-26 PROCEDURE — 96361 HYDRATE IV INFUSION ADD-ON: CPT

## 2017-10-26 PROCEDURE — 74011000250 HC RX REV CODE- 250: Performed by: NURSE PRACTITIONER

## 2017-10-26 PROCEDURE — 80053 COMPREHEN METABOLIC PANEL: CPT | Performed by: OBSTETRICS & GYNECOLOGY

## 2017-10-26 PROCEDURE — 74011250636 HC RX REV CODE- 250/636: Performed by: NURSE PRACTITIONER

## 2017-10-26 PROCEDURE — 96375 TX/PRO/DX INJ NEW DRUG ADDON: CPT

## 2017-10-26 PROCEDURE — 87147 CULTURE TYPE IMMUNOLOGIC: CPT | Performed by: NURSE PRACTITIONER

## 2017-10-26 RX ORDER — HEPARIN 100 UNIT/ML
500 SYRINGE INTRAVENOUS AS NEEDED
Status: ACTIVE | OUTPATIENT
Start: 2017-10-26 | End: 2017-10-27

## 2017-10-26 RX ORDER — SODIUM CHLORIDE 9 MG/ML
10 INJECTION INTRAMUSCULAR; INTRAVENOUS; SUBCUTANEOUS AS NEEDED
Status: ACTIVE | OUTPATIENT
Start: 2017-10-26 | End: 2017-10-27

## 2017-10-26 RX ORDER — FENTANYL 50 UG/1
1 PATCH TRANSDERMAL
Qty: 10 PATCH | Refills: 0 | Status: SHIPPED | OUTPATIENT
Start: 2017-10-26 | End: 2017-11-09 | Stop reason: SDUPTHER

## 2017-10-26 RX ORDER — SODIUM CHLORIDE 0.9 % (FLUSH) 0.9 %
10-40 SYRINGE (ML) INJECTION AS NEEDED
Status: ACTIVE | OUTPATIENT
Start: 2017-10-26 | End: 2017-10-27

## 2017-10-26 RX ADMIN — SODIUM CHLORIDE 10 MG: 9 INJECTION INTRAMUSCULAR; INTRAVENOUS; SUBCUTANEOUS at 13:35

## 2017-10-26 RX ADMIN — SODIUM CHLORIDE 2000 ML: 900 INJECTION, SOLUTION INTRAVENOUS at 13:20

## 2017-10-26 NOTE — TELEPHONE ENCOUNTER
Pt arrived feeling nausea. She had spoken with Yung Guillen NP yesterday about her abdominal pain and queasiness. Offered to let pt just sit in the center until her fluid appt which was scheduled at 1pm.  Pt said she was ok to come back and talk. Shared that from our previous discussion what she has gained is her need to reach out to friends and not be alone when feeling crappy. Pt proud of herself for calling a friend to bring her today who is also a good listener ( had to work). JULESW recommended we do the session without light to keep her nausea at bay. Pt seemed to want the company until her next appt. Most of this time spent providing active listening while she shared some stories about her travels with her family and coming to the acceptance of linda vs Rastafari. Pt shows amazing strength which she attributes to her family and linda and  can truly look at her situation with a glass half full. Of note, pt not sure how this new chemo is going to work for her and did make mention that she will ask her son in [place] to at least have the wedding local. She is not sure yet if there is a need to change the April date. Offered to walk pt to IV infusion center but she said she was ok at the elevator. Plan- pt to call if she feels the need for a follow up appt. Does not see the need today.     Rolando Bland LCSW

## 2017-10-26 NOTE — PROGRESS NOTES
Dick Qualia Dr. Daivd Funk Dr. Darrell Castleman. Wall, NP    Infusion Date: 10/26/2017     MAHSA Estrella is a 61 y.o.  postmenopausal female with a diagnosis of stage IV ovarian cancer. She underwent exploratory laparotomy with suboptimal debulking. She had extensive disease. She was readmitted about a week later with obstruction and subsequently had a cecal perforation, requiring resection, end ileostomy, and mucous fistula. During the hospitalization she was also diagnosed with pulmonary embolus and had chest tubes placed for bilateral pleural effusions. She had a prolonged hospital course and actually received a dose of cisplatin chemotherapy while in the hospital to help dry up the effusions. She completed 6 cycles of Taxol/Carboplatin chemotherapy following her initial debulking. She did relatively well, considering she had an ileostomy to deal with during chemotherapy. I took her to the OR on 4/29/16 for interval debulking and reversal of her ileostomy. She had extensive disease, but we were able to resect the majority of her disease. One week later she developed an anastomotic leak requiring reexploration and recreation of an ileostomy. She had another prolonged hospitalization, but recovered well since surgery. We then reinitiated Taxol/Carboplatin chemotherapy, but did reduce the dose of Taxol to 135 mg/m2. She completed 4 mores cycles. Her CA-125 normalized and we stopped treatment.      On 2/7, she presented to Dr. Diaz Cea office with her  to discuss her follow up/surveillance CT scan. Unfortunately, it demonstrated progression of disease, although minimally worse than her prior scan 2 months previously. She was actually feeling better than she did at that time. After review of scans and disease, the decision was made to begin Doxil/Avastin Q28 days for 6 cycles. She began this on 2/27/2017.  In April 2017, she had completed 3 cycles of Doxil/Avastin and had CT scans at that time showed she had evidence of recurrent disease. She was switched at that time to [facility]. She has been admitted to [facility] several times for partial SBO and all have resolved with consecutive management but this, in combination of her continued rise in CA-125 had begun to take an emotional toll on her. After having \"one of the worst weeks in years\" recently, she met with Dr. Venice Maguire with her  present to discuss options. After a long thoughtful discussion, Dr. Venice Maguire suggested that we give single agent Carboplatin a try since it had been over a year since she has tried it. Subjective:   Pt called yesterday with complaint of abdominal pain and nausea. She said her ostomy was working, but her pain had been increasing since receiving her first dose of Carbo last week. She was not able to take in too much in the way of food or liquids in last 24 hours and said she was becoming dehydrated. She has been taking her Reglan the last 2 days to see if it helped any and did take some MOM, 1/2 dose, yesterday as she felt her output had slowed down and was adding to her nausea. She said it did help some. I arranged for her to come in today to receive hydration. She presents today looking thinner than last week and has lost 4 lbs and is now down to 98 lbs. She says she is trying to eat and is taking nutritional supplements along with her protein smoothies. She says she is mostly nauseated and has only vomited a very small amount in the last 2 days. She says the pain in her abdomen persist, but actually was better yesterday and stayed improved until about noon today when she said it began to get worse again. She is taking her dilaudid \"almost every 4 hours around the clock the last 48 hours\". She continues with the Fentanyl patch at 100 mcg and is due to change it tomorrow.    She is wondering if the Dilaudid is adding to her almost constant nausea            Review of Systems:  General: Noted  4 lb wt loss since last week. Trying to eat and drink as able, but nausea makes it difficult. HEENT: Denies visual changes, dysphagia or headache  Resp: Denies SOB, DUDLEY, wheezing or cough  CV: Denies CP, palpitations  GI/: abd pain nauesa/vomiting as above. Says ostomy continues to work with liquid/semi formed stool, but has decreased some in the last 2 days. Said the Zofran does not work as well as the phenergan and has been using this mostly. Did try it once and said it still did not work well. Continues with protein shakes when not nauseated and recommendation from nutritionist.    MuskSkel: Denies muscle ache or joint pain  Neuro: Denies dizzyness or syncope    Objective:     Blood pressure 152/89, pulse 71, temperature 98.1 °F (36.7 °C), resp. rate 18, height 5' 3\" (1.6 m), weight 98 lb (44.5 kg), SpO2 98 %. Temp (24hrs), Av.1 °F (36.7 °C), Min:98.1 °F (36.7 °C), Max:98.1 °F (36.7 °C)      _____________________  Physical Exam:     General: A&O X 3. With pleasant affect, but fatigued/tired and frail appearing. HEENT: Oral mucosa pink, dry with no sores or lesions noted. No cervical adenopathy appreciated  Port site: easily accessed without redness, tenderness or swelling  Cardiovascular: Regular without M/R/G  Lungs:CTA bilat without wheezing or rales  Abdomen: Soft very mild tenderness to palpation to mid abdomen and + bowel sounds throughout. Mild firmness persists, unchanged, to left mid abd. Small amount of stool in ostomy bag   Ext: + pedal pulses without edema bilat.   bilat  Neuro: grossly intact      LABS:  Recent Results (from the past 12 hour(s))   CBC WITH AUTOMATED DIFF    Collection Time: 10/26/17  1:15 PM   Result Value Ref Range    WBC 17.6 (H) 3.6 - 11.0 K/uL    RBC 3.68 (L) 3.80 - 5.20 M/uL    HGB 11.3 (L) 11.5 - 16.0 g/dL    HCT 35.2 35.0 - 47.0 %    MCV 95.7 80.0 - 99.0 FL    MCH 30.7 26.0 - 34.0 PG    MCHC 32.1 30.0 - 36.5 g/dL    RDW 15.4 (H) 11.5 - 14.5 %    PLATELET 362 477 - 756 K/uL    NEUTROPHILS 84 (H) 32 - 75 %    LYMPHOCYTES 11 (L) 12 - 49 %    MONOCYTES 5 5 - 13 %    EOSINOPHILS 0 0 - 7 %    BASOPHILS 0 0 - 1 %    ABS. NEUTROPHILS 14.8 (H) 1.8 - 8.0 K/UL    ABS. LYMPHOCYTES 1.9 0.8 - 3.5 K/UL    ABS. MONOCYTES 0.8 0.0 - 1.0 K/UL    ABS. EOSINOPHILS 0.0 0.0 - 0.4 K/UL    ABS. BASOPHILS 0.0 0.0 - 0.1 K/UL   METABOLIC PANEL, COMPREHENSIVE    Collection Time: 10/26/17  1:15 PM   Result Value Ref Range    Sodium 132 (L) 136 - 145 mmol/L    Potassium 3.5 3.5 - 5.1 mmol/L    Chloride 91 (L) 97 - 108 mmol/L    CO2 34 (H) 21 - 32 mmol/L    Anion gap 7 5 - 15 mmol/L    Glucose 108 (H) 65 - 100 mg/dL    BUN 25 (H) 6 - 20 MG/DL    Creatinine 1.24 (H) 0.55 - 1.02 MG/DL    BUN/Creatinine ratio 20 12 - 20      GFR est AA 54 (L) >60 ml/min/1.73m2    GFR est non-AA 45 (L) >60 ml/min/1.73m2    Calcium 9.3 8.5 - 10.1 MG/DL    Bilirubin, total 0.4 0.2 - 1.0 MG/DL    ALT (SGPT) 19 12 - 78 U/L    AST (SGOT) 31 15 - 37 U/L    Alk.  phosphatase 182 (H) 45 - 117 U/L    Protein, total 7.6 6.4 - 8.2 g/dL    Albumin 4.0 3.5 - 5.0 g/dL    Globulin 3.6 2.0 - 4.0 g/dL    A-G Ratio 1.1 1.1 - 2.2     MAGNESIUM    Collection Time: 10/26/17  1:15 PM   Result Value Ref Range    Magnesium 2.0 1.6 - 2.4 mg/dL   URINALYSIS W/MICROSCOPIC    Collection Time: 10/26/17  2:33 PM   Result Value Ref Range    Color YELLOW/STRAW      Appearance CLEAR CLEAR      Specific gravity 1.028 1.003 - 1.030      pH (UA) 7.0 5.0 - 8.0      Protein 30 (A) NEG mg/dL    Glucose NEGATIVE  NEG mg/dL    Ketone NEGATIVE  NEG mg/dL    Bilirubin NEGATIVE  NEG      Blood NEGATIVE  NEG      Urobilinogen 0.2 0.2 - 1.0 EU/dL    Nitrites NEGATIVE  NEG      Leukocyte Esterase NEGATIVE  NEG      WBC 5-10 0 - 4 /hpf    RBC 0-5 0 - 5 /hpf    Epithelial cells FEW FEW /lpf    Bacteria NEGATIVE  NEG /hpf       :10/12/2017= 112 (09/26/2017: =128; 09/13/2017=85; 8/31/2017=87)   (8/9/2017= 83; 7/20/2017= 65; 6/28/17=95.5;  6/8/17= 102; 5/16/17= 145.6:  4/27= 100; 4/2149=198.2 3/22=80; 2/23= 63.9)          PATHOLOGY from 5/10/2017 Upper Endoscopy:  1. Gastric polyp, polypectomy:   Benign polypoid gastric oxyntic mucosa with some features of hyperplastic polyp   2. Lower esophagus, biopsy:   Acute erosive esophagitis of squamous mucosa with ulceration and severe squamous atypia (see comment)   3. Proximal esophagus, biopsy:   Acute esophagitis of squamous mucosa with severe squamous atypia           Imaging:  CT Abd/Pelvis 9/15/2017:  FINDINGS:   The visualized lung bases demonstrate a small right pleural effusion. There are  increased patchy airspace opacities in the right middle and lower lobes. The  left lung and pleural space are clear. The heart size is normal.     There is a small hiatal hernia. An enteric tube terminates in the stomach.     There are stable low-density liver lesions measuring 0.7 x 1.2 cm in the right  liver, previously 0.8 x 1.1 cm. The left liver lesion is stable measuring 0.7 cm  (series 3, image 28).    The spleen is stable with a small calcification. The gallbladder is present  without biliary duct dilatation. The pancreas and adrenal glands are  unremarkable. The kidneys enhance symmetrically with stable bilateral  hydronephrosis.     There are stable clumped and mildly dilated bowel loops in the mid lower  abdomen. Surgical changes are again seen with near complete colectomy with a  right lower abdomen ostomy again demonstrated. The rectum is blind-ending. There  is a stable to slightly increased amount of loculated ascites anteriorly. There  is no free air.     There are no enlarged lymph nodes. The aorta tapers without aneurysm. IVC filter  is present.     The urinary bladder is unremarkable. There is no aggressive bony lesion.       IMPRESSION:   1. Overall stable appearance of the bowel with mildly dilated clumped bowel  loops in the lower mid abdomen.  This may be attributable to peritoneal  carcinomatosis. A right lower abdomen ostomy is again demonstrated. There is no  evidence for complete bowel obstruction.     2. Stable to slightly increased loculated ascites anteriorly.     3. Stable bilateral hydronephrosis.     4. Stable low-density liver lesions.     5. Small right pleural effusion and increased patchy airspace opacities in the  right middle and lower lobes suggesting nonspecific infection or inflammation. .          CT scan of Abdomen/Pelvis with contrast 6/27/2017  FINDINGS:   LUNG BASES: Clear. INCIDENTALLY IMAGED HEART AND MEDIASTINUM: Unremarkable. LIVER: Small hypodense lesions, one in the left hepatic lobe and one in the  right hepatic lobe are stable. GALLBLADDER: Unremarkable. SPLEEN: No mass. PANCREAS: No mass or ductal dilatation. ADRENALS: Unremarkable. KIDNEYS: Hydronephrosis is moderate and stable on the right and mild to moderate  on the left and new, to the level of the mid ureters. There is no definite  obstructing calculus on either side. STOMACH: Unremarkable. SMALL BOWEL: Previously described small bowel centralization is stable. Small  bowel loops are dilated but roughly stable in appearance, with a maximum caliber  of 3.1 cm. Pattern of dilatation is similar to the prior study. Small bowel is  fluid-filled to the level of the ostomy in the right anterior abdominal wall. COLON: Visualized colon shows no distention, but the colon is predominantly  absent. APPENDIX: Surgically absent  PERITONEUM: Small amount of ascites, stable to slightly decreased since the  prior study. RETROPERITONEUM: No lymphadenopathy or aortic aneurysm. IVC filter stable. REPRODUCTIVE ORGANS: Surgically absent. URINARY BLADDER: No mass or calculus. BONES: No destructive bone lesion. ADDITIONAL COMMENTS: N/A       IMPRESSION:     1.  Mild to moderate small bowel distention with centralization of loops as  previously described, similar in appearance to prior study 5/7/2017.  2. Small to moderate amount of ascites, stable. 3. Postoperative changes as described above, stable. 4. Stable hypodense liver lesions. 5. Stable right hydronephrosis to the mid ureter without obvious obstructing  calculus. 6. New left hydronephrosis to the mid ureter without obvious obstructing  calculus.       Assessment:     Patient Active Problem List   Diagnosis Code    Malignant neoplasm of both ovaries ([facility] C56.1, C56.2    Carcinomatosis peritonei ([facility] C78.6, C80.1    Small bowel obstruction K56.609    Ileostomy in place ([facility] Z93.2    Anemia due to chemotherapy D64.81, T45.1X5A    CINV (chemotherapy-induced nausea and vomiting) R11.2, T45.1X5A    Cancer associated pain G89.3    Generalized abdominal pain R10.84    Anxiety about health F41.8    Ileus (HCC) K56.7    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    Hx of pulmonary embolus Z86.711    Elevated serum creatinine R79.89    Dehydration E86.0    Chemotherapy-induced neutropenia (HCC) D70.1, T45.1X5A    Anticoagulation adequate with anticoagulant therapy Z79.01    Counseling regarding end of life decision making Z71.89    Bowel obstruction K56.609    Protein calorie malnutrition ([facility] E46    Malignant ascites R18.0    Hydronephrosis of right kidney N13.30    Isolated proteinuria R80.0    Erosive esophagitis K22.10    Ulcerative esophagitis K22.10    Hypomagnesemia E83.42    Abdominal pain R10.9    Hydronephrosis of left kidney N13.30    Ovarian cancer (HCC) C56.9    Nausea & vomiting R11.2    Carcinomatosis (HCC) C80.0    Intractable vomiting with nausea R11.2    Febrile illness R50.9    Partial bowel obstruction K56.600    Nausea and vomiting R11.2         Plan: Will proceed with hydration of 2 liters of NaCl  Compazine 10 mg IV while here for nausea  Had a ling discussion with pt regarding weather or not to be admitted for symptom management.   She is not thrilled about being admitted, but is concerned about pain. The decision was made by her to try to go home tonight and stay and see how she does after hydration and IV compazine. We have increased her Fentanyl patch to 150 mcg beginning today so as to try to decrease the amount of dilaudid she is taking which may be contributing to her nausea. Pt will call in the morning to let us know how she is doing. If she has any concerns or increase in pain, she will proceed to the ER during the night. We discussed her increase in creat and she will try to get in as much fluid as she can     Noted elevated WBC's secondary to Neulasta last week. U/A negative and no positive symptoms for active infections. Pt will monitor temp and report any chills, fevers, or concerns of infections   She will try scheduled antiemetics tonight and see if it helps any. She will call for any decrease in urination or change in urination/urine. Continue anticoagulation  We will speak tomorrow morning or sooner if needed      Radha Montenegro NP  10/26/2017          ______________________  Medications:    Current Outpatient Prescriptions   Medication Sig Dispense Refill    fentaNYL (DURAGESIC) 50 mcg/hr PATCH 1 Patch by TransDERmal route every seventy-two (72) hours. Max Daily Amount: 1 Patch. This is in ADDITION to your 100 mcg patch for a total of 150 mcg patch every 72 hours 10 Patch 0    fentaNYL (DURAGESIC) 100 mcg/hr PATCH 1 Patch by TransDERmal route every seventy-two (72) hours. Max Daily Amount: 1 Patch. 10 Patch 0    dronabinol (SYNDROS) 5 mg/mL soln Take 2.1 mg by mouth two (2) times a day. Max Daily Amount: 4.2 mg. Take 30 minutes before lunch and dinner 30 mL 1    promethazine (PHENERGAN) 6.25 mg/5 mL syrup Take 20 mL by mouth four (4) times daily as needed for Nausea. 400 mL 4    HYDROmorphone (DILAUDID) 1 mg/mL liqd oral solution Take 4 mL by mouth every four (4) hours as needed.  Max Daily Amount: 24 mg. 475 mL 0    scopolamine (TRANSDERM-SCOP) 1.5 mg (1 mg over 3 days) pt3d 1 Patch by TransDERmal route every seventy-two (72) hours as needed. 10 Patch 1    NEXIUM PACKET 40 mg granules for oral suspension two (2) times a day.  metoclopramide (REGLAN) 5 mg/5 mL syrup Take 10 mL by mouth four (4) times daily as needed. Take before meals as needed 500 mL 3    magnesium oxide (MAG-OX) 400 mg tablet Take 1 Tab by mouth two (2) times a day. Indications: HYPOMAGNESEMIA 60 Tab 2    sucralfate (CARAFATE) 100 mg/mL suspension Take 5 mL by mouth four (4) times daily. 414 mL 3    polyethylene glycol (MIRALAX) 17 gram/dose powder Take 17 g by mouth daily as needed. 238 g 3    apixaban (ELIQUIS) 2.5 mg tablet Take 1 Tab by mouth two (2) times a day. 60 Tab 6    zolpidem (AMBIEN) 10 mg tablet Take 1 Tab by mouth nightly as needed for Sleep. Max Daily Amount: 10 mg. 30 Tab 1    ondansetron (ZOFRAN ODT) 4 mg disintegrating tablet Take 1 Tab by mouth every eight (8) hours as needed for Nausea. 30 Tab 2    Calcium-Vitamin D3-Vitamin K [id number] mg-unit-mcg chew Take 1 Tab by mouth two (2) times a day.  metoprolol (LOPRESSOR) 100 mg tablet Take 50 mg by mouth two (2) times a day.       lidocaine-prilocaine (EMLA) topical cream Apply small amount over port area and cover with a band aid 1 hour before chemo treatment 30 g 3     Current Facility-Administered Medications   Medication Dose Route Frequency Provider Last Rate Last Dose    sodium chloride (NS) flush 10-40 mL  10-40 mL IntraVENous PRN Clifford FloristonMD melany        sodium chloride 0.9 % injection 10 mL  10 mL IntraVENous PRN Quincy Floriston., MD        heparin (porcine) pf 500 Units  500 Units IntraVENous PRN Clifford FloristonMD melany        sodium chloride (NS) flush 10-40 mL  10-40 mL IntraVENous PRN Quincy Floriston., MD        sodium chloride 0.9 % injection 10 mL  10 mL IntraVENous PRN Clifford Floriston., MD        heparin (porcine) pf 500 Units  500 Units IntraVENous PRN Clifford FloristonMD melany

## 2017-10-28 LAB
BACTERIA SPEC CULT: ABNORMAL
BACTERIA SPEC CULT: ABNORMAL
CC UR VC: ABNORMAL
SERVICE CMNT-IMP: ABNORMAL

## 2017-10-30 DIAGNOSIS — R18.0 MALIGNANT ASCITES: ICD-10-CM

## 2017-10-30 DIAGNOSIS — I82.890 THROMBOSIS OF PELVIC VEIN: ICD-10-CM

## 2017-10-30 DIAGNOSIS — C78.6 CARCINOMATOSIS PERITONEI (HCC): ICD-10-CM

## 2017-10-30 DIAGNOSIS — K56.609 SMALL BOWEL OBSTRUCTION (HCC): ICD-10-CM

## 2017-10-30 DIAGNOSIS — G89.3 CANCER ASSOCIATED PAIN: ICD-10-CM

## 2017-10-30 DIAGNOSIS — K22.10 ULCERATIVE ESOPHAGITIS: ICD-10-CM

## 2017-10-30 DIAGNOSIS — K22.10 EROSIVE ESOPHAGITIS: ICD-10-CM

## 2017-10-30 DIAGNOSIS — C56.9 OVARIAN CANCER, UNSPECIFIED LATERALITY (HCC): ICD-10-CM

## 2017-10-30 RX ORDER — METOCLOPRAMIDE HYDROCHLORIDE 5 MG/5ML
10 SOLUTION ORAL
Qty: 500 ML | Refills: 3 | Status: SHIPPED | OUTPATIENT
Start: 2017-10-30 | End: 2018-02-15 | Stop reason: SDUPTHER

## 2017-10-30 NOTE — TELEPHONE ENCOUNTER
From: Vianney Sommer  To: Christiano Melgar NP  Sent: 10/30/2017 3:05 PM EDT  Subject: Medication Renewal Request    Original authorizing provider: HAYDER Walker. Luis would like a refill of the following medications:  metoclopramide (REGLAN) 5 mg/5 mL syrup Christiano Melgar NP]    Preferred pharmacy: [address] [facility]    Comment:  Could you please call this in to the [facility] in [facility]? Thanks.  Chino Smith

## 2017-11-01 ENCOUNTER — TELEPHONE (OUTPATIENT)
Dept: GYNECOLOGY | Age: 57
End: 2017-11-01

## 2017-11-01 NOTE — TELEPHONE ENCOUNTER
Pt called saying her pain is better, but she continues with nausea \"almost constantly\". Acknowledges only vomiting 3 times in last week. Says she feels she has been eating well despite this. Wt is currently stable at 98 lbs. She continues to use scop patch as well as phenergan elixir. She says she has not tried the Zofran because it never really helped before. We discussed options and since her ostomy is functioning well and she is eating, will try some decadron 4 mg now, then may repeat later if no or little relief. She has Ativan at home from Dr. Brett Sanders. Said she has never had a problem with the \"pill form\" of ativan, only when she got it IV in the hospital once and she reiterated that she was getting \"a whole bunch of meds at that time too\". She will try an ativan today and see if it lessens the nausea any. We discussed the Beta Strep in her urine and her persistent lower back discomfort and will treat it after trying the decadron/ativan for a day. She will call tomorrow to let us know how she is doing or sooner if needed.

## 2017-11-02 ENCOUNTER — APPOINTMENT (OUTPATIENT)
Dept: INFUSION THERAPY | Age: 57
End: 2017-11-02
Payer: OTHER GOVERNMENT

## 2017-11-07 ENCOUNTER — APPOINTMENT (OUTPATIENT)
Dept: INFUSION THERAPY | Age: 57
End: 2017-11-07
Payer: OTHER GOVERNMENT

## 2017-11-08 DIAGNOSIS — C56.3 MALIGNANT NEOPLASM OF BOTH OVARIES (HCC): Primary | ICD-10-CM

## 2017-11-09 ENCOUNTER — OFFICE VISIT (OUTPATIENT)
Dept: GYNECOLOGY | Age: 57
End: 2017-11-09

## 2017-11-09 VITALS
HEIGHT: 63 IN | HEART RATE: 64 BPM | SYSTOLIC BLOOD PRESSURE: 97 MMHG | DIASTOLIC BLOOD PRESSURE: 63 MMHG | WEIGHT: 103.4 LBS | BODY MASS INDEX: 18.32 KG/M2

## 2017-11-09 DIAGNOSIS — T45.1X5A ANEMIA DUE TO CHEMOTHERAPY: ICD-10-CM

## 2017-11-09 DIAGNOSIS — C56.9 OVARIAN CANCER, UNSPECIFIED LATERALITY (HCC): ICD-10-CM

## 2017-11-09 DIAGNOSIS — D64.81 ANEMIA DUE TO CHEMOTHERAPY: ICD-10-CM

## 2017-11-09 DIAGNOSIS — G89.3 CANCER ASSOCIATED PAIN: ICD-10-CM

## 2017-11-09 DIAGNOSIS — R11.2 CINV (CHEMOTHERAPY-INDUCED NAUSEA AND VOMITING): ICD-10-CM

## 2017-11-09 DIAGNOSIS — D70.1 CHEMOTHERAPY-INDUCED NEUTROPENIA (HCC): ICD-10-CM

## 2017-11-09 DIAGNOSIS — E87.1 HYPONATREMIA: ICD-10-CM

## 2017-11-09 DIAGNOSIS — C78.6 CARCINOMATOSIS PERITONEI (HCC): ICD-10-CM

## 2017-11-09 DIAGNOSIS — C56.3 MALIGNANT NEOPLASM OF BOTH OVARIES (HCC): ICD-10-CM

## 2017-11-09 DIAGNOSIS — R10.84 GENERALIZED ABDOMINAL PAIN: ICD-10-CM

## 2017-11-09 DIAGNOSIS — T45.1X5A CHEMOTHERAPY-INDUCED NEUTROPENIA (HCC): ICD-10-CM

## 2017-11-09 DIAGNOSIS — T45.1X5A CINV (CHEMOTHERAPY-INDUCED NAUSEA AND VOMITING): ICD-10-CM

## 2017-11-09 DIAGNOSIS — Z93.2 ILEOSTOMY IN PLACE (HCC): ICD-10-CM

## 2017-11-09 DIAGNOSIS — F41.8 ANXIETY ABOUT HEALTH: ICD-10-CM

## 2017-11-09 RX ORDER — DEXAMETHASONE 4 MG/1
TABLET ORAL
Qty: 30 TAB | Refills: 4 | Status: SHIPPED | OUTPATIENT
Start: 2017-11-09 | End: 2018-02-05 | Stop reason: SDUPTHER

## 2017-11-09 RX ORDER — FENTANYL 50 UG/1
1 PATCH TRANSDERMAL
Qty: 10 PATCH | Refills: 0 | Status: SHIPPED | OUTPATIENT
Start: 2017-11-09 | End: 2017-12-14 | Stop reason: SDUPTHER

## 2017-11-09 RX ORDER — METOPROLOL TARTRATE 50 MG/1
TABLET ORAL 2 TIMES DAILY
COMMUNITY
End: 2018-04-10 | Stop reason: ALTCHOICE

## 2017-11-09 RX ORDER — FENTANYL 100 UG/H
1 PATCH TRANSDERMAL
Qty: 10 PATCH | Refills: 0 | Status: SHIPPED | OUTPATIENT
Start: 2017-11-09 | End: 2017-12-14 | Stop reason: SDUPTHER

## 2017-11-09 NOTE — PROGRESS NOTES
[facility] [address], [address] [facility]  26 [id number] [phone]  MD Radha Giron MD  Office Note  Patient ID:  Name:  Carl Siddiqui  MRN:  [id number]  :  1960/57 y.o. Date:  2017      HISTORY OF PRESENT ILLNESS:  Carl Siddiqui is a 61 y.o.  postmenopausal female with a diagnosis of stage IV ovarian cancer. She underwent exploratory laparotomy with suboptimal debulking in 2015. She had extensive disease. She was readmitted about a week later with obstruction and subsequently had a cecal perforation, requiring resection, end ileostomy, and mucous fistula. During the hospitalization she was also diagnosed with pulmonary embolus and had chest tubes placed for bilateral pleural effusions. She had a prolonged hospital course and actually received a dose of cisplatin chemotherapy while in the hospital to help dry up the effusions. She completed 6 cycles of Taxol/Carboplatin chemotherapy following her initial debulking. She  did relatively well, considering she had an ileostomy to deal with during chemotherapy. I took her to the OR on 16 for interval debulking and reversal of her ileostomy. She had extensive disease, but we were able to resect the majority of her disease. One week later she developed an anastomotic leak requiring reexploration and recreation of an ileostomy. She had another prolonged hospitalization, but recovered well since surgery. We then reinitiated Taxol/Carboplatin chemotherapy, but did reduce the dose of Taxol to 135 mg/m2. She completed 4 mores cycles. Her CA-125 normalized and we stopped treatment. She then had evidence of recurrent disease and for a while was receiving Doxil/Avastin. She completed 3 cycles of that regimen. She was admitted to [facility] several time since during that regimen with evidence of partial small bowel obstruction.   The symptoms did resolved each time with conservative management. She had increased ascites on a follow up CT scan in June 2017 but no significant change in her disease otherwise. Her CA-125 also went up. All of this suggested progression of disease. We switched her chemotherapy to [facility] 5/3/17. She was in the middle of her 7th cycle and her CA-125 had been rising over the last few draws. She has been hospitalized almost every cycle for the last few cycles for GI issues. After her 9/26/17 infusion, she presented to the office saying it was one of the roughest she has had in the last couple of years. After long discussion with Dr. Cash Ventura she was switched to Carboplatin Q28 days starting on 10/17/17. She had a rough 10 days post Lety, but after some symptom management adjustments  (had her on Decadron for 3 days and 2 days of milk of mag for constipation not relieved with Miralax), she presents today for her pre-chemo visit saying she has not felt this good in [place] very long time\". Her  is with her and agrees she has been eating and doing more than she has in a long time. Both are in very good spirits.      Problem List:  Patient Active Problem List    Diagnosis Date Noted    Nausea and vomiting 09/14/2017    Partial bowel obstruction 09/13/2017    Intractable vomiting with nausea 08/10/2017    Febrile illness 08/10/2017    Ovarian cancer ([address] 08/09/2017    Nausea & vomiting 08/09/2017    Carcinomatosis ([address] 08/09/2017    Hydronephrosis of left kidney 06/28/2017    Abdominal pain 06/27/2017    Hypomagnesemia 06/13/2017    Erosive esophagitis 06/07/2017    Ulcerative esophagitis 06/07/2017    Isolated proteinuria 05/25/2017    Malignant ascites 05/08/2017    Hydronephrosis of right kidney 05/08/2017    Protein calorie malnutrition ([address] 04/28/2017    Bowel obstruction 04/27/2017    Counseling regarding end of life decision making 04/25/2017    Anticoagulation adequate with anticoagulant therapy 03/28/2017    Chemotherapy-induced neutropenia (HCC) 03/22/2017    Ileus ([address] 03/21/2017    Thrombosis of pelvic vein 03/21/2017    Hx of pulmonary embolus 03/21/2017    Elevated serum creatinine 03/21/2017    Dehydration 03/21/2017    Cancer associated pain 05/13/2016    Generalized abdominal pain 05/13/2016    Anxiety about health 05/13/2016    CINV (chemotherapy-induced nausea and vomiting) 03/22/2016    Anemia due to chemotherapy 02/22/2016    Ileostomy in place [facility]) 02/15/2016    Carcinomatosis peritonei ([address] 10/29/2015    Small bowel obstruction 10/29/2015    Malignant neoplasm of both ovaries ([address] 10/12/2015     PMH:  Past Medical History:   Diagnosis Date    Abdominal carcinomatosis ([address] 10/2015    Arthritis     left shoulder    BRCA negative 12/2015    Chronic pain     Depression     HX    Environmental allergies     GERD (gastroesophageal reflux disease)     Hypertension     NEW OVER PAST 5-6 MONTHS    Ovarian cancer ([address] 10/2015    serous adenocarcinoma, high grade, stage IIIC    Pulmonary embolism ([address] 10/2015      PSH:  Past Surgical History:   Procedure Laterality Date    CARDIAC SURG PROCEDURE UNLIST  12/11/15    cardiac cath/normal     HX BREAST BIOPSY  1995    benign right breast bx    HX DILATION AND CURETTAGE  2/2011    POLYPECTOMY    HX GI  2015    PERFORATED BOWEL; ILEOSTOMY    HX GYN  10/2015    EXP LAPAROTOMY    HX HEENT  LAST 2005    oral tissue graft X3    HX HEENT  1970    tonsillectomy    HX LAPAROTOMY  10/2015    tumor sampling    HX LAPAROTOMY  4/2016    hysterectomy, BSO, radical debulking    HX LAPAROTOMY  5/2016    resection ileocolic anastomosis, leak, ileostomy    HX OTHER SURGICAL  11/2015    tunneled portacath      Social History:  Social History   Substance Use Topics    Smoking status: Never Smoker    Smokeless tobacco: Never Used    Alcohol use No      Family History:  Family History   Problem Relation Age of Onset    Cancer Maternal Uncle      skin    Hypertension Mother     High Cholesterol Mother     Anxiety Mother     Heart Disease Mother     High Cholesterol Father     Hypertension Father     Stroke Father    Abril Trivedi Sister     Migraines Sister     Other Sister      FIBROMYALGIA    Depression Brother     Other Brother      DRUG ABUSE HEPATITIS C    Migraines Sister     Arrhythmia Sister     Depression Sister     Lung Disease Paternal Aunt      COPD    Cancer Paternal Uncle      LUNG    Lung Disease Paternal Uncle      COPD    Lung Disease Maternal Grandmother      COPD    Anesth Problems Neg Hx       Medications: (reviewed)  Current Outpatient Prescriptions   Medication Sig    metoprolol tartrate (LOPRESSOR) 50 mg tablet Take  by mouth two (2) times a day.  fentaNYL (DURAGESIC) 50 mcg/hr PATCH 1 Patch by TransDERmal route every seventy-two (72) hours. Max Daily Amount: 1 Patch. This is in ADDITION to your 100 mcg patch for a total of 150 mcg patch every 72 hours    fentaNYL (DURAGESIC) 100 mcg/hr PATCH 1 Patch by TransDERmal route every seventy-two (72) hours. Max Daily Amount: 1 Patch.  dexamethasone (DECADRON) 4 mg tablet Take 2 tabs QAM day before chemo and for 2 days after    metoclopramide (REGLAN) 5 mg/5 mL syrup Take 10 mL by mouth four (4) times daily as needed. Take before meals as needed    dronabinol (SYNDROS) 5 mg/mL soln Take 2.1 mg by mouth two (2) times a day. Max Daily Amount: 4.2 mg. Take 30 minutes before lunch and dinner    scopolamine (TRANSDERM-SCOP) 1.5 mg (1 mg over 3 days) pt3d 1 Patch by TransDERmal route every seventy-two (72) hours as needed.  NEXIUM PACKET 40 mg granules for oral suspension two (2) times a day.  magnesium oxide (MAG-OX) 400 mg tablet Take 1 Tab by mouth two (2) times a day. Indications: HYPOMAGNESEMIA    sucralfate (CARAFATE) 100 mg/mL suspension Take 5 mL by mouth four (4) times daily.     apixaban (ELIQUIS) 2.5 mg tablet Take 1 Tab by mouth two (2) times a day.  promethazine (PHENERGAN) 6.25 mg/5 mL syrup Take 20 mL by mouth four (4) times daily as needed for Nausea.  HYDROmorphone (DILAUDID) 1 mg/mL liqd oral solution Take 4 mL by mouth every four (4) hours as needed. Max Daily Amount: 24 mg.  polyethylene glycol (MIRALAX) 17 gram/dose powder Take 17 g by mouth daily as needed.  zolpidem (AMBIEN) 10 mg tablet Take 1 Tab by mouth nightly as needed for Sleep. Max Daily Amount: 10 mg.    ondansetron (ZOFRAN ODT) 4 mg disintegrating tablet Take 1 Tab by mouth every eight (8) hours as needed for Nausea.  metoprolol (LOPRESSOR) 100 mg tablet Take 50 mg by mouth two (2) times a day.  lidocaine-prilocaine (EMLA) topical cream Apply small amount over port area and cover with a band aid 1 hour before chemo treatment     No current facility-administered medications for this visit. Allergies: (reviewed)  Allergies   Allergen Reactions    Ativan [Lorazepam] Other (comments)     SEVERE CONFUSION          OBJECTIVE:    Physical Exam:  VITAL SIGNS: Vitals:    11/09/17 1343   BP: 97/63   Pulse: 64   Weight: 103 lb 6.4 oz (46.9 kg)   Height: 5' 3\" (1.6 m)     Body mass index is 18.32 kg/(m^2). GENERAL AVA: Conversant, alert, oriented. No acute distress. Pleasant affect   HEENT:  Pupils equal and reactive to light. Oral mucosa pink/moist                    Neck: No JVD or adenopathy appreciated   RESPIRATORY: CTA bilat without wheezing or rales. CARDIOVASC: Regula without murmur/rub. GASTROINT: soft, non-tender, without masses or organomegaly; ileostomy in RLQ functioning well with tan stool present. MUSCULOSKEL: no joint tenderness, deformity or swelling   EXTREMITIES: extremities normal, atraumatic, no cyanosis or edema   PELVIC: Deferred   RECTAL: Deferred   NEURO: Grossly intact. No acute deficit.        Lab Results   Component Value Date/Time    WBC 17.6 10/26/2017 01:15 PM    HGB 11.3 10/26/2017 01:15 PM    HCT 35.2 10/26/2017 01:15 PM    PLATELET 319 99/64/9305 01:15 PM    MCV 95.7 10/26/2017 01:15 PM     Lab Results   Component Value Date/Time    Sodium 132 10/26/2017 01:15 PM    Potassium 3.5 10/26/2017 01:15 PM    Chloride 91 10/26/2017 01:15 PM    CO2 34 10/26/2017 01:15 PM    Anion gap 7 10/26/2017 01:15 PM    Glucose 108 10/26/2017 01:15 PM    BUN 25 10/26/2017 01:15 PM    Creatinine 1.24 10/26/2017 01:15 PM    BUN/Creatinine ratio 20 10/26/2017 01:15 PM    GFR est AA 54 10/26/2017 01:15 PM    GFR est non-AA 45 10/26/2017 01:15 PM    Calcium 9.3 10/26/2017 01:15 PM       CT Abd/Pelvis 9/15/2017:  FINDINGS:   The visualized lung bases demonstrate a small right pleural effusion. There are  increased patchy airspace opacities in the right middle and lower lobes. The  left lung and pleural space are clear. The heart size is normal.     There is a small hiatal hernia. An enteric tube terminates in the stomach.     There are stable low-density liver lesions measuring 0.7 x 1.2 cm in the right  liver, previously 0.8 x 1.1 cm. The left liver lesion is stable measuring 0.7 cm  (series 3, image 28).    The spleen is stable with a small calcification. The gallbladder is present  without biliary duct dilatation. The pancreas and adrenal glands are  unremarkable. The kidneys enhance symmetrically with stable bilateral  hydronephrosis.     There are stable clumped and mildly dilated bowel loops in the mid lower  abdomen. Surgical changes are again seen with near complete colectomy with a  right lower abdomen ostomy again demonstrated. The rectum is blind-ending. There  is a stable to slightly increased amount of loculated ascites anteriorly. There  is no free air.     There are no enlarged lymph nodes. The aorta tapers without aneurysm. IVC filter  is present.     The urinary bladder is unremarkable.     There is no aggressive bony lesion. IMPRESSION:   1.  Overall stable appearance of the bowel with mildly dilated clumped bowel  loops in the lower mid abdomen. This may be attributable to peritoneal  carcinomatosis. A right lower abdomen ostomy is again demonstrated. There is no  evidence for complete bowel obstruction.     2. Stable to slightly increased loculated ascites anteriorly.     3. Stable bilateral hydronephrosis.     4. Stable low-density liver lesions.     5. Small right pleural effusion and increased patchy airspace opacities in the  right middle and lower lobes suggesting nonspecific infection or inflammation. IMPRESSION/PLAN:  Kera Mondragon is a 61 y.o. female with a diagnosis of stage IV ovarian cancer. She is s/p suboptimal debulking, followed by a second laparotomy for cecal perforation. She completed 6 cycles of Taxol/Carboplatin chemotherapy, in addition to the one dose of cisplatin given during her initial hospitalization. Based upon her CT and her CA-125, she responded well, though there was still disease remaining. She underwent interval debulking with resection of the majority of her disease, although there was still small volume carcinomatosis remaining. Dr. Manuela Herrera recommended continuing with Taxol/Carboplatin, as she did respond well, despite still having residual disease at her interval debulking. He dropped the dose of Taxol to 135 mg/m2 to reduce the chance of neuropathy, but kept the carboplatin dose at an AUC of 6. She completed 4 more cycles and her CA-125 normalized. He then stopped therapy. She subsequently developed recurrent disease and he recommended Doxil/Avastin, considering it had been less than 6 months since we stopped Taxol/Carboplatin. She completed 3 cycles of that regimen but found to have progression of disease. She was then switched to [employer]. She has completed 6 cycles. Due to the difficulty she has had with Gemzar, and the fact that her CA-125 is rising, He recommended that we stop the Gemzar at this point.   We previously discussed the possibility of switching to Topotecan. It has been over a year since she last received a platinum agent. He suggested that we give single agent Carboplatin a try because she would tolerate it better than the Topotecan and it would be just as likely to work. She began this on 10/17 and had a rough 10 days, but is now doing well and pleased with how she is feeling and functioning   We will check labs today and proceed with cycle 2 on 11/14. Re-fill of Fentanyl 150 mg patch given and we discussed taking Decadron the day before treatment and for 2 days after. She feels this is a good plan and knows we will adjust as needed going forward. She will call with any concerns or questions.    Patient seen with Alondra Rucker NP      Signed By: Michelle Schaeffer MD    11/9/2017/10:35 AM

## 2017-11-10 ENCOUNTER — TELEPHONE (OUTPATIENT)
Dept: GYNECOLOGY | Age: 57
End: 2017-11-10

## 2017-11-10 LAB
ALBUMIN SERPL-MCNC: 4.1 G/DL (ref 3.5–5.5)
ALBUMIN/GLOB SERPL: 1.7 {RATIO} (ref 1.2–2.2)
ALP SERPL-CCNC: 111 IU/L (ref 39–117)
ALT SERPL-CCNC: 22 IU/L (ref 0–32)
AST SERPL-CCNC: 21 IU/L (ref 0–40)
BASOPHILS # BLD AUTO: 0 X10E3/UL (ref 0–0.2)
BASOPHILS NFR BLD AUTO: 0 %
BILIRUB SERPL-MCNC: 0.4 MG/DL (ref 0–1.2)
BUN SERPL-MCNC: 23 MG/DL (ref 6–24)
BUN/CREAT SERPL: 26 (ref 9–23)
CALCIUM SERPL-MCNC: 9 MG/DL (ref 8.7–10.2)
CANCER AG125 SERPL-ACNC: 89.1 U/ML (ref 0–38.1)
CHLORIDE SERPL-SCNC: 95 MMOL/L (ref 96–106)
CO2 SERPL-SCNC: 27 MMOL/L (ref 18–29)
CREAT SERPL-MCNC: 0.87 MG/DL (ref 0.57–1)
EOSINOPHIL # BLD AUTO: 0.1 X10E3/UL (ref 0–0.4)
EOSINOPHIL NFR BLD AUTO: 2 %
ERYTHROCYTE [DISTWIDTH] IN BLOOD BY AUTOMATED COUNT: 17.6 % (ref 12.3–15.4)
GFR SERPLBLD CREATININE-BSD FMLA CKD-EPI: 74 ML/MIN/1.73
GFR SERPLBLD CREATININE-BSD FMLA CKD-EPI: 85 ML/MIN/1.73
GLOBULIN SER CALC-MCNC: 2.4 G/DL (ref 1.5–4.5)
GLUCOSE SERPL-MCNC: 93 MG/DL (ref 65–99)
HCT VFR BLD AUTO: 30.7 % (ref 34–46.6)
HGB BLD-MCNC: 10.2 G/DL (ref 11.1–15.9)
IMM GRANULOCYTES # BLD: 0 X10E3/UL (ref 0–0.1)
IMM GRANULOCYTES NFR BLD: 0 %
LYMPHOCYTES # BLD AUTO: 2.1 X10E3/UL (ref 0.7–3.1)
LYMPHOCYTES NFR BLD AUTO: 26 %
MAGNESIUM SERPL-MCNC: 1.6 MG/DL (ref 1.6–2.3)
MCH RBC QN AUTO: 31.8 PG (ref 26.6–33)
MCHC RBC AUTO-ENTMCNC: 33.2 G/DL (ref 31.5–35.7)
MCV RBC AUTO: 96 FL (ref 79–97)
MONOCYTES # BLD AUTO: 0.5 X10E3/UL (ref 0.1–0.9)
MONOCYTES NFR BLD AUTO: 6 %
NEUTROPHILS # BLD AUTO: 5.5 X10E3/UL (ref 1.4–7)
NEUTROPHILS NFR BLD AUTO: 66 %
PLATELET # BLD AUTO: 194 X10E3/UL (ref 150–379)
POTASSIUM SERPL-SCNC: 4.5 MMOL/L (ref 3.5–5.2)
PROT SERPL-MCNC: 6.5 G/DL (ref 6–8.5)
RBC # BLD AUTO: 3.21 X10E6/UL (ref 3.77–5.28)
SODIUM SERPL-SCNC: 137 MMOL/L (ref 134–144)
WBC # BLD AUTO: 8.2 X10E3/UL (ref 3.4–10.8)

## 2017-11-10 NOTE — TELEPHONE ENCOUNTER
Pt called this morning to day she began throwing up during the night last night. She took Phenergan with some relief at 0230. She said her ostomy bag was sluggish and she took MOM this morning. She also took 4 mg Decadron early this morning. Says she has not thrown up since about 4. Feels \"slightly nauseated\" now, but has not taken anything further since 0230. We discussed hydration and she will see if she can get it locally, otherwise, we will try to get her in down here either today or tomorrow. She will take another 4 mg of Decadron as well as Reglan and keep up on her phenergan scheduled Q6hr for today. She will let us know how she is doing later as well as if she is able to get her hydration up there.         Update: 1515: pt was unable to obtain her hydration up in [facility] and will come to [facility] tomorrow at 10 AM and receive 2 liters of hydration and we will have phenergan IV availiabe if needed  Dr. Cisco Espinosa is on call and aware

## 2017-11-11 ENCOUNTER — HOSPITAL ENCOUNTER (OUTPATIENT)
Dept: INFUSION THERAPY | Age: 57
Discharge: HOME | End: 2017-11-11
Payer: OTHER GOVERNMENT

## 2017-11-11 VITALS
RESPIRATION RATE: 16 BRPM | HEART RATE: 64 BPM | DIASTOLIC BLOOD PRESSURE: 54 MMHG | TEMPERATURE: 96.7 F | SYSTOLIC BLOOD PRESSURE: 96 MMHG

## 2017-11-11 PROCEDURE — 96374 THER/PROPH/DIAG INJ IV PUSH: CPT

## 2017-11-11 PROCEDURE — 74011250636 HC RX REV CODE- 250/636: Performed by: NURSE PRACTITIONER

## 2017-11-11 PROCEDURE — 74011000258 HC RX REV CODE- 258: Performed by: OBSTETRICS & GYNECOLOGY

## 2017-11-11 PROCEDURE — 96360 HYDRATION IV INFUSION INIT: CPT

## 2017-11-11 PROCEDURE — 74011000250 HC RX REV CODE- 250: Performed by: INTERNAL MEDICINE

## 2017-11-11 PROCEDURE — 96375 TX/PRO/DX INJ NEW DRUG ADDON: CPT

## 2017-11-11 PROCEDURE — 77030012965 HC NDL HUBR BBMI -A

## 2017-11-11 PROCEDURE — 74011250636 HC RX REV CODE- 250/636: Performed by: INTERNAL MEDICINE

## 2017-11-11 PROCEDURE — 96361 HYDRATE IV INFUSION ADD-ON: CPT

## 2017-11-11 PROCEDURE — 99211 OFF/OP EST MAY X REQ PHY/QHP: CPT

## 2017-11-11 PROCEDURE — 74011250636 HC RX REV CODE- 250/636: Performed by: OBSTETRICS & GYNECOLOGY

## 2017-11-11 RX ORDER — SODIUM CHLORIDE 0.9 % (FLUSH) 0.9 %
10 SYRINGE (ML) INJECTION AS NEEDED
Status: ACTIVE | OUTPATIENT
Start: 2017-11-11 | End: 2017-11-12

## 2017-11-11 RX ORDER — SODIUM CHLORIDE 9 MG/ML
10 INJECTION INTRAMUSCULAR; INTRAVENOUS; SUBCUTANEOUS AS NEEDED
Status: ACTIVE | OUTPATIENT
Start: 2017-11-11 | End: 2017-11-12

## 2017-11-11 RX ORDER — HEPARIN 100 UNIT/ML
500 SYRINGE INTRAVENOUS AS NEEDED
Status: ACTIVE | OUTPATIENT
Start: 2017-11-11 | End: 2017-11-12

## 2017-11-11 RX ADMIN — Medication 10 ML: at 12:40

## 2017-11-11 RX ADMIN — SODIUM CHLORIDE, PRESERVATIVE FREE 500 UNITS: 5 INJECTION INTRAVENOUS at 12:40

## 2017-11-11 RX ADMIN — SODIUM CHLORIDE 10 ML: 9 INJECTION INTRAMUSCULAR; INTRAVENOUS; SUBCUTANEOUS at 10:30

## 2017-11-11 RX ADMIN — PROMETHAZINE HYDROCHLORIDE 12.5 MG: 25 INJECTION INTRAMUSCULAR; INTRAVENOUS at 11:00

## 2017-11-11 RX ADMIN — SODIUM CHLORIDE 2000 ML: 900 INJECTION, SOLUTION INTRAVENOUS at 10:30

## 2017-11-11 NOTE — PROGRESS NOTES
[facility] VISIT NOTE    1020  Pt arrived at [facility] ambulatory and in no distress for hydration. Assessment completed, pt c/o nausea, but no vomiting this morning. She took one dose of phenergan PO with no relief early this morning. Called and spoke with Dr. Edita Hall and received orders for 2 liters NS IV hydration over 2 hours and 12.5mg IVPB Phenergan today. Blood pressure 116/65, pulse 69, temperature 98.1 °F (36.7 °C), resp. rate 16, not currently breastfeeding. Left chest port accessed with 0.75 in garcia no difficulty. Positive blood return noted and labs drawn. Medications received:  2 liters NS IV  Phenergan IVPB    Blood pressure 96/54, pulse 64, temperature 96.7 °F (35.9 °C), resp. rate 16, not currently breastfeeding. BP was on the lower side and I encouraged the patient to check her BP at home before taking BP meds. She's going to hold her BP meds if the systolic is greater than 288 and call her PCP this week if needed. Tolerated treatment well, no adverse reaction noted. Port de-accessed and flushed per protocol. Positive blood return noted. 1245  D/C'd from [facility] ambulatory and in no distress accompanied by her .  Next appointment is 11/14/17 at [address] [place] [address].

## 2017-11-13 RX ORDER — GRANISETRON HYDROCHLORIDE 1 MG/ML
1 INJECTION INTRAVENOUS ONCE
Status: COMPLETED | OUTPATIENT
Start: 2017-11-14 | End: 2017-11-14

## 2017-11-14 ENCOUNTER — HOSPITAL ENCOUNTER (OUTPATIENT)
Dept: INFUSION THERAPY | Age: 57
Discharge: HOME | End: 2017-11-14
Payer: OTHER GOVERNMENT

## 2017-11-14 VITALS
RESPIRATION RATE: 16 BRPM | HEART RATE: 60 BPM | SYSTOLIC BLOOD PRESSURE: 132 MMHG | TEMPERATURE: 97.2 F | DIASTOLIC BLOOD PRESSURE: 68 MMHG | HEIGHT: 63 IN | WEIGHT: 105.6 LBS | BODY MASS INDEX: 18.71 KG/M2

## 2017-11-14 PROCEDURE — 74011250636 HC RX REV CODE- 250/636: Performed by: OBSTETRICS & GYNECOLOGY

## 2017-11-14 PROCEDURE — 74011000258 HC RX REV CODE- 258: Performed by: OBSTETRICS & GYNECOLOGY

## 2017-11-14 PROCEDURE — 96375 TX/PRO/DX INJ NEW DRUG ADDON: CPT

## 2017-11-14 PROCEDURE — 74011000250 HC RX REV CODE- 250: Performed by: OBSTETRICS & GYNECOLOGY

## 2017-11-14 PROCEDURE — 96413 CHEMO IV INFUSION 1 HR: CPT

## 2017-11-14 PROCEDURE — 96377 APPLICATON ON-BODY INJECTOR: CPT

## 2017-11-14 PROCEDURE — 77030012965 HC NDL HUBR BBMI -A

## 2017-11-14 RX ORDER — SODIUM CHLORIDE 9 MG/ML
10 INJECTION INTRAMUSCULAR; INTRAVENOUS; SUBCUTANEOUS AS NEEDED
Status: DISPENSED | OUTPATIENT
Start: 2017-11-14 | End: 2017-11-15

## 2017-11-14 RX ORDER — SODIUM CHLORIDE 9 MG/ML
100 INJECTION, SOLUTION INTRAVENOUS CONTINUOUS
Status: DISPENSED | OUTPATIENT
Start: 2017-11-14 | End: 2017-11-15

## 2017-11-14 RX ORDER — HEPARIN 100 UNIT/ML
500 SYRINGE INTRAVENOUS AS NEEDED
Status: ACTIVE | OUTPATIENT
Start: 2017-11-14 | End: 2017-11-15

## 2017-11-14 RX ORDER — SODIUM CHLORIDE 0.9 % (FLUSH) 0.9 %
10-40 SYRINGE (ML) INJECTION AS NEEDED
Status: ACTIVE | OUTPATIENT
Start: 2017-11-14 | End: 2017-11-15

## 2017-11-14 RX ADMIN — SODIUM CHLORIDE, PRESERVATIVE FREE 500 UNITS: 5 INJECTION INTRAVENOUS at 11:53

## 2017-11-14 RX ADMIN — GRANISETRON HYDROCHLORIDE 1 MG: 1 INJECTION INTRAVENOUS at 09:53

## 2017-11-14 RX ADMIN — CARBOPLATIN 386 MG: 10 INJECTION, SOLUTION INTRAVENOUS at 10:43

## 2017-11-14 RX ADMIN — SODIUM CHLORIDE 10 ML: 9 INJECTION, SOLUTION INTRAMUSCULAR; INTRAVENOUS; SUBCUTANEOUS at 09:15

## 2017-11-14 RX ADMIN — PEGFILGRASTIM 6 MG: KIT SUBCUTANEOUS at 11:48

## 2017-11-14 RX ADMIN — SODIUM CHLORIDE 100 ML/HR: 900 INJECTION, SOLUTION INTRAVENOUS at 09:15

## 2017-11-14 RX ADMIN — DEXAMETHASONE SODIUM PHOSPHATE 20 MG: 4 INJECTION, SOLUTION INTRAMUSCULAR; INTRAVENOUS at 09:55

## 2017-11-14 RX ADMIN — Medication 10 ML: at 11:52

## 2017-11-14 NOTE — PROGRESS NOTES
[facility] - Chemotherapy Progress Note    7015 Pt admit to [facility] for Carboplatin. Chemotherapy Flowsheet 11/14/2017   Cycle C2   Date 11/14/2017   Drug / Regimen Carboplatin   Dosage -   Pre Hydration -   Pre Meds Given   Notes Given     Pt ambulatory in stable condition. Assessment completed. Pt expresses she feels better. Nausea is better. Had hydration this past Sunday. No new concerns voiced. Port accessed with positive blood return. Labs drawn at previous appointment. Line connected to NS. Conception Ano, NP at pt's bedside. Visit Vitals    /68    Pulse 60    Temp 97.2 °F (36.2 °C)    Resp 16    Ht 5' 3\" (1.6 m)    Wt 47.9 kg (105 lb 9.6 oz)    BMI 18.71 kg/m2       Medications:  NS at [facility]  Kytril  Decadron  Neulasta OBI  Carboplatin    1155 Pt tolerated treatment well. Port maintained positive blood return throughout treatment, flushed with positive blood return at conclusion and de-accessed. D/c home ambulatory in no distress. Pt aware of next [facility] appointment scheduled for 12/12/17 at 0900.

## 2017-11-14 NOTE — PROGRESS NOTES
[address]  Dr. Neal Mayfield. Wall, NP    Infusion Date: 11/14/2017     MAHSA Bird is a 61 y.o.  postmenopausal female with a diagnosis of stage IV ovarian cancer. She underwent exploratory laparotomy with suboptimal debulking. She had extensive disease. She was readmitted about a week later with obstruction and subsequently had a cecal perforation, requiring resection, end ileostomy, and mucous fistula. During the hospitalization she was also diagnosed with pulmonary embolus and had chest tubes placed for bilateral pleural effusions. She had a prolonged hospital course and actually received a dose of cisplatin chemotherapy while in the hospital to help dry up the effusions. She completed 6 cycles of Taxol/Carboplatin chemotherapy following her initial debulking. She did relatively well, considering she had an ileostomy to deal with during chemotherapy. I took her to the OR on 4/29/16 for interval debulking and reversal of her ileostomy. She had extensive disease, but we were able to resect the majority of her disease. One week later she developed an anastomotic leak requiring reexploration and recreation of an ileostomy. She had another prolonged hospitalization, but recovered well since surgery. We then reinitiated Taxol/Carboplatin chemotherapy, but did reduce the dose of Taxol to 135 mg/m2. She completed 4 mores cycles. Her CA-125 normalized and we stopped treatment.      On 2/7, she presented to Dr. Johana Michael office with her  to discuss her follow up/surveillance CT scan. Unfortunately, it demonstrated progression of disease, although minimally worse than her prior scan 2 months previously. She was actually feeling better than she did at that time. After review of scans and disease, the decision was made to begin Doxil/Avastin Q28 days for 6 cycles. She began this on 2/27/2017.  In April 2017, she had completed 3 cycles of Doxil/Avastin and had CT scans at that time showed she had evidence of recurrent disease. She was switched at that time to [facility]. She has been admitted to [facility] several times for partial SBO and all have resolved with consecutive management but this, in combination of her continued rise in CA-125 had begun to take an emotional toll on her. After having \"one of the worst weeks in years\" recently, she met with Dr. Faith Lopez with her  present to discuss options. After a long thoughtful discussion, Dr. Faith Lopez suggested that we give single agent Carboplatin a try since it had been over a year since she has tried it. Subjective:   Pt was seen in the office on last Thursday 11/9 and was doing \"fantastic\". Said she had not felt that well for a long time. Then, on Friday morning 11/10 and said she felt awful and had been throwing up during the night. Since she was unable to receive hydration on that day, arrangements were made for her to come to the infusion center the following day, Sat, 11/11. She received 2 liters of fluid and said she felt much better afterwards and since then, she had not vomited any further and feels her strength is improved. Said she has been able to eat \"fairly well\" and has put on 2 lbs, which she is very pleased with.  and sister are present and supportive. Review of Systems:  General: Noted 2 lb wt gain!! Continues trying to eat and drink as able. HEENT: Denies visual changes, dysphagia or headache  Resp: Denies SOB, DUDLEY, wheezing or cough  CV: Denies CP, palpitations  GI/: abd pain well controlled and pt says she is taking less breakthrough meds. Says nauesa/vomiting improved. Says ostomy continues to work with liquid/semi formed stool. Continues with Phenergan PRN. Jay Cuello   Continues with protein shakes and recommendation from nutritionist.    MuskSkel: Denies muscle ache or joint pain  Neuro: Denies dizzyness or syncope    Objective:     Blood pressure 132/68, pulse 60, temperature 97.2 °F (36.2 °C), resp. rate 16, height 5' 3\" (1.6 m), weight 105 lb 9.6 oz (47.9 kg). Temp (24hrs), Av.2 °F (36.2 °C), Min:97.2 °F (36.2 °C), Max:97.2 °F (36.2 °C)      _____________________  Physical Exam:     General: A&O X 3. With pleasant affect. Appears more alert and stronger. HEENT: Oral mucosa pink, dry with no sores or lesions noted. No cervical adenopathy appreciated  Port site: easily accessed without redness, tenderness or swelling  Cardiovascular: Regular without M/R/G  Lungs:CTA bilat without wheezing or rales  Abdomen: Soft and today without tenderness to palpation to mid abdomen (new) and with + bowel sounds throughout. Margie [place] + semi-formed stool in ostomy bag   Ext: + pedal pulses without edema bilat. bilat  Neuro: grossly intact      LABS: 2017:  WBC: 8.2, HGB 10.2; HCT 30.7; Plt's 194; ANC 5.5: Na 137; K+ 4.5; Cl 95;   CO2 27:BUN 23; Creat 0.87; Gluc 93; Mag 1.6      :2017= 89.1: (10/12/2017= 112: 2017: =128; 2017=85; 2017=87: 2017= 83; 2017= 65; 17=95.5;  17= 102; 17= 145.6:  = 100; 02=752.2 3/22=80; = 63.9)          PATHOLOGY from 5/10/2017 Upper Endoscopy:  1. Gastric polyp, polypectomy:   Benign polypoid gastric oxyntic mucosa with some features of hyperplastic polyp   2. Lower esophagus, biopsy:   Acute erosive esophagitis of squamous mucosa with ulceration and severe squamous atypia (see comment)   3. Proximal esophagus, biopsy:   Acute esophagitis of squamous mucosa with severe squamous atypia           Imaging:  CT Abd/Pelvis 9/15/2017:  FINDINGS:   The visualized lung bases demonstrate a small right pleural effusion. There are  increased patchy airspace opacities in the right middle and lower lobes. The  left lung and pleural space are clear. The heart size is normal.     There is a small hiatal hernia.  An enteric tube terminates in the stomach.     There are stable low-density liver lesions measuring 0.7 x 1.2 cm in the right  liver, previously 0.8 x 1.1 cm. The left liver lesion is stable measuring 0.7 cm  (series 3, image 28).    The spleen is stable with a small calcification. The gallbladder is present  without biliary duct dilatation. The pancreas and adrenal glands are  unremarkable. The kidneys enhance symmetrically with stable bilateral  hydronephrosis.     There are stable clumped and mildly dilated bowel loops in the mid lower  abdomen. Surgical changes are again seen with near complete colectomy with a  right lower abdomen ostomy again demonstrated. The rectum is blind-ending. There  is a stable to slightly increased amount of loculated ascites anteriorly. There  is no free air.     There are no enlarged lymph nodes. The aorta tapers without aneurysm. IVC filter  is present.     The urinary bladder is unremarkable. There is no aggressive bony lesion.       IMPRESSION:   1. Overall stable appearance of the bowel with mildly dilated clumped bowel  loops in the lower mid abdomen. This may be attributable to peritoneal  carcinomatosis. A right lower abdomen ostomy is again demonstrated. There is no  evidence for complete bowel obstruction.     2. Stable to slightly increased loculated ascites anteriorly.     3. Stable bilateral hydronephrosis.     4. Stable low-density liver lesions.     5. Small right pleural effusion and increased patchy airspace opacities in the  right middle and lower lobes suggesting nonspecific infection or inflammation. .          CT scan of Abdomen/Pelvis with contrast 6/27/2017  FINDINGS:   LUNG BASES: Clear. INCIDENTALLY IMAGED HEART AND MEDIASTINUM: Unremarkable. LIVER: Small hypodense lesions, one in the left hepatic lobe and one in the  right hepatic lobe are stable. GALLBLADDER: Unremarkable. SPLEEN: No mass. PANCREAS: No mass or ductal dilatation. ADRENALS: Unremarkable.   KIDNEYS: Hydronephrosis is moderate and stable on the right and mild to moderate  on the left and new, to the level of the mid ureters. There is no definite  obstructing calculus on either side. STOMACH: Unremarkable. SMALL BOWEL: Previously described small bowel centralization is stable. Small  bowel loops are dilated but roughly stable in appearance, with a maximum caliber  of 3.1 cm. Pattern of dilatation is similar to the prior study. Small bowel is  fluid-filled to the level of the ostomy in the right anterior abdominal wall. COLON: Visualized colon shows no distention, but the colon is predominantly  absent. APPENDIX: Surgically absent  PERITONEUM: Small amount of ascites, stable to slightly decreased since the  prior study. RETROPERITONEUM: No lymphadenopathy or aortic aneurysm. IVC filter stable. REPRODUCTIVE ORGANS: Surgically absent. URINARY BLADDER: No mass or calculus. BONES: No destructive bone lesion. ADDITIONAL COMMENTS: N/A       IMPRESSION:     1. Mild to moderate small bowel distention with centralization of loops as  previously described, similar in appearance to prior study 5/7/2017.  2. Small to moderate amount of ascites, stable. 3. Postoperative changes as described above, stable. 4. Stable hypodense liver lesions. 5. Stable right hydronephrosis to the mid ureter without obvious obstructing  calculus.   6. New left hydronephrosis to the mid ureter without obvious obstructing  calculus.       Assessment:     Patient Active Problem List   Diagnosis Code    Malignant neoplasm of both ovaries ([address] C56.1, C56.2    Carcinomatosis peritonei ([address] C78.6, C80.1    Small bowel obstruction K56.609    Ileostomy in place ([address] Z93.2    Anemia due to chemotherapy D64.81, T45.1X5A    CINV (chemotherapy-induced nausea and vomiting) R11.2, T45.1X5A    Cancer associated pain G89.3    Generalized abdominal pain R10.84    Anxiety about health F41.8    Ileus (HCC) K56.7    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    Hx of pulmonary embolus Z86.711    Elevated serum creatinine R79.89    Dehydration E86.0    Chemotherapy-induced neutropenia (HCC) D70.1, T45.1X5A    Anticoagulation adequate with anticoagulant therapy Z79.01    Counseling regarding end of life decision making Z71.89    Bowel obstruction K56.609    Protein calorie malnutrition (HCC) E46    Malignant ascites R18.0    Hydronephrosis of right kidney N13.30    Isolated proteinuria R80.0    Erosive esophagitis K22.10    Ulcerative esophagitis K22.10    Hypomagnesemia E83.42    Abdominal pain R10.9    Hydronephrosis of left kidney N13.30    Ovarian cancer (HCC) C56.9    Nausea & vomiting R11.2    Carcinomatosis (HCC) C80.0    Intractable vomiting with nausea R11.2    Febrile illness R50.9    Partial bowel obstruction K56.600    Nausea and vomiting R11.2         Plan: Will proceed with C2 of Carbo today  Due to 2 family events, in Dec and Jan, her chemo apt's will altered to accomodate these two dates which are very important to her  Continue Phenergan for nausea and she will let us know if she feels dehydrated and we will bring her in for hydration  Continue with  Fentanyl patch to 150 mcg and she will let us know if it gets worse again. Monitor HGB due to anemia and transfuse if she goes to 8  Continue anticoagulation  Call for any questions or concerns    Rosa Roberts NP  11/14/2017          ______________________  Medications:    Current Outpatient Prescriptions   Medication Sig Dispense Refill    metoprolol tartrate (LOPRESSOR) 50 mg tablet Take  by mouth two (2) times a day.  fentaNYL (DURAGESIC) 50 mcg/hr PATCH 1 Patch by TransDERmal route every seventy-two (72) hours. Max Daily Amount: 1 Patch. This is in ADDITION to your 100 mcg patch for a total of 150 mcg patch every 72 hours 10 Patch 0    fentaNYL (DURAGESIC) 100 mcg/hr PATCH 1 Patch by TransDERmal route every seventy-two (72) hours. Max Daily Amount: 1 Patch.  10 Patch 0    dexamethasone (DECADRON) 4 mg tablet Take 2 tabs QAM day before chemo and for 2 days after 30 Tab 4    metoclopramide (REGLAN) 5 mg/5 mL syrup Take 10 mL by mouth four (4) times daily as needed. Take before meals as needed 500 mL 3    dronabinol (SYNDROS) 5 mg/mL soln Take 2.1 mg by mouth two (2) times a day. Max Daily Amount: 4.2 mg. Take 30 minutes before lunch and dinner 30 mL 1    promethazine (PHENERGAN) 6.25 mg/5 mL syrup Take 20 mL by mouth four (4) times daily as needed for Nausea. 400 mL 4    HYDROmorphone (DILAUDID) 1 mg/mL liqd oral solution Take 4 mL by mouth every four (4) hours as needed. Max Daily Amount: 24 mg. 475 mL 0    scopolamine (TRANSDERM-SCOP) 1.5 mg (1 mg over 3 days) pt3d 1 Patch by TransDERmal route every seventy-two (72) hours as needed. 10 Patch 1    NEXIUM PACKET 40 mg granules for oral suspension two (2) times a day.  magnesium oxide (MAG-OX) 400 mg tablet Take 1 Tab by mouth two (2) times a day. Indications: HYPOMAGNESEMIA 60 Tab 2    sucralfate (CARAFATE) 100 mg/mL suspension Take 5 mL by mouth four (4) times daily. 414 mL 3    polyethylene glycol (MIRALAX) 17 gram/dose powder Take 17 g by mouth daily as needed. 238 g 3    apixaban (ELIQUIS) 2.5 mg tablet Take 1 Tab by mouth two (2) times a day. 60 Tab 6    zolpidem (AMBIEN) 10 mg tablet Take 1 Tab by mouth nightly as needed for Sleep. Max Daily Amount: 10 mg. 30 Tab 1    ondansetron (ZOFRAN ODT) 4 mg disintegrating tablet Take 1 Tab by mouth every eight (8) hours as needed for Nausea. 30 Tab 2    metoprolol (LOPRESSOR) 100 mg tablet Take 50 mg by mouth two (2) times a day.       lidocaine-prilocaine (EMLA) topical cream Apply small amount over port area and cover with a band aid 1 hour before chemo treatment 30 g 3     Current Facility-Administered Medications   Medication Dose Route Frequency Provider Last Rate Last Dose    sodium chloride 0.9 % injection 10 mL  10 mL IntraVENous PRN Rah Zepeda MD   10 mL at 11/14/17 0915    heparin (porcine) pf 500 Units 500 Units IntraVENous PRN Kamilah Reed MD   500 Units at 11/14/17 1149    sodium chloride (NS) flush 10-40 mL  10-40 mL IntraVENous PRN Kamilah Reed MD   10 mL at 11/14/17 1149    0.9% sodium chloride infusion  100 mL/hr IntraVENous CONTINUOUS Kamilah Reed  mL/hr at 11/14/17 0915 100 mL/hr at 11/14/17 0915

## 2017-11-14 NOTE — PROGRESS NOTES
Problem: Knowledge Deficit  Goal: *Verbalizes understanding of procedures and medications  Outcome: Progressing Towards Goal  Possible change of anti-emetics. Problem: Chemotherapy Treatment  Goal: *Tolerating diet  Outcome: Progressing Towards Goal  Pt hasn't been nauseous.  Feels the Kytril isn't working

## 2017-11-27 DIAGNOSIS — K22.10 ULCERATIVE ESOPHAGITIS: ICD-10-CM

## 2017-11-27 DIAGNOSIS — R18.0 MALIGNANT ASCITES: ICD-10-CM

## 2017-11-27 DIAGNOSIS — C56.9 OVARIAN CANCER, UNSPECIFIED LATERALITY (HCC): ICD-10-CM

## 2017-11-27 DIAGNOSIS — C78.6 CARCINOMATOSIS PERITONEI (HCC): ICD-10-CM

## 2017-11-27 DIAGNOSIS — G89.3 CANCER ASSOCIATED PAIN: ICD-10-CM

## 2017-11-27 DIAGNOSIS — K22.10 EROSIVE ESOPHAGITIS: ICD-10-CM

## 2017-11-27 DIAGNOSIS — I82.890 THROMBOSIS OF PELVIC VEIN: ICD-10-CM

## 2017-11-27 DIAGNOSIS — K56.609 SMALL BOWEL OBSTRUCTION (HCC): ICD-10-CM

## 2017-11-28 RX ORDER — PROMETHAZINE HYDROCHLORIDE 6.25 MG/5ML
25 SYRUP ORAL
Qty: 400 ML | Refills: 4 | Status: SHIPPED | OUTPATIENT
Start: 2017-11-28 | End: 2018-02-05 | Stop reason: SDUPTHER

## 2017-11-28 NOTE — TELEPHONE ENCOUNTER
From: Lobo Phelan  To: Kerry Burgos MD  Sent: 11/27/2017 2:09 PM EST  Subject: Medication Renewal Request    Original authorizing provider: MD Jess Dixon.  Luis would like a refill of the following medications:  promethazine (PHENERGAN) 6.25 mg/5 mL syrup Kerry Burgos MD]    Preferred pharmacy: [address] [place] [address] [facility]    Comment:

## 2017-11-29 ENCOUNTER — TELEPHONE (OUTPATIENT)
Dept: GYNECOLOGY | Age: 57
End: 2017-11-29

## 2017-11-29 DIAGNOSIS — G89.3 CANCER ASSOCIATED PAIN: ICD-10-CM

## 2017-11-29 DIAGNOSIS — C78.6 CARCINOMATOSIS PERITONEI (HCC): ICD-10-CM

## 2017-11-29 DIAGNOSIS — M62.830 MUSCLE SPASM OF BACK: Primary | ICD-10-CM

## 2017-11-29 DIAGNOSIS — C56.3 MALIGNANT NEOPLASM OF BOTH OVARIES (HCC): ICD-10-CM

## 2017-11-29 RX ORDER — CYCLOBENZAPRINE HCL 10 MG
10 TABLET ORAL
Qty: 30 TAB | Refills: 1 | Status: SHIPPED | OUTPATIENT
Start: 2017-11-29 | End: 2018-02-02 | Stop reason: SDUPTHER

## 2017-11-30 ENCOUNTER — OFFICE VISIT (OUTPATIENT)
Dept: GYNECOLOGY | Age: 57
End: 2017-11-30

## 2017-11-30 ENCOUNTER — HOSPITAL ENCOUNTER (OUTPATIENT)
Dept: INFUSION THERAPY | Age: 57
Discharge: HOME | End: 2017-11-30
Payer: OTHER GOVERNMENT

## 2017-11-30 VITALS
WEIGHT: 102.8 LBS | DIASTOLIC BLOOD PRESSURE: 66 MMHG | BODY MASS INDEX: 18.21 KG/M2 | HEIGHT: 63 IN | TEMPERATURE: 98.5 F | SYSTOLIC BLOOD PRESSURE: 106 MMHG | HEART RATE: 102 BPM

## 2017-11-30 VITALS
SYSTOLIC BLOOD PRESSURE: 119 MMHG | HEART RATE: 89 BPM | DIASTOLIC BLOOD PRESSURE: 78 MMHG | TEMPERATURE: 98 F | RESPIRATION RATE: 18 BRPM

## 2017-11-30 DIAGNOSIS — C56.9 OVARIAN CANCER, UNSPECIFIED LATERALITY (HCC): Primary | ICD-10-CM

## 2017-11-30 DIAGNOSIS — D70.1 CHEMOTHERAPY-INDUCED NEUTROPENIA (HCC): ICD-10-CM

## 2017-11-30 DIAGNOSIS — T45.1X5A CHEMOTHERAPY-INDUCED NEUTROPENIA (HCC): ICD-10-CM

## 2017-11-30 DIAGNOSIS — R50.9 FEVER, UNSPECIFIED FEVER CAUSE: ICD-10-CM

## 2017-11-30 PROBLEM — R79.89 ELEVATED SERUM CREATININE: Status: RESOLVED | Noted: 2017-03-21 | Resolved: 2017-11-30

## 2017-11-30 LAB
BASOPHILS # BLD: 0 K/UL (ref 0–0.1)
BASOPHILS NFR BLD: 0 % (ref 0–1)
DIFFERENTIAL METHOD BLD: ABNORMAL
EOSINOPHIL # BLD: 0 K/UL (ref 0–0.4)
EOSINOPHIL NFR BLD: 0 % (ref 0–7)
ERYTHROCYTE [DISTWIDTH] IN BLOOD BY AUTOMATED COUNT: 16.8 % (ref 11.5–14.5)
ERYTHROCYTE [DISTWIDTH] IN BLOOD BY AUTOMATED COUNT: 16.8 % (ref 11.5–14.5)
HCT VFR BLD AUTO: 29 % (ref 35–47)
HCT VFR BLD AUTO: 29 % (ref 35–47)
HGB BLD-MCNC: 9.5 G/DL (ref 11.5–16)
HGB BLD-MCNC: 9.5 G/DL (ref 11.5–16)
LYMPHOCYTES # BLD: 2.1 K/UL (ref 0.8–3.5)
LYMPHOCYTES NFR BLD: 14 % (ref 12–49)
MCH RBC QN AUTO: 31.4 PG (ref 26–34)
MCH RBC QN AUTO: 31.4 PG (ref 26–34)
MCHC RBC AUTO-ENTMCNC: 32.8 G/DL (ref 30–36.5)
MCHC RBC AUTO-ENTMCNC: 32.8 G/DL (ref 30–36.5)
MCV RBC AUTO: 95.7 FL (ref 80–99)
MCV RBC AUTO: 95.7 FL (ref 80–99)
MONOCYTES # BLD: 1 K/UL (ref 0–1)
MONOCYTES NFR BLD: 7 % (ref 5–13)
NEUTS SEG # BLD: 11.7 K/UL (ref 1.8–8)
NEUTS SEG NFR BLD: 79 % (ref 32–75)
PLATELET # BLD AUTO: 102 K/UL (ref 150–400)
PLATELET # BLD AUTO: 102 K/UL (ref 150–400)
RBC # BLD AUTO: 3.03 M/UL (ref 3.8–5.2)
RBC # BLD AUTO: 3.03 M/UL (ref 3.8–5.2)
RBC MORPH BLD: ABNORMAL
WBC # BLD AUTO: 14.8 K/UL (ref 3.6–11)
WBC # BLD AUTO: 14.8 K/UL (ref 3.6–11)
WBC MORPH BLD: ABNORMAL

## 2017-11-30 PROCEDURE — 74011250636 HC RX REV CODE- 250/636: Performed by: OBSTETRICS & GYNECOLOGY

## 2017-11-30 PROCEDURE — 85025 COMPLETE CBC W/AUTO DIFF WBC: CPT | Performed by: PHYSICIAN ASSISTANT

## 2017-11-30 PROCEDURE — 77030012965 HC NDL HUBR BBMI -A

## 2017-11-30 PROCEDURE — 96361 HYDRATE IV INFUSION ADD-ON: CPT

## 2017-11-30 PROCEDURE — 74011250636 HC RX REV CODE- 250/636: Performed by: NURSE PRACTITIONER

## 2017-11-30 PROCEDURE — 96360 HYDRATION IV INFUSION INIT: CPT

## 2017-11-30 PROCEDURE — 36415 COLL VENOUS BLD VENIPUNCTURE: CPT | Performed by: PHYSICIAN ASSISTANT

## 2017-11-30 RX ORDER — HEPARIN 100 UNIT/ML
500 SYRINGE INTRAVENOUS AS NEEDED
Status: ACTIVE | OUTPATIENT
Start: 2017-11-30 | End: 2017-12-01

## 2017-11-30 RX ORDER — SODIUM CHLORIDE 0.9 % (FLUSH) 0.9 %
5-10 SYRINGE (ML) INJECTION AS NEEDED
Status: ACTIVE | OUTPATIENT
Start: 2017-11-30 | End: 2017-12-01

## 2017-11-30 RX ORDER — CIPROFLOXACIN 250 MG/1
250 TABLET, FILM COATED ORAL 2 TIMES DAILY
Qty: 14 TAB | Refills: 0 | Status: SHIPPED | OUTPATIENT
Start: 2017-11-30 | End: 2017-12-14 | Stop reason: ALTCHOICE

## 2017-11-30 RX ADMIN — Medication 10 ML: at 12:50

## 2017-11-30 RX ADMIN — HEPARIN 500 UNITS: 100 SYRINGE at 14:53

## 2017-11-30 RX ADMIN — SODIUM CHLORIDE 2000 ML: 900 INJECTION, SOLUTION INTRAVENOUS at 12:54

## 2017-11-30 NOTE — PROGRESS NOTES
Problem visit, pt states she ran a fever this morning 103.2 and 103.5, with chills, pt states she took tylenol at approximately 8 am, temp in office was 98.5 orally, pt also complains of upper abdominal discomfort that is a 4/10 on pain scale, Patient states she is no longer taking the following medications: Syndros

## 2017-11-30 NOTE — PROGRESS NOTES
1240: Pt arrived at [facility] ambulatory and in no distress for hydration. Assessment completed, pt c/o nausea, but no vomiting. Patient also reports waking up with a fever this AM, patient in to see PA today prior to coming for hydration. No fever noted since this morning. Call received from PA prior to patient arriving who states he has entered orders in [facility] for a CBC to be drawn. Left chest port accessed without difficulty, blood drawn and processed. Patient Vitals for the past 12 hrs:   Temp Pulse Resp BP   11/30/17 1455 98 °F (36.7 °C) 89 18 119/78   11/30/17 1243 98.1 °F (36.7 °C) 92 18 93/61     Recent Results (from the past 12 hour(s))   CBC WITH AUTOMATED DIFF    Collection Time: 11/30/17 12:56 PM   Result Value Ref Range    WBC 14.8 (H) 3.6 - 11.0 K/uL    RBC 3.03 (L) 3.80 - 5.20 M/uL    HGB 9.5 (L) 11.5 - 16.0 g/dL    HCT 29.0 (L) 35.0 - 47.0 %    MCV 95.7 80.0 - 99.0 FL    MCH 31.4 26.0 - 34.0 PG    MCHC 32.8 30.0 - 36.5 g/dL    RDW 16.8 (H) 11.5 - 14.5 %    PLATELET 709 (L) 020 - 400 K/uL   CBC WITH AUTOMATED DIFF    Collection Time: 11/30/17 12:56 PM   Result Value Ref Range    WBC 14.8 (H) 3.6 - 11.0 K/uL    RBC 3.03 (L) 3.80 - 5.20 M/uL    HGB 9.5 (L) 11.5 - 16.0 g/dL    HCT 29.0 (L) 35.0 - 47.0 %    MCV 95.7 80.0 - 99.0 FL    MCH 31.4 26.0 - 34.0 PG    MCHC 32.8 30.0 - 36.5 g/dL    RDW 16.8 (H) 11.5 - 14.5 %    PLATELET 428 (L) 147 - 400 K/uL    NEUTROPHILS 79 (H) 32 - 75 %    LYMPHOCYTES 14 12 - 49 %    MONOCYTES 7 5 - 13 %    EOSINOPHILS 0 0 - 7 %    BASOPHILS 0 0 - 1 %    ABS. NEUTROPHILS 11.7 (H) 1.8 - 8.0 K/UL    ABS. LYMPHOCYTES 2.1 0.8 - 3.5 K/UL    ABS. MONOCYTES 1.0 0.0 - 1.0 K/UL    ABS. EOSINOPHILS 0.0 0.0 - 0.4 K/UL    ABS.  BASOPHILS 0.0 0.0 - 0.1 K/UL    DF SMEAR SCANNED      RBC COMMENTS ANISOCYTOSIS  1+        WBC COMMENTS TOXIC GRANULATION         Medications received:  2 liters NS IV    Tolerated treatment well, patient c/o abdominal discomforts and nausea prior to leaving clinic, patient reports she has medication for nausea and will take it once home. Port de-accessed and flushed per protocol. Positive blood return noted. 1500:D/C'd from [facility] ambulatory and in no distress accompanied by a friend.  Patient to follow up with referring MD.

## 2017-11-30 NOTE — PROGRESS NOTES
[facility]  Office visit note    11/30/2017       Carrie calls this AM reports fever at home x 2 to 103F. Was in otherwise normal state. She denies chills, rigors, acute pain. Generalized fatigue and nausea present w/o change. Denies other acute changes. Is scheduled for 2L hydration at [facility] today. She took one tylenol at [address] to come to office for eval.    Most recent chemo 11/14 carboplatin with neulasta OBI. Most recent heme profile adequate with normal WBC, mild anemia    Present here doing well, no c/o currently. Review of Systems -  Abdominal bloating: None. + nausea, though everpresent. Hx SBO. No dysuria  Stoma function normal, no blood. No recent illness, cough, SOB, dyspnea  Wt stable ~102lb  Remains fatigued    VSS temp here 98.5  General: nontoxic, pleasant, A&O, anicteric  RRR w/o murmur  CTA w/o w/r/r  S/nt/nd, stool in pouch  No edema    A/P:  Metastatic ovarian cancer s/p carboplatin 2 weeks ago. Appears well despite her two readings at home. Normothermic here. Proceed to [facility] for hydration with CBC. Monitor temp there. BENY Jaquez      Addendum:  CBC with mild leukocytosis. Temp 98 prior and following visit. neulasta noted. Lab Results   Component Value Date/Time    WBC 14.8 11/30/2017 12:56 PM    WBC 14.8 11/30/2017 12:56 PM    Hemoglobin (POC) 10.9 05/06/2016 04:22 PM    HGB 9.5 11/30/2017 12:56 PM    HGB 9.5 11/30/2017 12:56 PM    Hematocrit (POC) 32 05/06/2016 04:22 PM    HCT 29.0 11/30/2017 12:56 PM    HCT 29.0 11/30/2017 12:56 PM    PLATELET 256 50/53/0803 12:56 PM    PLATELET 232 50/59/8189 12:56 PM    MCV 95.7 11/30/2017 12:56 PM    MCV 95.7 11/30/2017 12:56 PM       Called June to advise of above. Remains with nausea, did have emesis on getting home but feels better. Rx one week CIPRO for generalized febrile episode and at risk state. She will call with any further fevers or changes. Otherwise next appt.  12/14      BENY Jaquez

## 2017-12-12 ENCOUNTER — APPOINTMENT (OUTPATIENT)
Dept: INFUSION THERAPY | Age: 57
End: 2017-12-12

## 2017-12-12 DIAGNOSIS — C56.9 OVARIAN CANCER, UNSPECIFIED LATERALITY (HCC): Primary | ICD-10-CM

## 2017-12-14 ENCOUNTER — OFFICE VISIT (OUTPATIENT)
Dept: GYNECOLOGY | Age: 57
End: 2017-12-14

## 2017-12-14 VITALS
DIASTOLIC BLOOD PRESSURE: 70 MMHG | HEART RATE: 68 BPM | BODY MASS INDEX: 18.04 KG/M2 | HEIGHT: 63 IN | WEIGHT: 101.8 LBS | SYSTOLIC BLOOD PRESSURE: 108 MMHG

## 2017-12-14 DIAGNOSIS — F41.8 ANXIETY ABOUT HEALTH: ICD-10-CM

## 2017-12-14 DIAGNOSIS — E87.1 HYPONATREMIA: ICD-10-CM

## 2017-12-14 DIAGNOSIS — D70.1 CHEMOTHERAPY-INDUCED NEUTROPENIA (HCC): ICD-10-CM

## 2017-12-14 DIAGNOSIS — Z93.2 ILEOSTOMY IN PLACE (HCC): ICD-10-CM

## 2017-12-14 DIAGNOSIS — C56.9 OVARIAN CANCER, UNSPECIFIED LATERALITY (HCC): ICD-10-CM

## 2017-12-14 DIAGNOSIS — C56.3 MALIGNANT NEOPLASM OF BOTH OVARIES (HCC): ICD-10-CM

## 2017-12-14 DIAGNOSIS — G89.3 CANCER ASSOCIATED PAIN: ICD-10-CM

## 2017-12-14 DIAGNOSIS — R10.84 GENERALIZED ABDOMINAL PAIN: ICD-10-CM

## 2017-12-14 DIAGNOSIS — T45.1X5A CHEMOTHERAPY-INDUCED NEUTROPENIA (HCC): ICD-10-CM

## 2017-12-14 DIAGNOSIS — T45.1X5A CINV (CHEMOTHERAPY-INDUCED NAUSEA AND VOMITING): ICD-10-CM

## 2017-12-14 DIAGNOSIS — T45.1X5A ANEMIA DUE TO CHEMOTHERAPY: ICD-10-CM

## 2017-12-14 DIAGNOSIS — C56.9 MALIGNANT NEOPLASM OF OVARY, UNSPECIFIED LATERALITY (HCC): Primary | ICD-10-CM

## 2017-12-14 DIAGNOSIS — C78.6 CARCINOMATOSIS PERITONEI (HCC): ICD-10-CM

## 2017-12-14 DIAGNOSIS — D64.81 ANEMIA DUE TO CHEMOTHERAPY: ICD-10-CM

## 2017-12-14 DIAGNOSIS — R11.2 CINV (CHEMOTHERAPY-INDUCED NAUSEA AND VOMITING): ICD-10-CM

## 2017-12-14 RX ORDER — FENTANYL 100 UG/H
1 PATCH TRANSDERMAL
Qty: 10 PATCH | Refills: 0 | Status: SHIPPED | OUTPATIENT
Start: 2017-12-14 | End: 2018-01-16 | Stop reason: SDUPTHER

## 2017-12-14 RX ORDER — HYDROMORPHONE HYDROCHLORIDE 5 MG/5ML
4 SOLUTION ORAL
Qty: 475 ML | Refills: 0 | Status: ON HOLD | OUTPATIENT
Start: 2017-12-14 | End: 2018-03-22

## 2017-12-14 RX ORDER — GRANISETRON HYDROCHLORIDE 1 MG/ML
1 INJECTION INTRAVENOUS ONCE
Status: COMPLETED | OUTPATIENT
Start: 2017-12-19 | End: 2017-12-19

## 2017-12-14 RX ORDER — FENTANYL 50 UG/1
1 PATCH TRANSDERMAL
Qty: 10 PATCH | Refills: 0 | Status: SHIPPED | OUTPATIENT
Start: 2017-12-14 | End: 2018-01-16 | Stop reason: SDUPTHER

## 2017-12-14 NOTE — PROGRESS NOTES
[address] [place] [address] [address], [address] [facility]  [phone]  MD Jeanie Nicholson MD  Office Note  Patient ID:  Name:  Roberto Presley  MRN:  [id number]  :  1960/57 y.o. Date:  2017      HISTORY OF PRESENT ILLNESS:  Roberto Presley is a 61 y.o.  postmenopausal female with a diagnosis of stage IV ovarian cancer. She underwent exploratory laparotomy with suboptimal debulking in 2015. She had extensive disease. She was readmitted about a week later with obstruction and subsequently had a cecal perforation, requiring resection, end ileostomy, and mucous fistula. During the hospitalization she was also diagnosed with pulmonary embolus and had chest tubes placed for bilateral pleural effusions. She had a prolonged hospital course and actually received a dose of cisplatin chemotherapy while in the hospital to help dry up the effusions. She completed 6 cycles of Taxol/Carboplatin chemotherapy following her initial debulking. She  did relatively well, considering she had an ileostomy to deal with during chemotherapy. I took her to the OR on 16 for interval debulking and reversal of her ileostomy. She had extensive disease, but we were able to resect the majority of her disease. One week later she developed an anastomotic leak requiring reexploration and recreation of an ileostomy. She had another prolonged hospitalization, but recovered well since surgery. We then reinitiated Taxol/Carboplatin chemotherapy, but did reduce the dose of Taxol to 135 mg/m2. She completed 4 mores cycles. Her CA-125 normalized and we stopped treatment. She now has evidence of recurrent disease and for a while was receiving Doxil/Avastin. She completed 3 cycles of that regimen. She was admitted to [facility] several time since during that regimen with evidence of partial small bowel obstruction.   The symptoms have resolved each time with conservative management. She had increased ascites on her last scan, but no significant change in her disease otherwise. Her CA-125 also went up. All of this suggests progression of disease. We switched her chemotherapy to [facility]. She completed 7 cycles. Her CA-125 had risen over the last few draws. She had been hospitalized almost every cycle for the last few cycles for GI issues. She was switched to single agent Carboplatin. She has completed 2 cycles. Her CA-125 has responded. She did have a febrile episode during this last cycle. It only lasted for one day. Her labs were normal and we didn't do anything specifically. It resolved spontaneously. She is feeling much better now. Has been doing well the last week or so.       Problem List:  Patient Active Problem List    Diagnosis Date Noted    Ovarian cancer, unspecified laterality ([address] 11/30/2017    Nausea and vomiting 09/14/2017    Partial bowel obstruction 09/13/2017    Intractable vomiting with nausea 08/10/2017    Ovarian cancer ([address] 08/09/2017    Nausea & vomiting 08/09/2017    Carcinomatosis ([address] 08/09/2017    Hydronephrosis of left kidney 06/28/2017    Abdominal pain 06/27/2017    Hypomagnesemia 06/13/2017    Erosive esophagitis 06/07/2017    Ulcerative esophagitis 06/07/2017    Isolated proteinuria 05/25/2017    Malignant ascites 05/08/2017    Hydronephrosis of right kidney 05/08/2017    Protein calorie malnutrition ([address] 04/28/2017    Bowel obstruction 04/27/2017    Counseling regarding end of life decision making 04/25/2017    Anticoagulation adequate with anticoagulant therapy 03/28/2017    Chemotherapy-induced neutropenia (HCC) 03/22/2017    Ileus ([address] 03/21/2017    Thrombosis of pelvic vein 03/21/2017    Hx of pulmonary embolus 03/21/2017    Dehydration 03/21/2017    Cancer associated pain 05/13/2016    Generalized abdominal pain 05/13/2016    Anxiety about health 05/13/2016    CINV (chemotherapy-induced nausea and vomiting) 03/22/2016    Anemia due to chemotherapy 02/22/2016    Ileostomy in place [facility]) 02/15/2016    Carcinomatosis peritonei ([address] 10/29/2015    Small bowel obstruction 10/29/2015    Malignant neoplasm of both ovaries ([address] 10/12/2015     PMH:  Past Medical History:   Diagnosis Date    Abdominal carcinomatosis ([address] 10/2015    Arthritis     left shoulder    BRCA negative 12/2015    Chronic pain     Depression     HX    Environmental allergies     GERD (gastroesophageal reflux disease)     Hypertension     NEW OVER PAST 5-6 MONTHS    Ovarian cancer ([address] 10/2015    serous adenocarcinoma, high grade, stage IIIC    Pulmonary embolism ([facility] 10/2015      PSH:  Past Surgical History:   Procedure Laterality Date    CARDIAC SURG PROCEDURE UNLIST  12/11/15    cardiac cath/normal     HX BREAST BIOPSY  1995    benign right breast bx    HX DILATION AND CURETTAGE  2/2011    POLYPECTOMY    HX GI  2015    PERFORATED BOWEL; ILEOSTOMY    HX GYN  10/2015    EXP LAPAROTOMY    HX HEENT  LAST 2005    oral tissue graft X3    HX HEENT  1970    tonsillectomy    HX LAPAROTOMY  10/2015    tumor sampling    HX LAPAROTOMY  4/2016    hysterectomy, BSO, radical debulking    HX LAPAROTOMY  5/2016    resection ileocolic anastomosis, leak, ileostomy    HX OTHER SURGICAL  11/2015    tunneled portacath      Social History:  Social History   Substance Use Topics    Smoking status: Never Smoker    Smokeless tobacco: Never Used    Alcohol use No      Family History:  Family History   Problem Relation Age of Onset    Cancer Maternal Uncle      skin    Hypertension Mother     High Cholesterol Mother     Anxiety Mother     Heart Disease Mother     High Cholesterol Father     Hypertension Father     Stroke Father     Arthritis-osteo Sister    [place] Migraines Sister     Other Sister      FIBROMYALGIA    Depression Brother     Other Brother      DRUG ABUSE HEPATITIS C    Migraines Sister     Arrhythmia Sister     Depression Sister     Lung Disease Paternal Aunt      COPD    Cancer Paternal Uncle      LUNG    Lung Disease Paternal Uncle      COPD    Lung Disease Maternal Grandmother      COPD    Anesth Problems Neg Hx       Medications: (reviewed)  Current Outpatient Prescriptions   Medication Sig    cyclobenzaprine (FLEXERIL) 10 mg tablet Take 1 Tab by mouth three (3) times daily as needed for Muscle Spasm(s).  promethazine (PHENERGAN) 6.25 mg/5 mL syrup Take 20 mL by mouth four (4) times daily as needed for Nausea.  metoprolol tartrate (LOPRESSOR) 50 mg tablet Take  by mouth two (2) times a day.  fentaNYL (DURAGESIC) 50 mcg/hr PATCH 1 Patch by TransDERmal route every seventy-two (72) hours. Max Daily Amount: 1 Patch. This is in ADDITION to your 100 mcg patch for a total of 150 mcg patch every 72 hours    fentaNYL (DURAGESIC) 100 mcg/hr PATCH 1 Patch by TransDERmal route every seventy-two (72) hours. Max Daily Amount: 1 Patch.  dexamethasone (DECADRON) 4 mg tablet Take 2 tabs QAM day before chemo and for 2 days after    metoclopramide (REGLAN) 5 mg/5 mL syrup Take 10 mL by mouth four (4) times daily as needed. Take before meals as needed    HYDROmorphone (DILAUDID) 1 mg/mL liqd oral solution Take 4 mL by mouth every four (4) hours as needed. Max Daily Amount: 24 mg.    scopolamine (TRANSDERM-SCOP) 1.5 mg (1 mg over 3 days) pt3d 1 Patch by TransDERmal route every seventy-two (72) hours as needed.  NEXIUM PACKET 40 mg granules for oral suspension two (2) times a day.  magnesium oxide (MAG-OX) 400 mg tablet Take 1 Tab by mouth two (2) times a day. Indications: HYPOMAGNESEMIA    sucralfate (CARAFATE) 100 mg/mL suspension Take 5 mL by mouth four (4) times daily.  polyethylene glycol (MIRALAX) 17 gram/dose powder Take 17 g by mouth daily as needed.  apixaban (ELIQUIS) 2.5 mg tablet Take 1 Tab by mouth two (2) times a day.     zolpidem (AMBIEN) 10 mg tablet Take 1 Tab by mouth nightly as needed for Sleep. Max Daily Amount: 10 mg.    ondansetron (ZOFRAN ODT) 4 mg disintegrating tablet Take 1 Tab by mouth every eight (8) hours as needed for Nausea.  lidocaine-prilocaine (EMLA) topical cream Apply small amount over port area and cover with a band aid 1 hour before chemo treatment    dronabinol (SYNDROS) 5 mg/mL soln Take 2.1 mg by mouth two (2) times a day. Max Daily Amount: 4.2 mg. Take 30 minutes before lunch and dinner     No current facility-administered medications for this visit. Facility-Administered Medications Ordered in Other Visits   Medication Dose Route Frequency    [START ON 12/19/2017] CARBOplatin (PARAPLATIN) 386 mg in dextrose 5% 250 mL, overfill volume 25 mL chemo infusion  386 mg IntraVENous ONCE    [START ON 12/19/2017] granisetron (KYTRIL) injection 1 mg  1 mg IntraVENous ONCE    [START ON 12/19/2017] dexamethasone (DECADRON) 20 mg in 0.9% sodium chloride 50 mL IVPB  20 mg IntraVENous ONCE    [START ON 12/19/2017] pegfilgrastim (NEULASTA) wearable SQ injector 6 mg  6 mg SubCUTAneous ONCE     Allergies: (reviewed)  Allergies   Allergen Reactions    Ativan [Lorazepam] Other (comments)     SEVERE CONFUSION          OBJECTIVE:    Physical Exam:  VITAL SIGNS: Vitals:    12/14/17 1324   Weight: 101 lb 12.8 oz (46.2 kg)   Height: 5' 2.99\" (1.6 m)     Body mass index is 18.04 kg/(m^2). GENERAL AVA: Conversant, alert, oriented. No acute distress. HEENT: HEENT. No thyroid enlargement. No JVD. Neck: Supple without restrictions. RESPIRATORY: Clear to auscultation and percussion to the bases. No CVAT. CARDIOVASC: RRR without murmur/rub. GASTROINT: soft, non-tender, without masses or organomegaly; ileostomy in RLQ   MUSCULOSKEL: no joint tenderness, deformity or swelling   EXTREMITIES: extremities normal, atraumatic, no cyanosis or edema   PELVIC: Deferred   RECTAL: Deferred   JOSE SURVEY: No suspicious lymphadenopathy or edema noted. NEURO: Grossly intact. No acute deficit. Lab Results   Component Value Date/Time    WBC 14.8 11/30/2017 12:56 PM    WBC 14.8 11/30/2017 12:56 PM    HGB 9.5 11/30/2017 12:56 PM    HGB 9.5 11/30/2017 12:56 PM    HCT 29.0 11/30/2017 12:56 PM    HCT 29.0 11/30/2017 12:56 PM    PLATELET 221 02/45/8484 12:56 PM    PLATELET 005 11/86/8635 12:56 PM    MCV 95.7 11/30/2017 12:56 PM    MCV 95.7 11/30/2017 12:56 PM     Lab Results   Component Value Date/Time    Sodium 137 11/09/2017 02:35 PM    Potassium 4.5 11/09/2017 02:35 PM    Chloride 95 11/09/2017 02:35 PM    CO2 27 11/09/2017 02:35 PM    Anion gap 7 10/26/2017 01:15 PM    Glucose 93 11/09/2017 02:35 PM    BUN 23 11/09/2017 02:35 PM    Creatinine 0.87 11/09/2017 02:35 PM    BUN/Creatinine ratio 26 11/09/2017 02:35 PM    GFR est AA 85 11/09/2017 02:35 PM    GFR est non-AA 74 11/09/2017 02:35 PM    Calcium 9.0 11/09/2017 02:35 PM       CT of chest/abdomen/pelvis (6/27/17)  LUNG BASES: Clear. INCIDENTALLY IMAGED HEART AND MEDIASTINUM: Unremarkable. LIVER: Small hypodense lesions, one in the left hepatic lobe and one in the  right hepatic lobe are stable. GALLBLADDER: Unremarkable. SPLEEN: No mass. PANCREAS: No mass or ductal dilatation. ADRENALS: Unremarkable. KIDNEYS: Hydronephrosis is moderate and stable on the right and mild to moderate  on the left and new, to the level of the mid ureters. There is no definite  obstructing calculus on either side. STOMACH: Unremarkable. SMALL BOWEL: Previously described small bowel centralization is stable. Small  bowel loops are dilated but roughly stable in appearance, with a maximum caliber  of 3.1 cm. Pattern of dilatation is similar to the prior study. Small bowel is  fluid-filled to the level of the ostomy in the right anterior abdominal wall. COLON: Visualized colon shows no distention, but the colon is predominantly  absent.   APPENDIX: Surgically absent  PERITONEUM: Small amount of ascites, stable to slightly decreased since the  prior study. RETROPERITONEUM: No lymphadenopathy or aortic aneurysm. IVC filter stable. REPRODUCTIVE ORGANS: Surgically absent. URINARY BLADDER: No mass or calculus. BONES: No destructive bone lesion. ADDITIONAL COMMENTS: N/A     IMPRESSION:  1. Mild to moderate small bowel distention with centralization of loops as  previously described, similar in appearance to prior study 5/7/2017.  2. Small to moderate amount of ascites, stable. 3. Postoperative changes as described above, stable. 4. Stable hypodense liver lesions. 5. Stable right hydronephrosis to the mid ureter without obvious obstructing  calculus. 6. New left hydronephrosis to the mid ureter without obvious obstructing  calculus. IMPRESSION/PLAN:  Kera Mondragon is a 61 y.o. female with a diagnosis of stage IV ovarian cancer. She is s/p suboptimal debulking, followed by a second laparotomy for cecal perforation. She completed 6 cycles of Taxol/Carboplatin chemotherapy, in addition to the one dose of cisplatin given during her initial hospitalization. Based upon her CT and her CA-125, she responded well, though there was still disease remaining. She underwent interval debulking with resection of the majority of her disease, although there was still small volume carcinomatosis remaining. I recommended continuing with Taxol/Carboplatin, as she did respond well, despite still having residual disease at her interval debulking. I dropped the dose of Taxol to 135 mg/m2 to reduce the chance of neuropathy, but kept the carboplatin dose at an AUC of 6. She completed 4 more cycles and her CA-125 normalized. We then stopped therapy. She subsequently developed recurrent disease and I recommended Doxil/Avastin, considering it had been less than 6 months since we stopped Taxol/Carboplatin. She completed 3 cycles of that regimen but found to have progression of disease. She was then switched to [employer].   She has completed 6 cycles so far. Due to the difficulty she has had with Gemzar, and the fact that her CA-125 was rising, I recommended that we stop the Gemzar. We had previously discussed the possibility of switching to Topotecan. It had been over a year since she had last received a platinum agent. I suggested that we give single agent Carboplatin a try. I felt she would tolerate it better than the Topotecan and it would be just as likely to work. She has completed 2 cycles. Her CA-125 has responded. We will continue with the current regimen and will repeat a CT after the next cycle.       Signed By: Roseline Regan MD     12/14/2017/10:35 AM

## 2017-12-15 LAB
ALBUMIN SERPL-MCNC: 4 G/DL (ref 3.5–5.5)
ALBUMIN/GLOB SERPL: 1.3 {RATIO} (ref 1.2–2.2)
ALP SERPL-CCNC: 101 IU/L (ref 39–117)
ALT SERPL-CCNC: 10 IU/L (ref 0–32)
AST SERPL-CCNC: 16 IU/L (ref 0–40)
BASOPHILS # BLD AUTO: 0 X10E3/UL (ref 0–0.2)
BASOPHILS NFR BLD AUTO: 0 %
BILIRUB SERPL-MCNC: <0.2 MG/DL (ref 0–1.2)
BUN SERPL-MCNC: 27 MG/DL (ref 6–24)
BUN/CREAT SERPL: 30 (ref 9–23)
CALCIUM SERPL-MCNC: 9.6 MG/DL (ref 8.7–10.2)
CANCER AG125 SERPL-ACNC: 97.8 U/ML (ref 0–38.1)
CHLORIDE SERPL-SCNC: 92 MMOL/L (ref 96–106)
CO2 SERPL-SCNC: 27 MMOL/L (ref 18–29)
CREAT SERPL-MCNC: 0.89 MG/DL (ref 0.57–1)
EOSINOPHIL # BLD AUTO: 0.1 X10E3/UL (ref 0–0.4)
EOSINOPHIL NFR BLD AUTO: 1 %
ERYTHROCYTE [DISTWIDTH] IN BLOOD BY AUTOMATED COUNT: 17.8 % (ref 12.3–15.4)
GFR SERPLBLD CREATININE-BSD FMLA CKD-EPI: 72 ML/MIN/1.73
GFR SERPLBLD CREATININE-BSD FMLA CKD-EPI: 83 ML/MIN/1.73
GLOBULIN SER CALC-MCNC: 3.2 G/DL (ref 1.5–4.5)
GLUCOSE SERPL-MCNC: 99 MG/DL (ref 65–99)
HCT VFR BLD AUTO: 27.6 % (ref 34–46.6)
HGB BLD-MCNC: 9 G/DL (ref 11.1–15.9)
IMM GRANULOCYTES # BLD: 0 X10E3/UL (ref 0–0.1)
IMM GRANULOCYTES NFR BLD: 1 %
LYMPHOCYTES # BLD AUTO: 3.9 X10E3/UL (ref 0.7–3.1)
LYMPHOCYTES NFR BLD AUTO: 46 %
MAGNESIUM SERPL-MCNC: 1.4 MG/DL (ref 1.6–2.3)
MCH RBC QN AUTO: 31.6 PG (ref 26.6–33)
MCHC RBC AUTO-ENTMCNC: 32.6 G/DL (ref 31.5–35.7)
MCV RBC AUTO: 97 FL (ref 79–97)
MONOCYTES # BLD AUTO: 0.7 X10E3/UL (ref 0.1–0.9)
MONOCYTES NFR BLD AUTO: 8 %
NEUTROPHILS # BLD AUTO: 3.7 X10E3/UL (ref 1.4–7)
NEUTROPHILS NFR BLD AUTO: 44 %
PLATELET # BLD AUTO: 493 X10E3/UL (ref 150–379)
POTASSIUM SERPL-SCNC: 4.4 MMOL/L (ref 3.5–5.2)
PROT SERPL-MCNC: 7.2 G/DL (ref 6–8.5)
RBC # BLD AUTO: 2.85 X10E6/UL (ref 3.77–5.28)
SODIUM SERPL-SCNC: 140 MMOL/L (ref 134–144)
WBC # BLD AUTO: 8.3 X10E3/UL (ref 3.4–10.8)

## 2017-12-18 RX ORDER — MAGNESIUM SULFATE 1 G/100ML
1 INJECTION INTRAVENOUS ONCE
Status: COMPLETED | OUTPATIENT
Start: 2017-12-19 | End: 2017-12-19

## 2017-12-19 ENCOUNTER — HOSPITAL ENCOUNTER (OUTPATIENT)
Dept: INFUSION THERAPY | Age: 57
Discharge: HOME | End: 2017-12-19
Payer: OTHER GOVERNMENT

## 2017-12-19 VITALS
SYSTOLIC BLOOD PRESSURE: 137 MMHG | RESPIRATION RATE: 18 BRPM | DIASTOLIC BLOOD PRESSURE: 84 MMHG | BODY MASS INDEX: 18.07 KG/M2 | HEIGHT: 63 IN | TEMPERATURE: 98.1 F | WEIGHT: 102 LBS | HEART RATE: 74 BPM | OXYGEN SATURATION: 99 %

## 2017-12-19 PROCEDURE — 74011000258 HC RX REV CODE- 258: Performed by: NURSE PRACTITIONER

## 2017-12-19 PROCEDURE — 96413 CHEMO IV INFUSION 1 HR: CPT

## 2017-12-19 PROCEDURE — 74011250636 HC RX REV CODE- 250/636: Performed by: NURSE PRACTITIONER

## 2017-12-19 PROCEDURE — 96375 TX/PRO/DX INJ NEW DRUG ADDON: CPT

## 2017-12-19 PROCEDURE — 96360 HYDRATION IV INFUSION INIT: CPT

## 2017-12-19 PROCEDURE — 77030012965 HC NDL HUBR BBMI -A

## 2017-12-19 PROCEDURE — 74011250636 HC RX REV CODE- 250/636: Performed by: OBSTETRICS & GYNECOLOGY

## 2017-12-19 PROCEDURE — 96361 HYDRATE IV INFUSION ADD-ON: CPT

## 2017-12-19 PROCEDURE — 96377 APPLICATON ON-BODY INJECTOR: CPT

## 2017-12-19 PROCEDURE — 74011000258 HC RX REV CODE- 258: Performed by: OBSTETRICS & GYNECOLOGY

## 2017-12-19 PROCEDURE — 96367 TX/PROPH/DG ADDL SEQ IV INF: CPT

## 2017-12-19 RX ORDER — HEPARIN 100 UNIT/ML
500 SYRINGE INTRAVENOUS AS NEEDED
Status: ACTIVE | OUTPATIENT
Start: 2017-12-19 | End: 2017-12-20

## 2017-12-19 RX ORDER — SODIUM CHLORIDE 0.9 % (FLUSH) 0.9 %
10-40 SYRINGE (ML) INJECTION AS NEEDED
Status: ACTIVE | OUTPATIENT
Start: 2017-12-19 | End: 2017-12-20

## 2017-12-19 RX ORDER — SODIUM CHLORIDE 9 MG/ML
10 INJECTION INTRAMUSCULAR; INTRAVENOUS; SUBCUTANEOUS AS NEEDED
Status: ACTIVE | OUTPATIENT
Start: 2017-12-19 | End: 2017-12-20

## 2017-12-19 RX ADMIN — PEGFILGRASTIM 6 MG: KIT SUBCUTANEOUS at 13:32

## 2017-12-19 RX ADMIN — CARBOPLATIN 386 MG: 10 INJECTION, SOLUTION INTRAVENOUS at 11:56

## 2017-12-19 RX ADMIN — MAGNESIUM SULFATE 1 G: 1 INJECTION INTRAVENOUS at 10:50

## 2017-12-19 RX ADMIN — Medication 10 ML: at 13:32

## 2017-12-19 RX ADMIN — PROMETHAZINE HYDROCHLORIDE 12.5 MG: 25 INJECTION INTRAMUSCULAR; INTRAVENOUS at 12:44

## 2017-12-19 RX ADMIN — SODIUM CHLORIDE, PRESERVATIVE FREE 500 UNITS: 5 INJECTION INTRAVENOUS at 13:32

## 2017-12-19 RX ADMIN — DEXAMETHASONE SODIUM PHOSPHATE 20 MG: 4 INJECTION, SOLUTION INTRA-ARTICULAR; INTRALESIONAL; INTRAMUSCULAR; INTRAVENOUS; SOFT TISSUE at 10:20

## 2017-12-19 RX ADMIN — GRANISETRON HYDROCHLORIDE 1 MG: 1 INJECTION INTRAVENOUS at 09:20

## 2017-12-19 RX ADMIN — SODIUM CHLORIDE 1000 ML: 900 INJECTION, SOLUTION INTRAVENOUS at 09:16

## 2017-12-19 NOTE — PROGRESS NOTES
[address]  [address] [facility] [address]  [place]: [phone]   F: [phone]    Infusion Date: 12/19/2017     MAHSA Pelaez is a 61 y.o.  postmenopausal female with a diagnosis of stage IV ovarian cancer. She underwent exploratory laparotomy with suboptimal debulking. She had extensive disease. She was readmitted about a week later with obstruction and subsequently had a cecal perforation, requiring resection, end ileostomy, and mucous fistula. During the hospitalization she was also diagnosed with pulmonary embolus and had chest tubes placed for bilateral pleural effusions. She had a prolonged hospital course and actually received a dose of cisplatin chemotherapy while in the hospital to help dry up the effusions. She completed 6 cycles of Taxol/Carboplatin chemotherapy following her initial debulking. She did relatively well, considering she had an ileostomy to deal with during chemotherapy. I took her to the OR on 4/29/16 for interval debulking and reversal of her ileostomy. She had extensive disease, but we were able to resect the majority of her disease. One week later she developed an anastomotic leak requiring reexploration and recreation of an ileostomy. She had another prolonged hospitalization, but recovered well since surgery. We then reinitiated Taxol/Carboplatin chemotherapy, but did reduce the dose of Taxol to 135 mg/m2. She completed 4 mores cycles. Her CA-125 normalized and we stopped treatment.      On 2/7, she presented to Dr. Christiano Velasquez office with her  to discuss her follow up/surveillance CT scan. Unfortunately, it demonstrated progression of disease, although minimally worse than her prior scan 2 months previously. She was actually feeling better than she did at that time. After review of scans and disease, the decision was made to begin Doxil/Avastin Q28 days for 6 cycles. She began this on 2/27/2017.  In April 2017, she had completed 3 cycles of Doxil/Avastin and had CT scans at that time showed she had evidence of recurrent disease. She was switched at that time to [facility]. She has been admitted to [facility] several times for partial SBO and all have resolved with consecutive management but this, in combination of her continued rise in CA-125 had begun to take an emotional toll on her. After having \"one of the worst weeks in years\" recently, she met with Dr. Thais Junior with her  present to discuss options. After a long thoughtful discussion, Dr. Thais Junior suggested that we give single agent Carboplatin a try since it had been over a year since she has tried it. Subjective:   PT presents today for her third cycle of single agent Carbo. She, overall, has been doing well. She has had some episodes of abdominal discomfort/pain, that she felt was related to her bowls and she took some MOM and once her ostomy increased in output, she said the pain was releived. She continues with current pain regimen and nausea management. Says it works best for her and she can [place] a  more normal life\". Says she has been eating \"fairly well\". Denies any vomiting for over a week. But does get nauseated and her phenergan does hold this.  and sister are present and supportive. Review of Systems:  General: Stable continues trying to eat and drink as able. Feels she has been doing better with this lately  HEENT: Denies visual changes, dysphagia or headache  Resp: Denies SOB, DUDLEY, wheezing or cough  CV: Denies CP, palpitations  GI/: abd pain well controlled and pt says she continues taking less breakthrough meds. Says nauesa/vomiting improved over all, but has some now even after the Kytril . Says ostomy continues to work with liquid/semi formed stool. Continues with Phenergan PRN.    Protein shakes and recommendation from nutritionist.    MuskSkel: Denies muscle ache or joint pain  Neuro: Denies dizzyness or syncope    Objective: Blood pressure 114/76, pulse 73, temperature 97.6 °F (36.4 °C), resp. rate 18, height 5' 3\" (1.6 m), weight 102 lb (46.3 kg), SpO2 97 %, not currently breastfeeding. Temp (24hrs), Av.6 °F (36.4 °C), Min:97.6 °F (36.4 °C), Max:97.6 °F (36.4 °C)      _____________________  Physical Exam:     General: A&O X 3. With pleasant affect. Appears more alert and stronger. HEENT: Oral mucosa pink, slightly dry with no sores or lesions noted. No cervical adenopathy appreciated  Port site: easily accessed without redness, tenderness or swelling  Cardiovascular: Regular without M/R/G  Lungs:CTA bilat without wheezing or rales  Abdomen: Soft and today without tenderness to palpation to mid abdomen  and with + bowel sounds throughout. . Small amount of + semi-formed stool in ostomy bag   Ext: + pedal pulses without edema bilat. bilat  Neuro: grossly intact      LABS: 2017  WBC: 8.3, HGB 9.0; HCT 27.6; Plt's 493; ANC 3.9: Na 140; K+ 4.4; Cl 92;   CO2 27:BUN 27; Creat 0.89; Gluc 99; Mag 1.4      :2017= 97 (2017= 89.1: (10/12/2017= 112: 2017: =128; 2017=85; 2017=87: 2017= 83; 2017= 65; 17=95.5;  17= 102; 17= 145.6:  = 100; 97=239.2 3/22=80; = 63.9)          PATHOLOGY from 5/10/2017 Upper Endoscopy:  1. Gastric polyp, polypectomy:   Benign polypoid gastric oxyntic mucosa with some features of hyperplastic polyp   2. Lower esophagus, biopsy:   Acute erosive esophagitis of squamous mucosa with ulceration and severe squamous atypia (see comment)   3. Proximal esophagus, biopsy:   Acute esophagitis of squamous mucosa with severe squamous atypia           Imaging:  CT Abd/Pelvis 9/15/2017:  FINDINGS:   The visualized lung bases demonstrate a small right pleural effusion. There are  increased patchy airspace opacities in the right middle and lower lobes. The  left lung and pleural space are clear. The heart size is normal.     There is a small hiatal hernia. An enteric tube terminates in the stomach.     There are stable low-density liver lesions measuring 0.7 x 1.2 cm in the right  liver, previously 0.8 x 1.1 cm. The left liver lesion is stable measuring 0.7 cm  (series 3, image 28).    The spleen is stable with a small calcification. The gallbladder is present  without biliary duct dilatation. The pancreas and adrenal glands are  unremarkable. The kidneys enhance symmetrically with stable bilateral  hydronephrosis.     There are stable clumped and mildly dilated bowel loops in the mid lower  abdomen. Surgical changes are again seen with near complete colectomy with a  right lower abdomen ostomy again demonstrated. The rectum is blind-ending. There  is a stable to slightly increased amount of loculated ascites anteriorly. There  is no free air.     There are no enlarged lymph nodes. The aorta tapers without aneurysm. IVC filter  is present.     The urinary bladder is unremarkable. There is no aggressive bony lesion.       IMPRESSION:   1. Overall stable appearance of the bowel with mildly dilated clumped bowel  loops in the lower mid abdomen. This may be attributable to peritoneal  carcinomatosis. A right lower abdomen ostomy is again demonstrated. There is no  evidence for complete bowel obstruction.     2. Stable to slightly increased loculated ascites anteriorly.     3. Stable bilateral hydronephrosis.     4. Stable low-density liver lesions.     5. Small right pleural effusion and increased patchy airspace opacities in the  right middle and lower lobes suggesting nonspecific infection or inflammation. .          CT scan of Abdomen/Pelvis with contrast 6/27/2017  FINDINGS:   LUNG BASES: Clear. INCIDENTALLY IMAGED HEART AND MEDIASTINUM: Unremarkable. LIVER: Small hypodense lesions, one in the left hepatic lobe and one in the  right hepatic lobe are stable. GALLBLADDER: Unremarkable. SPLEEN: No mass. PANCREAS: No mass or ductal dilatation.   ADRENALS: Unremarkable. KIDNEYS: Hydronephrosis is moderate and stable on the right and mild to moderate  on the left and new, to the level of the mid ureters. There is no definite  obstructing calculus on either side. STOMACH: Unremarkable. SMALL BOWEL: Previously described small bowel centralization is stable. Small  bowel loops are dilated but roughly stable in appearance, with a maximum caliber  of 3.1 cm. Pattern of dilatation is similar to the prior study. Small bowel is  fluid-filled to the level of the ostomy in the right anterior abdominal wall. COLON: Visualized colon shows no distention, but the colon is predominantly  absent. APPENDIX: Surgically absent  PERITONEUM: Small amount of ascites, stable to slightly decreased since the  prior study. RETROPERITONEUM: No lymphadenopathy or aortic aneurysm. IVC filter stable. REPRODUCTIVE ORGANS: Surgically absent. URINARY BLADDER: No mass or calculus. BONES: No destructive bone lesion. ADDITIONAL COMMENTS: N/A       IMPRESSION:     1. Mild to moderate small bowel distention with centralization of loops as  previously described, similar in appearance to prior study 5/7/2017.  2. Small to moderate amount of ascites, stable. 3. Postoperative changes as described above, stable. 4. Stable hypodense liver lesions. 5. Stable right hydronephrosis to the mid ureter without obvious obstructing  calculus.   6. New left hydronephrosis to the mid ureter without obvious obstructing  calculus.       Assessment:     Patient Active Problem List   Diagnosis Code    Malignant neoplasm of both ovaries ([address] C56.1, C56.2    Carcinomatosis peritonei ([address] C78.6, C80.1    Small bowel obstruction K56.609    Ileostomy in place ([address] Z93.2    Anemia due to chemotherapy D64.81, T45.1X5A    CINV (chemotherapy-induced nausea and vomiting) R11.2, T45.1X5A    Cancer associated pain G89.3    Generalized abdominal pain R10.84    Anxiety about health F41.8    Ileus (HCC) K56.7    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    Hx of pulmonary embolus Z86.711    Dehydration E86.0    Chemotherapy-induced neutropenia (HCC) D70.1, T45.1X5A    Anticoagulation adequate with anticoagulant therapy Z79.01    Counseling regarding end of life decision making Z71.89    Bowel obstruction K56.609    Protein calorie malnutrition (HCC) E46    Malignant ascites R18.0    Hydronephrosis of right kidney N13.30    Isolated proteinuria R80.0    Erosive esophagitis K22.10    Ulcerative esophagitis K22.10    Hypomagnesemia E83.42    Abdominal pain R10.9    Hydronephrosis of left kidney N13.30    Ovarian cancer (HCC) C56.9    Nausea & vomiting R11.2    Carcinomatosis (HCC) C80.0    Intractable vomiting with nausea R11.2    Partial bowel obstruction K56.600    Nausea and vomiting R11.2    Ovarian cancer, unspecified laterality ([facility] C56.9         Plan: Will proceed with C3of Carbo today  Replace Mag  CT scan scheduled on 1/4/2017   Will give additional dose of phenergan IV today  Continue Phenergan po at home for nausea and she will let us know if she feels dehydrated and we will bring her in for hydration  Continue with  Fentanyl patch to 150 mcg and she will let us know if it gets worse again. Monitor HGB due to anemia and transfuse if she goes to 8  Continue anticoagulation  Call for any questions or concerns    Navjot Wellington NP  12/19/2017          ______________________  Medications:    Current Outpatient Prescriptions   Medication Sig Dispense Refill    HYDROmorphone (DILAUDID) 1 mg/mL liqd oral solution Take 4 mL by mouth every four (4) hours as needed. Max Daily Amount: 24 mg. 475 mL 0    fentaNYL (DURAGESIC) 50 mcg/hr PATCH 1 Patch by TransDERmal route every seventy-two (72) hours. Max Daily Amount: 1 Patch.  This is in ADDITION to your 100 mcg patch for a total of 150 mcg patch every 72 hours 10 Patch 0    fentaNYL (DURAGESIC) 100 mcg/hr PATCH 1 Patch by TransDERmal route every seventy-two (72) hours. Max Daily Amount: 1 Patch. 10 Patch 0    cyclobenzaprine (FLEXERIL) 10 mg tablet Take 1 Tab by mouth three (3) times daily as needed for Muscle Spasm(s). 30 Tab 1    promethazine (PHENERGAN) 6.25 mg/5 mL syrup Take 20 mL by mouth four (4) times daily as needed for Nausea. 400 mL 4    metoprolol tartrate (LOPRESSOR) 50 mg tablet Take  by mouth two (2) times a day.  dexamethasone (DECADRON) 4 mg tablet Take 2 tabs QAM day before chemo and for 2 days after 30 Tab 4    metoclopramide (REGLAN) 5 mg/5 mL syrup Take 10 mL by mouth four (4) times daily as needed. Take before meals as needed 500 mL 3    dronabinol (SYNDROS) 5 mg/mL soln Take 2.1 mg by mouth two (2) times a day. Max Daily Amount: 4.2 mg. Take 30 minutes before lunch and dinner 30 mL 1    scopolamine (TRANSDERM-SCOP) 1.5 mg (1 mg over 3 days) pt3d 1 Patch by TransDERmal route every seventy-two (72) hours as needed. 10 Patch 1    NEXIUM PACKET 40 mg granules for oral suspension two (2) times a day.  magnesium oxide (MAG-OX) 400 mg tablet Take 1 Tab by mouth two (2) times a day. Indications: HYPOMAGNESEMIA 60 Tab 2    sucralfate (CARAFATE) 100 mg/mL suspension Take 5 mL by mouth four (4) times daily. 414 mL 3    polyethylene glycol (MIRALAX) 17 gram/dose powder Take 17 g by mouth daily as needed. 238 g 3    apixaban (ELIQUIS) 2.5 mg tablet Take 1 Tab by mouth two (2) times a day. 60 Tab 6    zolpidem (AMBIEN) 10 mg tablet Take 1 Tab by mouth nightly as needed for Sleep. Max Daily Amount: 10 mg. 30 Tab 1    ondansetron (ZOFRAN ODT) 4 mg disintegrating tablet Take 1 Tab by mouth every eight (8) hours as needed for Nausea.  30 Tab 2    lidocaine-prilocaine (EMLA) topical cream Apply small amount over port area and cover with a band aid 1 hour before chemo treatment 30 g 3     Current Facility-Administered Medications   Medication Dose Route Frequency Provider Last Rate Last Dose    sodium chloride 0.9 % injection 10 mL  10 mL IntraVENous PRN Silvina Garcia MD        heparin (porcine) pf 500 Units  500 Units IntraVENous PRN Silvina Garcia MD        sodium chloride (NS) flush 10-40 mL  10-40 mL IntraVENous PRN Silvina Garcia MD        promethazine (PHENERGAN) 12.5 mg in 0.9% sodium chloride 50 mL IVPB  12.5 mg IntraVENous NOW Genny Don NP        magnesium sulfate 1 g/100 ml IVPB (premix or compounded)  1 g IntraVENous ONCE Silvina Garcia MD        CARBOplatin (PARAPLATIN) 386 mg in dextrose 5% 250 mL, overfill volume 25 mL chemo infusion  386 mg IntraVENous ONCE Silvina Garcia MD        pegfilgrastim (NEULASTA) wearable SQ injector 6 mg  6 mg SubCUTAneous Dexter Stevenson MD

## 2017-12-19 NOTE — PROGRESS NOTES
Outpatient Infusion Center - Chemotherapy Progress Note     0900 Pt admit to [address] for Carboplatin   Chemotherapy Flowsheet 12/19/2017   Cycle C3   Date 12/19/2017   Drug / Regimen Carboplatin   Dosage -   Pre Hydration -   Pre Meds Given   Notes Given      Pt ambulatory in stable condition. Assessment completed. Pt expresses she nauseas. . No new concerns voiced. Port accessed with positive blood return. Labs drawn at previous appointment. Line connected to NS. NS bolus started, meds ordered.     Medications:  NS at [facility]  Kytril  Decadron  Neulasta OBI  Carboplatin  1L NS Bolus  Magnesium       Pt tolerated treatment well. Port maintained positive blood return throughout treatment, flushed with positive blood return at conclusion and de-accessed. D/c home ambulatory in no distress.  Pt aware of next [facility] appointment scheduled for 1/16/18 at 0900.

## 2017-12-22 RX ORDER — SCOLOPAMINE TRANSDERMAL SYSTEM 1 MG/1
1.5 PATCH, EXTENDED RELEASE TRANSDERMAL
Qty: 10 PATCH | Refills: 1 | Status: SHIPPED | OUTPATIENT
Start: 2017-12-22 | End: 2018-04-03 | Stop reason: SDUPTHER

## 2017-12-22 NOTE — TELEPHONE ENCOUNTER
From: Ori Ureña  To: Tyree Dominguez NP  Sent: 12/21/2017 10:33 AM EST  Subject: Medication Renewal Request    Original authorizing provider: HAYDER Santamaria would like a refill of the following medications:  scopolamine (TRANSDERM-SCOP) 1.5 mg (1 mg over 3 days) pt3d [Genny Don NP]    Preferred pharmacy: [address] [place] [address] [facility]    Comment:

## 2017-12-27 DIAGNOSIS — C56.9 OVARIAN CANCER, UNSPECIFIED LATERALITY (HCC): ICD-10-CM

## 2018-01-03 DIAGNOSIS — C78.6 CARCINOMATOSIS PERITONEI (HCC): Primary | ICD-10-CM

## 2018-01-03 DIAGNOSIS — G89.3 CANCER ASSOCIATED PAIN: ICD-10-CM

## 2018-01-03 DIAGNOSIS — F41.8 ANXIETY ABOUT HEALTH: ICD-10-CM

## 2018-01-03 DIAGNOSIS — R18.0 MALIGNANT ASCITES: ICD-10-CM

## 2018-01-03 DIAGNOSIS — C56.3 MALIGNANT NEOPLASM OF BOTH OVARIES (HCC): ICD-10-CM

## 2018-01-03 DIAGNOSIS — C56.9 OVARIAN CANCER, UNSPECIFIED LATERALITY (HCC): ICD-10-CM

## 2018-01-03 DIAGNOSIS — R11.2 INTRACTABLE VOMITING WITH NAUSEA, UNSPECIFIED VOMITING TYPE: ICD-10-CM

## 2018-01-03 RX ORDER — ZOLPIDEM TARTRATE 10 MG/1
10 TABLET ORAL
Qty: 30 TAB | Refills: 1 | Status: SHIPPED | OUTPATIENT
Start: 2018-01-03 | End: 2018-01-03 | Stop reason: SDUPTHER

## 2018-01-03 RX ORDER — ZOLPIDEM TARTRATE 10 MG/1
10 TABLET ORAL
Qty: 30 TAB | Refills: 2 | Status: SHIPPED | OUTPATIENT
Start: 2018-01-03

## 2018-01-03 NOTE — TELEPHONE ENCOUNTER
From: Cy Kitchen  To: Mundo Nava MD  Sent: 12/27/2017 3:13 PM EST  Subject: Medication Renewal Request    Original authorizing provider: MD Gabo Shipman.  White would like a refill of the following medications:  zolpidem (AMBIEN) 10 mg tablet Mundo Nava MD]    Preferred pharmacy: [place]    Comment:

## 2018-01-09 ENCOUNTER — HOSPITAL ENCOUNTER (OUTPATIENT)
Dept: CT IMAGING | Age: 58
Discharge: HOME | End: 2018-01-09
Attending: OBSTETRICS & GYNECOLOGY
Payer: OTHER GOVERNMENT

## 2018-01-09 ENCOUNTER — HOSPITAL ENCOUNTER (OUTPATIENT)
Dept: INFUSION THERAPY | Age: 58
Discharge: HOME | End: 2018-01-09
Payer: OTHER GOVERNMENT

## 2018-01-09 ENCOUNTER — APPOINTMENT (OUTPATIENT)
Dept: INFUSION THERAPY | Age: 58
End: 2018-01-09
Payer: OTHER GOVERNMENT

## 2018-01-09 VITALS
TEMPERATURE: 97.2 F | HEART RATE: 82 BPM | SYSTOLIC BLOOD PRESSURE: 123 MMHG | DIASTOLIC BLOOD PRESSURE: 76 MMHG | RESPIRATION RATE: 18 BRPM

## 2018-01-09 DIAGNOSIS — R10.84 GENERALIZED ABDOMINAL PAIN: ICD-10-CM

## 2018-01-09 DIAGNOSIS — C56.3 MALIGNANT NEOPLASM OF BOTH OVARIES (HCC): ICD-10-CM

## 2018-01-09 PROCEDURE — 74011000250 HC RX REV CODE- 250: Performed by: NURSE PRACTITIONER

## 2018-01-09 PROCEDURE — 74011636320 HC RX REV CODE- 636/320: Performed by: OBSTETRICS & GYNECOLOGY

## 2018-01-09 PROCEDURE — 74011000258 HC RX REV CODE- 258: Performed by: OBSTETRICS & GYNECOLOGY

## 2018-01-09 PROCEDURE — 74011250636 HC RX REV CODE- 250/636: Performed by: NURSE PRACTITIONER

## 2018-01-09 PROCEDURE — 96360 HYDRATION IV INFUSION INIT: CPT

## 2018-01-09 PROCEDURE — 74177 CT ABD & PELVIS W/CONTRAST: CPT

## 2018-01-09 PROCEDURE — 77030012965 HC NDL HUBR BBMI -A

## 2018-01-09 RX ORDER — SODIUM CHLORIDE 9 MG/ML
10 INJECTION INTRAMUSCULAR; INTRAVENOUS; SUBCUTANEOUS AS NEEDED
Status: ACTIVE | OUTPATIENT
Start: 2018-01-09 | End: 2018-01-10

## 2018-01-09 RX ORDER — SODIUM CHLORIDE 0.9 % (FLUSH) 0.9 %
10-40 SYRINGE (ML) INJECTION AS NEEDED
Status: ACTIVE | OUTPATIENT
Start: 2018-01-09 | End: 2018-01-10

## 2018-01-09 RX ORDER — SODIUM CHLORIDE 0.9 % (FLUSH) 0.9 %
10 SYRINGE (ML) INJECTION
Status: COMPLETED | OUTPATIENT
Start: 2018-01-09 | End: 2018-01-09

## 2018-01-09 RX ORDER — HEPARIN 100 UNIT/ML
500 SYRINGE INTRAVENOUS AS NEEDED
Status: ACTIVE | OUTPATIENT
Start: 2018-01-09 | End: 2018-01-10

## 2018-01-09 RX ADMIN — SODIUM CHLORIDE, PRESERVATIVE FREE 500 UNITS: 5 INJECTION INTRAVENOUS at 09:59

## 2018-01-09 RX ADMIN — IOHEXOL 50 ML: 240 INJECTION, SOLUTION INTRATHECAL; INTRAVASCULAR; INTRAVENOUS; ORAL at 08:36

## 2018-01-09 RX ADMIN — Medication 10 ML: at 10:31

## 2018-01-09 RX ADMIN — IOPAMIDOL 100 ML: 755 INJECTION, SOLUTION INTRAVENOUS at 10:31

## 2018-01-09 RX ADMIN — Medication 10 ML: at 09:59

## 2018-01-09 RX ADMIN — SODIUM CHLORIDE 1000 ML: 900 INJECTION, SOLUTION INTRAVENOUS at 08:59

## 2018-01-09 RX ADMIN — SODIUM CHLORIDE 100 ML: 900 INJECTION, SOLUTION INTRAVENOUS at 10:31

## 2018-01-09 RX ADMIN — SODIUM CHLORIDE 10 ML: 9 INJECTION, SOLUTION INTRAMUSCULAR; INTRAVENOUS; SUBCUTANEOUS at 08:59

## 2018-01-09 NOTE — PROGRESS NOTES
Outpatient Infusion Center Progress Note    3196 Pt admit to [facility] for hydration ambulatory in stable condition. Assessment completed. No new concerns voiced. Port accessed with positive blood return. Port flushed and NS bolus started. Visit Vitals    /76 (BP 1 Location: Right arm, BP Patient Position: Sitting)    Pulse 82    Temp 97.2 °F (36.2 °C)    Resp 18    Breastfeeding No       Medications:  1 L NS over 1 hour  NS flushes  Heparin     1000 Pt tolerated treatment well. Port maintained positive blood return throughout treatment. Port flushed, heparinized, and deaccessed per protocol. D/c home ambulatory in no distress. Pt aware of next appointment scheduled for 1/16/18 at 9:00.

## 2018-01-11 RX ORDER — GRANISETRON HYDROCHLORIDE 1 MG/ML
1 INJECTION INTRAVENOUS ONCE
Status: COMPLETED | OUTPATIENT
Start: 2018-01-16 | End: 2018-01-16

## 2018-01-16 ENCOUNTER — OFFICE VISIT (OUTPATIENT)
Dept: GYNECOLOGY | Age: 58
End: 2018-01-16

## 2018-01-16 ENCOUNTER — HOSPITAL ENCOUNTER (OUTPATIENT)
Dept: INFUSION THERAPY | Age: 58
Discharge: HOME | End: 2018-01-16
Payer: OTHER GOVERNMENT

## 2018-01-16 VITALS
HEART RATE: 91 BPM | HEIGHT: 63 IN | DIASTOLIC BLOOD PRESSURE: 73 MMHG | BODY MASS INDEX: 17.65 KG/M2 | SYSTOLIC BLOOD PRESSURE: 112 MMHG | WEIGHT: 99.6 LBS

## 2018-01-16 VITALS
HEIGHT: 63 IN | SYSTOLIC BLOOD PRESSURE: 97 MMHG | HEART RATE: 82 BPM | WEIGHT: 99.6 LBS | TEMPERATURE: 97.6 F | BODY MASS INDEX: 17.65 KG/M2 | RESPIRATION RATE: 18 BRPM | DIASTOLIC BLOOD PRESSURE: 58 MMHG

## 2018-01-16 DIAGNOSIS — E87.1 HYPONATREMIA: ICD-10-CM

## 2018-01-16 DIAGNOSIS — T45.1X5A CINV (CHEMOTHERAPY-INDUCED NAUSEA AND VOMITING): ICD-10-CM

## 2018-01-16 DIAGNOSIS — R10.84 GENERALIZED ABDOMINAL PAIN: ICD-10-CM

## 2018-01-16 DIAGNOSIS — G89.3 CANCER ASSOCIATED PAIN: ICD-10-CM

## 2018-01-16 DIAGNOSIS — C56.3 MALIGNANT NEOPLASM OF BOTH OVARIES (HCC): ICD-10-CM

## 2018-01-16 DIAGNOSIS — T45.1X5A ANEMIA DUE TO CHEMOTHERAPY: ICD-10-CM

## 2018-01-16 DIAGNOSIS — C56.9 OVARIAN CANCER, UNSPECIFIED LATERALITY (HCC): ICD-10-CM

## 2018-01-16 DIAGNOSIS — D64.81 ANEMIA DUE TO CHEMOTHERAPY: ICD-10-CM

## 2018-01-16 DIAGNOSIS — F41.8 ANXIETY ABOUT HEALTH: ICD-10-CM

## 2018-01-16 DIAGNOSIS — T45.1X5A CHEMOTHERAPY-INDUCED NEUTROPENIA (HCC): ICD-10-CM

## 2018-01-16 DIAGNOSIS — Z93.2 ILEOSTOMY IN PLACE (HCC): ICD-10-CM

## 2018-01-16 DIAGNOSIS — C78.6 CARCINOMATOSIS PERITONEI (HCC): ICD-10-CM

## 2018-01-16 DIAGNOSIS — C56.9 MALIGNANT NEOPLASM OF OVARY, UNSPECIFIED LATERALITY (HCC): Primary | ICD-10-CM

## 2018-01-16 DIAGNOSIS — R11.2 CINV (CHEMOTHERAPY-INDUCED NAUSEA AND VOMITING): ICD-10-CM

## 2018-01-16 DIAGNOSIS — D70.1 CHEMOTHERAPY-INDUCED NEUTROPENIA (HCC): ICD-10-CM

## 2018-01-16 LAB
ALBUMIN SERPL-MCNC: 3.5 G/DL (ref 3.5–5)
ALBUMIN/GLOB SERPL: 0.9 {RATIO} (ref 1.1–2.2)
ALP SERPL-CCNC: 126 U/L (ref 45–117)
ALT SERPL-CCNC: 30 U/L (ref 12–78)
ANION GAP SERPL CALC-SCNC: 8 MMOL/L (ref 5–15)
AST SERPL-CCNC: 18 U/L (ref 15–37)
BASOPHILS # BLD: 0 K/UL (ref 0–0.1)
BASOPHILS NFR BLD: 0 % (ref 0–1)
BILIRUB SERPL-MCNC: 0.3 MG/DL (ref 0.2–1)
BUN SERPL-MCNC: 30 MG/DL (ref 6–20)
BUN/CREAT SERPL: 27 (ref 12–20)
CALCIUM SERPL-MCNC: 8.7 MG/DL (ref 8.5–10.1)
CHLORIDE SERPL-SCNC: 91 MMOL/L (ref 97–108)
CO2 SERPL-SCNC: 34 MMOL/L (ref 21–32)
CREAT SERPL-MCNC: 1.1 MG/DL (ref 0.55–1.02)
EOSINOPHIL # BLD: 0 K/UL (ref 0–0.4)
EOSINOPHIL NFR BLD: 0 % (ref 0–7)
ERYTHROCYTE [DISTWIDTH] IN BLOOD BY AUTOMATED COUNT: 16.9 % (ref 11.5–14.5)
GLOBULIN SER CALC-MCNC: 4 G/DL (ref 2–4)
GLUCOSE SERPL-MCNC: 120 MG/DL (ref 65–100)
HCT VFR BLD AUTO: 30.4 % (ref 35–47)
HGB BLD-MCNC: 9.7 G/DL (ref 11.5–16)
LYMPHOCYTES # BLD: 2 K/UL (ref 0.8–3.5)
LYMPHOCYTES NFR BLD: 19 % (ref 12–49)
MAGNESIUM SERPL-MCNC: 1 MG/DL (ref 1.6–2.4)
MCH RBC QN AUTO: 31.1 PG (ref 26–34)
MCHC RBC AUTO-ENTMCNC: 31.9 G/DL (ref 30–36.5)
MCV RBC AUTO: 97.4 FL (ref 80–99)
MONOCYTES # BLD: 0.7 K/UL (ref 0–1)
MONOCYTES NFR BLD: 7 % (ref 5–13)
NEUTS SEG # BLD: 7.6 K/UL (ref 1.8–8)
NEUTS SEG NFR BLD: 74 % (ref 32–75)
PLATELET # BLD AUTO: 498 K/UL (ref 150–400)
POTASSIUM SERPL-SCNC: 2.8 MMOL/L (ref 3.5–5.1)
PROT SERPL-MCNC: 7.5 G/DL (ref 6.4–8.2)
RBC # BLD AUTO: 3.12 M/UL (ref 3.8–5.2)
SODIUM SERPL-SCNC: 133 MMOL/L (ref 136–145)
WBC # BLD AUTO: 10.4 K/UL (ref 3.6–11)

## 2018-01-16 PROCEDURE — 74011250636 HC RX REV CODE- 250/636: Performed by: OBSTETRICS & GYNECOLOGY

## 2018-01-16 PROCEDURE — 85025 COMPLETE CBC W/AUTO DIFF WBC: CPT | Performed by: OBSTETRICS & GYNECOLOGY

## 2018-01-16 PROCEDURE — 74011000258 HC RX REV CODE- 258: Performed by: OBSTETRICS & GYNECOLOGY

## 2018-01-16 PROCEDURE — 86304 IMMUNOASSAY TUMOR CA 125: CPT | Performed by: OBSTETRICS & GYNECOLOGY

## 2018-01-16 PROCEDURE — 36415 COLL VENOUS BLD VENIPUNCTURE: CPT | Performed by: OBSTETRICS & GYNECOLOGY

## 2018-01-16 PROCEDURE — 96367 TX/PROPH/DG ADDL SEQ IV INF: CPT

## 2018-01-16 PROCEDURE — 77030012965 HC NDL HUBR BBMI -A

## 2018-01-16 PROCEDURE — 96413 CHEMO IV INFUSION 1 HR: CPT

## 2018-01-16 PROCEDURE — 96375 TX/PRO/DX INJ NEW DRUG ADDON: CPT

## 2018-01-16 PROCEDURE — 83735 ASSAY OF MAGNESIUM: CPT | Performed by: OBSTETRICS & GYNECOLOGY

## 2018-01-16 PROCEDURE — 80053 COMPREHEN METABOLIC PANEL: CPT | Performed by: OBSTETRICS & GYNECOLOGY

## 2018-01-16 RX ORDER — MAGNESIUM SULFATE HEPTAHYDRATE 40 MG/ML
2 INJECTION, SOLUTION INTRAVENOUS ONCE
Status: COMPLETED | OUTPATIENT
Start: 2018-01-16 | End: 2018-01-16

## 2018-01-16 RX ORDER — SODIUM CHLORIDE 9 MG/ML
10 INJECTION INTRAMUSCULAR; INTRAVENOUS; SUBCUTANEOUS AS NEEDED
Status: ACTIVE | OUTPATIENT
Start: 2018-01-16 | End: 2018-01-17

## 2018-01-16 RX ORDER — POTASSIUM CHLORIDE 7.45 MG/ML
10 INJECTION INTRAVENOUS
Status: COMPLETED | OUTPATIENT
Start: 2018-01-16 | End: 2018-01-16

## 2018-01-16 RX ORDER — SODIUM CHLORIDE 0.9 % (FLUSH) 0.9 %
10-40 SYRINGE (ML) INJECTION AS NEEDED
Status: ACTIVE | OUTPATIENT
Start: 2018-01-16 | End: 2018-01-17

## 2018-01-16 RX ORDER — FENTANYL 100 UG/H
1 PATCH TRANSDERMAL
Qty: 10 PATCH | Refills: 0 | Status: SHIPPED | OUTPATIENT
Start: 2018-01-16 | End: 2018-02-08 | Stop reason: SDUPTHER

## 2018-01-16 RX ORDER — FENTANYL 50 UG/1
1 PATCH TRANSDERMAL
Qty: 10 PATCH | Refills: 0 | Status: SHIPPED | OUTPATIENT
Start: 2018-01-16 | End: 2018-02-08 | Stop reason: SDUPTHER

## 2018-01-16 RX ORDER — HEPARIN 100 UNIT/ML
500 SYRINGE INTRAVENOUS AS NEEDED
Status: ACTIVE | OUTPATIENT
Start: 2018-01-16 | End: 2018-01-17

## 2018-01-16 RX ADMIN — DEXAMETHASONE SODIUM PHOSPHATE 20 MG: 4 INJECTION, SOLUTION INTRA-ARTICULAR; INTRALESIONAL; INTRAMUSCULAR; INTRAVENOUS; SOFT TISSUE at 14:24

## 2018-01-16 RX ADMIN — POTASSIUM CHLORIDE 10 MEQ: 10 INJECTION, SOLUTION INTRAVENOUS at 12:03

## 2018-01-16 RX ADMIN — GRANISETRON HYDROCHLORIDE 1 MG: 1 INJECTION INTRAVENOUS at 14:19

## 2018-01-16 RX ADMIN — POTASSIUM CHLORIDE 10 MEQ: 10 INJECTION, SOLUTION INTRAVENOUS at 14:50

## 2018-01-16 RX ADMIN — SODIUM CHLORIDE 2000 ML: 900 INJECTION, SOLUTION INTRAVENOUS at 09:29

## 2018-01-16 RX ADMIN — POTASSIUM CHLORIDE 10 MEQ: 10 INJECTION, SOLUTION INTRAVENOUS at 11:00

## 2018-01-16 RX ADMIN — CARBOPLATIN 324 MG: 10 INJECTION, SOLUTION INTRAVENOUS at 14:47

## 2018-01-16 RX ADMIN — POTASSIUM CHLORIDE 10 MEQ: 10 INJECTION, SOLUTION INTRAVENOUS at 13:09

## 2018-01-16 RX ADMIN — MAGNESIUM SULFATE IN WATER 2 G: 40 INJECTION, SOLUTION INTRAVENOUS at 11:00

## 2018-01-16 RX ADMIN — PEGFILGRASTIM 6 MG: KIT SUBCUTANEOUS at 16:31

## 2018-01-16 NOTE — PROGRESS NOTES
0915 Pt admit to [facility] for C4 Carboplatin ambulatory in stable condition. Assessment completed. No new concerns voiced. Port accessed and flushed with positive blood return. Labs drawn per order and sent for processing. Normal Saline bolus started    Visit Vitals    /76    Pulse (!) 109    Temp 97.3 °F (36.3 °C)    Resp 18    Ht 5' 2.99\" (1.6 m)    Wt 45.2 kg (99 lb 9.6 oz)    BMI 17.65 kg/m2       Medications:  Normal Saline Bolus 2000ml  Magnesium 2grams  Potassium 10eMq X4  Kytril IV Push  Decadron IVPB  Carboplatin  Heparin Flush         1700 Pt tolerated treatment well. Port maintained positive blood return throughout treatment, flushed with positive blood return at conclusion, Port heparinized  and de-accessed per protocol. D/c home ambulatory in no distress. Pt aware of next appointment scheduled for 2/13/18. Recent Results (from the past 12 hour(s))   CBC WITH AUTOMATED DIFF    Collection Time: 01/16/18  9:20 AM   Result Value Ref Range    WBC 10.4 3.6 - 11.0 K/uL    RBC 3.12 (L) 3.80 - 5.20 M/uL    HGB 9.7 (L) 11.5 - 16.0 g/dL    HCT 30.4 (L) 35.0 - 47.0 %    MCV 97.4 80.0 - 99.0 FL    MCH 31.1 26.0 - 34.0 PG    MCHC 31.9 30.0 - 36.5 g/dL    RDW 16.9 (H) 11.5 - 14.5 %    PLATELET 528 (H) 101 - 400 K/uL    NEUTROPHILS 74 32 - 75 %    LYMPHOCYTES 19 12 - 49 %    MONOCYTES 7 5 - 13 %    EOSINOPHILS 0 0 - 7 %    BASOPHILS 0 0 - 1 %    ABS. NEUTROPHILS 7.6 1.8 - 8.0 K/UL    ABS. LYMPHOCYTES 2.0 0.8 - 3.5 K/UL    ABS. MONOCYTES 0.7 0.0 - 1.0 K/UL    ABS. EOSINOPHILS 0.0 0.0 - 0.4 K/UL    ABS.  BASOPHILS 0.0 0.0 - 0.1 K/UL   METABOLIC PANEL, COMPREHENSIVE    Collection Time: 01/16/18  9:20 AM   Result Value Ref Range    Sodium 133 (L) 136 - 145 mmol/L    Potassium 2.8 (L) 3.5 - 5.1 mmol/L    Chloride 91 (L) 97 - 108 mmol/L    CO2 34 (H) 21 - 32 mmol/L    Anion gap 8 5 - 15 mmol/L    Glucose 120 (H) 65 - 100 mg/dL    BUN 30 (H) 6 - 20 MG/DL    Creatinine 1.10 (H) 0.55 - 1.02 MG/DL    BUN/Creatinine ratio 27 (H) 12 - 20      GFR est AA >60 >60 ml/min/1.73m2    GFR est non-AA 51 (L) >60 ml/min/1.73m2    Calcium 8.7 8.5 - 10.1 MG/DL    Bilirubin, total 0.3 0.2 - 1.0 MG/DL    ALT (SGPT) 30 12 - 78 U/L    AST (SGOT) 18 15 - 37 U/L    Alk.  phosphatase 126 (H) 45 - 117 U/L    Protein, total 7.5 6.4 - 8.2 g/dL    Albumin 3.5 3.5 - 5.0 g/dL    Globulin 4.0 2.0 - 4.0 g/dL    A-G Ratio 0.9 (L) 1.1 - 2.2     MAGNESIUM    Collection Time: 01/16/18  9:20 AM   Result Value Ref Range    Magnesium 1.0 (L) 1.6 - 2.4 mg/dL

## 2018-01-16 NOTE — PROGRESS NOTES
[address] [place] [address] Mathias Moritz [address], [address] [facility]  [phone]  MD Maryana Anderson MD  Office Note  Patient ID:  Name:  Thomas Kim  MRN:  [id number]  :  1960/57 y.o. Date:  2018      HISTORY OF PRESENT ILLNESS:  Thomas Kim is a 61 y.o.  postmenopausal female with a diagnosis of stage IV ovarian cancer. She underwent exploratory laparotomy with suboptimal debulking in 2015. She had extensive disease. She was readmitted about a week later with obstruction and subsequently had a cecal perforation, requiring resection, end ileostomy, and mucous fistula. During the hospitalization she was also diagnosed with pulmonary embolus and had chest tubes placed for bilateral pleural effusions. She had a prolonged hospital course and actually received a dose of cisplatin chemotherapy while in the hospital to help dry up the effusions. She completed 6 cycles of Taxol/Carboplatin chemotherapy following her initial debulking. She  did relatively well, considering she had an ileostomy to deal with during chemotherapy. I took her to the OR on 16 for interval debulking and reversal of her ileostomy. She had extensive disease, but we were able to resect the majority of her disease. One week later she developed an anastomotic leak requiring reexploration and recreation of an ileostomy. She had another prolonged hospitalization, but recovered well since surgery. We then reinitiated Taxol/Carboplatin chemotherapy, but did reduce the dose of Taxol to 135 mg/m2. She completed 4 mores cycles. Her CA-125 normalized and we stopped treatment. She now has evidence of recurrent disease and for a while was receiving Doxil/Avastin. She completed 3 cycles of that regimen. She was admitted to [facility] several time since during that regimen with evidence of partial small bowel obstruction.   The symptoms have resolved each time with conservative management. She had increased ascites on her last scan, but no significant change in her disease otherwise. Her CA-125 also went up. All of this suggests progression of disease. We switched her chemotherapy to [facility]. She completed 7 cycles. Her CA-125 had risen over the last few draws. She had been hospitalized almost every cycle for the last few cycles for GI issues. She was switched to single agent Carboplatin. She has now completed 3 cycles. She did have a febrile episode during this the second cycle. It only lasted for one day. Her labs were normal and we didn't do anything specifically. It resolved spontaneously. She is feeling much better now. She is having some shortness of breathe with exertion. Her last Hgb was 9.0. She is due for repeat labs today. Her son's wedding was this weekend. Everything went well and there were no problems. She did have a lot of nausea and some emesis the day before the wedding.        Problem List:  Patient Active Problem List    Diagnosis Date Noted    Ovarian cancer, unspecified laterality ([address] 11/30/2017    Nausea and vomiting 09/14/2017    Partial bowel obstruction 09/13/2017    Intractable vomiting with nausea 08/10/2017    Ovarian cancer ([address] 08/09/2017    Nausea & vomiting 08/09/2017    Carcinomatosis ([address] 08/09/2017    Hydronephrosis of left kidney 06/28/2017    Abdominal pain 06/27/2017    Hypomagnesemia 06/13/2017    Erosive esophagitis 06/07/2017    Ulcerative esophagitis 06/07/2017    Isolated proteinuria 05/25/2017    Malignant ascites 05/08/2017    Hydronephrosis of right kidney 05/08/2017    Protein calorie malnutrition ([address] 04/28/2017    Bowel obstruction 04/27/2017    Counseling regarding end of life decision making 04/25/2017    Anticoagulation adequate with anticoagulant therapy 03/28/2017    Chemotherapy-induced neutropenia (HCC) 03/22/2017    Ileus ([address] 03/21/2017    Thrombosis of pelvic vein 03/21/2017    Hx of pulmonary embolus 03/21/2017    Dehydration 03/21/2017    Cancer associated pain 05/13/2016    Generalized abdominal pain 05/13/2016    Anxiety about health 05/13/2016    CINV (chemotherapy-induced nausea and vomiting) 03/22/2016    Anemia due to chemotherapy 02/22/2016    Ileostomy in place [facility]) 02/15/2016    Carcinomatosis peritonei ([address] 10/29/2015    Small bowel obstruction 10/29/2015    Malignant neoplasm of both ovaries ([address] 10/12/2015     PMH:  Past Medical History:   Diagnosis Date    Abdominal carcinomatosis ([address] 10/2015    Arthritis     left shoulder    BRCA negative 12/2015    Chronic pain     Depression     HX    Environmental allergies     GERD (gastroesophageal reflux disease)     Hypertension     NEW OVER PAST 5-6 MONTHS    Ovarian cancer ([address] 10/2015    serous adenocarcinoma, high grade, stage IIIC    Pulmonary embolism ([address] 10/2015      PSH:  Past Surgical History:   Procedure Laterality Date    CARDIAC SURG PROCEDURE UNLIST  12/11/15    cardiac cath/normal     HX BREAST BIOPSY  1995    benign right breast bx    HX DILATION AND CURETTAGE  2/2011    POLYPECTOMY    HX GI  2015    PERFORATED BOWEL; ILEOSTOMY    HX GYN  10/2015    EXP LAPAROTOMY    HX HEENT  LAST 2005    oral tissue graft X3    HX HEENT  1970    tonsillectomy    HX LAPAROTOMY  10/2015    tumor sampling    HX LAPAROTOMY  4/2016    hysterectomy, BSO, radical debulking    HX LAPAROTOMY  5/2016    resection ileocolic anastomosis, leak, ileostomy    HX OTHER SURGICAL  11/2015    tunneled portacath      Social History:  Social History   Substance Use Topics    Smoking status: Never Smoker    Smokeless tobacco: Never Used    Alcohol use No      Family History:  Family History   Problem Relation Age of Onset    Cancer Maternal Uncle      skin    Hypertension Mother     High Cholesterol Mother     Anxiety Mother     Heart Disease Mother     High Cholesterol Father  Hypertension Father     Stroke Father    [place] Sister     Migraines Sister     Other Sister      FIBROMYALGIA    Depression Brother     Other Brother      DRUG ABUSE HEPATITIS C    Migraines Sister     Arrhythmia Sister     Depression Sister     Lung Disease Paternal Aunt      COPD    Cancer Paternal Uncle      LUNG    Lung Disease Paternal Uncle      COPD    Lung Disease Maternal Grandmother      COPD    Anesth Problems Neg Hx       Medications: (reviewed)  Current Outpatient Prescriptions   Medication Sig    fentaNYL (DURAGESIC) 50 mcg/hr PATCH 1 Patch by TransDERmal route every seventy-two (72) hours. Max Daily Amount: 1 Patch. This is in ADDITION to your 100 mcg patch for a total of 150 mcg patch every 72 hours    fentaNYL (DURAGESIC) 100 mcg/hr PATCH 1 Patch by TransDERmal route every seventy-two (72) hours. Max Daily Amount: 1 Patch.  zolpidem (AMBIEN) 10 mg tablet Take 1 Tab by mouth nightly as needed for Sleep. Max Daily Amount: 10 mg. Indications: SLEEP-ONSET INSOMNIA    scopolamine (TRANSDERM-SCOP) 1 mg over 3 days pt3d 1 Patch by TransDERmal route every seventy-two (72) hours as needed.  HYDROmorphone (DILAUDID) 1 mg/mL liqd oral solution Take 4 mL by mouth every four (4) hours as needed. Max Daily Amount: 24 mg.  cyclobenzaprine (FLEXERIL) 10 mg tablet Take 1 Tab by mouth three (3) times daily as needed for Muscle Spasm(s).  promethazine (PHENERGAN) 6.25 mg/5 mL syrup Take 20 mL by mouth four (4) times daily as needed for Nausea.  metoprolol tartrate (LOPRESSOR) 50 mg tablet Take  by mouth two (2) times a day.  dexamethasone (DECADRON) 4 mg tablet Take 2 tabs QAM day before chemo and for 2 days after    metoclopramide (REGLAN) 5 mg/5 mL syrup Take 10 mL by mouth four (4) times daily as needed. Take before meals as needed    dronabinol (SYNDROS) 5 mg/mL soln Take 2.1 mg by mouth two (2) times a day. Max Daily Amount: 4.2 mg.  Take 30 minutes before lunch and dinner    NEXIUM PACKET 40 mg granules for oral suspension two (2) times a day.  sucralfate (CARAFATE) 100 mg/mL suspension Take 5 mL by mouth four (4) times daily.  polyethylene glycol (MIRALAX) 17 gram/dose powder Take 17 g by mouth daily as needed.  apixaban (ELIQUIS) 2.5 mg tablet Take 1 Tab by mouth two (2) times a day.  ondansetron (ZOFRAN ODT) 4 mg disintegrating tablet Take 1 Tab by mouth every eight (8) hours as needed for Nausea.  lidocaine-prilocaine (EMLA) topical cream Apply small amount over port area and cover with a band aid 1 hour before chemo treatment    magnesium oxide (MAG-OX) 400 mg tablet Take 1 Tab by mouth two (2) times a day. Indications: HYPOMAGNESEMIA     No current facility-administered medications for this visit. Facility-Administered Medications Ordered in Other Visits   Medication Dose Route Frequency    granisetron (KYTRIL) injection 1 mg  1 mg IntraVENous ONCE    dexamethasone (DECADRON) 20 mg in 0.9% sodium chloride 50 mL IVPB  20 mg IntraVENous ONCE    pegfilgrastim (NEULASTA) wearable SQ injector 6 mg  6 mg SubCUTAneous ONCE     Allergies: (reviewed)  Allergies   Allergen Reactions    Ativan [Lorazepam] Other (comments)     SEVERE CONFUSION          OBJECTIVE:    Physical Exam:  VITAL SIGNS: Vitals:    01/16/18 0838   BP: 112/73   Pulse: 91   Weight: 99 lb 9.6 oz (45.2 kg)   Height: 5' 2.99\" (1.6 m)     Body mass index is 17.65 kg/(m^2). GENERAL AVA: Conversant, alert, oriented. No acute distress. HEENT: HEENT. No thyroid enlargement. No JVD. Neck: Supple without restrictions. RESPIRATORY: Clear to auscultation and percussion to the bases. No CVAT. CARDIOVASC: RRR without murmur/rub.    GASTROINT: soft, non-tender, without masses or organomegaly; ileostomy in RLQ   MUSCULOSKEL: no joint tenderness, deformity or swelling   EXTREMITIES: extremities normal, atraumatic, no cyanosis or edema   PELVIC: Deferred   RECTAL: Deferred   JOSE SURVEY: No suspicious lymphadenopathy or edema noted. NEURO: Grossly intact. No acute deficit. Lab Results   Component Value Date/Time    WBC 8.3 12/14/2017 02:25 PM    HGB 9.0 12/14/2017 02:25 PM    HCT 27.6 12/14/2017 02:25 PM    PLATELET 460 59/57/4550 02:25 PM    MCV 97 12/14/2017 02:25 PM     Lab Results   Component Value Date/Time    Sodium 140 12/14/2017 02:25 PM    Potassium 4.4 12/14/2017 02:25 PM    Chloride 92 12/14/2017 02:25 PM    CO2 27 12/14/2017 02:25 PM    Anion gap 7 10/26/2017 01:15 PM    Glucose 99 12/14/2017 02:25 PM    BUN 27 12/14/2017 02:25 PM    Creatinine 0.89 12/14/2017 02:25 PM    BUN/Creatinine ratio 30 12/14/2017 02:25 PM    GFR est AA 83 12/14/2017 02:25 PM    GFR est non-AA 72 12/14/2017 02:25 PM    Calcium 9.6 12/14/2017 02:25 PM       CT of abdomen/pelvis (1/9/18)  Lung bases: There has been interval resolution of very small bilateral pleural  effusions to prior CT dated September 2017. There has been near total resolution  of bibasilar airspace disease and there is minimal residual linear scarring or  atelectasis within the peripheral left lung base.     Liver: There is a 1.2 cm x 7 mm hypodensity within the upper right hepatic lobe,  unchanged. There is a 7 mm hypodensity within the left hepatic lobe, unchanged  compared to prior CT dated September 2015.     Adrenals: Adrenal glands are normal.     Pancreas: The pancreas is normal.     Gallbladder: The gallbladder is normal.     Kidneys: There is persistent bilateral hydronephrosis, as seen on prior CT dated  September 2017.     Spleen: The spleen is normal.     Lymph nodes. There is no rogerio hepatitis, mesenteric, retroperitoneal or pelvic  lymphadenopathy.     Bowel: Bowel loops are centrally located, unchanged. No thickened loop of bowel  is visualized.  Right lower quadrant ostomy is unchanged.     Urinary bladder: Urinary bladder is partially filled and grossly normal.     Bones: No lytic or sclerotic osseous lesion is visualized.     Miscellaneous: There is a small amount of anterior intra-abdominal loculated  ascites which is decreased in quantity compared to prior CT dated September 2017. There is a 1.2 cm x 2.1 cm thin-walled fluid collection anterior to the  lateral segment of the left hepatic lobe, unchanged compared to prior CT dated  September 2017. There is subtle soft tissue nodularity along the right hepatic  lobe capsule, unchanged. There is no free intraperitoneal gas. IMPRESSION:   1. Interval decrease in quantity of anterior loculated intra-abdominal fluid. Subtle soft tissue nodularity along the right hepatic lobe capsule, unchanged. 1.2 cm x 2.1 cm thin-walled fluid collection anterior to the lateral segment of  the left hepatic lobe, unchanged compared to prior CT dated September 2017.   2. Interval resolution of bilateral pleural fluid. Near total resolution of  bibasilar airspace disease. 3. Persistent bilateral hydronephrosis, unchanged. IMPRESSION/PLAN:  Cathryn Torrez is a 61 y.o. female with a diagnosis of stage IV ovarian cancer. She is s/p suboptimal debulking, followed by a second laparotomy for cecal perforation. She completed 6 cycles of Taxol/Carboplatin chemotherapy, in addition to the one dose of cisplatin given during her initial hospitalization. Based upon her CT and her CA-125, she responded well, though there was still disease remaining. She underwent interval debulking with resection of the majority of her disease, although there was still small volume carcinomatosis remaining. I recommended continuing with Taxol/Carboplatin, as she did respond well, despite still having residual disease at her interval debulking. I dropped the dose of Taxol to 135 mg/m2 to reduce the chance of neuropathy, but kept the carboplatin dose at an AUC of 6. She completed 4 more cycles and her CA-125 normalized. We then stopped therapy.  She subsequently developed recurrent disease and I recommended Doxil/Avastin, considering it had been less than 6 months since we stopped Taxol/Carboplatin. She completed 3 cycles of that regimen but found to have progression of disease. She was then switched to [employer]. She has completed 6 cycles so far. Due to the difficulty she has had with Gemzar, and the fact that her CA-125 was rising, I recommended that we stop the Gemzar. We had previously discussed the possibility of switching to Topotecan. It had been over a year since she had last received a platinum agent. I suggested that we give single agent Carboplatin a try. I felt she would tolerate it better than the Topotecan and it would be just as likely to work. She has now completed 3 cycles. Her scan suggests slight improvement. We will continue with the current regimen. She may need a transfusion if her Hgb is down further.       Signed By: Santos Granado MD     1/16/2018/10:35 AM

## 2018-01-17 LAB — CANCER AG125 SERPL-ACNC: 86 U/ML (ref 0–30)

## 2018-01-23 ENCOUNTER — TELEPHONE (OUTPATIENT)
Dept: GYNECOLOGY | Age: 58
End: 2018-01-23

## 2018-01-23 DIAGNOSIS — C56.3 MALIGNANT NEOPLASM OF BOTH OVARIES (HCC): Primary | ICD-10-CM

## 2018-01-23 DIAGNOSIS — C78.6 CARCINOMATOSIS PERITONEI (HCC): ICD-10-CM

## 2018-01-23 DIAGNOSIS — D70.1 CHEMOTHERAPY-INDUCED NEUTROPENIA (HCC): ICD-10-CM

## 2018-01-23 DIAGNOSIS — R18.0 MALIGNANT ASCITES: ICD-10-CM

## 2018-01-23 DIAGNOSIS — T45.1X5A CHEMOTHERAPY-INDUCED NEUTROPENIA (HCC): ICD-10-CM

## 2018-01-23 DIAGNOSIS — N39.0 URINARY TRACT INFECTION WITHOUT HEMATURIA, SITE UNSPECIFIED: ICD-10-CM

## 2018-01-23 RX ORDER — SULFAMETHOXAZOLE AND TRIMETHOPRIM 800; 160 MG/1; MG/1
1 TABLET ORAL 2 TIMES DAILY
Qty: 14 TAB | Refills: 0 | Status: SHIPPED | OUTPATIENT
Start: 2018-01-23 | End: 2018-02-08 | Stop reason: ALTCHOICE

## 2018-01-23 NOTE — TELEPHONE ENCOUNTER
Pt called today day with multiple complaints/concerns. She has lost more weight due to recent nausea and is now down to 91 lbs. She says today her nausea was a little better today only and she got more of her protein drinks down than she had for a few days. She had been vomiting about 5 days ago, but none since with only nausea. She said pain has increased lately too and has had to begin taking some of her dilaudid again (had not needed this for a few weeks at this point). She says now she if she walks up 2 steps, she is short of breath. She went to her local cardiologist today and he increased her metoprolol to 75 and has ordered an ECHO for 2/13. He did not say she needed a CXR. She says she has never been this fatigued where she requires a wheel chair anywhere outside of the house. She also says she has been \"just plain wetting myself\" without any control\". Denies any dysuria or hematuria or fever. She say it is new and only been happening the last day or two. Asked if pt wanted to be seen today, and she said she was too exhausted. She will call tomorrow afternoon or Thursday to let us know how she is doing. But, if any symptoms worsen, has any fevers or chills, hematuria, she will call immediately. Discussed possibility of UIT and will try a course of Bactrim for 7 days.

## 2018-01-24 ENCOUNTER — TELEPHONE (OUTPATIENT)
Dept: GYNECOLOGY | Age: 58
End: 2018-01-24

## 2018-01-24 DIAGNOSIS — R18.0 MALIGNANT ASCITES: ICD-10-CM

## 2018-01-24 DIAGNOSIS — Z86.711 HX OF PULMONARY EMBOLUS: ICD-10-CM

## 2018-01-24 DIAGNOSIS — C80.0 CARCINOMATOSIS (HCC): ICD-10-CM

## 2018-01-24 DIAGNOSIS — R06.02 SOB (SHORTNESS OF BREATH): ICD-10-CM

## 2018-01-24 DIAGNOSIS — C56.9 MALIGNANT NEOPLASM OF OVARY, UNSPECIFIED LATERALITY (HCC): Primary | ICD-10-CM

## 2018-01-24 DIAGNOSIS — Z87.09 H/O PLEURAL EFFUSION: ICD-10-CM

## 2018-01-24 NOTE — TELEPHONE ENCOUNTER
Brenton Sun today to check on her. She says today has been better. Says she is having better control of her urine today since starting abx last night. She continues with significant shortness of breath. She denies coughing, fevers or chills. Continues on her Eliquis. Says she has not needed and breakthrough pain medication yesterday or today. Says nausea is decreased and has been able to take in \"a good amount' of her protein shakes and some food. I told her I would like a CXR at this point and we discussed that her  does not come home from work until 10. Pt says she has used the [facility] in the past and she felt coming down to [place] would be too much. Have faxed an order for a PA & LAT CXR to them and confirmed it  Had arrived and that it would be accepted. Pt will go after her  comes home. If she has any worsening of symptoms, she will proceed directly to the ER.    Encouraged to call for any concerns or questions

## 2018-01-25 ENCOUNTER — TELEPHONE (OUTPATIENT)
Dept: GYNECOLOGY | Age: 58
End: 2018-01-25

## 2018-01-25 DIAGNOSIS — R11.2 CINV (CHEMOTHERAPY-INDUCED NAUSEA AND VOMITING): Primary | ICD-10-CM

## 2018-01-25 DIAGNOSIS — T45.1X5A CINV (CHEMOTHERAPY-INDUCED NAUSEA AND VOMITING): Primary | ICD-10-CM

## 2018-01-25 PROBLEM — K56.609 BOWEL OBSTRUCTION (HCC): Status: RESOLVED | Noted: 2017-04-27 | Resolved: 2018-01-25

## 2018-01-25 RX ORDER — OLANZAPINE 10 MG/1
10 TABLET ORAL
Qty: 3 TAB | Refills: 0 | Status: SHIPPED | OUTPATIENT
Start: 2018-01-25 | End: 2018-01-28

## 2018-01-25 NOTE — TELEPHONE ENCOUNTER
Gyn Onc  Telephone visit    CXR with right/left atelectasis and \"some\" fluid left base. Feels breathing has gotten slightly better over last couple of days. Has had nausea and emesis today, some dry heaving. Taking in water. No pain, F/C. Prefers to stay at home. Hospital census here is quite full and no guarantee of immediate placement. Advised antiemetic regimen to try for possibly delayed CINV with electrolyte/sports drinks. Stoma is working. If persists, call in the morning and we will decide if admission is required. If acuity changes in terms of N/V or SOB, instructed to go to local hospital for evaluation.       BENY Nelson

## 2018-01-26 ENCOUNTER — TELEPHONE (OUTPATIENT)
Dept: GYNECOLOGY | Age: 58
End: 2018-01-26

## 2018-01-26 NOTE — TELEPHONE ENCOUNTER
Pt called to say she was feeling \" a little bit better today\", but did not begin taking the Olanzapine as prescribed to her yesterday by BENY Pedro because her pharmacist said it could cause her difficulty with her bowels (obstruction) and when she read more about it, became leary and just held off taking it at all. She has \"done better with eating this morning\" and feels she is even breathing better. I recommended her beginning using her IS hourly while awake due to her CXR  showing atelectasis. (inadition to \"some fluid at left base\". She would like to continue with staying at home and managing how she is currently. We discussed hydration and she agreed she feels it could be helpful and we will arrange for her to come to the [address] [place] [address]. on Monday for 2 liters of hydration.   We discussed that even though it was a weekend beginning tonight, to not hesitate to call for any concerns or questions

## 2018-01-29 ENCOUNTER — TELEPHONE (OUTPATIENT)
Dept: GYNECOLOGY | Age: 58
End: 2018-01-29

## 2018-01-29 ENCOUNTER — HOSPITAL ENCOUNTER (OUTPATIENT)
Dept: INFUSION THERAPY | Age: 58
Discharge: HOME | End: 2018-01-29
Payer: OTHER GOVERNMENT

## 2018-01-29 VITALS
DIASTOLIC BLOOD PRESSURE: 55 MMHG | HEART RATE: 69 BPM | RESPIRATION RATE: 16 BRPM | SYSTOLIC BLOOD PRESSURE: 92 MMHG | TEMPERATURE: 97.5 F

## 2018-01-29 PROCEDURE — 74011250636 HC RX REV CODE- 250/636: Performed by: OBSTETRICS & GYNECOLOGY

## 2018-01-29 PROCEDURE — 96361 HYDRATE IV INFUSION ADD-ON: CPT

## 2018-01-29 PROCEDURE — 96360 HYDRATION IV INFUSION INIT: CPT

## 2018-01-29 PROCEDURE — 74011000250 HC RX REV CODE- 250: Performed by: OBSTETRICS & GYNECOLOGY

## 2018-01-29 PROCEDURE — 77030012965 HC NDL HUBR BBMI -A

## 2018-01-29 RX ORDER — SODIUM CHLORIDE 0.9 % (FLUSH) 0.9 %
10-40 SYRINGE (ML) INJECTION AS NEEDED
Status: ACTIVE | OUTPATIENT
Start: 2018-01-29 | End: 2018-01-30

## 2018-01-29 RX ORDER — HEPARIN 100 UNIT/ML
500 SYRINGE INTRAVENOUS AS NEEDED
Status: ACTIVE | OUTPATIENT
Start: 2018-01-29 | End: 2018-01-30

## 2018-01-29 RX ORDER — SODIUM CHLORIDE 9 MG/ML
10 INJECTION INTRAMUSCULAR; INTRAVENOUS; SUBCUTANEOUS AS NEEDED
Status: ACTIVE | OUTPATIENT
Start: 2018-01-29 | End: 2018-01-30

## 2018-01-29 RX ADMIN — Medication 10 ML: at 14:22

## 2018-01-29 RX ADMIN — Medication 10 ML: at 16:27

## 2018-01-29 RX ADMIN — SODIUM CHLORIDE 2000 ML: 900 INJECTION, SOLUTION INTRAVENOUS at 14:21

## 2018-01-29 RX ADMIN — SODIUM CHLORIDE 10 ML: 9 INJECTION INTRAMUSCULAR; INTRAVENOUS; SUBCUTANEOUS at 14:19

## 2018-01-29 RX ADMIN — Medication 500 UNITS: at 16:28

## 2018-01-29 NOTE — PROGRESS NOTES
Pt arrived to [facility] ambulatory for hydration in no acute distress at 1410.  Assessment unremarkable except weakness and dyspnea with exertion. Pt denies nausea and reports feeling better today. L chest port accessed without issue and positive blood return noted. Visit Vitals    BP 93/60 (BP 1 Location: Right arm, BP Patient Position: Sitting)    Pulse 79    Temp 97.5 °F (36.4 °C)    Resp 16       The following medications administered:  NS 2L IV over 2 hours    Visit Vitals    BP 92/55 (BP 1 Location: Left arm, BP Patient Position: Sitting)    Pulse 69    Temp 97.5 °F (36.4 °C)    Resp 16       Pt tolerated treatment well.  No adverse reaction noted. Port flushed per policy and needle removed, 2x2 and paper tape placed.  Pt discharged ambulatory in no acute distress at 1630, accompanied by spouse. Next appointment 2/13/18 @ 0900 at [facility].

## 2018-02-02 DIAGNOSIS — G89.3 CANCER ASSOCIATED PAIN: ICD-10-CM

## 2018-02-02 DIAGNOSIS — C56.3 MALIGNANT NEOPLASM OF BOTH OVARIES (HCC): ICD-10-CM

## 2018-02-02 DIAGNOSIS — C78.6 CARCINOMATOSIS PERITONEI (HCC): ICD-10-CM

## 2018-02-02 DIAGNOSIS — M62.830 MUSCLE SPASM OF BACK: ICD-10-CM

## 2018-02-02 RX ORDER — CYCLOBENZAPRINE HCL 10 MG
10 TABLET ORAL
Qty: 30 TAB | Refills: 1 | Status: SHIPPED | OUTPATIENT
Start: 2018-02-02 | End: 2018-04-10 | Stop reason: ALTCHOICE

## 2018-02-05 DIAGNOSIS — E83.42 HYPOMAGNESEMIA: ICD-10-CM

## 2018-02-05 DIAGNOSIS — Z93.2 ILEOSTOMY IN PLACE (HCC): ICD-10-CM

## 2018-02-05 DIAGNOSIS — K22.10 ULCERATIVE ESOPHAGITIS: ICD-10-CM

## 2018-02-05 DIAGNOSIS — F41.8 ANXIETY ABOUT HEALTH: ICD-10-CM

## 2018-02-05 DIAGNOSIS — C78.6 CARCINOMATOSIS PERITONEI (HCC): ICD-10-CM

## 2018-02-05 DIAGNOSIS — K56.609 SMALL BOWEL OBSTRUCTION (HCC): ICD-10-CM

## 2018-02-05 DIAGNOSIS — E87.1 HYPONATREMIA: ICD-10-CM

## 2018-02-05 DIAGNOSIS — C56.9 OVARIAN CANCER, UNSPECIFIED LATERALITY (HCC): ICD-10-CM

## 2018-02-05 DIAGNOSIS — C56.3 MALIGNANT NEOPLASM OF BOTH OVARIES (HCC): ICD-10-CM

## 2018-02-05 DIAGNOSIS — C56.9 MALIGNANT NEOPLASM OF OVARY, UNSPECIFIED LATERALITY (HCC): Primary | ICD-10-CM

## 2018-02-05 DIAGNOSIS — G89.3 CANCER ASSOCIATED PAIN: ICD-10-CM

## 2018-02-05 DIAGNOSIS — T45.1X5A CINV (CHEMOTHERAPY-INDUCED NAUSEA AND VOMITING): ICD-10-CM

## 2018-02-05 DIAGNOSIS — R11.2 NAUSEA AND VOMITING, INTRACTABILITY OF VOMITING NOT SPECIFIED, UNSPECIFIED VOMITING TYPE: ICD-10-CM

## 2018-02-05 DIAGNOSIS — R11.2 CINV (CHEMOTHERAPY-INDUCED NAUSEA AND VOMITING): ICD-10-CM

## 2018-02-05 DIAGNOSIS — T45.1X5A CHEMOTHERAPY-INDUCED NEUTROPENIA (HCC): ICD-10-CM

## 2018-02-05 DIAGNOSIS — D70.1 CHEMOTHERAPY-INDUCED NEUTROPENIA (HCC): ICD-10-CM

## 2018-02-05 DIAGNOSIS — K22.10 EROSIVE ESOPHAGITIS: ICD-10-CM

## 2018-02-05 DIAGNOSIS — N13.30 HYDRONEPHROSIS OF RIGHT KIDNEY: ICD-10-CM

## 2018-02-05 DIAGNOSIS — R18.0 MALIGNANT ASCITES: ICD-10-CM

## 2018-02-05 DIAGNOSIS — D64.81 ANEMIA DUE TO CHEMOTHERAPY: ICD-10-CM

## 2018-02-05 DIAGNOSIS — K56.7 ILEUS (HCC): ICD-10-CM

## 2018-02-05 DIAGNOSIS — R10.84 GENERALIZED ABDOMINAL PAIN: ICD-10-CM

## 2018-02-05 DIAGNOSIS — K56.600 PARTIAL INTESTINAL OBSTRUCTION, UNSPECIFIED CAUSE (HCC): ICD-10-CM

## 2018-02-05 DIAGNOSIS — T45.1X5A ANEMIA DUE TO CHEMOTHERAPY: ICD-10-CM

## 2018-02-05 DIAGNOSIS — E46 PROTEIN-CALORIE MALNUTRITION, UNSPECIFIED SEVERITY (HCC): ICD-10-CM

## 2018-02-05 DIAGNOSIS — I82.890 THROMBOSIS OF PELVIC VEIN: ICD-10-CM

## 2018-02-05 RX ORDER — PROMETHAZINE HYDROCHLORIDE 6.25 MG/5ML
25 SYRUP ORAL
Qty: 400 ML | Refills: 4 | Status: ON HOLD | OUTPATIENT
Start: 2018-02-05 | End: 2018-03-22

## 2018-02-05 RX ORDER — DEXAMETHASONE 4 MG/1
TABLET ORAL
Qty: 30 TAB | Refills: 4 | Status: SHIPPED | OUTPATIENT
Start: 2018-02-05 | End: 2018-04-10 | Stop reason: ALTCHOICE

## 2018-02-05 NOTE — TELEPHONE ENCOUNTER
From: Erin Meraz  To: Blanca Morse NP  Sent: 2/5/2018 2:56 PM EST  Subject: Medication Renewal Request    Original authorizing provider: HAYDER Gutierrez would like a refill of the following medications:  dexamethasone (DECADRON) 4 mg tablet [Genny Don NP]    Preferred pharmacy: [address] [place] [address] AT [facility]    Comment:      Medication renewals requested in this message routed to other providers:  promethazine (PHENERGAN) 6.25 mg/5 mL syrup Duane Kerry., MD]

## 2018-02-05 NOTE — TELEPHONE ENCOUNTER
From: Comfort Chan  To: Alexandre Frost MD  Sent: 2/5/2018 2:56 PM EST  Subject: Medication Renewal Request    Original authorizing provider: MD Keith Conklin.  Luis would like a refill of the following medications:  promethazine (PHENERGAN) 6.25 mg/5 mL syrup Alexandre Frost MD]    Preferred pharmacy: [address] [place] [address] AT [facility]    Comment:      Medication renewals requested in this message routed to other providers:  dexamethasone (DECADRON) 4 mg tablet [Genny Don NP]

## 2018-02-06 ENCOUNTER — APPOINTMENT (OUTPATIENT)
Dept: INFUSION THERAPY | Age: 58
End: 2018-02-06
Payer: OTHER GOVERNMENT

## 2018-02-08 ENCOUNTER — OFFICE VISIT (OUTPATIENT)
Dept: GYNECOLOGY | Age: 58
End: 2018-02-08

## 2018-02-08 VITALS
HEART RATE: 82 BPM | SYSTOLIC BLOOD PRESSURE: 107 MMHG | WEIGHT: 95.4 LBS | DIASTOLIC BLOOD PRESSURE: 77 MMHG | HEIGHT: 63 IN | BODY MASS INDEX: 16.9 KG/M2

## 2018-02-08 DIAGNOSIS — T45.1X5A CINV (CHEMOTHERAPY-INDUCED NAUSEA AND VOMITING): ICD-10-CM

## 2018-02-08 DIAGNOSIS — T45.1X5A ANEMIA DUE TO CHEMOTHERAPY: ICD-10-CM

## 2018-02-08 DIAGNOSIS — Z93.2 ILEOSTOMY IN PLACE (HCC): ICD-10-CM

## 2018-02-08 DIAGNOSIS — T45.1X5A CHEMOTHERAPY-INDUCED NEUTROPENIA (HCC): ICD-10-CM

## 2018-02-08 DIAGNOSIS — F41.8 ANXIETY ABOUT HEALTH: ICD-10-CM

## 2018-02-08 DIAGNOSIS — C56.9 MALIGNANT NEOPLASM OF OVARY, UNSPECIFIED LATERALITY (HCC): ICD-10-CM

## 2018-02-08 DIAGNOSIS — D64.81 ANEMIA DUE TO CHEMOTHERAPY: ICD-10-CM

## 2018-02-08 DIAGNOSIS — D70.1 CHEMOTHERAPY-INDUCED NEUTROPENIA (HCC): ICD-10-CM

## 2018-02-08 DIAGNOSIS — R11.2 CINV (CHEMOTHERAPY-INDUCED NAUSEA AND VOMITING): ICD-10-CM

## 2018-02-08 DIAGNOSIS — E87.1 HYPONATREMIA: ICD-10-CM

## 2018-02-08 DIAGNOSIS — C56.3 MALIGNANT NEOPLASM OF BOTH OVARIES (HCC): Primary | ICD-10-CM

## 2018-02-08 DIAGNOSIS — C78.6 CARCINOMATOSIS PERITONEI (HCC): ICD-10-CM

## 2018-02-08 DIAGNOSIS — C56.9 OVARIAN CANCER, UNSPECIFIED LATERALITY (HCC): ICD-10-CM

## 2018-02-08 DIAGNOSIS — R10.84 GENERALIZED ABDOMINAL PAIN: ICD-10-CM

## 2018-02-08 DIAGNOSIS — G89.3 CANCER ASSOCIATED PAIN: ICD-10-CM

## 2018-02-08 RX ORDER — FENTANYL 50 UG/1
1 PATCH TRANSDERMAL
Qty: 10 PATCH | Refills: 0 | Status: ON HOLD | OUTPATIENT
Start: 2018-02-08 | End: 2018-03-22

## 2018-02-08 RX ORDER — FENTANYL 100 UG/H
1 PATCH TRANSDERMAL
Qty: 10 PATCH | Refills: 0 | Status: ON HOLD | OUTPATIENT
Start: 2018-02-08 | End: 2018-03-22

## 2018-02-08 NOTE — PROGRESS NOTES
[address] [place] [address] [address], [address] [facility]  [phone]  MD Liliana Boucher MD  Office Note  Patient ID:  Name:  Aspen Sommers  MRN:  [id number]  :  1960/57 y.o. Date:  2018      HISTORY OF PRESENT ILLNESS:  Aspen Sommers is a 61 y.o.  postmenopausal female with a diagnosis of stage IV ovarian cancer. She underwent exploratory laparotomy with suboptimal debulking in 2015. She had extensive disease. She was readmitted about a week later with obstruction and subsequently had a cecal perforation, requiring resection, end ileostomy, and mucous fistula. During the hospitalization she was also diagnosed with pulmonary embolus and had chest tubes placed for bilateral pleural effusions. She had a prolonged hospital course and actually received a dose of cisplatin chemotherapy while in the hospital to help dry up the effusions. She completed 6 cycles of Taxol/Carboplatin chemotherapy following her initial debulking. She  did relatively well, considering she had an ileostomy to deal with during chemotherapy. I took her to the OR on 16 for interval debulking and reversal of her ileostomy. She had extensive disease, but we were able to resect the majority of her disease. One week later she developed an anastomotic leak requiring reexploration and recreation of an ileostomy. She had another prolonged hospitalization, but recovered well since surgery. We then reinitiated Taxol/Carboplatin chemotherapy, but did reduce the dose of Taxol to 135 mg/m2. She completed 4 mores cycles. Her CA-125 normalized and we stopped treatment. She now has evidence of recurrent disease and for a while was receiving Doxil/Avastin. She completed 3 cycles of that regimen. She was admitted to [facility] several time since during that regimen with evidence of partial small bowel obstruction.   The symptoms have resolved each time with conservative management. She had increased ascites on her last scan, but no significant change in her disease otherwise. Her CA-125 also went up. All of this suggests progression of disease. We switched her chemotherapy to [facility]. She completed 7 cycles. Her CA-125 had risen over the last few draws. She had been hospitalized almost every cycle for the last few cycles for GI issues. She was switched to single agent Carboplatin. She has now completed 4 cycles. She appears to be responding. Her last CT looked better and her CA-125 is down slightly. She is feeling much better now. She has been having some shortness of breathe with exertion. Her last Hgb was 9.7. Her son's wedding was in January. Everything went well and there were no problems.          Problem List:  Patient Active Problem List    Diagnosis Date Noted    Ovarian cancer, unspecified laterality ([address] 11/30/2017    Nausea and vomiting 09/14/2017    Partial bowel obstruction 09/13/2017    Intractable vomiting with nausea 08/10/2017    Ovarian cancer ([address] 08/09/2017    Nausea & vomiting 08/09/2017    Carcinomatosis ([address] 08/09/2017    Hydronephrosis of left kidney 06/28/2017    Abdominal pain 06/27/2017    Hypomagnesemia 06/13/2017    Erosive esophagitis 06/07/2017    Ulcerative esophagitis 06/07/2017    Isolated proteinuria 05/25/2017    Malignant ascites 05/08/2017    Hydronephrosis of right kidney 05/08/2017    Protein calorie malnutrition ([address] 04/28/2017    Counseling regarding end of life decision making 04/25/2017    Anticoagulation adequate with anticoagulant therapy 03/28/2017    Chemotherapy-induced neutropenia (HCC) 03/22/2017    Ileus ([address] 03/21/2017    Thrombosis of pelvic vein 03/21/2017    Hx of pulmonary embolus 03/21/2017    Dehydration 03/21/2017    Cancer associated pain 05/13/2016    Generalized abdominal pain 05/13/2016    Anxiety about health 05/13/2016    CINV (chemotherapy-induced nausea and vomiting) 03/22/2016    Anemia due to chemotherapy 02/22/2016    Ileostomy in place [facility]) 02/15/2016    Carcinomatosis peritonei ([address] 10/29/2015    Small bowel obstruction 10/29/2015    Malignant neoplasm of both ovaries ([address] 10/12/2015     PMH:  Past Medical History:   Diagnosis Date    Abdominal carcinomatosis ([address] 10/2015    Arthritis     left shoulder    BRCA negative 12/2015    Chronic pain     Depression     HX    Environmental allergies     GERD (gastroesophageal reflux disease)     Hypertension     NEW OVER PAST 5-6 MONTHS    Ovarian cancer ([address] 10/2015    serous adenocarcinoma, high grade, stage IIIC    Pulmonary embolism ([facility] 10/2015      PSH:  Past Surgical History:   Procedure Laterality Date    CARDIAC SURG PROCEDURE UNLIST  12/11/15    cardiac cath/normal     HX BREAST BIOPSY  1995    benign right breast bx    HX DILATION AND CURETTAGE  2/2011    POLYPECTOMY    HX GI  2015    PERFORATED BOWEL; ILEOSTOMY    HX GYN  10/2015    EXP LAPAROTOMY    HX HEENT  LAST 2005    oral tissue graft X3    HX HEENT  1970    tonsillectomy    HX LAPAROTOMY  10/2015    tumor sampling    HX LAPAROTOMY  4/2016    hysterectomy, BSO, radical debulking    HX LAPAROTOMY  5/2016    resection ileocolic anastomosis, leak, ileostomy    HX OTHER SURGICAL  11/2015    tunneled portacath      Social History:  Social History   Substance Use Topics    Smoking status: Never Smoker    Smokeless tobacco: Never Used    Alcohol use No      Family History:  Family History   Problem Relation Age of Onset    Cancer Maternal Uncle      skin    Hypertension Mother     High Cholesterol Mother     Anxiety Mother     Heart Disease Mother     High Cholesterol Father     Hypertension Father     Stroke Father     Arthritis-osteo Sister    Sharl Andes Migraines Sister     Other Sister      FIBROMYALGIA    Depression Brother     Other Brother      DRUG ABUSE HEPATITIS C    Migraines Sister     Arrhythmia Sister     Depression Sister     Lung Disease Paternal Aunt      COPD    Cancer Paternal Uncle      LUNG    Lung Disease Paternal Uncle      COPD    Lung Disease Maternal Grandmother      COPD    Anesth Problems Neg Hx       Medications: (reviewed)  Current Outpatient Prescriptions   Medication Sig    cyclobenzaprine (FLEXERIL) 10 mg tablet Take 1 Tab by mouth three (3) times daily as needed for Muscle Spasm(s).  fentaNYL (DURAGESIC) 50 mcg/hr PATCH 1 Patch by TransDERmal route every seventy-two (72) hours. Max Daily Amount: 1 Patch. This is in ADDITION to your 100 mcg patch for a total of 150 mcg patch every 72 hours    zolpidem (AMBIEN) 10 mg tablet Take 1 Tab by mouth nightly as needed for Sleep. Max Daily Amount: 10 mg. Indications: SLEEP-ONSET INSOMNIA    scopolamine (TRANSDERM-SCOP) 1 mg over 3 days pt3d 1 Patch by TransDERmal route every seventy-two (72) hours as needed.  metoprolol tartrate (LOPRESSOR) 50 mg tablet Take  by mouth two (2) times a day.  metoclopramide (REGLAN) 5 mg/5 mL syrup Take 10 mL by mouth four (4) times daily as needed. Take before meals as needed    NEXIUM PACKET 40 mg granules for oral suspension two (2) times a day.  sucralfate (CARAFATE) 100 mg/mL suspension Take 5 mL by mouth four (4) times daily.  apixaban (ELIQUIS) 2.5 mg tablet Take 1 Tab by mouth two (2) times a day.  promethazine (PHENERGAN) 6.25 mg/5 mL syrup Take 20 mL by mouth four (4) times daily as needed for Nausea.  dexamethasone (DECADRON) 4 mg tablet Take 2 tabs QAM day before chemo and for 2 days after    fentaNYL (DURAGESIC) 100 mcg/hr PATCH 1 Patch by TransDERmal route every seventy-two (72) hours. Max Daily Amount: 1 Patch.  HYDROmorphone (DILAUDID) 1 mg/mL liqd oral solution Take 4 mL by mouth every four (4) hours as needed. Max Daily Amount: 24 mg.    dronabinol (SYNDROS) 5 mg/mL soln Take 2.1 mg by mouth two (2) times a day.  Max Daily Amount: 4.2 mg. Take 30 minutes before lunch and dinner    magnesium oxide (MAG-OX) 400 mg tablet Take 1 Tab by mouth two (2) times a day. Indications: HYPOMAGNESEMIA    polyethylene glycol (MIRALAX) 17 gram/dose powder Take 17 g by mouth daily as needed.  ondansetron (ZOFRAN ODT) 4 mg disintegrating tablet Take 1 Tab by mouth every eight (8) hours as needed for Nausea.  lidocaine-prilocaine (EMLA) topical cream Apply small amount over port area and cover with a band aid 1 hour before chemo treatment     No current facility-administered medications for this visit. Allergies: (reviewed)  Allergies   Allergen Reactions    Ativan [Lorazepam] Other (comments)     SEVERE CONFUSION          OBJECTIVE:    Physical Exam:  VITAL SIGNS: Vitals:    02/08/18 1430   BP: 107/77   Pulse: 82   Weight: 95 lb 6.4 oz (43.3 kg)   Height: 5' 2.99\" (1.6 m)     Body mass index is 16.9 kg/(m^2). GENERAL AVA: Conversant, alert, oriented. No acute distress. HEENT: HEENT. No thyroid enlargement. No JVD. Neck: Supple without restrictions. RESPIRATORY: Clear to auscultation and percussion to the bases. No CVAT. CARDIOVASC: RRR without murmur/rub. GASTROINT: soft, non-tender, without masses or organomegaly; ileostomy in RLQ   MUSCULOSKEL: no joint tenderness, deformity or swelling   EXTREMITIES: extremities normal, atraumatic, no cyanosis or edema   PELVIC: Deferred   RECTAL: Deferred   JOSE SURVEY: No suspicious lymphadenopathy or edema noted. NEURO: Grossly intact. No acute deficit.        Lab Results   Component Value Date/Time    WBC 10.4 01/16/2018 09:20 AM    HGB 9.7 (L) 01/16/2018 09:20 AM    HCT 30.4 (L) 01/16/2018 09:20 AM    PLATELET 773 (H) 49/93/3157 09:20 AM    MCV 97.4 01/16/2018 09:20 AM     Lab Results   Component Value Date/Time    Sodium 133 (L) 01/16/2018 09:20 AM    Potassium 2.8 (L) 01/16/2018 09:20 AM    Chloride 91 (L) 01/16/2018 09:20 AM    CO2 34 (H) 01/16/2018 09:20 AM    Anion gap 8 01/16/2018 09:20 AM Glucose 120 (H) 01/16/2018 09:20 AM    BUN 30 (H) 01/16/2018 09:20 AM    Creatinine 1.10 (H) 01/16/2018 09:20 AM    BUN/Creatinine ratio 27 (H) 01/16/2018 09:20 AM    GFR est AA >60 01/16/2018 09:20 AM    GFR est non-AA 51 (L) 01/16/2018 09:20 AM    Calcium 8.7 01/16/2018 09:20 AM       CT of abdomen/pelvis (1/9/18)  Lung bases: There has been interval resolution of very small bilateral pleural  effusions to prior CT dated September 2017. There has been near total resolution  of bibasilar airspace disease and there is minimal residual linear scarring or  atelectasis within the peripheral left lung base.     Liver: There is a 1.2 cm x 7 mm hypodensity within the upper right hepatic lobe,  unchanged. There is a 7 mm hypodensity within the left hepatic lobe, unchanged  compared to prior CT dated September 2015.     Adrenals: Adrenal glands are normal.     Pancreas: The pancreas is normal.     Gallbladder: The gallbladder is normal.     Kidneys: There is persistent bilateral hydronephrosis, as seen on prior CT dated  September 2017.     Spleen: The spleen is normal.     Lymph nodes. There is no rogerio hepatitis, mesenteric, retroperitoneal or pelvic  lymphadenopathy.     Bowel: Bowel loops are centrally located, unchanged. No thickened loop of bowel  is visualized. Right lower quadrant ostomy is unchanged.     Urinary bladder: Urinary bladder is partially filled and grossly normal.     Bones: No lytic or sclerotic osseous lesion is visualized.     Miscellaneous: There is a small amount of anterior intra-abdominal loculated  ascites which is decreased in quantity compared to prior CT dated September 2017. There is a 1.2 cm x 2.1 cm thin-walled fluid collection anterior to the  lateral segment of the left hepatic lobe, unchanged compared to prior CT dated  September 2017. There is subtle soft tissue nodularity along the right hepatic  lobe capsule, unchanged. There is no free intraperitoneal gas. IMPRESSION:   1. Interval decrease in quantity of anterior loculated intra-abdominal fluid. Subtle soft tissue nodularity along the right hepatic lobe capsule, unchanged. 1.2 cm x 2.1 cm thin-walled fluid collection anterior to the lateral segment of  the left hepatic lobe, unchanged compared to prior CT dated September 2017.   2. Interval resolution of bilateral pleural fluid. Near total resolution of  bibasilar airspace disease. 3. Persistent bilateral hydronephrosis, unchanged. IMPRESSION/PLAN:  Juan García is a 61 y.o. female with a diagnosis of stage IV ovarian cancer. She is s/p suboptimal debulking, followed by a second laparotomy for cecal perforation. She completed 6 cycles of Taxol/Carboplatin chemotherapy, in addition to the one dose of cisplatin given during her initial hospitalization. Based upon her CT and her CA-125, she responded well, though there was still disease remaining. She underwent interval debulking with resection of the majority of her disease, although there was still small volume carcinomatosis remaining. I recommended continuing with Taxol/Carboplatin, as she did respond well, despite still having residual disease at her interval debulking. I dropped the dose of Taxol to 135 mg/m2 to reduce the chance of neuropathy, but kept the carboplatin dose at an AUC of 6. She completed 4 more cycles and her CA-125 normalized. We then stopped therapy. She subsequently developed recurrent disease and I recommended Doxil/Avastin, considering it had been less than 6 months since we stopped Taxol/Carboplatin. She completed 3 cycles of that regimen but found to have progression of disease. She was then switched to [employer]. She has completed 6 cycles so far. Due to the difficulty she has had with Gemzar, and the fact that her CA-125 was rising, I recommended that we stop the Gemzar. We had previously discussed the possibility of switching to Topotecan.   It had been over a year since she had last received a platinum agent. I suggested that we give single agent Carboplatin a try. I felt she would tolerate it better than the Topotecan and it would be just as likely to work. She has now completed 4 cycles. Her most recent scan suggests slight improvement. We will continue with the current regimen and repeat a scan after this cycle.       Signed By: Alex Kern MD     2/8/2018/10:35 AM

## 2018-02-08 NOTE — PROGRESS NOTES
pre chemo appt, labs to be drawn today for C5 Carboplatin at [facility] on 2/13/18, pt complains of upper abdominal pain that is a 4/10 on pain scale, she also complains of nausea, Patient states she is no longer taking the following medications: Syndros

## 2018-02-12 RX ORDER — GRANISETRON HYDROCHLORIDE 1 MG/ML
1 INJECTION INTRAVENOUS ONCE
Status: COMPLETED | OUTPATIENT
Start: 2018-02-13 | End: 2018-02-13

## 2018-02-13 ENCOUNTER — HOSPITAL ENCOUNTER (OUTPATIENT)
Dept: INFUSION THERAPY | Age: 58
Discharge: HOME | End: 2018-02-13
Payer: OTHER GOVERNMENT

## 2018-02-13 VITALS
HEIGHT: 63 IN | SYSTOLIC BLOOD PRESSURE: 117 MMHG | DIASTOLIC BLOOD PRESSURE: 83 MMHG | RESPIRATION RATE: 16 BRPM | WEIGHT: 95.6 LBS | HEART RATE: 99 BPM | TEMPERATURE: 98 F | BODY MASS INDEX: 16.94 KG/M2

## 2018-02-13 LAB
1,25(OH)2D3 SERPL-MCNC: 41.2 PG/ML (ref 19.9–79.3)
A-TOCOPHEROL VIT E SERPL-MCNC: 11 MG/L (ref 5.3–16.8)
ALBUMIN SERPL-MCNC: 3.9 G/DL (ref 3.5–5.5)
ALBUMIN/GLOB SERPL: 1.4 {RATIO} (ref 1.2–2.2)
ALP SERPL-CCNC: 84 IU/L (ref 39–117)
ALT SERPL-CCNC: 12 IU/L (ref 0–32)
AST SERPL-CCNC: 16 IU/L (ref 0–40)
BASOPHILS # BLD AUTO: 0 X10E3/UL (ref 0–0.2)
BASOPHILS NFR BLD AUTO: 0 %
BILIRUB SERPL-MCNC: 0.3 MG/DL (ref 0–1.2)
BUN SERPL-MCNC: 22 MG/DL (ref 6–24)
BUN/CREAT SERPL: 22 (ref 9–23)
CALCIUM SERPL-MCNC: 8.8 MG/DL (ref 8.7–10.2)
CANCER AG125 SERPL-ACNC: 91.4 U/ML (ref 0–38.1)
CHLORIDE SERPL-SCNC: 92 MMOL/L (ref 96–106)
CO2 SERPL-SCNC: 25 MMOL/L (ref 18–29)
CREAT SERPL-MCNC: 0.98 MG/DL (ref 0.57–1)
EOSINOPHIL # BLD AUTO: 0 X10E3/UL (ref 0–0.4)
EOSINOPHIL NFR BLD AUTO: 0 %
ERYTHROCYTE [DISTWIDTH] IN BLOOD BY AUTOMATED COUNT: 19 % (ref 12.3–15.4)
GFR SERPLBLD CREATININE-BSD FMLA CKD-EPI: 64 ML/MIN/1.73
GFR SERPLBLD CREATININE-BSD FMLA CKD-EPI: 74 ML/MIN/1.73
GLOBULIN SER CALC-MCNC: 2.7 G/DL (ref 1.5–4.5)
GLUCOSE SERPL-MCNC: 115 MG/DL (ref 65–99)
HCT VFR BLD AUTO: 27.3 % (ref 34–46.6)
HGB BLD-MCNC: 9.1 G/DL (ref 11.1–15.9)
IMM GRANULOCYTES # BLD: 0 X10E3/UL (ref 0–0.1)
IMM GRANULOCYTES NFR BLD: 0 %
LYMPHOCYTES # BLD AUTO: 1.5 X10E3/UL (ref 0.7–3.1)
LYMPHOCYTES NFR BLD AUTO: 23 %
MAGNESIUM SERPL-MCNC: 1.2 MG/DL (ref 1.6–2.3)
MCH RBC QN AUTO: 32 PG (ref 26.6–33)
MCHC RBC AUTO-ENTMCNC: 33.3 G/DL (ref 31.5–35.7)
MCV RBC AUTO: 96 FL (ref 79–97)
MONOCYTES # BLD AUTO: 0.2 X10E3/UL (ref 0.1–0.9)
MONOCYTES NFR BLD AUTO: 3 %
NEUTROPHILS # BLD AUTO: 4.9 X10E3/UL (ref 1.4–7)
NEUTROPHILS NFR BLD AUTO: 74 %
PHYTONADIONE SERPL-MCNC: 0.47 NG/ML (ref 0.13–1.88)
PLATELET # BLD AUTO: 173 X10E3/UL (ref 150–379)
POTASSIUM SERPL-SCNC: 4.1 MMOL/L (ref 3.5–5.2)
PROT SERPL-MCNC: 6.6 G/DL (ref 6–8.5)
RBC # BLD AUTO: 2.84 X10E6/UL (ref 3.77–5.28)
SODIUM SERPL-SCNC: 137 MMOL/L (ref 134–144)
VIT A SERPL-MCNC: 56 UG/DL (ref 20–65)
VIT B12 SERPL-MCNC: 1090 PG/ML (ref 232–1245)
WBC # BLD AUTO: 6.7 X10E3/UL (ref 3.4–10.8)

## 2018-02-13 PROCEDURE — 74011000258 HC RX REV CODE- 258: Performed by: OBSTETRICS & GYNECOLOGY

## 2018-02-13 PROCEDURE — 74011250636 HC RX REV CODE- 250/636: Performed by: OBSTETRICS & GYNECOLOGY

## 2018-02-13 PROCEDURE — 96361 HYDRATE IV INFUSION ADD-ON: CPT

## 2018-02-13 PROCEDURE — 74011250636 HC RX REV CODE- 250/636: Performed by: NURSE PRACTITIONER

## 2018-02-13 PROCEDURE — 77030012965 HC NDL HUBR BBMI -A

## 2018-02-13 PROCEDURE — 96360 HYDRATION IV INFUSION INIT: CPT

## 2018-02-13 PROCEDURE — 96375 TX/PRO/DX INJ NEW DRUG ADDON: CPT

## 2018-02-13 PROCEDURE — 96377 APPLICATON ON-BODY INJECTOR: CPT

## 2018-02-13 PROCEDURE — 96413 CHEMO IV INFUSION 1 HR: CPT

## 2018-02-13 RX ORDER — MAGNESIUM SULFATE HEPTAHYDRATE 40 MG/ML
2 INJECTION, SOLUTION INTRAVENOUS
Status: COMPLETED | OUTPATIENT
Start: 2018-02-13 | End: 2018-02-13

## 2018-02-13 RX ORDER — HEPARIN 100 UNIT/ML
500 SYRINGE INTRAVENOUS AS NEEDED
Status: ACTIVE | OUTPATIENT
Start: 2018-02-13 | End: 2018-02-14

## 2018-02-13 RX ORDER — SODIUM CHLORIDE 0.9 % (FLUSH) 0.9 %
10-40 SYRINGE (ML) INJECTION AS NEEDED
Status: ACTIVE | OUTPATIENT
Start: 2018-02-13 | End: 2018-02-14

## 2018-02-13 RX ORDER — SODIUM CHLORIDE 9 MG/ML
10 INJECTION INTRAMUSCULAR; INTRAVENOUS; SUBCUTANEOUS AS NEEDED
Status: ACTIVE | OUTPATIENT
Start: 2018-02-13 | End: 2018-02-14

## 2018-02-13 RX ADMIN — MAGNESIUM SULFATE IN WATER 2 G: 40 INJECTION, SOLUTION INTRAVENOUS at 10:30

## 2018-02-13 RX ADMIN — SODIUM CHLORIDE 2000 ML: 900 INJECTION, SOLUTION INTRAVENOUS at 12:03

## 2018-02-13 RX ADMIN — DEXAMETHASONE SODIUM PHOSPHATE 20 MG: 4 INJECTION, SOLUTION INTRA-ARTICULAR; INTRALESIONAL; INTRAMUSCULAR; INTRAVENOUS; SOFT TISSUE at 12:04

## 2018-02-13 RX ADMIN — CARBOPLATIN 324 MG: 10 INJECTION, SOLUTION INTRAVENOUS at 12:44

## 2018-02-13 RX ADMIN — GRANISETRON HYDROCHLORIDE 1 MG: 1 INJECTION INTRAVENOUS at 12:03

## 2018-02-13 RX ADMIN — Medication 10 ML: at 09:45

## 2018-02-13 RX ADMIN — MAGNESIUM SULFATE IN WATER 2 G: 40 INJECTION, SOLUTION INTRAVENOUS at 11:25

## 2018-02-13 RX ADMIN — PEGFILGRASTIM 6 MG: KIT SUBCUTANEOUS at 15:13

## 2018-02-13 RX ADMIN — SODIUM CHLORIDE, PRESERVATIVE FREE 500 UNITS: 5 INJECTION INTRAVENOUS at 15:06

## 2018-02-13 NOTE — PROGRESS NOTES
[address]  [address] [facility] 20  [place]: [phone]   F: [phone]    Infusion Date: 2/13/2018     MAHSA Bird is a 61 y.o.  postmenopausal female with a diagnosis of stage IV ovarian cancer. She underwent exploratory laparotomy with suboptimal debulking. She had extensive disease. She was readmitted about a week later with obstruction and subsequently had a cecal perforation, requiring resection, end ileostomy, and mucous fistula. During the hospitalization she was also diagnosed with pulmonary embolus and had chest tubes placed for bilateral pleural effusions. She had a prolonged hospital course and actually received a dose of cisplatin chemotherapy while in the hospital to help dry up the effusions. She completed 6 cycles of Taxol/Carboplatin chemotherapy following her initial debulking. She did relatively well, considering she had an ileostomy to deal with during chemotherapy. I took her to the OR on 4/29/16 for interval debulking and reversal of her ileostomy. She had extensive disease, but we were able to resect the majority of her disease. One week later she developed an anastomotic leak requiring reexploration and recreation of an ileostomy. She had another prolonged hospitalization, but recovered well since surgery. We then reinitiated Taxol/Carboplatin chemotherapy, but did reduce the dose of Taxol to 135 mg/m2. She completed 4 mores cycles. Her CA-125 normalized and we stopped treatment.      On 2/7, she presented to Dr. Johana Michael office with her  to discuss her follow up/surveillance CT scan. Unfortunately, it demonstrated progression of disease, although minimally worse than her prior scan 2 months previously. She was actually feeling better than she did at that time. After review of scans and disease, the decision was made to begin Doxil/Avastin Q28 days for 6 cycles. She began this on 2/27/2017.  In April 2017, she had completed 3 cycles of Doxil/Avastin and had CT scans at that time showed she had evidence of recurrent disease. She was switched at that time to [facility]. She has been admitted to [facility] several times for partial SBO and all have resolved with consecutive management but this, in combination of her continued rise in CA-125 had begun to take an emotional toll on her. After having \"one of the worst weeks in years\" recently, she met with Dr. Jonny Coello with her  present to discuss options. After a long thoughtful discussion, Dr. Jonny Coello suggested that we give single agent Carboplatin a try since it had been over a year since she has tried it. Subjective:   PT presents today for her 5th cycle of single agent Carbo. She continues to do well. She has had some episode of occasional nausea and when she vomits, it is in small amounts, but says once she vomits, it relieves both nausea and pain.  and sister are again present and supportive. Pt is slightly concerned about her CA-125 slight bump      Review of Systems:  General: Stable continues trying to eat and drink as able. Feels she continues to be doing well with this  HEENT: Denies visual changes, dysphagia or headache  Resp: Denies SOB, DUDLEY, wheezing or cough  CV: Denies CP, palpitations  GI/: abd pain well controlled and pt says she continues taking less breakthrough meds. Says nauesa/vomiting improved over all, but has some now even after the Kytril . Says ostomy continues to work with liquid/semi formed stool. Continues with Phenergan PRN. Protein shakes and recommendation from nutritionist.    MuskSkel: Denies muscle ache or joint pain  Neuro: Denies dizzyness or syncope    Objective:     Blood pressure 117/83, pulse 99, temperature 98 °F (36.7 °C), resp. rate 16, height 5' 2.99\" (1.6 m), weight 95 lb 9.6 oz (43.4 kg).   Temp (24hrs), Av °F (36.7 °C), Min:98 °F (36.7 °C), Max:98 °F (36.7 °C)      _____________________  Physical Exam: General: A&O X 3. With pleasant affect. Appears stronger. HEENT: Oral mucosa pink, moist with no sores or lesions noted. No cervical adenopathy appreciated  Port site: easily accessed without redness, tenderness or swelling  Cardiovascular: Regular without M/R/G  Lungs:CTA bilat without wheezing or rales  Abdomen: Soft and today without tenderness to palpation to mid abdomen  and with + bowel sounds throughout. . Mod amount of + semi-formed stool in ostomy bag   Ext: + pedal pulses without edema bilat. bilat  Neuro: grossly intact      LABS: 2/8/2018  WBC: 6.7, HGB 9.1; HCT 27.3; Plt's 173; ANC 4.9: Na 137; K+ 4.1; Cl 92;   CO2 25:BUN 22; Creat 0.98; Gluc 115; Mag 1.4      :2/8/18=91; (1/16/18= 86; 12/14/2017= 97: 11/9/2017= 89.1: (10/12/2017= 112: 09/26/2017: =128; 09/13/2017=85; 8/31/2017=87: 8/9/2017= 83; 7/20/2017= 65; 6/28/17=95.5;  6/8/17= 102; 5/16/17= 145.6:  4/27= 100; 4/2100=438.2 3/22=80; 2/23= 63.9)          PATHOLOGY from 5/10/2017 Upper Endoscopy:  1. Gastric polyp, polypectomy:   Benign polypoid gastric oxyntic mucosa with some features of hyperplastic polyp   2. Lower esophagus, biopsy:   Acute erosive esophagitis of squamous mucosa with ulceration and severe squamous atypia (see comment)   3. Proximal esophagus, biopsy:   Acute esophagitis of squamous mucosa with severe squamous atypia           Imaging:  CT Abd/Pelvis 1/9/2018  Findings:     Lung bases: There has been interval resolution of very small bilateral pleural  effusions to prior CT dated September 2017. There has been near total resolution  of bibasilar airspace disease and there is minimal residual linear scarring or  atelectasis within the peripheral left lung base.     Liver: There is a 1.2 cm x 7 mm hypodensity within the upper right hepatic lobe,  unchanged.  There is a 7 mm hypodensity within the left hepatic lobe, unchanged  compared to prior CT dated September 2015.     Adrenals: Adrenal glands are normal.     Pancreas: The pancreas is normal.     Gallbladder: The gallbladder is normal.     Kidneys: There is persistent bilateral hydronephrosis, as seen on prior CT dated  September 2017.     Spleen: The spleen is normal.     Lymph nodes. There is no rogerio hepatitis, mesenteric, retroperitoneal or pelvic  lymphadenopathy.     Bowel: Bowel loops are centrally located, unchanged. No thickened loop of bowel  is visualized. Right lower quadrant ostomy is unchanged.     Urinary bladder: Urinary bladder is partially filled and grossly normal.     Bones: No lytic or sclerotic osseous lesion is visualized.     Miscellaneous: There is a small amount of anterior intra-abdominal loculated  ascites which is decreased in quantity compared to prior CT dated September 2017. There is a 1.2 cm x 2.1 cm thin-walled fluid collection anterior to the  lateral segment of the left hepatic lobe, unchanged compared to prior CT dated  September 2017. There is subtle soft tissue nodularity along the right hepatic  lobe capsule, unchanged. There is no free intraperitoneal gas.      IMPRESSION:   1. Interval decrease in quantity of anterior loculated intra-abdominal fluid. Subtle soft tissue nodularity along the right hepatic lobe capsule, unchanged. 1.2 cm x 2.1 cm thin-walled fluid collection anterior to the lateral segment of  the left hepatic lobe, unchanged compared to prior CT dated September 2017.   2. Interval resolution of bilateral pleural fluid. Near total resolution of  bibasilar airspace disease. 3. Persistent bilateral hydronephrosis, unchanged.             Assessment:     Patient Active Problem List   Diagnosis Code    Malignant neoplasm of both ovaries ([address] C56.1, C56.2    Carcinomatosis peritonei ([address] C78.6, C80.1    Small bowel obstruction K56.609    Ileostomy in place ([address] Z93.2    Anemia due to chemotherapy D64.81, T45.1X5A    CINV (chemotherapy-induced nausea and vomiting) R11.2, T45.1X5A    Cancer associated pain G89.3    Generalized abdominal pain R10.84    Anxiety about health F41.8    Ileus (HCC) K56.7    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    Hx of pulmonary embolus Z86.711    Dehydration E86.0    Chemotherapy-induced neutropenia (HCC) D70.1, T45.1X5A    Anticoagulation adequate with anticoagulant therapy Z79.01    Counseling regarding end of life decision making Z71.89    Protein calorie malnutrition ([facility] E46    Malignant ascites R18.0    Hydronephrosis of right kidney N13.30    Isolated proteinuria R80.0    Erosive esophagitis K22.10    Ulcerative esophagitis K22.10    Hypomagnesemia E83.42    Abdominal pain R10.9    Hydronephrosis of left kidney N13.30    Ovarian cancer (HCC) C56.9    Nausea & vomiting R11.2    Carcinomatosis (HCC) C80.0    Intractable vomiting with nausea R11.2    Partial bowel obstruction K56.600    Nausea and vomiting R11.2    Ovarian cancer, unspecified laterality ([facility] C56.9         Plan: Will proceed with C5 of Carbo today  Replace Mag with 2 runs of 2GM  CT scan scheduled on  2/28/18  Continue Phenergan po at home for nausea and she will let us know if she feels dehydrated and we will bring her in for hydration  Continue with  Fentanyl patch to 150 mcg and she will let us know if it gets worse again. Monitor HGB due to anemia and transfuse if she goes to 8  Continue anticoagulation  Due to pt feeling dehydrated form some vomiting, will give additional hydration today  Call for any questions or concerns  Greater than 45 minutes spent with pt with greater than 50% of time in face to face counseling    [address] [place] [address],   2/13/2018          ______________________  Medications:    Current Outpatient Prescriptions   Medication Sig Dispense Refill    fentaNYL (DURAGESIC) 50 mcg/hr PATCH 1 Patch by TransDERmal route every seventy-two (72) hours. Max Daily Amount: 1 Patch.  This is in ADDITION to your 100 mcg patch for a total of 150 mcg patch every 72 hours 10 Patch 0    fentaNYL (DURAGESIC) 100 mcg/hr PATCH 1 Patch by TransDERmal route every seventy-two (72) hours. Max Daily Amount: 1 Patch. 10 Patch 0    promethazine (PHENERGAN) 6.25 mg/5 mL syrup Take 20 mL by mouth four (4) times daily as needed for Nausea. 400 mL 4    dexamethasone (DECADRON) 4 mg tablet Take 2 tabs QAM day before chemo and for 2 days after 30 Tab 4    cyclobenzaprine (FLEXERIL) 10 mg tablet Take 1 Tab by mouth three (3) times daily as needed for Muscle Spasm(s). 30 Tab 1    zolpidem (AMBIEN) 10 mg tablet Take 1 Tab by mouth nightly as needed for Sleep. Max Daily Amount: 10 mg. Indications: SLEEP-ONSET INSOMNIA 30 Tab 2    scopolamine (TRANSDERM-SCOP) 1 mg over 3 days pt3d 1 Patch by TransDERmal route every seventy-two (72) hours as needed. 10 Patch 1    HYDROmorphone (DILAUDID) 1 mg/mL liqd oral solution Take 4 mL by mouth every four (4) hours as needed. Max Daily Amount: 24 mg. 475 mL 0    metoprolol tartrate (LOPRESSOR) 50 mg tablet Take  by mouth two (2) times a day.  metoclopramide (REGLAN) 5 mg/5 mL syrup Take 10 mL by mouth four (4) times daily as needed. Take before meals as needed 500 mL 3    dronabinol (SYNDROS) 5 mg/mL soln Take 2.1 mg by mouth two (2) times a day. Max Daily Amount: 4.2 mg. Take 30 minutes before lunch and dinner 30 mL 1    NEXIUM PACKET 40 mg granules for oral suspension two (2) times a day.  magnesium oxide (MAG-OX) 400 mg tablet Take 1 Tab by mouth two (2) times a day. Indications: HYPOMAGNESEMIA 60 Tab 2    sucralfate (CARAFATE) 100 mg/mL suspension Take 5 mL by mouth four (4) times daily. 414 mL 3    polyethylene glycol (MIRALAX) 17 gram/dose powder Take 17 g by mouth daily as needed. 238 g 3    apixaban (ELIQUIS) 2.5 mg tablet Take 1 Tab by mouth two (2) times a day. 60 Tab 6    ondansetron (ZOFRAN ODT) 4 mg disintegrating tablet Take 1 Tab by mouth every eight (8) hours as needed for Nausea.  30 Tab 2    lidocaine-prilocaine (EMLA) topical cream Apply small amount over port area and cover with a band aid 1 hour before chemo treatment 30 g 3     Current Facility-Administered Medications   Medication Dose Route Frequency Provider Last Rate Last Dose    sodium chloride 0.9 % injection 10 mL  10 mL IntraVENous PRN Bernard Hastings MD        heparin (porcine) pf 500 Units  500 Units IntraVENous PRN Bernard Hastings MD        sodium chloride (NS) flush 10-40 mL  10-40 mL IntraVENous PRN Bernard Hastings MD   10 mL at 02/13/18 0945    magnesium sulfate 2 g/50 ml IVPB (premix or compounded)  2 g IntraVENous Q1H Genny Don NP 50 mL/hr at 02/13/18 1125 2 g at 02/13/18 1125    sodium chloride 0.9 % bolus infusion 2,000 mL  2,000 mL IntraVENous ONCE Gneny Don NP        CARBOplatin (PARAPLATIN) 324 mg in dextrose 5% 250 mL, overfill volume 25 mL chemo infusion  324 mg IntraVENous ONCE Bernard Hastings MD        granisetron (KYTRIL) injection 1 mg  1 mg IntraVENous ONCE Bernard Hastings MD        dexamethasone (DECADRON) 20 mg in 0.9% sodium chloride 50 mL IVPB  20 mg IntraVENous ONCE Tyshawn Scott MD        pegfilgrastim (NEULASTA) wearable SQ injector 6 mg  6 mg SubCUTAneous Sheela Ramírez MD

## 2018-02-13 NOTE — PROGRESS NOTES
Outpatient Infusion Center - Chemotherapy Progress Note    0966 Pt admit to [place] for C5 Carboplatin ambulatory in stable condition. Assessment completed. No new concerns voiced. Port accessed with positive blood return. NS infusing KVO. Pre-chemo labs drawn 2/8/18 and scanned into chart. Chemo ordered. [facility] NP at bedside, new orders received for hydration and mag. Chemotherapy Flowsheet 2/13/2018   Cycle C5   Date 2/13/2018   Drug / Regimen Carboplatin   Dosage -   Pre Hydration 2 L   Pre Meds given   Notes given     Visit Vitals    /83    Pulse 99    Temp 98 °F (36.7 °C)    Resp 16    Ht 5' 2.99\" (1.6 m)    Wt 43.4 kg (95 lb 9.6 oz)    BMI 16.94 kg/m2       Medications:  NS 2 L  Mag 2 grams  Kytril IVP  Decadron IVPB  Carboplatin  Neulasta OBI    1515 Pt tolerated treatment well. Port maintained positive blood return throughout treatment, flushed with positive blood return at conclusion, heparinized, and de-accessed per protocol. D/c home ambulatory in no distress. Pt aware of next [facility] appointment scheduled for 3/13/18.

## 2018-02-15 DIAGNOSIS — K22.10 ULCERATIVE ESOPHAGITIS: ICD-10-CM

## 2018-02-15 DIAGNOSIS — G89.3 CANCER ASSOCIATED PAIN: ICD-10-CM

## 2018-02-15 DIAGNOSIS — C56.9 OVARIAN CANCER, UNSPECIFIED LATERALITY (HCC): ICD-10-CM

## 2018-02-15 DIAGNOSIS — R18.0 MALIGNANT ASCITES: ICD-10-CM

## 2018-02-15 DIAGNOSIS — C78.6 CARCINOMATOSIS PERITONEI (HCC): ICD-10-CM

## 2018-02-15 DIAGNOSIS — I82.890 THROMBOSIS OF PELVIC VEIN: ICD-10-CM

## 2018-02-15 DIAGNOSIS — K56.609 SMALL BOWEL OBSTRUCTION (HCC): ICD-10-CM

## 2018-02-15 DIAGNOSIS — K22.10 EROSIVE ESOPHAGITIS: ICD-10-CM

## 2018-02-15 RX ORDER — METOCLOPRAMIDE HYDROCHLORIDE 5 MG/5ML
10 SOLUTION ORAL
Qty: 500 ML | Refills: 3 | Status: SHIPPED | OUTPATIENT
Start: 2018-02-15

## 2018-02-15 NOTE — TELEPHONE ENCOUNTER
From: Percy Carranza  To: Aleksandar El NP  Sent: 2/15/2018 8:09 AM EST  Subject: Medication Renewal Request    Original authorizing provider: HAYDER Duran.  Luis would like a refill of the following medications:  metoclopramide (REGLAN) 5 mg/5 mL syrup [Genny Don NP]    Preferred pharmacy: [address] [place] [address] [facility]    Comment:

## 2018-02-19 ENCOUNTER — TELEPHONE (OUTPATIENT)
Dept: GYNECOLOGY | Age: 58
End: 2018-02-19

## 2018-02-19 NOTE — TELEPHONE ENCOUNTER
Patient states she's having some issues and will be seeing her GI doctor. She wants to keep you informed.

## 2018-02-20 ENCOUNTER — TELEPHONE (OUTPATIENT)
Dept: GYNECOLOGY | Age: 58
End: 2018-02-20

## 2018-02-20 NOTE — TELEPHONE ENCOUNTER
Pt called to say that with all her nausea, vomiting and feeling of fullness, she went to her local gastroenterologist and they will be doing an endoscopy tomorrow. She just wanted us to be aware. She will be off her Eliquis for 24 hours to complete the study. She said when she weight this morning, she is now down to 90 lbs. She is trying to take in as many supplements as she can, but says it feels like it is always just \"sitting there\" and she can not put any more on top of it. She will call and let us know how she is doing after the procedure.

## 2018-03-02 ENCOUNTER — TELEPHONE (OUTPATIENT)
Dept: GYNECOLOGY | Age: 58
End: 2018-03-02

## 2018-03-02 NOTE — TELEPHONE ENCOUNTER
Calling to update on possible transfer from [facility] to [facility].

## 2018-03-05 ENCOUNTER — HOSPITAL ENCOUNTER (INPATIENT)
Age: 58
LOS: 17 days | Discharge: HOME HEALTH | DRG: 388 | End: 2018-03-22
Attending: OBSTETRICS & GYNECOLOGY | Admitting: OBSTETRICS & GYNECOLOGY
Payer: OTHER GOVERNMENT

## 2018-03-05 DIAGNOSIS — Z93.2 ILEOSTOMY IN PLACE (HCC): ICD-10-CM

## 2018-03-05 DIAGNOSIS — T45.1X5A CINV (CHEMOTHERAPY-INDUCED NAUSEA AND VOMITING): ICD-10-CM

## 2018-03-05 DIAGNOSIS — F41.8 ANXIETY ABOUT HEALTH: ICD-10-CM

## 2018-03-05 DIAGNOSIS — E87.1 HYPONATREMIA: ICD-10-CM

## 2018-03-05 DIAGNOSIS — R18.0 MALIGNANT ASCITES: ICD-10-CM

## 2018-03-05 DIAGNOSIS — C56.3 MALIGNANT NEOPLASM OF BOTH OVARIES (HCC): ICD-10-CM

## 2018-03-05 DIAGNOSIS — D64.81 ANEMIA DUE TO CHEMOTHERAPY: ICD-10-CM

## 2018-03-05 DIAGNOSIS — T45.1X5A ANEMIA DUE TO CHEMOTHERAPY: ICD-10-CM

## 2018-03-05 DIAGNOSIS — G89.3 CANCER ASSOCIATED PAIN: ICD-10-CM

## 2018-03-05 DIAGNOSIS — K22.10 EROSIVE ESOPHAGITIS: ICD-10-CM

## 2018-03-05 DIAGNOSIS — C78.6 CARCINOMATOSIS PERITONEI (HCC): ICD-10-CM

## 2018-03-05 DIAGNOSIS — I82.890 THROMBOSIS OF PELVIC VEIN: ICD-10-CM

## 2018-03-05 DIAGNOSIS — D70.1 CHEMOTHERAPY-INDUCED NEUTROPENIA (HCC): ICD-10-CM

## 2018-03-05 DIAGNOSIS — K56.609 SMALL BOWEL OBSTRUCTION (HCC): ICD-10-CM

## 2018-03-05 DIAGNOSIS — C56.9 OVARIAN CANCER, UNSPECIFIED LATERALITY (HCC): ICD-10-CM

## 2018-03-05 DIAGNOSIS — K22.10 ULCERATIVE ESOPHAGITIS: ICD-10-CM

## 2018-03-05 DIAGNOSIS — R11.2 CINV (CHEMOTHERAPY-INDUCED NAUSEA AND VOMITING): ICD-10-CM

## 2018-03-05 DIAGNOSIS — R10.84 GENERALIZED ABDOMINAL PAIN: ICD-10-CM

## 2018-03-05 DIAGNOSIS — T45.1X5A CHEMOTHERAPY-INDUCED NEUTROPENIA (HCC): ICD-10-CM

## 2018-03-05 PROBLEM — K20.90 ESOPHAGITIS DETERMINED BY ENDOSCOPY: Status: ACTIVE | Noted: 2018-03-05

## 2018-03-05 PROBLEM — E43 PROTEIN-CALORIE MALNUTRITION, SEVERE (HCC): Status: ACTIVE | Noted: 2018-03-05

## 2018-03-05 LAB
ALBUMIN SERPL-MCNC: 2.5 G/DL (ref 3.5–5)
ALBUMIN/GLOB SERPL: 0.6 {RATIO} (ref 1.1–2.2)
ALP SERPL-CCNC: 122 U/L (ref 45–117)
ALT SERPL-CCNC: 17 U/L (ref 12–78)
ANION GAP SERPL CALC-SCNC: 6 MMOL/L (ref 5–15)
APPEARANCE UR: CLEAR
AST SERPL-CCNC: 19 U/L (ref 15–37)
BACTERIA URNS QL MICRO: NEGATIVE /HPF
BASOPHILS # BLD: 0 K/UL (ref 0–0.1)
BASOPHILS NFR BLD: 0 % (ref 0–1)
BILIRUB SERPL-MCNC: 0.6 MG/DL (ref 0.2–1)
BILIRUB UR QL: NEGATIVE
BUN SERPL-MCNC: 27 MG/DL (ref 6–20)
BUN/CREAT SERPL: 38 (ref 12–20)
CALCIUM SERPL-MCNC: 8.7 MG/DL (ref 8.5–10.1)
CHLORIDE SERPL-SCNC: 105 MMOL/L (ref 97–108)
CO2 SERPL-SCNC: 28 MMOL/L (ref 21–32)
COLOR UR: ABNORMAL
CREAT SERPL-MCNC: 0.72 MG/DL (ref 0.55–1.02)
DIFFERENTIAL METHOD BLD: ABNORMAL
EOSINOPHIL # BLD: 0 K/UL (ref 0–0.4)
EOSINOPHIL NFR BLD: 1 % (ref 0–7)
EPITH CASTS URNS QL MICRO: ABNORMAL /LPF
ERYTHROCYTE [DISTWIDTH] IN BLOOD BY AUTOMATED COUNT: 18.8 % (ref 11.5–14.5)
GLOBULIN SER CALC-MCNC: 4.4 G/DL (ref 2–4)
GLUCOSE SERPL-MCNC: 80 MG/DL (ref 65–100)
GLUCOSE UR STRIP.AUTO-MCNC: NEGATIVE MG/DL
HCT VFR BLD AUTO: 31.5 % (ref 35–47)
HGB BLD-MCNC: 10.7 G/DL (ref 11.5–16)
HGB UR QL STRIP: NEGATIVE
HYALINE CASTS URNS QL MICRO: ABNORMAL /LPF (ref 0–5)
IMM GRANULOCYTES # BLD: 0 K/UL (ref 0–0.04)
IMM GRANULOCYTES NFR BLD AUTO: 0 % (ref 0–0.5)
KETONES UR QL STRIP.AUTO: NEGATIVE MG/DL
LEUKOCYTE ESTERASE UR QL STRIP.AUTO: NEGATIVE
LYMPHOCYTES # BLD: 2 K/UL (ref 0.8–3.5)
LYMPHOCYTES NFR BLD: 36 % (ref 12–49)
MAGNESIUM SERPL-MCNC: 1.5 MG/DL (ref 1.6–2.4)
MCH RBC QN AUTO: 31 PG (ref 26–34)
MCHC RBC AUTO-ENTMCNC: 34 G/DL (ref 30–36.5)
MCV RBC AUTO: 91.3 FL (ref 80–99)
MONOCYTES # BLD: 0.2 K/UL (ref 0–1)
MONOCYTES NFR BLD: 4 % (ref 5–13)
NEUTS SEG # BLD: 3.3 K/UL (ref 1.8–8)
NEUTS SEG NFR BLD: 60 % (ref 32–75)
NITRITE UR QL STRIP.AUTO: NEGATIVE
NRBC # BLD: 0 K/UL (ref 0–0.01)
NRBC BLD-RTO: 0 PER 100 WBC
PH UR STRIP: 5.5 [PH] (ref 5–8)
PHOSPHATE SERPL-MCNC: 3.1 MG/DL (ref 2.6–4.7)
PLATELET # BLD AUTO: 110 K/UL (ref 150–400)
PMV BLD AUTO: 10 FL (ref 8.9–12.9)
POTASSIUM SERPL-SCNC: 3.8 MMOL/L (ref 3.5–5.1)
PREALB SERPL-MCNC: 15.7 MG/DL (ref 20–40)
PROT SERPL-MCNC: 6.9 G/DL (ref 6.4–8.2)
PROT UR STRIP-MCNC: ABNORMAL MG/DL
RBC # BLD AUTO: 3.45 M/UL (ref 3.8–5.2)
RBC #/AREA URNS HPF: ABNORMAL /HPF (ref 0–5)
SODIUM SERPL-SCNC: 139 MMOL/L (ref 136–145)
SP GR UR REFRACTOMETRY: 1.03 (ref 1–1.03)
UROBILINOGEN UR QL STRIP.AUTO: 1 EU/DL (ref 0.2–1)
WBC # BLD AUTO: 5.5 K/UL (ref 3.6–11)
WBC URNS QL MICRO: ABNORMAL /HPF (ref 0–4)

## 2018-03-05 PROCEDURE — 65210000002 HC RM PRIVATE GYN

## 2018-03-05 PROCEDURE — 80053 COMPREHEN METABOLIC PANEL: CPT | Performed by: NURSE PRACTITIONER

## 2018-03-05 PROCEDURE — 74011000258 HC RX REV CODE- 258: Performed by: NURSE PRACTITIONER

## 2018-03-05 PROCEDURE — 81001 URINALYSIS AUTO W/SCOPE: CPT | Performed by: NURSE PRACTITIONER

## 2018-03-05 PROCEDURE — 84100 ASSAY OF PHOSPHORUS: CPT | Performed by: NURSE PRACTITIONER

## 2018-03-05 PROCEDURE — 85025 COMPLETE CBC W/AUTO DIFF WBC: CPT | Performed by: NURSE PRACTITIONER

## 2018-03-05 PROCEDURE — 86304 IMMUNOASSAY TUMOR CA 125: CPT | Performed by: NURSE PRACTITIONER

## 2018-03-05 PROCEDURE — 74011250636 HC RX REV CODE- 250/636: Performed by: NURSE PRACTITIONER

## 2018-03-05 PROCEDURE — 84134 ASSAY OF PREALBUMIN: CPT | Performed by: NURSE PRACTITIONER

## 2018-03-05 PROCEDURE — 74011250637 HC RX REV CODE- 250/637: Performed by: NURSE PRACTITIONER

## 2018-03-05 PROCEDURE — 3E0436Z INTRODUCTION OF NUTRITIONAL SUBSTANCE INTO CENTRAL VEIN, PERCUTANEOUS APPROACH: ICD-10-PCS | Performed by: OBSTETRICS & GYNECOLOGY

## 2018-03-05 PROCEDURE — 83735 ASSAY OF MAGNESIUM: CPT | Performed by: NURSE PRACTITIONER

## 2018-03-05 PROCEDURE — 74011250637 HC RX REV CODE- 250/637: Performed by: OBSTETRICS & GYNECOLOGY

## 2018-03-05 PROCEDURE — 36415 COLL VENOUS BLD VENIPUNCTURE: CPT | Performed by: NURSE PRACTITIONER

## 2018-03-05 RX ORDER — DEXTROSE MONOHYDRATE AND SODIUM CHLORIDE 5; .45 G/100ML; G/100ML
125 INJECTION, SOLUTION INTRAVENOUS CONTINUOUS
Status: DISCONTINUED | OUTPATIENT
Start: 2018-03-05 | End: 2018-03-06

## 2018-03-05 RX ORDER — ZOLPIDEM TARTRATE 10 MG/1
10 TABLET ORAL
Status: DISCONTINUED | OUTPATIENT
Start: 2018-03-05 | End: 2018-03-22 | Stop reason: HOSPADM

## 2018-03-05 RX ORDER — PANTOPRAZOLE SODIUM 20 MG/1
20 TABLET, DELAYED RELEASE ORAL
Status: DISCONTINUED | OUTPATIENT
Start: 2018-03-05 | End: 2018-03-06

## 2018-03-05 RX ORDER — ACETAMINOPHEN 325 MG/1
650 TABLET ORAL
Status: DISCONTINUED | OUTPATIENT
Start: 2018-03-05 | End: 2018-03-22 | Stop reason: HOSPADM

## 2018-03-05 RX ORDER — ONDANSETRON 2 MG/ML
4 INJECTION INTRAMUSCULAR; INTRAVENOUS
Status: DISCONTINUED | OUTPATIENT
Start: 2018-03-05 | End: 2018-03-22 | Stop reason: HOSPADM

## 2018-03-05 RX ORDER — FENTANYL 100 UG/H
1 PATCH TRANSDERMAL
Status: DISCONTINUED | OUTPATIENT
Start: 2018-03-07 | End: 2018-03-22 | Stop reason: HOSPADM

## 2018-03-05 RX ORDER — FENTANYL 100 UG/H
1 PATCH TRANSDERMAL
Status: DISCONTINUED | OUTPATIENT
Start: 2018-03-05 | End: 2018-03-05

## 2018-03-05 RX ORDER — SODIUM CHLORIDE 0.9 % (FLUSH) 0.9 %
5-10 SYRINGE (ML) INJECTION AS NEEDED
Status: DISCONTINUED | OUTPATIENT
Start: 2018-03-05 | End: 2018-03-22 | Stop reason: HOSPADM

## 2018-03-05 RX ORDER — FENTANYL 50 UG/1
1 PATCH TRANSDERMAL
Status: DISCONTINUED | OUTPATIENT
Start: 2018-03-07 | End: 2018-03-12

## 2018-03-05 RX ORDER — DIPHENHYDRAMINE HYDROCHLORIDE 50 MG/ML
12.5 INJECTION, SOLUTION INTRAMUSCULAR; INTRAVENOUS
Status: DISCONTINUED | OUTPATIENT
Start: 2018-03-05 | End: 2018-03-22 | Stop reason: HOSPADM

## 2018-03-05 RX ORDER — FENTANYL 50 UG/1
1 PATCH TRANSDERMAL
Status: DISCONTINUED | OUTPATIENT
Start: 2018-03-05 | End: 2018-03-05

## 2018-03-05 RX ORDER — SCOLOPAMINE TRANSDERMAL SYSTEM 1 MG/1
1 PATCH, EXTENDED RELEASE TRANSDERMAL
Status: DISCONTINUED | OUTPATIENT
Start: 2018-03-05 | End: 2018-03-22 | Stop reason: HOSPADM

## 2018-03-05 RX ORDER — METOPROLOL TARTRATE 50 MG/1
50 TABLET ORAL 2 TIMES DAILY
Status: DISCONTINUED | OUTPATIENT
Start: 2018-03-05 | End: 2018-03-22 | Stop reason: HOSPADM

## 2018-03-05 RX ORDER — HYDROMORPHONE HYDROCHLORIDE 1 MG/ML
0.5 INJECTION, SOLUTION INTRAMUSCULAR; INTRAVENOUS; SUBCUTANEOUS
Status: DISCONTINUED | OUTPATIENT
Start: 2018-03-05 | End: 2018-03-15

## 2018-03-05 RX ORDER — CYCLOBENZAPRINE HCL 10 MG
10 TABLET ORAL
Status: DISCONTINUED | OUTPATIENT
Start: 2018-03-05 | End: 2018-03-22 | Stop reason: HOSPADM

## 2018-03-05 RX ORDER — SODIUM CHLORIDE 0.9 % (FLUSH) 0.9 %
5-10 SYRINGE (ML) INJECTION EVERY 8 HOURS
Status: DISCONTINUED | OUTPATIENT
Start: 2018-03-05 | End: 2018-03-22 | Stop reason: HOSPADM

## 2018-03-05 RX ORDER — SUCRALFATE 1 G/10ML
0.5 SUSPENSION ORAL 4 TIMES DAILY
Status: DISCONTINUED | OUTPATIENT
Start: 2018-03-05 | End: 2018-03-22 | Stop reason: HOSPADM

## 2018-03-05 RX ADMIN — SUCRALFATE 0.5 G: 1 SUSPENSION ORAL at 18:43

## 2018-03-05 RX ADMIN — METOPROLOL TARTRATE 50 MG: 50 TABLET ORAL at 18:43

## 2018-03-05 RX ADMIN — PROMETHAZINE HYDROCHLORIDE 12.5 MG: 25 INJECTION INTRAMUSCULAR; INTRAVENOUS at 18:43

## 2018-03-05 RX ADMIN — Medication 10 ML: at 22:00

## 2018-03-05 RX ADMIN — SUCRALFATE 0.5 G: 1 SUSPENSION ORAL at 22:14

## 2018-03-05 RX ADMIN — APIXABAN 2.5 MG: 2.5 TABLET, FILM COATED ORAL at 18:43

## 2018-03-05 RX ADMIN — ZOLPIDEM TARTRATE 10 MG: 10 TABLET ORAL at 22:29

## 2018-03-05 RX ADMIN — PANTOPRAZOLE SODIUM 20 MG: 20 TABLET, DELAYED RELEASE ORAL at 22:57

## 2018-03-05 RX ADMIN — DEXTROSE MONOHYDRATE AND SODIUM CHLORIDE 125 ML/HR: 5; .45 INJECTION, SOLUTION INTRAVENOUS at 18:42

## 2018-03-05 NOTE — H&P
[facility] Gynecological Oncology  [address] [facility], [address] [place] [address], [id number]  P: [phone]     F: [phone]     Cy Kitchen       [id number]  1960    Admitted (Not on file) Date 3/5/2018     HISTORY OF PRESENT ILLNESS:  Cy Kitchen is a 61 y.o.  postmenopausal female with a diagnosis of stage IV ovarian cancer. She underwent exploratory laparotomy with suboptimal debulking in 2015. She had extensive disease. She was readmitted about a week later with obstruction and subsequently had a cecal perforation, requiring resection, end ileostomy, and mucous fistula. During the hospitalization she was also diagnosed with pulmonary embolus and had chest tubes placed for bilateral pleural effusions. She had a prolonged hospital course and actually received a dose of cisplatin chemotherapy while in the hospital to help dry up the effusions. She completed 6 cycles of Taxol/Carboplatin chemotherapy following her initial debulking. She  did relatively well, considering she had an ileostomy to deal with during chemotherapy. Dr. Silverio Pierson took her to the OR on 16 for interval debulking and reversal of her ileostomy. She had extensive disease, but we were able to resect the majority of her disease. One week later she developed an anastomotic leak requiring reexploration and recreation of an ileostomy. She had another prolonged hospitalization, but recovered well since surgery. We then reinitiated Taxol/Carboplatin chemotherapy, but did reduce the dose of Taxol to 135 mg/m2. She completed 4 mores cycles. Her CA-125 normalized and we stopped treatment.     She had evidence of recurrent disease and for a while was receiving Doxil/Avastin. She completed 3 cycles of that regimen (). She was admitted to [facility] several time since during that regimen with evidence of partial small bowel obstruction.   The symptoms have resolved each time with conservative management. She then had increased ascites on her CT scan, but no significant change in her disease otherwise. Her CA-125 also went up. All of this suggested progression of disease. We switched her chemotherapy to [facility]. She completed 7 cycles 5/23/2017-9/12/2017). Her CA-125 had risen over the last few draws. Her last CT on 1/9/2018 showed improvement. She had been feeling good  She has been having some shortness of breathe with exertion. Her son's wedding was in January. Everything went well and there were no problems  She had been hospitalized almost every cycle for the last few cycles for GI issues so she was switch to single agent Carboplatin begining on 10/17/17 and has completed 5 cycles so far with last being 2/13/2018. On 2/20/18 due to increasing nausea, vomiting and weight loss (down to 82 lbs), her gastroenterologist preformed an upper endoscopy and called to inform us that it showed very friable, ulcerative esophagitis and he felt she needed admission for nutrition and possible gastrostomy tube placement. She was therefore [place] [facility] [place] [facility] on 2/22 and has been in house since then receiving TPN daily with TIW Lipids. Her weight on admission was down to 84 lbs. She initially was able to eat a small amount, but the nausea and vomiting returned and she continued be nauseated almost all the time. Ostomy has continue to function, although decrease in output due to decreased PO intake. She had a CT scan there on 2/26/28 that  Showed:  1: significant distention of small bowel with possibility secondary  To an obstruction focus just proximal to her ileostomy with clumping of small bowel and evidence of omental caking and peritoneal carcinomatosis\".   2: Moderate hydronephrosis and proximal hydroureter likely secondary to extrinsic compression upon the proximal or mid ureters due to the clumped and distended bowel and peritoneal carcinomatosis. 3: Hyperattenuating foci within the liver; cannot exclude hepatic metastatic foci  4: Noncalcified nodular foci in the lingula: cannot exclude pulmonary parenchymal metastatic disease. (report scanned into media of this chart)    After several 9 days at [facility], her attending physician requested pt be transferred to Dr. Olivia Whittaker for continued management of her disease and assist with decision making going forward. SUBJECTIVE:  Pt said she was not too nauseated earlier today, but had some on the way down from [place]. Currently, she is feeling better being settled in. Says she is just a little nauseated. Says pain is currently controlled.          Patient Active Problem List    Diagnosis Date Noted    Ovarian cancer, unspecified laterality ([address] 11/30/2017    Nausea and vomiting 09/14/2017    Partial bowel obstruction 09/13/2017    Intractable vomiting with nausea 08/10/2017    Ovarian cancer ([address] 08/09/2017    Nausea & vomiting 08/09/2017    Carcinomatosis ([address] 08/09/2017    Hydronephrosis of left kidney 06/28/2017    Abdominal pain 06/27/2017    Hypomagnesemia 06/13/2017    Erosive esophagitis 06/07/2017    Ulcerative esophagitis 06/07/2017    Isolated proteinuria 05/25/2017    Malignant ascites 05/08/2017    Hydronephrosis of right kidney 05/08/2017    Protein calorie malnutrition ([address] 04/28/2017    Counseling regarding end of life decision making 04/25/2017    Anticoagulation adequate with anticoagulant therapy 03/28/2017    Chemotherapy-induced neutropenia (HCC) 03/22/2017    Ileus ([address] 03/21/2017    Thrombosis of pelvic vein 03/21/2017    Hx of pulmonary embolus 03/21/2017    Dehydration 03/21/2017    Cancer associated pain 05/13/2016    Generalized abdominal pain 05/13/2016    Anxiety about health 05/13/2016    CINV (chemotherapy-induced nausea and vomiting) 03/22/2016    Anemia due to chemotherapy 02/22/2016    Ileostomy in place ([facility] 02/15/2016    Carcinomatosis peritonei ([facility] 10/29/2015    Small bowel obstruction 10/29/2015    Malignant neoplasm of both ovaries ([facility] 10/12/2015     Past Medical History:   Diagnosis Date    Abdominal carcinomatosis ([facility] 10/2015    Arthritis     left shoulder    BRCA negative 12/2015    Chronic pain     Depression     HX    Environmental allergies     GERD (gastroesophageal reflux disease)     Hypertension     NEW OVER PAST 5-6 MONTHS    Ovarian cancer ([facility] 10/2015    serous adenocarcinoma, high grade, stage IIIC    Pulmonary embolism ([facility] 10/2015      Past Surgical History:   Procedure Laterality Date    CARDIAC SURG PROCEDURE UNLIST  12/11/15    cardiac cath/normal     HX BREAST BIOPSY  1995    benign right breast bx    HX DILATION AND CURETTAGE  2/2011    POLYPECTOMY    HX GI  2015    PERFORATED BOWEL; ILEOSTOMY    HX GYN  10/2015    EXP LAPAROTOMY    HX HEENT  LAST 2005    oral tissue graft X3    HX HEENT  1970    tonsillectomy    HX LAPAROTOMY  10/2015    tumor sampling    HX LAPAROTOMY  4/2016    hysterectomy, BSO, radical debulking    HX LAPAROTOMY  5/2016    resection ileocolic anastomosis, leak, ileostomy    HX OTHER SURGICAL  11/2015    tunneled portacath      Social History   Substance Use Topics    Smoking status: Never Smoker    Smokeless tobacco: Never Used    Alcohol use No      Family History   Problem Relation Age of Onset    Cancer Maternal Uncle      skin    Hypertension Mother     High Cholesterol Mother     Anxiety Mother     Heart Disease Mother     High Cholesterol Father     Hypertension Father     Stroke Father     Arthritis-osteo Sister     Migraines Sister     Other Sister      FIBROMYALGIA    Depression Brother     Other Brother      DRUG ABUSE HEPATITIS C    Migraines Sister     Arrhythmia Sister     Depression Sister     Lung Disease Paternal Aunt      COPD    Cancer Paternal Uncle      LUNG    Lung Disease Paternal Uncle COPD    Lung Disease Maternal Grandmother      COPD    Anesth Problems Neg Hx       No current facility-administered medications for this encounter. Current Outpatient Prescriptions   Medication Sig    metoclopramide (REGLAN) 5 mg/5 mL syrup Take 10 mL by mouth four (4) times daily as needed. Take before meals as needed    fentaNYL (DURAGESIC) 50 mcg/hr PATCH 1 Patch by TransDERmal route every seventy-two (72) hours. Max Daily Amount: 1 Patch. This is in ADDITION to your 100 mcg patch for a total of 150 mcg patch every 72 hours    fentaNYL (DURAGESIC) 100 mcg/hr PATCH 1 Patch by TransDERmal route every seventy-two (72) hours. Max Daily Amount: 1 Patch.  dexamethasone (DECADRON) 4 mg tablet Take 2 tabs QAM day before chemo and for 2 days after    cyclobenzaprine (FLEXERIL) 10 mg tablet Take 1 Tab by mouth three (3) times daily as needed for Muscle Spasm(s).  zolpidem (AMBIEN) 10 mg tablet Take 1 Tab by mouth nightly as needed for Sleep. Max Daily Amount: 10 mg. Indications: SLEEP-ONSET INSOMNIA    scopolamine (TRANSDERM-SCOP) 1 mg over 3 days pt3d 1 Patch by TransDERmal route every seventy-two (72) hours as needed.  HYDROmorphone (DILAUDID) 1 mg/mL liqd oral solution Take 4 mL by mouth every four (4) hours as needed. Max Daily Amount: 24 mg.    metoprolol tartrate (LOPRESSOR) 50 mg tablet Take  by mouth two (2) times a day.  NEXIUM PACKET 40 mg granules for oral suspension two (2) times a day.  magnesium oxide (MAG-OX) 400 mg tablet Take 1 Tab by mouth two (2) times a day. Indications: HYPOMAGNESEMIA    apixaban (ELIQUIS) 2.5 mg tablet Take 1 Tab by mouth two (2) times a day.  promethazine (PHENERGAN) 6.25 mg/5 mL syrup Take 20 mL by mouth four (4) times daily as needed for Nausea.  dronabinol (SYNDROS) 5 mg/mL soln Take 2.1 mg by mouth two (2) times a day. Max Daily Amount: 4.2 mg.  Take 30 minutes before lunch and dinner    sucralfate (CARAFATE) 100 mg/mL suspension Take 5 mL by mouth four (4) times daily.  polyethylene glycol (MIRALAX) 17 gram/dose powder Take 17 g by mouth daily as needed.  ondansetron (ZOFRAN ODT) 4 mg disintegrating tablet Take 1 Tab by mouth every eight (8) hours as needed for Nausea.  lidocaine-prilocaine (EMLA) topical cream Apply small amount over port area and cover with a band aid 1 hour before chemo treatment     Allergies   Allergen Reactions    Ativan [Lorazepam] Other (comments)     SEVERE CONFUSION        Review of Systems  General: Significant weight loss, with fatigue  HEENT: Denies visual changes, dysphagia or headache  Resp: Acknowledges SOB/DUDLEY, but says it is stable. Denies wheezing or cough  CV: Denies CP, palpitations  GI/:N/V as above. Denies diarrhea, constipation or dysuria. Says ostomy is functioning slower than usual, but she still is emptying it 2-3 times a day  MuskSkel: Denies muscle ache or joint pain  Neuro: Denies dizzyness or syncope      OBJECTIVE:    Physical Exam  General: A&O X3 with pleasant affect. Thin appearing  HEENT: Sclera anicteric, mucosa pink, moist   Neck: No JVD. No adenopathy appreciated  PortSite: without redness, tenderness or swelling. Heart: Regular without M/R/G  Lungs: CTA Bilat without wheezing or rales. Abd: Soft, thin, NT/ND with + BS throughout.  Ostomy with small amount tan liquid stool present  Ext: Without edema and + pedal pulses bilat  Neuro: grossly intact      Data Review  Pending  Imaging  Scanned in to media from outside hospital.    IMPRESSION:    Patient Active Problem List   Diagnosis Code    Malignant neoplasm of both ovaries ([address] C56.1, C56.2    Carcinomatosis peritonei ([address] C78.6, C80.1    Small bowel obstruction K56.609    Ileostomy in place ([address] Z93.2    Anemia due to chemotherapy D64.81, T45.1X5A    CINV (chemotherapy-induced nausea and vomiting) R11.2, T45.1X5A    Cancer associated pain G89.3    Generalized abdominal pain R10.84    Anxiety about health F41.8    Ileus (HCC) K56.7    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    Hx of pulmonary embolus Z86.711    Dehydration E86.0    Chemotherapy-induced neutropenia (HCC) D70.1, T45.1X5A    Anticoagulation adequate with anticoagulant therapy Z79.01    Counseling regarding end of life decision making Z71.89    Protein calorie malnutrition ([facility] E46    Malignant ascites R18.0    Hydronephrosis of right kidney N13.30    Isolated proteinuria R80.0    Erosive esophagitis K22.10    Ulcerative esophagitis K22.10    Hypomagnesemia E83.42    Abdominal pain R10.9    Hydronephrosis of left kidney N13.30    Ovarian cancer (HCC) C56.9    Nausea & vomiting R11.2    Carcinomatosis (HCC) C80.0    Intractable vomiting with nausea R11.2    Partial bowel obstruction K56.600    Nausea and vomiting R11.2    Ovarian cancer, unspecified laterality (HCC) C56.9       PLAN:  Will admit and hydrate over night. Draw labs and check pre-albumin, phos, mag and CMP to re-start TPN and lipids tomorrow (she only completed her bag today at 2pm). Nutrition consult  Continue current pain management   Anti-emitics prn  Dr. Eren Low sat with pt and her  Lynda Piedra and had a long discussion regarding treatment options including PEG tube, G/J tube , NG tube and rational of all. He began discussion of end of life/quality of life issues. They were very receptive to the discussion and will try to \"get through\" this period with some resolution of the obstruction and know \"decisions will have to be made at some point\".                Signed By: Irish Lovett NP     3/5/2018/3:15 PM

## 2018-03-05 NOTE — IP AVS SNAPSHOT
[address] 
[phone] Patient: Peyton Aragon MRN: [id number] :1960 About your hospitalization You were admitted on:  2018 You last received care in the:  [facility] PSYCHIATRIC Reader 3N TELEMETRY You were discharged on:  2018 Why you were hospitalized Your primary diagnosis was:  Not on File Your diagnoses also included:  Partial Bowel Obstruction, Ovarian Cancer (Hcc), Nausea & Vomiting, Protein-Calorie Malnutrition, Severe (Hcc), Esophagitis Determined By Endoscopy, Anemia Due To Chemotherapy, Abdominal Pain, Cancer Associated Pain, Carcinomatosis (Hcc), Counseling Regarding End Of Life Decision Making, Hx Of Pulmonary Embolus, Erosive Esophagitis, Carcinomatosis Peritonei (Hcc), Intractable Vomiting With Nausea, Small Bowel Obstruction, Dehydration, Anticoagulation Adequate With Anticoagulant Therapy, Malignant Ascites, Hypomagnesemia, Hydronephrosis Of Left Kidney, Ileostomy In Place (Hcc), Mucositis Due To Chemotherapy Follow-up Information Follow up With Details Comments Contact Info Geetha Henson MD   [address] [address] [phone] [address] [facility] 53 for weekly lab draws should be established on/around 3/27/18. [phone] [facility] IV Infusion services should start 4-5 days post discharge for IV hydration. [phone] Discharge Orders None A check alejandro indicates which time of day the medication should be taken. My Medications START taking these medications Instructions Each Dose to Equal  
 Morning Noon Evening Bedtime  
 haloperidol 2 mg/mL oral concentrate Commonly known as:  HALDOL Your last dose was: Your next dose is:    
   
   
 2 mL by Per G Tube route every eight (8) hours as needed (Nausea).  Indications: CANCER CHEMOTHERAPY-INDUCED NAUSEA AND VOMITING  
 4 mg L. acidoph & paracasei- S therm- Bifido 8 billion cell Cap cap Commonly known as:  HILTON-Q/RISAQUAD Start taking on:  3/23/2018 Your last dose was: Your next dose is: Take 1 Cap by mouth daily. 1 Cap  
    
   
   
   
  
 levoFLOXacin 250 mg/10 mL solution Commonly known as:  Virginia Boggs Your last dose was: Your next dose is: Take 30 mL by mouth every twenty-four (24) hours for 12 days. 750 mg  
    
   
   
   
  
 potassium chloride 20 mEq/15 mL solution Commonly known as:  KAON 10% Your last dose was: Your next dose is: Take 30 mL by mouth two (2) times a day for 30 days. 40 mEq  
    
   
   
   
  
 sterile water (preservative free) soln 5 mL, lipase-protease-amylase 10,440-39,150- 39,150 unit tab 1 Tab, sodium bicarbonate 650 mg tab 325 mg Your last dose was: Your next dose is:    
   
   
 1 Dose by Per J Tube route once for 1 dose. Place in G tube as needed for clog 1 Dose  
    
   
   
   
  
 sterile water irrigation soln 45.6 mL with hydrocortisone 10 mg tab 30 mg, nystatin 100,000 unit/mL susp 1,440,000 Units, diphenhydrAMINE 12.5 mg/5 mL elix 150 mg oral suspension Your last dose was: Your next dose is: Take 5 mL by mouth every six (6) hours as needed for up to 10 days. 5 mL CONTINUE taking these medications Instructions Each Dose to Equal  
 Morning Noon Evening Bedtime  
 apixaban 2.5 mg tablet Commonly known as:  Chester Johnson Your last dose was: Your next dose is: Take 1 Tab by mouth two (2) times a day. 2.5 mg  
    
   
   
   
  
 cyclobenzaprine 10 mg tablet Commonly known as:  FLEXERIL Your last dose was: Your next dose is: Take 1 Tab by mouth three (3) times daily as needed for Muscle Spasm(s).   
 10 mg  
    
   
   
   
  
 dexamethasone 4 mg tablet Commonly known as:  DECADRON Your last dose was: Your next dose is: Take 2 tabs QAM day before chemo and for 2 days after  
     
   
   
   
  
 dronabinol 5 mg/mL Soln Commonly known as:  SYNDROS Your last dose was: Your next dose is: Take 2.1 mg by mouth two (2) times a day. Max Daily Amount: 4.2 mg. Take 30 minutes before lunch and dinner 2.1 mg  
    
   
   
   
  
 * fentaNYL 50 mcg/hr PATCH Commonly known as:  Tejal Strange Your last dose was: Your next dose is:    
   
   
 1 Patch by TransDERmal route every seventy-two (72) hours. Max Daily Amount: 1 Patch. This is in ADDITION to your 100 mcg patch for a total of 150 mcg patch every 72 hours 1 Patch * fentaNYL 100 mcg/hr PATCH Commonly known as:  Tejal Strange Your last dose was: Your next dose is:    
   
   
 1 Patch by TransDERmal route every seventy-two (72) hours. Max Daily Amount: 1 Patch. 1 Patch HYDROmorphone 1 mg/mL Liqd oral solution Commonly known as:  DILAUDID Your last dose was: Your next dose is: Take 4 mL by mouth every four (4) hours as needed. Max Daily Amount: 24 mg. Indications: Pain  
 4 mg LOPRESSOR 50 mg tablet Generic drug:  metoprolol tartrate Your last dose was: Your next dose is: Take  by mouth two (2) times a day. magnesium oxide 400 mg tablet Commonly known as:  MAG-OX Your last dose was: Your next dose is: Take 1 Tab by mouth two (2) times a day. Indications: HYPOMAGNESEMIA  
 400 mg  
    
   
   
   
  
 metoclopramide 5 mg/5 mL syrup Commonly known as:  REGLAN Your last dose was: Your next dose is: Take 10 mL by mouth four (4) times daily as needed. Take before meals as needed  10 mg  
    
   
   
   
 NexIUM Packet 40 mg granules for oral suspension Generic drug:  esomeprazole Your last dose was: Your next dose is:    
   
   
 two (2) times a day. ondansetron 4 mg disintegrating tablet Commonly known as:  ZOFRAN ODT Your last dose was: Your next dose is: Take 1 Tab by mouth every eight (8) hours as needed for Nausea. 4 mg  
    
   
   
   
  
 polyethylene glycol 17 gram/dose powder Commonly known as:  Reche Agreste Your last dose was: Your next dose is: Take 17 g by mouth daily as needed. 17 g  
    
   
   
   
  
 promethazine 6.25 mg/5 mL syrup Commonly known as:  PHENERGAN Your last dose was: Your next dose is: Take 20 mL by mouth four (4) times daily as needed for Nausea. 25 mg  
    
   
   
   
  
 scopolamine 1 mg over 3 days Pt3d Commonly known as:  TRANSDERM-SCOP Your last dose was: Your next dose is:    
   
   
 1 Patch by TransDERmal route every seventy-two (72) hours as needed. 1.5 mg  
    
   
   
   
  
 sucralfate 100 mg/mL suspension Commonly known as:  [place] Your last dose was: Your next dose is: Take 5 mL by mouth four (4) times daily. 1 tsp  
    
   
   
   
  
 zolpidem 10 mg tablet Commonly known as:  AMBIEN Your last dose was: Your next dose is: Take 1 Tab by mouth nightly as needed for Sleep. Max Daily Amount: 10 mg. Indications: SLEEP-ONSET INSOMNIA 10 mg  
    
   
   
   
  
 * Notice: This list has 2 medication(s) that are the same as other medications prescribed for you. Read the directions carefully, and ask your doctor or other care provider to review them with you. STOP taking these medications   
 lidocaine-prilocaine topical cream  
Commonly known as:  EMLA Where to Get Your Medications These medications were sent to [facility] [address]  [facility]  [address], [address] [place] [address] Phone:  [phone] L. acidoph & paracasei- S therm- Bifido 8 billion cell Cap cap  
 levoFLOXacin 250 mg/10 mL solution  
 potassium chloride 20 mEq/15 mL solution  
 promethazine 6.25 mg/5 mL syrup Information on where to get these meds will be given to you by the nurse or doctor. ! Ask your nurse or doctor about these medications  
  fentaNYL 100 mcg/hr PATCH  
 fentaNYL 50 mcg/hr PATCH  
 haloperidol 2 mg/mL oral concentrate HYDROmorphone 1 mg/mL Liqd oral solution  
 sterile water (preservative free) soln 5 mL, lipase-protease-amylase 10,440-39,150- 39,150 unit tab 1 Tab, sodium bicarbonate 650 mg tab 325 mg  
 sterile water irrigation soln 45.6 mL with hydrocortisone 10 mg tab 30 mg, nystatin 100,000 unit/mL susp 1,440,000 Units, diphenhydrAMINE 12.5 mg/5 mL elix 150 mg oral suspension Discharge Instructions [address] [facility], [address] 
P [phone]  F [phone] Patient Discharge Instructions Peyton Aragon / [id number] : 1960 Admitted 3/5/2018 Discharged: 3/22/2018 Take Home Medications No current facility-administered medications on file prior to encounter. Current Outpatient Prescriptions on File Prior to Encounter Medication Sig Dispense Refill  metoclopramide (REGLAN) 5 mg/5 mL syrup Take 10 mL by mouth four (4) times daily as needed. Take before meals as needed 500 mL 3  
 dexamethasone (DECADRON) 4 mg tablet Take 2 tabs QAM day before chemo and for 2 days after 30 Tab 4  cyclobenzaprine (FLEXERIL) 10 mg tablet Take 1 Tab by mouth three (3) times daily as needed for Muscle Spasm(s).  30 Tab 1  
 zolpidem (AMBIEN) 10 mg tablet Take 1 Tab by mouth nightly as needed for Sleep. Max Daily Amount: 10 mg. Indications: SLEEP-ONSET INSOMNIA 30 Tab 2  
 scopolamine (TRANSDERM-SCOP) 1 mg over 3 days pt3d 1 Patch by TransDERmal route every seventy-two (72) hours as needed. 10 Patch 1  
 metoprolol tartrate (LOPRESSOR) 50 mg tablet Take  by mouth two (2) times a day.  NEXIUM PACKET 40 mg granules for oral suspension two (2) times a day.  magnesium oxide (MAG-OX) 400 mg tablet Take 1 Tab by mouth two (2) times a day. Indications: HYPOMAGNESEMIA 60 Tab 2  
 sucralfate (CARAFATE) 100 mg/mL suspension Take 5 mL by mouth four (4) times daily. 414 mL 3  
 polyethylene glycol (MIRALAX) 17 gram/dose powder Take 17 g by mouth daily as needed. 238 g 3  
 apixaban (ELIQUIS) 2.5 mg tablet Take 1 Tab by mouth two (2) times a day. 60 Tab 6  
 ondansetron (ZOFRAN ODT) 4 mg disintegrating tablet Take 1 Tab by mouth every eight (8) hours as needed for Nausea. 30 Tab 2  
 dronabinol (SYNDROS) 5 mg/mL soln Take 2.1 mg by mouth two (2) times a day. Max Daily Amount: 4.2 mg. Take 30 minutes before lunch and dinner 30 mL 1  
 lidocaine-prilocaine (EMLA) topical cream Apply small amount over port area and cover with a band aid 1 hour before chemo treatment 30 g 3  
 
 
· It is important that you take the medication exactly as we discussed · Keep your medication in the bottles provided by the pharmacist and keep a list of the medication names, dosages, and times to be taken in your wallet. · Do not take other medications without consulting your doctor. What to do at [facility] Recommended diet: as tolerated. Remember to stay hydrated. Recommended activity: No driving while on pain medication Showers okay, no tub bathing/swimming for now Activity as able, stairs okay.  
 
 
If you experience any of the following persisting symptoms: 
 
Nausea/vomiting, fever > 101 F, diarrhea/constipation, increasing pain despite medication, increasing vaginal discharge or any concerns, please call our office. Follow-up with Dr. Estefania La in 1-2 weeks or sooner if needed. Call [phone] for an appointment. Information obtained by : 
I understand that if any problems occur once I am at home I am to contact my physician. I understand and acknowledge receipt of the instructions indicated above. Physician's or R.N.'s Signature                                                                  Date/Time Patient or Representative Signature                                                          Date/Time MD2U Announcement We are excited to announce that we are making your provider's discharge notes available to you in MD2U. You will see these notes when they are completed and signed by the physician that discharged you from your recent hospital stay. If you have any questions or concerns about any information you see in MD2U, please call the Health Information Department where you were seen or reach out to your Primary Care Provider for more information about your plan of care. Introducing hospitals & [facility] SERVICES! Dear Andriy Curiel: 
Thank you for requesting a MD2U account. Our records indicate that you already have an active MD2U account. You can access your account anytime at [email or web address] Did you know that you can access your hospital and ER discharge instructions at any time in MD2U? You can also review all of your test results from your hospital stay or ER visit. Additional Information If you have questions, please visit the Frequently Asked Questions section of the MD2U website at [email or web address] Remember, MyChart is NOT to be used for urgent needs. For medical emergencies, dial 911. Now available from your iPhone and Android! Unresulted Labs-Please follow up with your PCP about these lab tests Order Current Status CULTURE, BLOOD Preliminary result Providers Seen During Your Hospitalization Provider Specialty Primary office phone Yemi Okeefe MD Gynecologic Oncology [phone] Your Primary Care Physician (PCP) Primary Care Physician Office Phone Office Fax [address] [phone] You are allergic to the following Allergen Reactions Ativan (Lorazepam) Other (comments) SEVERE CONFUSION Recent Documentation Height Weight BMI OB Status Smoking Status 1.6 m 46.4 kg 18.11 kg/m2 Menopause Never Smoker Emergency Contacts Name Discharge Info Relation Home Work Mobile [address] CAREGIVER [3] Spouse [3] [phone] Patient Belongings The following personal items are in your possession at time of discharge: 
  Dental Appliances: None  Visual Aid: Glasses, With patient             Clothing: At bedside, Pajamas, Footwear, Shirt, Pants, Sweater, Socks, Undergarments Please provide this summary of care documentation to your next provider. Signatures-by signing, you are acknowledging that this After Visit Summary has been reviewed with you and you have received a copy. Patient Signature:  ____________________________________________________________ Date:  ____________________________________________________________  
  
Marlyse Leaks Provider Signature:  ____________________________________________________________ Date:  ____________________________________________________________

## 2018-03-05 NOTE — IP AVS SNAPSHOT
[address] [facility] 
[phone] Patient: Melony Yeboah MRN: [id number] :1960 A check alejandro indicates which time of day the medication should be taken. My Medications ASK your doctor about these medications Instructions Each Dose to Equal  
 Morning Noon Evening Bedtime  
 apixaban 2.5 mg tablet Commonly known as:  Albino Beards Your last dose was: Your next dose is: Take 1 Tab by mouth two (2) times a day. 2.5 mg  
    
   
   
   
  
 cyclobenzaprine 10 mg tablet Commonly known as:  FLEXERIL Your last dose was: Your next dose is: Take 1 Tab by mouth three (3) times daily as needed for Muscle Spasm(s). 10 mg  
    
   
   
   
  
 dexamethasone 4 mg tablet Commonly known as:  DECADRON Your last dose was: Your next dose is: Take 2 tabs QAM day before chemo and for 2 days after  
     
   
   
   
  
 dronabinol 5 mg/mL Soln Commonly known as:  SYNDROS Your last dose was: Your next dose is: Take 2.1 mg by mouth two (2) times a day. Max Daily Amount: 4.2 mg. Take 30 minutes before lunch and dinner 2.1 mg  
    
   
   
   
  
 * fentaNYL 50 mcg/hr PATCH Commonly known as:  Clary Mann Your last dose was: Your next dose is:    
   
   
 1 Patch by TransDERmal route every seventy-two (72) hours. Max Daily Amount: 1 Patch. This is in ADDITION to your 100 mcg patch for a total of 150 mcg patch every 72 hours 1 Patch * fentaNYL 100 mcg/hr PATCH Commonly known as:  Clary Sees Your last dose was: Your next dose is:    
   
   
 1 Patch by TransDERmal route every seventy-two (72) hours. Max Daily Amount: 1 Patch. 1 Patch HYDROmorphone 1 mg/mL Liqd oral solution Commonly known as:  DILAUDID Your last dose was: Your next dose is: Take 4 mL by mouth every four (4) hours as needed. Max Daily Amount: 24 mg.  
 4 mg  
    
   
   
   
  
 lidocaine-prilocaine topical cream  
Commonly known as:  EMLA Your last dose was: Your next dose is:    
   
   
 Apply small amount over port area and cover with a band aid 1 hour before chemo treatment LOPRESSOR 50 mg tablet Generic drug:  metoprolol tartrate Your last dose was: Your next dose is: Take  by mouth two (2) times a day. magnesium oxide 400 mg tablet Commonly known as:  MAG-OX Your last dose was: Your next dose is: Take 1 Tab by mouth two (2) times a day. Indications: HYPOMAGNESEMIA  
 400 mg  
    
   
   
   
  
 metoclopramide 5 mg/5 mL syrup Commonly known as:  REGLAN Your last dose was: Your next dose is: Take 10 mL by mouth four (4) times daily as needed. Take before meals as needed 10 mg NexIUM Packet 40 mg granules for oral suspension Generic drug:  esomeprazole Your last dose was: Your next dose is:    
   
   
 two (2) times a day. ondansetron 4 mg disintegrating tablet Commonly known as:  ZOFRAN ODT Your last dose was: Your next dose is: Take 1 Tab by mouth every eight (8) hours as needed for Nausea. 4 mg  
    
   
   
   
  
 polyethylene glycol 17 gram/dose powder Commonly known as:  Ese Whiteside Your last dose was: Your next dose is: Take 17 g by mouth daily as needed. 17 g  
    
   
   
   
  
 promethazine 6.25 mg/5 mL syrup Commonly known as:  PHENERGAN Your last dose was: Your next dose is: Take 20 mL by mouth four (4) times daily as needed for Nausea. 25 mg  
    
   
   
   
  
 scopolamine 1 mg over 3 days Pt3d Commonly known as:  TRANSDERM-SCOP Your last dose was: Your next dose is:    
   
   
 1 Patch by TransDERmal route every seventy-two (72) hours as needed. 1.5 mg  
    
   
   
   
  
 sucralfate 100 mg/mL suspension Commonly known as:  Rubin Matthews Your last dose was: Your next dose is: Take 5 mL by mouth four (4) times daily. 1 tsp  
    
   
   
   
  
 zolpidem 10 mg tablet Commonly known as:  AMBIEN Your last dose was: Your next dose is: Take 1 Tab by mouth nightly as needed for Sleep. Max Daily Amount: 10 mg. Indications: SLEEP-ONSET INSOMNIA 10 mg  
    
   
   
   
  
 * Notice: This list has 2 medication(s) that are the same as other medications prescribed for you. Read the directions carefully, and ask your doctor or other care provider to review them with you.

## 2018-03-06 ENCOUNTER — APPOINTMENT (OUTPATIENT)
Dept: GENERAL RADIOLOGY | Age: 58
DRG: 388 | End: 2018-03-06
Attending: NURSE PRACTITIONER
Payer: OTHER GOVERNMENT

## 2018-03-06 ENCOUNTER — APPOINTMENT (OUTPATIENT)
Dept: INFUSION THERAPY | Age: 58
End: 2018-03-06
Payer: OTHER GOVERNMENT

## 2018-03-06 LAB
ALBUMIN SERPL-MCNC: 2.2 G/DL (ref 3.5–5)
ALBUMIN/GLOB SERPL: 0.6 {RATIO} (ref 1.1–2.2)
ALP SERPL-CCNC: 110 U/L (ref 45–117)
ALT SERPL-CCNC: 14 U/L (ref 12–78)
ANION GAP SERPL CALC-SCNC: 8 MMOL/L (ref 5–15)
AST SERPL-CCNC: 14 U/L (ref 15–37)
BASOPHILS # BLD: 0 K/UL (ref 0–0.1)
BASOPHILS # BLD: 0 K/UL (ref 0–0.1)
BASOPHILS NFR BLD: 0 % (ref 0–1)
BASOPHILS NFR BLD: 0 % (ref 0–1)
BILIRUB SERPL-MCNC: 0.5 MG/DL (ref 0.2–1)
BUN SERPL-MCNC: 24 MG/DL (ref 6–20)
BUN/CREAT SERPL: 34 (ref 12–20)
CALCIUM SERPL-MCNC: 8.2 MG/DL (ref 8.5–10.1)
CHLORIDE SERPL-SCNC: 102 MMOL/L (ref 97–108)
CO2 SERPL-SCNC: 27 MMOL/L (ref 21–32)
CREAT SERPL-MCNC: 0.7 MG/DL (ref 0.55–1.02)
DIFFERENTIAL METHOD BLD: ABNORMAL
DIFFERENTIAL METHOD BLD: ABNORMAL
EOSINOPHIL # BLD: 0 K/UL (ref 0–0.4)
EOSINOPHIL # BLD: 0.1 K/UL (ref 0–0.4)
EOSINOPHIL NFR BLD: 0 % (ref 0–7)
EOSINOPHIL NFR BLD: 1 % (ref 0–7)
ERYTHROCYTE [DISTWIDTH] IN BLOOD BY AUTOMATED COUNT: 17.8 % (ref 11.5–14.5)
ERYTHROCYTE [DISTWIDTH] IN BLOOD BY AUTOMATED COUNT: 18.5 % (ref 11.5–14.5)
GLOBULIN SER CALC-MCNC: 3.8 G/DL (ref 2–4)
GLUCOSE BLD STRIP.AUTO-MCNC: 117 MG/DL (ref 65–100)
GLUCOSE BLD STRIP.AUTO-MCNC: 132 MG/DL (ref 65–100)
GLUCOSE SERPL-MCNC: 89 MG/DL (ref 65–100)
HCT VFR BLD AUTO: 30 % (ref 35–47)
HCT VFR BLD AUTO: 30.6 % (ref 35–47)
HGB BLD-MCNC: 10.5 G/DL (ref 11.5–16)
HGB BLD-MCNC: 9.8 G/DL (ref 11.5–16)
IMM GRANULOCYTES # BLD: 0 K/UL
IMM GRANULOCYTES # BLD: 0 K/UL (ref 0–0.04)
IMM GRANULOCYTES NFR BLD AUTO: 0 %
IMM GRANULOCYTES NFR BLD AUTO: 0 % (ref 0–0.5)
LYMPHOCYTES # BLD: 0.7 K/UL (ref 0.8–3.5)
LYMPHOCYTES # BLD: 2 K/UL (ref 0.8–3.5)
LYMPHOCYTES NFR BLD: 43 % (ref 12–49)
LYMPHOCYTES NFR BLD: 6 % (ref 12–49)
MAGNESIUM SERPL-MCNC: 1.3 MG/DL (ref 1.6–2.4)
MCH RBC QN AUTO: 30.2 PG (ref 26–34)
MCH RBC QN AUTO: 31.4 PG (ref 26–34)
MCHC RBC AUTO-ENTMCNC: 32.7 G/DL (ref 30–36.5)
MCHC RBC AUTO-ENTMCNC: 34.3 G/DL (ref 30–36.5)
MCV RBC AUTO: 91.6 FL (ref 80–99)
MCV RBC AUTO: 92.6 FL (ref 80–99)
MONOCYTES # BLD: 0.1 K/UL (ref 0–1)
MONOCYTES # BLD: 0.2 K/UL (ref 0–1)
MONOCYTES NFR BLD: 1 % (ref 5–13)
MONOCYTES NFR BLD: 3 % (ref 5–13)
NEUTS SEG # BLD: 10.7 K/UL (ref 1.8–8)
NEUTS SEG # BLD: 2.5 K/UL (ref 1.8–8)
NEUTS SEG NFR BLD: 52 % (ref 32–75)
NEUTS SEG NFR BLD: 93 % (ref 32–75)
NRBC # BLD: 0 K/UL (ref 0–0.01)
NRBC # BLD: 0 K/UL (ref 0–0.01)
NRBC BLD-RTO: 0 PER 100 WBC
NRBC BLD-RTO: 0 PER 100 WBC
PHOSPHATE SERPL-MCNC: 3.2 MG/DL (ref 2.6–4.7)
PLATELET # BLD AUTO: 105 K/UL (ref 150–400)
PLATELET # BLD AUTO: 111 K/UL (ref 150–400)
PMV BLD AUTO: 9.5 FL (ref 8.9–12.9)
PMV BLD AUTO: 9.7 FL (ref 8.9–12.9)
POTASSIUM SERPL-SCNC: 3.3 MMOL/L (ref 3.5–5.1)
PROT SERPL-MCNC: 6 G/DL (ref 6.4–8.2)
RBC # BLD AUTO: 3.24 M/UL (ref 3.8–5.2)
RBC # BLD AUTO: 3.34 M/UL (ref 3.8–5.2)
RBC MORPH BLD: ABNORMAL
SERVICE CMNT-IMP: ABNORMAL
SERVICE CMNT-IMP: ABNORMAL
SODIUM SERPL-SCNC: 137 MMOL/L (ref 136–145)
WBC # BLD AUTO: 11.5 K/UL (ref 3.6–11)
WBC # BLD AUTO: 4.7 K/UL (ref 3.6–11)

## 2018-03-06 PROCEDURE — 74011000250 HC RX REV CODE- 250: Performed by: NURSE PRACTITIONER

## 2018-03-06 PROCEDURE — 0D9670Z DRAINAGE OF STOMACH WITH DRAINAGE DEVICE, VIA NATURAL OR ARTIFICIAL OPENING: ICD-10-PCS | Performed by: RADIOLOGY

## 2018-03-06 PROCEDURE — 74011636320 HC RX REV CODE- 636/320: Performed by: RADIOLOGY

## 2018-03-06 PROCEDURE — 84100 ASSAY OF PHOSPHORUS: CPT | Performed by: NURSE PRACTITIONER

## 2018-03-06 PROCEDURE — 74011000258 HC RX REV CODE- 258: Performed by: NURSE PRACTITIONER

## 2018-03-06 PROCEDURE — 77030010520

## 2018-03-06 PROCEDURE — 80053 COMPREHEN METABOLIC PANEL: CPT | Performed by: NURSE PRACTITIONER

## 2018-03-06 PROCEDURE — C9113 INJ PANTOPRAZOLE SODIUM, VIA: HCPCS | Performed by: NURSE PRACTITIONER

## 2018-03-06 PROCEDURE — 77030008771 HC TU NG SALEM SUMP -A

## 2018-03-06 PROCEDURE — 77030010522

## 2018-03-06 PROCEDURE — 85025 COMPLETE CBC W/AUTO DIFF WBC: CPT | Performed by: NURSE PRACTITIONER

## 2018-03-06 PROCEDURE — 74011000250 HC RX REV CODE- 250: Performed by: RADIOLOGY

## 2018-03-06 PROCEDURE — 65210000002 HC RM PRIVATE GYN

## 2018-03-06 PROCEDURE — 36415 COLL VENOUS BLD VENIPUNCTURE: CPT | Performed by: NURSE PRACTITIONER

## 2018-03-06 PROCEDURE — 74011250636 HC RX REV CODE- 250/636: Performed by: NURSE PRACTITIONER

## 2018-03-06 PROCEDURE — 87040 BLOOD CULTURE FOR BACTERIA: CPT | Performed by: OBSTETRICS & GYNECOLOGY

## 2018-03-06 PROCEDURE — 43752 NASAL/OROGASTRIC W/TUBE PLMT: CPT

## 2018-03-06 PROCEDURE — 82962 GLUCOSE BLOOD TEST: CPT

## 2018-03-06 PROCEDURE — 36593 DECLOT VASCULAR DEVICE: CPT

## 2018-03-06 PROCEDURE — 74011250637 HC RX REV CODE- 250/637: Performed by: NURSE PRACTITIONER

## 2018-03-06 PROCEDURE — 77030011641 HC PASTE OST ADH BMS -A

## 2018-03-06 PROCEDURE — 77030010541

## 2018-03-06 PROCEDURE — 83735 ASSAY OF MAGNESIUM: CPT | Performed by: NURSE PRACTITIONER

## 2018-03-06 PROCEDURE — 74011636637 HC RX REV CODE- 636/637: Performed by: NURSE PRACTITIONER

## 2018-03-06 RX ORDER — MAGNESIUM SULFATE HEPTAHYDRATE 40 MG/ML
2 INJECTION, SOLUTION INTRAVENOUS
Status: COMPLETED | OUTPATIENT
Start: 2018-03-06 | End: 2018-03-06

## 2018-03-06 RX ORDER — DEXTROSE 50 % IN WATER (D50W) INTRAVENOUS SYRINGE
12.5-25 AS NEEDED
Status: DISCONTINUED | OUTPATIENT
Start: 2018-03-06 | End: 2018-03-22 | Stop reason: HOSPADM

## 2018-03-06 RX ORDER — PANTOPRAZOLE SODIUM 40 MG/10ML
40 INJECTION, POWDER, LYOPHILIZED, FOR SOLUTION INTRAVENOUS DAILY
Status: DISCONTINUED | OUTPATIENT
Start: 2018-03-06 | End: 2018-03-10 | Stop reason: CLARIF

## 2018-03-06 RX ORDER — LIDOCAINE HYDROCHLORIDE 20 MG/ML
JELLY TOPICAL
Status: DISPENSED
Start: 2018-03-06 | End: 2018-03-06

## 2018-03-06 RX ORDER — LIDOCAINE HYDROCHLORIDE 20 MG/ML
JELLY TOPICAL AS NEEDED
Status: DISCONTINUED | OUTPATIENT
Start: 2018-03-06 | End: 2018-03-22 | Stop reason: HOSPADM

## 2018-03-06 RX ORDER — MAGNESIUM SULFATE 100 %
4 CRYSTALS MISCELLANEOUS AS NEEDED
Status: DISCONTINUED | OUTPATIENT
Start: 2018-03-06 | End: 2018-03-22 | Stop reason: HOSPADM

## 2018-03-06 RX ORDER — INSULIN LISPRO 100 [IU]/ML
INJECTION, SOLUTION INTRAVENOUS; SUBCUTANEOUS EVERY 6 HOURS
Status: DISCONTINUED | OUTPATIENT
Start: 2018-03-06 | End: 2018-03-22

## 2018-03-06 RX ORDER — DIAZEPAM 10 MG/2ML
2.5 INJECTION INTRAMUSCULAR
Status: DISCONTINUED | OUTPATIENT
Start: 2018-03-06 | End: 2018-03-22 | Stop reason: HOSPADM

## 2018-03-06 RX ORDER — POTASSIUM CHLORIDE 14.9 MG/ML
10 INJECTION INTRAVENOUS
Status: COMPLETED | OUTPATIENT
Start: 2018-03-06 | End: 2018-03-06

## 2018-03-06 RX ORDER — MAGNESIUM SULFATE HEPTAHYDRATE 40 MG/ML
2 INJECTION, SOLUTION INTRAVENOUS
Status: DISCONTINUED | OUTPATIENT
Start: 2018-03-06 | End: 2018-03-06

## 2018-03-06 RX ORDER — SODIUM CHLORIDE 450 MG/100ML
75 INJECTION, SOLUTION INTRAVENOUS CONTINUOUS
Status: DISCONTINUED | OUTPATIENT
Start: 2018-03-06 | End: 2018-03-19

## 2018-03-06 RX ADMIN — SUCRALFATE 0.5 G: 1 SUSPENSION ORAL at 19:57

## 2018-03-06 RX ADMIN — PANTOPRAZOLE SODIUM 40 MG: 40 INJECTION, POWDER, FOR SOLUTION INTRAVENOUS at 10:24

## 2018-03-06 RX ADMIN — BENZOCAINE AND MENTHOL 1 LOZENGE: 15; 3.6 LOZENGE ORAL at 21:55

## 2018-03-06 RX ADMIN — SODIUM CHLORIDE 1000 ML: 900 INJECTION, SOLUTION INTRAVENOUS at 10:36

## 2018-03-06 RX ADMIN — Medication 10 ML: at 03:15

## 2018-03-06 RX ADMIN — PROMETHAZINE HYDROCHLORIDE 12.5 MG: 25 INJECTION INTRAMUSCULAR; INTRAVENOUS at 23:20

## 2018-03-06 RX ADMIN — BENZOCAINE AND MENTHOL 1 LOZENGE: 15; 3.6 LOZENGE ORAL at 14:09

## 2018-03-06 RX ADMIN — Medication 10 ML: at 12:07

## 2018-03-06 RX ADMIN — DEXTROSE MONOHYDRATE AND SODIUM CHLORIDE 125 ML/HR: 5; .45 INJECTION, SOLUTION INTRAVENOUS at 02:59

## 2018-03-06 RX ADMIN — SUCRALFATE 0.5 G: 1 SUSPENSION ORAL at 23:35

## 2018-03-06 RX ADMIN — MAGNESIUM SULFATE HEPTAHYDRATE 2 G: 40 INJECTION, SOLUTION INTRAVENOUS at 10:25

## 2018-03-06 RX ADMIN — SUCRALFATE 0.5 G: 1 SUSPENSION ORAL at 10:25

## 2018-03-06 RX ADMIN — LIDOCAINE HYDROCHLORIDE: 20 JELLY TOPICAL at 09:18

## 2018-03-06 RX ADMIN — POTASSIUM CHLORIDE 10 MEQ: 200 INJECTION, SOLUTION INTRAVENOUS at 10:45

## 2018-03-06 RX ADMIN — PROMETHAZINE HYDROCHLORIDE 12.5 MG: 25 INJECTION INTRAMUSCULAR; INTRAVENOUS at 07:03

## 2018-03-06 RX ADMIN — PIPERACILLIN AND TAZOBACTAM 10.12 G: 3; .375 INJECTION, POWDER, FOR SOLUTION INTRAVENOUS at 12:46

## 2018-03-06 RX ADMIN — BENZOCAINE AND MENTHOL 1 LOZENGE: 15; 3.6 LOZENGE ORAL at 18:39

## 2018-03-06 RX ADMIN — PROMETHAZINE HYDROCHLORIDE 12.5 MG: 25 INJECTION INTRAMUSCULAR; INTRAVENOUS at 00:09

## 2018-03-06 RX ADMIN — PIPERACILLIN AND TAZOBACTAM 10.12 G: 3; .375 INJECTION, POWDER, FOR SOLUTION INTRAVENOUS at 16:27

## 2018-03-06 RX ADMIN — SODIUM CHLORIDE 125 ML/HR: 450 INJECTION, SOLUTION INTRAVENOUS at 20:42

## 2018-03-06 RX ADMIN — IOHEXOL 50 ML: 350 INJECTION, SOLUTION INTRAVENOUS at 09:18

## 2018-03-06 RX ADMIN — MAGNESIUM SULFATE HEPTAHYDRATE 2 G: 40 INJECTION, SOLUTION INTRAVENOUS at 11:57

## 2018-03-06 RX ADMIN — POTASSIUM CHLORIDE 10 MEQ: 200 INJECTION, SOLUTION INTRAVENOUS at 11:30

## 2018-03-06 RX ADMIN — PIPERACILLIN SODIUM,TAZOBACTAM SODIUM 3.38 G: 3; .375 INJECTION, POWDER, FOR SOLUTION INTRAVENOUS at 12:40

## 2018-03-06 RX ADMIN — BENZOCAINE AND MENTHOL 1 LOZENGE: 15; 3.6 LOZENGE ORAL at 10:30

## 2018-03-06 RX ADMIN — PROMETHAZINE HYDROCHLORIDE 12.5 MG: 25 INJECTION INTRAMUSCULAR; INTRAVENOUS at 19:18

## 2018-03-06 RX ADMIN — ASCORBIC ACID: 500 INJECTION, SOLUTION INTRAMUSCULAR; INTRAVENOUS; SUBCUTANEOUS at 19:47

## 2018-03-06 NOTE — PROGRESS NOTES
Day #1 of Zosyn  Indication:  Empiric for sepsis  Current regimen:  3.375g IV Q8h x 3 days    Recent Labs      18   0305  18   1836   WBC  4.7  5.5   CREA  0.70  0.72   BUN  24*  27*     Est CrCl: 59.3 ml/min  Temp (24hrs), Av °F (37.2 °C), Min:97.8 °F (36.6 °C), Max:100 °F (37.8 °C)    Cultures: 3/6 blood - in process    Plan: Change to 3.375g x1 followed by 10.125g Q24h x 3 days per [facility] P&T Committee Protocol. Pharmacy will continue to monitor patient daily and will make dosage adjustments based upon changing renal function.     Acceptable to use via Y site with TPN (verified using [address]

## 2018-03-06 NOTE — PROGRESS NOTES
Spiritual Care Partner Volunteer visited patient in 361 on 3.6. 18.   Documented by:    Keegan Cespedes M.Div, M.S, [place] 607 available at [phone])

## 2018-03-06 NOTE — CDMP QUERY
Please clarify if this patient is being treated/managed for:        => Severe Protein Calorie Malnutrition with BMI 16.41 in the setting of CA/SBO requiring nutritional support  =>Other Explanation of clinical findings  =>Unable to Determine (no explanation of clinical findings)     The medical record reflects the following clinical findings, treatment, and risk factors:     Risk Factors: CA/SOB  Clinical Indicators: N/V , ovarian CA,pre albumin 15.7  Meets Criteria for Chronic Malnutrition    [X] Severe Malnutrition, as evidenced by:              [  ] Moderate muscle wasting, loss of subcutaneous fat              [X] Nutritional intake of <75% of recommended intake for >1 month              [X] Weight loss of  >5% in 1 month, >7.5% in 3 months, >10% in 6 months,               [  ] Severe edema   Treatment: nutritional consult, lab monitoring ,TPN        Please clarify and document your clinical opinion in the progress notes and discharge summary including the definitive and/or presumptive diagnosis, (suspected or probable), related to the above clinical findings.  Please include clinical findings supporting your diagnosis    Thank you,         Kat Sommer [address] [place] [address]

## 2018-03-06 NOTE — PROGRESS NOTES
Bedside shift change report given to Goldie Aragon (oncoming nurse) by Radha Jara (offgoing nurse). Report included the following information SBAR.

## 2018-03-06 NOTE — PROGRESS NOTES
Marisa Muir. Wall, NP     BENY Cat    Admit Date: 3/5/2018  Hospital Day: 2    Subjective:   Pt had episode of vomiting during the night ~ 500 cc's. When rounding on pt, noted she felt warm and checked temp and it was noted at 101.4   Pt stating she feels \"washed out\". No other specific complaints  Says pain is controlled  Noted mag and K+ decreased as well as BUN of 24 with IV fluid infusing. Objective:     Blood pressure 107/66, pulse (!) 102, temperature 100 °F (37.8 °C), resp. rate 16, height 5' 2.99\" (1.6 m), weight 92 lb 9.6 oz (42 kg), SpO2 93 %. Temp (24hrs), Av °F (37.2 °C), Min:97.8 °F (36.6 °C), Max:100 °F (37.8 °C)      _____________________  Physical Exam:     General: A&O X 3 tired appearing  Port site without redness, tenderness or swelling  Cardiovascular: Regular without M/R/G  Lungs:Clear throughout without wheezing or rales  Abdomen: Soft, NT/ND with ostomy output continuing. + bowel sounds throughout  Ext: + pedal pulses without edema bilat.  SCD's and Compression boots in place bilat  Neuro: grossly intact      Assessment:   Active Problems:    Carcinomatosis peritonei ([facility] [address] (10/29/2015)      Small bowel obstruction (10/29/2015)      Ileostomy in place [facility]) (2/15/2016)      Anemia due to chemotherapy (2016)      Cancer associated pain (2016)      Hx of pulmonary embolus (3/21/2017)      Dehydration (3/21/2017)      Anticoagulation adequate with anticoagulant therapy (3/28/2017)      Counseling regarding end of life decision making (2017)      Malignant ascites (2017)      Erosive esophagitis (2017)      Hypomagnesemia (2017)      Abdominal pain (2017)      Hydronephrosis of left kidney (2017)      Ovarian cancer ([facility] [address] (2017)      Nausea & vomiting (2017)      Carcinomatosis ([facility] [address] (2017)      Intractable vomiting with nausea (8/10/2017)      Partial bowel obstruction (9/13/2017)      Protein-calorie malnutrition, severe ([address] (3/5/2018)      Esophagitis determined by endoscopy (3/5/2018)            Plan: Will have radiology insert NG tube due to esophageus anatomy and esophagitis  Hold Eliquis today  Replace Mag and K+  Give 1 liter bolus for elevated BUN and vomiting  Re-start TPN/Lipids. Nutrition consult in. Noted pre-albumin of 15.7. Would like to improve this incase surgical intervention becomes necessary. Wound care consult for ostomy assistance and supplies  Daily weights  Blood cultures taken from port and peripheral when fever noted. Will now begin Zosyn and re-check CBC this afternoon  Seen with Dr. Tracy Cruz who discussed with pt possibility of exploring ostomy proximally to see if obstruction noted on CT scan would be palpable with finger or consider GI consult for possible pediatric scope to this area. Will monitor closely for now     Lashawn Valerio NP  3/6/2018      ADDENDUM:  Dr. Tracy Cruz performed a digital exam through her ostomy. He noted almost an immediate, palpable implant just proximal to her opening. Will attempt placement of gaytan cath proximal tomorrow to see if decompression is possible. NGT to intermittent suction noted with bile colored fluid in good quanties. Lashawn Valerio NP      Data Review:    Recent Labs      03/06/18   0305  03/05/18   1836   WBC  4.7  5.5   HGB  9.8*  10.7*   HCT  30.0*  31.5*   PLT  111*  110*     Recent Labs      03/06/18   0305  03/05/18   1836   NA  137  139   K  3.3*  3.8   CL  102  105   CO2  27  28   GLU  89  80   BUN  24*  27*   CREA  0.70  0.72   CA  8.2*  8.7   MG  1.3*  1.5*   PHOS  3.2  3.1   ALB  2.2*  2.5*   SGOT  14*  19   ALT  14  17     No results for input(s): AML, LPSE in the last 72 hours.         ______________________  Medications:    Current Facility-Administered Medications   Medication Dose Route Frequency Provider Last Rate Last Dose    sodium chloride 0.9 % bolus infusion 1,000 mL 1,000 mL IntraVENous ONCE Genny Don NP        pantoprazole (PROTONIX) injection 40 mg  40 mg IntraVENous DAILY Genny Don NP        [START ON 3/7/2018] apixaban (ELIQUIS) tablet 2.5 mg  2.5 mg Oral BID Genny Don NP        lidocaine (XYLOCAINE) 2 % jelly   Mucous Membrane PRN Mary Jo Sommers MD        lidocaine (XYLOCAINE) 2 % jelly             benzocaine-menthol (CEPACOL) lozenge 1 Lozenge  1 Lozenge Mucous Membrane PRN Genny Don NP        piperacillin-tazobactam (ZOSYN) 3.375 g in 0.9% sodium chloride (MBP/ADV) 100 mL  3.375 g IntraVENous ONCE Genny Don NP        And    piperacillin-tazobactam (ZOSYN) 10.125 g in  mL continuous 24 hr infusion  10.125 g IntraVENous Q24H Genny Don NP        cyclobenzaprine (FLEXERIL) tablet 10 mg  10 mg Oral TID PRN Genny Don NP        metoprolol tartrate (LOPRESSOR) tablet 50 mg  50 mg Oral BID Genny Don, NP   50 mg at 03/05/18 1843    scopolamine (TRANSDERM-SCOP) 1 mg over 3 days 1 Patch  1 Patch TransDERmal Q72H PRN Genny Don NP        sucralfate (CARAFATE) 100 mg/mL oral suspension 0.5 g  0.5 g Oral QID Genny Don, NP   0.5 g at 03/05/18 2214    zolpidem (AMBIEN) tablet 10 mg  10 mg Oral QHS PRN Genny Don, NP   10 mg at 03/05/18 2229    HYDROmorphone (PF) (DILAUDID) injection 0.5 mg  0.5 mg IntraVENous Q4H PRN Genny Don NP        ondansetron (ZOFRAN) injection 4 mg  4 mg IntraVENous Q6H PRN Genny Don, HAYDER        promethazine (PHENERGAN) 12.5 mg in 0.9% sodium chloride 50 mL IVPB  12.5 mg IntraVENous Q4H PRN Genny Don,  mL/hr at 03/06/18 0703 12.5 mg at 03/06/18 0703    dextrose 5 % - 0.45% NaCl infusion  125 mL/hr IntraVENous CONTINUOUS Genny E Wall,  mL/hr at 03/06/18 0259 125 mL/hr at 03/06/18 0259    sodium chloride (NS) flush 5-10 mL  5-10 mL IntraVENous Q8H Genny E Wall, NP   10 mL at 03/05/18 2200    sodium chloride (NS) flush 5-10 mL  5-10 mL IntraVENous PRN Genny E Wall, NP   10 mL at 03/06/18 0315    acetaminophen (TYLENOL) tablet 650 mg  650 mg Oral Q4H PRN Genny Don NP        diphenhydrAMINE (BENADRYL) injection 12.5 mg  12.5 mg IntraVENous Q4H PRN Genny Don NP        [START ON 3/7/2018] fentaNYL (DURAGESIC) 50 mcg/hr patch 1 Patch  1 Patch TransDERmal Q72H Daniella Garibay MD        [START ON 3/7/2018] fentaNYL (DURAGESIC) 100 mcg/hr patch 1 Patch  1 Patch TransDERmal Q72H Daniella Garibay MD

## 2018-03-06 NOTE — PROGRESS NOTES
CM met with patient at bedside. Patient was uncomfortable and distraught due to the placement of her NG tube. Patient requested to proceed with the meeting despite her nausea/gagging from the tube. Patient confirmed name, date of birth, address, home phone, mobile phone, emergency contact information, admit reason, insurance information, pharmacy preference, and ADL status. Patient was transferred to [address] [place] [address] on 3/5/18 to come into Dr. Germán Birch service from [place] [address], due to complications of ovarian cancer, extreme/unstoppable ongoing nausea and vomiting. RRAT: 20  INS: [address]: [facility] on [address] [facility] ([place]); [facility] for speciality; [facility] for longterm    DME: none  ADL: fully independent, including driving when she feels well enough to drive  Home: Lives with spouse in own home, and he is available to assist  ADV: on file  HH: has history with [facility] from initial 11/2015 diagnosis of ovarian cancer    Care Management Interventions  PCP Verified by CM: Yes (Patient does not use PCP regularly; has only seen her once within the last year to establish a PCP.)  Mode of Transport at Discharge: Other (see comment) (Spouse will be on hand for discharge transportation home.)  Transition of Care Consult (CM Consult): Discharge Planning  MyChart Signup: No  Discharge Durable Medical Equipment: No  Health Maintenance Reviewed: Yes  Physical Therapy Consult: No  Occupational Therapy Consult: No  Speech Therapy Consult: No  Current Support Network: Lives with Spouse, Own Home  Confirm Follow Up Transport: Family (Spouse)  Plan discussed with Pt/Family/Caregiver: Yes    CM to continue following, including possible HH services upon discharge.     CRM: Iman Martin, MPH; Z: [phone]

## 2018-03-06 NOTE — PROGRESS NOTES
Problem: Discharge Planning  Goal: *Discharge to safe environment  Outcome: Progressing Towards Goal  See CM Notes.   CRM: Lorena Carrion, MPH; Z: [phone]

## 2018-03-06 NOTE — WOUND CARE
Ostomy Assessment:     Ostomy Type ileostomy with 2 1/4 wafer and pouch; Patient manages ileostomy on own;  Supplies provided; patient empties own ostomy; provided a log for documenting output; appliance intact; Ostomy care: routine ostomy care if leaking occurs; empty before 1/2 full to avoid leaks; Will continue to follow for ostomy care;    Discussed above with Lance Hunter;     PAGER [phone]  OFFICE [phone]  Mari Carrillo RN, BSN, Certified Ostomy Nurse

## 2018-03-06 NOTE — PROGRESS NOTES
Bedside shift change report given to [address] (oncoming nurse) by Obinna Sethi RN (offgoing nurse). Report included the following information SBAR, Kardex, Intake/Output, MAR, Accordion and Recent Results.

## 2018-03-06 NOTE — PROGRESS NOTES
Pt. Voiced concerns about not taking PPI (protonix) along with the Carafate to keep her esophagitis controlled. MD Reddy Sylvanite notified, orders received for  Protonix 20mg tab daily.

## 2018-03-06 NOTE — PROGRESS NOTES
NUTRITION COMPLETE ASSESSMENT    RECOMMENDATIONS:   1. Recommend resumption of TPN with:    - 5%AA, D15 @ 50ml/hr x 24hrs   - Advance to goal of 5%AA, D20 @ 63ml/hr + 250ml, 20% lipids 3x/week   *reduce D5 infusion as TPN advanced    ADDENDUM: Spoke with NP - plans to start with goal rate of TPN since pt had been on TPN prior to transfer. 2. Add 100mg thiamine via TPN x 10 days with close monitoring/repletion of electrolytes  3. Check baseline triglycerides  4. Daily weights (standing scale if possible)     Interventions/Plan:   Food/Nutrient Delivery:          Initiate parenteral nutrition    Assessment:   Reason for Assessment: [x] Provider Consult/BPA/MST Referral     Diet: NPO  Nutritionally Significant Medications: [x] Reviewed & Includes: mag sulfate (IV), protonix (IV), carafate, KCl, D5 1/2 NaCl @ 125ml/hr; PRN: zofran q6hr (IV), phenergan q4hr (IV), scopolamine patch    Pre-Hospitalization:  Usual Appetite: Poor  Diet at Home: sips of clears prior to tx (with TPN)     Current Hospitalization:   Appetite: Poor  PO Ability: Independent Average po intake:NPO        Subjective: Pt declining interview with NGT causing irritation. No questions regarding TPN. Objective:  Pt admitted for PSBO. PMHx: Stage 4 ovarian CA s/p debulking and chemo, cecal perforation s/p resection with end ileostomy, HTN, GERD, esophagitis, malignant ascites, protein-calorie malnutrition. Now with SBO, likely near level of ileostomy. Pt transferred from [facility] (admitted since 2/22) with TPN and lipids started there on 2/24. May need PEG tube for palliation/venting per MD note. Prior to transfer, GI noting possible conversion to J-tube for feeds later on. discussion regarding feeding tube and end of life/quality of life issues yesterday. NGT placed today. Hypokalemia and hypomagnesemia noted and repleted. Low prior to transfer as well. Question if related to refeeding syndrome with pt at risk.  Add 100mg thiamine via TPN x 10 days with close monitoring/repletion of electrolytes. Pt with port access for IVF. D5 providinml, 150g CHO, 510kcal.  No TPN orders prior to transfer available. Recommend resumption of TPN with: 5%AA, D15 @ 50ml/hr x 24hrs. Advance to goal of 5%AA, D20 @ 63ml/hr + 250ml, 20% lipids 3x/week - pending electrolytes. Provides: 1544kcal, 76g protein, 302g CHO, 1500ml fluid. Meets 100% energy and protein needs. Severe wt loss of 7% x 2 months; 26% x 12 months. BMI 16.4 with 80% IBW. Wt Readings:   18 42 kg (92 lb 9.6 oz)   18 43.4 kg (95 lb 9.6 oz)   18 45.2 kg (99 lb 9.6 oz)   17 46.3 kg (102 lb)   10/26/17 44.5 kg (98 lb)   17 48.1 kg (106 lb)   17 50.2 kg (110 lb 9.6 oz)   17 53.3 kg (117 lb 6.4 oz)   17 54.4 kg (120 lb)   17 56.5 kg (124 lb 9.6 oz)     Patient meets criteria for Severe Acute Protein Calorie Malnutrition as evidenced by:   ASPEN Malnutrition Criteria  Acute Illness, Chronic Illness, or Social/Enviornmental: Chronic illness  Energy Intake: Less than/equal to 50% est energy req for greater than/equal to 5 days  Weight Loss: Greater than 5% x 1 mo  Body Fat: Moderate  Muscle Mass: Moderate  ASPEN Malnutrition Score - Acute Illness: 24  Acute Illness - Malnutrition Diagnosis: Severe malnutrition  Weight Loss: Greater than 20% x 1 yr  ASPEN Malnutrition Score - Chronic Illness: 6. Will continue to follow for start of TPN, electrolytes, wt trends, possible feeding tube placement. Estimated Nutrition Needs:   Kcals/day: 1470 Kcals/day (1470-1596kcal)  Protein: 59 g (59-76g (1.4-1.8g/kg))  Fluid: 1470 ml (1ml/kcal)  Based On: Kcal/kg - specify (Comment) (35-38kcal/kg)  Weight Used: Actual wt (42kg)    Pt expected to meet estimated nutrient needs:  []   Yes     []  No [x] Unable to predict at this time  Nutrition Diagnosis:   1.  Malnutrition related to inadequate protein/energy intake 2/2 altered GI fx as evidenced by PSBO; N/V; 80%IBW, severe wt loss; minimal PO intake x 10+ days     2. (Increased protein/energy needs) related to increased energy expenditure as evidenced by stage 4 ovarian CA; malnutrition with severe wt loss    Goals:     PN to meet at least 90% needs in 2-3 days; wt maintenance/gain     Monitoring & Evaluation:    - Enteral/parenteral nutrition intake   - Weight/weight change, GI, Electrolyte and renal profile    Previous Nutrition Goals Met:   N/A  Previous Recommendations:    N/A    Education & Discharge Needs:   [x] None Identified   [] Identified and addressed    [] Participated in care plan, discharge planning, and/or interdisciplinary rounds        Cultural, Sikh and ethnic food preferences identified: None    Skin Integrity: [x]Intact  []Other  Edema: [x]None []Other  Last BM: 3/5 - loose, ileostomy  Food Allergies: [x]None []Other  Diet Restrictions: Cultural/Sabianism Preference(s): None     Anthropometrics:    Weight Loss Metrics 3/5/2018 2/13/2018 2/8/2018 1/16/2018 1/16/2018 12/19/2017 12/14/2017   Today's Wt 92 lb 9.6 oz 95 lb 9.6 oz 95 lb 6.4 oz 99 lb 9.6 oz 99 lb 9.6 oz 102 lb 101 lb 12.8 oz   BMI 16.41 kg/m2 16.94 kg/m2 16.9 kg/m2 17.65 kg/m2 17.65 kg/m2 18.07 kg/m2 18.04 kg/m2      Weight Source: Standing scale (comment)  Height: 5' 2.99\" (160 cm),    Body mass index is 16.41 kg/(m^2).   IBW : 52.2 kg (115 lb), % IBW (Calculated): 80.52 %  Usual Body Weight: 56.2 kg (124 lb),      Labs:    Lab Results   Component Value Date/Time    Sodium 137 03/06/2018 03:05 AM    Potassium 3.3 (L) 03/06/2018 03:05 AM    Chloride 102 03/06/2018 03:05 AM    CO2 27 03/06/2018 03:05 AM    Glucose 89 03/06/2018 03:05 AM    BUN 24 (H) 03/06/2018 03:05 AM    Creatinine 0.70 03/06/2018 03:05 AM    Calcium 8.2 (L) 03/06/2018 03:05 AM    Magnesium 1.3 (L) 03/06/2018 03:05 AM    Phosphorus 3.2 03/06/2018 03:05 AM    Albumin 2.2 (L) 03/06/2018 03:05 AM     Luzmaria Mercer RD

## 2018-03-07 LAB
ALBUMIN SERPL-MCNC: 2.1 G/DL (ref 3.5–5)
ALBUMIN/GLOB SERPL: 0.5 {RATIO} (ref 1.1–2.2)
ALP SERPL-CCNC: 118 U/L (ref 45–117)
ALT SERPL-CCNC: 16 U/L (ref 12–78)
ANION GAP SERPL CALC-SCNC: 8 MMOL/L (ref 5–15)
AST SERPL-CCNC: 15 U/L (ref 15–37)
BASOPHILS # BLD: 0 K/UL (ref 0–0.1)
BASOPHILS NFR BLD: 0 % (ref 0–1)
BILIRUB SERPL-MCNC: 0.6 MG/DL (ref 0.2–1)
BUN SERPL-MCNC: 20 MG/DL (ref 6–20)
BUN/CREAT SERPL: 29 (ref 12–20)
CALCIUM SERPL-MCNC: 8.4 MG/DL (ref 8.5–10.1)
CANCER AG125 SERPL-ACNC: 123 U/ML (ref 0–30)
CHLORIDE SERPL-SCNC: 102 MMOL/L (ref 97–108)
CO2 SERPL-SCNC: 26 MMOL/L (ref 21–32)
CREAT SERPL-MCNC: 0.7 MG/DL (ref 0.55–1.02)
DIFFERENTIAL METHOD BLD: ABNORMAL
EOSINOPHIL # BLD: 0.1 K/UL (ref 0–0.4)
EOSINOPHIL NFR BLD: 1 % (ref 0–7)
ERYTHROCYTE [DISTWIDTH] IN BLOOD BY AUTOMATED COUNT: 17.5 % (ref 11.5–14.5)
GLOBULIN SER CALC-MCNC: 4.1 G/DL (ref 2–4)
GLUCOSE BLD STRIP.AUTO-MCNC: 104 MG/DL (ref 65–100)
GLUCOSE BLD STRIP.AUTO-MCNC: 108 MG/DL (ref 65–100)
GLUCOSE BLD STRIP.AUTO-MCNC: 112 MG/DL (ref 65–100)
GLUCOSE BLD STRIP.AUTO-MCNC: 127 MG/DL (ref 65–100)
GLUCOSE SERPL-MCNC: 100 MG/DL (ref 65–100)
HCT VFR BLD AUTO: 28.7 % (ref 35–47)
HGB BLD-MCNC: 9.6 G/DL (ref 11.5–16)
IMM GRANULOCYTES # BLD: 0 K/UL (ref 0–0.04)
IMM GRANULOCYTES NFR BLD AUTO: 0 % (ref 0–0.5)
LYMPHOCYTES # BLD: 1.2 K/UL (ref 0.8–3.5)
LYMPHOCYTES NFR BLD: 23 % (ref 12–49)
MAGNESIUM SERPL-MCNC: 2.1 MG/DL (ref 1.6–2.4)
MCH RBC QN AUTO: 31.1 PG (ref 26–34)
MCHC RBC AUTO-ENTMCNC: 33.4 G/DL (ref 30–36.5)
MCV RBC AUTO: 92.9 FL (ref 80–99)
MONOCYTES # BLD: 0.1 K/UL (ref 0–1)
MONOCYTES NFR BLD: 3 % (ref 5–13)
NEUTS SEG # BLD: 3.8 K/UL (ref 1.8–8)
NEUTS SEG NFR BLD: 73 % (ref 32–75)
NRBC # BLD: 0 K/UL (ref 0–0.01)
NRBC BLD-RTO: 0 PER 100 WBC
PHOSPHATE SERPL-MCNC: 4.4 MG/DL (ref 2.6–4.7)
PLATELET # BLD AUTO: 95 K/UL (ref 150–400)
PMV BLD AUTO: 9.4 FL (ref 8.9–12.9)
POTASSIUM SERPL-SCNC: 3.5 MMOL/L (ref 3.5–5.1)
PROT SERPL-MCNC: 6.2 G/DL (ref 6.4–8.2)
RBC # BLD AUTO: 3.09 M/UL (ref 3.8–5.2)
SERVICE CMNT-IMP: ABNORMAL
SODIUM SERPL-SCNC: 136 MMOL/L (ref 136–145)
WBC # BLD AUTO: 5.3 K/UL (ref 3.6–11)

## 2018-03-07 PROCEDURE — 74011250636 HC RX REV CODE- 250/636: Performed by: PHYSICIAN ASSISTANT

## 2018-03-07 PROCEDURE — 80053 COMPREHEN METABOLIC PANEL: CPT | Performed by: NURSE PRACTITIONER

## 2018-03-07 PROCEDURE — 74011000258 HC RX REV CODE- 258: Performed by: NURSE PRACTITIONER

## 2018-03-07 PROCEDURE — 74011000250 HC RX REV CODE- 250: Performed by: NURSE PRACTITIONER

## 2018-03-07 PROCEDURE — 74011000250 HC RX REV CODE- 250: Performed by: PHYSICIAN ASSISTANT

## 2018-03-07 PROCEDURE — 74011636637 HC RX REV CODE- 636/637: Performed by: PHYSICIAN ASSISTANT

## 2018-03-07 PROCEDURE — 82962 GLUCOSE BLOOD TEST: CPT

## 2018-03-07 PROCEDURE — 74011250636 HC RX REV CODE- 250/636: Performed by: NURSE PRACTITIONER

## 2018-03-07 PROCEDURE — 84100 ASSAY OF PHOSPHORUS: CPT | Performed by: NURSE PRACTITIONER

## 2018-03-07 PROCEDURE — 36415 COLL VENOUS BLD VENIPUNCTURE: CPT | Performed by: NURSE PRACTITIONER

## 2018-03-07 PROCEDURE — 74011250637 HC RX REV CODE- 250/637: Performed by: OBSTETRICS & GYNECOLOGY

## 2018-03-07 PROCEDURE — 85025 COMPLETE CBC W/AUTO DIFF WBC: CPT | Performed by: NURSE PRACTITIONER

## 2018-03-07 PROCEDURE — 83735 ASSAY OF MAGNESIUM: CPT | Performed by: NURSE PRACTITIONER

## 2018-03-07 PROCEDURE — 74011000258 HC RX REV CODE- 258: Performed by: PHYSICIAN ASSISTANT

## 2018-03-07 PROCEDURE — 65210000002 HC RM PRIVATE GYN

## 2018-03-07 PROCEDURE — C9113 INJ PANTOPRAZOLE SODIUM, VIA: HCPCS | Performed by: NURSE PRACTITIONER

## 2018-03-07 PROCEDURE — 74011250637 HC RX REV CODE- 250/637: Performed by: NURSE PRACTITIONER

## 2018-03-07 PROCEDURE — 74011250637 HC RX REV CODE- 250/637: Performed by: PHYSICIAN ASSISTANT

## 2018-03-07 RX ORDER — LORATADINE 10 MG/1
10 TABLET ORAL DAILY
Status: DISCONTINUED | OUTPATIENT
Start: 2018-03-07 | End: 2018-03-22 | Stop reason: HOSPADM

## 2018-03-07 RX ADMIN — I.V. FAT EMULSION 500 ML: 20 EMULSION INTRAVENOUS at 20:11

## 2018-03-07 RX ADMIN — PROMETHAZINE HYDROCHLORIDE 12.5 MG: 25 INJECTION INTRAMUSCULAR; INTRAVENOUS at 05:44

## 2018-03-07 RX ADMIN — LORATADINE 10 MG: 10 TABLET ORAL at 13:42

## 2018-03-07 RX ADMIN — BENZOCAINE AND MENTHOL 1 LOZENGE: 15; 3.6 LOZENGE ORAL at 05:49

## 2018-03-07 RX ADMIN — ASCORBIC ACID: 500 INJECTION, SOLUTION INTRAMUSCULAR; INTRAVENOUS; SUBCUTANEOUS at 20:11

## 2018-03-07 RX ADMIN — PANTOPRAZOLE SODIUM 40 MG: 40 INJECTION, POWDER, FOR SOLUTION INTRAVENOUS at 09:19

## 2018-03-07 RX ADMIN — PIPERACILLIN AND TAZOBACTAM 10.12 G: 3; .375 INJECTION, POWDER, FOR SOLUTION INTRAVENOUS at 19:12

## 2018-03-07 RX ADMIN — PROMETHAZINE HYDROCHLORIDE 12.5 MG: 25 INJECTION INTRAMUSCULAR; INTRAVENOUS at 16:56

## 2018-03-07 RX ADMIN — SUCRALFATE 0.5 G: 1 SUSPENSION ORAL at 19:12

## 2018-03-07 RX ADMIN — APIXABAN 2.5 MG: 2.5 TABLET, FILM COATED ORAL at 09:19

## 2018-03-07 RX ADMIN — ZOLPIDEM TARTRATE 10 MG: 10 TABLET ORAL at 21:01

## 2018-03-07 RX ADMIN — PROMETHAZINE HYDROCHLORIDE 12.5 MG: 25 INJECTION INTRAMUSCULAR; INTRAVENOUS at 21:01

## 2018-03-07 RX ADMIN — APIXABAN 2.5 MG: 2.5 TABLET, FILM COATED ORAL at 19:12

## 2018-03-07 RX ADMIN — SUCRALFATE 0.5 G: 1 SUSPENSION ORAL at 07:28

## 2018-03-07 RX ADMIN — SODIUM CHLORIDE 125 ML/HR: 450 INJECTION, SOLUTION INTRAVENOUS at 05:50

## 2018-03-07 RX ADMIN — BENZOCAINE AND MENTHOL 1 LOZENGE: 15; 3.6 LOZENGE ORAL at 22:06

## 2018-03-07 NOTE — WOUND CARE
Checked in with patient today to see if she would like any assistance with ostomy care;  I provided supplies yesterday and she stated that she would prefer to manage ostomy at this time.   Will continue to follow while admitted;     Mari Carrillo RN

## 2018-03-07 NOTE — PROGRESS NOTES
[facility] GYNECOLOGIC ONCOLOGY  [address] [place] [address], [address]  P [phone]  F [phone]       Patient Name: Chrissy [place] Date: 3/5/2018   OR Date: * No surgery found *       Visit Date: 3/7/2018        SUBJECTIVE:    Feeling overall better. Nausea remains somewhat. Denies pain. Stoma with 75cc out overnight. No F/C, SOB    OBJECTIVE:    Patient Vitals for the past 24 hrs:   Temp Pulse Resp BP SpO2   03/07/18 0838 98.6 °F (37 °C) 87 18 102/64 98 %   03/07/18 0005 98.9 °F (37.2 °C) 80 18 133/76 98 %   03/06/18 1707 99.2 °F (37.3 °C) 83 18 113/66 96 %         Date 03/06/18 0700 - 03/07/18 0659 03/07/18 0700 - 03/08/18 0659   Shift 3317-7600 5880-3579 24 Hour Total 3666-3032 2655-3346 24 Hour Total   I  N  T  A  K  E   P. O. 0  0         P. O. 0  0       I.V.  (mL/kg/hr) 1252.1  (2.4) 1906  (3.7) 3158.1  (3.1)         I.V. 0  0         Volume (dextrose 5 % - 0.45% NaCl infusion) 485.4  485.4         Volume (sodium chloride 0.9 % bolus infusion 1,000 mL) 266.7  266.7         Volume (0.45% sodium chloride infusion) 0 1129.2 1129.2         Volume (promethazine (PHENERGAN) 12.5 mg in 0.9% sodium chloride 50 mL IVPB) 0 150 150         Volume (piperacillin-tazobactam (ZOSYN) 10.125 g in  mL continuous 24 hr infusion) 500  500         Volume (piperacillin-tazobactam (ZOSYN) 3.375 g in  ml premix/cmpd) 0  0         Volume (piperacillin-tazobactam (ZOSYN) 3.375 g in  ml premix/cmpd) 0  0         Volume (piperacillin-tazobactam (ZOSYN) 10.125 g in  mL continuous 24 hr infusion) 0  0         Volume (TPN ADULT - CENTRAL)  626.9 626.9       Shift Total  (mL/kg) 1252.1  (29.2) 1906  (44.4) 3158.1  (73.6)      O  U  T  P  U  T   Urine  (mL/kg/hr) 1000  (1.9) 850  (1.7) 1850  (1.8)         Urine Voided 9466 588 5413       Emesis/NG output 450 475 925         Output (ml) (Nasogastric Tube 03/06/18) 450 475 925       Stool 380  380         Output (ml) (Ileostomy 05/05/16 Right  Abdomen) 380  380       Shift Total  (mL/kg) 1830  (42.6) 1325  (30.9) 3155  (73.5)      NET -577.9 581 3.1      Weight (kg) 42.9 42.9 42.9 42.9 42.9 42.9       Physical Exam     General:  alert, cooperative, no distress     Cardiac:  Regular rate and rhythm        Lungs:  nonlabored  Abdomen:  Firm, thin, nondistended, nontender, no fluid. Extremity: no edema, SCDs in place    Date Review  Lab Results   Component Value Date/Time    WBC 5.3 03/07/2018 05:41 AM    ABS. NEUTROPHILS 3.8 03/07/2018 05:41 AM    HGB 9.6 (L) 03/07/2018 05:41 AM    HCT 28.7 (L) 03/07/2018 05:41 AM    MCV 92.9 03/07/2018 05:41 AM    MCH 31.1 03/07/2018 05:41 AM    PLATELET 95 (L) 67/50/9753 05:41 AM     Lab Results   Component Value Date/Time    Sodium 136 03/07/2018 05:41 AM    Potassium 3.5 03/07/2018 05:41 AM    Chloride 102 03/07/2018 05:41 AM    CO2 26 03/07/2018 05:41 AM    Glucose 100 03/07/2018 05:41 AM    BUN 20 03/07/2018 05:41 AM    Creatinine 0.70 03/07/2018 05:41 AM    Calcium 8.4 (L) 03/07/2018 05:41 AM    Albumin 2.1 (L) 03/07/2018 05:41 AM    Bilirubin, total 0.6 03/07/2018 05:41 AM    AST (SGOT) 15 03/07/2018 05:41 AM    ALT (SGPT) 16 03/07/2018 05:41 AM    Alk.  phosphatase 118 (H) 03/07/2018 05:41 AM     IMPRESSION/PLAN:     61 y.o. with advanced ovarian cancer, carcinomatosis with ?partial GI obstruction admitted in transfer for obstruction, N/V    Present on Admission:   Anemia due to chemotherapy   Abdominal pain   Cancer associated pain   Carcinomatosis ([address]   Counseling regarding end of life decision making   Hx of pulmonary embolus   Erosive esophagitis   Carcinomatosis peritonei ([address]   Intractable vomiting with nausea   Small bowel obstruction   Dehydration   Anticoagulation adequate with anticoagulant therapy   Malignant ascites   Hypomagnesemia   Hydronephrosis of left kidney   Ileostomy in place [facility])      Continue NGT decompression, TPN/lipids  Cultures no growth/UA normal. Leukocytosis/FUO - continue zosyn  Daily weights  Possibility of exploring ostomy proximally today or consider GI mapping to identify transit time and if multilevel obstructive process to determine if any distal intervention would be of benefit. Consider GI consult for possible pediatric scope to this area.    Will monitor closely for now        BENY Flores

## 2018-03-07 NOTE — PROGRESS NOTES
Problem: Falls - Risk of  Goal: *Absence of Falls  Document Tyree Fall Risk and appropriate interventions in the flowsheet.    Outcome: Progressing Towards Goal  Fall Risk Interventions:  Mobility Interventions: Patient to call before getting OOB         Medication Interventions: Teach patient to arise slowly

## 2018-03-07 NOTE — PROGRESS NOTES
Bedside and Verbal shift change report given to Yana RN (oncoming nurse) by Marina Rodriguez RN (offgoing nurse). Report included the following information SBAR, Kardex, Intake/Output, MAR and Recent Results.

## 2018-03-08 LAB
ALBUMIN SERPL-MCNC: 2 G/DL (ref 3.5–5)
ALBUMIN/GLOB SERPL: 0.5 {RATIO} (ref 1.1–2.2)
ALP SERPL-CCNC: 108 U/L (ref 45–117)
ALT SERPL-CCNC: 12 U/L (ref 12–78)
ANION GAP SERPL CALC-SCNC: 9 MMOL/L (ref 5–15)
AST SERPL-CCNC: 10 U/L (ref 15–37)
BASOPHILS # BLD: 0 K/UL (ref 0–0.1)
BASOPHILS NFR BLD: 0 % (ref 0–1)
BILIRUB SERPL-MCNC: 0.2 MG/DL (ref 0.2–1)
BUN SERPL-MCNC: 20 MG/DL (ref 6–20)
BUN/CREAT SERPL: 30 (ref 12–20)
CALCIUM SERPL-MCNC: 8 MG/DL (ref 8.5–10.1)
CHLORIDE SERPL-SCNC: 104 MMOL/L (ref 97–108)
CO2 SERPL-SCNC: 24 MMOL/L (ref 21–32)
CREAT SERPL-MCNC: 0.67 MG/DL (ref 0.55–1.02)
DIFFERENTIAL METHOD BLD: ABNORMAL
EOSINOPHIL # BLD: 0.2 K/UL (ref 0–0.4)
EOSINOPHIL NFR BLD: 3 % (ref 0–7)
ERYTHROCYTE [DISTWIDTH] IN BLOOD BY AUTOMATED COUNT: 17.2 % (ref 11.5–14.5)
GLOBULIN SER CALC-MCNC: 4 G/DL (ref 2–4)
GLUCOSE BLD STRIP.AUTO-MCNC: 103 MG/DL (ref 65–100)
GLUCOSE BLD STRIP.AUTO-MCNC: 109 MG/DL (ref 65–100)
GLUCOSE BLD STRIP.AUTO-MCNC: 111 MG/DL (ref 65–100)
GLUCOSE BLD STRIP.AUTO-MCNC: 118 MG/DL (ref 65–100)
GLUCOSE SERPL-MCNC: 96 MG/DL (ref 65–100)
HCT VFR BLD AUTO: 27.5 % (ref 35–47)
HGB BLD-MCNC: 9.2 G/DL (ref 11.5–16)
IMM GRANULOCYTES # BLD: 0 K/UL (ref 0–0.04)
IMM GRANULOCYTES NFR BLD AUTO: 1 % (ref 0–0.5)
LYMPHOCYTES # BLD: 1.3 K/UL (ref 0.8–3.5)
LYMPHOCYTES NFR BLD: 21 % (ref 12–49)
MAGNESIUM SERPL-MCNC: 1.6 MG/DL (ref 1.6–2.4)
MCH RBC QN AUTO: 31.3 PG (ref 26–34)
MCHC RBC AUTO-ENTMCNC: 33.5 G/DL (ref 30–36.5)
MCV RBC AUTO: 93.5 FL (ref 80–99)
MONOCYTES # BLD: 0.1 K/UL (ref 0–1)
MONOCYTES NFR BLD: 2 % (ref 5–13)
NEUTS SEG # BLD: 4.4 K/UL (ref 1.8–8)
NEUTS SEG NFR BLD: 73 % (ref 32–75)
NRBC # BLD: 0 K/UL (ref 0–0.01)
NRBC BLD-RTO: 0 PER 100 WBC
PHOSPHATE SERPL-MCNC: 4.3 MG/DL (ref 2.6–4.7)
PLATELET # BLD AUTO: 85 K/UL (ref 150–400)
PMV BLD AUTO: 9.8 FL (ref 8.9–12.9)
POTASSIUM SERPL-SCNC: 3.4 MMOL/L (ref 3.5–5.1)
PROT SERPL-MCNC: 6 G/DL (ref 6.4–8.2)
RBC # BLD AUTO: 2.94 M/UL (ref 3.8–5.2)
SERVICE CMNT-IMP: ABNORMAL
SODIUM SERPL-SCNC: 137 MMOL/L (ref 136–145)
WBC # BLD AUTO: 6 K/UL (ref 3.6–11)

## 2018-03-08 PROCEDURE — 85025 COMPLETE CBC W/AUTO DIFF WBC: CPT | Performed by: NURSE PRACTITIONER

## 2018-03-08 PROCEDURE — 74011000258 HC RX REV CODE- 258: Performed by: PHYSICIAN ASSISTANT

## 2018-03-08 PROCEDURE — 74011250636 HC RX REV CODE- 250/636: Performed by: PHYSICIAN ASSISTANT

## 2018-03-08 PROCEDURE — C9113 INJ PANTOPRAZOLE SODIUM, VIA: HCPCS | Performed by: NURSE PRACTITIONER

## 2018-03-08 PROCEDURE — 74011000250 HC RX REV CODE- 250: Performed by: PHYSICIAN ASSISTANT

## 2018-03-08 PROCEDURE — 74011250637 HC RX REV CODE- 250/637: Performed by: PHYSICIAN ASSISTANT

## 2018-03-08 PROCEDURE — 65210000002 HC RM PRIVATE GYN

## 2018-03-08 PROCEDURE — 74011250636 HC RX REV CODE- 250/636: Performed by: NURSE PRACTITIONER

## 2018-03-08 PROCEDURE — 74011250637 HC RX REV CODE- 250/637: Performed by: OBSTETRICS & GYNECOLOGY

## 2018-03-08 PROCEDURE — 80053 COMPREHEN METABOLIC PANEL: CPT | Performed by: NURSE PRACTITIONER

## 2018-03-08 PROCEDURE — 83735 ASSAY OF MAGNESIUM: CPT | Performed by: NURSE PRACTITIONER

## 2018-03-08 PROCEDURE — 36415 COLL VENOUS BLD VENIPUNCTURE: CPT | Performed by: NURSE PRACTITIONER

## 2018-03-08 PROCEDURE — 82962 GLUCOSE BLOOD TEST: CPT

## 2018-03-08 PROCEDURE — 84100 ASSAY OF PHOSPHORUS: CPT | Performed by: NURSE PRACTITIONER

## 2018-03-08 PROCEDURE — 74011000258 HC RX REV CODE- 258: Performed by: NURSE PRACTITIONER

## 2018-03-08 PROCEDURE — 74011250637 HC RX REV CODE- 250/637: Performed by: NURSE PRACTITIONER

## 2018-03-08 PROCEDURE — 74011636637 HC RX REV CODE- 636/637: Performed by: PHYSICIAN ASSISTANT

## 2018-03-08 RX ADMIN — SUCRALFATE 0.5 G: 1 SUSPENSION ORAL at 21:21

## 2018-03-08 RX ADMIN — SODIUM CHLORIDE 25 ML/HR: 450 INJECTION, SOLUTION INTRAVENOUS at 06:43

## 2018-03-08 RX ADMIN — ASCORBIC ACID: 500 INJECTION, SOLUTION INTRAMUSCULAR; INTRAVENOUS; SUBCUTANEOUS at 18:48

## 2018-03-08 RX ADMIN — Medication 10 ML: at 06:44

## 2018-03-08 RX ADMIN — PROMETHAZINE HYDROCHLORIDE 12.5 MG: 25 INJECTION INTRAMUSCULAR; INTRAVENOUS at 15:29

## 2018-03-08 RX ADMIN — PROMETHAZINE HYDROCHLORIDE 12.5 MG: 25 INJECTION INTRAMUSCULAR; INTRAVENOUS at 10:56

## 2018-03-08 RX ADMIN — LORATADINE 10 MG: 10 TABLET ORAL at 10:04

## 2018-03-08 RX ADMIN — ZOLPIDEM TARTRATE 10 MG: 10 TABLET ORAL at 21:28

## 2018-03-08 RX ADMIN — BENZOCAINE AND MENTHOL 1 LOZENGE: 15; 3.6 LOZENGE ORAL at 10:04

## 2018-03-08 RX ADMIN — BENZOCAINE AND MENTHOL 1 LOZENGE: 15; 3.6 LOZENGE ORAL at 15:33

## 2018-03-08 RX ADMIN — SUCRALFATE 0.5 G: 1 SUSPENSION ORAL at 10:04

## 2018-03-08 RX ADMIN — PANTOPRAZOLE SODIUM 40 MG: 40 INJECTION, POWDER, FOR SOLUTION INTRAVENOUS at 10:05

## 2018-03-08 RX ADMIN — Medication 10 ML: at 15:10

## 2018-03-08 RX ADMIN — PROMETHAZINE HYDROCHLORIDE 12.5 MG: 25 INJECTION INTRAMUSCULAR; INTRAVENOUS at 05:29

## 2018-03-08 RX ADMIN — SUCRALFATE 0.5 G: 1 SUSPENSION ORAL at 04:33

## 2018-03-08 RX ADMIN — SUCRALFATE 0.5 G: 1 SUSPENSION ORAL at 15:33

## 2018-03-08 RX ADMIN — BENZOCAINE AND MENTHOL 1 LOZENGE: 15; 3.6 LOZENGE ORAL at 21:22

## 2018-03-08 NOTE — PROGRESS NOTES
NUTRITION       Recommendations:  1. Decrease lipid volume to 250 mL 3x/week (TPN regimen will still meet 100% of estimated needs)    2. If pt to remain on TPN long term, will need to consider cyclic regimen to help prevent cholestasis (example: 75 mL/hr x 1 hour; 135 mL/hr x 10 hours; 75 mL/hr x last hour)    3. Adjust additional IVF prn to prevent overload (pt currently receiving 5000 mL/day on lipid days)    Brief TPN note. Chart reviewed. Pt remains on TPN as sole source of nutrition. NGT in place and 650 mL, 925 mL and 500 mL out over the past 3 days, respectively. No output documentation yet today. Pt also remains on IVF @ 125 mL/hr (3000 mL/day). TPN: 5%AA D20 @ 63 mL/hr + MVI, Vitamin K 1 mg, 6 units insulin/L, thiamine (455 mg), folic acid 1 mg + 67% lipids, 500 mL 3x/week  -- This provides a daily average of 1759 kcal, 76 g protein in 1512 mL (2012 mL on lipid days)-- meeting 110% and 100% of estimated kcal (upper range) and protein needs, respectively. Would decrease lipids to 250 mL 3x/week. This would decrease average daily kcal to 1544 kcal/day and still meet 100% of estimated needs. PAB 15.7-- WNL     Weight up 0.9 kg x 2 days. May need to adjust additional IVF. Estimated Nutrition Needs:   Kcals/day: 1470 Kcals/day (1470-1596kcal)  Protein: 59 g (59-76g (1.4-1.8g/kg))  Fluid: 1470 ml (1ml/kcal)     Based On: Kcal/kg - specify (Comment) (35-38kcal/kg)  Weight Used: Actual wt (42kg)    RD to follow.     [address]

## 2018-03-08 NOTE — PROGRESS NOTES
[facility]  [address] [facility], [facility] [address], [address] [employer] [place]  P [phone]  F [phone]       Patient Name: Alfonso Bolden Date: 3/5/2018   OR Date: * No surgery found *       Visit Date: 3/8/2018        SUBJECTIVE:    Stoma gaytan placed last PM.  Feeling overall better. Nausea less. Denies pain. Stoma with ~175 overnight. ~800/24hr  No F/C, SOB. Ambulating    OBJECTIVE:    Patient Vitals for the past 24 hrs:   Temp Pulse Resp BP SpO2   03/08/18 0030 99.1 °F (37.3 °C) 86 18 122/71 99 %   03/07/18 2034 99 °F (37.2 °C) 90 18 145/83 99 %   03/07/18 1438 98.4 °F (36.9 °C) 90 18 129/69 100 %   03/07/18 0838 98.6 °F (37 °C) 87 18 102/64 98 %         Date 03/07/18 0700 - 03/08/18 0659 03/08/18 0700 - 03/09/18 0659   Shift 8552-6900 8876-9065 24 Hour Total 4096-1732 5625-2892 24 Hour Total   I  N  T  A  K  E   I.V.  (mL/kg/hr) 1437.8  (2.8) 3317.7  (6.4) 4755.5  (4.6)         Volume (0.45% sodium chloride infusion) 877.1 2000 2877.1         Volume (fat emulsion 20% (LIPOSYN, INTRAlipid) infusion 500 mL)  428.3 428. 3         Volume (TPN ADULT - CENTRAL) 560.7 349.7 910.4         Volume (TPN ADULT - CENTRAL)  539.7 539.7       Shift Total  (mL/kg) 1437.8  (33.5) 3317.7  (77.3) 4755.5  (110.8)      O  U  T  P  U  T   Urine  (mL/kg/hr) 700  (1.4) 650  (1.3) 1350  (1.3)         Urine Voided        Emesis/NG output  650 650         Output (ml) (Nasogastric Tube 03/06/18)  650 650       Stool  300 300         Output (ml) (Ileostomy 05/05/16 Right  Abdomen)  300 300       Shift Total  (mL/kg) 700  (16.3) 1600  (37.3) 2300  (53.6)      .8 1717.7 2455.5      Weight (kg) 42.9 42.9 42.9 42.9 42.9 42.9       Physical Exam     General:  alert, cooperative, no distress     Cardiac:  Regular rate and rhythm        Lungs:  nonlabored  Abdomen:  Firm, thin, nondistended, nontender, no fluid.    Extremity: no edema, SCDs in place    Date Review  Lab Results   Component Value Date/Time WBC 6.0 03/08/2018 04:43 AM    ABS. NEUTROPHILS 4.4 03/08/2018 04:43 AM    HGB 9.2 (L) 03/08/2018 04:43 AM    HCT 27.5 (L) 03/08/2018 04:43 AM    MCV 93.5 03/08/2018 04:43 AM    MCH 31.3 03/08/2018 04:43 AM    PLATELET 85 (L) 77/14/1077 04:43 AM     Lab Results   Component Value Date/Time    Sodium 137 03/08/2018 04:43 AM    Potassium 3.4 (L) 03/08/2018 04:43 AM    Chloride 104 03/08/2018 04:43 AM    CO2 24 03/08/2018 04:43 AM    Glucose 96 03/08/2018 04:43 AM    BUN 20 03/08/2018 04:43 AM    Creatinine 0.67 03/08/2018 04:43 AM    Calcium 8.0 (L) 03/08/2018 04:43 AM    Albumin 2.0 (L) 03/08/2018 04:43 AM    Bilirubin, total 0.2 03/08/2018 04:43 AM    AST (SGOT) 10 (L) 03/08/2018 04:43 AM    ALT (SGPT) 12 03/08/2018 04:43 AM    Alk. phosphatase 108 03/08/2018 04:43 AM     IMPRESSION/PLAN:     61 y.o. with advanced ovarian cancer, carcinomatosis with ?partial GI obstruction admitted in transfer for obstruction, N/V    Present on Admission:   Anemia due to chemotherapy   Abdominal pain   Cancer associated pain   Carcinomatosis ([address]   Counseling regarding end of life decision making   Hx of pulmonary embolus   Erosive esophagitis   Carcinomatosis peritonei (HCC)   Intractable vomiting with nausea   Small bowel obstruction   Dehydration   Anticoagulation adequate with anticoagulant therapy   Malignant ascites   Hypomagnesemia   Hydronephrosis of left kidney   Ile[facility] in place [facility])      Continue NGT decompression, TPN/lipids  Possible CT scan in AM eval GI pattern/obstruction  Cultures no growth/UA normal. Leukocytosis/FUO x 1. Resolved.   DC zosyn  Thrombocytopenia - DC eliquis   Will monitor closely for now         BENY Morrissey

## 2018-03-08 NOTE — PROGRESS NOTES
Bedside shift change report given to Donald Redd  (oncoming nurse) by Samantha Koroma (offgoing nurse). Report included the following information SBAR.

## 2018-03-09 ENCOUNTER — APPOINTMENT (OUTPATIENT)
Dept: CT IMAGING | Age: 58
DRG: 388 | End: 2018-03-09
Attending: PHYSICIAN ASSISTANT
Payer: OTHER GOVERNMENT

## 2018-03-09 LAB
ALBUMIN SERPL-MCNC: 1.9 G/DL (ref 3.5–5)
ALBUMIN/GLOB SERPL: 0.5 {RATIO} (ref 1.1–2.2)
ALP SERPL-CCNC: 102 U/L (ref 45–117)
ALT SERPL-CCNC: 9 U/L (ref 12–78)
ANION GAP SERPL CALC-SCNC: 9 MMOL/L (ref 5–15)
AST SERPL-CCNC: 12 U/L (ref 15–37)
BASOPHILS # BLD: 0 K/UL (ref 0–0.1)
BASOPHILS NFR BLD: 0 % (ref 0–1)
BILIRUB SERPL-MCNC: 0.3 MG/DL (ref 0.2–1)
BUN SERPL-MCNC: 22 MG/DL (ref 6–20)
BUN/CREAT SERPL: 38 (ref 12–20)
CALCIUM SERPL-MCNC: 8.2 MG/DL (ref 8.5–10.1)
CHLORIDE SERPL-SCNC: 103 MMOL/L (ref 97–108)
CO2 SERPL-SCNC: 25 MMOL/L (ref 21–32)
CREAT SERPL-MCNC: 0.58 MG/DL (ref 0.55–1.02)
DIFFERENTIAL METHOD BLD: ABNORMAL
EOSINOPHIL # BLD: 0.1 K/UL (ref 0–0.4)
EOSINOPHIL NFR BLD: 4 % (ref 0–7)
ERYTHROCYTE [DISTWIDTH] IN BLOOD BY AUTOMATED COUNT: 16.8 % (ref 11.5–14.5)
GLOBULIN SER CALC-MCNC: 4 G/DL (ref 2–4)
GLUCOSE BLD STRIP.AUTO-MCNC: 106 MG/DL (ref 65–100)
GLUCOSE BLD STRIP.AUTO-MCNC: 108 MG/DL (ref 65–100)
GLUCOSE BLD STRIP.AUTO-MCNC: 171 MG/DL (ref 65–100)
GLUCOSE BLD STRIP.AUTO-MCNC: 74 MG/DL (ref 65–100)
GLUCOSE SERPL-MCNC: 93 MG/DL (ref 65–100)
HCT VFR BLD AUTO: 26.3 % (ref 35–47)
HGB BLD-MCNC: 8.7 G/DL (ref 11.5–16)
IMM GRANULOCYTES # BLD: 0 K/UL (ref 0–0.04)
IMM GRANULOCYTES NFR BLD AUTO: 0 % (ref 0–0.5)
LYMPHOCYTES # BLD: 0.9 K/UL (ref 0.8–3.5)
LYMPHOCYTES NFR BLD: 29 % (ref 12–49)
MAGNESIUM SERPL-MCNC: 1.6 MG/DL (ref 1.6–2.4)
MCH RBC QN AUTO: 31 PG (ref 26–34)
MCHC RBC AUTO-ENTMCNC: 33.1 G/DL (ref 30–36.5)
MCV RBC AUTO: 93.6 FL (ref 80–99)
MONOCYTES # BLD: 0.1 K/UL (ref 0–1)
MONOCYTES NFR BLD: 3 % (ref 5–13)
NEUTS SEG # BLD: 2.1 K/UL (ref 1.8–8)
NEUTS SEG NFR BLD: 65 % (ref 32–75)
NRBC # BLD: 0 K/UL (ref 0–0.01)
NRBC BLD-RTO: 0 PER 100 WBC
PHOSPHATE SERPL-MCNC: 5 MG/DL (ref 2.6–4.7)
PLATELET # BLD AUTO: 100 K/UL (ref 150–400)
PMV BLD AUTO: 10 FL (ref 8.9–12.9)
POTASSIUM SERPL-SCNC: 3.7 MMOL/L (ref 3.5–5.1)
PROT SERPL-MCNC: 5.9 G/DL (ref 6.4–8.2)
RBC # BLD AUTO: 2.81 M/UL (ref 3.8–5.2)
SERVICE CMNT-IMP: ABNORMAL
SERVICE CMNT-IMP: NORMAL
SODIUM SERPL-SCNC: 137 MMOL/L (ref 136–145)
WBC # BLD AUTO: 3.2 K/UL (ref 3.6–11)

## 2018-03-09 PROCEDURE — 74011250636 HC RX REV CODE- 250/636: Performed by: NURSE PRACTITIONER

## 2018-03-09 PROCEDURE — 74011000258 HC RX REV CODE- 258: Performed by: PHYSICIAN ASSISTANT

## 2018-03-09 PROCEDURE — 65210000002 HC RM PRIVATE GYN

## 2018-03-09 PROCEDURE — 80053 COMPREHEN METABOLIC PANEL: CPT | Performed by: NURSE PRACTITIONER

## 2018-03-09 PROCEDURE — 74011250636 HC RX REV CODE- 250/636: Performed by: PHYSICIAN ASSISTANT

## 2018-03-09 PROCEDURE — 83735 ASSAY OF MAGNESIUM: CPT | Performed by: NURSE PRACTITIONER

## 2018-03-09 PROCEDURE — 84100 ASSAY OF PHOSPHORUS: CPT | Performed by: NURSE PRACTITIONER

## 2018-03-09 PROCEDURE — 74011000258 HC RX REV CODE- 258: Performed by: OBSTETRICS & GYNECOLOGY

## 2018-03-09 PROCEDURE — C9113 INJ PANTOPRAZOLE SODIUM, VIA: HCPCS | Performed by: NURSE PRACTITIONER

## 2018-03-09 PROCEDURE — 74011636637 HC RX REV CODE- 636/637: Performed by: PHYSICIAN ASSISTANT

## 2018-03-09 PROCEDURE — 74011000250 HC RX REV CODE- 250: Performed by: NURSE PRACTITIONER

## 2018-03-09 PROCEDURE — 82962 GLUCOSE BLOOD TEST: CPT

## 2018-03-09 PROCEDURE — 74011000250 HC RX REV CODE- 250: Performed by: PHYSICIAN ASSISTANT

## 2018-03-09 PROCEDURE — 85025 COMPLETE CBC W/AUTO DIFF WBC: CPT | Performed by: NURSE PRACTITIONER

## 2018-03-09 PROCEDURE — 36415 COLL VENOUS BLD VENIPUNCTURE: CPT | Performed by: NURSE PRACTITIONER

## 2018-03-09 PROCEDURE — 74177 CT ABD & PELVIS W/CONTRAST: CPT

## 2018-03-09 PROCEDURE — 74011250637 HC RX REV CODE- 250/637: Performed by: NURSE PRACTITIONER

## 2018-03-09 PROCEDURE — 74011250637 HC RX REV CODE- 250/637: Performed by: PHYSICIAN ASSISTANT

## 2018-03-09 PROCEDURE — 74011636320 HC RX REV CODE- 636/320: Performed by: OBSTETRICS & GYNECOLOGY

## 2018-03-09 RX ORDER — SODIUM CHLORIDE 0.9 % (FLUSH) 0.9 %
10 SYRINGE (ML) INJECTION
Status: COMPLETED | OUTPATIENT
Start: 2018-03-09 | End: 2018-03-09

## 2018-03-09 RX ADMIN — Medication 10 ML: at 22:00

## 2018-03-09 RX ADMIN — SUCRALFATE 0.5 G: 1 SUSPENSION ORAL at 04:20

## 2018-03-09 RX ADMIN — SUCRALFATE 0.5 G: 1 SUSPENSION ORAL at 22:04

## 2018-03-09 RX ADMIN — SUCRALFATE 0.5 G: 1 SUSPENSION ORAL at 18:02

## 2018-03-09 RX ADMIN — ZOLPIDEM TARTRATE 10 MG: 10 TABLET ORAL at 22:04

## 2018-03-09 RX ADMIN — SODIUM CHLORIDE 47 ML/HR: 450 INJECTION, SOLUTION INTRAVENOUS at 14:38

## 2018-03-09 RX ADMIN — SODIUM CHLORIDE 100 ML: 900 INJECTION, SOLUTION INTRAVENOUS at 08:00

## 2018-03-09 RX ADMIN — Medication 10 ML: at 08:00

## 2018-03-09 RX ADMIN — DEXTROSE MONOHYDRATE 25 G: 500 INJECTION PARENTERAL at 05:00

## 2018-03-09 RX ADMIN — IOPAMIDOL 100 ML: 755 INJECTION, SOLUTION INTRAVENOUS at 08:00

## 2018-03-09 RX ADMIN — DEXTROSE MONOHYDRATE 25 G: 500 INJECTION PARENTERAL at 20:25

## 2018-03-09 RX ADMIN — SUCRALFATE 0.5 G: 1 SUSPENSION ORAL at 09:12

## 2018-03-09 RX ADMIN — ASCORBIC ACID: 500 INJECTION, SOLUTION INTRAMUSCULAR; INTRAVENOUS; SUBCUTANEOUS at 20:21

## 2018-03-09 RX ADMIN — METOPROLOL TARTRATE 50 MG: 50 TABLET ORAL at 18:03

## 2018-03-09 RX ADMIN — Medication 10 ML: at 14:00

## 2018-03-09 RX ADMIN — Medication 10 ML: at 09:14

## 2018-03-09 RX ADMIN — LORATADINE 10 MG: 10 TABLET ORAL at 09:12

## 2018-03-09 RX ADMIN — IOHEXOL 50 ML: 240 INJECTION, SOLUTION INTRATHECAL; INTRAVASCULAR; INTRAVENOUS; ORAL at 08:00

## 2018-03-09 RX ADMIN — I.V. FAT EMULSION 250 ML: 20 EMULSION INTRAVENOUS at 20:27

## 2018-03-09 RX ADMIN — PANTOPRAZOLE SODIUM 40 MG: 40 INJECTION, POWDER, FOR SOLUTION INTRAVENOUS at 09:13

## 2018-03-09 NOTE — PROGRESS NOTES
Bedside shift change report given to VALERIANO Haskins RN (oncoming nurse) by Gabriele Segura RN (offgoing nurse). Report included the following information SBAR, Kardex, Intake/Output, MAR and Recent Results.

## 2018-03-09 NOTE — PROGRESS NOTES
[facility]  [address] [facility], [address]  P [phone]  F [phone]       Patient Name: Aissatou Merritt Date: 3/5/2018   OR Date: * No surgery found *       Visit Date: 3/9/2018        SUBJECTIVE:    Stoma gaytan in place. Stoma output stable. No c/o. Denies pain. Stoma output up overall, though NG output as well. No F/C, SOB. Ambulating well. OBJECTIVE:    Patient Vitals for the past 24 hrs:   Temp Pulse Resp BP SpO2   03/09/18 0433 98 °F (36.7 °C) 82 18 135/84 100 %   03/08/18 2124 99.3 °F (37.4 °C) 93 18 119/78 99 %   03/08/18 1409 98.6 °F (37 °C) 98 18 122/73 99 %   03/08/18 0854 98.8 °F (37.1 °C) 91 16 118/76 97 %         Date 03/08/18 0700 - 03/09/18 0659 03/09/18 0700 - 03/10/18 0659   Shift 0286-1376 6345-2086 24 Hour Total 7419-2649 0676-9463 24 Hour Total   I  N  T  A  K  E   P.O. 130  130         P. O. 130  130       I.V.  (mL/kg/hr) 2182.5  (4.2) 866  (1.7) 3048.5  (2.9)         Volume (0.45% sodium chloride infusion) 431.7 251.7 683.4         Volume (promethazine (PHENERGAN) 12.5 mg in 0.9% sodium chloride 50 mL IVPB) 150  150         Volume (fat emulsion 20% (LIPOSYN, INTRAlipid) infusion 500 mL) 207.5  207.5         Volume (TPN ADULT - CENTRAL) 1131.9  1131.9         Volume (TPN ADULT - CENTRAL) 261.5  261.5         Volume (TPN ADULT - CENTRAL) 0 614.3 614.3       Shift Total  (mL/kg) 2312.5  (53.2) 866  (19.9) 3178.5  (73.1)      O  U  T  P  U  T   Urine  (mL/kg/hr) 1150  (2.2) 800  (1.5) 1950  (1.9)         Urine Voided 9670 586 5424         Urine Occurrence(s) 1 x  1 x       Emesis/NG output          Output (ml) (Nasogastric Tube 03/06/18)        Stool 200 225 425         Output (ml) (Ileostomy 05/05/16 Right  Abdomen) 200 225 425       Shift Total  (mL/kg) 1950  (44.9) 1655  (38.1) 3605  (83)      .5 -789 -426.5      Weight (kg) 43.5 43.5 43.5 43.5 43.5 43.5       Physical Exam     General:  alert, cooperative, no distress     Cardiac:  Regular rate and rhythm        Lungs:  nonlabored  Abdomen:  Firm, thin, nondistended, nontender, no fluid. Extremity: no edema, SCDs in place    Date Review  Lab Results   Component Value Date/Time    WBC 3.2 (L) 03/09/2018 04:31 AM    ABS. NEUTROPHILS 2.1 03/09/2018 04:31 AM    HGB 8.7 (L) 03/09/2018 04:31 AM    HCT 26.3 (L) 03/09/2018 04:31 AM    MCV 93.6 03/09/2018 04:31 AM    MCH 31.0 03/09/2018 04:31 AM    PLATELET 867 (L) 55/45/4468 04:31 AM     Lab Results   Component Value Date/Time    Sodium 137 03/09/2018 04:31 AM    Potassium 3.7 03/09/2018 04:31 AM    Chloride 103 03/09/2018 04:31 AM    CO2 25 03/09/2018 04:31 AM    Glucose 93 03/09/2018 04:31 AM    BUN 22 (H) 03/09/2018 04:31 AM    Creatinine 0.58 03/09/2018 04:31 AM    Calcium 8.2 (L) 03/09/2018 04:31 AM    Albumin 1.9 (L) 03/09/2018 04:31 AM    Bilirubin, total 0.3 03/09/2018 04:31 AM    AST (SGOT) 12 (L) 03/09/2018 04:31 AM    ALT (SGPT) 9 (L) 03/09/2018 04:31 AM    Alk. phosphatase 102 03/09/2018 04:31 AM     IMPRESSION/PLAN:     61 y.o. with advanced ovarian cancer, carcinomatosis with ?partial GI obstruction admitted in transfer for obstruction, N/V    Present on Admission:   Anemia due to chemotherapy   Abdominal pain   Cancer associated pain   Carcinomatosis ([address]   Counseling regarding end of life decision making   Hx of pulmonary embolus   Erosive esophagitis   Carcinomatosis peritonei (HCC)   Intractable vomiting with nausea   Small bowel obstruction   Dehydration   Anticoagulation adequate with anticoagulant therapy   Malignant ascites   Hypomagnesemia   Hydronephrosis of left kidney   Ileostomy in place [facility])      Continue NGT decompression, TPN/lipids  CT scan in AM eval GI pattern/obstruction  Cultures no growth/UA normal. Leukocytosis/FUO x 1. Resolved.   Thrombocytopenia resolved- holding eliquis today   Will monitor closely for now         BENY Rodriguez    Addendum:    CT results discussed  FINDINGS:  LOWER CHEST: There are linear areas of scar or atelectasis in the lower lobes  bilaterally. There is no mass or nodule. A nasogastric tube courses through the  esophagus into the stomach. ABDOMEN:  Liver: The liver is normal in size and contour. There is a small cyst in the  left lobe and a small indeterminate low-attenuation lesion in the right lobe  which are unchanged. Gallbladder and bile ducts: There is no calcified gallstone or biliary  dilatation. Spleen: No abnormality. Pancreas: No abnormality. Adrenal glands: No abnormality. Kidneys: There is bilateral hydronephrosis. PELVIS:  Reproductive organs: The uterus and ovaries are absent. Bladder: The urinary bladder is distended but otherwise normal.   BOWEL AND MESENTERY: There is a enterostomy tube in the right lower quadrant and  contrast within markedly dilated centralized small bowel loops. The colon has  been resected with a small bowel to rectosigmoid anastomosis. There is no  contrast within the rectum. There is no mesenteric mass or adenopathy. The  appendix is absent. PERITONEUM: There is some loculated ascites throughout the abdomen. The largest  collection is in the right abdomen and measures 7.9 x 1.9 x 7 cm and these are  unchanged. RETROPERITONEUM: The aorta  tapers without aneurysm. There is a  filter in the inferior vena cava. There is no retroperitoneal adenopathy or  mass. There is no pelvic mass or adenopathy. BONES AND SOFT TISSUES: The bones and soft tissues of the abdominal wall are  within normal limits.       IMPRESSION:   1. Status post hysterectomy and bilateral nephrectomy. 2. Status post subtotal colectomy. 3. Nasogastric tube in the stomach. 4. Status post subtotal colectomy with ileocolic anastomosis.    5. Enterostomy tube in the right abdomen with dilated centralized small bowel  loops suggesting cocooning  with adhesions and obstruction since there is no  contrast in the rectum, unchanged. 6. Minimal loculated ascites unchanged. 7. Bilateral hydronephrosis, unchanged. 8. IVC filter. Discussed findings with Sam Youngblood. Discouraged but vigilant. To f/u with Dr. Low Manual in coming week regarding possible interventions. 40 minutes spent with patient in face to face visit though day with > 50% allotted to counseling and answering questions related to the above.

## 2018-03-10 LAB
ALBUMIN SERPL-MCNC: 2 G/DL (ref 3.5–5)
ALBUMIN/GLOB SERPL: 0.5 {RATIO} (ref 1.1–2.2)
ALP SERPL-CCNC: 97 U/L (ref 45–117)
ALT SERPL-CCNC: 9 U/L (ref 12–78)
ANION GAP SERPL CALC-SCNC: 9 MMOL/L (ref 5–15)
AST SERPL-CCNC: 13 U/L (ref 15–37)
BASOPHILS # BLD: 0 K/UL (ref 0–0.1)
BASOPHILS NFR BLD: 0 % (ref 0–1)
BILIRUB SERPL-MCNC: 0.2 MG/DL (ref 0.2–1)
BUN SERPL-MCNC: 25 MG/DL (ref 6–20)
BUN/CREAT SERPL: 49 (ref 12–20)
CALCIUM SERPL-MCNC: 8 MG/DL (ref 8.5–10.1)
CHLORIDE SERPL-SCNC: 103 MMOL/L (ref 97–108)
CO2 SERPL-SCNC: 25 MMOL/L (ref 21–32)
CREAT SERPL-MCNC: 0.51 MG/DL (ref 0.55–1.02)
DIFFERENTIAL METHOD BLD: ABNORMAL
EOSINOPHIL # BLD: 0.3 K/UL (ref 0–0.4)
EOSINOPHIL NFR BLD: 5 % (ref 0–7)
ERYTHROCYTE [DISTWIDTH] IN BLOOD BY AUTOMATED COUNT: 16.8 % (ref 11.5–14.5)
GLOBULIN SER CALC-MCNC: 3.8 G/DL (ref 2–4)
GLUCOSE BLD STRIP.AUTO-MCNC: 107 MG/DL (ref 65–100)
GLUCOSE BLD STRIP.AUTO-MCNC: 113 MG/DL (ref 65–100)
GLUCOSE BLD STRIP.AUTO-MCNC: 84 MG/DL (ref 65–100)
GLUCOSE BLD STRIP.AUTO-MCNC: 91 MG/DL (ref 65–100)
GLUCOSE SERPL-MCNC: 83 MG/DL (ref 65–100)
HCT VFR BLD AUTO: 26.1 % (ref 35–47)
HGB BLD-MCNC: 8.7 G/DL (ref 11.5–16)
IMM GRANULOCYTES # BLD: 0 K/UL (ref 0–0.04)
IMM GRANULOCYTES NFR BLD AUTO: 1 % (ref 0–0.5)
LYMPHOCYTES # BLD: 1.6 K/UL (ref 0.8–3.5)
LYMPHOCYTES NFR BLD: 32 % (ref 12–49)
MAGNESIUM SERPL-MCNC: 1.5 MG/DL (ref 1.6–2.4)
MCH RBC QN AUTO: 31.4 PG (ref 26–34)
MCHC RBC AUTO-ENTMCNC: 33.3 G/DL (ref 30–36.5)
MCV RBC AUTO: 94.2 FL (ref 80–99)
MONOCYTES # BLD: 0.2 K/UL (ref 0–1)
MONOCYTES NFR BLD: 4 % (ref 5–13)
NEUTS SEG # BLD: 2.9 K/UL (ref 1.8–8)
NEUTS SEG NFR BLD: 58 % (ref 32–75)
NRBC # BLD: 0 K/UL (ref 0–0.01)
NRBC BLD-RTO: 0 PER 100 WBC
PHOSPHATE SERPL-MCNC: 5.3 MG/DL (ref 2.6–4.7)
PLATELET # BLD AUTO: 128 K/UL (ref 150–400)
PMV BLD AUTO: 9.6 FL (ref 8.9–12.9)
POTASSIUM SERPL-SCNC: 4.7 MMOL/L (ref 3.5–5.1)
PROT SERPL-MCNC: 5.8 G/DL (ref 6.4–8.2)
RBC # BLD AUTO: 2.77 M/UL (ref 3.8–5.2)
SERVICE CMNT-IMP: ABNORMAL
SERVICE CMNT-IMP: ABNORMAL
SERVICE CMNT-IMP: NORMAL
SERVICE CMNT-IMP: NORMAL
SODIUM SERPL-SCNC: 137 MMOL/L (ref 136–145)
WBC # BLD AUTO: 4.9 K/UL (ref 3.6–11)

## 2018-03-10 PROCEDURE — 84100 ASSAY OF PHOSPHORUS: CPT | Performed by: NURSE PRACTITIONER

## 2018-03-10 PROCEDURE — 74011000250 HC RX REV CODE- 250: Performed by: OBSTETRICS & GYNECOLOGY

## 2018-03-10 PROCEDURE — 74011250637 HC RX REV CODE- 250/637: Performed by: OBSTETRICS & GYNECOLOGY

## 2018-03-10 PROCEDURE — 83735 ASSAY OF MAGNESIUM: CPT | Performed by: NURSE PRACTITIONER

## 2018-03-10 PROCEDURE — 74011250637 HC RX REV CODE- 250/637: Performed by: PHYSICIAN ASSISTANT

## 2018-03-10 PROCEDURE — 65210000002 HC RM PRIVATE GYN

## 2018-03-10 PROCEDURE — 74011000258 HC RX REV CODE- 258: Performed by: PHYSICIAN ASSISTANT

## 2018-03-10 PROCEDURE — C9113 INJ PANTOPRAZOLE SODIUM, VIA: HCPCS | Performed by: NURSE PRACTITIONER

## 2018-03-10 PROCEDURE — 74011250636 HC RX REV CODE- 250/636: Performed by: OBSTETRICS & GYNECOLOGY

## 2018-03-10 PROCEDURE — 80053 COMPREHEN METABOLIC PANEL: CPT | Performed by: NURSE PRACTITIONER

## 2018-03-10 PROCEDURE — 74011636637 HC RX REV CODE- 636/637: Performed by: OBSTETRICS & GYNECOLOGY

## 2018-03-10 PROCEDURE — 74011250637 HC RX REV CODE- 250/637: Performed by: NURSE PRACTITIONER

## 2018-03-10 PROCEDURE — 85025 COMPLETE CBC W/AUTO DIFF WBC: CPT | Performed by: NURSE PRACTITIONER

## 2018-03-10 PROCEDURE — 74011000258 HC RX REV CODE- 258: Performed by: OBSTETRICS & GYNECOLOGY

## 2018-03-10 PROCEDURE — 74011250636 HC RX REV CODE- 250/636: Performed by: NURSE PRACTITIONER

## 2018-03-10 PROCEDURE — 36415 COLL VENOUS BLD VENIPUNCTURE: CPT | Performed by: NURSE PRACTITIONER

## 2018-03-10 PROCEDURE — 82962 GLUCOSE BLOOD TEST: CPT

## 2018-03-10 PROCEDURE — 74011000258 HC RX REV CODE- 258: Performed by: NURSE PRACTITIONER

## 2018-03-10 RX ADMIN — Medication 10 ML: at 04:00

## 2018-03-10 RX ADMIN — ZOLPIDEM TARTRATE 10 MG: 10 TABLET ORAL at 21:59

## 2018-03-10 RX ADMIN — ASCORBIC ACID: 500 INJECTION, SOLUTION INTRAMUSCULAR; INTRAVENOUS; SUBCUTANEOUS at 19:39

## 2018-03-10 RX ADMIN — SUCRALFATE 0.5 G: 1 SUSPENSION ORAL at 03:55

## 2018-03-10 RX ADMIN — SODIUM CHLORIDE 50 ML/HR: 450 INJECTION, SOLUTION INTRAVENOUS at 08:00

## 2018-03-10 RX ADMIN — PANTOPRAZOLE SODIUM 40 MG: 40 INJECTION, POWDER, FOR SOLUTION INTRAVENOUS at 09:28

## 2018-03-10 RX ADMIN — PROMETHAZINE HYDROCHLORIDE 12.5 MG: 25 INJECTION INTRAMUSCULAR; INTRAVENOUS at 10:49

## 2018-03-10 RX ADMIN — Medication 10 ML: at 22:00

## 2018-03-10 RX ADMIN — SUCRALFATE 0.5 G: 1 SUSPENSION ORAL at 21:59

## 2018-03-10 RX ADMIN — SUCRALFATE 0.5 G: 1 SUSPENSION ORAL at 11:33

## 2018-03-10 RX ADMIN — LORATADINE 10 MG: 10 TABLET ORAL at 09:27

## 2018-03-10 RX ADMIN — Medication 10 ML: at 11:30

## 2018-03-10 RX ADMIN — SUCRALFATE 0.5 G: 1 SUSPENSION ORAL at 17:21

## 2018-03-10 NOTE — PROGRESS NOTES
Verified from Dr Bard Padron regarding TPN orders, continue TPN as ordered yesterday, Tonya Maharaj @  clin. pharmacist notified.

## 2018-03-10 NOTE — PROGRESS NOTES
[facility]  [address] [facility], [address] [facility]  P [phone]  F [phone]       Patient Name: Juan García   Admit Date: 3/5/2018       Primary Dx Progressive ovarian cancer, carcinomatosis. GI dysfunction, relative obstruction   Visit Date: 3/10/2018        SUBJECTIVE:    Stoma gaytan in place. Stoma output stable. No c/o. Denies pain. Stoma output up overall, though NG output as well. No F/C, SOB. Ambulating well.       OBJECTIVE:    Patient Vitals for the past 24 hrs:   Temp Pulse Resp BP SpO2   03/10/18 0925 98.7 °F (37.1 °C) 93 18 108/70 97 %   03/10/18 0018 98.6 °F (37 °C) 83 16 132/79 99 %   03/09/18 2044 98.9 °F (37.2 °C) 73 16 119/73 100 %         Date 03/09/18 0700 - 03/10/18 0659 03/10/18 0700 - 03/11/18 0659   Shift 2637-7125 3516-6864 24 Hour Total 7584-1804 1661-5989 24 Hour Total   I  N  T  A  K  E   I.V.  (mL/kg/hr) 439.5  (0.9)  439.5  (0.4)         Volume (0.45% sodium chloride infusion) 90.7  90.7         Volume (sodium chloride 0.9 % bolus infusion 100 mL) 0  0         Volume (promethazine (PHENERGAN) 12.5 mg in 0.9% sodium chloride 50 mL IVPB) 100  100         Volume (TPN ADULT - CENTRAL) 248.9  248.9         Volume (fat emulsion 20% (LIPOSYN, INTRAlipid) infusion 250 mL) 0  0       Shift Total  (mL/kg) 439.5  (10.5)  439.5  (10.5)      O  U  T  P  U  T   Urine  (mL/kg/hr) 1000  (2) 900  (1.8) 1900  (1.9)         Urine Voided 0789 651 2716       Emesis/NG output 100 50 150         Output (ml) (Nasogastric Tube 03/06/18) 100 50 150       Stool  725 725         Output (ml) (Ileostomy 05/05/16 Right  Abdomen)  725 725       Shift Total  (mL/kg) 1100  (26.3) 1675  (40.1) 2775  (66.4)      NET -660.5 -1675 -2335.5      Weight (kg) 41.8 41.8 41.8 41.8 41.8 41.8       Physical Exam     General:  alert, cooperative, no distress, in good spirits     Cardiac:  Regular rate and rhythm        Lungs:  nonlabored  Abdomen:  Firm, thin, nondistended, nontender, no fluid. Extremity: no edema, SCDs in place    Date Review  Lab Results   Component Value Date/Time    WBC 4.9 03/10/2018 04:02 AM    ABS. NEUTROPHILS 2.9 03/10/2018 04:02 AM    HGB 8.7 (L) 03/10/2018 04:02 AM    HCT 26.1 (L) 03/10/2018 04:02 AM    MCV 94.2 03/10/2018 04:02 AM    MCH 31.4 03/10/2018 04:02 AM    PLATELET 154 (L) 80/17/7401 04:02 AM     Lab Results   Component Value Date/Time    Sodium 137 03/10/2018 04:02 AM    Potassium 4.7 03/10/2018 04:02 AM    Chloride 103 03/10/2018 04:02 AM    CO2 25 03/10/2018 04:02 AM    Glucose 83 03/10/2018 04:02 AM    BUN 25 (H) 03/10/2018 04:02 AM    Creatinine 0.51 (L) 03/10/2018 04:02 AM    Calcium 8.0 (L) 03/10/2018 04:02 AM    Albumin 2.0 (L) 03/10/2018 04:02 AM    Bilirubin, total 0.2 03/10/2018 04:02 AM    AST (SGOT) 13 (L) 03/10/2018 04:02 AM    ALT (SGPT) 9 (L) 03/10/2018 04:02 AM    Alk. phosphatase 97 03/10/2018 04:02 AM     IMPRESSION/PLAN:     61 y.o. with advanced ovarian cancer, carcinomatosis with ?partial GI obstruction admitted in transfer for obstruction, N/V    Present on Admission:   Anemia due to chemotherapy   Abdominal pain   Cancer associated pain   Carcinomatosis ([address]   Counseling regarding end of life decision making   Hx of pulmonary embolus   Erosive esophagitis   Carcinomatosis peritonei (HCC)   Intractable vomiting with nausea   Small bowel obstruction   Dehydration   Anticoagulation adequate with anticoagulant therapy   Malignant ascites   Hypomagnesemia   Hydronephrosis of left kidney   Ileostomy in place [facility])      Continue NGT decompression, TPN/lipids, increase to 75 cc/hr  CT scan in AM eval GI pattern/obstruction  Cultures no growth/UA normal. Leukocytosis/FUO x 1. Resolved. Anemia, stable  Thrombocytopenia resolved- holding eliquis today   Will monitor closely for now     Discussed with patient and   All questions answered.         Kathryn Saavedra MD    Addendum:    CT results discussed  FINDINGS:  LOWER CHEST: There are linear areas of scar or atelectasis in the lower lobes  bilaterally. There is no mass or nodule. A nasogastric tube courses through the  esophagus into the stomach. ABDOMEN:  Liver: The liver is normal in size and contour. There is a small cyst in the  left lobe and a small indeterminate low-attenuation lesion in the right lobe  which are unchanged. Gallbladder and bile ducts: There is no calcified gallstone or biliary  dilatation. Spleen: No abnormality. Pancreas: No abnormality. Adrenal glands: No abnormality. Kidneys: There is bilateral hydronephrosis. PELVIS:  Reproductive organs: The uterus and ovaries are absent. Bladder: The urinary bladder is distended but otherwise normal.   BOWEL AND MESENTERY: There is a enterostomy tube in the right lower quadrant and  contrast within markedly dilated centralized small bowel loops. The colon has  been resected with a small bowel to rectosigmoid anastomosis. There is no  contrast within the rectum. There is no mesenteric mass or adenopathy. The  appendix is absent. PERITONEUM: There is some loculated ascites throughout the abdomen. The largest  collection is in the right abdomen and measures 7.9 x 1.9 x 7 cm and these are  unchanged. RETROPERITONEUM: The aorta  tapers without aneurysm. There is a  filter in the inferior vena cava. There is no retroperitoneal adenopathy or  mass. There is no pelvic mass or adenopathy. BONES AND SOFT TISSUES: The bones and soft tissues of the abdominal wall are  within normal limits.       IMPRESSION:   1. Status post hysterectomy and bilateral nephrectomy. 2. Status post subtotal colectomy. 3. Nasogastric tube in the stomach. 4. Status post subtotal colectomy with ileocolic anastomosis. 5. Enterostomy tube in the right abdomen with dilated centralized small bowel  loops suggesting cocooning  with adhesions and obstruction since there is no  contrast in the rectum, unchanged.    6. Minimal loculated ascites unchanged. 7. Bilateral hydronephrosis, unchanged. 8. IVC filter. Discussed findings with Jud Walton. Discouraged but vigilant. To f/u with Dr. Brennon Hubbard in coming week regarding possible interventions. 40 minutes spent with patient in face to face visit though day with > 50% allotted to counseling and answering questions related to the above.

## 2018-03-11 LAB
ALBUMIN SERPL-MCNC: 1.9 G/DL (ref 3.5–5)
ALBUMIN/GLOB SERPL: 0.5 {RATIO} (ref 1.1–2.2)
ALP SERPL-CCNC: 103 U/L (ref 45–117)
ALT SERPL-CCNC: 9 U/L (ref 12–78)
ANION GAP SERPL CALC-SCNC: 8 MMOL/L (ref 5–15)
AST SERPL-CCNC: 11 U/L (ref 15–37)
BACTERIA SPEC CULT: NORMAL
BASOPHILS # BLD: 0 K/UL (ref 0–0.1)
BASOPHILS NFR BLD: 0 % (ref 0–1)
BILIRUB SERPL-MCNC: 0.2 MG/DL (ref 0.2–1)
BUN SERPL-MCNC: 31 MG/DL (ref 6–20)
BUN/CREAT SERPL: 58 (ref 12–20)
CALCIUM SERPL-MCNC: 8.3 MG/DL (ref 8.5–10.1)
CHLORIDE SERPL-SCNC: 104 MMOL/L (ref 97–108)
CO2 SERPL-SCNC: 23 MMOL/L (ref 21–32)
CREAT SERPL-MCNC: 0.53 MG/DL (ref 0.55–1.02)
DIFFERENTIAL METHOD BLD: ABNORMAL
EOSINOPHIL # BLD: 0.1 K/UL (ref 0–0.4)
EOSINOPHIL NFR BLD: 3 % (ref 0–7)
ERYTHROCYTE [DISTWIDTH] IN BLOOD BY AUTOMATED COUNT: 17 % (ref 11.5–14.5)
GLOBULIN SER CALC-MCNC: 4 G/DL (ref 2–4)
GLUCOSE BLD STRIP.AUTO-MCNC: 104 MG/DL (ref 65–100)
GLUCOSE BLD STRIP.AUTO-MCNC: 118 MG/DL (ref 65–100)
GLUCOSE BLD STRIP.AUTO-MCNC: 120 MG/DL (ref 65–100)
GLUCOSE BLD STRIP.AUTO-MCNC: 124 MG/DL (ref 65–100)
GLUCOSE BLD STRIP.AUTO-MCNC: 148 MG/DL (ref 65–100)
GLUCOSE BLD STRIP.AUTO-MCNC: 75 MG/DL (ref 65–100)
GLUCOSE SERPL-MCNC: 129 MG/DL (ref 65–100)
HCT VFR BLD AUTO: 26.3 % (ref 35–47)
HGB BLD-MCNC: 8.5 G/DL (ref 11.5–16)
IMM GRANULOCYTES # BLD: 0 K/UL
IMM GRANULOCYTES NFR BLD AUTO: 0 %
LYMPHOCYTES # BLD: 1.5 K/UL (ref 0.8–3.5)
LYMPHOCYTES NFR BLD: 45 % (ref 12–49)
MAGNESIUM SERPL-MCNC: 1.8 MG/DL (ref 1.6–2.4)
MCH RBC QN AUTO: 30.8 PG (ref 26–34)
MCHC RBC AUTO-ENTMCNC: 32.3 G/DL (ref 30–36.5)
MCV RBC AUTO: 95.3 FL (ref 80–99)
MONOCYTES # BLD: 0.2 K/UL (ref 0–1)
MONOCYTES NFR BLD: 5 % (ref 5–13)
NEUTS BAND NFR BLD MANUAL: 2 % (ref 0–6)
NEUTS SEG # BLD: 1.6 K/UL (ref 1.8–8)
NEUTS SEG NFR BLD: 45 % (ref 32–75)
NRBC # BLD: 0 K/UL (ref 0–0.01)
NRBC BLD-RTO: 0 PER 100 WBC
PHOSPHATE SERPL-MCNC: 6.2 MG/DL (ref 2.6–4.7)
PLATELET # BLD AUTO: 139 K/UL (ref 150–400)
PMV BLD AUTO: 9.6 FL (ref 8.9–12.9)
POTASSIUM SERPL-SCNC: 4.3 MMOL/L (ref 3.5–5.1)
PROT SERPL-MCNC: 5.9 G/DL (ref 6.4–8.2)
RBC # BLD AUTO: 2.76 M/UL (ref 3.8–5.2)
RBC MORPH BLD: ABNORMAL
SERVICE CMNT-IMP: ABNORMAL
SERVICE CMNT-IMP: NORMAL
SERVICE CMNT-IMP: NORMAL
SODIUM SERPL-SCNC: 135 MMOL/L (ref 136–145)
WBC # BLD AUTO: 3.4 K/UL (ref 3.6–11)
WBC MORPH BLD: ABNORMAL

## 2018-03-11 PROCEDURE — 80053 COMPREHEN METABOLIC PANEL: CPT | Performed by: NURSE PRACTITIONER

## 2018-03-11 PROCEDURE — 74011250636 HC RX REV CODE- 250/636: Performed by: OBSTETRICS & GYNECOLOGY

## 2018-03-11 PROCEDURE — 74011250637 HC RX REV CODE- 250/637: Performed by: PHYSICIAN ASSISTANT

## 2018-03-11 PROCEDURE — 84100 ASSAY OF PHOSPHORUS: CPT | Performed by: NURSE PRACTITIONER

## 2018-03-11 PROCEDURE — 74011250637 HC RX REV CODE- 250/637: Performed by: OBSTETRICS & GYNECOLOGY

## 2018-03-11 PROCEDURE — 74011000258 HC RX REV CODE- 258: Performed by: OBSTETRICS & GYNECOLOGY

## 2018-03-11 PROCEDURE — 74011250636 HC RX REV CODE- 250/636: Performed by: NURSE PRACTITIONER

## 2018-03-11 PROCEDURE — 83735 ASSAY OF MAGNESIUM: CPT | Performed by: NURSE PRACTITIONER

## 2018-03-11 PROCEDURE — C9113 INJ PANTOPRAZOLE SODIUM, VIA: HCPCS | Performed by: NURSE PRACTITIONER

## 2018-03-11 PROCEDURE — 65210000002 HC RM PRIVATE GYN

## 2018-03-11 PROCEDURE — 74011000258 HC RX REV CODE- 258: Performed by: PHYSICIAN ASSISTANT

## 2018-03-11 PROCEDURE — 36415 COLL VENOUS BLD VENIPUNCTURE: CPT | Performed by: NURSE PRACTITIONER

## 2018-03-11 PROCEDURE — 74011636637 HC RX REV CODE- 636/637: Performed by: NURSE PRACTITIONER

## 2018-03-11 PROCEDURE — 74011250637 HC RX REV CODE- 250/637: Performed by: NURSE PRACTITIONER

## 2018-03-11 PROCEDURE — 74011000250 HC RX REV CODE- 250: Performed by: NURSE PRACTITIONER

## 2018-03-11 PROCEDURE — 85025 COMPLETE CBC W/AUTO DIFF WBC: CPT | Performed by: NURSE PRACTITIONER

## 2018-03-11 PROCEDURE — 74011000258 HC RX REV CODE- 258: Performed by: NURSE PRACTITIONER

## 2018-03-11 PROCEDURE — 74011636637 HC RX REV CODE- 636/637: Performed by: OBSTETRICS & GYNECOLOGY

## 2018-03-11 PROCEDURE — 74011000250 HC RX REV CODE- 250: Performed by: OBSTETRICS & GYNECOLOGY

## 2018-03-11 RX ADMIN — SODIUM CHLORIDE 50 ML/HR: 450 INJECTION, SOLUTION INTRAVENOUS at 08:23

## 2018-03-11 RX ADMIN — SUCRALFATE 0.5 G: 1 SUSPENSION ORAL at 10:00

## 2018-03-11 RX ADMIN — Medication 10 ML: at 22:00

## 2018-03-11 RX ADMIN — SUCRALFATE 0.5 G: 1 SUSPENSION ORAL at 22:07

## 2018-03-11 RX ADMIN — ZOLPIDEM TARTRATE 10 MG: 10 TABLET ORAL at 22:07

## 2018-03-11 RX ADMIN — SUCRALFATE 0.5 G: 1 SUSPENSION ORAL at 16:29

## 2018-03-11 RX ADMIN — LORATADINE 10 MG: 10 TABLET ORAL at 08:30

## 2018-03-11 RX ADMIN — INSULIN LISPRO 2 UNITS: 100 INJECTION, SOLUTION INTRAVENOUS; SUBCUTANEOUS at 00:00

## 2018-03-11 RX ADMIN — ASCORBIC ACID: 500 INJECTION, SOLUTION INTRAMUSCULAR; INTRAVENOUS; SUBCUTANEOUS at 19:36

## 2018-03-11 RX ADMIN — Medication 10 ML: at 06:00

## 2018-03-11 RX ADMIN — Medication 10 ML: at 16:32

## 2018-03-11 RX ADMIN — DEXTROSE MONOHYDRATE 12.5 G: 500 INJECTION PARENTERAL at 17:46

## 2018-03-11 RX ADMIN — PANTOPRAZOLE SODIUM 40 MG: 40 INJECTION, POWDER, FOR SOLUTION INTRAVENOUS at 08:31

## 2018-03-11 RX ADMIN — PROMETHAZINE HYDROCHLORIDE 12.5 MG: 25 INJECTION INTRAMUSCULAR; INTRAVENOUS at 11:59

## 2018-03-11 RX ADMIN — PROMETHAZINE HYDROCHLORIDE 12.5 MG: 25 INJECTION INTRAMUSCULAR; INTRAVENOUS at 07:46

## 2018-03-11 RX ADMIN — Medication 10 ML: at 08:39

## 2018-03-11 NOTE — PROGRESS NOTES
[facility]  [address] [facility], [facility] [address]  P [phone]  F [phone]       Patient Name: Savana Merritt   Admit Date: 3/5/2018       Primary Dx Progressive ovarian cancer, carcinomatosis. GI dysfunction, relative obstruction   Visit Date: 3/11/2018         SUBJECTIVE:    Stoma gaytan in place. Stoma output stable. No c/o. Denies pain. Stoma output up overall, flatus noted in stoma bag. though NG output as well. No F/C, SOB. Ambulating well. OBJECTIVE:    Patient Vitals for the past 24 hrs:   Temp Pulse Resp BP SpO2   03/11/18 0022 98.7 °F (37.1 °C) 90 16 116/76 99 %   03/10/18 2018 98.2 °F (36.8 °C) 97 16 110/71 98 %   03/10/18 1823 98.9 °F (37.2 °C) 92 16 107/65 99 %   03/10/18 0925 98.7 °F (37.1 °C) 93 18 108/70 97 %         Date 03/10/18 0700 - 03/11/18 0659 03/11/18 0700 - 03/12/18 0659   Shift 5658-2784 5319-3205 24 Hour Total 5623-1619 1600-6006 24 Hour Total   I  N  T  A  K  E   P. O.  50 50         P. O.  50 50       I.V.  (mL/kg/hr)  1232.9  (2.4) 1232.9  (1.2)         Volume (0.45% sodium chloride infusion)  500.8 500.8         Volume (TPN ADULT - CENTRAL)  732.1 732.1       Shift Total  (mL/kg)  1282.9  (30.3) 1282.9  (30.3)      O  U  T  P  U  T   Urine  (mL/kg/hr) 800  (1.6) 1000  (2) 1800  (1.8)         Urine Voided 800 1000 1800         Urine Occurrence(s)  1 x 1 x       Emesis/NG output 50  50         Output (ml) (Nasogastric Tube 03/06/18) 50  50       Stool 175 75 250         Output (ml) (Ileostomy 05/05/16 Right  Abdomen) 175 75 250       Shift Total  (mL/kg) 1025  (24.5) 1075  (25.4) 2100  (49.7)      NET -1025 207.9 -817.1      Weight (kg) 41.8 42.3 42.3 42.3 42.3 42.3       Physical Exam     General:  alert, cooperative, no distress, in good spirits     Cardiac:  Regular rate and rhythm        Lungs:  nonlabored  Abdomen:  Firm byt palpable,  thin, nondistended, nontender, no fluid.    Extremity: no edema, SCDs in place    Date Review  Lab Results   Component Value Date/Time    WBC 3.4 (L) 03/11/2018 05:56 AM    ABS. NEUTROPHILS PENDING 03/11/2018 05:56 AM    HGB 8.5 (L) 03/11/2018 05:56 AM    HCT 26.3 (L) 03/11/2018 05:56 AM    MCV 95.3 03/11/2018 05:56 AM    MCH 30.8 03/11/2018 05:56 AM    PLATELET 459 (L) 23/03/3795 05:56 AM     Lab Results   Component Value Date/Time    Sodium 135 (L) 03/11/2018 05:56 AM    Potassium 4.3 03/11/2018 05:56 AM    Chloride 104 03/11/2018 05:56 AM    CO2 23 03/11/2018 05:56 AM    Glucose 129 (H) 03/11/2018 05:56 AM    BUN 31 (H) 03/11/2018 05:56 AM    Creatinine 0.53 (L) 03/11/2018 05:56 AM    Calcium 8.3 (L) 03/11/2018 05:56 AM    Albumin 1.9 (L) 03/11/2018 05:56 AM    Bilirubin, total 0.2 03/11/2018 05:56 AM    AST (SGOT) 11 (L) 03/11/2018 05:56 AM    ALT (SGPT) 9 (L) 03/11/2018 05:56 AM    Alk. phosphatase 103 03/11/2018 05:56 AM     IMPRESSION/PLAN:     61 y.o. with advanced ovarian cancer, carcinomatosis with ?partial GI obstruction admitted in transfer for obstruction, N/V    Present on Admission:   Anemia due to chemotherapy   Abdominal pain   Cancer associated pain   [address] ([facility] [address] [facility]   Counseling regarding end of life decision making   Hx of pulmonary embolus   Erosive esophagitis   Carcinomatosis peritonei (HCC)   Intractable vomiting with nausea   Small bowel obstruction   Dehydration   Anticoagulation adequate with anticoagulant therapy   Malignant ascites   Hypomagnesemia   Hydronephrosis of left kidney   Ileostomy in place [facility])      Continue NGT decompression, TPN/lipids, 12 hour cycle    Cultures no growth/UA normal. Leukocytosis/FUO x 1. Resolved. Anemia, stable  Thrombocytopenia resolved- holding eliquis today   Will monitor closely for now     Discussed with patient  All questions answered.         Keo Reilly MD

## 2018-03-11 NOTE — PROGRESS NOTES
Bedside and Verbal shift change report given to [address] [place], [place] [address]e (oncoming nurse) by Darryl Peguero RN (offgoing nurse). Report included the following information SBAR, Kardex, Intake/Output, MAR and Recent Results.

## 2018-03-11 NOTE — PROGRESS NOTES
Bedside and Verbal shift change report given to [address] [place], [place] [address]e (oncoming nurse) by Moon Cowart RN (offgoing nurse). Report included the following information SBAR./kardex, MAR, Labs.

## 2018-03-12 LAB
ALBUMIN SERPL-MCNC: 1.9 G/DL (ref 3.5–5)
ALBUMIN/GLOB SERPL: 0.5 {RATIO} (ref 1.1–2.2)
ALP SERPL-CCNC: 96 U/L (ref 45–117)
ALT SERPL-CCNC: 8 U/L (ref 12–78)
ANION GAP SERPL CALC-SCNC: 7 MMOL/L (ref 5–15)
AST SERPL-CCNC: 11 U/L (ref 15–37)
BASOPHILS # BLD: 0 K/UL (ref 0–0.1)
BASOPHILS NFR BLD: 0 % (ref 0–1)
BILIRUB SERPL-MCNC: 0.3 MG/DL (ref 0.2–1)
BUN SERPL-MCNC: 27 MG/DL (ref 6–20)
BUN/CREAT SERPL: 47 (ref 12–20)
CALCIUM SERPL-MCNC: 8.2 MG/DL (ref 8.5–10.1)
CHLORIDE SERPL-SCNC: 104 MMOL/L (ref 97–108)
CO2 SERPL-SCNC: 25 MMOL/L (ref 21–32)
CREAT SERPL-MCNC: 0.57 MG/DL (ref 0.55–1.02)
DIFFERENTIAL METHOD BLD: ABNORMAL
EOSINOPHIL # BLD: 0.2 K/UL (ref 0–0.4)
EOSINOPHIL NFR BLD: 5 % (ref 0–7)
ERYTHROCYTE [DISTWIDTH] IN BLOOD BY AUTOMATED COUNT: 17.1 % (ref 11.5–14.5)
GLOBULIN SER CALC-MCNC: 4 G/DL (ref 2–4)
GLUCOSE BLD STRIP.AUTO-MCNC: 108 MG/DL (ref 65–100)
GLUCOSE BLD STRIP.AUTO-MCNC: 144 MG/DL (ref 65–100)
GLUCOSE BLD STRIP.AUTO-MCNC: 209 MG/DL (ref 65–100)
GLUCOSE BLD STRIP.AUTO-MCNC: 71 MG/DL (ref 65–100)
GLUCOSE BLD STRIP.AUTO-MCNC: 76 MG/DL (ref 65–100)
GLUCOSE BLD STRIP.AUTO-MCNC: 79 MG/DL (ref 65–100)
GLUCOSE BLD STRIP.AUTO-MCNC: 88 MG/DL (ref 65–100)
GLUCOSE SERPL-MCNC: 108 MG/DL (ref 65–100)
HCT VFR BLD AUTO: 25.5 % (ref 35–47)
HGB BLD-MCNC: 8.2 G/DL (ref 11.5–16)
IMM GRANULOCYTES # BLD: 0 K/UL (ref 0–0.04)
IMM GRANULOCYTES NFR BLD AUTO: 0 % (ref 0–0.5)
LYMPHOCYTES # BLD: 1.5 K/UL (ref 0.8–3.5)
LYMPHOCYTES NFR BLD: 39 % (ref 12–49)
MAGNESIUM SERPL-MCNC: 1.8 MG/DL (ref 1.6–2.4)
MCH RBC QN AUTO: 30.5 PG (ref 26–34)
MCHC RBC AUTO-ENTMCNC: 32.2 G/DL (ref 30–36.5)
MCV RBC AUTO: 94.8 FL (ref 80–99)
MONOCYTES # BLD: 0.2 K/UL (ref 0–1)
MONOCYTES NFR BLD: 6 % (ref 5–13)
NEUTS SEG # BLD: 1.9 K/UL (ref 1.8–8)
NEUTS SEG NFR BLD: 49 % (ref 32–75)
NRBC # BLD: 0 K/UL (ref 0–0.01)
NRBC BLD-RTO: 0 PER 100 WBC
PHOSPHATE SERPL-MCNC: 5.5 MG/DL (ref 2.6–4.7)
PLATELET # BLD AUTO: 175 K/UL (ref 150–400)
PMV BLD AUTO: 9.7 FL (ref 8.9–12.9)
POTASSIUM SERPL-SCNC: 4.1 MMOL/L (ref 3.5–5.1)
PROT SERPL-MCNC: 5.9 G/DL (ref 6.4–8.2)
RBC # BLD AUTO: 2.69 M/UL (ref 3.8–5.2)
SERVICE CMNT-IMP: ABNORMAL
SERVICE CMNT-IMP: NORMAL
SODIUM SERPL-SCNC: 136 MMOL/L (ref 136–145)
WBC # BLD AUTO: 3.8 K/UL (ref 3.6–11)

## 2018-03-12 PROCEDURE — 84100 ASSAY OF PHOSPHORUS: CPT | Performed by: NURSE PRACTITIONER

## 2018-03-12 PROCEDURE — 82962 GLUCOSE BLOOD TEST: CPT

## 2018-03-12 PROCEDURE — 36415 COLL VENOUS BLD VENIPUNCTURE: CPT | Performed by: NURSE PRACTITIONER

## 2018-03-12 PROCEDURE — 74011000258 HC RX REV CODE- 258: Performed by: NURSE PRACTITIONER

## 2018-03-12 PROCEDURE — 74011250636 HC RX REV CODE- 250/636: Performed by: NURSE PRACTITIONER

## 2018-03-12 PROCEDURE — 74011636637 HC RX REV CODE- 636/637: Performed by: PHYSICIAN ASSISTANT

## 2018-03-12 PROCEDURE — 74011636637 HC RX REV CODE- 636/637: Performed by: NURSE PRACTITIONER

## 2018-03-12 PROCEDURE — C9113 INJ PANTOPRAZOLE SODIUM, VIA: HCPCS | Performed by: NURSE PRACTITIONER

## 2018-03-12 PROCEDURE — 74011000258 HC RX REV CODE- 258: Performed by: PHYSICIAN ASSISTANT

## 2018-03-12 PROCEDURE — 74011000250 HC RX REV CODE- 250: Performed by: NURSE PRACTITIONER

## 2018-03-12 PROCEDURE — 74011250637 HC RX REV CODE- 250/637: Performed by: NURSE PRACTITIONER

## 2018-03-12 PROCEDURE — 74011250637 HC RX REV CODE- 250/637: Performed by: PHYSICIAN ASSISTANT

## 2018-03-12 PROCEDURE — 83735 ASSAY OF MAGNESIUM: CPT | Performed by: NURSE PRACTITIONER

## 2018-03-12 PROCEDURE — 80053 COMPREHEN METABOLIC PANEL: CPT | Performed by: NURSE PRACTITIONER

## 2018-03-12 PROCEDURE — 74011000250 HC RX REV CODE- 250: Performed by: PHYSICIAN ASSISTANT

## 2018-03-12 PROCEDURE — 65210000002 HC RM PRIVATE GYN

## 2018-03-12 PROCEDURE — 85025 COMPLETE CBC W/AUTO DIFF WBC: CPT | Performed by: NURSE PRACTITIONER

## 2018-03-12 PROCEDURE — 74011250636 HC RX REV CODE- 250/636: Performed by: PHYSICIAN ASSISTANT

## 2018-03-12 RX ADMIN — PROMETHAZINE HYDROCHLORIDE 12.5 MG: 25 INJECTION INTRAMUSCULAR; INTRAVENOUS at 09:04

## 2018-03-12 RX ADMIN — ASCORBIC ACID: 500 INJECTION, SOLUTION INTRAMUSCULAR; INTRAVENOUS; SUBCUTANEOUS at 19:26

## 2018-03-12 RX ADMIN — SUCRALFATE 0.5 G: 1 SUSPENSION ORAL at 04:00

## 2018-03-12 RX ADMIN — PANTOPRAZOLE SODIUM 40 MG: 40 INJECTION, POWDER, FOR SOLUTION INTRAVENOUS at 08:58

## 2018-03-12 RX ADMIN — METOPROLOL TARTRATE 50 MG: 50 TABLET ORAL at 08:58

## 2018-03-12 RX ADMIN — METOPROLOL TARTRATE 50 MG: 50 TABLET ORAL at 17:24

## 2018-03-12 RX ADMIN — PROMETHAZINE HYDROCHLORIDE 12.5 MG: 25 INJECTION INTRAMUSCULAR; INTRAVENOUS at 14:33

## 2018-03-12 RX ADMIN — ZOLPIDEM TARTRATE 10 MG: 10 TABLET ORAL at 21:13

## 2018-03-12 RX ADMIN — Medication 10 ML: at 04:00

## 2018-03-12 RX ADMIN — DEXTROSE MONOHYDRATE 25 G: 500 INJECTION PARENTERAL at 18:00

## 2018-03-12 RX ADMIN — I.V. FAT EMULSION 250 ML: 20 EMULSION INTRAVENOUS at 19:30

## 2018-03-12 RX ADMIN — Medication 10 ML: at 14:34

## 2018-03-12 RX ADMIN — SUCRALFATE 0.5 G: 1 SUSPENSION ORAL at 11:04

## 2018-03-12 RX ADMIN — Medication 16 G: at 17:33

## 2018-03-12 RX ADMIN — SODIUM CHLORIDE 50 ML/HR: 450 INJECTION, SOLUTION INTRAVENOUS at 04:32

## 2018-03-12 RX ADMIN — LORATADINE 10 MG: 10 TABLET ORAL at 08:57

## 2018-03-12 RX ADMIN — INSULIN LISPRO 2 UNITS: 100 INJECTION, SOLUTION INTRAVENOUS; SUBCUTANEOUS at 05:54

## 2018-03-12 RX ADMIN — SUCRALFATE 0.5 G: 1 SUSPENSION ORAL at 17:24

## 2018-03-12 RX ADMIN — SUCRALFATE 0.5 G: 1 SUSPENSION ORAL at 21:06

## 2018-03-12 NOTE — PROGRESS NOTES
[facility] GYNECOLOGIC ONCOLOGY  [address] [facility], [address]  P [phone]  F [phone]       Patient Name: Farhan Cartagena   Admit Date: 3/5/2018       Primary Dx Progressive ovarian cancer, carcinomatosis. GI dysfunction, relative obstruction   Visit Date: 3/12/2018         SUBJECTIVE:    Stoma gaytan in place. Stoma output stable, though more thickened over weekend. Has been clamping off NGT for ~18 hrs No c/o with thickened stool and +flatus, though reflux. Denies pain. Stoma output modest.  No F/C, SOB. Ambulating well. OBJECTIVE:    Patient Vitals for the past 24 hrs:   Temp Pulse Resp BP SpO2   03/12/18 0856 98.5 °F (36.9 °C) 97 16 102/67 99 %   03/12/18 0400 98.6 °F (37 °C) 93 18 115/73 98 %   03/11/18 1950 98.5 °F (36.9 °C) 89 16 106/71 98 %   03/11/18 1607 98.7 °F (37.1 °C) 98 16 103/65 99 %         Date 03/11/18 0700 - 03/12/18 0659 03/12/18 0700 - 03/13/18 0659   Shift 2832-7510 6581-8187 24 Hour Total 4228-7046 0438-9096 24 Hour Total   I  N  T  A  K  E   I.V.  (mL/kg/hr)  400  (0.8) 400  (0.4)         Volume (0.45% sodium chloride infusion)  400 400       Shift Total  (mL/kg)  400  (9.5) 400  (9.5)      O  U  T  P  U  T   Urine  (mL/kg/hr)  1500  (3) 1500  (1.5)         Urine Voided  1500 1500       Emesis/NG output  200 200         Output (ml) (Nasogastric Tube 03/06/18)  200 200       Stool 50 225 275         Output (ml) (Ileostomy 05/05/16 Right  Abdomen) 50 225 275       Shift Total  (mL/kg) 50  (1.2) 1925  (45.6) 1975  (46.8)      NET -50 -8038 -1570      Weight (kg) 42.3 42.2 42.2 42.2 42.2 42.2       Physical Exam     General:  alert, cooperative, no distress, in good spirits     Cardiac:  Regular rate and rhythm        Lungs:  nonlabored  Abdomen:  Firm byt palpable,  thin, nondistended, nontender, no fluid. Extremity: no edema, SCDs in place    Date Review  Lab Results   Component Value Date/Time    WBC 3.8 03/12/2018 04:06 AM    ABS.  NEUTROPHILS 1.9 03/12/2018 04:06 AM    HGB 8.2 (L) 03/12/2018 04:06 AM    HCT 25.5 (L) 03/12/2018 04:06 AM    MCV 94.8 03/12/2018 04:06 AM    MCH 30.5 03/12/2018 04:06 AM    PLATELET 760 23/64/8968 04:06 AM     Lab Results   Component Value Date/Time    Sodium 136 03/12/2018 04:06 AM    Potassium 4.1 03/12/2018 04:06 AM    Chloride 104 03/12/2018 04:06 AM    CO2 25 03/12/2018 04:06 AM    Glucose 108 (H) 03/12/2018 04:06 AM    BUN 27 (H) 03/12/2018 04:06 AM    Creatinine 0.57 03/12/2018 04:06 AM    Calcium 8.2 (L) 03/12/2018 04:06 AM    Albumin 1.9 (L) 03/12/2018 04:06 AM    Bilirubin, total 0.3 03/12/2018 04:06 AM    AST (SGOT) 11 (L) 03/12/2018 04:06 AM    ALT (SGPT) 8 (L) 03/12/2018 04:06 AM    Alk. phosphatase 96 03/12/2018 04:06 AM     IMPRESSION/PLAN:     61 y.o. with advanced ovarian cancer, carcinomatosis with ?partial GI obstruction admitted in transfer for obstruction, N/V    Present on Admission:   Anemia due to chemotherapy   Abdominal pain   Cancer associated pain   Carcinomatosis ([facility] [address]   Counseling regarding end of life decision making   Hx of pulmonary embolus   Erosive esophagitis   Carcinomatosis peritonei (HCC)   Intractable vomiting with nausea   Small bowel obstruction   Dehydration   Anticoagulation adequate with anticoagulant therapy   Malignant ascites   Hypomagnesemia   Hydronephrosis of left kidney   Ileostomy in place [facility])      Continue NGT decompression, TPN/lipids, 12 hour cycle  Anemia, stable  Thrombocytopenia resolved- holding eliquis today in event of any procedure. She seems to have resolved, though duration of tolerance without reflux may vary. Long discussions, may best be served with palliative peg for venting purposes PRN, allowing PO intake as tolerated.  present, continuing planning and discussions as per Dr. Azalea Aase. Discussed with patient  All questions answered.         BENY Maria

## 2018-03-13 ENCOUNTER — HOSPITAL ENCOUNTER (OUTPATIENT)
Dept: INFUSION THERAPY | Age: 58
End: 2018-03-13

## 2018-03-13 ENCOUNTER — ANESTHESIA (OUTPATIENT)
Dept: ENDOSCOPY | Age: 58
DRG: 388 | End: 2018-03-13
Payer: OTHER GOVERNMENT

## 2018-03-13 ENCOUNTER — ANESTHESIA EVENT (OUTPATIENT)
Dept: ENDOSCOPY | Age: 58
DRG: 388 | End: 2018-03-13
Payer: OTHER GOVERNMENT

## 2018-03-13 LAB
ALBUMIN SERPL-MCNC: 2.4 G/DL (ref 3.5–5)
ALBUMIN/GLOB SERPL: 0.5 {RATIO} (ref 1.1–2.2)
ALP SERPL-CCNC: 111 U/L (ref 45–117)
ALT SERPL-CCNC: 13 U/L (ref 12–78)
ANION GAP SERPL CALC-SCNC: 8 MMOL/L (ref 5–15)
AST SERPL-CCNC: 17 U/L (ref 15–37)
BASOPHILS # BLD: 0 K/UL (ref 0–0.1)
BASOPHILS NFR BLD: 0 % (ref 0–1)
BILIRUB SERPL-MCNC: 0.2 MG/DL (ref 0.2–1)
BUN SERPL-MCNC: 22 MG/DL (ref 6–20)
BUN/CREAT SERPL: 33 (ref 12–20)
CALCIUM SERPL-MCNC: 8.8 MG/DL (ref 8.5–10.1)
CHLORIDE SERPL-SCNC: 101 MMOL/L (ref 97–108)
CO2 SERPL-SCNC: 27 MMOL/L (ref 21–32)
CREAT SERPL-MCNC: 0.67 MG/DL (ref 0.55–1.02)
DIFFERENTIAL METHOD BLD: ABNORMAL
EOSINOPHIL # BLD: 0.2 K/UL (ref 0–0.4)
EOSINOPHIL NFR BLD: 5 % (ref 0–7)
ERYTHROCYTE [DISTWIDTH] IN BLOOD BY AUTOMATED COUNT: 17.1 % (ref 11.5–14.5)
GLOBULIN SER CALC-MCNC: 4.5 G/DL (ref 2–4)
GLUCOSE BLD STRIP.AUTO-MCNC: 121 MG/DL (ref 65–100)
GLUCOSE BLD STRIP.AUTO-MCNC: 129 MG/DL (ref 65–100)
GLUCOSE BLD STRIP.AUTO-MCNC: 81 MG/DL (ref 65–100)
GLUCOSE BLD STRIP.AUTO-MCNC: 87 MG/DL (ref 65–100)
GLUCOSE SERPL-MCNC: 95 MG/DL (ref 65–100)
HCT VFR BLD AUTO: 28.9 % (ref 35–47)
HGB BLD-MCNC: 9.4 G/DL (ref 11.5–16)
IMM GRANULOCYTES # BLD: 0 K/UL (ref 0–0.04)
IMM GRANULOCYTES NFR BLD AUTO: 0 % (ref 0–0.5)
INR PPP: 1 (ref 0.9–1.1)
LYMPHOCYTES # BLD: 1.8 K/UL (ref 0.8–3.5)
LYMPHOCYTES NFR BLD: 41 % (ref 12–49)
MAGNESIUM SERPL-MCNC: 1.6 MG/DL (ref 1.6–2.4)
MCH RBC QN AUTO: 30.8 PG (ref 26–34)
MCHC RBC AUTO-ENTMCNC: 32.5 G/DL (ref 30–36.5)
MCV RBC AUTO: 94.8 FL (ref 80–99)
MONOCYTES # BLD: 0.3 K/UL (ref 0–1)
MONOCYTES NFR BLD: 7 % (ref 5–13)
NEUTS SEG # BLD: 2.1 K/UL (ref 1.8–8)
NEUTS SEG NFR BLD: 47 % (ref 32–75)
NRBC # BLD: 0 K/UL (ref 0–0.01)
NRBC BLD-RTO: 0 PER 100 WBC
PHOSPHATE SERPL-MCNC: 3.6 MG/DL (ref 2.6–4.7)
PLATELET # BLD AUTO: 270 K/UL (ref 150–400)
PMV BLD AUTO: 9.6 FL (ref 8.9–12.9)
POTASSIUM SERPL-SCNC: 3.8 MMOL/L (ref 3.5–5.1)
PROT SERPL-MCNC: 6.9 G/DL (ref 6.4–8.2)
PROTHROMBIN TIME: 10.1 SEC (ref 9–11.1)
RBC # BLD AUTO: 3.05 M/UL (ref 3.8–5.2)
SERVICE CMNT-IMP: ABNORMAL
SERVICE CMNT-IMP: ABNORMAL
SERVICE CMNT-IMP: NORMAL
SERVICE CMNT-IMP: NORMAL
SODIUM SERPL-SCNC: 136 MMOL/L (ref 136–145)
WBC # BLD AUTO: 4.5 K/UL (ref 3.6–11)

## 2018-03-13 PROCEDURE — 65210000002 HC RM PRIVATE GYN

## 2018-03-13 PROCEDURE — 74011250637 HC RX REV CODE- 250/637: Performed by: PHYSICIAN ASSISTANT

## 2018-03-13 PROCEDURE — 0DB38ZX EXCISION OF LOWER ESOPHAGUS, VIA NATURAL OR ARTIFICIAL OPENING ENDOSCOPIC, DIAGNOSTIC: ICD-10-PCS | Performed by: INTERNAL MEDICINE

## 2018-03-13 PROCEDURE — 88342 IMHCHEM/IMCYTCHM 1ST ANTB: CPT | Performed by: INTERNAL MEDICINE

## 2018-03-13 PROCEDURE — 85610 PROTHROMBIN TIME: CPT | Performed by: PHYSICIAN ASSISTANT

## 2018-03-13 PROCEDURE — 74011000258 HC RX REV CODE- 258: Performed by: NURSE PRACTITIONER

## 2018-03-13 PROCEDURE — 77030009426 HC FCPS BIOP ENDOSC BSC -B: Performed by: INTERNAL MEDICINE

## 2018-03-13 PROCEDURE — 74011636637 HC RX REV CODE- 636/637: Performed by: OBSTETRICS & GYNECOLOGY

## 2018-03-13 PROCEDURE — 0DB68ZX EXCISION OF STOMACH, VIA NATURAL OR ARTIFICIAL OPENING ENDOSCOPIC, DIAGNOSTIC: ICD-10-PCS | Performed by: INTERNAL MEDICINE

## 2018-03-13 PROCEDURE — C9113 INJ PANTOPRAZOLE SODIUM, VIA: HCPCS | Performed by: NURSE PRACTITIONER

## 2018-03-13 PROCEDURE — 74011000250 HC RX REV CODE- 250: Performed by: OBSTETRICS & GYNECOLOGY

## 2018-03-13 PROCEDURE — 84100 ASSAY OF PHOSPHORUS: CPT | Performed by: NURSE PRACTITIONER

## 2018-03-13 PROCEDURE — 82962 GLUCOSE BLOOD TEST: CPT

## 2018-03-13 PROCEDURE — 76060000031 HC ANESTHESIA FIRST 0.5 HR: Performed by: INTERNAL MEDICINE

## 2018-03-13 PROCEDURE — 80053 COMPREHEN METABOLIC PANEL: CPT | Performed by: NURSE PRACTITIONER

## 2018-03-13 PROCEDURE — 74011250636 HC RX REV CODE- 250/636: Performed by: OBSTETRICS & GYNECOLOGY

## 2018-03-13 PROCEDURE — 88305 TISSUE EXAM BY PATHOLOGIST: CPT | Performed by: INTERNAL MEDICINE

## 2018-03-13 PROCEDURE — 74011250636 HC RX REV CODE- 250/636: Performed by: NURSE PRACTITIONER

## 2018-03-13 PROCEDURE — 74011000258 HC RX REV CODE- 258: Performed by: PHYSICIAN ASSISTANT

## 2018-03-13 PROCEDURE — 83735 ASSAY OF MAGNESIUM: CPT | Performed by: NURSE PRACTITIONER

## 2018-03-13 PROCEDURE — 36415 COLL VENOUS BLD VENIPUNCTURE: CPT | Performed by: NURSE PRACTITIONER

## 2018-03-13 PROCEDURE — 85025 COMPLETE CBC W/AUTO DIFF WBC: CPT | Performed by: NURSE PRACTITIONER

## 2018-03-13 PROCEDURE — 74011000258 HC RX REV CODE- 258: Performed by: OBSTETRICS & GYNECOLOGY

## 2018-03-13 PROCEDURE — 76040000019: Performed by: INTERNAL MEDICINE

## 2018-03-13 PROCEDURE — 74011250637 HC RX REV CODE- 250/637: Performed by: NURSE PRACTITIONER

## 2018-03-13 PROCEDURE — 74011250636 HC RX REV CODE- 250/636

## 2018-03-13 RX ORDER — SODIUM CHLORIDE 0.9 % (FLUSH) 0.9 %
5-10 SYRINGE (ML) INJECTION EVERY 8 HOURS
Status: COMPLETED | OUTPATIENT
Start: 2018-03-13 | End: 2018-03-13

## 2018-03-13 RX ORDER — MIDAZOLAM HYDROCHLORIDE 1 MG/ML
.25-5 INJECTION, SOLUTION INTRAMUSCULAR; INTRAVENOUS
Status: DISCONTINUED | OUTPATIENT
Start: 2018-03-13 | End: 2018-03-13 | Stop reason: HOSPADM

## 2018-03-13 RX ORDER — ATROPINE SULFATE 0.1 MG/ML
0.5 INJECTION INTRAVENOUS
Status: DISCONTINUED | OUTPATIENT
Start: 2018-03-13 | End: 2018-03-13 | Stop reason: HOSPADM

## 2018-03-13 RX ORDER — SODIUM CHLORIDE 9 MG/ML
150 INJECTION, SOLUTION INTRAVENOUS CONTINUOUS
Status: DISPENSED | OUTPATIENT
Start: 2018-03-13 | End: 2018-03-13

## 2018-03-13 RX ORDER — PROPOFOL 10 MG/ML
INJECTION, EMULSION INTRAVENOUS AS NEEDED
Status: DISCONTINUED | OUTPATIENT
Start: 2018-03-13 | End: 2018-03-13 | Stop reason: HOSPADM

## 2018-03-13 RX ORDER — CEFAZOLIN SODIUM 2 G/50ML
2 SOLUTION INTRAVENOUS ONCE
Status: DISCONTINUED | OUTPATIENT
Start: 2018-03-13 | End: 2018-03-13 | Stop reason: HOSPADM

## 2018-03-13 RX ORDER — DEXTROMETHORPHAN/PSEUDOEPHED 2.5-7.5/.8
1.2 DROPS ORAL
Status: DISCONTINUED | OUTPATIENT
Start: 2018-03-13 | End: 2018-03-13 | Stop reason: HOSPADM

## 2018-03-13 RX ORDER — SODIUM CHLORIDE 9 MG/ML
INJECTION, SOLUTION INTRAVENOUS
Status: DISCONTINUED | OUTPATIENT
Start: 2018-03-13 | End: 2018-03-13 | Stop reason: HOSPADM

## 2018-03-13 RX ORDER — CEFAZOLIN SODIUM 1 G/3ML
INJECTION, POWDER, FOR SOLUTION INTRAMUSCULAR; INTRAVENOUS
Status: COMPLETED
Start: 2018-03-13 | End: 2018-03-13

## 2018-03-13 RX ORDER — EPINEPHRINE 0.1 MG/ML
1 INJECTION INTRACARDIAC; INTRAVENOUS
Status: DISCONTINUED | OUTPATIENT
Start: 2018-03-13 | End: 2018-03-13 | Stop reason: HOSPADM

## 2018-03-13 RX ORDER — SODIUM CHLORIDE 0.9 % (FLUSH) 0.9 %
5-10 SYRINGE (ML) INJECTION AS NEEDED
Status: ACTIVE | OUTPATIENT
Start: 2018-03-13 | End: 2018-03-13

## 2018-03-13 RX ORDER — CEFAZOLIN SODIUM 1 G/3ML
INJECTION, POWDER, FOR SOLUTION INTRAMUSCULAR; INTRAVENOUS AS NEEDED
Status: DISCONTINUED | OUTPATIENT
Start: 2018-03-13 | End: 2018-03-13 | Stop reason: HOSPADM

## 2018-03-13 RX ADMIN — SODIUM CHLORIDE: 9 INJECTION, SOLUTION INTRAVENOUS at 10:59

## 2018-03-13 RX ADMIN — SODIUM CHLORIDE 25 ML/HR: 450 INJECTION, SOLUTION INTRAVENOUS at 14:04

## 2018-03-13 RX ADMIN — PANTOPRAZOLE SODIUM 40 MG: 40 INJECTION, POWDER, FOR SOLUTION INTRAVENOUS at 09:32

## 2018-03-13 RX ADMIN — PROPOFOL 50 MG: 10 INJECTION, EMULSION INTRAVENOUS at 11:07

## 2018-03-13 RX ADMIN — ASCORBIC ACID: 500 INJECTION, SOLUTION INTRAMUSCULAR; INTRAVENOUS; SUBCUTANEOUS at 18:44

## 2018-03-13 RX ADMIN — PROMETHAZINE HYDROCHLORIDE 12.5 MG: 25 INJECTION INTRAMUSCULAR; INTRAVENOUS at 14:04

## 2018-03-13 RX ADMIN — PROPOFOL 50 MG: 10 INJECTION, EMULSION INTRAVENOUS at 11:11

## 2018-03-13 RX ADMIN — PROMETHAZINE HYDROCHLORIDE 12.5 MG: 25 INJECTION INTRAMUSCULAR; INTRAVENOUS at 22:28

## 2018-03-13 RX ADMIN — METOPROLOL TARTRATE 50 MG: 50 TABLET ORAL at 09:32

## 2018-03-13 RX ADMIN — ZOLPIDEM TARTRATE 10 MG: 10 TABLET ORAL at 22:27

## 2018-03-13 RX ADMIN — SUCRALFATE 0.5 G: 1 SUSPENSION ORAL at 21:16

## 2018-03-13 RX ADMIN — PROMETHAZINE HYDROCHLORIDE 12.5 MG: 25 INJECTION INTRAMUSCULAR; INTRAVENOUS at 05:05

## 2018-03-13 RX ADMIN — Medication 10 ML: at 06:02

## 2018-03-13 RX ADMIN — PROPOFOL 50 MG: 10 INJECTION, EMULSION INTRAVENOUS at 11:03

## 2018-03-13 RX ADMIN — CEFAZOLIN SODIUM 1 G: 1 INJECTION, POWDER, FOR SOLUTION INTRAMUSCULAR; INTRAVENOUS at 10:59

## 2018-03-13 RX ADMIN — SUCRALFATE 0.5 G: 1 SUSPENSION ORAL at 04:55

## 2018-03-13 RX ADMIN — SUCRALFATE 0.5 G: 1 SUSPENSION ORAL at 16:07

## 2018-03-13 RX ADMIN — PROMETHAZINE HYDROCHLORIDE 12.5 MG: 25 INJECTION INTRAMUSCULAR; INTRAVENOUS at 09:37

## 2018-03-13 RX ADMIN — LORATADINE 10 MG: 10 TABLET ORAL at 09:32

## 2018-03-13 RX ADMIN — PROPOFOL 50 MG: 10 INJECTION, EMULSION INTRAVENOUS at 11:09

## 2018-03-13 RX ADMIN — Medication 10 ML: at 16:07

## 2018-03-13 NOTE — PROCEDURES
[facility] [address] M.D.  [place], [address] [facility]  [phone]               Esophagogastroduodenoscopy (EGD) Procedure Note    NAME: Jaclyn Wiggins  :  1960  MRN:  [id number]    Indications: Intestinal obstruction due to carcinomatous, placement of \"venting\" G tube    : Midge Dakins, MD    Referring Provider:  Iqra Olmedo MD; Lizz Kathleen MD    Medicine:  MAC anesthesia, Ancef 1 g IV x 1      Procedure Details:  After informed consent was obtained with all risks and benefits of the procedure explained and preprocedure exam completed, the patient was placed in the left lateral decubitus position. Universal protocol for patient identification was performed and documented in the nursing notes. Throughout the procedure, the patient's blood pressure was monitored at least every five minutes; pulse, and oxygen saturations were monitored continuously. All vital signs were documented in the nursing notes. The endoscope was inserted into the mouth and advanced under direct vision to second portion of the duodenum. A careful inspection was made as the gastroscope was withdrawn, including a retroflexed view of the proximal stomach; findings and interventions are described below. Findings:   Esophagus: severe ulcerative esophagitis with narrowing in the distal esophagus s/p biopsies  Stomach: moderate hiatal hernia, moderate erythema throughout the stomach s/p biopsies  Duodenum: normal    Interventions:    biopsy of esophagus and stomach    Specimens:     ID Type Source Tests Collected by Time Destination   1 : gastric Preservative Gastric  Midge Dakins, MD 3/13/2018 1114 Pathology   2 : esophagus Preservative Esophagus  Midge Dakins, MD 3/13/2018 1114 Pathology        EBL: None          Complications:     No immediate complications        Impression:  -As above.  Unable to endoscopically place G tube due to severe esophagitis with narrowing and risk of esophageal perforation with pulling of the G tube through the esophagus. Recommendations:  -Await pathology. -PPI  -Placed IR consultation for G tube placement    Signed by:  Abdelrahman Farris MD         3/13/2018 11:21 AM

## 2018-03-13 NOTE — ADT AUTH CERT NOTES
Utilization Review           LOC:Acute Adult-General Medical (3/11/2018) by Jennifer Ceballos RN        Review Status Review Entered       In Primary 3/12/2018       Details         REVIEW SUMMARY     Patient: Guevara Puentes  Review Number: [id number]  Review Status: In Primary     Condition Specific: Yes        OUTCOMES  Outcome Type: Primary           REVIEW DETAILS     Service Date: 03/11/2018  Product: Diana Luis Adult  Subset: General Medical      (Symptom or finding within 24h)         (Excludes PO medications unless noted)          [X] Select Day, One:              [X] Episode Day 3-X, One:                  [X] ACUTE, >= One:                      [X] Gastrointestinal or biliary, One:                          [X] Partial responder, not clinically stable for discharge and requires continued stay, >= One:                              [X] Bowel obstruction, One:                                  [X] Unresolved, >= One:                                  ~--Admin, IQ Admin Admin on 03- 11:05 AM--~                                  98.7, 98/61, , RR 16, O2 sat 99, Ra                                      Meds: ½ NS 50 ml.hr, D50W iv, fentanyl patch, Humalog sq, clraatin po, protonix iv, Phenergan iv, Carafate po, ambien po, TPN iv cont,                                       Labs: WBc 3.4, RBC 2.76, HGb 8.5, HCT 26.3, RDw 17.0, Platelet 036, sodium 135, glucose 129, Bun 31, Creat 0.53, calcium 8.3, phos 6.2, protein 5.9, albumin 1.9, ALT 9, AST 11                                                      [X] Parenteral nutrition     Version: [employer]® 2017.2  Ender Hurley and Care[employer]hance®  © 2017 [employer] and/or one of its [place]. All Rights Reserved. CPT only © 2016 American Medical Association. All Rights Reserved.              Additional Notes       Continue NGT decompression, TPN/lipids, 12 hour cycle               Cultures no growth/UA normal. Leukocytosis/FUO x 1.  Resolved.       Anemia, stable       Thrombocytopenia resolved- holding eliquis today        Will monitor closely for now           LOC:Acute Adult-General Medical (3/8/2018) by Shannon Cespedes RN        Review Status Review Entered       In Primary 3/12/2018       Details         REVIEW SUMMARY     Patient: Jose Murillo  Review Number: [id number]  Review Status: In Primary     Condition Specific: Yes        OUTCOMES  Outcome Type: Primary           REVIEW DETAILS     Service Date: 03/08/2018  Product: Ervin Mealy Adult  Subset: General Medical      (Symptom or finding within 24h)         (Excludes PO medications unless noted)          [X] Select Day, One:              [X] Episode Day 3-X, One:                  [X] ACUTE, >= One:                      [X] Gastrointestinal or biliary, One:                          [X] Partial responder, not clinically stable for discharge and requires continued stay, >= One:                              [X] Bowel obstruction, One:                              ~--Admin, IQ Admin Admin on 03- 10:57 AM--~                              98.8, HR 91, RR 16, O2 sat 97, Ra                                  Meds: ½ NS 50 ml.hr, cepacol, fentanyl patch, claratin po, Phenergan iv, Carafate po, ambien po, protonix iv, TPN iv cont,                                   Labs: RBC 2.94, HGb 9.2, HCT 27.5, RDw 17.2, Platelet 85, Mono 2, potassium 3.4, calcium 8.0, protein 6.0, albumin 2.0, ASt 10                                                   [X] Unresolved, >= One:                                      [X] Parenteral nutrition     Version: InterQual® 2017.2  [employer] and CareEnhance®  © 2017 [employer] and/or one of its [place]. All Rights Reserved. CPT only © 2016 American Medical Association.   All Rights Reserved.              Additional Notes       Continue NGT decompression, TPN/lipids       Possible CT scan in AM eval GI pattern/obstruction       Cultures no growth/UA normal. Leukocytosis/FUO x 1. Resolved.  DC zosyn       Thrombocytopenia - DC eliquis       Feels a little better this morning.  Feels that her ostomy output has increased with placement of the Pierre catheter through the stoma.  Will continue to monitor output.  Her NGT output has decreased.               Will likely scan her tomorrow to reevaluate the rest of her small bowel.  I think her likely point of obstruction is just below the abdominal wall at the site of the ostomy.

## 2018-03-13 NOTE — ROUTINE PROCESS
Percy Bustose  1960  [id number]    Situation:  Verbal report received from: Yael Clements RN  Procedure: Procedure(s):  ESOPHAGOGASTRODUODENOSCOPY (EGD)  ESOPHAGOGASTRODUODENAL (EGD) BIOPSY    Background:    Preoperative diagnosis: Dysphagia  Postoperative diagnosis: Hiatial Hernia  Esophagitis  Gastritis    :  Dr. Leslie Schwarz  Assistant(s): Endoscopy Technician-1: Ellaree Crigler  Endoscopy RN-1: Cristiane Wheeler RN    Specimens:   ID Type Source Tests Collected by Time Destination   1 : gastric Preservative Gastric  David Spencer MD 3/13/2018 1114 Pathology   2 : esophagus Preservative Esophagus  David Spencer MD 3/13/2018 1114 Pathology     H. Pylori  no    Assessment:  Intra-procedure medications     Anesthesia gave intra-procedure sedation and medications, see anesthesia flow sheet yes    Intravenous fluids: NS@ KVO     Vital signs stable     Abdominal assessment: round and hard    Recommendation:    Return to floor  Family or Friend N/A  Permission to share finding with family or friend yes and n/a

## 2018-03-13 NOTE — CONSULTS
[address] [facility].  [address] [facility] 140 [facility], [address] [facility]                     Thank you for requesting this consultation but this patient has been seen by Karen and Dr. Genesis Bhat in the past.  We will inform them of this request.    Sincerely,  Patti Espana NP  8:57 AM 3/13/2018

## 2018-03-13 NOTE — ANESTHESIA POSTPROCEDURE EVALUATION
Post-Anesthesia Evaluation and Assessment    Patient: Nancy Jackson MRN: [id number]  SSN: [id number]    YOB: 1960  Age: 61 y.o. Sex: female       Cardiovascular Function/Vital Signs  Visit Vitals    BP 97/49    Pulse 84    Temp 37 °C (98.6 °F)    Resp 18    Ht 5' 2.99\" (1.6 m)    Wt 42 kg (92 lb 9.6 oz)    SpO2 99%    BMI 16.41 kg/m2       Patient is status post MAC anesthesia for Procedure(s):  ESOPHAGOGASTRODUODENOSCOPY (EGD)  ESOPHAGOGASTRODUODENAL (EGD) BIOPSY. Nausea/Vomiting: None    Postoperative hydration reviewed and adequate. Pain:  Pain Scale 1: Numeric (0 - 10) (03/13/18 1133)  Pain Intensity 1: 0 (03/13/18 1133)   Managed    Neurological Status:   Neuro  Neurologic State: Alert (03/12/18 2106)  Orientation Level: Oriented X4 (03/12/18 2106)  Cognition: Appropriate decision making; Appropriate for age attention/concentration; Appropriate safety awareness (03/11/18 2045)  Speech: Clear (03/11/18 2045)  LUE Motor Response: Purposeful (03/09/18 0830)  RUE Motor Response: Purposeful (03/09/18 0830)   At baseline    Mental Status and Level of Consciousness: Arousable    Pulmonary Status:   O2 Device: Room air (03/13/18 1133)   Adequate oxygenation and airway patent    Complications related to anesthesia: None    Post-anesthesia assessment completed.  No concerns    Signed By: Lucia Gee MD     March 13, 2018

## 2018-03-13 NOTE — PROGRESS NOTES
[facility]YNEC[facility]COLOGY  [address] [facility], [address], [address]  P [phone]  F [phone]       Patient Name: Comfort Chan   Admit Date: 3/5/2018       Primary Dx Progressive ovarian cancer, carcinomatosis. GI dysfunction, relative obstruction   Visit Date: 3/13/2018         SUBJECTIVE:    Stoma gaytan in place. Stoma output stable, though more thickened over weekend. Has been clamping off NGT for ~12 hrs. No c/o this AM.  Thickened stool and +flatus. Denies pain. Stoma output modest.  No F/C, SOB. Ambulating well.       OBJECTIVE:    Patient Vitals for the past 24 hrs:   Temp Pulse Resp BP SpO2   03/13/18 0145 99.3 °F (37.4 °C) 91 16 97/59 99 %   03/12/18 2046 99.3 °F (37.4 °C) 79 16 99/64 98 %   03/12/18 1723 - 91 - 118/66 -   03/12/18 1454 98.9 °F (37.2 °C) 86 16 113/73 100 %   03/12/18 0856 98.5 °F (36.9 °C) 97 16 102/67 99 %         Date 03/12/18 0700 - 03/13/18 0659 03/13/18 0700 - 03/14/18 0659   Shift 2390-9139 1920-7464 24 Hour Total 2989-6175 6764-2395 24 Hour Total   I  N  T  A  K  E   I.V.  (mL/kg/hr)  1074.2  (2.1) 1074.2  (1.1)         Volume (0.45% sodium chloride infusion)  290.8 290.8         Volume (promethazine (PHENERGAN) 12.5 mg in 0.9% sodium chloride 50 mL IVPB)  50 50         Volume (TPN ADULT - CENTRAL)  733.4 733.4       Shift Total  (mL/kg)  1074.2  (25.5) 1074.2  (25.5)      O  U  T  P  U  T   Urine  (mL/kg/hr)  2000  (4) 2000  (2)         Urine Voided  2000 2000         Urine Occurrence(s) 1 x  1 x       Emesis/NG output 450 350 800         Output (ml) (Nasogastric Tube 03/06/18) 450 350 800       Stool 125 325 450         Output (ml) (Ileostomy 05/05/16 Right  Abdomen) 125 325 450       Shift Total  (mL/kg) 575  (13.6) 2675  (63.4) 3250  (77)      NET -575 -1600.8 -2175.8      Weight (kg) 42.2 42.2 42.2 42.2 42.2 42.2       Physical Exam     General:  alert, cooperative, no distress, in good spirits     Cardiac:  Regular rate and rhythm        Lungs: nonlabored  Abdomen:  Firm byt palpable,  thin, nondistended, nontender, no fluid. Extremity: no edema, SCDs in place      Lab Results   Component Value Date/Time    WBC 4.5 03/13/2018 05:05 AM    ABS. NEUTROPHILS 2.1 03/13/2018 05:05 AM    HGB 9.4 (L) 03/13/2018 05:05 AM    HCT 28.9 (L) 03/13/2018 05:05 AM    MCV 94.8 03/13/2018 05:05 AM    MCH 30.8 03/13/2018 05:05 AM    PLATELET 437 75/30/2839 05:05 AM     Lab Results   Component Value Date/Time    Sodium 136 03/13/2018 05:05 AM    Potassium 3.8 03/13/2018 05:05 AM    Chloride 101 03/13/2018 05:05 AM    CO2 27 03/13/2018 05:05 AM    Glucose 95 03/13/2018 05:05 AM    BUN 22 (H) 03/13/2018 05:05 AM    Creatinine 0.67 03/13/2018 05:05 AM    Calcium 8.8 03/13/2018 05:05 AM    Albumin 2.4 (L) 03/13/2018 05:05 AM    Bilirubin, total 0.2 03/13/2018 05:05 AM    AST (SGOT) 17 03/13/2018 05:05 AM    ALT (SGPT) 13 03/13/2018 05:05 AM    Alk. phosphatase 111 03/13/2018 05:05 AM     IMPRESSION/PLAN:     61 y.o. with advanced ovarian cancer, carcinomatosis with ?partial GI obstruction admitted in transfer for obstruction, N/V. Present on Admission:   Anemia due to chemotherapy   Abdominal pain   Cancer associated pain   Carcinomatosis ([address]   Counseling regarding end of life decision making   Hx of pulmonary embolus   Erosive esophagitis   Carcinomatosis peritonei (HCC)   Intractable vomiting with nausea   Small bowel obstruction   Dehydration   Anticoagulation adequate with anticoagulant therapy   Malignant ascites   Hypomagnesemia   Hydronephrosis of left kidney   Ileostomy in place [facility])      Continue NGT decompression, TPN/lipids, 12 hour cycle  Anemia, stable  Thrombocytopenia resolved- holding eliquis today in event of any procedure. She seems to have resolved, though duration of tolerance without reflux may vary. Long discussions, may best be served with palliative peg for venting purposes PRN, allowing PO intake as tolerated.       Long conversation yesterday with Johanna Valderrama and her . Plan to have gastroenterology see today for palliative PEG placement. Will be able to pull her NGT at that time. Hopefully will be able to get her home by the end of the week if they are able to place the PEG.         Conda Dakins., MD

## 2018-03-13 NOTE — PERIOP NOTES

## 2018-03-13 NOTE — PROGRESS NOTES
Bedside and Verbal shift change report given to Yana RN (oncoming nurse) by Harper Muniz (offgoing nurse). Report included the following information SBAR, Kardex, Intake/Output, MAR and Accordion.

## 2018-03-13 NOTE — H&P
[address], [address]          Pre-procedure History and Physical       NAME:  Juan García   :   1960   MRN:   [id number]     CHIEF COMPLAINT/HPI: See procedure note    PMH:  Past Medical History:   Diagnosis Date    Abdominal carcinomatosis ([facility] [address] 10/2015    Arthritis     left shoulder    BRCA negative 2015    Chronic pain     Depression     HX    Environmental allergies     GERD (gastroesophageal reflux disease)     Hypertension     NEW OVER PAST 5-6 MONTHS    Ovarian cancer ([facility] [address] 10/2015    serous adenocarcinoma, high grade, stage IIIC    Pulmonary embolism ([facility] [address] 10/2015       PSH:  Past Surgical History:   Procedure Laterality Date    CARDIAC SURG PROCEDURE UNLIST  12/11/15    cardiac cath/normal     HX BREAST BIOPSY      benign right breast bx    HX DILATION AND CURETTAGE  2011    POLYPECTOMY    HX GI      PERFORATED BOWEL; ILEOSTOMY    HX GYN  10/2015    EXP LAPAROTOMY    HX HEENT  LAST     oral tissue graft X3    HX HEENT  1970    tonsillectomy    HX LAPAROTOMY  10/2015    tumor sampling    HX LAPAROTOMY  2016    hysterectomy, BSO, radical debulking    HX LAPAROTOMY  2016    resection ileocolic anastomosis, leak, ileostomy    HX OTHER SURGICAL  2015    tunneled portacath       Allergies: Allergies   Allergen Reactions    Ativan [Lorazepam] Other (comments)     SEVERE CONFUSION       Home Medications:  Prior to Admission Medications   Prescriptions Last Dose Informant Patient Reported? Taking? HYDROmorphone (DILAUDID) 1 mg/mL liqd oral solution 2018 at Unknown time  No Yes   Sig: Take 4 mL by mouth every four (4) hours as needed. Max Daily Amount: 24 mg. NEXIUM PACKET 40 mg granules for oral suspension 2018 at Unknown time  Yes Yes   Sig: two (2) times a day. apixaban (ELIQUIS) 2.5 mg tablet 2018 at Unknown time  No Yes   Sig: Take 1 Tab by mouth two (2) times a day.    cyclobenzaprine (FLEXERIL) 10 mg tablet 2018 at Unknown time  No Yes   Sig: Take 1 Tab by mouth three (3) times daily as needed for Muscle Spasm(s). dexamethasone (DECADRON) 4 mg tablet 2018 at Unknown time  No Yes   Sig: Take 2 tabs QAM day before chemo and for 2 days after   dronabinol (SYNDROS) 5 mg/mL soln Not Taking at Unknown time  No No   Sig: Take 2.1 mg by mouth two (2) times a day. Max Daily Amount: 4.2 mg. Take 30 minutes before lunch and dinner   fentaNYL (DURAGESIC) 100 mcg/hr PATCH 3/5/2018 at Unknown time  No Yes   Si Patch by TransDERmal route every seventy-two (72) hours. Max Daily Amount: 1 Patch. fentaNYL (DURAGESIC) 50 mcg/hr PATCH 3/5/2018 at Unknown time  No Yes   Si Patch by TransDERmal route every seventy-two (72) hours. Max Daily Amount: 1 Patch. This is in ADDITION to your 100 mcg patch for a total of 150 mcg patch every 72 hours   lidocaine-prilocaine (EMLA) topical cream 2018 at Unknown time  No No   Sig: Apply small amount over port area and cover with a band aid 1 hour before chemo treatment   magnesium oxide (MAG-OX) 400 mg tablet 3/4/2018 at Unknown time  No Yes   Sig: Take 1 Tab by mouth two (2) times a day. Indications: HYPOMAGNESEMIA   metoclopramide (REGLAN) 5 mg/5 mL syrup 2018 at Unknown time  No Yes   Sig: Take 10 mL by mouth four (4) times daily as needed. Take before meals as needed   metoprolol tartrate (LOPRESSOR) 50 mg tablet 3/5/2018 at Unknown time  Yes Yes   Sig: Take  by mouth two (2) times a day. ondansetron (ZOFRAN ODT) 4 mg disintegrating tablet 2018 at Unknown time  No Yes   Sig: Take 1 Tab by mouth every eight (8) hours as needed for Nausea. polyethylene glycol (MIRALAX) 17 gram/dose powder 2018 at Unknown time  No Yes   Sig: Take 17 g by mouth daily as needed. promethazine (PHENERGAN) 6.25 mg/5 mL syrup 2018 at Unknown time  No Yes   Sig: Take 20 mL by mouth four (4) times daily as needed for Nausea.    scopolamine (TRANSDERM-SCOP) 1 mg over 3 days pt3d 3/5/2018 at Unknown time  No Yes   Si Patch by TransDERmal route every seventy-two (72) hours as needed. sucralfate (CARAFATE) 100 mg/mL suspension 2018 at Unknown time  No Yes   Sig: Take 5 mL by mouth four (4) times daily. zolpidem (AMBIEN) 10 mg tablet 2018 at Unknown time  No Yes   Sig: Take 1 Tab by mouth nightly as needed for Sleep. Max Daily Amount: 10 mg.  Indications: SLEEP-ONSET INSOMNIA      Facility-Administered Medications: None       Hospital Medications:  Current Facility-Administered Medications   Medication Dose Route Frequency    TPN ADULT - CENTRAL   IntraVENous CONTINUOUS    0.9% sodium chloride infusion  150 mL/hr IntraVENous CONTINUOUS    sodium chloride (NS) flush 5-10 mL  5-10 mL IntraVENous Q8H    sodium chloride (NS) flush 5-10 mL  5-10 mL IntraVENous PRN    midazolam (VERSED) injection 0.25-5 mg  0.25-5 mg IntraVENous Multiple    simethicone (MYLICON) 84OF/6.7SG oral drops 80 mg  1.2 mL Oral Multiple    atropine injection 0.5 mg  0.5 mg IntraVENous ONCE PRN    EPINEPHrine (ADRENALIN) 0.1 mg/mL syringe 1 mg  1 mg Endoscopically ONCE PRN    ceFAZolin (ANCEF) 2g IVPB in 50 mL D5W  2 g IntraVENous ONCE    ceFAZolin (ANCEF) 1 gram injection        pantoprazole (PROTONIX) 40 mg in sodium chloride (NS) 10 mL injection  40 mg IntraVENous DAILY    fat emulsion 20% (LIPOSYN, INTRAlipid) infusion 250 mL  250 mL IntraVENous Q MON, WED & FRI    loratadine (CLARITIN) tablet 10 mg  10 mg Oral DAILY    lidocaine (XYLOCAINE) 2 % jelly   Mucous Membrane PRN    benzocaine-menthol (CEPACOL) lozenge 1 Lozenge  1 Lozenge Mucous Membrane PRN    0.45% sodium chloride infusion  25 mL/hr IntraVENous CONTINUOUS    insulin lispro (HUMALOG) injection   SubCUTAneous Q6H    glucose chewable tablet 16 g  4 Tab Oral PRN    dextrose (D50W) injection syrg 12.5-25 g  12.5-25 g IntraVENous PRN    glucagon (GLUCAGEN) injection 1 mg  1 mg IntraMUSCular PRN    diazePAM (VALIUM) injection 2.5 mg  2.5 mg IntraVENous Q4H PRN    cyclobenzaprine (FLEXERIL) tablet 10 mg  10 mg Oral TID PRN    metoprolol tartrate (LOPRESSOR) tablet 50 mg  50 mg Oral BID    scopolamine (TRANSDERM-SCOP) 1 mg over 3 days 1 Patch  1 Patch TransDERmal Q72H PRN    sucralfate (CARAFATE) 100 mg/mL oral suspension 0.5 g  0.5 g Oral QID    zolpidem (AMBIEN) tablet 10 mg  10 mg Oral QHS PRN    HYDROmorphone (PF) (DILAUDID) injection 0.5 mg  0.5 mg IntraVENous Q4H PRN    ondansetron (ZOFRAN) injection 4 mg  4 mg IntraVENous Q6H PRN    promethazine (PHENERGAN) 12.5 mg in 0.9% sodium chloride 50 mL IVPB  12.5 mg IntraVENous Q4H PRN    sodium chloride (NS) flush 5-10 mL  5-10 mL IntraVENous Q8H    sodium chloride (NS) flush 5-10 mL  5-10 mL IntraVENous PRN    acetaminophen (TYLENOL) tablet 650 mg  650 mg Oral Q4H PRN    diphenhydrAMINE (BENADRYL) injection 12.5 mg  12.5 mg IntraVENous Q4H PRN    fentaNYL (DURAGESIC) 100 mcg/hr patch 1 Patch  1 Patch TransDERmal Q72H       Family History:  Family History   Problem Relation Age of Onset    Cancer Maternal Uncle      skin    Hypertension Mother     High Cholesterol Mother     Anxiety Mother     Heart Disease Mother     High Cholesterol Father     Hypertension Father     Stroke Father     Arthritis-osteo Sister     Migraines Sister     Other Sister      FIBROMYALGIA    Depression Brother     Other Brother      DRUG ABUSE HEPATITIS C    Migraines Sister     Arrhythmia Sister     Depression Sister     Lung Disease Paternal Aunt      COPD    Cancer Paternal Uncle      LUNG    Lung Disease Paternal Uncle      COPD    Lung Disease Maternal Grandmother      COPD    Anesth Problems Neg Hx        Social History:  Social History   Substance Use Topics    Smoking status: Never Smoker    Smokeless tobacco: Never Used    Alcohol use No         PHYSICAL EXAM PRIOR TO SEDATION:  General: Alert, in no acute distress    Lungs: CTA bilaterally  Heart:  Normal S1, S2    Abdomen: Soft, Non distended, Non tender. Normoactive bowel sounds. Assessment:   Stable for sedation administration. Discussed with pt that she is at increased risk for complications with peritoneal carcinomatosis.   Plan:   · Endoscopic procedure with sedation     Signed By: Rose Patrick MD     3/13/2018  11:01 AM

## 2018-03-13 NOTE — PROGRESS NOTES
NUTRITION COMPLETE ASSESSMENT    RECOMMENDATIONS:   1. Continue TPN at goal as ordered until diet is advanced and po plan finalized  2. Consider sending a request to IR to put a large bore tube if possible so that decompression is most successful  3. Continue daily weights on TPN     Interventions/Plan:   Food/Nutrient Delivery:  TPN as primary source of nutrition    Assessment:   Reason for Assessment:   [x]Reassessment     Diet: NPO  Nutritionally Significant Medications: [x] Reviewed & Includes: carafate 4x/day; phenergan q4 hours prn; scopolamine patch; protonix daily; zofran q 6 hours prn    TPN: 5%AA D20 @ 76 mL/hr x 1 hour    @ 136 mL/hr x 10 hours    @ 76 mL/hr x 1 hour    + MVI, thiamine (100 mg), 6 units insulin/L, ascorbic acid 351 mg, folic acid 1 mg, vitamin K 1 mg   + 20% lipids, 250 mL 3x/week    Meal Intake: No data found. Subjective:  Pt in good spirits. Family at the bedside. Pt disappointed she was unable to have G-tube placed during EGD this morning and has to wait until tomorrow for IR to place. Per pt, the plan is for her to advance her diet as tolerated and use G-tube for decompression as needed. No plans for TPN on discharge. Objective:  Chart reviewed, discussed with RN. Pt remains on TPN as primary source of nutrition. She is s/p EGD this morning, which showed hiatal hernia, esophagitis, gastritis. Plans for G-tube placement in IR tomorrow. NGT out during EGD. TPN is providing a daily average of 1544 kcal, 76 g protein in 1512 mL-- meeting 100% of estimated needs. GIR: 10 mg/kg/min (upper limit-- if she is to remain on TPN, may need to consider a 14-16 hour cycle). BG are well controlled. If adjusted to 16 hours feedings, GIR would decrease to 7.5 mg/kg/min. Ostomy slowly productive. Noted hyperphosphatemia-- WNL with labs this morning. Weight stable.     Estimated Nutrition Needs:   Kcals/day: 7920 Kcals/day (1470-1596kcal)  Protein: 59 g (59-76g (1.4-1.8g/kg))  Fluid: 1470 ml (1ml/kcal)     Based On: Kcal/kg - specify (Comment) (35-38kcal/kg)  Weight Used: Actual wt (42kg)    Pt expected to meet estimated nutrient needs:  [x]   Yes    Nutrition Diagnosis:   1. Malnutrition related to inadequate protein/energy intake 2/2 altered GI fx as evidenced by PSBO; N/V; 80%IBW, severe wt loss; minimal PO intake x 10+ days     2. (Increased protein/energy needs) related to increased energy expenditure as evidenced by stage 4 ovarian CA; malnutrition with severe wt loss    Goals:     PN to meet at least 90% needs in 2-3 days; wt maintenance/gain     Monitoring & Evaluation:    - Enteral/parenteral nutrition intake   - Weight/weight change, GI, Electrolyte and renal profile      Previous Nutrition Goals Met:  Yes  Previous Recommendations:      Yes    Education & Discharge Needs:   [x] None Identified   [] Identified and addressed    [x] Participated in care plan, discharge planning, and/or interdisciplinary rounds        Cultural, Jehovah's witness and ethnic food preferences identified:   None    Skin Integrity: []Intact  [x]Other: ostomy  Edema: [x]None []Other  Last BM: 3/13/18  Food Allergies: [x]None []Other    Anthropometrics:    Weight Loss Metrics 3/13/2018 3/5/2018 2/13/2018 2/8/2018 1/16/2018 1/16/2018 12/19/2017   Today's Wt 92 lb 9.6 oz - 95 lb 9.6 oz 95 lb 6.4 oz 99 lb 9.6 oz 99 lb 9.6 oz 102 lb   BMI - 16.41 kg/m2 16.94 kg/m2 16.9 kg/m2 17.65 kg/m2 17.65 kg/m2 18.07 kg/m2      Last 3 Recorded Weights in this Encounter    03/10/18 2018 03/11/18 2205 03/13/18 0848   Weight: 42.3 kg (93 lb 3.2 oz) 42.2 kg (93 lb) 42 kg (92 lb 9.6 oz)      Weight Source: Standing scale (comment)  Height: 5' 2.99\" (160 cm),    Body mass index is 16.41 kg/(m^2).   IBW : 52.2 kg (115 lb), % IBW (Calculated): 80.52 %  Usual Body Weight: 56.2 kg (124 lb),      Labs:    Lab Results   Component Value Date/Time    Sodium 136 03/13/2018 05:05 AM    Potassium 3.8 03/13/2018 05:05 AM Chloride 101 03/13/2018 05:05 AM    CO2 27 03/13/2018 05:05 AM    Glucose 95 03/13/2018 05:05 AM    BUN 22 (H) 03/13/2018 05:05 AM    Creatinine 0.67 03/13/2018 05:05 AM    Calcium 8.8 03/13/2018 05:05 AM    Magnesium 1.6 03/13/2018 05:05 AM    Phosphorus 3.6 03/13/2018 05:05 AM    Albumin 2.4 (L) 03/13/2018 05:05 AM     No results found for: HBA1C, HGBE8, ACC3WBUE, SJT4LFDP  Lab Results   Component Value Date/Time    Glucose 95 03/13/2018 05:05 AM    Glucose (POC) 81 03/13/2018 12:04 PM      Lab Results   Component Value Date/Time    ALT (SGPT) 13 03/13/2018 05:05 AM    AST (SGOT) 17 03/13/2018 05:05 AM    Alk.  phosphatase 111 03/13/2018 05:05 AM    Bilirubin, direct 0.4 (H) 05/11/2016 04:39 AM    Bilirubin, total 0.2 03/13/2018 05:05 AM      [address]

## 2018-03-13 NOTE — PROGRESS NOTES
Patient's blood sugar at 1729 was 71. Patient AOx4, denied chills, fever, shakes. Patient given glucose tablet at 1733. Blood sugar at 1750 was 79. Rechecked at [phone], blood sugar was 76. D50 given at 1800. Blood sugar checked at 1818, sugar was 209.

## 2018-03-13 NOTE — CONSULTS
[address] [place] [address] NOTE  [facility]  [phone] NP in-hospital cell phone M-F until 4:30  After 5pm or on weekends, please call  for physician on call        NAME:  Ernesto Maurer   :   1960   MRN:   [id number]       Referring Physician: Devorah Castellanos    Consult Date: 3/13/2018 9:13 AM    Chief Complaint: placement of palliative gastrostomy tube     History of Present Illness:  Patient is a 61 y.o. who is seen in consultation at the request of BENY Shah for placement of palliative gastrostomy tube. Medical history as listed below includes stage IV ovarian cancer (status post suboptimal debulking and chemo), fecal peritonitis, cecal perforation, SBO (status post ileostomy), pleural effusions, and pulmonary embolus (on Eliquis). She has been in the hospital since 3/5/18 for nausea, vomiting, and abdominal pain due to obstruction likely from adhesions. She denies current nausea, vomiting, or abdominal pain. She has been using NG tube to suction during the night and when she is symptomatic during the day. She denies bloody output from ileostomy. EGD in 2018 severe esophagitis    EGD 5/10/17 for dysphagia: esophagus with circumferential ulceration and mild narrowing which sloughed away with passage of the scope, mild distal linear ulcerations; moderate hiatal hernia, diminutive polyp in the stomach    I have reviewed the emergency room note, hospital admission note, notes by all other clinicians who have seen the patient during this hospitalization to date. I have reviewed the problem list and the reason for this hospitalization. I have reviewed the allergies and the medications the patient was taking at home prior to this hospitalization.     PMH:  Past Medical History:   Diagnosis Date    Abdominal carcinomatosis ([address] 10/2015    Arthritis     left shoulder    BRCA negative 12/2015    Chronic pain     Depression     HX    Environmental allergies     GERD (gastroesophageal reflux disease)     Hypertension     NEW OVER PAST 5-6 MONTHS    Ovarian cancer ([facility] 10/2015    serous adenocarcinoma, high grade, stage IIIC    Pulmonary embolism ([facility] 10/2015       PSH:  Past Surgical History:   Procedure Laterality Date    CARDIAC SURG PROCEDURE UNLIST  12/11/15    cardiac cath/normal     HX BREAST BIOPSY  1995    benign right breast bx    HX DILATION AND CURETTAGE  2/2011    POLYPECTOMY    HX GI  2015    PERFORATED BOWEL; ILEOSTOMY    HX GYN  10/2015    EXP LAPAROTOMY    HX HEENT  LAST 2005    oral tissue graft X3    HX HEENT  1970    tonsillectomy    HX LAPAROTOMY  10/2015    tumor sampling    HX LAPAROTOMY  4/2016    hysterectomy, BSO, radical debulking    HX LAPAROTOMY  5/2016    resection ileocolic anastomosis, leak, ileostomy    HX OTHER SURGICAL  11/2015    tunneled portacath       Allergies: Allergies   Allergen Reactions    Ativan [Lorazepam] Other (comments)     SEVERE CONFUSION       Home Medications:  Prior to Admission Medications   Prescriptions Last Dose Informant Patient Reported? Taking? HYDROmorphone (DILAUDID) 1 mg/mL liqd oral solution 2/5/2018 at Unknown time  No Yes   Sig: Take 4 mL by mouth every four (4) hours as needed. Max Daily Amount: 24 mg. NEXIUM PACKET 40 mg granules for oral suspension 2/5/2018 at Unknown time  Yes Yes   Sig: two (2) times a day. apixaban (ELIQUIS) 2.5 mg tablet 2/27/2018 at Unknown time  No Yes   Sig: Take 1 Tab by mouth two (2) times a day. cyclobenzaprine (FLEXERIL) 10 mg tablet 2/5/2018 at Unknown time  No Yes   Sig: Take 1 Tab by mouth three (3) times daily as needed for Muscle Spasm(s).    dexamethasone (DECADRON) 4 mg tablet 2/17/2018 at Unknown time  No Yes   Sig: Take 2 tabs QAM day before chemo and for 2 days after   dronabinol (SYNDROS) 5 mg/mL soln Not Taking at Unknown time  No No   Sig: Take 2.1 mg by mouth two (2) times a day. Max Daily Amount: 4.2 mg. Take 30 minutes before lunch and dinner   fentaNYL (DURAGESIC) 100 mcg/hr PATCH 3/5/2018 at Unknown time  No Yes   Si Patch by TransDERmal route every seventy-two (72) hours. Max Daily Amount: 1 Patch. fentaNYL (DURAGESIC) 50 mcg/hr PATCH 3/5/2018 at Unknown time  No Yes   Si Patch by TransDERmal route every seventy-two (72) hours. Max Daily Amount: 1 Patch. This is in ADDITION to your 100 mcg patch for a total of 150 mcg patch every 72 hours   lidocaine-prilocaine (EMLA) topical cream 2018 at Unknown time  No No   Sig: Apply small amount over port area and cover with a band aid 1 hour before chemo treatment   magnesium oxide (MAG-OX) 400 mg tablet 3/4/2018 at Unknown time  No Yes   Sig: Take 1 Tab by mouth two (2) times a day. Indications: HYPOMAGNESEMIA   metoclopramide (REGLAN) 5 mg/5 mL syrup 2018 at Unknown time  No Yes   Sig: Take 10 mL by mouth four (4) times daily as needed. Take before meals as needed   metoprolol tartrate (LOPRESSOR) 50 mg tablet 3/5/2018 at Unknown time  Yes Yes   Sig: Take  by mouth two (2) times a day. ondansetron (ZOFRAN ODT) 4 mg disintegrating tablet 2018 at Unknown time  No Yes   Sig: Take 1 Tab by mouth every eight (8) hours as needed for Nausea. polyethylene glycol (MIRALAX) 17 gram/dose powder 2018 at Unknown time  No Yes   Sig: Take 17 g by mouth daily as needed. promethazine (PHENERGAN) 6.25 mg/5 mL syrup 2018 at Unknown time  No Yes   Sig: Take 20 mL by mouth four (4) times daily as needed for Nausea. scopolamine (TRANSDERM-SCOP) 1 mg over 3 days pt3d 3/5/2018 at Unknown time  No Yes   Si Patch by TransDERmal route every seventy-two (72) hours as needed. sucralfate (CARAFATE) 100 mg/mL suspension 2018 at Unknown time  No Yes   Sig: Take 5 mL by mouth four (4) times daily.    zolpidem (AMBIEN) 10 mg tablet 2018 at Unknown time  No Yes   Sig: Take 1 Tab by mouth nightly as needed for Sleep. Max Daily Amount: 10 mg.  Indications: SLEEP-ONSET INSOMNIA      Facility-Administered Medications: None       Hospital Medications:  Current Facility-Administered Medications   Medication Dose Route Frequency    TPN ADULT - CENTRAL   IntraVENous CONTINUOUS    pantoprazole (PROTONIX) 40 mg in sodium chloride (NS) 10 mL injection  40 mg IntraVENous DAILY    fat emulsion 20% (LIPOSYN, INTRAlipid) infusion 250 mL  250 mL IntraVENous Q MON, WED & FRI    loratadine (CLARITIN) tablet 10 mg  10 mg Oral DAILY    lidocaine (XYLOCAINE) 2 % jelly   Mucous Membrane PRN    benzocaine-menthol (CEPACOL) lozenge 1 Lozenge  1 Lozenge Mucous Membrane PRN    0.45% sodium chloride infusion  25 mL/hr IntraVENous CONTINUOUS    insulin lispro (HUMALOG) injection   SubCUTAneous Q6H    glucose chewable tablet 16 g  4 Tab Oral PRN    dextrose (D50W) injection syrg 12.5-25 g  12.5-25 g IntraVENous PRN    glucagon (GLUCAGEN) injection 1 mg  1 mg IntraMUSCular PRN    diazePAM (VALIUM) injection 2.5 mg  2.5 mg IntraVENous Q4H PRN    cyclobenzaprine (FLEXERIL) tablet 10 mg  10 mg Oral TID PRN    metoprolol tartrate (LOPRESSOR) tablet 50 mg  50 mg Oral BID    scopolamine (TRANSDERM-SCOP) 1 mg over 3 days 1 Patch  1 Patch TransDERmal Q72H PRN    sucralfate (CARAFATE) 100 mg/mL oral suspension 0.5 g  0.5 g Oral QID    zolpidem (AMBIEN) tablet 10 mg  10 mg Oral QHS PRN    HYDROmorphone (PF) (DILAUDID) injection 0.5 mg  0.5 mg IntraVENous Q4H PRN    ondansetron (ZOFRAN) injection 4 mg  4 mg IntraVENous Q6H PRN    promethazine (PHENERGAN) 12.5 mg in 0.9% sodium chloride 50 mL IVPB  12.5 mg IntraVENous Q4H PRN    sodium chloride (NS) flush 5-10 mL  5-10 mL IntraVENous Q8H    sodium chloride (NS) flush 5-10 mL  5-10 mL IntraVENous PRN    acetaminophen (TYLENOL) tablet 650 mg  650 mg Oral Q4H PRN    diphenhydrAMINE (BENADRYL) injection 12.5 mg  12.5 mg IntraVENous Q4H PRN    fentaNYL (DURAGESIC) 100 mcg/hr patch 1 Patch  1 Patch TransDERmal Q72H       Social History:  Social History   Substance Use Topics    Smoking status: Never Smoker    Smokeless tobacco: Never Used    Alcohol use No       Family History:  Family History   Problem Relation Age of Onset    Cancer Maternal Uncle      skin    Hypertension Mother     High Cholesterol Mother     Anxiety Mother     Heart Disease Mother     High Cholesterol Father     Hypertension Father     Stroke Father     Arthritis-osteo Sister     Migraines Sister     Other Sister      FIBROMYALGIA    Depression Brother     Other Brother      DRUG ABUSE HEPATITIS C    Migraines Sister     Arrhythmia Sister     Depression Sister     Lung Disease Paternal Aunt      COPD    Cancer Paternal Uncle      LUNG    Lung Disease Paternal Uncle      COPD    Lung Disease Maternal Grandmother      COPD    Anesth Problems Neg Hx        Review of Systems:  Constitutional: negative fever, negative chills, +weight loss  Eyes:   negative visual changes  ENT:   +sore throat, negative tongue or lip swelling  Respiratory:  negative cough, negative dyspnea  Cards:  negative for chest pain, palpitations, lower extremity edema  GI:   See HPI  :  negative for frequency, dysuria  Integument:  negative for rash and pruritus  Heme:  negative for gum/nose bleeding; + easy bruising   Musculoskeletal:negative for myalgias, back pain and muscle weakness  Neuro:    negative for headaches, dizziness, vertigo  Psych:  negative for feelings of anxiety, depression (occasional)    Objective:   Patient Vitals for the past 8 hrs:   BP Temp Pulse Resp SpO2 Weight   03/13/18 0854 124/79 98.8 °F (37.1 °C) 94 14 99 % -   03/13/18 0813 - - - - - 42 kg (92 lb 9.6 oz)   03/13/18 0145 97/59 99.3 °F (37.4 °C) 91 16 99 % -        03/11 1901 - 03/13 0700  In: 1474.2 [I.V.:1474.2]  Out: 4950 [Urine:3500]    EXAM:     CONST:  Pleasant lady lying in bed, no acute distress   NEURO:  alert and oriented x 3   HEENT: EOMI, no scleral icterus   LUNGS: clear to ausculation, (-) wheeze   CARD:  regular rate and rhythm, S1 S2   ABD:  soft, no tenderness, no rebound, bowel sounds (+) all 4 quadrants, no masses, non distended; midline surgical scar; RLQ ileostomy with scant brown output   EXT:  no edema, warm   PSYCH: full, not anxious     Data Review     Recent Labs      03/13/18   0505  03/12/18   0406   WBC  4.5  3.8   HGB  9.4*  8.2*   HCT  28.9*  25.5*   PLT  270  175     Recent Labs      03/13/18   0505  03/12/18   0406   NA  136  136   K  3.8  4.1   CL  101  104   CO2  27  25   BUN  22*  27*   CREA  0.67  0.57   GLU  95  108*   PHOS  3.6  5.5*   CA  8.8  8.2*     Recent Labs      03/13/18   0505  03/12/18   0406   SGOT  17  11*   AP  111  96   TP  6.9  5.9*   ALB  2.4*  1.9*   GLOB  4.5*  4.0     Recent Labs      03/13/18   0505   INR  1.0   PTP  10.1     IMPRESSION:   1. Status post hysterectomy and bilateral nephrectomy. 2. Status post subtotal colectomy. 3. Nasogastric tube in the stomach. 4. Status post subtotal colectomy with ileocolic anastomosis. 5. Enterostomy tube in the right abdomen with dilated centralized small bowel loops suggesting cocooning  with adhesions and obstruction since there is no contrast in the rectum, unchanged. 6. Minimal loculated ascites unchanged. 7. Bilateral hydronephrosis, unchanged. 8. IVC filter.      Assessment:   · Persistent obstruction: CT with dilated small bowel with adhesions and obstruction; patient has been using NG tube for the past week for relief of nausea/vomiting and abdominal pain  · Ovarian Cancer     Patient Active Problem List   Diagnosis Code    Malignant neoplasm of both ovaries ([address] C56.1, C56.2    Carcinomatosis peritonei ([address] C78.6, C80.1    Small bowel obstruction K56.609    Ileostomy in place ([address] Z93.2    Anemia due to chemotherapy D64.81, T45.1X5A    CINV (chemotherapy-induced nausea and vomiting) R11.2, T45.1X5A    Cancer associated pain G89.3    Generalized abdominal pain R10.84    Anxiety about health F41.8    Ileus (HCC) K56.7    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    Hx of pulmonary embolus Z86.711    Dehydration E86.0    Chemotherapy-induced neutropenia (HCC) D70.1, T45.1X5A    Anticoagulation adequate with anticoagulant therapy Z79.01    Counseling regarding end of life decision making Z71.89    Protein calorie malnutrition ([address] E46    Malignant ascites R18.0    Hydronephrosis of right kidney N13.30    Isolated proteinuria R80.0    Erosive esophagitis K22.10    Ulcerative esophagitis K22.10    Hypomagnesemia E83.42    Abdominal pain R10.9    Hydronephrosis of left kidney N13.30    Ovarian cancer (HCC) C56.9    Nausea & vomiting R11.2    Carcinomatosis (HCC) C80.0    Intractable vomiting with nausea R11.2    Partial bowel obstruction K56.600    Nausea and vomiting R11.2    Ovarian cancer, unspecified laterality (HCC) C56.9    Protein-calorie malnutrition, severe (HCC) E43    Esophagitis determined by endoscopy K20.9     Plan:     · Maintain NPO  · Continue to hold Eliquis  · EGD with PEG placement likely this morning with Dr. Ricarda Burgos  · Thank you for allowing me to participate in care of Rob Rodriguez     Signed By: Faby Norris NP     3/13/2018  9:13 AM       GI attending note:    Gen: NAD; Cardiac: nml S1, S2; Pulm: CTA B; Abd: normoactive BS, nt, nd, no rebound/guarding. Vitals, labs, and imaging reviewed. Agree with the history, physical, and plan of the AVA. Thank you for this consultation.     Dr. Ricarda Burgos

## 2018-03-13 NOTE — PROGRESS NOTES
03/13/2018; 10:00 -   CM noted new order for [facility] for IV hydration and met with patient at bedside to determine [place] of Choice for [facility] services. Patient selected [facility] for SN services. CM to contact [facility] and [place].  Patient is a possible 3/16/18 discharge and is requesting [place] education on 3/15/18 in the afternoon (3:30 pm or later) so that her  can be present as well.   CRM: Elijah Gomez, MPH; Z: [phone]

## 2018-03-13 NOTE — PERIOP NOTES
TRANSFER - OUT REPORT:    Verbal report given to [facility][1] (name) on [facility]  being transferred to [address] [place] [address] for routine progression of care       Report consisted of patients Situation, Background, Assessment and   Recommendations(SBAR). Information from the following report(s) SBAR was reviewed with the receiving nurse. Lines:   Venous Access Device port (Active)       Venous Access Device  Hector cath 03/05/18 (Active)   Central Line Being Utilized Yes 3/13/2018 10:00 AM   Criteria for Appropriate Use Total parenteral nutrition 3/13/2018 10:00 AM   Site Assessment Clean, dry, & intact 3/13/2018 10:00 AM   Date of Last Dressing Change 03/10/18 3/12/2018  9:06 PM   Dressing Status Clean, dry, & intact 3/12/2018  9:06 PM   Dressing Type Disk with Chlorhexadine gluconate (CHG); Transparent 3/13/2018  5:05 AM   Action Taken Blood drawn;Open ports on tubing capped 3/13/2018  5:05 AM   Access Needle Size (Site #1) 20 G 3/12/2018  4:00 AM   Positive Blood Return (Medial Site) Yes 3/13/2018  5:05 AM   Action Taken (Medial Site) Blood drawn;Flushed; Infusing 3/13/2018  5:05 AM   Positive Blood Return (Lateral Site) Yes 3/9/2018  4:33 AM   Alcohol Cap Used Yes 3/13/2018  5:05 AM       Peripheral IV 03/13/18 (Active)        Opportunity for questions and clarification was provided.       Patient transported with:   State

## 2018-03-14 LAB
ALBUMIN SERPL-MCNC: 2.2 G/DL (ref 3.5–5)
ALBUMIN/GLOB SERPL: 0.5 {RATIO} (ref 1.1–2.2)
ALP SERPL-CCNC: 101 U/L (ref 45–117)
ALT SERPL-CCNC: 13 U/L (ref 12–78)
ANION GAP SERPL CALC-SCNC: 8 MMOL/L (ref 5–15)
AST SERPL-CCNC: 12 U/L (ref 15–37)
BASOPHILS # BLD: 0 K/UL (ref 0–0.1)
BASOPHILS NFR BLD: 0 % (ref 0–1)
BILIRUB SERPL-MCNC: 0.3 MG/DL (ref 0.2–1)
BUN SERPL-MCNC: 25 MG/DL (ref 6–20)
BUN/CREAT SERPL: 40 (ref 12–20)
CALCIUM SERPL-MCNC: 8.4 MG/DL (ref 8.5–10.1)
CHLORIDE SERPL-SCNC: 104 MMOL/L (ref 97–108)
CO2 SERPL-SCNC: 27 MMOL/L (ref 21–32)
CREAT SERPL-MCNC: 0.63 MG/DL (ref 0.55–1.02)
DIFFERENTIAL METHOD BLD: ABNORMAL
EOSINOPHIL # BLD: 0.1 K/UL (ref 0–0.4)
EOSINOPHIL NFR BLD: 3 % (ref 0–7)
ERYTHROCYTE [DISTWIDTH] IN BLOOD BY AUTOMATED COUNT: 17.1 % (ref 11.5–14.5)
GLOBULIN SER CALC-MCNC: 4.3 G/DL (ref 2–4)
GLUCOSE BLD STRIP.AUTO-MCNC: 108 MG/DL (ref 65–100)
GLUCOSE BLD STRIP.AUTO-MCNC: 121 MG/DL (ref 65–100)
GLUCOSE BLD STRIP.AUTO-MCNC: 131 MG/DL (ref 65–100)
GLUCOSE BLD STRIP.AUTO-MCNC: 172 MG/DL (ref 65–100)
GLUCOSE BLD STRIP.AUTO-MCNC: 74 MG/DL (ref 65–100)
GLUCOSE SERPL-MCNC: 109 MG/DL (ref 65–100)
HCT VFR BLD AUTO: 26.6 % (ref 35–47)
HGB BLD-MCNC: 8.5 G/DL (ref 11.5–16)
IMM GRANULOCYTES # BLD: 0 K/UL (ref 0–0.04)
IMM GRANULOCYTES NFR BLD AUTO: 0 % (ref 0–0.5)
LYMPHOCYTES # BLD: 1.6 K/UL (ref 0.8–3.5)
LYMPHOCYTES NFR BLD: 37 % (ref 12–49)
MAGNESIUM SERPL-MCNC: 1.5 MG/DL (ref 1.6–2.4)
MCH RBC QN AUTO: 30.7 PG (ref 26–34)
MCHC RBC AUTO-ENTMCNC: 32 G/DL (ref 30–36.5)
MCV RBC AUTO: 96 FL (ref 80–99)
MONOCYTES # BLD: 0.4 K/UL (ref 0–1)
MONOCYTES NFR BLD: 9 % (ref 5–13)
NEUTS SEG # BLD: 2.3 K/UL (ref 1.8–8)
NEUTS SEG NFR BLD: 52 % (ref 32–75)
NRBC # BLD: 0 K/UL (ref 0–0.01)
NRBC BLD-RTO: 0 PER 100 WBC
PHOSPHATE SERPL-MCNC: 3.8 MG/DL (ref 2.6–4.7)
PLATELET # BLD AUTO: 263 K/UL (ref 150–400)
PMV BLD AUTO: 9.4 FL (ref 8.9–12.9)
POTASSIUM SERPL-SCNC: 4.2 MMOL/L (ref 3.5–5.1)
PROT SERPL-MCNC: 6.5 G/DL (ref 6.4–8.2)
RBC # BLD AUTO: 2.77 M/UL (ref 3.8–5.2)
SERVICE CMNT-IMP: ABNORMAL
SERVICE CMNT-IMP: NORMAL
SODIUM SERPL-SCNC: 139 MMOL/L (ref 136–145)
WBC # BLD AUTO: 4.4 K/UL (ref 3.6–11)

## 2018-03-14 PROCEDURE — 65210000002 HC RM PRIVATE GYN

## 2018-03-14 PROCEDURE — 74011250637 HC RX REV CODE- 250/637: Performed by: NURSE PRACTITIONER

## 2018-03-14 PROCEDURE — 36415 COLL VENOUS BLD VENIPUNCTURE: CPT | Performed by: NURSE PRACTITIONER

## 2018-03-14 PROCEDURE — 74011000258 HC RX REV CODE- 258: Performed by: PHYSICIAN ASSISTANT

## 2018-03-14 PROCEDURE — 84100 ASSAY OF PHOSPHORUS: CPT | Performed by: NURSE PRACTITIONER

## 2018-03-14 PROCEDURE — C9113 INJ PANTOPRAZOLE SODIUM, VIA: HCPCS | Performed by: PHYSICIAN ASSISTANT

## 2018-03-14 PROCEDURE — 80053 COMPREHEN METABOLIC PANEL: CPT | Performed by: NURSE PRACTITIONER

## 2018-03-14 PROCEDURE — 74011000250 HC RX REV CODE- 250: Performed by: PHYSICIAN ASSISTANT

## 2018-03-14 PROCEDURE — 82962 GLUCOSE BLOOD TEST: CPT

## 2018-03-14 PROCEDURE — 74011250636 HC RX REV CODE- 250/636: Performed by: PHYSICIAN ASSISTANT

## 2018-03-14 PROCEDURE — 74011250637 HC RX REV CODE- 250/637: Performed by: OBSTETRICS & GYNECOLOGY

## 2018-03-14 PROCEDURE — 83735 ASSAY OF MAGNESIUM: CPT | Performed by: NURSE PRACTITIONER

## 2018-03-14 PROCEDURE — 74011636637 HC RX REV CODE- 636/637: Performed by: PHYSICIAN ASSISTANT

## 2018-03-14 PROCEDURE — 74011000250 HC RX REV CODE- 250: Performed by: NURSE PRACTITIONER

## 2018-03-14 PROCEDURE — 85025 COMPLETE CBC W/AUTO DIFF WBC: CPT | Performed by: NURSE PRACTITIONER

## 2018-03-14 PROCEDURE — 74011000258 HC RX REV CODE- 258: Performed by: NURSE PRACTITIONER

## 2018-03-14 PROCEDURE — 74011250636 HC RX REV CODE- 250/636: Performed by: NURSE PRACTITIONER

## 2018-03-14 PROCEDURE — 74011250637 HC RX REV CODE- 250/637: Performed by: PHYSICIAN ASSISTANT

## 2018-03-14 RX ORDER — MAGNESIUM SULFATE 1 G/100ML
1 INJECTION INTRAVENOUS
Status: COMPLETED | OUTPATIENT
Start: 2018-03-14 | End: 2018-03-14

## 2018-03-14 RX ADMIN — DEXTROSE MONOHYDRATE 12.5 G: 500 INJECTION PARENTERAL at 17:02

## 2018-03-14 RX ADMIN — SUCRALFATE 0.5 G: 1 SUSPENSION ORAL at 09:59

## 2018-03-14 RX ADMIN — HYDROMORPHONE HYDROCHLORIDE 0.5 MG: 1 INJECTION, SOLUTION INTRAMUSCULAR; INTRAVENOUS; SUBCUTANEOUS at 18:58

## 2018-03-14 RX ADMIN — METOPROLOL TARTRATE 50 MG: 50 TABLET ORAL at 17:43

## 2018-03-14 RX ADMIN — PROMETHAZINE HYDROCHLORIDE 12.5 MG: 25 INJECTION INTRAMUSCULAR; INTRAVENOUS at 11:57

## 2018-03-14 RX ADMIN — SUCRALFATE 0.5 G: 1 SUSPENSION ORAL at 22:07

## 2018-03-14 RX ADMIN — MAGNESIUM SULFATE HEPTAHYDRATE 1 G: 1 INJECTION, SOLUTION INTRAVENOUS at 12:46

## 2018-03-14 RX ADMIN — HYDROMORPHONE HYDROCHLORIDE 0.5 MG: 1 INJECTION, SOLUTION INTRAMUSCULAR; INTRAVENOUS; SUBCUTANEOUS at 14:10

## 2018-03-14 RX ADMIN — ASCORBIC ACID: 500 INJECTION, SOLUTION INTRAMUSCULAR; INTRAVENOUS; SUBCUTANEOUS at 19:15

## 2018-03-14 RX ADMIN — Medication 10 ML: at 14:00

## 2018-03-14 RX ADMIN — PROMETHAZINE HYDROCHLORIDE 12.5 MG: 25 INJECTION INTRAMUSCULAR; INTRAVENOUS at 08:31

## 2018-03-14 RX ADMIN — HYDROMORPHONE HYDROCHLORIDE 0.5 MG: 1 INJECTION, SOLUTION INTRAMUSCULAR; INTRAVENOUS; SUBCUTANEOUS at 10:00

## 2018-03-14 RX ADMIN — ZOLPIDEM TARTRATE 10 MG: 10 TABLET ORAL at 22:07

## 2018-03-14 RX ADMIN — Medication 10 ML: at 03:09

## 2018-03-14 RX ADMIN — METOPROLOL TARTRATE 50 MG: 50 TABLET ORAL at 10:03

## 2018-03-14 RX ADMIN — MAGNESIUM SULFATE HEPTAHYDRATE 1 G: 1 INJECTION, SOLUTION INTRAVENOUS at 10:43

## 2018-03-14 RX ADMIN — SUCRALFATE 0.5 G: 1 SUSPENSION ORAL at 03:08

## 2018-03-14 RX ADMIN — Medication 10 ML: at 22:00

## 2018-03-14 RX ADMIN — I.V. FAT EMULSION 250 ML: 20 EMULSION INTRAVENOUS at 19:06

## 2018-03-14 RX ADMIN — SUCRALFATE 0.5 G: 1 SUSPENSION ORAL at 17:43

## 2018-03-14 RX ADMIN — LORATADINE 10 MG: 10 TABLET ORAL at 10:03

## 2018-03-14 RX ADMIN — PROMETHAZINE HYDROCHLORIDE 12.5 MG: 25 INJECTION INTRAMUSCULAR; INTRAVENOUS at 18:59

## 2018-03-14 RX ADMIN — MAGNESIUM SULFATE HEPTAHYDRATE 1 G: 1 INJECTION, SOLUTION INTRAVENOUS at 14:00

## 2018-03-14 RX ADMIN — PANTOPRAZOLE SODIUM 40 MG: 40 INJECTION, POWDER, FOR SOLUTION INTRAVENOUS at 09:59

## 2018-03-14 RX ADMIN — PANTOPRAZOLE SODIUM 40 MG: 40 INJECTION, POWDER, FOR SOLUTION INTRAVENOUS at 20:34

## 2018-03-14 NOTE — PROGRESS NOTES
Bedside and Verbal shift change report given to Bailey Rodney RN (oncoming nurse) by Louann He RN (offgoing nurse). Report included the following information SBAR, Kardex, Intake/Output, MAR, Accordion and Med Rec Status.

## 2018-03-14 NOTE — PROGRESS NOTES
03/14/2018; 11:30 -   CM met with attending physician team to request clarification of home IV hydration order. PA updated order to reflect 2 L bolus 1x/week. CM submitted referral to [place] for IV services and [facility] for SN needs via [employer], including request for 3/15/18 afternoon education session for IV needs.   Referral acknowledged by Anne.    CRM: Moriah Lopez, MPH; Z: [phone]

## 2018-03-14 NOTE — PROGRESS NOTES
Bedside shift change report given to [facility] (oncoming nurse) by Destiny Robin (offgoing nurse). Report included the following information SBAR.

## 2018-03-14 NOTE — PROGRESS NOTES
Bedside and Verbal shift change report given to Gladys Mendoza (oncoming nurse) by Marcus Mathews (offgoing nurse). Report included the following information SBAR, Kardex, Intake/Output, MAR, Accordion and Recent Results.

## 2018-03-15 ENCOUNTER — APPOINTMENT (OUTPATIENT)
Dept: INTERVENTIONAL RADIOLOGY/VASCULAR | Age: 58
DRG: 388 | End: 2018-03-15
Attending: INTERNAL MEDICINE
Payer: OTHER GOVERNMENT

## 2018-03-15 LAB
ALBUMIN SERPL-MCNC: 2.1 G/DL (ref 3.5–5)
ALBUMIN/GLOB SERPL: 0.5 {RATIO} (ref 1.1–2.2)
ALP SERPL-CCNC: 99 U/L (ref 45–117)
ALT SERPL-CCNC: 13 U/L (ref 12–78)
ANION GAP SERPL CALC-SCNC: 8 MMOL/L (ref 5–15)
AST SERPL-CCNC: 17 U/L (ref 15–37)
BASOPHILS # BLD: 0 K/UL (ref 0–0.1)
BASOPHILS NFR BLD: 0 % (ref 0–1)
BILIRUB SERPL-MCNC: 0.2 MG/DL (ref 0.2–1)
BUN SERPL-MCNC: 29 MG/DL (ref 6–20)
BUN/CREAT SERPL: 48 (ref 12–20)
CALCIUM SERPL-MCNC: 8.5 MG/DL (ref 8.5–10.1)
CHLORIDE SERPL-SCNC: 105 MMOL/L (ref 97–108)
CO2 SERPL-SCNC: 25 MMOL/L (ref 21–32)
CREAT SERPL-MCNC: 0.6 MG/DL (ref 0.55–1.02)
DIFFERENTIAL METHOD BLD: ABNORMAL
EOSINOPHIL # BLD: 0.2 K/UL (ref 0–0.4)
EOSINOPHIL NFR BLD: 5 % (ref 0–7)
ERYTHROCYTE [DISTWIDTH] IN BLOOD BY AUTOMATED COUNT: 17.3 % (ref 11.5–14.5)
GLOBULIN SER CALC-MCNC: 4.1 G/DL (ref 2–4)
GLUCOSE BLD STRIP.AUTO-MCNC: 113 MG/DL (ref 65–100)
GLUCOSE BLD STRIP.AUTO-MCNC: 125 MG/DL (ref 65–100)
GLUCOSE BLD STRIP.AUTO-MCNC: 126 MG/DL (ref 65–100)
GLUCOSE BLD STRIP.AUTO-MCNC: 129 MG/DL (ref 65–100)
GLUCOSE BLD STRIP.AUTO-MCNC: 65 MG/DL (ref 65–100)
GLUCOSE SERPL-MCNC: 102 MG/DL (ref 65–100)
HCT VFR BLD AUTO: 26 % (ref 35–47)
HGB BLD-MCNC: 8.3 G/DL (ref 11.5–16)
IMM GRANULOCYTES # BLD: 0 K/UL
IMM GRANULOCYTES NFR BLD AUTO: 0 %
LYMPHOCYTES # BLD: 2 K/UL (ref 0.8–3.5)
LYMPHOCYTES NFR BLD: 54 % (ref 12–49)
MAGNESIUM SERPL-MCNC: 2.3 MG/DL (ref 1.6–2.4)
MCH RBC QN AUTO: 30.9 PG (ref 26–34)
MCHC RBC AUTO-ENTMCNC: 31.9 G/DL (ref 30–36.5)
MCV RBC AUTO: 96.7 FL (ref 80–99)
MONOCYTES # BLD: 0.3 K/UL (ref 0–1)
MONOCYTES NFR BLD: 8 % (ref 5–13)
NEUTS BAND NFR BLD MANUAL: 1 % (ref 0–6)
NEUTS SEG # BLD: 1.2 K/UL (ref 1.8–8)
NEUTS SEG NFR BLD: 32 % (ref 32–75)
NRBC # BLD: 0 K/UL (ref 0–0.01)
NRBC BLD-RTO: 0 PER 100 WBC
PHOSPHATE SERPL-MCNC: 4.9 MG/DL (ref 2.6–4.7)
PLATELET # BLD AUTO: 341 K/UL (ref 150–400)
PLATELET COMMENTS,PCOM: ABNORMAL
PMV BLD AUTO: 9.4 FL (ref 8.9–12.9)
POTASSIUM SERPL-SCNC: 4.2 MMOL/L (ref 3.5–5.1)
PROT SERPL-MCNC: 6.2 G/DL (ref 6.4–8.2)
RBC # BLD AUTO: 2.69 M/UL (ref 3.8–5.2)
RBC MORPH BLD: ABNORMAL
RBC MORPH BLD: ABNORMAL
SERVICE CMNT-IMP: ABNORMAL
SERVICE CMNT-IMP: NORMAL
SODIUM SERPL-SCNC: 138 MMOL/L (ref 136–145)
WBC # BLD AUTO: 3.7 K/UL (ref 3.6–11)
WBC MORPH BLD: ABNORMAL

## 2018-03-15 PROCEDURE — 49440 PLACE GASTROSTOMY TUBE PERC: CPT

## 2018-03-15 PROCEDURE — 74011250636 HC RX REV CODE- 250/636: Performed by: RADIOLOGY

## 2018-03-15 PROCEDURE — 74011250636 HC RX REV CODE- 250/636: Performed by: NURSE PRACTITIONER

## 2018-03-15 PROCEDURE — C1894 INTRO/SHEATH, NON-LASER: HCPCS

## 2018-03-15 PROCEDURE — 74011000258 HC RX REV CODE- 258: Performed by: PHYSICIAN ASSISTANT

## 2018-03-15 PROCEDURE — 74011250637 HC RX REV CODE- 250/637: Performed by: NURSE PRACTITIONER

## 2018-03-15 PROCEDURE — 74011000250 HC RX REV CODE- 250: Performed by: NURSE PRACTITIONER

## 2018-03-15 PROCEDURE — C1769 GUIDE WIRE: HCPCS

## 2018-03-15 PROCEDURE — 74011250636 HC RX REV CODE- 250/636: Performed by: PHYSICIAN ASSISTANT

## 2018-03-15 PROCEDURE — 85025 COMPLETE CBC W/AUTO DIFF WBC: CPT | Performed by: NURSE PRACTITIONER

## 2018-03-15 PROCEDURE — 83735 ASSAY OF MAGNESIUM: CPT | Performed by: NURSE PRACTITIONER

## 2018-03-15 PROCEDURE — 74011250636 HC RX REV CODE- 250/636

## 2018-03-15 PROCEDURE — 74011636637 HC RX REV CODE- 636/637: Performed by: NURSE PRACTITIONER

## 2018-03-15 PROCEDURE — 65210000002 HC RM PRIVATE GYN

## 2018-03-15 PROCEDURE — 84100 ASSAY OF PHOSPHORUS: CPT | Performed by: NURSE PRACTITIONER

## 2018-03-15 PROCEDURE — 0DH63UZ INSERTION OF FEEDING DEVICE INTO STOMACH, PERCUTANEOUS APPROACH: ICD-10-PCS | Performed by: RADIOLOGY

## 2018-03-15 PROCEDURE — 36415 COLL VENOUS BLD VENIPUNCTURE: CPT | Performed by: NURSE PRACTITIONER

## 2018-03-15 PROCEDURE — C9113 INJ PANTOPRAZOLE SODIUM, VIA: HCPCS | Performed by: PHYSICIAN ASSISTANT

## 2018-03-15 PROCEDURE — 80053 COMPREHEN METABOLIC PANEL: CPT | Performed by: NURSE PRACTITIONER

## 2018-03-15 PROCEDURE — 74011000258 HC RX REV CODE- 258: Performed by: NURSE PRACTITIONER

## 2018-03-15 PROCEDURE — C1887 CATHETER, GUIDING: HCPCS

## 2018-03-15 PROCEDURE — 74011636320 HC RX REV CODE- 636/320: Performed by: RADIOLOGY

## 2018-03-15 PROCEDURE — 74011000250 HC RX REV CODE- 250: Performed by: RADIOLOGY

## 2018-03-15 PROCEDURE — 77030018617 HC TU FEED GASTMY1 COOK -B

## 2018-03-15 PROCEDURE — 82962 GLUCOSE BLOOD TEST: CPT

## 2018-03-15 RX ORDER — MIDAZOLAM HYDROCHLORIDE 1 MG/ML
5 INJECTION, SOLUTION INTRAMUSCULAR; INTRAVENOUS
Status: DISCONTINUED | OUTPATIENT
Start: 2018-03-15 | End: 2018-03-15

## 2018-03-15 RX ORDER — HYDROMORPHONE HYDROCHLORIDE 1 MG/ML
0.5 INJECTION, SOLUTION INTRAMUSCULAR; INTRAVENOUS; SUBCUTANEOUS
Status: DISCONTINUED | OUTPATIENT
Start: 2018-03-15 | End: 2018-03-17

## 2018-03-15 RX ORDER — CEFAZOLIN SODIUM/WATER 2 G/20 ML
SYRINGE (ML) INTRAVENOUS
Status: COMPLETED
Start: 2018-03-15 | End: 2018-03-15

## 2018-03-15 RX ORDER — HYDROMORPHONE HYDROCHLORIDE 1 MG/ML
0.5 INJECTION, SOLUTION INTRAMUSCULAR; INTRAVENOUS; SUBCUTANEOUS
Status: COMPLETED | OUTPATIENT
Start: 2018-03-15 | End: 2018-03-15

## 2018-03-15 RX ORDER — SODIUM CHLORIDE 9 MG/ML
25 INJECTION, SOLUTION INTRAVENOUS CONTINUOUS
Status: DISCONTINUED | OUTPATIENT
Start: 2018-03-15 | End: 2018-03-15

## 2018-03-15 RX ORDER — LIDOCAINE HYDROCHLORIDE 20 MG/ML
INJECTION, SOLUTION INFILTRATION; PERINEURAL
Status: DISPENSED
Start: 2018-03-15 | End: 2018-03-15

## 2018-03-15 RX ORDER — FENTANYL CITRATE 50 UG/ML
200 INJECTION, SOLUTION INTRAMUSCULAR; INTRAVENOUS
Status: DISCONTINUED | OUTPATIENT
Start: 2018-03-15 | End: 2018-03-15

## 2018-03-15 RX ORDER — LIDOCAINE HYDROCHLORIDE 20 MG/ML
10 INJECTION, SOLUTION INFILTRATION; PERINEURAL ONCE
Status: DISPENSED | OUTPATIENT
Start: 2018-03-15 | End: 2018-03-15

## 2018-03-15 RX ORDER — HYDROMORPHONE HYDROCHLORIDE 1 MG/ML
1 INJECTION, SOLUTION INTRAMUSCULAR; INTRAVENOUS; SUBCUTANEOUS
Status: DISCONTINUED | OUTPATIENT
Start: 2018-03-15 | End: 2018-03-15

## 2018-03-15 RX ORDER — CEFAZOLIN SODIUM 2 G/50ML
2 SOLUTION INTRAVENOUS
Status: DISCONTINUED | OUTPATIENT
Start: 2018-03-15 | End: 2018-03-15

## 2018-03-15 RX ORDER — LIDOCAINE HYDROCHLORIDE 20 MG/ML
10 INJECTION, SOLUTION INFILTRATION; PERINEURAL ONCE
Status: COMPLETED | OUTPATIENT
Start: 2018-03-15 | End: 2018-03-15

## 2018-03-15 RX ADMIN — SODIUM CHLORIDE 25 ML/HR: 450 INJECTION, SOLUTION INTRAVENOUS at 06:44

## 2018-03-15 RX ADMIN — HYDROMORPHONE HYDROCHLORIDE 0.5 MG: 1 INJECTION, SOLUTION INTRAMUSCULAR; INTRAVENOUS; SUBCUTANEOUS at 15:02

## 2018-03-15 RX ADMIN — FENTANYL CITRATE 50 MCG: 50 INJECTION, SOLUTION INTRAMUSCULAR; INTRAVENOUS at 13:06

## 2018-03-15 RX ADMIN — HYDROMORPHONE HYDROCHLORIDE 0.5 MG: 1 INJECTION, SOLUTION INTRAMUSCULAR; INTRAVENOUS; SUBCUTANEOUS at 01:53

## 2018-03-15 RX ADMIN — PANTOPRAZOLE SODIUM 40 MG: 40 INJECTION, POWDER, FOR SOLUTION INTRAVENOUS at 20:47

## 2018-03-15 RX ADMIN — SODIUM CHLORIDE 25 ML/HR: 9 INJECTION, SOLUTION INTRAVENOUS at 12:03

## 2018-03-15 RX ADMIN — HYDROMORPHONE HYDROCHLORIDE 0.5 MG: 1 INJECTION, SOLUTION INTRAMUSCULAR; INTRAVENOUS; SUBCUTANEOUS at 09:32

## 2018-03-15 RX ADMIN — IOPAMIDOL 50 ML: 612 INJECTION, SOLUTION INTRAVENOUS at 13:08

## 2018-03-15 RX ADMIN — MIDAZOLAM HYDROCHLORIDE 2 MG: 1 INJECTION, SOLUTION INTRAMUSCULAR; INTRAVENOUS at 12:53

## 2018-03-15 RX ADMIN — ZOLPIDEM TARTRATE 10 MG: 10 TABLET ORAL at 22:04

## 2018-03-15 RX ADMIN — Medication 10 ML: at 03:43

## 2018-03-15 RX ADMIN — DEXTROSE MONOHYDRATE 12.5 G: 500 INJECTION PARENTERAL at 17:00

## 2018-03-15 RX ADMIN — MIDAZOLAM HYDROCHLORIDE 1 MG: 1 INJECTION, SOLUTION INTRAMUSCULAR; INTRAVENOUS at 13:05

## 2018-03-15 RX ADMIN — SUCRALFATE 0.5 G: 1 SUSPENSION ORAL at 22:05

## 2018-03-15 RX ADMIN — LIDOCAINE HYDROCHLORIDE 200 MG: 20 INJECTION, SOLUTION INFILTRATION; PERINEURAL at 13:01

## 2018-03-15 RX ADMIN — ASCORBIC ACID: 500 INJECTION, SOLUTION INTRAMUSCULAR; INTRAVENOUS; SUBCUTANEOUS at 19:17

## 2018-03-15 RX ADMIN — HYDROMORPHONE HYDROCHLORIDE 0.5 MG: 1 INJECTION, SOLUTION INTRAMUSCULAR; INTRAVENOUS; SUBCUTANEOUS at 12:59

## 2018-03-15 RX ADMIN — FENTANYL CITRATE 100 MCG: 50 INJECTION, SOLUTION INTRAMUSCULAR; INTRAVENOUS at 13:04

## 2018-03-15 RX ADMIN — PROMETHAZINE HYDROCHLORIDE 12.5 MG: 25 INJECTION INTRAMUSCULAR; INTRAVENOUS at 09:44

## 2018-03-15 RX ADMIN — MIDAZOLAM HYDROCHLORIDE 1 MG: 1 INJECTION, SOLUTION INTRAMUSCULAR; INTRAVENOUS at 13:00

## 2018-03-15 RX ADMIN — HYDROMORPHONE HYDROCHLORIDE 0.5 MG: 1 INJECTION, SOLUTION INTRAMUSCULAR; INTRAVENOUS; SUBCUTANEOUS at 20:47

## 2018-03-15 RX ADMIN — HYDROMORPHONE HYDROCHLORIDE 0.5 MG: 1 INJECTION, SOLUTION INTRAMUSCULAR; INTRAVENOUS; SUBCUTANEOUS at 23:59

## 2018-03-15 RX ADMIN — PANTOPRAZOLE SODIUM 40 MG: 40 INJECTION, POWDER, FOR SOLUTION INTRAVENOUS at 09:00

## 2018-03-15 RX ADMIN — HYDROMORPHONE HYDROCHLORIDE 0.5 MG: 1 INJECTION, SOLUTION INTRAMUSCULAR; INTRAVENOUS; SUBCUTANEOUS at 12:45

## 2018-03-15 RX ADMIN — SUCRALFATE 0.5 G: 1 SUSPENSION ORAL at 17:01

## 2018-03-15 RX ADMIN — MIDAZOLAM HYDROCHLORIDE 1 MG: 1 INJECTION, SOLUTION INTRAMUSCULAR; INTRAVENOUS at 12:42

## 2018-03-15 RX ADMIN — FENTANYL CITRATE 50 MCG: 50 INJECTION, SOLUTION INTRAMUSCULAR; INTRAVENOUS at 12:53

## 2018-03-15 RX ADMIN — Medication 2 G: at 12:03

## 2018-03-15 RX ADMIN — HYDROMORPHONE HYDROCHLORIDE 0.5 MG: 1 INJECTION, SOLUTION INTRAMUSCULAR; INTRAVENOUS; SUBCUTANEOUS at 17:51

## 2018-03-15 RX ADMIN — DIPHENHYDRAMINE HYDROCHLORIDE 12.5 MG: 50 INJECTION, SOLUTION INTRAMUSCULAR; INTRAVENOUS at 12:55

## 2018-03-15 NOTE — H&P
Radiology History and Physical    Patient: Nancy Jackson 61 y.o. female       Chief Complaint: No chief complaint on file. History of Present Illness: for g tube    History:    Past Medical History:   Diagnosis Date    Abdominal carcinomatosis ([facility] 10/2015    Arthritis     left shoulder    BRCA negative 12/2015    Chronic pain     Depression     HX    Environmental allergies     GERD (gastroesophageal reflux disease)     Hypertension     NEW OVER PAST 5-6 MONTHS    Ovarian cancer ([facility] 10/2015    serous adenocarcinoma, high grade, stage IIIC    Pulmonary embolism ([facility] 10/2015     Family History   Problem Relation Age of Onset    Cancer Maternal Uncle      skin    Hypertension Mother     High Cholesterol Mother     Anxiety Mother     Heart Disease Mother     High Cholesterol Father     Hypertension Father     Stroke Father     Arthritis-osteo Sister     Migraines Sister     Other Sister      FIBROMYALGIA    Depression Brother     Other Brother      DRUG ABUSE HEPATITIS C    Migraines Sister     Arrhythmia Sister     Depression Sister     Lung Disease Paternal Aunt      COPD    Cancer Paternal Uncle      LUNG    Lung Disease Paternal Uncle      COPD    Lung Disease Maternal Grandmother      COPD    Anesth Problems Neg Hx      Social History     Social History    Marital status:      Spouse name: N/A    Number of children: N/A    Years of education: N/A     Occupational History    Not on file. Social History Main Topics    Smoking status: Never Smoker    Smokeless tobacco: Never Used    Alcohol use No    Drug use: No    Sexual activity: Not on file     Other Topics Concern    Not on file     Social History Narrative       Allergies:    Allergies   Allergen Reactions    Ativan [Lorazepam] Other (comments)     SEVERE CONFUSION       Current Medications:  Current Facility-Administered Medications   Medication Dose Route Frequency    lidocaine (XYLOCAINE) 20 mg/mL (2 %) injection 200 mg  10 mL IntraDERMal ONCE    lidocaine (XYLOCAINE) 20 mg/mL (2 %) injection 200 mg  10 mL IntraDERMal ONCE    iopamidol (ISOVUE 300) 61 % contrast injection 50 mL  50 mL IntraVENous RAD ONCE    0.9% sodium chloride infusion  25 mL/hr IntraVENous CONTINUOUS    fentaNYL citrate (PF) injection 200 mcg  200 mcg IntraVENous RAD PRN    midazolam (VERSED) injection 5 mg  5 mg IntraVENous RAD PRN    ceFAZolin (ANCEF) 2g IVPB in 50 mL D5W  2 g IntraVENous ONCE PRN    TPN ADULT - CENTRAL   IntraVENous CONTINUOUS    lidocaine (XYLOCAINE) 20 mg/mL (2 %) injection        pantoprazole (PROTONIX) 40 mg in sodium chloride (NS) 10 mL injection  40 mg IntraVENous Q12H    fat emulsion 20% (LIPOSYN, INTRAlipid) infusion 250 mL  250 mL IntraVENous Q MON, WED & FRI    loratadine (CLARITIN) tablet 10 mg  10 mg Oral DAILY    lidocaine (XYLOCAINE) 2 % jelly   Mucous Membrane PRN    benzocaine-menthol (CEPACOL) lozenge 1 Lozenge  1 Lozenge Mucous Membrane PRN    0.45% sodium chloride infusion  25 mL/hr IntraVENous CONTINUOUS    insulin lispro (HUMALOG) injection   SubCUTAneous Q6H    glucose chewable tablet 16 g  4 Tab Oral PRN    dextrose (D50W) injection syrg 12.5-25 g  12.5-25 g IntraVENous PRN    glucagon (GLUCAGEN) injection 1 mg  1 mg IntraMUSCular PRN    diazePAM (VALIUM) injection 2.5 mg  2.5 mg IntraVENous Q4H PRN    cyclobenzaprine (FLEXERIL) tablet 10 mg  10 mg Oral TID PRN    metoprolol tartrate (LOPRESSOR) tablet 50 mg  50 mg Oral BID    scopolamine (TRANSDERM-SCOP) 1 mg over 3 days 1 Patch  1 Patch TransDERmal Q72H PRN    sucralfate (CARAFATE) 100 mg/mL oral suspension 0.5 g  0.5 g Oral QID    zolpidem (AMBIEN) tablet 10 mg  10 mg Oral QHS PRN    HYDROmorphone (PF) (DILAUDID) injection 0.5 mg  0.5 mg IntraVENous Q4H PRN    ondansetron (ZOFRAN) injection 4 mg  4 mg IntraVENous Q6H PRN    promethazine (PHENERGAN) 12.5 mg in 0.9% sodium chloride 50 mL IVPB  12.5 mg IntraVENous Q4H PRN    sodium chloride (NS) flush 5-10 mL  5-10 mL IntraVENous Q8H    sodium chloride (NS) flush 5-10 mL  5-10 mL IntraVENous PRN    acetaminophen (TYLENOL) tablet 650 mg  650 mg Oral Q4H PRN    diphenhydrAMINE (BENADRYL) injection 12.5 mg  12.5 mg IntraVENous Q4H PRN    fentaNYL (DURAGESIC) 100 mcg/hr patch 1 Patch  1 Patch TransDERmal Q72H        Physical Exam:  Blood pressure 105/57, pulse 91, temperature 98 °F (36.7 °C), resp. rate 18, height 5' 2.99\" (1.6 m), weight 42.7 kg (94 lb 3.2 oz), SpO2 100 %.   LUNG: clear to auscultation bilaterally, HEART: regular rate and rhythm      Alerts:    Hospital Problems  Date Reviewed: 2/8/2018          Codes Class Noted POA    Protein-calorie malnutrition, severe ([facility] ICD-10-CM: E43  ICD-9-CM: 826  3/5/2018 Unknown        Esophagitis determined by endoscopy ICD-10-CM: K20.9  ICD-9-CM: 530.10  3/5/2018 Unknown        Partial bowel obstruction ICD-10-CM: K56.600  ICD-9-CM: 560.9  9/13/2017 Unknown        Intractable vomiting with nausea ICD-10-CM: R11.2  ICD-9-CM: 536.2  8/10/2017 Yes        Ovarian cancer ([facility] ICD-10-CM: C56.9  ICD-9-CM: 183.0  8/9/2017 Unknown        Nausea & vomiting ICD-10-CM: R11.2  ICD-9-CM: 787.01  8/9/2017 Unknown        Carcinomatosis ([facility] ICD-10-CM: C80.0  ICD-9-CM: 199.0  8/9/2017 Yes        Hydronephrosis of left kidney ICD-10-CM: N13.30  ICD-9-CM: 934  6/28/2017 Yes        Abdominal pain ICD-10-CM: R10.9  ICD-9-CM: 789.00  6/27/2017 Yes        Hypomagnesemia ICD-10-CM: E83.42  ICD-9-CM: 275.2  6/13/2017 Yes        Erosive esophagitis ICD-10-CM: K22.10  ICD-9-CM: 530.19  6/7/2017 Yes        Malignant ascites ICD-10-CM: R18.0  ICD-9-CM: 789.51  5/8/2017 Yes        Counseling regarding end of life decision making ICD-10-CM: Z71.89  ICD-9-CM: V65.49  4/25/2017 Yes        Anticoagulation adequate with anticoagulant therapy ICD-10-CM: Z79.01  ICD-9-CM: V58.61  3/28/2017 Yes        Hx of pulmonary embolus ICD-10-CM: Y15.224  ICD-9-CM: V12.55 3/21/2017 Yes        Dehydration ICD-10-CM: E86.0  ICD-9-CM: 276.51  3/21/2017 Yes        Cancer associated pain ICD-10-CM: G89.3  ICD-9-CM: 338.3  5/13/2016 Yes        Anemia due to chemotherapy ICD-10-CM: D64.81, T45.1X5A  ICD-9-CM: 285.3, E933.1  2/22/2016 Yes        Ileostomy in place [facility] ICD-10-CM: Z93.2  ICD-9-CM: V44.2  2/15/2016 Yes        Carcinomatosis peritonei ([facility] ICD-10-CM: C78.6, C80.1  ICD-9-CM: 197.6, 199.1  10/29/2015 Yes        Small bowel obstruction ICD-10-CM: J10.468  ICD-9-CM: 560.9  10/29/2015 Yes              Laboratory:      Recent Labs      03/15/18   0343   03/13/18   0505   HGB  8.3*   < >  9.4*   HCT  26.0*   < >  28.9*   WBC  3.7   < >  4.5   PLT  341   < >  270   INR   --    --   1.0   BUN  29*   < >  22*   CREA  0.60   < >  0.67   K  4.2   < >  3.8    < > = values in this interval not displayed. Plan of Care/Planned Procedure:  Risks, benefits, and alternatives reviewed with patient and she agrees to proceed with the procedure.        Aleena Reyna MD

## 2018-03-15 NOTE — PROGRESS NOTES
03/15/2018; 09:45  CM received [place] from [facility] [place] be able to accept the patient into [facility] service on 3/20/18. GERARD also received a response from Ruma Sandy: [phone]). For IV services, patient will have a 20% copay until out of pocket max met, then will be covered at 100%. 20% copay = approx $49 per day. Anne hernandez should be contacting CM/patient to set up education services. NOTE: Patient will be having a PEG tube placed at some point today. CRM: Mouna Burgess, MPH; Z: [phone]    14:00 -  CM received update from bedside nurse that Anne attempted to see the patient while she was off the unit for PEG tube placement. CM placed a call to Anne Sandy: [phone] when the patient returned and was informed that the patient can receive training once she has arrived at home. Anne asked if the patient will require TPN services at home as well as IV hydration. CM to contact physician to determine TPN status. CRM: Mouna Burgess, MPH; Z: [phone]    14:55 -   CM confirmed that TPN at home is not in the current care plan.     CRM: Mouna Burgess, MPH; Z: [phone]

## 2018-03-15 NOTE — PROGRESS NOTES
TRANSFER - IN REPORT:    Verbal report received from [address] (name) on Lysle Chimera  being received from angio(unit) for ordered procedure      Report consisted of patients Situation, Background, Assessment and   Recommendations(SBAR). Information from the following report(s) SBAR, Kardex, Procedure Summary, Intake/Output, MAR and Accordion was reviewed with the receiving nurse. Opportunity for questions and clarification was provided. Assessment completed upon patients arrival to unit and care assumed.

## 2018-03-15 NOTE — PROGRESS NOTES
[facility]IC [facility]COL[facility]  [address] [facility], [address], [address] [address] G[address]IA [phone]  F [phone]       Patient Name: Man Holden   Admit Date: 3/5/2018       Primary Dx Progressive ovarian cancer, carcinomatosis. GI dysfunction, relative obstruction   Visit Date: 3/15/2018         SUBJECTIVE:  Results of endoscopy yesterday noted. Will await larger GTube today via IR. More comfortable today. No emesis. Stoma continues modest output. TPN cycled continues   No F/C, SOB. Ambulating well. OBJECTIVE:    Patient Vitals for the past 24 hrs:   Temp Pulse Resp BP SpO2   03/15/18 0033 98 °F (36.7 °C) 74 18 104/66 98 %   03/14/18 1539 98.4 °F (36.9 °C) 74 18 109/71 100 %   03/14/18 0947 99.1 °F (37.3 °C) 95 18 137/86 99 %         Date 03/14/18 0700 - 03/15/18 0659 03/15/18 0700 - 03/16/18 0659   Shift 4805-1683 0380-1096 24 Hour Total 6115-4606 1387-0253 24 Hour Total   I  N  T  A  K  E   Shift Total  (mL/kg)         O  U  T  P  U  T   Urine  (mL/kg/hr) 800  (1.6) 1400  (2.7) 2200  (2.1)         Urine Voided 800 1400 2200         Urine Occurrence(s) 4 x 2 x 6 x       Stool 100 150 250         Output (ml) (Ileostomy 05/05/16 Right  Abdomen) 100 150 250       Shift Total  (mL/kg) 900  (21.4) 1550  (36.3) 2450  (57.3)      NET -900 -1550 -2450      Weight (kg) 42 42.7 42.7 42.7 42.7 42.7       Physical Exam     General:  alert, cooperative, no distress, in good spirits     Cardiac:  Regular         Lungs:  nonlabored  Abdomen:  Firm but palpable,  thin, nondistended, nontender, no fluid. Extremity: no edema, SCDs in place      Lab Results   Component Value Date/Time    WBC 3.7 03/15/2018 03:43 AM    ABS.  NEUTROPHILS 1.2 (L) 03/15/2018 03:43 AM    HGB 8.3 (L) 03/15/2018 03:43 AM    HCT 26.0 (L) 03/15/2018 03:43 AM    MCV 96.7 03/15/2018 03:43 AM    MCH 30.9 03/15/2018 03:43 AM    PLATELET 475 82/30/5450 03:43 AM     Lab Results   Component Value Date/Time    Sodium 138 03/15/2018 03:43 AM Potassium 4.2 03/15/2018 03:43 AM    Chloride 105 03/15/2018 03:43 AM    CO2 25 03/15/2018 03:43 AM    Glucose 102 (H) 03/15/2018 03:43 AM    BUN 29 (H) 03/15/2018 03:43 AM    Creatinine 0.60 03/15/2018 03:43 AM    Calcium 8.5 03/15/2018 03:43 AM    Albumin 2.1 (L) 03/15/2018 03:43 AM    Bilirubin, total 0.2 03/15/2018 03:43 AM    AST (SGOT) 17 03/15/2018 03:43 AM    ALT (SGPT) 13 03/15/2018 03:43 AM    Alk. phosphatase 99 03/15/2018 03:43 AM     IMPRESSION/PLAN:     61 y.o. with advanced ovarian cancer, carcinomatosis with ?partial GI obstruction admitted in transfer for obstruction, N/V. Present on Admission:   Anemia due to chemotherapy   Abdominal pain   Cancer associated pain   Carcinomatosis ([facility] [address]   Counseling regarding end of life decision making   Hx of pulmonary embolus   Erosive esophagitis   Carcinomatosis peritonei (HCC)   Intractable vomiting with nausea   Small bowel obstruction   Dehydration   Anticoagulation adequate with anticoagulant therapy   Malignant ascites   Hypomagnesemia   Hydronephrosis of left kidney   Ileostomy in place [facility])      Continue TPN/lipids, 12 hour cycle. Will begin weaning since G-tube to be placed later today  Anemia, stable. Will continue to monitor  Thrombocytopenia resolved- holding eliquis for procedure today. May best be served with palliative G-tube for venting purposes PRN, allowing PO intake as tolerated. Plan today IR peg. Antiemetics/antireflux control. Monitor pain. IV for breakthru. Long conversation 3/13 with Marco Antonio Hernandez and her  by Dr. Sandra Jasmine again. Hopefully will be able to get her home by the end of this week/weekend if they are able to place the PEG.         Mao Kincaid NP

## 2018-03-15 NOTE — PROGRESS NOTES
Bedside shift change report given to [address] (oncoming nurse) by Yamil Rivers RN (offgoing nurse). Report included the following information SBAR, Kardex and MAR.

## 2018-03-15 NOTE — PROGRESS NOTES
TRANSFER - OUT REPORT:    Verbal report given to Emilie(name) on Rut Smith  being transferred to (unit) for routine progression of care       Report consisted of patients Situation, Background, Assessment and   Recommendations(SBAR). Information from the following report(s) Kardex, OR Summary and MAR was reviewed with the receiving nurse. Lines:   Venous Access Device port (Active)       Venous Access Device  Hector cath 03/05/18 (Active)   Central Line Being Utilized Yes 3/15/2018  3:46 AM   Criteria for Appropriate Use Total parenteral nutrition 3/15/2018  3:46 AM   Site Assessment Clean, dry, & intact 3/15/2018  3:46 AM   Date of Last Dressing Change 03/10/18 3/14/2018  8:34 PM   Dressing Status Clean, dry, & intact 3/15/2018  3:46 AM   Dressing Type Disk with Chlorhexadine gluconate (CHG); Transparent 3/15/2018  3:46 AM   Action Taken Open ports on tubing capped;Blood drawn 3/15/2018  3:46 AM   Access Needle Size (Site #1) 20 G 3/14/2018  8:34 PM   Positive Blood Return (Medial Site) Yes 3/15/2018  3:46 AM   Action Taken (Medial Site) Blood drawn; Infusing;Flushed 3/15/2018  3:46 AM   Positive Blood Return (Lateral Site) Yes 3/9/2018  4:33 AM   Alcohol Cap Used Yes 3/15/2018  3:46 AM        Opportunity for questions and clarification was provided.       Patient transported with:   [employer]

## 2018-03-16 LAB
ALBUMIN SERPL-MCNC: 2 G/DL (ref 3.5–5)
ALBUMIN/GLOB SERPL: 0.5 {RATIO} (ref 1.1–2.2)
ALP SERPL-CCNC: 101 U/L (ref 45–117)
ALT SERPL-CCNC: 17 U/L (ref 12–78)
ANION GAP SERPL CALC-SCNC: 7 MMOL/L (ref 5–15)
AST SERPL-CCNC: 21 U/L (ref 15–37)
BASOPHILS # BLD: 0 K/UL (ref 0–0.1)
BASOPHILS NFR BLD: 0 % (ref 0–1)
BILIRUB SERPL-MCNC: 0.2 MG/DL (ref 0.2–1)
BUN SERPL-MCNC: 31 MG/DL (ref 6–20)
BUN/CREAT SERPL: 48 (ref 12–20)
CALCIUM SERPL-MCNC: 8 MG/DL (ref 8.5–10.1)
CHLORIDE SERPL-SCNC: 104 MMOL/L (ref 97–108)
CO2 SERPL-SCNC: 25 MMOL/L (ref 21–32)
CREAT SERPL-MCNC: 0.64 MG/DL (ref 0.55–1.02)
DIFFERENTIAL METHOD BLD: ABNORMAL
EOSINOPHIL # BLD: 0.1 K/UL (ref 0–0.4)
EOSINOPHIL NFR BLD: 2 % (ref 0–7)
ERYTHROCYTE [DISTWIDTH] IN BLOOD BY AUTOMATED COUNT: 17.2 % (ref 11.5–14.5)
GLOBULIN SER CALC-MCNC: 3.9 G/DL (ref 2–4)
GLUCOSE BLD STRIP.AUTO-MCNC: 101 MG/DL (ref 65–100)
GLUCOSE BLD STRIP.AUTO-MCNC: 119 MG/DL (ref 65–100)
GLUCOSE BLD STRIP.AUTO-MCNC: 81 MG/DL (ref 65–100)
GLUCOSE SERPL-MCNC: 107 MG/DL (ref 65–100)
HCT VFR BLD AUTO: 25.7 % (ref 35–47)
HGB BLD-MCNC: 8 G/DL (ref 11.5–16)
IMM GRANULOCYTES # BLD: 0 K/UL (ref 0–0.04)
IMM GRANULOCYTES NFR BLD AUTO: 0 % (ref 0–0.5)
LYMPHOCYTES # BLD: 1.3 K/UL (ref 0.8–3.5)
LYMPHOCYTES NFR BLD: 36 % (ref 12–49)
MAGNESIUM SERPL-MCNC: 1.9 MG/DL (ref 1.6–2.4)
MCH RBC QN AUTO: 30.2 PG (ref 26–34)
MCHC RBC AUTO-ENTMCNC: 31.1 G/DL (ref 30–36.5)
MCV RBC AUTO: 97 FL (ref 80–99)
MONOCYTES # BLD: 0.5 K/UL (ref 0–1)
MONOCYTES NFR BLD: 13 % (ref 5–13)
NEUTS SEG # BLD: 1.7 K/UL (ref 1.8–8)
NEUTS SEG NFR BLD: 49 % (ref 32–75)
NRBC # BLD: 0 K/UL (ref 0–0.01)
NRBC BLD-RTO: 0 PER 100 WBC
PHOSPHATE SERPL-MCNC: 3.2 MG/DL (ref 2.6–4.7)
PLATELET # BLD AUTO: 351 K/UL (ref 150–400)
PMV BLD AUTO: 9.6 FL (ref 8.9–12.9)
POTASSIUM SERPL-SCNC: 3.8 MMOL/L (ref 3.5–5.1)
PROT SERPL-MCNC: 5.9 G/DL (ref 6.4–8.2)
RBC # BLD AUTO: 2.65 M/UL (ref 3.8–5.2)
SERVICE CMNT-IMP: ABNORMAL
SERVICE CMNT-IMP: ABNORMAL
SERVICE CMNT-IMP: NORMAL
SODIUM SERPL-SCNC: 136 MMOL/L (ref 136–145)
WBC # BLD AUTO: 3.6 K/UL (ref 3.6–11)

## 2018-03-16 PROCEDURE — 83735 ASSAY OF MAGNESIUM: CPT | Performed by: NURSE PRACTITIONER

## 2018-03-16 PROCEDURE — 85025 COMPLETE CBC W/AUTO DIFF WBC: CPT | Performed by: NURSE PRACTITIONER

## 2018-03-16 PROCEDURE — 74011250636 HC RX REV CODE- 250/636: Performed by: PHYSICIAN ASSISTANT

## 2018-03-16 PROCEDURE — 74011000258 HC RX REV CODE- 258: Performed by: PHYSICIAN ASSISTANT

## 2018-03-16 PROCEDURE — C9113 INJ PANTOPRAZOLE SODIUM, VIA: HCPCS | Performed by: PHYSICIAN ASSISTANT

## 2018-03-16 PROCEDURE — 74011000258 HC RX REV CODE- 258: Performed by: NURSE PRACTITIONER

## 2018-03-16 PROCEDURE — 74011250637 HC RX REV CODE- 250/637: Performed by: PHYSICIAN ASSISTANT

## 2018-03-16 PROCEDURE — 84100 ASSAY OF PHOSPHORUS: CPT | Performed by: NURSE PRACTITIONER

## 2018-03-16 PROCEDURE — 74011250637 HC RX REV CODE- 250/637: Performed by: NURSE PRACTITIONER

## 2018-03-16 PROCEDURE — 36415 COLL VENOUS BLD VENIPUNCTURE: CPT | Performed by: NURSE PRACTITIONER

## 2018-03-16 PROCEDURE — 74011250636 HC RX REV CODE- 250/636: Performed by: NURSE PRACTITIONER

## 2018-03-16 PROCEDURE — 65210000002 HC RM PRIVATE GYN

## 2018-03-16 PROCEDURE — 82962 GLUCOSE BLOOD TEST: CPT

## 2018-03-16 PROCEDURE — 80053 COMPREHEN METABOLIC PANEL: CPT | Performed by: NURSE PRACTITIONER

## 2018-03-16 RX ORDER — FENTANYL 50 UG/1
1 PATCH TRANSDERMAL
Status: DISCONTINUED | OUTPATIENT
Start: 2018-03-16 | End: 2018-03-22 | Stop reason: HOSPADM

## 2018-03-16 RX ORDER — HYDROMORPHONE HYDROCHLORIDE 1 MG/ML
0.5 INJECTION, SOLUTION INTRAMUSCULAR; INTRAVENOUS; SUBCUTANEOUS
Status: COMPLETED | OUTPATIENT
Start: 2018-03-16 | End: 2018-03-16

## 2018-03-16 RX ADMIN — ACETAMINOPHEN 650 MG: 325 TABLET, FILM COATED ORAL at 13:47

## 2018-03-16 RX ADMIN — PANTOPRAZOLE SODIUM 40 MG: 40 INJECTION, POWDER, FOR SOLUTION INTRAVENOUS at 21:15

## 2018-03-16 RX ADMIN — SODIUM CHLORIDE 75 ML/HR: 450 INJECTION, SOLUTION INTRAVENOUS at 21:13

## 2018-03-16 RX ADMIN — HYDROMORPHONE HYDROCHLORIDE 0.5 MG: 1 INJECTION, SOLUTION INTRAMUSCULAR; INTRAVENOUS; SUBCUTANEOUS at 16:35

## 2018-03-16 RX ADMIN — Medication 10 ML: at 19:22

## 2018-03-16 RX ADMIN — APIXABAN 2.5 MG: 2.5 TABLET, FILM COATED ORAL at 09:13

## 2018-03-16 RX ADMIN — HYDROMORPHONE HYDROCHLORIDE 0.5 MG: 1 INJECTION, SOLUTION INTRAMUSCULAR; INTRAVENOUS; SUBCUTANEOUS at 12:26

## 2018-03-16 RX ADMIN — HYDROMORPHONE HYDROCHLORIDE 0.5 MG: 1 INJECTION, SOLUTION INTRAMUSCULAR; INTRAVENOUS; SUBCUTANEOUS at 19:18

## 2018-03-16 RX ADMIN — Medication 10 ML: at 22:50

## 2018-03-16 RX ADMIN — APIXABAN 2.5 MG: 2.5 TABLET, FILM COATED ORAL at 21:14

## 2018-03-16 RX ADMIN — PROMETHAZINE HYDROCHLORIDE 12.5 MG: 25 INJECTION INTRAMUSCULAR; INTRAVENOUS at 10:02

## 2018-03-16 RX ADMIN — PROMETHAZINE HYDROCHLORIDE 12.5 MG: 25 INJECTION INTRAMUSCULAR; INTRAVENOUS at 04:25

## 2018-03-16 RX ADMIN — SUCRALFATE 0.5 G: 1 SUSPENSION ORAL at 16:36

## 2018-03-16 RX ADMIN — PANTOPRAZOLE SODIUM 40 MG: 40 INJECTION, POWDER, FOR SOLUTION INTRAVENOUS at 09:08

## 2018-03-16 RX ADMIN — HYDROMORPHONE HYDROCHLORIDE 0.5 MG: 1 INJECTION, SOLUTION INTRAMUSCULAR; INTRAVENOUS; SUBCUTANEOUS at 02:57

## 2018-03-16 RX ADMIN — SUCRALFATE 0.5 G: 1 SUSPENSION ORAL at 09:13

## 2018-03-16 RX ADMIN — SODIUM CHLORIDE 1000 ML: 900 INJECTION, SOLUTION INTRAVENOUS at 07:25

## 2018-03-16 RX ADMIN — SUCRALFATE 0.5 G: 1 SUSPENSION ORAL at 21:15

## 2018-03-16 RX ADMIN — HYDROMORPHONE HYDROCHLORIDE 0.5 MG: 1 INJECTION, SOLUTION INTRAMUSCULAR; INTRAVENOUS; SUBCUTANEOUS at 09:06

## 2018-03-16 RX ADMIN — HYDROMORPHONE HYDROCHLORIDE 0.5 MG: 1 INJECTION, SOLUTION INTRAMUSCULAR; INTRAVENOUS; SUBCUTANEOUS at 14:20

## 2018-03-16 RX ADMIN — LORATADINE 10 MG: 10 TABLET ORAL at 09:24

## 2018-03-16 RX ADMIN — SUCRALFATE 0.5 G: 1 SUSPENSION ORAL at 03:00

## 2018-03-16 RX ADMIN — HYDROMORPHONE HYDROCHLORIDE 0.5 MG: 1 INJECTION, SOLUTION INTRAMUSCULAR; INTRAVENOUS; SUBCUTANEOUS at 06:09

## 2018-03-16 RX ADMIN — ZOLPIDEM TARTRATE 10 MG: 10 TABLET ORAL at 22:49

## 2018-03-16 RX ADMIN — HYDROMORPHONE HYDROCHLORIDE 0.5 MG: 1 INJECTION, SOLUTION INTRAMUSCULAR; INTRAVENOUS; SUBCUTANEOUS at 22:44

## 2018-03-16 RX ADMIN — SODIUM CHLORIDE 25 ML/HR: 450 INJECTION, SOLUTION INTRAVENOUS at 09:25

## 2018-03-16 NOTE — PROGRESS NOTES
Music Therapy Assessment    Rob Rodriguez [id number]  [id number]    1960  61 y.o.  female    Patient Telephone Number: [phone] (home)   Pentecostal Affiliation: Spiritism   Language: English   Extended Emergency Contact Information  Primary Emergency Contact: Eamon Smith  Address: [facility] [address]   [address]           [facility] [address] Phone: [phone]  Mobile Phone: [phone]  Relation: Spouse   Patient Active Problem List    Diagnosis Date Noted    Protein-calorie malnutrition, severe ([address] 03/05/2018    Esophagitis determined by endoscopy 03/05/2018    Ovarian cancer, unspecified laterality ([address] 11/30/2017    Nausea and vomiting 09/14/2017    Partial bowel obstruction 09/13/2017    Intractable vomiting with nausea 08/10/2017    Ovarian cancer ([address] 08/09/2017    Nausea & vomiting 08/09/2017    Carcinomatosis ([address] 08/09/2017    Hydronephrosis of left kidney 06/28/2017    Abdominal pain 06/27/2017    Hypomagnesemia 06/13/2017    Erosive esophagitis 06/07/2017    Ulcerative esophagitis 06/07/2017    Isolated proteinuria 05/25/2017    Malignant ascites 05/08/2017    Hydronephrosis of right kidney 05/08/2017    Protein calorie malnutrition ([address] 04/28/2017    Counseling regarding end of life decision making 04/25/2017    Anticoagulation adequate with anticoagulant therapy 03/28/2017    Chemotherapy-induced neutropenia ([address] 03/22/2017    Ileus ([address] 03/21/2017    Thrombosis of pelvic vein 03/21/2017    Hx of pulmonary embolus 03/21/2017    Dehydration 03/21/2017    Cancer associated pain 05/13/2016    Generalized abdominal pain 05/13/2016    Anxiety about health 05/13/2016    CINV (chemotherapy-induced nausea and vomiting) 03/22/2016    Anemia due to chemotherapy 02/22/2016    Ile[facility] in place [facility]) 02/15/2016    Carcinomatosis peritonei ([address] 10/29/2015    Small bowel obstruction 10/29/2015    Malignant neoplasm of both ovaries ([address] 10/12/2015        Date: 3/16/2018       Mental Status:   [x] Alert [  ] Rowena Rape [  ]  Confused  [  ] Minimally responsive    Communication Status: [  ] Impaired Speech [  ] Nonverbal -N/A     Physical Status:   [  ] Oxygen in use  [  ] Hard of Hearing [  ] Vision Impaired  [  ] Ambulatory  [  ] Ambulatory with assistance [  ] Non-ambulatory -N/A    Music Preferences, Background: N/A: Please see Session Observations below. Clinical Problem to be addressed: Alleviate pain. Goal(s) met in session: N/A: Please see Session Observations below. Physical/Pain management (Scale of 1-10):    Pre-session rating: Pt reported pain. Post-session rating: N/A: Please see Session Observations below. [  ] Increased relaxation   [  ] Regulated breathing patterns  [  ] Decreased muscle tension   [  ] Minimized physical distress     Emotional/Psychological:  [  ] Increased self-expression   [  ] Decreased aggressive behavior   [  ] Decreased sadness   [  ] Discussed healthy coping skills     [  ] Improved mood    [  ] Decreased withdrawn behavior     Social:  [  ] Decreased feelings of isolation/loneliness [  ] Positive social interaction   [  ] Provided support and/or comfort for family/friends    Spiritual:  [  ] Spiritual support    [  ] Expressed peace  [  ] Expressed linda    [  ] Discussed beliefs    Techniques Utilized (Check all that apply): N/A: Please see Session Observations below. [  ] Procedural support MT [  ] Music for relaxation [  ] Patient preferred music  [  ] Zuly analysis  [  ] Song choice  [  ] Music for validation  [  ] Entrainment  [  ] Progressive muscle relax.  [  ] Guided visualization  [  ] Maine Gamble  [  ] Patient instrument playing [  ] Anayeli Gould writing  [  ] Roly Sanders along   [  ] Adron Leventhal  [  ] Sensory stimulation  [  ] Active Listening  [  ] Music for spiritual support [  ] Making of CDs as gifts    Session Observations:  Referral from Jan Paz, Patient Advocate at the [facility]. This patient (pt) is known to this music therapist (MT) through pt's previous hospitalizations. Pt was observed to be lying in bed with her sister Martin Manrique at bedside. Pt reported pain but said it's gradually improving through her medicine. MT offered music therapy to help further alleviate pain, but pt declined. She expressed gratitude for MT's visit and denied any needs. Will follow as able.     ASHLEY JuneBC (Music Therapist-Board Certified)  Spiritual Care Department  Referral-based service

## 2018-03-16 NOTE — WOUND CARE
Wound / ostomy visit: stopped by to assess for any ostomy / skin needs; most pleasant female who does not need assistance at this time; using supplies provided without difficulty; encouraged her to have nursing call if any needs do arise Mon - Fri.   Allen Zuñiga RN,CWCN

## 2018-03-16 NOTE — DIABETES MGMT
DTC Progress Note    Recommendations/ Comments Noted pt with  POC glucose of 65 mg/dl yesterday at 1639. She is on cyclical TPN from 7p to 7a and there is 6 units/L of regular insulin =9 units total in TPN. She has not received any correction insulin and diet was advanced today to full liquids. Will watch for trends. Current hospital DM medication: lispro insulin correction scale    Chart reviewed on [address]. Patient is a 61 y.o. female with no history of diabetes. A1c:   No results found for: HBA1C, HGBE8, BLX8XRCB    Recent Glucose Results:   Lab Results   Component Value Date/Time     (H) 03/16/2018 04:36 AM    GLUCPOC 119 (H) 03/16/2018 06:02 AM    GLUCPOC 113 (H) 03/15/2018 11:55 PM    GLUCPOC 125 (H) 03/15/2018 09:13 PM        Lab Results   Component Value Date/Time    Creatinine 0.64 03/16/2018 04:36 AM     Estimated Creatinine Clearance: 64.6 mL/min (based on Cr of 0.64). Active Orders   Diet    DIET FULL LIQUID        PO intake: No data found. Will continue to follow as needed.     Thank you

## 2018-03-16 NOTE — PROGRESS NOTES
Bedside and Verbal shift change report given to Elian Arzola (oncoming nurse) by Mel Beltran (offgoing nurse). Report included the following information SBAR, Kardex, OR Summary, Procedure Summary, Intake/Output, MAR and Recent Results.

## 2018-03-16 NOTE — PROGRESS NOTES
[facility] ONCOL[facility]  [address] [facility], [address], [address] [address]  P [phone]  F [phone]       Patient Name: Lucas Herndon   Admit Date: 3/5/2018   Primary Dx Progressive ovarian cancer, carcinomatosis. GI dysfunction, relative obstruction   Visit Date: 3/16/2018         SUBJECTIVE:  G-tube placement successful yesterday and draining well. Pt feels her nausea is \"noticabley improved\". Still sore from placement, but says it is getting better. Stoma continues modest output. TPN cycled continues with lipids TIW  No F/C, SOB. Ambulating well.       OBJECTIVE:    Patient Vitals for the past 24 hrs:   Temp Pulse Resp BP SpO2   03/16/18 0443 99.6 °F (37.6 °C) (!) 106 20 91/53 98 %   03/15/18 2249 99.4 °F (37.4 °C) (!) 101 18 109/71 100 %   03/15/18 2037 97.1 °F (36.2 °C) 92 18 100/53 92 %   03/15/18 1658 98.3 °F (36.8 °C) 96 18 100/61 100 %   03/15/18 1550 98.3 °F (36.8 °C) 88 18 94/58 100 %   03/15/18 1445 98.4 °F (36.9 °C) 86 18 100/66 100 %   03/15/18 1420 - 93 18 106/62 99 %   03/15/18 1343 - 89 20 102/50 100 %   03/15/18 1331 - 98 18 96/55 100 %   03/15/18 1318 - 98 15 117/54 100 %   03/15/18 1306 - (!) 103 12 143/87 100 %   03/15/18 1301 - (!) 101 12 118/76 100 %   03/15/18 1256 - (!) 102 18 (!) 134/91 100 %   03/15/18 1251 - 88 16 119/76 100 %   03/15/18 1246 - (!) 101 16 117/81 100 %   03/15/18 1241 - 92 16 126/70 100 %   03/15/18 1153 - 91 18 105/57 100 %   03/15/18 0934 98 °F (36.7 °C) 85 18 96/60 98 %         Date 03/15/18 0700 - 03/16/18 0659 03/16/18 0700 - 03/17/18 0659   Shift 5259-5891 7375-7610 24 Hour Total 2507-4950 9775-7455 24 Hour Total   I  N  T  A  K  E   I.V.  (mL/kg/hr) 1000  (2)  1000  (1)         I.V. 1000  1000       Shift Total  (mL/kg) 1000  (23.4)  1000  (23.4)      O  U  T  P  U  T   Urine  (mL/kg/hr) 1000  (2) 700  (1.4) 1700  (1.7)         Urine Voided 2362 582 7566         Urine Occurrence(s) 1 x  1 x       Emesis/NG output  450 450         Output (ml) (Feeding Tube 03/15/18)  450 450       Shift Total  (mL/kg) 1000  (23.4) 1150  (26.9) 2150  (50.3)      NET 0 -1150 -1150      Weight (kg) 42.7 42.7 42.7 42.7 42.7 42.7       Physical Exam     General:  A&O X3 in NAD and remaing in good spirits   Port site: without tenderness, redness or swelling     Cardiac:  Regular without M/R/G        Lungs:  CTA bilat without wheezing or rales  Abdomen: Thin. Firm but palpable without tenderness. Nondistended, no fluid. G-Tube draining bile colored fluid to bedside drainage. Extremity: no edema, SCDs in place      Lab Results   Component Value Date/Time    WBC 3.6 03/16/2018 04:36 AM    ABS. NEUTROPHILS 1.7 (L) 03/16/2018 04:36 AM    HGB 8.0 (L) 03/16/2018 04:36 AM    HCT 25.7 (L) 03/16/2018 04:36 AM    MCV 97.0 03/16/2018 04:36 AM    MCH 30.2 03/16/2018 04:36 AM    PLATELET 769 76/61/7115 04:36 AM     Lab Results   Component Value Date/Time    Sodium 136 03/16/2018 04:36 AM    Potassium 3.8 03/16/2018 04:36 AM    Chloride 104 03/16/2018 04:36 AM    CO2 25 03/16/2018 04:36 AM    Glucose 107 (H) 03/16/2018 04:36 AM    BUN 31 (H) 03/16/2018 04:36 AM    Creatinine 0.64 03/16/2018 04:36 AM    Calcium 8.0 (L) 03/16/2018 04:36 AM    Albumin 2.0 (L) 03/16/2018 04:36 AM    Bilirubin, total 0.2 03/16/2018 04:36 AM    AST (SGOT) 21 03/16/2018 04:36 AM    ALT (SGPT) 17 03/16/2018 04:36 AM    Alk. phosphatase 101 03/16/2018 04:36 AM     IMPRESSION/PLAN:     61 y.o. with advanced ovarian cancer, carcinomatosis with ?partial GI obstruction admitted in transfer for obstruction, N/V.     Present on Admission:   Anemia due to chemotherapy   Abdominal pain   Cancer associated pain   Carcinomatosis ([address]   Counseling regarding end of life decision making   Hx of pulmonary embolus   Erosive esophagitis   Carcinomatosis peritonei (HCC)   Intractable vomiting with nausea   Small bowel obstruction   Dehydration   Anticoagulation adequate with anticoagulant therapy   Malignant ascites   Hypomagnesemia   Hydronephrosis of left kidney   Ileostomy in place [facility])      Continue TPN/lipids, 12 hour cycle. will wean since G-tube to be placed now. Will begin full liquid diet and monitor   Anemia, stable. Will continue to monitor  Thrombocytopenia resolved- restarting eliquis     Antiemetics/antireflux control. Monitor pain. IV for breakthru. Long conversation 3/13 with Carmelareidvenkatesh Moisés and her  by Dr. Estefania La again regarding prognosis  and going forward. At this point, pt would like to hold on chemotherapy and just \"take a break\" and will follow up with Dr. Estefania La as out pt to plan further.   Hopefully will be able to get her home over weekend if she is able to tolerate PO without difficulty        Aidan Yost NP

## 2018-03-16 NOTE — PROGRESS NOTES
03/16/2018; 16:00  CM conferenced with patient's physician team.  They anticipate that the patient will be ready for discharge on Sunday, 3/18/18, pending being able to maintain the PEG tube. CM sent referral update to [place] [facility] services can be initiated on 3/20/18.   CRM: Elijah Gomez, MPH; Z: [phone]

## 2018-03-16 NOTE — PROGRESS NOTES
NUTRITION       Recommendations:  1. If pt to receive TPN tonight, would recommend discontinuing lipid orders (due today) and continuing AA and dextrose orders as previously written to allow for one more day of optimized nutrition support prior to discharge. ** If desire to decrease caloric value of TPN, could d/c lipid orders and decrease Dextrose to 10% tonight with cyclic volumes as previously ordered (this would meet 56% and 100% of estimated kcal and protein needs, respectively). ** If dextrose changed, will need to cut insulin in TPN to prevent hypoglycemia as CHO load will be cut in half (302 gm to 151 gm). Brief note. Chart reviewed. Provider message regarding weaning TPN noted. Pt s/p G-tube placement yesterday to be used for decompression as needed. Full liquid diet ordered to start today. TPN weaned off this morning per usual cyclic regimen. Above adjustment (5%AA M22 @ previous cylic volume, no lipids) would provide 816 kcal, 76 g protein in 1512 mL-- meeting 56% and 100% of estimated kcal and protein needs, respectively. This would also decrease CHO load by half (302 gm/day to 151 gm/day). TPN: 5%AA D20 @ 76 mL/hr x 1 hour    @ 136 mL/hr x 10 hours    @ 76 mL/hr x 1 hour   + MVI, thiamine (100 mg), 6 units insulin/L, ascorbic acid 2005 mg, folic acid 1 mg, vitamin K 1 mg   + 20% lipids, 250 mL 3x/week  -- This has been providing a daily average of 1544 kcal, 76 g protein in 1512 mL-- meeting 100% of estimated needs. Estimated Nutrition Needs:   Kcals/day: 1470 Kcals/day (1470-1596kcal)  Protein: 59 g (59-76g (1.4-1.8g/kg))  Fluid: 1470 ml (1ml/kcal)     Based On: Kcal/kg - specify (Comment) (35-38kcal/kg)  Weight Used: Actual wt (42kg)     RD to follow.     [address]

## 2018-03-17 LAB
ALBUMIN SERPL-MCNC: 2 G/DL (ref 3.5–5)
ALBUMIN/GLOB SERPL: 0.5 {RATIO} (ref 1.1–2.2)
ALP SERPL-CCNC: 107 U/L (ref 45–117)
ALT SERPL-CCNC: 18 U/L (ref 12–78)
ANION GAP SERPL CALC-SCNC: 7 MMOL/L (ref 5–15)
AST SERPL-CCNC: 21 U/L (ref 15–37)
BASOPHILS # BLD: 0 K/UL (ref 0–0.1)
BASOPHILS NFR BLD: 0 % (ref 0–1)
BILIRUB SERPL-MCNC: 0.3 MG/DL (ref 0.2–1)
BUN SERPL-MCNC: 14 MG/DL (ref 6–20)
BUN/CREAT SERPL: 24 (ref 12–20)
CALCIUM SERPL-MCNC: 8.4 MG/DL (ref 8.5–10.1)
CHLORIDE SERPL-SCNC: 103 MMOL/L (ref 97–108)
CO2 SERPL-SCNC: 27 MMOL/L (ref 21–32)
CREAT SERPL-MCNC: 0.59 MG/DL (ref 0.55–1.02)
DIFFERENTIAL METHOD BLD: ABNORMAL
EOSINOPHIL # BLD: 0.1 K/UL (ref 0–0.4)
EOSINOPHIL NFR BLD: 3 % (ref 0–7)
ERYTHROCYTE [DISTWIDTH] IN BLOOD BY AUTOMATED COUNT: 17.2 % (ref 11.5–14.5)
GLOBULIN SER CALC-MCNC: 3.9 G/DL (ref 2–4)
GLUCOSE SERPL-MCNC: 81 MG/DL (ref 65–100)
HCT VFR BLD AUTO: 24.6 % (ref 35–47)
HGB BLD-MCNC: 8 G/DL (ref 11.5–16)
IMM GRANULOCYTES # BLD: 0 K/UL (ref 0–0.04)
IMM GRANULOCYTES NFR BLD AUTO: 0 % (ref 0–0.5)
LYMPHOCYTES # BLD: 1.7 K/UL (ref 0.8–3.5)
LYMPHOCYTES NFR BLD: 48 % (ref 12–49)
MAGNESIUM SERPL-MCNC: 1.4 MG/DL (ref 1.6–2.4)
MCH RBC QN AUTO: 30.9 PG (ref 26–34)
MCHC RBC AUTO-ENTMCNC: 32.5 G/DL (ref 30–36.5)
MCV RBC AUTO: 95 FL (ref 80–99)
MONOCYTES # BLD: 0.4 K/UL (ref 0–1)
MONOCYTES NFR BLD: 10 % (ref 5–13)
NEUTS SEG # BLD: 1.3 K/UL (ref 1.8–8)
NEUTS SEG NFR BLD: 39 % (ref 32–75)
NRBC # BLD: 0 K/UL (ref 0–0.01)
NRBC BLD-RTO: 0 PER 100 WBC
PLATELET # BLD AUTO: 333 K/UL (ref 150–400)
PMV BLD AUTO: 9.5 FL (ref 8.9–12.9)
POTASSIUM SERPL-SCNC: 3.3 MMOL/L (ref 3.5–5.1)
PROT SERPL-MCNC: 5.9 G/DL (ref 6.4–8.2)
RBC # BLD AUTO: 2.59 M/UL (ref 3.8–5.2)
SODIUM SERPL-SCNC: 137 MMOL/L (ref 136–145)
WBC # BLD AUTO: 3.5 K/UL (ref 3.6–11)

## 2018-03-17 PROCEDURE — 36415 COLL VENOUS BLD VENIPUNCTURE: CPT | Performed by: NURSE PRACTITIONER

## 2018-03-17 PROCEDURE — 74011250637 HC RX REV CODE- 250/637: Performed by: PHYSICIAN ASSISTANT

## 2018-03-17 PROCEDURE — 74011250637 HC RX REV CODE- 250/637: Performed by: NURSE PRACTITIONER

## 2018-03-17 PROCEDURE — 77030018836 HC SOL IRR NACL ICUM -A

## 2018-03-17 PROCEDURE — 74011000258 HC RX REV CODE- 258: Performed by: NURSE PRACTITIONER

## 2018-03-17 PROCEDURE — 83735 ASSAY OF MAGNESIUM: CPT | Performed by: NURSE PRACTITIONER

## 2018-03-17 PROCEDURE — 74011250636 HC RX REV CODE- 250/636: Performed by: OBSTETRICS & GYNECOLOGY

## 2018-03-17 PROCEDURE — 74011250636 HC RX REV CODE- 250/636: Performed by: NURSE PRACTITIONER

## 2018-03-17 PROCEDURE — 65210000002 HC RM PRIVATE GYN

## 2018-03-17 PROCEDURE — C9113 INJ PANTOPRAZOLE SODIUM, VIA: HCPCS | Performed by: PHYSICIAN ASSISTANT

## 2018-03-17 PROCEDURE — 85025 COMPLETE CBC W/AUTO DIFF WBC: CPT | Performed by: NURSE PRACTITIONER

## 2018-03-17 PROCEDURE — 74011250636 HC RX REV CODE- 250/636: Performed by: PHYSICIAN ASSISTANT

## 2018-03-17 PROCEDURE — 74011250637 HC RX REV CODE- 250/637: Performed by: OBSTETRICS & GYNECOLOGY

## 2018-03-17 PROCEDURE — 80053 COMPREHEN METABOLIC PANEL: CPT | Performed by: NURSE PRACTITIONER

## 2018-03-17 RX ORDER — HYDROMORPHONE HYDROCHLORIDE 1 MG/ML
1 INJECTION, SOLUTION INTRAMUSCULAR; INTRAVENOUS; SUBCUTANEOUS
Status: DISCONTINUED | OUTPATIENT
Start: 2018-03-17 | End: 2018-03-19 | Stop reason: CLARIF

## 2018-03-17 RX ADMIN — PROMETHAZINE HYDROCHLORIDE 12.5 MG: 25 INJECTION INTRAMUSCULAR; INTRAVENOUS at 14:23

## 2018-03-17 RX ADMIN — HYDROMORPHONE HYDROCHLORIDE 0.5 MG: 1 INJECTION, SOLUTION INTRAMUSCULAR; INTRAVENOUS; SUBCUTANEOUS at 15:44

## 2018-03-17 RX ADMIN — HYDROMORPHONE HYDROCHLORIDE 1 MG: 1 INJECTION, SOLUTION INTRAMUSCULAR; INTRAVENOUS; SUBCUTANEOUS at 21:30

## 2018-03-17 RX ADMIN — SUCRALFATE 0.5 G: 1 SUSPENSION ORAL at 17:33

## 2018-03-17 RX ADMIN — APIXABAN 2.5 MG: 2.5 TABLET, FILM COATED ORAL at 08:58

## 2018-03-17 RX ADMIN — APIXABAN 2.5 MG: 2.5 TABLET, FILM COATED ORAL at 20:53

## 2018-03-17 RX ADMIN — SUCRALFATE 0.5 G: 1 SUSPENSION ORAL at 03:50

## 2018-03-17 RX ADMIN — METOPROLOL TARTRATE 50 MG: 50 TABLET ORAL at 17:32

## 2018-03-17 RX ADMIN — SUCRALFATE 0.5 G: 1 SUSPENSION ORAL at 10:00

## 2018-03-17 RX ADMIN — HYDROMORPHONE HYDROCHLORIDE 0.5 MG: 1 INJECTION, SOLUTION INTRAMUSCULAR; INTRAVENOUS; SUBCUTANEOUS at 12:40

## 2018-03-17 RX ADMIN — HYDROMORPHONE HYDROCHLORIDE 1 MG: 1 INJECTION, SOLUTION INTRAMUSCULAR; INTRAVENOUS; SUBCUTANEOUS at 23:54

## 2018-03-17 RX ADMIN — ZOLPIDEM TARTRATE 10 MG: 10 TABLET ORAL at 22:32

## 2018-03-17 RX ADMIN — HYDROMORPHONE HYDROCHLORIDE 0.5 MG: 1 INJECTION, SOLUTION INTRAMUSCULAR; INTRAVENOUS; SUBCUTANEOUS at 03:50

## 2018-03-17 RX ADMIN — HYDROMORPHONE HYDROCHLORIDE 0.5 MG: 1 INJECTION, SOLUTION INTRAMUSCULAR; INTRAVENOUS; SUBCUTANEOUS at 10:00

## 2018-03-17 RX ADMIN — LORATADINE 10 MG: 10 TABLET ORAL at 08:58

## 2018-03-17 RX ADMIN — PANTOPRAZOLE SODIUM 40 MG: 40 INJECTION, POWDER, FOR SOLUTION INTRAVENOUS at 20:53

## 2018-03-17 RX ADMIN — HYDROMORPHONE HYDROCHLORIDE 0.5 MG: 1 INJECTION, SOLUTION INTRAMUSCULAR; INTRAVENOUS; SUBCUTANEOUS at 07:19

## 2018-03-17 RX ADMIN — Medication 10 ML: at 03:42

## 2018-03-17 RX ADMIN — SUCRALFATE 0.5 G: 1 SUSPENSION ORAL at 21:00

## 2018-03-17 RX ADMIN — PROMETHAZINE HYDROCHLORIDE 12.5 MG: 25 INJECTION INTRAMUSCULAR; INTRAVENOUS at 09:09

## 2018-03-17 RX ADMIN — Medication 10 ML: at 23:30

## 2018-03-17 RX ADMIN — HYDROMORPHONE HYDROCHLORIDE 1 MG: 1 INJECTION, SOLUTION INTRAMUSCULAR; INTRAVENOUS; SUBCUTANEOUS at 17:32

## 2018-03-17 RX ADMIN — HYDROMORPHONE HYDROCHLORIDE 1 MG: 1 INJECTION, SOLUTION INTRAMUSCULAR; INTRAVENOUS; SUBCUTANEOUS at 19:24

## 2018-03-17 RX ADMIN — SODIUM CHLORIDE 75 ML/HR: 450 INJECTION, SOLUTION INTRAVENOUS at 22:32

## 2018-03-17 RX ADMIN — METOPROLOL TARTRATE 50 MG: 50 TABLET ORAL at 08:58

## 2018-03-17 RX ADMIN — PANTOPRAZOLE SODIUM 40 MG: 40 INJECTION, POWDER, FOR SOLUTION INTRAVENOUS at 08:58

## 2018-03-17 NOTE — PROGRESS NOTES
Bedside and Verbal shift change report given to VALERIANO Haskins RN (oncoming nurse) by Carlito Grissom RN (offgoing nurse). Report included the following information SBAR, Kardex, Intake/Output, MAR and Recent Results.

## 2018-03-17 NOTE — PROGRESS NOTES
[facility]  [address] [place] [address], [address]  P [phone]  F [phone]       Patient Name: Neena Vincent   Admit Date: 3/5/2018   Primary Dx Progressive ovarian cancer, carcinomatosis. GI dysfunction, relative obstruction   Visit Date: 3/17/2018         SUBJECTIVE:  G-tube placement successful and draining well. Pt feels her nausea is improved. Still sore from placement, but says it is getting better each day. Stoma continues modest output. TPN stopped yesterday. Taking in PO. No F/C, SOB. Ambulating well. Not sure she is ready to go home and be on her own. OBJECTIVE:    Patient Vitals for the past 24 hrs:   Temp Pulse Resp BP SpO2   03/17/18 0831 98.3 °F (36.8 °C) 90 18 117/70 98 %   03/17/18 0026 99.1 °F (37.3 °C) 94 18 109/75 97 %   03/16/18 2030 98.8 °F (37.1 °C) 89 18 105/65 99 %   03/16/18 1451 98.6 °F (37 °C) 92 18 96/59 98 %         Date 03/16/18 0700 - 03/17/18 0659 03/17/18 0700 - 03/18/18 0659   Shift 6403-2348 4196-2334 24 Hour Total 7911-8232 6430-3714 24 Hour Total   I  N  T  A  K  E   P. O. 50 50 100         P. O. 50 50 100       I.V.  (mL/kg/hr)  145  (0.3) 145  (0.1)         Volume (0.45% sodium chloride infusion)  145 145       Shift Total  (mL/kg) 50  (1.2) 195  (4.5) 245  (5.7)      O  U  T  P  U  T   Urine  (mL/kg/hr) 950  (1.9) 600  (1.2) 1550  (1.5) 600  600      Urine Voided  600  600      Urine Occurrence(s)  1 x 1 x       Emesis/NG output  875 875         Output (ml) (Feeding Tube 03/15/18)  875 875       Stool  125 125         Output (ml) (Ileostomy 05/05/16 Right  Abdomen)  125 125       Shift Total  (mL/kg) 950  (22.2) 1600  (37.2) 2550  (59.3) 600  (14)  600  (14)   NET -900 -4705 -2305 -600  -600   Weight (kg) 42.7 43 43 43 43 43       Physical Exam     General:  A&O X3 in NAD and remaing in good spirits   Port site: without tenderness, redness or swelling     Cardiac:  Regular without M/R/G        Lungs:  CTA bilat without wheezing or rales  Abdomen: Thin. Firm but palpable without tenderness. Nondistended, no fluid. G-Tube draining bile colored fluid to bedside drainage. Extremity: no edema, SCDs in place      Lab Results   Component Value Date/Time    WBC 3.5 (L) 03/17/2018 03:49 AM    ABS. NEUTROPHILS 1.3 (L) 03/17/2018 03:49 AM    HGB 8.0 (L) 03/17/2018 03:49 AM    HCT 24.6 (L) 03/17/2018 03:49 AM    MCV 95.0 03/17/2018 03:49 AM    MCH 30.9 03/17/2018 03:49 AM    PLATELET 846 33/35/1199 03:49 AM     Lab Results   Component Value Date/Time    Sodium 137 03/17/2018 03:49 AM    Potassium 3.3 (L) 03/17/2018 03:49 AM    Chloride 103 03/17/2018 03:49 AM    CO2 27 03/17/2018 03:49 AM    Glucose 81 03/17/2018 03:49 AM    BUN 14 03/17/2018 03:49 AM    Creatinine 0.59 03/17/2018 03:49 AM    Calcium 8.4 (L) 03/17/2018 03:49 AM    Albumin 2.0 (L) 03/17/2018 03:49 AM    Bilirubin, total 0.3 03/17/2018 03:49 AM    AST (SGOT) 21 03/17/2018 03:49 AM    ALT (SGPT) 18 03/17/2018 03:49 AM    Alk. phosphatase 107 03/17/2018 03:49 AM     IMPRESSION/PLAN:     61 y.o. with advanced ovarian cancer, carcinomatosis with ?partial GI obstruction admitted in transfer for obstruction, N/V. Present on Admission:   Anemia due to chemotherapy   Abdominal pain   Cancer associated pain   Carcinomatosis ([facility] [address]   Counseling regarding end of life decision making   Hx of pulmonary embolus   Erosive esophagitis   Carcinomatosis peritonei (HCC)   Intractable vomiting with nausea   Small bowel obstruction   Dehydration   Anticoagulation adequate with anticoagulant therapy   Malignant ascites   Hypomagnesemia   Hydronephrosis of left kidney   Ileostomy in place (HCC)      TPN stopped. Tolerating full liquids. Have left gastric tube open to drainage. Discussed clamping it at home and indications to unclamping it. Anemia, stable. Will continue to monitor  Thrombocytopenia resolved - restarting eliquis     Antiemetics/antireflux control. Monitor pain. IV for breakthough. Ongoing conversation with Guevara Merritt and her  regarding prognosis and going forward. At this point, pt would like to hold on chemotherapy and just \"take a break\" and will follow up with Dr. Bryant Spears as out pt to plan further. Hopefully will be able to get her home over weekend if she is able to tolerate PO without difficulty. No plans for discharge today.         Collin Corcoran MD

## 2018-03-18 LAB
ALBUMIN SERPL-MCNC: 2 G/DL (ref 3.5–5)
ALBUMIN/GLOB SERPL: 0.5 {RATIO} (ref 1.1–2.2)
ALP SERPL-CCNC: 101 U/L (ref 45–117)
ALT SERPL-CCNC: 18 U/L (ref 12–78)
ANION GAP SERPL CALC-SCNC: 12 MMOL/L (ref 5–15)
AST SERPL-CCNC: 23 U/L (ref 15–37)
BASOPHILS # BLD: 0 K/UL (ref 0–0.1)
BASOPHILS NFR BLD: 0 % (ref 0–1)
BILIRUB SERPL-MCNC: 0.4 MG/DL (ref 0.2–1)
BUN SERPL-MCNC: 9 MG/DL (ref 6–20)
BUN/CREAT SERPL: 17 (ref 12–20)
CALCIUM SERPL-MCNC: 8.7 MG/DL (ref 8.5–10.1)
CHLORIDE SERPL-SCNC: 99 MMOL/L (ref 97–108)
CO2 SERPL-SCNC: 25 MMOL/L (ref 21–32)
CREAT SERPL-MCNC: 0.53 MG/DL (ref 0.55–1.02)
DIFFERENTIAL METHOD BLD: ABNORMAL
EOSINOPHIL # BLD: 0.1 K/UL (ref 0–0.4)
EOSINOPHIL NFR BLD: 3 % (ref 0–7)
ERYTHROCYTE [DISTWIDTH] IN BLOOD BY AUTOMATED COUNT: 16.9 % (ref 11.5–14.5)
GLOBULIN SER CALC-MCNC: 4.1 G/DL (ref 2–4)
GLUCOSE SERPL-MCNC: 68 MG/DL (ref 65–100)
HCT VFR BLD AUTO: 24.5 % (ref 35–47)
HGB BLD-MCNC: 7.9 G/DL (ref 11.5–16)
IMM GRANULOCYTES # BLD: 0 K/UL (ref 0–0.04)
IMM GRANULOCYTES NFR BLD AUTO: 1 % (ref 0–0.5)
LYMPHOCYTES # BLD: 1.7 K/UL (ref 0.8–3.5)
LYMPHOCYTES NFR BLD: 46 % (ref 12–49)
MAGNESIUM SERPL-MCNC: 1.1 MG/DL (ref 1.6–2.4)
MCH RBC QN AUTO: 30.7 PG (ref 26–34)
MCHC RBC AUTO-ENTMCNC: 32.2 G/DL (ref 30–36.5)
MCV RBC AUTO: 95.3 FL (ref 80–99)
MONOCYTES # BLD: 0.4 K/UL (ref 0–1)
MONOCYTES NFR BLD: 11 % (ref 5–13)
NEUTS SEG # BLD: 1.6 K/UL (ref 1.8–8)
NEUTS SEG NFR BLD: 41 % (ref 32–75)
NRBC # BLD: 0 K/UL (ref 0–0.01)
NRBC BLD-RTO: 0 PER 100 WBC
PLATELET # BLD AUTO: 345 K/UL (ref 150–400)
PMV BLD AUTO: 9.8 FL (ref 8.9–12.9)
POTASSIUM SERPL-SCNC: 3 MMOL/L (ref 3.5–5.1)
PROT SERPL-MCNC: 6.1 G/DL (ref 6.4–8.2)
RBC # BLD AUTO: 2.57 M/UL (ref 3.8–5.2)
SODIUM SERPL-SCNC: 136 MMOL/L (ref 136–145)
WBC # BLD AUTO: 3.8 K/UL (ref 3.6–11)

## 2018-03-18 PROCEDURE — 85025 COMPLETE CBC W/AUTO DIFF WBC: CPT | Performed by: NURSE PRACTITIONER

## 2018-03-18 PROCEDURE — 83735 ASSAY OF MAGNESIUM: CPT | Performed by: NURSE PRACTITIONER

## 2018-03-18 PROCEDURE — 74011000258 HC RX REV CODE- 258: Performed by: NURSE PRACTITIONER

## 2018-03-18 PROCEDURE — 74011250636 HC RX REV CODE- 250/636: Performed by: NURSE PRACTITIONER

## 2018-03-18 PROCEDURE — 74011250637 HC RX REV CODE- 250/637: Performed by: PHYSICIAN ASSISTANT

## 2018-03-18 PROCEDURE — 36415 COLL VENOUS BLD VENIPUNCTURE: CPT | Performed by: NURSE PRACTITIONER

## 2018-03-18 PROCEDURE — C9113 INJ PANTOPRAZOLE SODIUM, VIA: HCPCS | Performed by: PHYSICIAN ASSISTANT

## 2018-03-18 PROCEDURE — 74011250636 HC RX REV CODE- 250/636: Performed by: PHYSICIAN ASSISTANT

## 2018-03-18 PROCEDURE — 74011250636 HC RX REV CODE- 250/636: Performed by: OBSTETRICS & GYNECOLOGY

## 2018-03-18 PROCEDURE — 65210000002 HC RM PRIVATE GYN

## 2018-03-18 PROCEDURE — 74011250637 HC RX REV CODE- 250/637: Performed by: NURSE PRACTITIONER

## 2018-03-18 PROCEDURE — 80053 COMPREHEN METABOLIC PANEL: CPT | Performed by: NURSE PRACTITIONER

## 2018-03-18 RX ADMIN — HYDROMORPHONE HYDROCHLORIDE 1 MG: 1 INJECTION, SOLUTION INTRAMUSCULAR; INTRAVENOUS; SUBCUTANEOUS at 08:04

## 2018-03-18 RX ADMIN — SUCRALFATE 0.5 G: 1 SUSPENSION ORAL at 16:37

## 2018-03-18 RX ADMIN — Medication 10 ML: at 14:28

## 2018-03-18 RX ADMIN — METOPROLOL TARTRATE 50 MG: 50 TABLET ORAL at 17:38

## 2018-03-18 RX ADMIN — HYDROMORPHONE HYDROCHLORIDE 1 MG: 1 INJECTION, SOLUTION INTRAMUSCULAR; INTRAVENOUS; SUBCUTANEOUS at 23:15

## 2018-03-18 RX ADMIN — SUCRALFATE 0.5 G: 1 SUSPENSION ORAL at 10:11

## 2018-03-18 RX ADMIN — PANTOPRAZOLE SODIUM 40 MG: 40 INJECTION, POWDER, FOR SOLUTION INTRAVENOUS at 21:00

## 2018-03-18 RX ADMIN — SUCRALFATE 0.5 G: 1 SUSPENSION ORAL at 04:10

## 2018-03-18 RX ADMIN — HYDROMORPHONE HYDROCHLORIDE 1 MG: 1 INJECTION, SOLUTION INTRAMUSCULAR; INTRAVENOUS; SUBCUTANEOUS at 10:11

## 2018-03-18 RX ADMIN — APIXABAN 2.5 MG: 2.5 TABLET, FILM COATED ORAL at 08:57

## 2018-03-18 RX ADMIN — PROMETHAZINE HYDROCHLORIDE 12.5 MG: 25 INJECTION INTRAMUSCULAR; INTRAVENOUS at 17:38

## 2018-03-18 RX ADMIN — SUCRALFATE 0.5 G: 1 SUSPENSION ORAL at 21:00

## 2018-03-18 RX ADMIN — LORATADINE 10 MG: 10 TABLET ORAL at 08:57

## 2018-03-18 RX ADMIN — APIXABAN 2.5 MG: 2.5 TABLET, FILM COATED ORAL at 21:00

## 2018-03-18 RX ADMIN — HYDROMORPHONE HYDROCHLORIDE 1 MG: 1 INJECTION, SOLUTION INTRAMUSCULAR; INTRAVENOUS; SUBCUTANEOUS at 16:37

## 2018-03-18 RX ADMIN — HYDROMORPHONE HYDROCHLORIDE 1 MG: 1 INJECTION, SOLUTION INTRAMUSCULAR; INTRAVENOUS; SUBCUTANEOUS at 14:28

## 2018-03-18 RX ADMIN — SODIUM CHLORIDE 75 ML/HR: 450 INJECTION, SOLUTION INTRAVENOUS at 11:35

## 2018-03-18 RX ADMIN — ZOLPIDEM TARTRATE 10 MG: 10 TABLET ORAL at 22:20

## 2018-03-18 RX ADMIN — HYDROMORPHONE HYDROCHLORIDE 1 MG: 1 INJECTION, SOLUTION INTRAMUSCULAR; INTRAVENOUS; SUBCUTANEOUS at 18:57

## 2018-03-18 RX ADMIN — PROMETHAZINE HYDROCHLORIDE 12.5 MG: 25 INJECTION INTRAMUSCULAR; INTRAVENOUS at 10:11

## 2018-03-18 RX ADMIN — HYDROMORPHONE HYDROCHLORIDE 1 MG: 1 INJECTION, SOLUTION INTRAMUSCULAR; INTRAVENOUS; SUBCUTANEOUS at 20:59

## 2018-03-18 RX ADMIN — PANTOPRAZOLE SODIUM 40 MG: 40 INJECTION, POWDER, FOR SOLUTION INTRAVENOUS at 08:57

## 2018-03-18 RX ADMIN — HYDROMORPHONE HYDROCHLORIDE 1 MG: 1 INJECTION, SOLUTION INTRAMUSCULAR; INTRAVENOUS; SUBCUTANEOUS at 12:11

## 2018-03-18 RX ADMIN — HYDROMORPHONE HYDROCHLORIDE 1 MG: 1 INJECTION, SOLUTION INTRAMUSCULAR; INTRAVENOUS; SUBCUTANEOUS at 04:08

## 2018-03-18 NOTE — PROGRESS NOTES
[facility]  [address] [place] [address], [address]  P [phone]  F [phone]       Patient Name: Hernando Cabello   Admit Date: 3/5/2018   Primary Dx Progressive ovarian cancer, carcinomatosis. GI dysfunction, relative obstruction   Visit Date: 3/18/2018         SUBJECTIVE:  G-tube placement successful and draining well. Pt feels her nausea is improved. Still sore from placement, but says it is getting better each day. Stoma continues modest output. TPN stopped yesterday. Taking in PO. She die have some nausea and one episode of emesis this morning. No F/C, SOB. Ambulating well. Not sure she is ready to go home and be on her own.       OBJECTIVE:    Patient Vitals for the past 24 hrs:   Temp Pulse Resp BP SpO2   03/18/18 0742 98.5 °F (36.9 °C) 90 18 94/55 99 %   03/18/18 0408 98.7 °F (37.1 °C) 86 18 109/71 97 %   03/17/18 2020 98.9 °F (37.2 °C) 81 18 106/65 98 %   03/17/18 1442 99.2 °F (37.3 °C) 92 18 102/63 98 %         Date 03/17/18 0700 - 03/18/18 0659 03/18/18 0700 - 03/19/18 0659   Shift 0183-6241 5859-1777 24 Hour Total 8466-5300 1937-1236 24 Hour Total   I  N  T  A  K  E   I.V.  (mL/kg/hr)  300  (0.6) 300  (0.3)         Volume (0.45% sodium chloride infusion)  300 300       Shift Total  (mL/kg)  300  (7) 300  (7)      O  U  T  P  U  T   Urine  (mL/kg/hr) 1600  (3.1) 250  (0.5) 1850  (1.8) 500  500      Urine Voided 3342 687 8288 500  500    Emesis/NG output 450 250 700 250  250      Output (ml) (Feeding Tube 03/15/18) 450 250 700 250  250    Stool  0 0         Output (ml) (Ileostomy 05/05/16 Right  Abdomen)  0 0       Shift Total  (mL/kg) 2050  (47.7) 500  (11.6) 2550  (59.3) 750  (17.7)  750  (17.7)   NET -2050 -200 -2250 -750  -750   Weight (kg) 43 43 43 42.5 42.5 42.5       Physical Exam     General:  A&O X3 in NAD and remaing in good spirits   Port site: without tenderness, redness or swelling     Cardiac:  Regular without M/R/G        Lungs:  CTA bilat without wheezing or rales  Abdomen: Thin. Firm but palpable without tenderness. Nondistended, no fluid. G-Tube draining bile colored fluid to bedside drainage. Extremity: no edema, SCDs in place      Lab Results   Component Value Date/Time    WBC 3.8 03/18/2018 04:07 AM    ABS. NEUTROPHILS 1.6 (L) 03/18/2018 04:07 AM    HGB 7.9 (L) 03/18/2018 04:07 AM    HCT 24.5 (L) 03/18/2018 04:07 AM    MCV 95.3 03/18/2018 04:07 AM    MCH 30.7 03/18/2018 04:07 AM    PLATELET 535 86/19/8198 04:07 AM     Lab Results   Component Value Date/Time    Sodium 136 03/18/2018 04:07 AM    Potassium 3.0 (L) 03/18/2018 04:07 AM    Chloride 99 03/18/2018 04:07 AM    CO2 25 03/18/2018 04:07 AM    Glucose 68 03/18/2018 04:07 AM    BUN 9 03/18/2018 04:07 AM    Creatinine 0.53 (L) 03/18/2018 04:07 AM    Calcium 8.7 03/18/2018 04:07 AM    Albumin 2.0 (L) 03/18/2018 04:07 AM    Bilirubin, total 0.4 03/18/2018 04:07 AM    AST (SGOT) 23 03/18/2018 04:07 AM    ALT (SGPT) 18 03/18/2018 04:07 AM    Alk. phosphatase 101 03/18/2018 04:07 AM     IMPRESSION/PLAN:     61 y.o. with advanced ovarian cancer, carcinomatosis with ?partial GI obstruction admitted in transfer for obstruction, N/V. Present on Admission:   Anemia due to chemotherapy   Abdominal pain   Cancer associated pain   Carcinomatosis ([facility] [address]   Counseling regarding end of life decision making   Hx of pulmonary embolus   Erosive esophagitis   Carcinomatosis peritonei (HCC)   Intractable vomiting with nausea   Small bowel obstruction   Dehydration   Anticoagulation adequate with anticoagulant therapy   Malignant ascites   Hypomagnesemia   Hydronephrosis of left kidney   Ileostomy in place (HCC)      TPN stopped. Tolerating full liquids. Have left gastric tube open to drainage. Discussed clamping it at home and indications to unclamping it. Anemia, stable. Will continue to monitor  Thrombocytopenia resolved - restarting eliquis     Antiemetics/antireflux control.   Monitor pain. IV for breakthough. Ongoing conversation with Irene Obrien and her  regarding prognosis and going forward. At this point, pt would like to hold on chemotherapy and just \"take a break\" and will follow up with Dr. Pedro Luis Damon as out pt to plan further. No plans for discharge today. Possible discharge tomorrow.         Favian Gao MD

## 2018-03-19 LAB
ALBUMIN SERPL-MCNC: 1.9 G/DL (ref 3.5–5)
ALBUMIN/GLOB SERPL: 0.5 {RATIO} (ref 1.1–2.2)
ALP SERPL-CCNC: 95 U/L (ref 45–117)
ALT SERPL-CCNC: 14 U/L (ref 12–78)
ANION GAP SERPL CALC-SCNC: 11 MMOL/L (ref 5–15)
AST SERPL-CCNC: 21 U/L (ref 15–37)
BASOPHILS # BLD: 0 K/UL (ref 0–0.1)
BASOPHILS NFR BLD: 0 % (ref 0–1)
BILIRUB SERPL-MCNC: 0.3 MG/DL (ref 0.2–1)
BUN SERPL-MCNC: 8 MG/DL (ref 6–20)
BUN/CREAT SERPL: 12 (ref 12–20)
CALCIUM SERPL-MCNC: 8.3 MG/DL (ref 8.5–10.1)
CHLORIDE SERPL-SCNC: 98 MMOL/L (ref 97–108)
CO2 SERPL-SCNC: 28 MMOL/L (ref 21–32)
CREAT SERPL-MCNC: 0.67 MG/DL (ref 0.55–1.02)
DIFFERENTIAL METHOD BLD: ABNORMAL
EOSINOPHIL # BLD: 0.1 K/UL (ref 0–0.4)
EOSINOPHIL NFR BLD: 2 % (ref 0–7)
ERYTHROCYTE [DISTWIDTH] IN BLOOD BY AUTOMATED COUNT: 16.8 % (ref 11.5–14.5)
GLOBULIN SER CALC-MCNC: 4 G/DL (ref 2–4)
GLUCOSE SERPL-MCNC: 81 MG/DL (ref 65–100)
HCT VFR BLD AUTO: 23.1 % (ref 35–47)
HGB BLD-MCNC: 7.5 G/DL (ref 11.5–16)
IMM GRANULOCYTES # BLD: 0 K/UL (ref 0–0.04)
IMM GRANULOCYTES NFR BLD AUTO: 0 % (ref 0–0.5)
LYMPHOCYTES # BLD: 1.3 K/UL (ref 0.8–3.5)
LYMPHOCYTES NFR BLD: 42 % (ref 12–49)
MAGNESIUM SERPL-MCNC: 1 MG/DL (ref 1.6–2.4)
MCH RBC QN AUTO: 30.6 PG (ref 26–34)
MCHC RBC AUTO-ENTMCNC: 32.5 G/DL (ref 30–36.5)
MCV RBC AUTO: 94.3 FL (ref 80–99)
MONOCYTES # BLD: 0.4 K/UL (ref 0–1)
MONOCYTES NFR BLD: 11 % (ref 5–13)
NEUTS SEG # BLD: 1.4 K/UL (ref 1.8–8)
NEUTS SEG NFR BLD: 45 % (ref 32–75)
NRBC # BLD: 0 K/UL (ref 0–0.01)
NRBC BLD-RTO: 0 PER 100 WBC
PLATELET # BLD AUTO: 360 K/UL (ref 150–400)
PMV BLD AUTO: 9.2 FL (ref 8.9–12.9)
POTASSIUM SERPL-SCNC: 2.9 MMOL/L (ref 3.5–5.1)
PROT SERPL-MCNC: 5.9 G/DL (ref 6.4–8.2)
RBC # BLD AUTO: 2.45 M/UL (ref 3.8–5.2)
SODIUM SERPL-SCNC: 137 MMOL/L (ref 136–145)
WBC # BLD AUTO: 3.2 K/UL (ref 3.6–11)

## 2018-03-19 PROCEDURE — 74011250637 HC RX REV CODE- 250/637: Performed by: PHYSICIAN ASSISTANT

## 2018-03-19 PROCEDURE — 74011250637 HC RX REV CODE- 250/637: Performed by: NURSE PRACTITIONER

## 2018-03-19 PROCEDURE — 85025 COMPLETE CBC W/AUTO DIFF WBC: CPT | Performed by: NURSE PRACTITIONER

## 2018-03-19 PROCEDURE — P9016 RBC LEUKOCYTES REDUCED: HCPCS | Performed by: NURSE PRACTITIONER

## 2018-03-19 PROCEDURE — C9113 INJ PANTOPRAZOLE SODIUM, VIA: HCPCS | Performed by: PHYSICIAN ASSISTANT

## 2018-03-19 PROCEDURE — 74011250636 HC RX REV CODE- 250/636: Performed by: OBSTETRICS & GYNECOLOGY

## 2018-03-19 PROCEDURE — 74011250636 HC RX REV CODE- 250/636: Performed by: NURSE PRACTITIONER

## 2018-03-19 PROCEDURE — 36415 COLL VENOUS BLD VENIPUNCTURE: CPT | Performed by: NURSE PRACTITIONER

## 2018-03-19 PROCEDURE — 86923 COMPATIBILITY TEST ELECTRIC: CPT | Performed by: NURSE PRACTITIONER

## 2018-03-19 PROCEDURE — 83735 ASSAY OF MAGNESIUM: CPT | Performed by: NURSE PRACTITIONER

## 2018-03-19 PROCEDURE — 80053 COMPREHEN METABOLIC PANEL: CPT | Performed by: NURSE PRACTITIONER

## 2018-03-19 PROCEDURE — 65210000002 HC RM PRIVATE GYN

## 2018-03-19 PROCEDURE — 74011000258 HC RX REV CODE- 258: Performed by: NURSE PRACTITIONER

## 2018-03-19 PROCEDURE — 30233N1 TRANSFUSION OF NONAUTOLOGOUS RED BLOOD CELLS INTO PERIPHERAL VEIN, PERCUTANEOUS APPROACH: ICD-10-PCS | Performed by: OBSTETRICS & GYNECOLOGY

## 2018-03-19 PROCEDURE — 74011250636 HC RX REV CODE- 250/636: Performed by: PHYSICIAN ASSISTANT

## 2018-03-19 PROCEDURE — 36430 TRANSFUSION BLD/BLD COMPNT: CPT

## 2018-03-19 PROCEDURE — 86900 BLOOD TYPING SEROLOGIC ABO: CPT | Performed by: NURSE PRACTITIONER

## 2018-03-19 RX ORDER — POTASSIUM CHLORIDE 20MEQ/15ML
20 LIQUID (ML) ORAL
Status: DISPENSED | OUTPATIENT
Start: 2018-03-19 | End: 2018-03-19

## 2018-03-19 RX ORDER — HYDROMORPHONE HYDROCHLORIDE 2 MG/ML
1 INJECTION, SOLUTION INTRAMUSCULAR; INTRAVENOUS; SUBCUTANEOUS
Status: DISCONTINUED | OUTPATIENT
Start: 2018-03-19 | End: 2018-03-20 | Stop reason: SDUPTHER

## 2018-03-19 RX ORDER — DEXTROSE, SODIUM CHLORIDE, AND POTASSIUM CHLORIDE 5; .9; .15 G/100ML; G/100ML; G/100ML
75 INJECTION INTRAVENOUS CONTINUOUS
Status: DISCONTINUED | OUTPATIENT
Start: 2018-03-19 | End: 2018-03-22 | Stop reason: HOSPADM

## 2018-03-19 RX ORDER — SODIUM CHLORIDE 9 MG/ML
250 INJECTION, SOLUTION INTRAVENOUS AS NEEDED
Status: DISCONTINUED | OUTPATIENT
Start: 2018-03-19 | End: 2018-03-22 | Stop reason: HOSPADM

## 2018-03-19 RX ORDER — POTASSIUM CHLORIDE 14.9 MG/ML
10 INJECTION INTRAVENOUS
Status: COMPLETED | OUTPATIENT
Start: 2018-03-19 | End: 2018-03-19

## 2018-03-19 RX ORDER — MAGNESIUM SULFATE HEPTAHYDRATE 40 MG/ML
2 INJECTION, SOLUTION INTRAVENOUS
Status: DISPENSED | OUTPATIENT
Start: 2018-03-19 | End: 2018-03-20

## 2018-03-19 RX ADMIN — DEXTROSE MONOHYDRATE, SODIUM CHLORIDE, AND POTASSIUM CHLORIDE 75 ML/HR: 50; 9; 1.49 INJECTION, SOLUTION INTRAVENOUS at 20:05

## 2018-03-19 RX ADMIN — HYDROMORPHONE HYDROCHLORIDE 1 MG: 1 INJECTION, SOLUTION INTRAMUSCULAR; INTRAVENOUS; SUBCUTANEOUS at 10:12

## 2018-03-19 RX ADMIN — POTASSIUM CHLORIDE 10 MEQ: 200 INJECTION, SOLUTION INTRAVENOUS at 22:40

## 2018-03-19 RX ADMIN — PANTOPRAZOLE SODIUM 40 MG: 40 INJECTION, POWDER, FOR SOLUTION INTRAVENOUS at 21:21

## 2018-03-19 RX ADMIN — APIXABAN 2.5 MG: 2.5 TABLET, FILM COATED ORAL at 21:21

## 2018-03-19 RX ADMIN — SUCRALFATE 0.5 G: 1 SUSPENSION ORAL at 15:40

## 2018-03-19 RX ADMIN — SUCRALFATE 0.5 G: 1 SUSPENSION ORAL at 10:25

## 2018-03-19 RX ADMIN — SODIUM CHLORIDE 75 ML/HR: 450 INJECTION, SOLUTION INTRAVENOUS at 00:39

## 2018-03-19 RX ADMIN — PROMETHAZINE HYDROCHLORIDE 12.5 MG: 25 INJECTION INTRAMUSCULAR; INTRAVENOUS at 15:43

## 2018-03-19 RX ADMIN — HYDROMORPHONE HYDROCHLORIDE 1 MG: 2 INJECTION INTRAMUSCULAR; INTRAVENOUS; SUBCUTANEOUS at 20:04

## 2018-03-19 RX ADMIN — SUCRALFATE 0.5 G: 1 SUSPENSION ORAL at 21:22

## 2018-03-19 RX ADMIN — PROMETHAZINE HYDROCHLORIDE 12.5 MG: 25 INJECTION INTRAMUSCULAR; INTRAVENOUS at 10:34

## 2018-03-19 RX ADMIN — HYDROMORPHONE HYDROCHLORIDE 1 MG: 2 INJECTION INTRAMUSCULAR; INTRAVENOUS; SUBCUTANEOUS at 13:01

## 2018-03-19 RX ADMIN — HYDROMORPHONE HYDROCHLORIDE 1 MG: 2 INJECTION INTRAMUSCULAR; INTRAVENOUS; SUBCUTANEOUS at 15:16

## 2018-03-19 RX ADMIN — MAGNESIUM SULFATE HEPTAHYDRATE 2 G: 40 INJECTION, SOLUTION INTRAVENOUS at 23:45

## 2018-03-19 RX ADMIN — HYDROMORPHONE HYDROCHLORIDE 1 MG: 1 INJECTION, SOLUTION INTRAMUSCULAR; INTRAVENOUS; SUBCUTANEOUS at 02:49

## 2018-03-19 RX ADMIN — HYDROMORPHONE HYDROCHLORIDE 1 MG: 2 INJECTION INTRAMUSCULAR; INTRAVENOUS; SUBCUTANEOUS at 23:41

## 2018-03-19 RX ADMIN — POTASSIUM CHLORIDE 10 MEQ: 200 INJECTION, SOLUTION INTRAVENOUS at 23:06

## 2018-03-19 RX ADMIN — SUCRALFATE 0.5 G: 1 SUSPENSION ORAL at 03:59

## 2018-03-19 RX ADMIN — LORATADINE 10 MG: 10 TABLET ORAL at 10:21

## 2018-03-19 RX ADMIN — PANTOPRAZOLE SODIUM 40 MG: 40 INJECTION, POWDER, FOR SOLUTION INTRAVENOUS at 10:16

## 2018-03-19 RX ADMIN — PROMETHAZINE HYDROCHLORIDE 12.5 MG: 25 INJECTION INTRAMUSCULAR; INTRAVENOUS at 20:08

## 2018-03-19 RX ADMIN — HYDROMORPHONE HYDROCHLORIDE 1 MG: 2 INJECTION INTRAMUSCULAR; INTRAVENOUS; SUBCUTANEOUS at 17:30

## 2018-03-19 RX ADMIN — POTASSIUM CHLORIDE 10 MEQ: 200 INJECTION, SOLUTION INTRAVENOUS at 22:09

## 2018-03-19 RX ADMIN — POTASSIUM CHLORIDE 10 MEQ: 200 INJECTION, SOLUTION INTRAVENOUS at 21:32

## 2018-03-19 RX ADMIN — HYDROMORPHONE HYDROCHLORIDE 1 MG: 1 INJECTION, SOLUTION INTRAMUSCULAR; INTRAVENOUS; SUBCUTANEOUS at 07:34

## 2018-03-19 RX ADMIN — HYDROMORPHONE HYDROCHLORIDE 1 MG: 2 INJECTION INTRAMUSCULAR; INTRAVENOUS; SUBCUTANEOUS at 21:30

## 2018-03-19 RX ADMIN — METOPROLOL TARTRATE 50 MG: 50 TABLET ORAL at 10:21

## 2018-03-19 RX ADMIN — POTASSIUM CHLORIDE 20 MEQ: 20 SOLUTION ORAL at 10:20

## 2018-03-19 RX ADMIN — METOPROLOL TARTRATE 50 MG: 50 TABLET ORAL at 21:23

## 2018-03-19 RX ADMIN — APIXABAN 2.5 MG: 2.5 TABLET, FILM COATED ORAL at 10:20

## 2018-03-19 RX ADMIN — Medication 10 ML: at 21:37

## 2018-03-19 NOTE — PROGRESS NOTES
Orders for labs sent to [facility]. Have notified both [place] and the home health agency of patient's planned discharge for tomorrow.

## 2018-03-20 ENCOUNTER — APPOINTMENT (OUTPATIENT)
Dept: GENERAL RADIOLOGY | Age: 58
DRG: 388 | End: 2018-03-20
Attending: NURSE PRACTITIONER
Payer: OTHER GOVERNMENT

## 2018-03-20 LAB
ABO + RH BLD: NORMAL
ALBUMIN SERPL-MCNC: 2 G/DL (ref 3.5–5)
ALBUMIN/GLOB SERPL: 0.5 {RATIO} (ref 1.1–2.2)
ALP SERPL-CCNC: 102 U/L (ref 45–117)
ALT SERPL-CCNC: 14 U/L (ref 12–78)
ANION GAP SERPL CALC-SCNC: 9 MMOL/L (ref 5–15)
APPEARANCE UR: CLEAR
AST SERPL-CCNC: 24 U/L (ref 15–37)
BACTERIA URNS QL MICRO: NEGATIVE /HPF
BASOPHILS # BLD: 0 K/UL (ref 0–0.1)
BASOPHILS # BLD: 0 K/UL (ref 0–0.1)
BASOPHILS NFR BLD: 0 % (ref 0–1)
BASOPHILS NFR BLD: 0 % (ref 0–1)
BILIRUB SERPL-MCNC: 0.5 MG/DL (ref 0.2–1)
BILIRUB UR QL: NEGATIVE
BLD PROD TYP BPU: NORMAL
BLD PROD TYP BPU: NORMAL
BLOOD GROUP ANTIBODIES SERPL: NORMAL
BPU ID: NORMAL
BPU ID: NORMAL
BUN SERPL-MCNC: 4 MG/DL (ref 6–20)
BUN/CREAT SERPL: 7 (ref 12–20)
CALCIUM SERPL-MCNC: 8.7 MG/DL (ref 8.5–10.1)
CHLORIDE SERPL-SCNC: 99 MMOL/L (ref 97–108)
CO2 SERPL-SCNC: 29 MMOL/L (ref 21–32)
COLOR UR: ABNORMAL
CREAT SERPL-MCNC: 0.59 MG/DL (ref 0.55–1.02)
CROSSMATCH RESULT,%XM: NORMAL
CROSSMATCH RESULT,%XM: NORMAL
DIFFERENTIAL METHOD BLD: ABNORMAL
DIFFERENTIAL METHOD BLD: ABNORMAL
EOSINOPHIL # BLD: 0 K/UL (ref 0–0.4)
EOSINOPHIL # BLD: 0 K/UL (ref 0–0.4)
EOSINOPHIL NFR BLD: 0 % (ref 0–7)
EOSINOPHIL NFR BLD: 1 % (ref 0–7)
EPITH CASTS URNS QL MICRO: ABNORMAL /LPF
ERYTHROCYTE [DISTWIDTH] IN BLOOD BY AUTOMATED COUNT: 17.1 % (ref 11.5–14.5)
ERYTHROCYTE [DISTWIDTH] IN BLOOD BY AUTOMATED COUNT: 17.2 % (ref 11.5–14.5)
GLOBULIN SER CALC-MCNC: 4.3 G/DL (ref 2–4)
GLUCOSE SERPL-MCNC: 129 MG/DL (ref 65–100)
GLUCOSE UR STRIP.AUTO-MCNC: NEGATIVE MG/DL
HCT VFR BLD AUTO: 31.3 % (ref 35–47)
HCT VFR BLD AUTO: 34.6 % (ref 35–47)
HGB BLD-MCNC: 10.7 G/DL (ref 11.5–16)
HGB BLD-MCNC: 11.6 G/DL (ref 11.5–16)
HGB UR QL STRIP: NEGATIVE
HYALINE CASTS URNS QL MICRO: ABNORMAL /LPF (ref 0–5)
IMM GRANULOCYTES # BLD: 0 K/UL (ref 0–0.04)
IMM GRANULOCYTES # BLD: 0 K/UL (ref 0–0.04)
IMM GRANULOCYTES NFR BLD AUTO: 0 % (ref 0–0.5)
IMM GRANULOCYTES NFR BLD AUTO: 1 % (ref 0–0.5)
KETONES UR QL STRIP.AUTO: ABNORMAL MG/DL
LEUKOCYTE ESTERASE UR QL STRIP.AUTO: NEGATIVE
LYMPHOCYTES # BLD: 1.1 K/UL (ref 0.8–3.5)
LYMPHOCYTES # BLD: 1.5 K/UL (ref 0.8–3.5)
LYMPHOCYTES NFR BLD: 19 % (ref 12–49)
LYMPHOCYTES NFR BLD: 43 % (ref 12–49)
MAGNESIUM SERPL-MCNC: 1.6 MG/DL (ref 1.6–2.4)
MCH RBC QN AUTO: 30.7 PG (ref 26–34)
MCH RBC QN AUTO: 30.9 PG (ref 26–34)
MCHC RBC AUTO-ENTMCNC: 33.5 G/DL (ref 30–36.5)
MCHC RBC AUTO-ENTMCNC: 34.2 G/DL (ref 30–36.5)
MCV RBC AUTO: 89.7 FL (ref 80–99)
MCV RBC AUTO: 92 FL (ref 80–99)
MONOCYTES # BLD: 0.4 K/UL (ref 0–1)
MONOCYTES # BLD: 0.4 K/UL (ref 0–1)
MONOCYTES NFR BLD: 11 % (ref 5–13)
MONOCYTES NFR BLD: 8 % (ref 5–13)
NEUTS SEG # BLD: 1.6 K/UL (ref 1.8–8)
NEUTS SEG # BLD: 4 K/UL (ref 1.8–8)
NEUTS SEG NFR BLD: 44 % (ref 32–75)
NEUTS SEG NFR BLD: 73 % (ref 32–75)
NITRITE UR QL STRIP.AUTO: NEGATIVE
NRBC # BLD: 0 K/UL (ref 0–0.01)
NRBC # BLD: 0 K/UL (ref 0–0.01)
NRBC BLD-RTO: 0 PER 100 WBC
NRBC BLD-RTO: 0 PER 100 WBC
PH UR STRIP: 5.5 [PH] (ref 5–8)
PLATELET # BLD AUTO: 385 K/UL (ref 150–400)
PLATELET # BLD AUTO: 395 K/UL (ref 150–400)
PMV BLD AUTO: 8.7 FL (ref 8.9–12.9)
PMV BLD AUTO: 8.9 FL (ref 8.9–12.9)
POTASSIUM SERPL-SCNC: 3.3 MMOL/L (ref 3.5–5.1)
PROT SERPL-MCNC: 6.3 G/DL (ref 6.4–8.2)
PROT UR STRIP-MCNC: NEGATIVE MG/DL
RBC # BLD AUTO: 3.49 M/UL (ref 3.8–5.2)
RBC # BLD AUTO: 3.76 M/UL (ref 3.8–5.2)
RBC #/AREA URNS HPF: ABNORMAL /HPF (ref 0–5)
SODIUM SERPL-SCNC: 137 MMOL/L (ref 136–145)
SP GR UR REFRACTOMETRY: 1.02 (ref 1–1.03)
SPECIMEN EXP DATE BLD: NORMAL
STATUS OF UNIT,%ST: NORMAL
STATUS OF UNIT,%ST: NORMAL
UNIT DIVISION, %UDIV: 0
UNIT DIVISION, %UDIV: 0
UROBILINOGEN UR QL STRIP.AUTO: 1 EU/DL (ref 0.2–1)
WBC # BLD AUTO: 3.5 K/UL (ref 3.6–11)
WBC # BLD AUTO: 5.5 K/UL (ref 3.6–11)
WBC URNS QL MICRO: ABNORMAL /HPF (ref 0–4)

## 2018-03-20 PROCEDURE — C9113 INJ PANTOPRAZOLE SODIUM, VIA: HCPCS | Performed by: PHYSICIAN ASSISTANT

## 2018-03-20 PROCEDURE — 71046 X-RAY EXAM CHEST 2 VIEWS: CPT

## 2018-03-20 PROCEDURE — 81001 URINALYSIS AUTO W/SCOPE: CPT | Performed by: NURSE PRACTITIONER

## 2018-03-20 PROCEDURE — 74011000258 HC RX REV CODE- 258: Performed by: NURSE PRACTITIONER

## 2018-03-20 PROCEDURE — 74011250637 HC RX REV CODE- 250/637: Performed by: NURSE PRACTITIONER

## 2018-03-20 PROCEDURE — 65210000002 HC RM PRIVATE GYN

## 2018-03-20 PROCEDURE — 87086 URINE CULTURE/COLONY COUNT: CPT | Performed by: NURSE PRACTITIONER

## 2018-03-20 PROCEDURE — 83735 ASSAY OF MAGNESIUM: CPT | Performed by: NURSE PRACTITIONER

## 2018-03-20 PROCEDURE — 36415 COLL VENOUS BLD VENIPUNCTURE: CPT | Performed by: NURSE PRACTITIONER

## 2018-03-20 PROCEDURE — 74011250636 HC RX REV CODE- 250/636: Performed by: NURSE PRACTITIONER

## 2018-03-20 PROCEDURE — 74011250636 HC RX REV CODE- 250/636: Performed by: OBSTETRICS & GYNECOLOGY

## 2018-03-20 PROCEDURE — 74011250637 HC RX REV CODE- 250/637: Performed by: OBSTETRICS & GYNECOLOGY

## 2018-03-20 PROCEDURE — 74011250636 HC RX REV CODE- 250/636: Performed by: PHYSICIAN ASSISTANT

## 2018-03-20 PROCEDURE — 85025 COMPLETE CBC W/AUTO DIFF WBC: CPT | Performed by: NURSE PRACTITIONER

## 2018-03-20 PROCEDURE — 74011250637 HC RX REV CODE- 250/637: Performed by: PHYSICIAN ASSISTANT

## 2018-03-20 PROCEDURE — 87040 BLOOD CULTURE FOR BACTERIA: CPT | Performed by: NURSE PRACTITIONER

## 2018-03-20 PROCEDURE — 77030018846 HC SOL IRR STRL H20 ICUM -A

## 2018-03-20 PROCEDURE — 80053 COMPREHEN METABOLIC PANEL: CPT | Performed by: NURSE PRACTITIONER

## 2018-03-20 RX ORDER — LEVOFLOXACIN 5 MG/ML
500 INJECTION, SOLUTION INTRAVENOUS EVERY 24 HOURS
Status: DISCONTINUED | OUTPATIENT
Start: 2018-03-20 | End: 2018-03-20 | Stop reason: DRUGHIGH

## 2018-03-20 RX ORDER — POTASSIUM CHLORIDE 14.9 MG/ML
10 INJECTION INTRAVENOUS
Status: COMPLETED | OUTPATIENT
Start: 2018-03-20 | End: 2018-03-20

## 2018-03-20 RX ORDER — HYDROMORPHONE HYDROCHLORIDE 2 MG/ML
1 INJECTION, SOLUTION INTRAMUSCULAR; INTRAVENOUS; SUBCUTANEOUS
Status: DISCONTINUED | OUTPATIENT
Start: 2018-03-20 | End: 2018-03-22 | Stop reason: HOSPADM

## 2018-03-20 RX ORDER — ACETAMINOPHEN 10 MG/ML
1000 INJECTION, SOLUTION INTRAVENOUS
Status: DISPENSED | OUTPATIENT
Start: 2018-03-20 | End: 2018-03-21

## 2018-03-20 RX ORDER — LEVOFLOXACIN 5 MG/ML
750 INJECTION, SOLUTION INTRAVENOUS EVERY 24 HOURS
Status: DISCONTINUED | OUTPATIENT
Start: 2018-03-20 | End: 2018-03-22

## 2018-03-20 RX ADMIN — HYDROMORPHONE HYDROCHLORIDE 1 MG: 2 INJECTION INTRAMUSCULAR; INTRAVENOUS; SUBCUTANEOUS at 22:55

## 2018-03-20 RX ADMIN — Medication 1 SPRAY: at 09:20

## 2018-03-20 RX ADMIN — Medication 10 ML: at 07:01

## 2018-03-20 RX ADMIN — HYDROMORPHONE HYDROCHLORIDE 1 MG: 2 INJECTION INTRAMUSCULAR; INTRAVENOUS; SUBCUTANEOUS at 12:01

## 2018-03-20 RX ADMIN — PROMETHAZINE HYDROCHLORIDE 12.5 MG: 25 INJECTION INTRAMUSCULAR; INTRAVENOUS at 09:49

## 2018-03-20 RX ADMIN — Medication 10 ML: at 09:46

## 2018-03-20 RX ADMIN — SUCRALFATE 0.5 G: 1 SUSPENSION ORAL at 00:05

## 2018-03-20 RX ADMIN — PROMETHAZINE HYDROCHLORIDE 12.5 MG: 25 INJECTION INTRAMUSCULAR; INTRAVENOUS at 14:25

## 2018-03-20 RX ADMIN — DEXTROSE MONOHYDRATE, SODIUM CHLORIDE, AND POTASSIUM CHLORIDE 75 ML/HR: 50; 9; 1.49 INJECTION, SOLUTION INTRAVENOUS at 10:06

## 2018-03-20 RX ADMIN — Medication 10 ML: at 14:26

## 2018-03-20 RX ADMIN — SUCRALFATE 0.5 G: 1 SUSPENSION ORAL at 21:00

## 2018-03-20 RX ADMIN — POTASSIUM CHLORIDE 10 MEQ: 200 INJECTION, SOLUTION INTRAVENOUS at 09:25

## 2018-03-20 RX ADMIN — PIPERACILLIN AND TAZOBACTAM 3.38 G: 3; .375 INJECTION, POWDER, FOR SOLUTION INTRAVENOUS at 12:08

## 2018-03-20 RX ADMIN — Medication 10 ML: at 12:06

## 2018-03-20 RX ADMIN — POTASSIUM CHLORIDE 10 MEQ: 200 INJECTION, SOLUTION INTRAVENOUS at 10:07

## 2018-03-20 RX ADMIN — HYDROMORPHONE HYDROCHLORIDE 1 MG: 2 INJECTION INTRAMUSCULAR; INTRAVENOUS; SUBCUTANEOUS at 14:24

## 2018-03-20 RX ADMIN — HYDROMORPHONE HYDROCHLORIDE 1 MG: 2 INJECTION INTRAMUSCULAR; INTRAVENOUS; SUBCUTANEOUS at 18:39

## 2018-03-20 RX ADMIN — APIXABAN 2.5 MG: 2.5 TABLET, FILM COATED ORAL at 09:24

## 2018-03-20 RX ADMIN — SUCRALFATE 0.5 G: 1 SUSPENSION ORAL at 04:21

## 2018-03-20 RX ADMIN — PIPERACILLIN AND TAZOBACTAM 3.38 G: 3; .375 INJECTION, POWDER, FOR SOLUTION INTRAVENOUS at 20:41

## 2018-03-20 RX ADMIN — HYDROMORPHONE HYDROCHLORIDE 1 MG: 2 INJECTION INTRAMUSCULAR; INTRAVENOUS; SUBCUTANEOUS at 07:00

## 2018-03-20 RX ADMIN — PANTOPRAZOLE SODIUM 40 MG: 40 INJECTION, POWDER, FOR SOLUTION INTRAVENOUS at 20:45

## 2018-03-20 RX ADMIN — HYDROMORPHONE HYDROCHLORIDE 1 MG: 2 INJECTION INTRAMUSCULAR; INTRAVENOUS; SUBCUTANEOUS at 20:53

## 2018-03-20 RX ADMIN — HYDROMORPHONE HYDROCHLORIDE 1 MG: 2 INJECTION INTRAMUSCULAR; INTRAVENOUS; SUBCUTANEOUS at 02:07

## 2018-03-20 RX ADMIN — PROMETHAZINE HYDROCHLORIDE 12.5 MG: 25 INJECTION INTRAMUSCULAR; INTRAVENOUS at 04:22

## 2018-03-20 RX ADMIN — ACETAMINOPHEN 1000 MG: 10 INJECTION, SOLUTION INTRAVENOUS at 19:04

## 2018-03-20 RX ADMIN — PANTOPRAZOLE SODIUM 40 MG: 40 INJECTION, POWDER, FOR SOLUTION INTRAVENOUS at 09:24

## 2018-03-20 RX ADMIN — POTASSIUM CHLORIDE 10 MEQ: 200 INJECTION, SOLUTION INTRAVENOUS at 07:37

## 2018-03-20 RX ADMIN — APIXABAN 2.5 MG: 2.5 TABLET, FILM COATED ORAL at 20:45

## 2018-03-20 RX ADMIN — PROMETHAZINE HYDROCHLORIDE 12.5 MG: 25 INJECTION INTRAMUSCULAR; INTRAVENOUS at 18:39

## 2018-03-20 RX ADMIN — HYDROMORPHONE HYDROCHLORIDE 1 MG: 2 INJECTION INTRAMUSCULAR; INTRAVENOUS; SUBCUTANEOUS at 09:40

## 2018-03-20 RX ADMIN — SUCRALFATE 0.5 G: 1 SUSPENSION ORAL at 09:24

## 2018-03-20 RX ADMIN — HYDROMORPHONE HYDROCHLORIDE 1 MG: 2 INJECTION INTRAMUSCULAR; INTRAVENOUS; SUBCUTANEOUS at 04:20

## 2018-03-20 RX ADMIN — ACETAMINOPHEN 650 MG: 325 TABLET, FILM COATED ORAL at 07:00

## 2018-03-20 RX ADMIN — LEVOFLOXACIN 750 MG: 5 INJECTION, SOLUTION INTRAVENOUS at 18:43

## 2018-03-20 RX ADMIN — LORATADINE 10 MG: 10 TABLET ORAL at 09:20

## 2018-03-20 RX ADMIN — Medication 10 ML: at 21:00

## 2018-03-20 RX ADMIN — ZOLPIDEM TARTRATE 10 MG: 10 TABLET ORAL at 22:36

## 2018-03-20 RX ADMIN — HYDROMORPHONE HYDROCHLORIDE 1 MG: 2 INJECTION INTRAMUSCULAR; INTRAVENOUS; SUBCUTANEOUS at 16:39

## 2018-03-20 RX ADMIN — PROMETHAZINE HYDROCHLORIDE 12.5 MG: 25 INJECTION INTRAMUSCULAR; INTRAVENOUS at 00:03

## 2018-03-20 NOTE — PROGRESS NOTES
[facility]  [address] [facility], [address], [address]  P [phone]  F [phone]       Patient Name: Erin Meraz   Admit Date: 3/5/2018   Primary Dx Progressive ovarian cancer, carcinomatosis. GI dysfunction, relative obstruction   Visit Date: 3/20/2018       SUBJECTIVE:  G-tube placement successful and draining well. Pt feels her nausea is improved, but says the pain of the insertion site is still her biggest issue. Noted temp of 103.3 at 0700 after pt called nurse with feelings of \"chills all over\". Stoma continues modest output. Taking in PO. Denies nausea at present, but has had G-tube unclamped while she sleeps  No SOB. Ambulating less with her insertion site pain. She is not ready to go home and be on her own today.   Noted K+ and Mag levels this morning    OBJECTIVE:    Patient Vitals for the past 24 hrs:   Temp Pulse Resp BP SpO2   03/20/18 0020 99.5 °F (37.5 °C) 86 16 104/65 98 %   03/19/18 2000 99.3 °F (37.4 °C) 83 16 134/81 100 %   03/19/18 1856 99 °F (37.2 °C) 84 16 122/80 100 %   03/19/18 1819 98.2 °F (36.8 °C) 71 16 107/78 97 %   03/19/18 1756 98.3 °F (36.8 °C) 76 16 104/77 98 %   03/19/18 1459 99.4 °F (37.4 °C) 82 16 107/68 97 %   03/19/18 1355 99 °F (37.2 °C) 78 16 96/62 96 %   03/19/18 1305 99.3 °F (37.4 °C) 79 16 109/70 96 %   03/19/18 1244 99.1 °F (37.3 °C) 84 16 101/65 98 %   03/19/18 1207 - 83 16 111/73 97 %   03/19/18 0944 98.6 °F (37 °C) (!) 104 16 114/75 100 %         Date 03/19/18 0700 - 03/20/18 0659 03/20/18 0700 - 03/21/18 0659   Shift 3190-2724 3609-2393 24 Hour Total 3361-7865 3816-1953 24 Hour Total   I  N  T  A  K  E   I.V.  (mL/kg/hr)  618.8  (1.2) 618.8  (0.6)         Volume (dextrose 5% - 0.9% NaCl with KCl 20 mEq/L infusion)  618.8 618.8       Blood 310 310 620         Volume (TRANSFUSE PACKED RBC'S) 310  310         Volume (TRANSFUSE PACKED RBC'S)  310 310       Shift Total  (mL/kg) 310  (7.3) 928.8  (21.9) 1238.8  (29.2) O  U  T  P  U  T   Urine  (mL/kg/hr)  1000  (2) 1000  (1)         Urine Voided  1000 1000       Emesis/NG output  400 400         Output (ml) (Feeding Tube 03/15/18)  400 400       Stool  0 0         Output (ml) (Ileostomy 05/05/16 Right  Abdomen)  0 0       Shift Total  (mL/kg)  1400  (33) 1400  (33)       -471.3 -161.3      Weight (kg) 42.5 42.5 42.5 42.5 42.5 42.5       Physical Exam     General:  A&O X3 in NAD and remaing in good spirits, but tired appearing this morning   Port site: Remains without tenderness, redness or swelling     Cardiac:  Regular without M/R/G        Lungs:  CTA bilat without wheezing or rales  Abdomen: Thin. Firm but remains palpable without tenderness to generally abdomen, but consistent tenderness noted to insertion area of G-tube. Nondistended, no fluid noted. G-Tube draining bile colored fluid to bedside drainage. Extremity: no edema, SCDs in place      Lab Results   Component Value Date/Time    WBC 3.5 (L) 03/20/2018 04:15 AM    ABS. NEUTROPHILS 1.6 (L) 03/20/2018 04:15 AM    HGB 10.7 (L) 03/20/2018 04:15 AM    HCT 31.3 (L) 03/20/2018 04:15 AM    MCV 89.7 03/20/2018 04:15 AM    MCH 30.7 03/20/2018 04:15 AM    PLATELET 863 17/35/4421 04:15 AM     Lab Results   Component Value Date/Time    Sodium 137 03/20/2018 04:15 AM    Potassium 3.3 (L) 03/20/2018 04:15 AM    Chloride 99 03/20/2018 04:15 AM    CO2 29 03/20/2018 04:15 AM    Glucose 129 (H) 03/20/2018 04:15 AM    BUN 4 (L) 03/20/2018 04:15 AM    Creatinine 0.59 03/20/2018 04:15 AM    Calcium 8.7 03/20/2018 04:15 AM    Albumin 2.0 (L) 03/20/2018 04:15 AM    Bilirubin, total 0.5 03/20/2018 04:15 AM    AST (SGOT) 24 03/20/2018 04:15 AM    ALT (SGPT) 14 03/20/2018 04:15 AM    Alk. phosphatase 102 03/20/2018 04:15 AM     IMPRESSION/PLAN:     61 y.o. with advanced ovarian cancer, carcinomatosis with ?partial GI obstruction admitted in transfer for obstruction, N/V.     Present on Admission:   Anemia due to chemotherapy   Abdominal pain   Cancer associated pain   Carcinomatosis ([facility] [address]   Counseling regarding end of life decision making   Hx of pulmonary embolus   Erosive esophagitis   Carcinomatosis peritonei (HCC)   Intractable vomiting with nausea   Small bowel obstruction   Dehydration   Anticoagulation adequate with anticoagulant therapy   Malignant ascites   Hypomagnesemia   Hydronephrosis of left kidney   Ileostomy in place [facility])      Drawing paired blood cultures and checking urine cultures. Will begin Zosyn when  Cultures obtained. TPN has been stopped and pt tolerating small frequent regular meals like grilled cheese and cream soups. Now has decreased appetite with not \"feeling well\". Anemia: improved after transfusion yesterday with HGB 10.7 this morning. Will continue to monitor  Discussed clamping it at home and indications to unclamping it. Replace K+ and Mag today and monitor  Thrombocytopenia resolved - continue eliquis     Antiemetics/antireflux control. Monitor pain. IV for breakthough. Will have to convert 24 hour totals of IV dilaudid to PO in anticipation of discharge soon, but will wait due to holding on D/C at this point. Ongoing conversation with Linden Burgess and her  regarding prognosis and going forward. At this point, pt would like to hold on chemotherapy and just \"take a break\" and will follow up with Dr. Senia Olguin as out pt to plan further. No plans for discharge today.         Stefani Alicea NP

## 2018-03-20 NOTE — PROGRESS NOTES
03/20/2018; 11:35 -   [facility] [phone], option 1, option 1) to update that the patient has not discharged due to various problems. The [facility] agency has requested to have regular updates submitted to determine when the patient will discharge.  has also requested to have clarification on when the IV hydration is to start upon discharge. CRM: Mouna Burgess, MPH; Z: [phone]    14:00 -   CM rounded on patient with attending physician's NP. CM has updated [facility] agency and IV Infusion provider in [employer] per verbal communication that the patient should be anticipated for home based IV hydration 4-5 days post discharge. CRM: Mouna Burgess, MPH; Z: [phone]    15:35 -   CM received request from patient's  for a [facility] placement. Per , placement is available. CCL to submit referral, and  should receive a call regarding room.   CRM: Mouna Burgess, MPH; Z: [phone]

## 2018-03-20 NOTE — PROGRESS NOTES
Levaquin dose adjusted from 500 mg to 750 mg q24hr per protocol for [facility]. Pharmacy to monitor daily and adjust if necessary for any changes in renal function.

## 2018-03-20 NOTE — PROGRESS NOTES
03/20/18 1620   Vitals   Temp (!) 102.5 °F (39.2 °C)   Temp Source Oral   Pulse (Heart Rate) (!) 105   Resp Rate 16   O2 Sat (%) 93 %   Level of Consciousness Alert   /69   MAP (Calculated) 84   BP 1 Method Automatic   MEWS Score 4     Dr Brennon Hubbard notified. CXR results called to him. Dr Brennon Hubbard also aware that pt vomited 400 cc total upon arrival from [facility], [facility].

## 2018-03-21 PROBLEM — K12.31 MUCOSITIS DUE TO CHEMOTHERAPY: Status: ACTIVE | Noted: 2018-03-21

## 2018-03-21 LAB
ALBUMIN SERPL-MCNC: 1.8 G/DL (ref 3.5–5)
ALBUMIN/GLOB SERPL: 0.5 {RATIO} (ref 1.1–2.2)
ALP SERPL-CCNC: 98 U/L (ref 45–117)
ALT SERPL-CCNC: 10 U/L (ref 12–78)
ANION GAP SERPL CALC-SCNC: 7 MMOL/L (ref 5–15)
AST SERPL-CCNC: 19 U/L (ref 15–37)
BACTERIA SPEC CULT: NORMAL
BASOPHILS # BLD: 0 K/UL (ref 0–0.1)
BASOPHILS NFR BLD: 0 % (ref 0–1)
BILIRUB SERPL-MCNC: 0.5 MG/DL (ref 0.2–1)
BUN SERPL-MCNC: 5 MG/DL (ref 6–20)
BUN/CREAT SERPL: 7 (ref 12–20)
CALCIUM SERPL-MCNC: 8 MG/DL (ref 8.5–10.1)
CC UR VC: NORMAL
CHLORIDE SERPL-SCNC: 101 MMOL/L (ref 97–108)
CO2 SERPL-SCNC: 29 MMOL/L (ref 21–32)
CREAT SERPL-MCNC: 0.7 MG/DL (ref 0.55–1.02)
DIFFERENTIAL METHOD BLD: ABNORMAL
EOSINOPHIL # BLD: 0.1 K/UL (ref 0–0.4)
EOSINOPHIL NFR BLD: 1 % (ref 0–7)
ERYTHROCYTE [DISTWIDTH] IN BLOOD BY AUTOMATED COUNT: 17 % (ref 11.5–14.5)
GLOBULIN SER CALC-MCNC: 4 G/DL (ref 2–4)
GLUCOSE SERPL-MCNC: 106 MG/DL (ref 65–100)
HCT VFR BLD AUTO: 29.9 % (ref 35–47)
HGB BLD-MCNC: 9.9 G/DL (ref 11.5–16)
IMM GRANULOCYTES # BLD: 0 K/UL
IMM GRANULOCYTES NFR BLD AUTO: 0 %
LYMPHOCYTES # BLD: 0.8 K/UL (ref 0.8–3.5)
LYMPHOCYTES NFR BLD: 13 % (ref 12–49)
MAGNESIUM SERPL-MCNC: 1 MG/DL (ref 1.6–2.4)
MCH RBC QN AUTO: 30.4 PG (ref 26–34)
MCHC RBC AUTO-ENTMCNC: 33.1 G/DL (ref 30–36.5)
MCV RBC AUTO: 91.7 FL (ref 80–99)
MONOCYTES # BLD: 0.5 K/UL (ref 0–1)
MONOCYTES NFR BLD: 8 % (ref 5–13)
NEUTS BAND NFR BLD MANUAL: 11 % (ref 0–6)
NEUTS SEG # BLD: 4.5 K/UL (ref 1.8–8)
NEUTS SEG NFR BLD: 67 % (ref 32–75)
NRBC # BLD: 0 K/UL (ref 0–0.01)
NRBC BLD-RTO: 0 PER 100 WBC
PLATELET # BLD AUTO: 320 K/UL (ref 150–400)
PLATELET COMMENTS,PCOM: ABNORMAL
PMV BLD AUTO: 9.2 FL (ref 8.9–12.9)
POTASSIUM SERPL-SCNC: 3.4 MMOL/L (ref 3.5–5.1)
PROT SERPL-MCNC: 5.8 G/DL (ref 6.4–8.2)
RBC # BLD AUTO: 3.26 M/UL (ref 3.8–5.2)
RBC MORPH BLD: ABNORMAL
SERVICE CMNT-IMP: NORMAL
SODIUM SERPL-SCNC: 137 MMOL/L (ref 136–145)
WBC # BLD AUTO: 5.9 K/UL (ref 3.6–11)

## 2018-03-21 PROCEDURE — C9113 INJ PANTOPRAZOLE SODIUM, VIA: HCPCS | Performed by: PHYSICIAN ASSISTANT

## 2018-03-21 PROCEDURE — 74011250636 HC RX REV CODE- 250/636: Performed by: PHYSICIAN ASSISTANT

## 2018-03-21 PROCEDURE — 74011000258 HC RX REV CODE- 258: Performed by: NURSE PRACTITIONER

## 2018-03-21 PROCEDURE — 74011000250 HC RX REV CODE- 250: Performed by: NURSE PRACTITIONER

## 2018-03-21 PROCEDURE — 74011250636 HC RX REV CODE- 250/636: Performed by: OBSTETRICS & GYNECOLOGY

## 2018-03-21 PROCEDURE — 74011250637 HC RX REV CODE- 250/637: Performed by: NURSE PRACTITIONER

## 2018-03-21 PROCEDURE — 85025 COMPLETE CBC W/AUTO DIFF WBC: CPT | Performed by: NURSE PRACTITIONER

## 2018-03-21 PROCEDURE — 36415 COLL VENOUS BLD VENIPUNCTURE: CPT | Performed by: NURSE PRACTITIONER

## 2018-03-21 PROCEDURE — 74011250637 HC RX REV CODE- 250/637: Performed by: PHYSICIAN ASSISTANT

## 2018-03-21 PROCEDURE — 77030027138 HC INCENT SPIROMETER -A

## 2018-03-21 PROCEDURE — 74011250636 HC RX REV CODE- 250/636: Performed by: NURSE PRACTITIONER

## 2018-03-21 PROCEDURE — 65210000002 HC RM PRIVATE GYN

## 2018-03-21 PROCEDURE — 83735 ASSAY OF MAGNESIUM: CPT | Performed by: NURSE PRACTITIONER

## 2018-03-21 PROCEDURE — 80053 COMPREHEN METABOLIC PANEL: CPT | Performed by: NURSE PRACTITIONER

## 2018-03-21 RX ORDER — MAGNESIUM SULFATE HEPTAHYDRATE 40 MG/ML
2 INJECTION, SOLUTION INTRAVENOUS
Status: COMPLETED | OUTPATIENT
Start: 2018-03-21 | End: 2018-03-21

## 2018-03-21 RX ORDER — POTASSIUM CHLORIDE 29.8 MG/ML
20 INJECTION INTRAVENOUS ONCE
Status: COMPLETED | OUTPATIENT
Start: 2018-03-21 | End: 2018-03-21

## 2018-03-21 RX ADMIN — Medication 10 ML: at 04:15

## 2018-03-21 RX ADMIN — SUCRALFATE 0.5 G: 1 SUSPENSION ORAL at 00:05

## 2018-03-21 RX ADMIN — HYDROMORPHONE HYDROCHLORIDE 1 MG: 2 INJECTION INTRAMUSCULAR; INTRAVENOUS; SUBCUTANEOUS at 04:15

## 2018-03-21 RX ADMIN — LORATADINE 10 MG: 10 TABLET ORAL at 08:58

## 2018-03-21 RX ADMIN — HYDROMORPHONE HYDROCHLORIDE 1 MG: 2 INJECTION INTRAMUSCULAR; INTRAVENOUS; SUBCUTANEOUS at 16:11

## 2018-03-21 RX ADMIN — HYDROCORTISONE 5 ML: 10 TABLET ORAL at 12:07

## 2018-03-21 RX ADMIN — PROMETHAZINE HYDROCHLORIDE 12.5 MG: 25 INJECTION INTRAMUSCULAR; INTRAVENOUS at 13:12

## 2018-03-21 RX ADMIN — HYDROMORPHONE HYDROCHLORIDE 1 MG: 2 INJECTION INTRAMUSCULAR; INTRAVENOUS; SUBCUTANEOUS at 07:44

## 2018-03-21 RX ADMIN — PROMETHAZINE HYDROCHLORIDE 12.5 MG: 25 INJECTION INTRAMUSCULAR; INTRAVENOUS at 08:53

## 2018-03-21 RX ADMIN — PANTOPRAZOLE SODIUM 40 MG: 40 INJECTION, POWDER, FOR SOLUTION INTRAVENOUS at 08:58

## 2018-03-21 RX ADMIN — HYDROMORPHONE HYDROCHLORIDE 1 MG: 2 INJECTION INTRAMUSCULAR; INTRAVENOUS; SUBCUTANEOUS at 21:20

## 2018-03-21 RX ADMIN — HYDROMORPHONE HYDROCHLORIDE 1 MG: 2 INJECTION INTRAMUSCULAR; INTRAVENOUS; SUBCUTANEOUS at 13:09

## 2018-03-21 RX ADMIN — MAGNESIUM SULFATE HEPTAHYDRATE 2 G: 40 INJECTION, SOLUTION INTRAVENOUS at 12:10

## 2018-03-21 RX ADMIN — PROMETHAZINE HYDROCHLORIDE 12.5 MG: 25 INJECTION INTRAMUSCULAR; INTRAVENOUS at 23:13

## 2018-03-21 RX ADMIN — PIPERACILLIN AND TAZOBACTAM 3.38 G: 3; .375 INJECTION, POWDER, FOR SOLUTION INTRAVENOUS at 04:15

## 2018-03-21 RX ADMIN — Medication 1 CAPSULE: at 10:44

## 2018-03-21 RX ADMIN — PROMETHAZINE HYDROCHLORIDE 12.5 MG: 25 INJECTION INTRAMUSCULAR; INTRAVENOUS at 04:15

## 2018-03-21 RX ADMIN — Medication 10 ML: at 22:00

## 2018-03-21 RX ADMIN — Medication 10 ML: at 10:48

## 2018-03-21 RX ADMIN — APIXABAN 2.5 MG: 2.5 TABLET, FILM COATED ORAL at 20:19

## 2018-03-21 RX ADMIN — PROMETHAZINE HYDROCHLORIDE 12.5 MG: 25 INJECTION INTRAMUSCULAR; INTRAVENOUS at 18:19

## 2018-03-21 RX ADMIN — ZOLPIDEM TARTRATE 10 MG: 10 TABLET ORAL at 21:41

## 2018-03-21 RX ADMIN — Medication 10 ML: at 18:34

## 2018-03-21 RX ADMIN — SUCRALFATE 0.5 G: 1 SUSPENSION ORAL at 21:20

## 2018-03-21 RX ADMIN — ONDANSETRON 4 MG: 2 INJECTION INTRAMUSCULAR; INTRAVENOUS at 21:31

## 2018-03-21 RX ADMIN — SUCRALFATE 0.5 G: 1 SUSPENSION ORAL at 04:15

## 2018-03-21 RX ADMIN — PIPERACILLIN AND TAZOBACTAM 3.38 G: 3; .375 INJECTION, POWDER, FOR SOLUTION INTRAVENOUS at 12:08

## 2018-03-21 RX ADMIN — HYDROMORPHONE HYDROCHLORIDE 1 MG: 2 INJECTION INTRAMUSCULAR; INTRAVENOUS; SUBCUTANEOUS at 18:30

## 2018-03-21 RX ADMIN — LEVOFLOXACIN 750 MG: 5 INJECTION, SOLUTION INTRAVENOUS at 18:20

## 2018-03-21 RX ADMIN — HYDROCORTISONE 5 ML: 10 TABLET ORAL at 16:08

## 2018-03-21 RX ADMIN — Medication 10 ML: at 13:11

## 2018-03-21 RX ADMIN — APIXABAN 2.5 MG: 2.5 TABLET, FILM COATED ORAL at 08:58

## 2018-03-21 RX ADMIN — MAGNESIUM SULFATE HEPTAHYDRATE 2 G: 40 INJECTION, SOLUTION INTRAVENOUS at 10:43

## 2018-03-21 RX ADMIN — POTASSIUM CHLORIDE 20 MEQ: 400 INJECTION, SOLUTION INTRAVENOUS at 07:32

## 2018-03-21 RX ADMIN — POTASSIUM CHLORIDE: 2 INJECTION, SOLUTION, CONCENTRATE INTRAVENOUS at 18:26

## 2018-03-21 RX ADMIN — HYDROMORPHONE HYDROCHLORIDE 1 MG: 2 INJECTION INTRAMUSCULAR; INTRAVENOUS; SUBCUTANEOUS at 10:44

## 2018-03-21 RX ADMIN — PIPERACILLIN AND TAZOBACTAM 3.38 G: 3; .375 INJECTION, POWDER, FOR SOLUTION INTRAVENOUS at 20:18

## 2018-03-21 RX ADMIN — MAGNESIUM SULFATE HEPTAHYDRATE 2 G: 40 INJECTION, SOLUTION INTRAVENOUS at 09:13

## 2018-03-21 RX ADMIN — PANTOPRAZOLE SODIUM 40 MG: 40 INJECTION, POWDER, FOR SOLUTION INTRAVENOUS at 20:18

## 2018-03-21 RX ADMIN — SUCRALFATE 0.5 G: 1 SUSPENSION ORAL at 16:08

## 2018-03-21 NOTE — PROGRESS NOTES
Today, pt was engaged in learning G management; anxious about going home due to n/v. NP Tara rounded with pt this am and pt shared her concerns. Emotional support provided.

## 2018-03-21 NOTE — PROGRESS NOTES
03/21/2018; 09:00 -   CM conferenced with attending physician's NP. Patient has been diagnosed with pneumonia and has aspirated, but medically she is doing better, including receiving education on her PEG tube and how to open/close it. Current disposition is to discharge the patient 03/22/2018. CM updated referrals to [facility] and Home IV Infusion via [employer] to notify selected providers of potential discharge. CRM: Kaur Brewer, MPH; Z: [phone]    11:30 -   CM met with medical team during IDRs. Patient has a new pneumonia diagnosis, but her fever seemed to decrease over night. Patient is a likely discharge for 3/22/2018. CM notified IV infusion ([place]) and [facility]) of updated discharge planning. CM received response from [facility], [facility] that they are willing to accept patient with a [facility] 3/27/2018 for [facility] services. CM to continue following for ongoing discharge planning. CRM: Kaur Brewer MPH; Z: [phone]    14:45 -   CM met with patient at bedside. CM explained that [facility] plans to take patient into service on 3/27/18, which is in line with the 4-5 day post discharge requests of attending physician team.  Patient expressed understanding and will anticipate the [facility] visit. CM explained that [place] should be following a similar schedule.   CRM: Kaur Brewer MPH; Z: [phone]

## 2018-03-21 NOTE — PROGRESS NOTES
Bedside and Verbal shift change report given to VALERIANO Haskins RN (oncoming nurse) by Collin Holland RN (offgoing nurse). Report included the following information SBAR, Kardex, Intake/Output, MAR and Recent Results.

## 2018-03-21 NOTE — PROGRESS NOTES
Bedside and Verbal shift change report given to [address] [place] [address] Ave (oncoming nurse) by Jyothi Montoya RN (offgoing nurse). Report included the following information SBAR, Kardex, Intake/Output, MAR and Recent Results.

## 2018-03-21 NOTE — PROGRESS NOTES
NUTRITION COMPLETE ASSESSMENT    RECOMMENDATIONS:   1. Recommend ordering Viokace with bicarb for pt to have prn here and on discharge for use in difficult de-clogging situations (suspect there may be episodes where warm water flushes may not work to [employer])  -- **At [facility], the order with directions on administration is under \"Clog\" in manage orders    2. Continue diet as order with emphasis on small, frequent meals, thorough chewing, soft, well-cooked foods (pureed as needed) and high calorie liquids    3. If pt able to clamp for longer periods, could consider giving additional flushes via PEG to help prevent clogs and to provide additional hydration     Interventions/Plan:   Food/Nutrient Delivery: as above. Will discontinue ONS per pt request.    Assessment:   Reason for Assessment:   [x]Reassessment     Diet: Regular + Ensure Enlive, Boost Pudding  Nutritionally Significant Medications: [x] Reviewed & Includes: D5 0.9% NaCl with KCl 20 mEq/L @ 75 mL/hr; humalog correction scale; klack's mouthwash; floraQ daily; levaquin daily; tariq q6 hours prn; protonix q12 hours; scopolamine patch; carafate 4x/day  Meal Intake: Patient Vitals for the past 100 hrs:   % Diet Eaten   03/19/18 1212 60 %     Subjective:  Pt in good spirits, though tired. She is making an effort to eat. Dislikes supplements, so will discontinue all orders. Discussed taking them with her and using as needed at home and provided other ideas/ways of drinking them. Objective:  Chart reviewed, discussed with RN. Pt has been off of TPN since 3/16. Regular diet ordered and she is choosing foods that are soft. Reviewed recommended foods and highly emphasized chewing foods thoroughly, choosing very soft cooked vegetables (no raw) and soft fruits (and/or peeling skin from fruits). The pt is used to this from her ileostomy. Discussed using chicken salad/tuna salad/egg salad, cottage cheese, pudding, applesauce.   Also recommended adding butter/gravy to soften foods further and using a  as needed. May also use non-fat milk powder to increase calories/protein as needed. Pt is draining PEG frequently during the day, so will likely absorb very little po. She has had issues with the tube clogging, but has been able to learn how to use warm water to declog. Would recommend sending pt with Viokace enzymes to crush and use as declogging agent. Weight stable. Estimated Nutrition Needs:   Kcals/day: 1470 Kcals/day (1470-1596kcal)  Protein: 59 g (59-76g (1.4-1.8g/kg))  Fluid: 1470 ml (1ml/kcal)     Based On: Kcal/kg - specify (Comment) (35-38kcal/kg)  Weight Used: Actual wt (42kg)    Pt expected to meet estimated nutrient needs:  []   Yes     [x]  No    Nutrition Diagnosis:   1. Malnutrition related to inadequate protein/energy intake 2/2 altered GI fx as evidenced by PSBO; N/V; 80%IBW, severe wt loss; minimal PO intake x 10+ days     2.   (Increased protein/energy needs) related to increased energy expenditure as evidenced by stage 4 ovarian CA; malnutrition with severe wt loss    Goals:     Weight maintenance over the next 5-7 days     Monitoring & Evaluation:    - Enteral/parenteral nutrition intake   - Weight/weight change, GI, Electrolyte and renal profile     Previous Nutrition Goals Met:  N/A  Previous Recommendations:      Yes    Education & Discharge Needs:   [] None Identified   [x] Identified and addressed    [x] Participated in care plan, discharge planning, and/or interdisciplinary rounds        Cultural, Latter-day and ethnic food preferences identified:   None    Skin Integrity: []Intact  [x]Other: incision  Edema: [x]None []Other  Last BM: 3/21/18  Food Allergies: [x]None []Other    Anthropometrics:    Weight Loss Metrics 3/18/2018 3/5/2018 2/13/2018 2/8/2018 1/16/2018 1/16/2018 12/19/2017   Today's Wt 93 lb 9.6 oz - 95 lb 9.6 oz 95 lb 6.4 oz 99 lb 9.6 oz 99 lb 9.6 oz 102 lb   BMI - 16.58 kg/m2 16.94 kg/m2 16.9 kg/m2 17.65 kg/m2 17.65 kg/m2 18.07 kg/m2      Last 3 Recorded Weights in this Encounter    03/15/18 0346 03/17/18 0516 03/18/18 0904   Weight: 42.7 kg (94 lb 3.2 oz) 43 kg (94 lb 12.8 oz) 42.5 kg (93 lb 9.6 oz)      Weight Source: Standing scale (comment)  Height: 5' 2.99\" (160 cm),    Body mass index is 16.58 kg/(m^2). IBW : 52.2 kg (115 lb), % IBW (Calculated): 80.52 %  Usual Body Weight: 56.2 kg (124 lb),      Labs:  Lab Results   Component Value Date/Time    Sodium 137 03/21/2018 04:23 AM    Potassium 3.4 (L) 03/21/2018 04:23 AM    Chloride 101 03/21/2018 04:23 AM    CO2 29 03/21/2018 04:23 AM    Glucose 106 (H) 03/21/2018 04:23 AM    BUN 5 (L) 03/21/2018 04:23 AM    Creatinine 0.70 03/21/2018 04:23 AM    Calcium 8.0 (L) 03/21/2018 04:23 AM    Magnesium 1.0 (L) 03/21/2018 04:23 AM    Phosphorus 3.2 03/16/2018 04:36 AM    Albumin 1.8 (L) 03/21/2018 04:23 AM     No results found for: HBA1C, HGBE8, QHX9SXOU, FCS9FEYR  Lab Results   Component Value Date/Time    Glucose 106 (H) 03/21/2018 04:23 AM    Glucose (POC) 81 03/16/2018 05:59 PM      Lab Results   Component Value Date/Time    ALT (SGPT) 10 (L) 03/21/2018 04:23 AM    AST (SGOT) 19 03/21/2018 04:23 AM    Alk.  phosphatase 98 03/21/2018 04:23 AM    Bilirubin, direct 0.4 (H) 05/11/2016 04:39 AM    Bilirubin, total 0.5 03/21/2018 04:23 AM      David Bashir, [address]

## 2018-03-21 NOTE — PROGRESS NOTES
[facility] GYNECOLOGIC ONCOLOGY  [address] [facility], [place] Mathias [address] Thelma GARCIA [phone]  F [phone]       Patient Name: Sherrill Jaramillo   Admit Date: 3/5/2018   Primary Dx Progressive ovarian cancer, carcinomatosis. GI dysfunction, relative obstruction   Visit Date: 3/21/2018       SUBJECTIVE:  Noted CXR results yesterday and pt is now on Zosyn/Levaquin. Probable aspiration due to reflux  G-tube placement successful and draining well. Pt feels her nausea is improved and today finally, she says the pain at the insertion site is improved  Noted temp of 103.3 at 0700 after pt called nurse with feelings of \"chills all over\". Stoma continues modest output. Taking in PO. Denies nausea at present, but has had G-tube unclamped while she sleeps  No SOB. Ambulating less with her insertion site pain. She is not ready to go home and be on her own today.   Noted K+ and Mag levels this morning    OBJECTIVE:    Patient Vitals for the past 24 hrs:   Temp Pulse Resp BP SpO2   03/21/18 0846 99.2 °F (37.3 °C) 89 16 95/59 95 %   03/21/18 0038 98.8 °F (37.1 °C) 83 16 90/55 97 %   03/20/18 2011 100.3 °F (37.9 °C) 98 16 96/60 95 %   03/20/18 1620 (!) 102.5 °F (39.2 °C) (!) 105 16 114/69 93 %   03/20/18 1331 (!) 100.6 °F (38.1 °C) (!) 102 16 124/82 98 %   03/20/18 1202 99.2 °F (37.3 °C) - - - -         Date 03/20/18 0700 - 03/21/18 0659 03/21/18 0700 - 03/22/18 0659   Shift 4116-3239 6971-9524 24 Hour Total 4604-5901 3702-5866 24 Hour Total   I  N  T  A  K  E   I.V.  (mL/kg/hr)  1102.5  (2.2) 1102.5  (1.1)         Volume (dextrose 5% - 0.9% NaCl with KCl 20 mEq/L infusion)  852.5 852.5         Volume (acetaminophen (OFIRMEV) infusion 1,000 mg)  100 100         Volume (levoFLOXacin (LEVAQUIN) 750 mg in D5W IVPB)  150 150       NG/GT  100 100 50  50      Water Flush Volume (mL) (Feeding Tube 03/15/18)  100 100 50  50    Shift Total  (mL/kg)  1202.5  (28.3) 1202.5  (28.3) 50  (1.2)  50  (1.2)   O  U  T  P  U  T Urine  (mL/kg/hr) 200  (0.4) 925  (1.8) 1125  (1.1)         Urine Voided        Emesis/NG output          Emesis 400  400         Output (ml) (Feeding Tube 03/15/18)        Shift Total  (mL/kg) 900  (21.2) 1850  (43.6) 2750  (64.8)      NET -900 -647.5 -1547.5 50  50   Weight (kg) 42.5 42.5 42.5 42.5 42.5 42.5       Physical Exam     General:  A&O X3 in NAD and remaing in good spirits, but tired appearing this morning        HEENT: Mild aphthous ulcers noted to posterior throat. Port site: Remains without tenderness, redness or swelling     Cardiac:  Regular without M/R/G        Lungs:  CTA bilat without wheezing or rales  Abdomen: Thin. Firm but remains palpable without tenderness to generally abdomen. Tenderness noted improved to insertion area of G-tube. Nondistended. G-Tube draining bile colored fluid to bedside drainage. Extremity: no edema, SCDs in place      Lab Results   Component Value Date/Time    WBC 5.9 03/21/2018 04:23 AM    ABS. NEUTROPHILS 4.5 03/21/2018 04:23 AM    HGB 9.9 (L) 03/21/2018 04:23 AM    HCT 29.9 (L) 03/21/2018 04:23 AM    MCV 91.7 03/21/2018 04:23 AM    MCH 30.4 03/21/2018 04:23 AM    PLATELET 166 36/62/2467 04:23 AM     Lab Results   Component Value Date/Time    Sodium 137 03/21/2018 04:23 AM    Potassium 3.4 (L) 03/21/2018 04:23 AM    Chloride 101 03/21/2018 04:23 AM    CO2 29 03/21/2018 04:23 AM    Glucose 106 (H) 03/21/2018 04:23 AM    BUN 5 (L) 03/21/2018 04:23 AM    Creatinine 0.70 03/21/2018 04:23 AM    Calcium 8.0 (L) 03/21/2018 04:23 AM    Albumin 1.8 (L) 03/21/2018 04:23 AM    Bilirubin, total 0.5 03/21/2018 04:23 AM    AST (SGOT) 19 03/21/2018 04:23 AM    ALT (SGPT) 10 (L) 03/21/2018 04:23 AM    Alk. phosphatase 98 03/21/2018 04:23 AM     IMPRESSION/PLAN:     61 y.o. with advanced ovarian cancer, carcinomatosis with ?partial GI obstruction admitted in transfer for obstruction, N/V.     Present on Admission:   Anemia due to chemotherapy   Abdominal pain   Cancer associated pain   [facility]cinomatosis ([facility]   Counseling regarding end of life decision making   Hx of pulmonary embolus   Erosive esophagitis   Carcinomatosis peritonei (HCC)   Intractable vomiting with nausea   Small bowel obstruction   Dehydration   Anticoagulation adequate with anticoagulant therapy   Malignant ascites   Hypomagnesemia   Hydronephrosis of left kidney   Ileostomy in place [facility])      Continue Zosyn/Levo     Pneumonia: continue abx and will add pro-biotic today  Monitor temp and blood culture results  Anemia: stable at 9.9 today. Will continue to monitor  Reviewed flushing G-tube and will have pt use leg bag today to see how she does with it to be more active with it. Replace K+ and Mag again today and monitor  Thrombocytopenia resolved - continue eliquis     Antiemetics/antireflux control. Monitor pain. IV for breakthough. Will have to convert 24 hour totals of IV dilaudid to PO in anticipation of discharge maybe tomorrow. Ongoing conversation with Van Brito and her  regarding prognosis and going forward. As stated previously, pt and her  have agreed they would like to hold on chemotherapy and just \"take a break\" and will follow up with Dr. Maria Luisa Marquez as out pt to plan further.   Possible discharge tomorrow        Hoda Lynch NP

## 2018-03-22 VITALS
HEART RATE: 83 BPM | RESPIRATION RATE: 18 BRPM | WEIGHT: 102.2 LBS | SYSTOLIC BLOOD PRESSURE: 97 MMHG | BODY MASS INDEX: 18.11 KG/M2 | TEMPERATURE: 98.4 F | OXYGEN SATURATION: 96 % | HEIGHT: 63 IN | DIASTOLIC BLOOD PRESSURE: 65 MMHG

## 2018-03-22 LAB
ALBUMIN SERPL-MCNC: 1.8 G/DL (ref 3.5–5)
ALBUMIN/GLOB SERPL: 0.5 {RATIO} (ref 1.1–2.2)
ALP SERPL-CCNC: 94 U/L (ref 45–117)
ALT SERPL-CCNC: 9 U/L (ref 12–78)
ANION GAP SERPL CALC-SCNC: 7 MMOL/L (ref 5–15)
AST SERPL-CCNC: 13 U/L (ref 15–37)
BASOPHILS # BLD: 0 K/UL (ref 0–0.1)
BASOPHILS NFR BLD: 0 % (ref 0–1)
BILIRUB SERPL-MCNC: 0.3 MG/DL (ref 0.2–1)
BUN SERPL-MCNC: 5 MG/DL (ref 6–20)
BUN/CREAT SERPL: 7 (ref 12–20)
CALCIUM SERPL-MCNC: 7.9 MG/DL (ref 8.5–10.1)
CHLORIDE SERPL-SCNC: 101 MMOL/L (ref 97–108)
CO2 SERPL-SCNC: 30 MMOL/L (ref 21–32)
CREAT SERPL-MCNC: 0.69 MG/DL (ref 0.55–1.02)
DIFFERENTIAL METHOD BLD: ABNORMAL
EOSINOPHIL # BLD: 0.1 K/UL (ref 0–0.4)
EOSINOPHIL NFR BLD: 2 % (ref 0–7)
ERYTHROCYTE [DISTWIDTH] IN BLOOD BY AUTOMATED COUNT: 16.7 % (ref 11.5–14.5)
GLOBULIN SER CALC-MCNC: 3.9 G/DL (ref 2–4)
GLUCOSE SERPL-MCNC: 103 MG/DL (ref 65–100)
HCT VFR BLD AUTO: 29.3 % (ref 35–47)
HGB BLD-MCNC: 9.6 G/DL (ref 11.5–16)
IMM GRANULOCYTES # BLD: 0 K/UL (ref 0–0.04)
IMM GRANULOCYTES NFR BLD AUTO: 0 % (ref 0–0.5)
LYMPHOCYTES # BLD: 1.3 K/UL (ref 0.8–3.5)
LYMPHOCYTES NFR BLD: 19 % (ref 12–49)
MAGNESIUM SERPL-MCNC: 2.4 MG/DL (ref 1.6–2.4)
MCH RBC QN AUTO: 30.8 PG (ref 26–34)
MCHC RBC AUTO-ENTMCNC: 32.8 G/DL (ref 30–36.5)
MCV RBC AUTO: 93.9 FL (ref 80–99)
MONOCYTES # BLD: 0.6 K/UL (ref 0–1)
MONOCYTES NFR BLD: 10 % (ref 5–13)
NEUTS SEG # BLD: 4.6 K/UL (ref 1.8–8)
NEUTS SEG NFR BLD: 69 % (ref 32–75)
NRBC # BLD: 0 K/UL (ref 0–0.01)
NRBC BLD-RTO: 0 PER 100 WBC
PLATELET # BLD AUTO: 371 K/UL (ref 150–400)
PMV BLD AUTO: 9 FL (ref 8.9–12.9)
POTASSIUM SERPL-SCNC: 3.2 MMOL/L (ref 3.5–5.1)
PROT SERPL-MCNC: 5.7 G/DL (ref 6.4–8.2)
RBC # BLD AUTO: 3.12 M/UL (ref 3.8–5.2)
SODIUM SERPL-SCNC: 138 MMOL/L (ref 136–145)
WBC # BLD AUTO: 6.6 K/UL (ref 3.6–11)

## 2018-03-22 PROCEDURE — 74011250637 HC RX REV CODE- 250/637: Performed by: NURSE PRACTITIONER

## 2018-03-22 PROCEDURE — C9113 INJ PANTOPRAZOLE SODIUM, VIA: HCPCS | Performed by: PHYSICIAN ASSISTANT

## 2018-03-22 PROCEDURE — 85025 COMPLETE CBC W/AUTO DIFF WBC: CPT | Performed by: NURSE PRACTITIONER

## 2018-03-22 PROCEDURE — 74011250636 HC RX REV CODE- 250/636: Performed by: NURSE PRACTITIONER

## 2018-03-22 PROCEDURE — 74011000250 HC RX REV CODE- 250: Performed by: NURSE PRACTITIONER

## 2018-03-22 PROCEDURE — 80053 COMPREHEN METABOLIC PANEL: CPT | Performed by: NURSE PRACTITIONER

## 2018-03-22 PROCEDURE — 83735 ASSAY OF MAGNESIUM: CPT | Performed by: NURSE PRACTITIONER

## 2018-03-22 PROCEDURE — 36415 COLL VENOUS BLD VENIPUNCTURE: CPT | Performed by: NURSE PRACTITIONER

## 2018-03-22 PROCEDURE — 74011250637 HC RX REV CODE- 250/637: Performed by: PHYSICIAN ASSISTANT

## 2018-03-22 PROCEDURE — 74011250636 HC RX REV CODE- 250/636: Performed by: PHYSICIAN ASSISTANT

## 2018-03-22 PROCEDURE — 74011250636 HC RX REV CODE- 250/636: Performed by: OBSTETRICS & GYNECOLOGY

## 2018-03-22 PROCEDURE — 77030010545

## 2018-03-22 PROCEDURE — 74011000250 HC RX REV CODE- 250

## 2018-03-22 PROCEDURE — 74011000258 HC RX REV CODE- 258: Performed by: NURSE PRACTITIONER

## 2018-03-22 RX ORDER — POTASSIUM CHLORIDE 29.8 MG/ML
20 INJECTION INTRAVENOUS
Status: COMPLETED | OUTPATIENT
Start: 2018-03-22 | End: 2018-03-22

## 2018-03-22 RX ORDER — FENTANYL 50 UG/1
1 PATCH TRANSDERMAL
Qty: 10 PATCH | Refills: 0 | Status: SHIPPED | OUTPATIENT
Start: 2018-03-22 | End: 2018-04-10 | Stop reason: ALTCHOICE

## 2018-03-22 RX ORDER — PROMETHAZINE HYDROCHLORIDE 6.25 MG/5ML
25 SYRUP ORAL
Qty: 400 ML | Refills: 5 | Status: SHIPPED | OUTPATIENT
Start: 2018-03-22

## 2018-03-22 RX ORDER — LEVOFLOXACIN 25 MG/ML
750 SOLUTION ORAL EVERY 24 HOURS
Status: DISCONTINUED | OUTPATIENT
Start: 2018-03-22 | End: 2018-03-22 | Stop reason: HOSPADM

## 2018-03-22 RX ORDER — BACITRACIN 500 [USP'U]/G
OINTMENT TOPICAL 3 TIMES DAILY
Status: DISCONTINUED | OUTPATIENT
Start: 2018-03-22 | End: 2018-03-22 | Stop reason: HOSPADM

## 2018-03-22 RX ORDER — BACITRACIN 500 UNIT/G
PACKET (EA) TOPICAL
Status: COMPLETED
Start: 2018-03-22 | End: 2018-03-22

## 2018-03-22 RX ORDER — POTASSIUM CHLORIDE 20MEQ/15ML
20 LIQUID (ML) ORAL DAILY
Status: DISCONTINUED | OUTPATIENT
Start: 2018-03-22 | End: 2018-03-22 | Stop reason: HOSPADM

## 2018-03-22 RX ORDER — MAGNESIUM SULFATE 1 G/100ML
1 INJECTION INTRAVENOUS ONCE
Status: COMPLETED | OUTPATIENT
Start: 2018-03-22 | End: 2018-03-22

## 2018-03-22 RX ORDER — HYDROMORPHONE HYDROCHLORIDE 5 MG/5ML
4 SOLUTION ORAL
Qty: 475 ML | Refills: 0 | Status: SHIPPED | OUTPATIENT
Start: 2018-03-22 | End: 2018-04-10 | Stop reason: SDUPTHER

## 2018-03-22 RX ORDER — HEPARIN 100 UNIT/ML
300 SYRINGE INTRAVENOUS AS NEEDED
Status: DISCONTINUED | OUTPATIENT
Start: 2018-03-22 | End: 2018-03-22 | Stop reason: HOSPADM

## 2018-03-22 RX ORDER — HALOPERIDOL 2 MG/ML
4 SOLUTION ORAL
Status: DISCONTINUED | OUTPATIENT
Start: 2018-03-22 | End: 2018-03-22 | Stop reason: HOSPADM

## 2018-03-22 RX ORDER — HALOPERIDOL 2 MG/ML
4 SOLUTION ORAL
Qty: 30 ML | Refills: 3 | Status: SHIPPED | OUTPATIENT
Start: 2018-03-22 | End: 2018-04-24 | Stop reason: SDUPTHER

## 2018-03-22 RX ORDER — LEVOFLOXACIN 25 MG/ML
750 SOLUTION ORAL EVERY 24 HOURS
Qty: 360 ML | Refills: 0 | Status: SHIPPED | OUTPATIENT
Start: 2018-03-22 | End: 2018-04-03

## 2018-03-22 RX ORDER — POTASSIUM CHLORIDE 20MEQ/15ML
40 LIQUID (ML) ORAL 2 TIMES DAILY
Qty: 480 ML | Refills: 4 | Status: SHIPPED | OUTPATIENT
Start: 2018-03-22 | End: 2018-03-27

## 2018-03-22 RX ORDER — FENTANYL 100 UG/H
1 PATCH TRANSDERMAL
Qty: 10 PATCH | Refills: 0 | Status: SHIPPED | OUTPATIENT
Start: 2018-03-22 | End: 2018-04-10 | Stop reason: ALTCHOICE

## 2018-03-22 RX ADMIN — PIPERACILLIN AND TAZOBACTAM 3.38 G: 3; .375 INJECTION, POWDER, FOR SOLUTION INTRAVENOUS at 12:18

## 2018-03-22 RX ADMIN — HYDROMORPHONE HYDROCHLORIDE 1 MG: 2 INJECTION INTRAMUSCULAR; INTRAVENOUS; SUBCUTANEOUS at 02:38

## 2018-03-22 RX ADMIN — HYDROCORTISONE 5 ML: 10 TABLET ORAL at 15:11

## 2018-03-22 RX ADMIN — LEVOFLOXACIN 750 MG: 25 SOLUTION ORAL at 15:12

## 2018-03-22 RX ADMIN — DEXTROSE MONOHYDRATE, SODIUM CHLORIDE, AND POTASSIUM CHLORIDE 75 ML/HR: 50; 9; 1.49 INJECTION, SOLUTION INTRAVENOUS at 02:29

## 2018-03-22 RX ADMIN — Medication 10 ML: at 10:31

## 2018-03-22 RX ADMIN — Medication 1 CAPSULE: at 08:54

## 2018-03-22 RX ADMIN — MAGNESIUM SULFATE HEPTAHYDRATE 1 G: 1 INJECTION, SOLUTION INTRAVENOUS at 10:42

## 2018-03-22 RX ADMIN — APIXABAN 2.5 MG: 2.5 TABLET, FILM COATED ORAL at 08:55

## 2018-03-22 RX ADMIN — HYDROMORPHONE HYDROCHLORIDE 1 MG: 2 INJECTION INTRAMUSCULAR; INTRAVENOUS; SUBCUTANEOUS at 10:31

## 2018-03-22 RX ADMIN — SUCRALFATE 0.5 G: 1 SUSPENSION ORAL at 08:54

## 2018-03-22 RX ADMIN — SUCRALFATE 0.5 G: 1 SUSPENSION ORAL at 15:11

## 2018-03-22 RX ADMIN — PANTOPRAZOLE SODIUM 40 MG: 40 TABLET, DELAYED RELEASE ORAL at 16:03

## 2018-03-22 RX ADMIN — WATER 1 DOSE: 1 INJECTION INTRAMUSCULAR; INTRAVENOUS; SUBCUTANEOUS at 12:18

## 2018-03-22 RX ADMIN — PANTOPRAZOLE SODIUM 40 MG: 40 INJECTION, POWDER, FOR SOLUTION INTRAVENOUS at 08:55

## 2018-03-22 RX ADMIN — PROMETHAZINE HYDROCHLORIDE 12.5 MG: 25 INJECTION INTRAMUSCULAR; INTRAVENOUS at 10:32

## 2018-03-22 RX ADMIN — BACITRACIN 1 PACKET: 500 OINTMENT TOPICAL at 16:04

## 2018-03-22 RX ADMIN — POTASSIUM CHLORIDE 20 MEQ: 400 INJECTION, SOLUTION INTRAVENOUS at 09:00

## 2018-03-22 RX ADMIN — POTASSIUM CHLORIDE 20 MEQ: 20 SOLUTION ORAL at 08:59

## 2018-03-22 RX ADMIN — SUCRALFATE 0.5 G: 1 SUSPENSION ORAL at 04:22

## 2018-03-22 RX ADMIN — PROMETHAZINE HYDROCHLORIDE 12.5 MG: 25 INJECTION INTRAMUSCULAR; INTRAVENOUS at 14:19

## 2018-03-22 RX ADMIN — HYDROMORPHONE HYDROCHLORIDE 1 MG: 2 INJECTION INTRAMUSCULAR; INTRAVENOUS; SUBCUTANEOUS at 15:10

## 2018-03-22 RX ADMIN — Medication 10 ML: at 14:00

## 2018-03-22 RX ADMIN — LORATADINE 10 MG: 10 TABLET ORAL at 08:55

## 2018-03-22 RX ADMIN — HYDROMORPHONE HYDROCHLORIDE 1 MG: 2 INJECTION INTRAMUSCULAR; INTRAVENOUS; SUBCUTANEOUS at 12:51

## 2018-03-22 RX ADMIN — Medication 10 ML: at 02:34

## 2018-03-22 RX ADMIN — PROMETHAZINE HYDROCHLORIDE 12.5 MG: 25 INJECTION INTRAMUSCULAR; INTRAVENOUS at 04:43

## 2018-03-22 RX ADMIN — PIPERACILLIN AND TAZOBACTAM 3.38 G: 3; .375 INJECTION, POWDER, FOR SOLUTION INTRAVENOUS at 04:23

## 2018-03-22 RX ADMIN — SODIUM CHLORIDE 1000 ML: 900 INJECTION, SOLUTION INTRAVENOUS at 10:56

## 2018-03-22 RX ADMIN — POTASSIUM CHLORIDE 20 MEQ: 400 INJECTION, SOLUTION INTRAVENOUS at 07:13

## 2018-03-22 RX ADMIN — HYDROMORPHONE HYDROCHLORIDE 1 MG: 2 INJECTION INTRAMUSCULAR; INTRAVENOUS; SUBCUTANEOUS at 00:20

## 2018-03-22 RX ADMIN — HEPARIN 300 UNITS: 100 SYRINGE at 16:04

## 2018-03-22 RX ADMIN — HYDROCORTISONE 5 ML: 10 TABLET ORAL at 10:41

## 2018-03-22 NOTE — DISCHARGE SUMMARY
[place] [address] Oncology   Discharge Summary        Patient ID:  Jennifer Toh  [id number]  2 y.o.  1960    Admit Date: 3/5/2018    Discharge Date: 3/22/2018  Attending physician: Dr. Carlos Bain. MD  Admission Diagnoses: ovarian cancer, bowel obstruction  Ovarian cancer ([address]  Partial bowel obstruction  Nausea & vomiting  Protein-calorie malnutrition, severe (HCC)  Esophagitis determined by endoscopy  Dysphagia    Discharge Diagnoses: Active Problems:    Carcinomatosis peritonei ([address] (10/29/2015)      Small bowel obstruction (10/29/2015)      Ileostomy in place [facility]) (2/15/2016)      Anemia due to chemotherapy (2/22/2016)      Cancer associated pain (5/13/2016)      Hx of pulmonary embolus (3/21/2017)      Dehydration (3/21/2017)      Anticoagulation adequate with anticoagulant therapy (3/28/2017)      Counseling regarding end of life decision making (4/25/2017)      Malignant ascites (5/8/2017)      Erosive esophagitis (6/7/2017)      Hypomagnesemia (6/13/2017)      Abdominal pain (6/27/2017)      Hydronephrosis of left kidney (6/28/2017)      Ovarian cancer ([address] (8/9/2017)      Nausea & vomiting (8/9/2017)      Carcinomatosis ([address] (8/9/2017)      Intractable vomiting with nausea (8/10/2017)      Partial bowel obstruction (9/13/2017)      Protein-calorie malnutrition, severe ([address] (3/5/2018)      Esophagitis determined by endoscopy (3/5/2018)      Mucositis due to chemotherapy (3/21/2018)         Admission Condition: Poor    Discharge Condition: Fair    Last Procedure: Procedure(s):  ESOPHAGOGASTRODUODENOSCOPY (EGD)  ESOPHAGOGASTRODUODENAL (EGD) BIOPSY      Hospital Course: Since pt was transfer from [facility] on March 5, 2018, see scanned notes/D/C summary in this chart, admission H&P form March 5th here and daily notes for details of hospitalization.   On 2/20/18 due to increasing nausea, vomiting and weight loss (down to 82 lbs), her gastroenterologist preformed an upper endoscopy and called to inform us that it showed very friable, ulcerative esophagitis and he felt she needed admission for nutrition and possible gastrostomy tube placement.     She was therefore admitted to [facility] in [facility] on 2/22 and has been in house since then receiving TPN daily with TIW Lipids. Her weight on admission was down to 84 lbs. She initially was able to eat a small amount, but the nausea and vomiting returned and she continued be nauseated almost all the time. Ostomy has continue to function, although decrease in output due to decreased PO intake. She had a CT scan there on 2/26/28 that  Showed:  1: significant distention of small bowel with possibility secondary  To an obstruction focus just proximal to her ileostomy with clumping of small bowel and evidence of omental caking and peritoneal carcinomatosis\". 2: Moderate hydronephrosis and proximal hydroureter likely secondary to extrinsic compression upon the proximal or mid ureters due to the clumped and distended bowel and peritoneal carcinomatosis. 3: Hyperattenuating foci within the liver; cannot exclude hepatic metastatic foci  4: Noncalcified nodular foci in the lingula: cannot exclude pulmonary parenchymal metastatic disease. (report scanned into media of this chart)     After 9 days at [facility], her attending physician requested pt be transferred to Dr. Maria Luisa Marquez for continued management of her disease and assist with decision making going forward. She was therefore transferred her on 3/5. On her first night of transfer, she was noted with vomiting and a temp of 101.4 cultures were drawn with negative results. 3/6/: An NGT was placed in IR due to pt's GI anatomy and previous UGD showing erosive gastritis. TPN and lipids were re-started (pt had been on them at previous hospital)  3/7: GI consult placed. Nausea persisted despite NGT.   Dr. Maria Luisa Marquez attempted decompression via placement of 18 Fr gaytan c into the ileostomy past a narrowed area. Immediately, liquid brown stool was noted draining. 3/8: D/T thrombocytopenia, Eliquis was held Pt was some better after placement of gaytan cath for decompression. NGT remained  3/9; CT abd/Pelvis showed:likely multilevel obstruction  IMPRESSION: 1. Status post hysterectomy and bilateral nephrectomy. 2. Status post subtotal colectomy. 3. Nasogastric tube in the stomach. 4. Status post subtotal colectomy with                                                                                                   ileocolic anastomosis. 5. Enterostomy tube in the right abdomen with dilated centralized small bowelloops suggesting cocooning  with adhesions and obstruction since                                                                                                 there is nocontrast in the rectum, unchanged. 6. Minimal loculated ascites unchanged. 7. Bilateral hydronephrosis, unchanged. 8. IVC filter  3/12:See Dr. Yasmeen Quispe note of this day regarding obstruction and placement of tube. 3/13:Pt had EGD by Dr. Anika Maldonado that reveled Gastric erythema and Esophagitis. Bx taken. They were unsuccessful in placing G-Tube due to pt's severe esophagitis with narrowing and risk of esophageal perforation. At this point, Dr. Yasmeen Quispe sat with pt and her  and had a long conversation regarding prognosis and after discussion, they wanted to just get home and hold on any treatment for time being and take a break and plan further after follow up with Dr. Yasmeen Quispe  3/14: IR consulted to see if able to place G-tube and on 3/15 Dr. Teodoro Castano was successful in placing G-tube and it was hooked to gaytan bag for decompression. 3/15: pt was very sore from placement of tube. TPN was begun to be weaned  3/16: Pt acknowledged nausea had improved with placement of G-tube. Case management consulted to help arrange home hydration with weekly lab work.  TPN stopped as pt was taking PO  3/20: upon rounding on pt in the morning, she was noted with temp of 103.3. She was noted coughing and mentioned she had vomited with reflux. Blood and urine cultures were obtained and pt was started on Zosyn. CXR obtained and she            Was noted with new bilateral perihilar airspace disease and Levaquin was added to treatment. Over next several days, pt continued to improve and after addition of Levaquin, no further fevers were noted. 3/22: Pt was examined and felt stable for discharge home. Long discussion regarding management of G-tube with both nursing and nutrition reviweing how to unclog if needed, supplies given until home health would be able to get to see pt and all medications reviewed at length. After discussing with Dr. Sneia Olguin, pt will be discharged only on Levaquin elixer and will let us know if she has any fevers or chills. She will follow up in our office next week or sooner if needed. Home health will see pt beginning tomorrow and we will keep in close phone contact with pt. Current Discharge Medication List      START taking these medications    Details   haloperidol (HALDOL) 2 mg/mL oral concentrate 2 mL by Per G Tube route every eight (8) hours as needed (Nausea). Indications: CANCER CHEMOTHERAPY-INDUCED NAUSEA AND VOMITING  Qty: 30 mL, Refills: 3      L. acidoph & paracasei- S therm- Bifido (HILTON-Q/RISAQUAD) 8 billion cell cap cap Take 1 Cap by mouth daily. Qty: 30 Cap, Refills: 3      levoFLOXacin (LEVAQUIN) 250 mg/10 mL solution Take 30 mL by mouth every twenty-four (24) hours for 12 days. Qty: 360 mL, Refills: 0      potassium chloride (KAON 10%) 20 mEq/15 mL solution Take 30 mL by mouth two (2) times a day for 30 days. Qty: 480 mL, Refills: 4      sterile water (preservative free) soln 5 mL, lipase-protease-amylase 10,440-39,150- 39,150 unit tab 1 Tab, sodium bicarbonate 650 mg tab 325 mg 1 Dose by Per J Tube route once for 1 dose.  Place in G tube as needed for clog  Qty: 10 Package, Refills: 10 sterile water irrigation soln 45.6 mL with hydrocortisone 10 mg tab 30 mg, nystatin 100,000 unit/mL susp 1,440,000 Units, diphenhydrAMINE 12.5 mg/5 mL elix 150 mg oral suspension Take 5 mL by mouth every six (6) hours as needed for up to 10 days. Qty: 250 mL, Refills: 4         CONTINUE these medications which have CHANGED    Details   HYDROmorphone (DILAUDID) 1 mg/mL liqd oral solution Take 4 mL by mouth every four (4) hours as needed. Max Daily Amount: 24 mg. Indications: Pain  Qty: 475 mL, Refills: 0    Associated Diagnoses: Malignant neoplasm of both ovaries ([facility] Carcinomatosis peritonei ([address] Cancer associated pain; Generalized abdominal pain; Ovarian cancer, unspecified laterality ([facility] Ileostomy in place [facility]); Anxiety about health; CINV (chemotherapy-induced nausea and vomiting); Chemotherapy-induced neutropenia ([facility] Hyponatremia; Anemia due to chemotherapy      promethazine (PHENERGAN) 6.25 mg/5 mL syrup Take 20 mL by mouth four (4) times daily as needed for Nausea. Qty: 400 mL, Refills: 5    Associated Diagnoses: Ovarian cancer, unspecified laterality ([address] Carcinomatosis peritonei ([address] Small bowel obstruction; Cancer associated pain; Thrombosis of pelvic vein; Malignant ascites; Erosive esophagitis; Ulcerative esophagitis      fentaNYL (DURAGESIC) 50 mcg/hr PATCH 1 Patch by TransDERmal route every seventy-two (72) hours. Max Daily Amount: 1 Patch. This is in ADDITION to your 100 mcg patch for a total of 150 mcg patch every 72 hours  Qty: 10 Patch, Refills: 0    Associated Diagnoses: Carcinomatosis peritonei ([facility] Malignant neoplasm of both ovaries ([address] Cancer associated pain; Generalized abdominal pain; Ovarian cancer, unspecified laterality ([facility] [facility] Ileostomy in place [facility]); Anxiety about health; CINV (chemotherapy-induced nausea and vomiting); Chemotherapy-induced neutropenia ([facility] [facility] Hyponatremia;  Anemia due to chemotherapy      fentaNYL (DURAGESIC) 100 mcg/hr PATCH 1 Patch by TransDERmal route every seventy-two (72) hours. Max Daily Amount: 1 Patch. Qty: 10 Patch, Refills: 0    Associated Diagnoses: Ovarian cancer, unspecified laterality ([facility] Ileostomy in place [facility]); Carcinomatosis peritonei ([facility] Cancer associated pain; Anxiety about health; Generalized abdominal pain; CINV (chemotherapy-induced nausea and vomiting); Chemotherapy-induced neutropenia ([facility] [facility] Hyponatremia; Anemia due to chemotherapy; Malignant neoplasm of both ovaries ([facility] [facility]         CONTINUE these medications which have NOT CHANGED    Details   metoclopramide (REGLAN) 5 mg/5 mL syrup Take 10 mL by mouth four (4) times daily as needed. Take before meals as needed  Qty: 500 mL, Refills: 3    Associated Diagnoses: Ovarian cancer, unspecified laterality ([facility] [facility] Carcinomatosis peritonei ([facility] [facility] Small bowel obstruction; Cancer associated pain; Thrombosis of pelvic vein; Malignant ascites; Erosive esophagitis; Ulcerative esophagitis      dexamethasone (DECADRON) 4 mg tablet Take 2 tabs QAM day before chemo and for 2 days after  Qty: 30 Tab, Refills: 4    Associated Diagnoses: Carcinomatosis peritonei ([facility] Malignant neoplasm of both ovaries ([facility] Cancer associated pain; Generalized abdominal pain; Ovarian cancer, unspecified laterality ([facility] Ileostomy in place [facility]); Anxiety about health; CINV (chemotherapy-induced nausea and vomiting); Chemotherapy-induced neutropenia ([facility] Hyponatremia; Anemia due to chemotherapy      cyclobenzaprine (FLEXERIL) 10 mg tablet Take 1 Tab by mouth three (3) times daily as needed for Muscle Spasm(s). Qty: 30 Tab, Refills: 1    Associated Diagnoses: Muscle spasm of back; Malignant neoplasm of both ovaries ([facility] Carcinomatosis peritonei ([facility] Cancer associated pain      zolpidem (AMBIEN) 10 mg tablet Take 1 Tab by mouth nightly as needed for Sleep. Max Daily Amount: 10 mg.  Indications: SLEEP-ONSET INSOMNIA  Qty: 30 Tab, Refills: 2    Associated Diagnoses: Ovarian cancer, unspecified laterality ([facility] Carcinomatosis peritonei ([facility] Malignant neoplasm of both ovaries ([address] Cancer associated pain; Anxiety about health; Malignant ascites; Intractable vomiting with nausea, unspecified vomiting type      scopolamine (TRANSDERM-SCOP) 1 mg over 3 days pt3d 1 Patch by TransDERmal route every seventy-two (72) hours as needed. Qty: 10 Patch, Refills: 1      metoprolol tartrate (LOPRESSOR) 50 mg tablet Take  by mouth two (2) times a day. Associated Diagnoses: Carcinomatosis peritonei ([facility] Malignant neoplasm of both ovaries ([facility] [facility] Cancer associated pain; Generalized abdominal pain; Ovarian cancer, unspecified laterality ([facility] [facility] Ileostomy in place [facility]); Anxiety about health; CINV (chemotherapy-induced nausea and vomiting); Chemotherapy-induced neutropenia ([facility] Hyponatremia; Anemia due to chemotherapy      NEXIUM PACKET 40 mg granules for oral suspension two (2) times a day. magnesium oxide (MAG-OX) 400 mg tablet Take 1 Tab by mouth two (2) times a day. Indications: HYPOMAGNESEMIA  Qty: 60 Tab, Refills: 2    Associated Diagnoses: Ovarian cancer, unspecified laterality ([facility] Hypomagnesemia      sucralfate (CARAFATE) 100 mg/mL suspension Take 5 mL by mouth four (4) times daily. Qty: 414 mL, Refills: 3      polyethylene glycol (MIRALAX) 17 gram/dose powder Take 17 g by mouth daily as needed. Qty: 238 g, Refills: 3      apixaban (ELIQUIS) 2.5 mg tablet Take 1 Tab by mouth two (2) times a day. Qty: 60 Tab, Refills: 6      ondansetron (ZOFRAN ODT) 4 mg disintegrating tablet Take 1 Tab by mouth every eight (8) hours as needed for Nausea. Qty: 30 Tab, Refills: 2    Associated Diagnoses: Ovarian cancer, unspecified laterality ([facility] Generalized abdominal pain; CINV (chemotherapy-induced nausea and vomiting); Cancer associated pain; Anxiety about health; Carcinomatosis peritonei ([facility] Ileostomy in place ([facility]      dronabinol (SYNDROS) 5 mg/mL soln Take 2.1 mg by mouth two (2) times a day.  Max Daily Amount: 4.2 mg. Take 30 minutes before lunch and dinner  Qty: 30 mL, Refills: 1    Associated Diagnoses: AN (anorexia nervosa); Weight loss, unintentional; Malignant neoplasm of ovary, unspecified laterality ([address] Carcinomatosis ([address] Moderate protein-calorie malnutrition ([address] Carcinomatosis peritonei ([address] Cancer associated pain; Cachexia ([address] No appetite         STOP taking these medications       lidocaine-prilocaine (EMLA) topical cream Comments:   Reason for Stopping:                     Consults: Gastroenterology, Nutrition, Wound care    Disposition: home    Patient Instructions: Activity: No lifting, pushing or pulling anything heavier than 20 lbs x 2 weeks. Walking as tolerated. No driving while you are taking narcotics. Diet: as tolerated. If you have cramps or nausea, try eating smaller light meals more frequently. Wound Care: Keep clean and dry. You may shower, but no immersion in standing water (bath tubs, swimming pools). Pat area with soft towel to dry. Follow-up with our office with Dr. Jorge Novak or Patrick Bonner NP in 1-2 weeks or sooner if needed  .   We are located at [facility] in the [address] [place] [address], [address] [facility] floor, [address] [phone]      Signed:  Kisha Mendez NP  3/22/2018

## 2018-03-22 NOTE — CDMP QUERY
There is noted documentation of \"Pneumonia\" on this record. Could this be further clarified as    =>Likely Aspiration Pneumonia in the setting of reflux requiring Zosyn/Levaquin  =>Other Explanation of clinical findings  =>Unable to Determine (no explanation of clinical findings)    The medical record reflects the following clinical findings, treatment, and risk factors:    Risk Factors: reflux  Clinical Indicators:   3/20-nurses note  Noted temp of 103.3 at 0700 after pt called nurse with feelings of \"chills all over\". 3/21-progress note  Noted CXR results yesterday and pt is now on Zosyn/Levaquin. Probable aspiration due to reflux  Pneumonia: continue abx and will add pro-biotic today    Treatment: Zosyn/Levaquin      Please clarify and document your clinical opinion in the progress notes and discharge summary including the definitive and/or presumptive diagnosis, (suspected or probable), related to the above clinical findings.  Please include clinical findings supporting your diagnosis      Thank you,         Phu Easley [address] [place] [address]

## 2018-03-22 NOTE — PROGRESS NOTES
03/22/2018; 11:15 -   CM received confirmed discharge plan from attending physician team.  CM updated [facility] ([place] of pending discharge with updated orders via [employer]. CRM: Jose Hodges MPH; Z: [phone]    11:30 -   CM met with patient at bedside with attending physician's NP. CM updated patient and  that all updated orders have been sent to [facility]. CM has added appropriate contact information to patient's discharge summary.   CRM: Jose Hodges, MPH; Z: [phone]

## 2018-03-22 NOTE — PROGRESS NOTES
[facility]COL[facility]  [address] [place] [address], Noel Mathias [address], [address] Meredith GARCIA [phone]  F [phone]       Patient Name: Hernando Cabello   Admit Date: 3/5/2018   Primary Dx Progressive ovarian cancer, carcinomatosis. GI dysfunction, relative obstruction   Visit Date: 3/22/2018       SUBJECTIVE:  Pt had some nausea over night, but more controlled and no vomiting since late afternoon yesterday. Eating small, frequent amounts  Remaining A-febrile  Noted K+ low again despite replacement yesterday due to GI loss. Ostomy functioning well    OBJECTIVE:    Patient Vitals for the past 24 hrs:   Temp Pulse Resp BP SpO2   03/22/18 0005 98.9 °F (37.2 °C) 86 16 100/64 96 %   03/21/18 2025 99.1 °F (37.3 °C) 95 16 113/75 96 %   03/21/18 1823 99.4 °F (37.4 °C) 88 16 107/63 97 %   03/21/18 1436 98.6 °F (37 °C) 91 16 98/63 97 %   03/21/18 0846 99.2 °F (37.3 °C) 89 16 95/59 95 %         Date 03/21/18 0700 - 03/22/18 0659 03/22/18 0700 - 03/23/18 0659   Shift 2360-2858 6623-3588 24 Hour Total 0404-9781 5811-4932 24 Hour Total   I  N  T  A  K  E   P.O.  150 150         P. O.  150 150       I.V.  (mL/kg/hr)  125 125         Volume (dextrose 5% - 0.9% NaCl with KCl 20 mEq/L infusion)  125 125       NG/  100         Water Flush Volume (mL) (Feeding Tube 03/15/18) 100  100       Shift Total  (mL/kg) 100  (2.4) 275  (5.9) 375  (8.1)      O  U  T  P  U  T   Urine  (mL/kg/hr) 400  (0.8) 300 700         Urine Voided 400 300 700         Urine Occurrence(s)  1 x 1 x       Emesis/NG output          Emesis 450  450         Output (ml) (Feeding Tube 03/15/18)        Stool 125 0 125         Stool Occurrence(s) 1 x  1 x         Output (ml) (Ileostomy 05/05/16 Right  Abdomen) 125 0 125       Shift Total  (mL/kg) 1425  (33.6) 850  (18.3) 2275  (49.1)      NET -9919 -899 -1902      Weight (kg) 42.5 46.4 46.4 46.4 46.4 46.4       Physical Exam     General:  A&O X3 in NAD and remaing in good spirits, more awake today        HEENT: Mild aphthous ulcers noted to posterior throat. Port site: Remains without tenderness, redness or swelling     Cardiac:  Regular without M/R/G        Lungs:  CTA bilat without wheezing or rales with improved air exchange today  Abdomen: Thin. Firm but remains palpable without tenderness to generally abdomen. Tenderness again improved to insertion area of G-tube. Nondistended. G-Tube draining bile colored fluid to bedside drainage. Extremity: no edema, SCDs in place      Lab Results   Component Value Date/Time    WBC 6.6 03/22/2018 02:34 AM    ABS. NEUTROPHILS 4.6 03/22/2018 02:34 AM    HGB 9.6 (L) 03/22/2018 02:34 AM    HCT 29.3 (L) 03/22/2018 02:34 AM    MCV 93.9 03/22/2018 02:34 AM    MCH 30.8 03/22/2018 02:34 AM    PLATELET 789 90/09/2002 02:34 AM     Lab Results   Component Value Date/Time    Sodium 138 03/22/2018 02:34 AM    Potassium 3.2 (L) 03/22/2018 02:34 AM    Chloride 101 03/22/2018 02:34 AM    CO2 30 03/22/2018 02:34 AM    Glucose 103 (H) 03/22/2018 02:34 AM    BUN 5 (L) 03/22/2018 02:34 AM    Creatinine 0.69 03/22/2018 02:34 AM    Calcium 7.9 (L) 03/22/2018 02:34 AM    Albumin 1.8 (L) 03/22/2018 02:34 AM    Bilirubin, total 0.3 03/22/2018 02:34 AM    AST (SGOT) 13 (L) 03/22/2018 02:34 AM    ALT (SGPT) 9 (L) 03/22/2018 02:34 AM    Alk. phosphatase 94 03/22/2018 02:34 AM     IMPRESSION/PLAN:     61 y.o. with advanced ovarian cancer, carcinomatosis with ?partial GI obstruction admitted in transfer for obstruction, N/V.     Present on Admission:   Anemia due to chemotherapy   Abdominal pain   Cancer associated pain   [facility]cinomatosis ([facility]   Counseling regarding end of life decision making   Hx of pulmonary embolus   Erosive esophagitis   Carcinomatosis peritonei (HCC)   Intractable vomiting with nausea   Small bowel obstruction   Dehydration   Anticoagulation adequate with anticoagulant therapy   Malignant ascites   Hypomagnesemia   Hydronephrosis of left kidney   Ileostomy in place [facility])      Continue Zosyn/Levo     1) Aspiration peumonia due reflux:  continue abx and pro-biotic  2) Monitor temp and blood culture results. No growth in 2 days on blood cultures and Urine clt negative. 3)Anemia: stable at 9.6 today. Will continue to monitor as pt will have labs drawn at home by home health at least once a week beginning next Tuesday, 3/27. 4) Reviewed flushing G-tube with Viokace (Directions for using Viokace: *if unable to obtain sodium bicarbonate, may just use water. * Withdraw any enteral solution remaining in tube. Crush one Viokace 10,440 unit tablet with 325 mg sodium bicarbonate (use HALF sodium bicarbonate 650 mg tablet) and mix with sterile water 5 mL. Inject mixture into tube and clamp for 5 minutes. Flush with water until clear. and will have pt use leg bag today to see how she does with it to be more active with it. 5) Replace K+ and Mag again today and discussed dosing at home. See discharge med list  6)  Thrombocytopenia resolved - continue eliquis     7) Antiemetics/antireflux control. Monitor pain. Have adjusted home liquid dilaudid and pt will monitor how much she requires in 24 hour period after discharge and as her G-tub insertion site continues to improve. 8) Again, with  present we had discussion regarding prognosis and going forward. As stated previously, pt and her  have agreed they would like to hold on chemotherapy and just \"take a break\" and will follow up with Dr. Pedro Luis Damon as out pt to plan further. Will proceed with discharge today. See case manage note as we have set up for [address] for weekly lab draws and hydration. Also, gave pt Rx for PT at outpt facility pt would like to attend.          Kaley Headings, NP

## 2018-03-22 NOTE — PROGRESS NOTES
NUTRITION       Addendum:  Spoke with NP and pt. Will provide pt with directions (below) for mixing Viokace. NP wrote for prescription on dischrage. Directions for using Viokace: *if unable to obtain sodium bicarbonate, may just use water. *  Withdraw any enteral solution remaining in tube. Crush one Viokace 10,440 unit tablet with 325 mg sodium bicarbonate (use HALF sodium bicarbonate 650 mg tablet) and mix with sterile water 5 mL. Inject mixture into tube and clamp for 5 minutes. Flush with water until clear. ---------------------  RN with questions regarding pancreatic enzyme mixture for declogging the tube. In the hospital, the only available order is for Viokace enzyme/Bicarb mixture as ordered under \"Clog\"; however, on discharge, an easier option may be purchasing \"Clog Zapper\" online or inquire at a pharmacy. Spoke with [facility] and this is not currently available at our pharmacy, but it's possible this or other pharmacies could get it. These are pre-mixed syringes that could be used in an emergency should she be unable to declog her tube with warm water. Will discuss this with the pt. **It is not essential that the pt is discharged with this solution, but it may be something to consider if there are issues in the future**.     [address]

## 2018-03-22 NOTE — PROGRESS NOTES
I have reviewed discharge instructions with the patient. The patient and spouse verbalized understanding.

## 2018-03-22 NOTE — DISCHARGE INSTRUCTIONS
[address] [place] [address] [address], [address]  P [phone]  F [phone]      Patient Discharge Instructions    Sherrill Jaramillo / [id number] : 1960    Admitted 3/5/2018 Discharged: 3/22/2018     Take Home Medications     No current facility-administered medications on file prior to encounter. Current Outpatient Prescriptions on File Prior to Encounter   Medication Sig Dispense Refill    metoclopramide (REGLAN) 5 mg/5 mL syrup Take 10 mL by mouth four (4) times daily as needed. Take before meals as needed 500 mL 3    dexamethasone (DECADRON) 4 mg tablet Take 2 tabs QAM day before chemo and for 2 days after 30 Tab 4    cyclobenzaprine (FLEXERIL) 10 mg tablet Take 1 Tab by mouth three (3) times daily as needed for Muscle Spasm(s). 30 Tab 1    zolpidem (AMBIEN) 10 mg tablet Take 1 Tab by mouth nightly as needed for Sleep. Max Daily Amount: 10 mg. Indications: SLEEP-ONSET INSOMNIA 30 Tab 2    scopolamine (TRANSDERM-SCOP) 1 mg over 3 days pt3d 1 Patch by TransDERmal route every seventy-two (72) hours as needed. 10 Patch 1    metoprolol tartrate (LOPRESSOR) 50 mg tablet Take  by mouth two (2) times a day.  NEXIUM PACKET 40 mg granules for oral suspension two (2) times a day.  magnesium oxide (MAG-OX) 400 mg tablet Take 1 Tab by mouth two (2) times a day. Indications: HYPOMAGNESEMIA 60 Tab 2    sucralfate (CARAFATE) 100 mg/mL suspension Take 5 mL by mouth four (4) times daily. 414 mL 3    polyethylene glycol (MIRALAX) 17 gram/dose powder Take 17 g by mouth daily as needed. 238 g 3    apixaban (ELIQUIS) 2.5 mg tablet Take 1 Tab by mouth two (2) times a day. 60 Tab 6    ondansetron (ZOFRAN ODT) 4 mg disintegrating tablet Take 1 Tab by mouth every eight (8) hours as needed for Nausea. 30 Tab 2    dronabinol (SYNDROS) 5 mg/mL soln Take 2.1 mg by mouth two (2) times a day. Max Daily Amount: 4.2 mg.  Take 30 minutes before lunch and dinner 30 mL 1    lidocaine-prilocaine (EMLA) topical cream Apply small amount over port area and cover with a band aid 1 hour before chemo treatment 30 g 3       · It is important that you take the medication exactly as we discussed   · Keep your medication in the bottles provided by the pharmacist and keep a list of the medication names, dosages, and times to be taken in your wallet. · Do not take other medications without consulting your doctor. What to do at Home    Recommended diet: as tolerated. Remember to stay hydrated. Recommended activity:   No driving while on pain medication  Showers okay, no tub bathing/swimming for now  Activity as able, stairs okay. If you experience any of the following persisting symptoms:    Nausea/vomiting, fever > 101 F, diarrhea/constipation, increasing pain despite medication, increasing vaginal discharge or any concerns, please call our office. Follow-up with Dr. Brennon Hubbard in 1-2 weeks or sooner if needed. Call [phone] for an appointment. Information obtained by :  I understand that if any problems occur once I am at home I am to contact my physician. I understand and acknowledge receipt of the instructions indicated above.                                                                                                                                            Physician's or R.N.'s Signature                                                                  Date/Time                                                                                                                                              Patient or Representative Signature                                                          Date/Time

## 2018-03-23 DIAGNOSIS — E87.6 HYPOKALEMIA: Primary | ICD-10-CM

## 2018-03-23 RX ORDER — POTASSIUM CHLORIDE 1.5 G/1.77G
20 POWDER, FOR SOLUTION ORAL 2 TIMES DAILY
Qty: 60 PACKET | Refills: 3 | Status: SHIPPED | OUTPATIENT
Start: 2018-03-23

## 2018-03-23 NOTE — PROGRESS NOTES
Pt called to say she had difficulty taking the potassium elixir and wondered if there was a powder she could try to put in food  Potassium 20 meq granules to take BID called to her pharmacy. She will call on Monday to let us know how she is doing and will call over this weekend if she needs anything.

## 2018-03-25 LAB
BACTERIA SPEC CULT: NORMAL
SERVICE CMNT-IMP: NORMAL

## 2018-03-26 ENCOUNTER — HOSPITAL ENCOUNTER (OUTPATIENT)
Age: 58
Discharge: HOME | End: 2018-03-27
Attending: OBSTETRICS & GYNECOLOGY | Admitting: OBSTETRICS & GYNECOLOGY
Payer: OTHER GOVERNMENT

## 2018-03-26 ENCOUNTER — HOSPITAL ENCOUNTER (OUTPATIENT)
Dept: INTERVENTIONAL RADIOLOGY/VASCULAR | Age: 58
Discharge: HOME | End: 2018-03-26
Attending: NURSE PRACTITIONER
Payer: OTHER GOVERNMENT

## 2018-03-26 VITALS
RESPIRATION RATE: 16 BRPM | WEIGHT: 92 LBS | TEMPERATURE: 99 F | BODY MASS INDEX: 16.3 KG/M2 | SYSTOLIC BLOOD PRESSURE: 122 MMHG | HEIGHT: 63 IN | OXYGEN SATURATION: 98 % | DIASTOLIC BLOOD PRESSURE: 76 MMHG | HEART RATE: 95 BPM

## 2018-03-26 DIAGNOSIS — K56.51 INTESTINAL ADHESIONS WITH PARTIAL OBSTRUCTION (HCC): ICD-10-CM

## 2018-03-26 DIAGNOSIS — E44.0 MODERATE PROTEIN-CALORIE MALNUTRITION (HCC): ICD-10-CM

## 2018-03-26 DIAGNOSIS — K20.90 ESOPHAGITIS DETERMINED BY ENDOSCOPY: ICD-10-CM

## 2018-03-26 DIAGNOSIS — C80.0 CARCINOMATOSIS (HCC): ICD-10-CM

## 2018-03-26 DIAGNOSIS — C56.9 MALIGNANT NEOPLASM OF OVARY, UNSPECIFIED LATERALITY (HCC): Primary | ICD-10-CM

## 2018-03-26 DIAGNOSIS — C56.9 OVARIAN CANCER (HCC): ICD-10-CM

## 2018-03-26 DIAGNOSIS — K22.10 ULCERATIVE ESOPHAGITIS: ICD-10-CM

## 2018-03-26 DIAGNOSIS — Z93.2 ILEOSTOMY IN PLACE (HCC): ICD-10-CM

## 2018-03-26 DIAGNOSIS — C56.3 MALIGNANT NEOPLASM OF BOTH OVARIES (HCC): ICD-10-CM

## 2018-03-26 PROBLEM — T85.598A: Status: ACTIVE | Noted: 2018-03-26

## 2018-03-26 PROBLEM — K56.609 SBO (SMALL BOWEL OBSTRUCTION) (HCC): Status: ACTIVE | Noted: 2018-03-26

## 2018-03-26 PROBLEM — E87.6 HYPOKALEMIA: Status: ACTIVE | Noted: 2018-03-26

## 2018-03-26 LAB
ABO + RH BLD: NORMAL
ALBUMIN SERPL-MCNC: 2.2 G/DL (ref 3.5–5)
ALBUMIN/GLOB SERPL: 0.5 {RATIO} (ref 1.1–2.2)
ALP SERPL-CCNC: 85 U/L (ref 45–117)
ALT SERPL-CCNC: 8 U/L (ref 12–78)
ANION GAP SERPL CALC-SCNC: 12 MMOL/L (ref 5–15)
AST SERPL-CCNC: 18 U/L (ref 15–37)
BASOPHILS # BLD: 0 K/UL (ref 0–0.1)
BASOPHILS NFR BLD: 0 % (ref 0–1)
BILIRUB SERPL-MCNC: 0.4 MG/DL (ref 0.2–1)
BLOOD GROUP ANTIBODIES SERPL: NORMAL
BUN SERPL-MCNC: 8 MG/DL (ref 6–20)
BUN/CREAT SERPL: 12 (ref 12–20)
CALCIUM SERPL-MCNC: 8.1 MG/DL (ref 8.5–10.1)
CHLORIDE SERPL-SCNC: 99 MMOL/L (ref 97–108)
CO2 SERPL-SCNC: 26 MMOL/L (ref 21–32)
CREAT SERPL-MCNC: 0.68 MG/DL (ref 0.55–1.02)
DIFFERENTIAL METHOD BLD: ABNORMAL
EOSINOPHIL # BLD: 0 K/UL (ref 0–0.4)
EOSINOPHIL NFR BLD: 0 % (ref 0–7)
ERYTHROCYTE [DISTWIDTH] IN BLOOD BY AUTOMATED COUNT: 16.1 % (ref 11.5–14.5)
GLOBULIN SER CALC-MCNC: 4.3 G/DL (ref 2–4)
GLUCOSE SERPL-MCNC: 54 MG/DL (ref 65–100)
HCT VFR BLD AUTO: 32.3 % (ref 35–47)
HGB BLD-MCNC: 10.3 G/DL (ref 11.5–16)
IMM GRANULOCYTES # BLD: 0.1 K/UL (ref 0–0.04)
IMM GRANULOCYTES NFR BLD AUTO: 1 % (ref 0–0.5)
LYMPHOCYTES # BLD: 2 K/UL (ref 0.8–3.5)
LYMPHOCYTES NFR BLD: 17 % (ref 12–49)
MAGNESIUM SERPL-MCNC: 0.8 MG/DL (ref 1.6–2.4)
MCH RBC QN AUTO: 30.3 PG (ref 26–34)
MCHC RBC AUTO-ENTMCNC: 31.9 G/DL (ref 30–36.5)
MCV RBC AUTO: 95 FL (ref 80–99)
MONOCYTES # BLD: 0.8 K/UL (ref 0–1)
MONOCYTES NFR BLD: 7 % (ref 5–13)
NEUTS SEG # BLD: 9 K/UL (ref 1.8–8)
NEUTS SEG NFR BLD: 75 % (ref 32–75)
NRBC # BLD: 0 K/UL (ref 0–0.01)
NRBC BLD-RTO: 0 PER 100 WBC
PLATELET # BLD AUTO: 350 K/UL (ref 150–400)
PMV BLD AUTO: 9.5 FL (ref 8.9–12.9)
POTASSIUM SERPL-SCNC: 3 MMOL/L (ref 3.5–5.1)
PROT SERPL-MCNC: 6.5 G/DL (ref 6.4–8.2)
RBC # BLD AUTO: 3.4 M/UL (ref 3.8–5.2)
RBC MORPH BLD: ABNORMAL
SODIUM SERPL-SCNC: 137 MMOL/L (ref 136–145)
SPECIMEN EXP DATE BLD: NORMAL
WBC # BLD AUTO: 11.9 K/UL (ref 3.6–11)

## 2018-03-26 PROCEDURE — 74011000250 HC RX REV CODE- 250

## 2018-03-26 PROCEDURE — 77030009378 HC DEV TORQ OLCT F/GWIRE MANIP COOK -A

## 2018-03-26 PROCEDURE — 77030011229 HC DIL VESL COON COOK -A

## 2018-03-26 PROCEDURE — 74011250636 HC RX REV CODE- 250/636: Performed by: RADIOLOGY

## 2018-03-26 PROCEDURE — 74011000258 HC RX REV CODE- 258: Performed by: NURSE PRACTITIONER

## 2018-03-26 PROCEDURE — 74011636320 HC RX REV CODE- 636/320: Performed by: RADIOLOGY

## 2018-03-26 PROCEDURE — 86900 BLOOD TYPING SEROLOGIC ABO: CPT | Performed by: NURSE PRACTITIONER

## 2018-03-26 PROCEDURE — 99218 HC RM OBSERVATION: CPT

## 2018-03-26 PROCEDURE — 74011000258 HC RX REV CODE- 258: Performed by: RADIOLOGY

## 2018-03-26 PROCEDURE — 80053 COMPREHEN METABOLIC PANEL: CPT | Performed by: NURSE PRACTITIONER

## 2018-03-26 PROCEDURE — 74011000250 HC RX REV CODE- 250: Performed by: RADIOLOGY

## 2018-03-26 PROCEDURE — C1769 GUIDE WIRE: HCPCS

## 2018-03-26 PROCEDURE — 83735 ASSAY OF MAGNESIUM: CPT | Performed by: NURSE PRACTITIONER

## 2018-03-26 PROCEDURE — 43760 IR CHANGE GASTROSTOMY TUBE PERC: CPT

## 2018-03-26 PROCEDURE — 77030018617 HC TU FEED GASTMY1 COOK -B

## 2018-03-26 PROCEDURE — C1887 CATHETER, GUIDING: HCPCS

## 2018-03-26 PROCEDURE — 99152 MOD SED SAME PHYS/QHP 5/>YRS: CPT

## 2018-03-26 PROCEDURE — 74011250637 HC RX REV CODE- 250/637: Performed by: NURSE PRACTITIONER

## 2018-03-26 PROCEDURE — 74011250636 HC RX REV CODE- 250/636: Performed by: NURSE PRACTITIONER

## 2018-03-26 PROCEDURE — 77030003560 HC NDL HUBR BARD -A

## 2018-03-26 PROCEDURE — 85025 COMPLETE CBC W/AUTO DIFF WBC: CPT | Performed by: NURSE PRACTITIONER

## 2018-03-26 PROCEDURE — 74011250636 HC RX REV CODE- 250/636: Performed by: OBSTETRICS & GYNECOLOGY

## 2018-03-26 PROCEDURE — C9113 INJ PANTOPRAZOLE SODIUM, VIA: HCPCS | Performed by: NURSE PRACTITIONER

## 2018-03-26 PROCEDURE — C1892 INTRO/SHEATH,FIXED,PEEL-AWAY: HCPCS

## 2018-03-26 PROCEDURE — 77030010545

## 2018-03-26 PROCEDURE — 36415 COLL VENOUS BLD VENIPUNCTURE: CPT | Performed by: NURSE PRACTITIONER

## 2018-03-26 PROCEDURE — C1894 INTRO/SHEATH, NON-LASER: HCPCS

## 2018-03-26 PROCEDURE — 74011250636 HC RX REV CODE- 250/636

## 2018-03-26 RX ORDER — LIDOCAINE HYDROCHLORIDE 20 MG/ML
JELLY TOPICAL AS NEEDED
Status: DISCONTINUED | OUTPATIENT
Start: 2018-03-26 | End: 2018-03-30 | Stop reason: HOSPADM

## 2018-03-26 RX ORDER — CEFAZOLIN SODIUM 2 G/50ML
SOLUTION INTRAVENOUS
Status: COMPLETED
Start: 2018-03-26 | End: 2018-03-26

## 2018-03-26 RX ORDER — HALOPERIDOL 2 MG/ML
4 SOLUTION ORAL
Status: DISCONTINUED | OUTPATIENT
Start: 2018-03-26 | End: 2018-03-26

## 2018-03-26 RX ORDER — SODIUM CHLORIDE 0.9 % (FLUSH) 0.9 %
5-10 SYRINGE (ML) INJECTION EVERY 8 HOURS
Status: DISCONTINUED | OUTPATIENT
Start: 2018-03-26 | End: 2018-03-27 | Stop reason: HOSPADM

## 2018-03-26 RX ORDER — CEFAZOLIN SODIUM 2 G/50ML
2 SOLUTION INTRAVENOUS
Status: COMPLETED | OUTPATIENT
Start: 2018-03-26 | End: 2018-03-26

## 2018-03-26 RX ORDER — POTASSIUM CHLORIDE 20MEQ/15ML
20 LIQUID (ML) ORAL 2 TIMES DAILY
Status: DISCONTINUED | OUTPATIENT
Start: 2018-03-26 | End: 2018-03-26

## 2018-03-26 RX ORDER — SUCRALFATE 1 G/10ML
0.5 SUSPENSION ORAL 4 TIMES DAILY
Status: DISCONTINUED | OUTPATIENT
Start: 2018-03-26 | End: 2018-03-27 | Stop reason: HOSPADM

## 2018-03-26 RX ORDER — ZOLPIDEM TARTRATE 10 MG/1
10 TABLET ORAL
Status: DISCONTINUED | OUTPATIENT
Start: 2018-03-26 | End: 2018-03-27 | Stop reason: HOSPADM

## 2018-03-26 RX ORDER — HEPARIN 100 UNIT/ML
300 SYRINGE INTRAVENOUS AS NEEDED
Status: DISCONTINUED | OUTPATIENT
Start: 2018-03-26 | End: 2018-03-30 | Stop reason: HOSPADM

## 2018-03-26 RX ORDER — FENTANYL 100 UG/H
1 PATCH TRANSDERMAL
Status: DISCONTINUED | OUTPATIENT
Start: 2018-03-26 | End: 2018-03-27 | Stop reason: HOSPADM

## 2018-03-26 RX ORDER — FENTANYL CITRATE 50 UG/ML
200 INJECTION, SOLUTION INTRAMUSCULAR; INTRAVENOUS
Status: DISCONTINUED | OUTPATIENT
Start: 2018-03-26 | End: 2018-03-26 | Stop reason: ALTCHOICE

## 2018-03-26 RX ORDER — DIPHENHYDRAMINE HYDROCHLORIDE 50 MG/ML
12.5 INJECTION, SOLUTION INTRAMUSCULAR; INTRAVENOUS
Status: DISCONTINUED | OUTPATIENT
Start: 2018-03-26 | End: 2018-03-27 | Stop reason: HOSPADM

## 2018-03-26 RX ORDER — LIDOCAINE HYDROCHLORIDE 20 MG/ML
INJECTION, SOLUTION INFILTRATION; PERINEURAL
Status: COMPLETED
Start: 2018-03-26 | End: 2018-03-26

## 2018-03-26 RX ORDER — ONDANSETRON 2 MG/ML
4 INJECTION INTRAMUSCULAR; INTRAVENOUS
Status: DISCONTINUED | OUTPATIENT
Start: 2018-03-26 | End: 2018-03-27 | Stop reason: HOSPADM

## 2018-03-26 RX ORDER — HYDROMORPHONE HYDROCHLORIDE 2 MG/ML
1 INJECTION, SOLUTION INTRAMUSCULAR; INTRAVENOUS; SUBCUTANEOUS
Status: DISCONTINUED | OUTPATIENT
Start: 2018-03-26 | End: 2018-03-27 | Stop reason: HOSPADM

## 2018-03-26 RX ORDER — FENTANYL 50 UG/1
1 PATCH TRANSDERMAL
Status: DISCONTINUED | OUTPATIENT
Start: 2018-03-26 | End: 2018-03-27 | Stop reason: HOSPADM

## 2018-03-26 RX ORDER — SCOLOPAMINE TRANSDERMAL SYSTEM 1 MG/1
1 PATCH, EXTENDED RELEASE TRANSDERMAL
Status: DISCONTINUED | OUTPATIENT
Start: 2018-03-27 | End: 2018-03-27 | Stop reason: HOSPADM

## 2018-03-26 RX ORDER — MAGNESIUM SULFATE 1 G/100ML
1 INJECTION INTRAVENOUS ONCE
Status: COMPLETED | OUTPATIENT
Start: 2018-03-26 | End: 2018-03-26

## 2018-03-26 RX ORDER — METOCLOPRAMIDE HYDROCHLORIDE 5 MG/ML
10 INJECTION INTRAMUSCULAR; INTRAVENOUS EVERY 6 HOURS
Status: DISCONTINUED | OUTPATIENT
Start: 2018-03-26 | End: 2018-03-27 | Stop reason: HOSPADM

## 2018-03-26 RX ORDER — DEXLANSOPRAZOLE 60 MG/1
60 CAPSULE, DELAYED RELEASE ORAL DAILY
COMMUNITY

## 2018-03-26 RX ORDER — LIDOCAINE 40 MG/G
CREAM TOPICAL
Status: COMPLETED | OUTPATIENT
Start: 2018-03-26 | End: 2018-03-26

## 2018-03-26 RX ORDER — CYCLOBENZAPRINE HCL 10 MG
10 TABLET ORAL
Status: DISCONTINUED | OUTPATIENT
Start: 2018-03-26 | End: 2018-03-27 | Stop reason: HOSPADM

## 2018-03-26 RX ORDER — SODIUM CHLORIDE 0.9 % (FLUSH) 0.9 %
10 SYRINGE (ML) INJECTION
Status: DISPENSED | OUTPATIENT
Start: 2018-03-26 | End: 2018-03-27

## 2018-03-26 RX ORDER — NALOXONE HYDROCHLORIDE 0.4 MG/ML
0.4 INJECTION, SOLUTION INTRAMUSCULAR; INTRAVENOUS; SUBCUTANEOUS AS NEEDED
Status: DISCONTINUED | OUTPATIENT
Start: 2018-03-26 | End: 2018-03-27 | Stop reason: HOSPADM

## 2018-03-26 RX ORDER — DEXTROSE, SODIUM CHLORIDE, AND POTASSIUM CHLORIDE 5; .9; .15 G/100ML; G/100ML; G/100ML
125 INJECTION INTRAVENOUS CONTINUOUS
Status: DISCONTINUED | OUTPATIENT
Start: 2018-03-26 | End: 2018-03-27 | Stop reason: HOSPADM

## 2018-03-26 RX ORDER — LEVOFLOXACIN 5 MG/ML
750 INJECTION, SOLUTION INTRAVENOUS EVERY 24 HOURS
Status: DISCONTINUED | OUTPATIENT
Start: 2018-03-26 | End: 2018-03-26 | Stop reason: DRUGHIGH

## 2018-03-26 RX ORDER — SCOLOPAMINE TRANSDERMAL SYSTEM 1 MG/1
1 PATCH, EXTENDED RELEASE TRANSDERMAL
Status: DISCONTINUED | OUTPATIENT
Start: 2018-03-26 | End: 2018-03-26

## 2018-03-26 RX ORDER — LEVOFLOXACIN 5 MG/ML
500 INJECTION, SOLUTION INTRAVENOUS EVERY 24 HOURS
Status: DISCONTINUED | OUTPATIENT
Start: 2018-03-26 | End: 2018-03-27 | Stop reason: HOSPADM

## 2018-03-26 RX ORDER — PANTOPRAZOLE SODIUM 40 MG/10ML
40 INJECTION, POWDER, LYOPHILIZED, FOR SOLUTION INTRAVENOUS DAILY
Status: DISCONTINUED | OUTPATIENT
Start: 2018-03-27 | End: 2018-03-26

## 2018-03-26 RX ORDER — HALOPERIDOL 2 MG/ML
4 SOLUTION ORAL
Status: DISCONTINUED | OUTPATIENT
Start: 2018-03-26 | End: 2018-03-26 | Stop reason: SDUPTHER

## 2018-03-26 RX ORDER — POTASSIUM CHLORIDE 14.9 MG/ML
10 INJECTION INTRAVENOUS
Status: COMPLETED | OUTPATIENT
Start: 2018-03-26 | End: 2018-03-26

## 2018-03-26 RX ORDER — MIDAZOLAM HYDROCHLORIDE 1 MG/ML
5 INJECTION, SOLUTION INTRAMUSCULAR; INTRAVENOUS
Status: DISCONTINUED | OUTPATIENT
Start: 2018-03-26 | End: 2018-03-26 | Stop reason: ALTCHOICE

## 2018-03-26 RX ORDER — SODIUM CHLORIDE 9 MG/ML
25 INJECTION, SOLUTION INTRAVENOUS ONCE
Status: COMPLETED | OUTPATIENT
Start: 2018-03-26 | End: 2018-03-26

## 2018-03-26 RX ORDER — METOPROLOL TARTRATE 50 MG/1
50 TABLET ORAL EVERY 12 HOURS
Status: DISCONTINUED | OUTPATIENT
Start: 2018-03-27 | End: 2018-03-27 | Stop reason: HOSPADM

## 2018-03-26 RX ORDER — SODIUM CHLORIDE 0.9 % (FLUSH) 0.9 %
5-10 SYRINGE (ML) INJECTION AS NEEDED
Status: DISCONTINUED | OUTPATIENT
Start: 2018-03-26 | End: 2018-03-27 | Stop reason: HOSPADM

## 2018-03-26 RX ADMIN — SUCRALFATE 0.5 G: 1 SUSPENSION ORAL at 22:19

## 2018-03-26 RX ADMIN — PANTOPRAZOLE SODIUM 40 MG: 40 INJECTION, POWDER, FOR SOLUTION INTRAVENOUS at 18:17

## 2018-03-26 RX ADMIN — POTASSIUM CHLORIDE 20 MEQ: 20 SOLUTION ORAL at 18:17

## 2018-03-26 RX ADMIN — PROMETHAZINE HYDROCHLORIDE 25 MG: 25 INJECTION INTRAMUSCULAR; INTRAVENOUS at 23:36

## 2018-03-26 RX ADMIN — LIDOCAINE HYDROCHLORIDE 5 ML: 20 JELLY TOPICAL at 15:33

## 2018-03-26 RX ADMIN — SUCRALFATE 0.5 G: 1 SUSPENSION ORAL at 18:17

## 2018-03-26 RX ADMIN — IOPAMIDOL 40 ML: 612 INJECTION, SOLUTION INTRAVENOUS at 16:09

## 2018-03-26 RX ADMIN — LIDOCAINE 2 G: 40 CREAM TOPICAL at 13:14

## 2018-03-26 RX ADMIN — CEFAZOLIN SODIUM 2 G: 2 SOLUTION INTRAVENOUS at 15:30

## 2018-03-26 RX ADMIN — FENTANYL CITRATE 25 MCG: 50 INJECTION, SOLUTION INTRAMUSCULAR; INTRAVENOUS at 15:37

## 2018-03-26 RX ADMIN — Medication 10 ML: at 18:21

## 2018-03-26 RX ADMIN — POTASSIUM CHLORIDE 10 MEQ: 200 INJECTION, SOLUTION INTRAVENOUS at 22:21

## 2018-03-26 RX ADMIN — MIDAZOLAM HYDROCHLORIDE 1 MG: 1 INJECTION, SOLUTION INTRAMUSCULAR; INTRAVENOUS at 15:15

## 2018-03-26 RX ADMIN — PROMETHAZINE HYDROCHLORIDE 12.5 MG: 25 INJECTION INTRAMUSCULAR; INTRAVENOUS at 15:03

## 2018-03-26 RX ADMIN — SODIUM CHLORIDE 25 ML/HR: 9 INJECTION, SOLUTION INTRAVENOUS at 13:19

## 2018-03-26 RX ADMIN — MIDAZOLAM HYDROCHLORIDE 1 MG: 1 INJECTION, SOLUTION INTRAMUSCULAR; INTRAVENOUS at 15:56

## 2018-03-26 RX ADMIN — HYDROMORPHONE HYDROCHLORIDE 1 MG: 2 INJECTION INTRAMUSCULAR; INTRAVENOUS; SUBCUTANEOUS at 19:20

## 2018-03-26 RX ADMIN — ZOLPIDEM TARTRATE 10 MG: 10 TABLET ORAL at 22:19

## 2018-03-26 RX ADMIN — FENTANYL CITRATE 25 MCG: 50 INJECTION, SOLUTION INTRAMUSCULAR; INTRAVENOUS at 15:17

## 2018-03-26 RX ADMIN — FENTANYL CITRATE 25 MCG: 50 INJECTION, SOLUTION INTRAMUSCULAR; INTRAVENOUS at 15:31

## 2018-03-26 RX ADMIN — MIDAZOLAM HYDROCHLORIDE 1 MG: 1 INJECTION, SOLUTION INTRAMUSCULAR; INTRAVENOUS at 15:30

## 2018-03-26 RX ADMIN — HYDROMORPHONE HYDROCHLORIDE 1 MG: 2 INJECTION INTRAMUSCULAR; INTRAVENOUS; SUBCUTANEOUS at 23:36

## 2018-03-26 RX ADMIN — MIDAZOLAM HYDROCHLORIDE 1 MG: 1 INJECTION, SOLUTION INTRAMUSCULAR; INTRAVENOUS at 15:36

## 2018-03-26 RX ADMIN — METOCLOPRAMIDE 10 MG: 5 INJECTION, SOLUTION INTRAMUSCULAR; INTRAVENOUS at 18:20

## 2018-03-26 RX ADMIN — LIDOCAINE HYDROCHLORIDE 9 ML: 20 INJECTION, SOLUTION INFILTRATION; PERINEURAL at 16:08

## 2018-03-26 RX ADMIN — FENTANYL CITRATE 50 MCG: 50 INJECTION, SOLUTION INTRAMUSCULAR; INTRAVENOUS at 15:56

## 2018-03-26 RX ADMIN — APIXABAN 2.5 MG: 2.5 TABLET, FILM COATED ORAL at 18:17

## 2018-03-26 RX ADMIN — METOCLOPRAMIDE 10 MG: 5 INJECTION, SOLUTION INTRAMUSCULAR; INTRAVENOUS at 23:36

## 2018-03-26 RX ADMIN — LEVOFLOXACIN 500 MG: 5 INJECTION, SOLUTION INTRAVENOUS at 18:20

## 2018-03-26 RX ADMIN — FENTANYL CITRATE 25 MCG: 50 INJECTION, SOLUTION INTRAMUSCULAR; INTRAVENOUS at 15:15

## 2018-03-26 RX ADMIN — MAGNESIUM SULFATE HEPTAHYDRATE 1 G: 1 INJECTION, SOLUTION INTRAVENOUS at 20:51

## 2018-03-26 RX ADMIN — POTASSIUM CHLORIDE 10 MEQ: 200 INJECTION, SOLUTION INTRAVENOUS at 21:05

## 2018-03-26 RX ADMIN — Medication 10 ML: at 22:00

## 2018-03-26 RX ADMIN — FENTANYL CITRATE 25 MCG: 50 INJECTION, SOLUTION INTRAMUSCULAR; INTRAVENOUS at 16:01

## 2018-03-26 RX ADMIN — DEXTROSE MONOHYDRATE, SODIUM CHLORIDE, AND POTASSIUM CHLORIDE 125 ML/HR: 50; 9; 1.49 INJECTION, SOLUTION INTRAVENOUS at 18:16

## 2018-03-26 RX ADMIN — MIDAZOLAM HYDROCHLORIDE 1 MG: 1 INJECTION, SOLUTION INTRAMUSCULAR; INTRAVENOUS at 15:17

## 2018-03-26 NOTE — H&P
Radiology History and Physical    Patient: Kaley Tijerina 61 y.o. female       Chief Complaint: No chief complaint on file. History of Present Illness: bowel obstruction, g tube fell out     History:    Past Medical History:   Diagnosis Date    [facility]    Arthritis     left shoulder    BRCA negative 12/2015    Chronic pain     Depression     HX    Environmental allergies     GERD (gastroesophageal reflux disease)     Hypertension     NEW OVER PAST 5-6 MONTHS    Ovarian cancer ([facility] 10/2015    serous adenocarcinoma, high grade, stage IIIC    Pulmonary embolism ([facility] 10/2015     Family History   Problem Relation Age of Onset    Cancer Maternal Uncle      skin    Hypertension Mother     High Cholesterol Mother     Anxiety Mother     Heart Disease Mother     High Cholesterol Father     Hypertension Father     Stroke Father     Arthritis-osteo Sister     Migraines Sister     Other Sister      FIBROMYALGIA    Depression Brother     Other Brother      DRUG ABUSE HEPATITIS C    Migraines Sister     Arrhythmia Sister     Depression Sister     Lung Disease Paternal Aunt      COPD    Cancer Paternal Uncle      LUNG    Lung Disease Paternal Uncle      COPD    Lung Disease Maternal Grandmother      COPD    Anesth Problems Neg Hx      Social History     Social History    Marital status:      Spouse name: N/A    Number of children: N/A    Years of education: N/A     Occupational History    Not on file. Social History Main Topics    Smoking status: Never Smoker    Smokeless tobacco: Never Used    Alcohol use No    Drug use: No    Sexual activity: Not on file     Other Topics Concern    Not on file     Social History Narrative       Allergies:    Allergies   Allergen Reactions    Ativan [Lorazepam] Other (comments)     SEVERE CONFUSION       Current Medications:  Current Facility-Administered Medications   Medication Dose Route Frequency    sodium chloride (NS) flush 10 mL  10 mL IntraVENous RAD ONCE    heparin (porcine) pf 300 Units  300 Units InterCATHeter PRN    lidocaine (XYLOCAINE) 2 % jelly   Mucous Membrane PRN    iopamidol (ISOVUE 300) 61 % contrast injection         No current outpatient prescriptions on file.      Facility-Administered Medications Ordered in Other Encounters   Medication Dose Route Frequency    apixaban (ELIQUIS) tablet 2.5 mg  2.5 mg Oral BID    cyclobenzaprine (FLEXERIL) tablet 10 mg  10 mg Oral TID PRN    fentaNYL (DURAGESIC) 100 mcg/hr patch 1 Patch  1 Patch TransDERmal Q72H    fentaNYL (DURAGESIC) 50 mcg/hr patch 1 Patch  1 Patch TransDERmal Q72H    [START ON 3/27/2018] lactobac ac& pc-s.therm-b.anim (HILTON Q/RISAQUAD)  1 Cap Oral DAILY    [START ON 3/27/2018] metoprolol tartrate (LOPRESSOR) tablet 50 mg  50 mg Oral Q12H    potassium chloride (KAON 10%) 20 mEq/15 mL oral liquid 20 mEq  20 mEq Oral BID    scopolamine (TRANSDERM-SCOP) 1 mg over 3 days 1 Patch  1 Patch TransDERmal Q72H    sucralfate (CARAFATE) 100 mg/mL oral suspension 0.5 g  0.5 g Oral QID    zolpidem (AMBIEN) tablet 10 mg  10 mg Oral QHS PRN    ondansetron (ZOFRAN) injection 4 mg  4 mg IntraVENous Q6H PRN    promethazine (PHENERGAN) 25 mg in 0.9% sodium chloride 50 mL IVPB  25 mg IntraVENous Q6H PRN    HYDROmorphone (DILAUDID) injection 1 mg  1 mg IntraVENous Q4H PRN    metoclopramide HCl (REGLAN) injection 10 mg  10 mg IntraVENous Q6H    [START ON 3/27/2018] pantoprazole (PROTONIX) injection 40 mg  40 mg IntraVENous DAILY    sodium chloride (NS) flush 5-10 mL  5-10 mL IntraVENous Q8H    sodium chloride (NS) flush 5-10 mL  5-10 mL IntraVENous PRN    naloxone (NARCAN) injection 0.4 mg  0.4 mg IntraVENous PRN    diphenhydrAMINE (BENADRYL) injection 12.5 mg  12.5 mg IntraVENous Q4H PRN    levoFLOXacin (LEVAQUIN) 500 mg in D5W IVPB  500 mg IntraVENous Q24H    dextrose 5% - 0.9% NaCl with KCl 20 mEq/L infusion  125 mL/hr IntraVENous CONTINUOUS Physical Exam:  Blood pressure 134/69, pulse 96, temperature 99 °F (37.2 °C), resp. rate 15, height 5' 3\" (1.6 m), weight 41.7 kg (92 lb), SpO2 100 %. LUNG: clear to auscultation bilaterally, HEART: regular rate and rhythm, S1, S2 normal, no murmur, click, rub or gallop      Alerts:    Hospital Problems  Date Reviewed: 2/8/2018    None          Laboratory:    No results for input(s): HGB, HCT, WBC, PLT, INR, BUN, CREA, K, CRCLT, HGBEXT, HCTEXT, PLTEXT in the last 72 hours. No lab exists for component: PTT, PT, INREXT      Plan of Care/Planned Procedure:  Risks, benefits, and alternatives reviewed with patient and she agrees to proceed with the procedure.      Plan is for G tube replacement       Jignesh Flood MD

## 2018-03-26 NOTE — H&P
Chava Romano [facility]     Dr. Norita Lombard. Wall, NP     [place], [address]       [id number]  1960    Admitted 3/26/2018 Date 3/26/2018   HPI:  HISTORY OF PRESENT ILLNESS:  Irma Smith is a 61 y.o.  postmenopausal female with a diagnosis of stage IV ovarian cancer.  She underwent exploratory laparotomy with suboptimal debulking in 2015.  She had extensive disease.  She was readmitted about a week later with obstruction and subsequently had a cecal perforation, requiring resection, end ileostomy, and mucous fistula.  During the hospitalization she was also diagnosed with pulmonary embolus and had chest tubes placed for bilateral pleural effusions.  She had a prolonged hospital course and actually received a dose of cisplatin chemotherapy while in the hospital to help dry up the effusions.  She completed 6 cycles of Taxol/Carboplatin chemotherapy following her initial debulking.    She  did relatively well, considering she had an ileostomy to deal with during chemotherapy.  Dr. Deepa Dunn took her to the OR on 16 for interval debulking and reversal of her ileostomy.  She had extensive disease, but we were able to resect the majority of her disease. One week later she developed an anastomotic leak requiring reexploration and recreation of an ileostomy.  She had another prolonged hospitalization, but recovered well since surgery.  We then reinitiated Taxol/Carboplatin chemotherapy, but did reduce the dose of Taxol to 135 mg/m2. Kevin Florentino completed 4 mores cycles.  Her CA-125 normalized and we stopped treatment.      She had evidence of recurrent disease and for a while was receiving Doxil/Avastin.  She completed 3 cycles of that regimen ().    She was admitted to [facility] several time since during that regimen with evidence of partial small bowel obstruction.  The symptoms resolved each time with conservative management.     She then had increased ascites on her CT scan, but no significant change in her disease otherwise.  Her CA-125 also went up.  All of this suggested progression of disease.  We switched her chemotherapy to [facility]. She completed 7 cycles 5/23/2017-9/12/2017). Her CA-125 had risen over the last few draws. Sage Rivera last CT on 1/9/2018 showed improvement.   She had been feeling good  She has been having some shortness of breathe with exertion.  Her son's wedding was in January. Gisele Acharya went well and there were no problems  She had been hospitalized almost every cycle for the last few cycles for GI issues so she was switch to single agent Carboplatin begining on 10/17/17 and has completed 5 cycles so far with last being 2/13/2018.     On 2/20/18 due to increasing nausea, vomiting and weight loss (down to 82 lbs), her gastroenterologist in [place], preformed an upper endoscopy and called to inform us that it showed very friable, ulcerative esophagitis and he felt she needed admission for nutrition and possible gastrostomy tube placement.     [place] [facility] [place] [facility] and has been in house since then receiving TPN daily with TIW Lipids. Her weight on admission was down to 84 lbs. She initially was able to eat a small amount, but the nausea and vomiting returned and she continued be nauseated almost all the time. Ostomy has continue to function, although decrease in output due to decreased PO intake. She had a CT scan there on 2/26/28 that  Showed:  1: significant distention of small bowel with possibility secondary  To an obstruction focus just proximal to her ileostomy with clumping of small bowel and evidence of omental caking and peritoneal carcinomatosis\".   2: Moderate hydronephrosis and proximal hydroureter likely secondary to extrinsic compression upon the proximal or mid ureters due to the clumped and distended bowel and peritoneal carcinomatosis. 3: Hyperattenuating foci within the liver; cannot exclude hepatic metastatic foci  4: Noncalcified nodular foci in the lingula: cannot exclude pulmonary parenchymal metastatic disease. (report scanned into media of this chart)     After several 9 days at [facility], her attending physician requested pt be transferred to Dr. Selina Bowen for continued management of her disease and assist with decision making going forward. She was admitted here from March 5th-March 22nd. (see discharge summary for details of visit)    On March 15th, she had a g-tube placed for decompression and after several days, her pain subsided and she felt improvement in her nausea and kept G-Tube open to drainage to gaytan bag. She was discharged on 3/22 in stable condition and tolerating diet. SUBJECTIVE:  Yenifer Vogt called me at home and said her drainage bag got caught on an edge of the couch and it pulled her G-Tube out. She said she had no drainage and was not in any pain and was not having any nausea or vomiting. Realizing it was Sunday and it would not be considered an emergency case for IR physician to come in for, it was decided to have pt wait until today to see if G-tube could be replaces. At 0700 this morning, I discussed pt with IR and they said to bring her in under observation and they would try to re-insert into same site, if not, would need to hold Eliquis this evening and re-attempt one tomorrow.    She is therefore admitted for observation and g-tube replacement           Patient Active Problem List    Diagnosis Date Noted    Hypokalemia 03/26/2018    SBO (small bowel obstruction) 03/26/2018    Impaired oral gastric feeding tube 03/26/2018    Mucositis due to chemotherapy 03/21/2018    Protein-calorie malnutrition, severe ([address] 03/05/2018    Esophagitis determined by endoscopy 03/05/2018    Ovarian cancer, unspecified laterality ([address] 11/30/2017    Nausea and vomiting 09/14/2017    Partial bowel obstruction 09/13/2017    Intractable vomiting with nausea 08/10/2017    Ovarian cancer ([address] 08/09/2017    Nausea & vomiting 08/09/2017    Carcinomatosis ([address] 08/09/2017    Hydronephrosis of left kidney 06/28/2017    Abdominal pain 06/27/2017    Hypomagnesemia 06/13/2017    Erosive esophagitis 06/07/2017    Ulcerative esophagitis 06/07/2017    Isolated proteinuria 05/25/2017    Malignant ascites 05/08/2017    Hydronephrosis of right kidney 05/08/2017    Protein calorie malnutrition ([address] 04/28/2017    Counseling regarding end of life decision making 04/25/2017    Anticoagulation adequate with anticoagulant therapy 03/28/2017    Chemotherapy-induced neutropenia (HCC) 03/22/2017    Ileus ([address] 03/21/2017    Thrombosis of pelvic vein 03/21/2017    Hx of pulmonary embolus 03/21/2017    Dehydration 03/21/2017    Cancer associated pain 05/13/2016    Generalized abdominal pain 05/13/2016    Anxiety about health 05/13/2016    CINV (chemotherapy-induced nausea and vomiting) 03/22/2016    Anemia due to chemotherapy 02/22/2016    Ileostomy in place [facility]) 02/15/2016    Carcinomatosis peritonei ([address] 10/29/2015    Small bowel obstruction 10/29/2015    Malignant neoplasm of both ovaries ([address] 10/12/2015     Past Medical History:   Diagnosis Date    Abdominal carcinomatosis ([address] 10/2015    Arthritis     left shoulder    BRCA negative 12/2015    Chronic pain     Depression     HX    Environmental allergies     GERD (gastroesophageal reflux disease)     Hypertension     NEW OVER PAST 5-6 MONTHS    Ovarian cancer ([address] 10/2015    serous adenocarcinoma, high grade, stage IIIC    Pulmonary embolism ([address] 10/2015      Past Surgical History:   Procedure Laterality Date    CARDIAC SURG PROCEDURE UNLIST  12/11/15    cardiac cath/normal     HX BREAST BIOPSY  1995    benign right breast bx    HX DILATION AND CURETTAGE  2/2011    POLYPECTOMY    HX GI  2015 PERFORATED BOWEL; ILEOSTOMY    HX GYN  10/2015    EXP LAPAROTOMY    HX HEENT  LAST 2005    oral tissue graft X3    HX HEENT  1970    tonsillectomy    HX LAPAROTOMY  10/2015    tumor sampling    HX LAPAROTOMY  4/2016    hysterectomy, BSO, radical debulking    HX LAPAROTOMY  5/2016    resection ileocolic anastomosis, leak, ileostomy    HX OTHER SURGICAL  11/2015    tunneled portacath      Social History   Substance Use Topics    Smoking status: Never Smoker    Smokeless tobacco: Never Used    Alcohol use No      Family History   Problem Relation Age of Onset    Cancer Maternal Uncle      skin    Hypertension Mother     High Cholesterol Mother     Anxiety Mother     Heart Disease Mother     High Cholesterol Father     Hypertension Father     Stroke Father     Arthritis-osteo Sister     Migraines Sister     Other Sister      FIBROMYALGIA    Depression Brother     Other Brother      DRUG ABUSE HEPATITIS C    Migraines Sister     Arrhythmia Sister     Depression Sister     Lung Disease Paternal Aunt      COPD    Cancer Paternal Uncle      LUNG    Lung Disease Paternal Uncle      COPD    Lung Disease Maternal Grandmother      COPD    Anesth Problems Neg Hx       Current Facility-Administered Medications   Medication Dose Route Frequency    apixaban (ELIQUIS) tablet 2.5 mg  2.5 mg Oral BID    cyclobenzaprine (FLEXERIL) tablet 10 mg  10 mg Oral TID PRN    fentaNYL (DURAGESIC) 100 mcg/hr patch 1 Patch  1 Patch TransDERmal Q72H    fentaNYL (DURAGESIC) 50 mcg/hr patch 1 Patch  1 Patch TransDERmal Q72H    [START ON 3/27/2018] lactobac ac& pc-s.therm-b.anim (HILTON Q/RISAQUAD)  1 Cap Oral DAILY    [START ON 3/27/2018] metoprolol tartrate (LOPRESSOR) tablet 50 mg  50 mg Oral Q12H    potassium chloride (KAON 10%) 20 mEq/15 mL oral liquid 20 mEq  20 mEq Oral BID    scopolamine (TRANSDERM-SCOP) 1 mg over 3 days 1 Patch  1 Patch TransDERmal Q72H    sucralfate (CARAFATE) 100 mg/mL oral suspension 0.5 g  0.5 g Oral QID    zolpidem (AMBIEN) tablet 10 mg  10 mg Oral QHS PRN    ondansetron (ZOFRAN) injection 4 mg  4 mg IntraVENous Q6H PRN    promethazine (PHENERGAN) 25 mg in 0.9% sodium chloride 50 mL IVPB  25 mg IntraVENous Q6H PRN    HYDROmorphone (DILAUDID) injection 1 mg  1 mg IntraVENous Q4H PRN    metoclopramide HCl (REGLAN) injection 10 mg  10 mg IntraVENous Q6H    [START ON 3/27/2018] pantoprazole (PROTONIX) injection 40 mg  40 mg IntraVENous DAILY    sodium chloride (NS) flush 5-10 mL  5-10 mL IntraVENous Q8H    sodium chloride (NS) flush 5-10 mL  5-10 mL IntraVENous PRN    naloxone (NARCAN) injection 0.4 mg  0.4 mg IntraVENous PRN    diphenhydrAMINE (BENADRYL) injection 12.5 mg  12.5 mg IntraVENous Q4H PRN    haloperidol (HALDOL) 2 mg/mL oral solution 4 mg  4 mg Per G Tube Q8H PRN    levoFLOXacin (LEVAQUIN) 500 mg in D5W IVPB  500 mg IntraVENous Q24H     Facility-Administered Medications Ordered in Other Encounters   Medication Dose Route Frequency    fentaNYL citrate (PF) injection 200 mcg  200 mcg IntraVENous Multiple    midazolam (VERSED) injection 5 mg  5 mg IntraVENous Multiple    sodium chloride (NS) flush 10 mL  10 mL IntraVENous RAD ONCE    heparin (porcine) pf 300 Units  300 Units InterCATHeter PRN    lidocaine (XYLOCAINE) 2 % jelly   Mucous Membrane PRN    iopamidol (ISOVUE 300) 61 % contrast injection 50 mL  50 mL IntraVENous RAD ONCE    ceFAZolin (ANCEF) 2g IVPB in 50 mL D5W  2 g IntraVENous ON CALL    ceFAZolin in dextrose (iso-os) 2 gram/50 mL IVPB pgbk         Allergies   Allergen Reactions    Ativan [Lorazepam] Other (comments)     SEVERE CONFUSION        Review of Systems  General: Some wt loss form discharge, but says she is able to take in her protein drinks and has not been vomiting  HEENT: Denies visual changes, dysphagia or headache  Resp: Denies SOB, DUDLEY, wheezing or cough  CV: Denies CP, palpitations  GI/: Acknowledges some nausea over weekend with vomiting X2. Denies diarrhea, constipation or dysuria. Says ostomy functioning well  MuskSkel: Denies muscle ache or joint pain  Neuro: Denies dizzyness or syncope      OBJECTIVE:    Physical Exam  General: Thin female, A&O X3 in NAD with pleasant affect  HEENT: Sclera anicteric, mucosa pink, moist. Pupils equal and reactive to light  Port site without redness, tenderness or swelling. Accessed in IR without difficulty  Heart: Regular without M/R/G  Lungs: CTA Bilat   Abd: Soft, thin, mild tenderness without fluid wave or distention and with + BS throughout  Ext: Without edema and + pedal pulses bilat  Neuro: grossly intact      Data Review  Pending  Imaging  See percutaneous placement of G-Tube under imaging.       IMPRESSION:    Patient Active Problem List   Diagnosis Code    Malignant neoplasm of both ovaries ([facility] C56.1, C56.2    Carcinomatosis peritonei ([facility] C78.6, C80.1    Small bowel obstruction K56.609    Ileostomy in place ([facility] Z93.2    Anemia due to chemotherapy D64.81, T45.1X5A    CINV (chemotherapy-induced nausea and vomiting) R11.2, T45.1X5A    Cancer associated pain G89.3    Generalized abdominal pain R10.84    Anxiety about health F41.8    Ileus (HCC) K56.7    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    Hx of pulmonary embolus Z86.711    Dehydration E86.0    Chemotherapy-induced neutropenia (HCC) D70.1, T45.1X5A    Anticoagulation adequate with anticoagulant therapy Z79.01    Counseling regarding end of life decision making Z71.89    Protein calorie malnutrition ([facility] E46    Malignant ascites R18.0    Hydronephrosis of right kidney N13.30    Isolated proteinuria R80.0    Erosive esophagitis K22.10    Ulcerative esophagitis K22.10    Hypomagnesemia E83.42    Abdominal pain R10.9    Hydronephrosis of left kidney N13.30    Ovarian cancer (HCC) C56.9    Nausea & vomiting R11.2    Carcinomatosis (HCC) C80.0    Intractable vomiting with nausea R11.2    Partial bowel obstruction K56.600    Nausea and vomiting R11.2    Ovarian cancer, unspecified laterality (HCC) C56.9    Protein-calorie malnutrition, severe (HCC) E43    Esophagitis determined by endoscopy K20.9    Mucositis due to chemotherapy K12.31    Hypokalemia E87.6    SBO (small bowel obstruction) K56.609    Impaired oral gastric feeding tube T85.598A       PLAN:  Will proceed with drainage to bedside drainage bag  IV hydration over night  Checking labs and replace electrolytes as needed. Continue Eliquis  Monitor overnight and evaluate function of G-Tube, bleeding, N/V and pain  Plan on discharge tomorrow.            Signed By: Lashawn Valerio NP     3/26/2018/3:26 PM

## 2018-03-26 NOTE — PROGRESS NOTES
Day #6 of levofloxacin  Indication:  HAP  Current regimen:  500 mg Q24H  Abx regimen: levofloxacin  No results for input(s): WBC, CREA, BUN in the last 72 hours. Est CrCl: 58.5 ml/min; UO: The patient has no input/output data to display  Temp (24hrs), Av °F (37.2 °C), Min:99 °F (37.2 °C), Max:99 °F (37.2 °C)    Cultures: NA    Plan: Change to 750 mg Q24H x 5 days to complete 10 day course. **close i-vent after initial review. Remove this text prior to posting to note.

## 2018-03-26 NOTE — PROGRESS NOTES
TRANSFER - IN REPORT:    Verbal report received from [facility]) on [address]  being received from [facility] (unit) for routine progression of care      Report consisted of patients Situation, Background, Assessment and   Recommendations(SBAR). Information from the following report(s) SBAR, Kardex, Intake/Output, MAR and Recent Results was reviewed with the receiving nurse. Opportunity for questions and clarification was provided. Assessment completed upon patients arrival to unit and care assumed.

## 2018-03-26 NOTE — ROUTINE PROCESS
TRANSFER - OUT REPORT:    Verbal report given to Liza(name) on Rut Smith  being transferred to [facility](unit) for routine progression of care       Report consisted of patients Situation, Background, Assessment and   Recommendations(SBAR). Information from the following report(s) SBAR, Procedure Summary and MAR was reviewed with the receiving nurse. Lines:   Venous Access Device port 12/07/15 Upper chest (subclavicular area), left (Active)   Central Line Being Utilized Yes 3/26/2018  1:17 PM   Criteria for Appropriate Use Limited/no vessel suitable for conventional peripheral access 3/26/2018  1:17 PM   Site Assessment Clean, dry, & intact 3/26/2018  1:17 PM   Date of Last Dressing Change 03/26/18 3/26/2018  1:17 PM   Dressing Status Clean, dry, & intact 3/26/2018  1:17 PM   Dressing Type Transparent 3/26/2018  1:17 PM   Date Accessed (Medial Site) 03/26/18 3/26/2018  1:17 PM   Access Time (Medial Site) 1318 3/26/2018  1:17 PM   Access Needle Size (Site #1) 20 G 3/26/2018  1:17 PM   Access Needle Length (Medial Site) 0.75 inches 3/26/2018  1:17 PM   Positive Blood Return (Medial Site) Yes 3/26/2018  1:17 PM   Action Taken (Medial Site) Flushed; Infusing 3/26/2018  1:17 PM        Opportunity for questions and clarification was provided.       Patient transported with:   [employer]

## 2018-03-26 NOTE — IP AVS SNAPSHOT
[address] 
[phone] Patient: Serena Portillo MRN: [id number] :1960 A check alejandro indicates which time of day the medication should be taken. My Medications CHANGE how you take these medications Instructions Each Dose to Equal  
 Morning Noon Evening Bedtime  
 potassium chloride 20 mEq packet Commonly known as:  KLOR-CON What changed:  Another medication with the same name was removed. Continue taking this medication, and follow the directions you see here. Your last dose was: Your next dose is: Take 1 Packet by mouth two (2) times a day. 20 mEq CONTINUE taking these medications Instructions Each Dose to Equal  
 Morning Noon Evening Bedtime  
 apixaban 2.5 mg tablet Commonly known as:  Edd Vigil Your last dose was: Your next dose is: Take 1 Tab by mouth two (2) times a day. 2.5 mg  
    
   
   
   
  
 cyclobenzaprine 10 mg tablet Commonly known as:  FLEXERIL Your last dose was: Your next dose is: Take 1 Tab by mouth three (3) times daily as needed for Muscle Spasm(s). 10 mg  
    
   
   
   
  
 dexamethasone 4 mg tablet Commonly known as:  DECADRON Your last dose was: Your next dose is: Take 2 tabs QAM day before chemo and for 2 days after DEXILANT 60 mg Cpdb Generic drug:  Dexlansoprazole Your last dose was: Your next dose is: Take 60 mg by mouth daily. 60 mg  
    
   
   
   
  
 dronabinol 5 mg/mL Soln Commonly known as:  SYNDROS Your last dose was: Your next dose is: Take 2.1 mg by mouth two (2) times a day. Max Daily Amount: 4.2 mg. Take 30 minutes before lunch and dinner  2.1 mg  
    
   
   
   
  
 * fentaNYL 50 mcg/hr PATCH  
 Commonly known as:  Beti Comer Your last dose was: Your next dose is:    
   
   
 1 Patch by TransDERmal route every seventy-two (72) hours. Max Daily Amount: 1 Patch. This is in ADDITION to your 100 mcg patch for a total of 150 mcg patch every 72 hours 1 Patch * fentaNYL 100 mcg/hr PATCH Commonly known as:  Beti Nahomi Your last dose was: Your next dose is:    
   
   
 1 Patch by TransDERmal route every seventy-two (72) hours. Max Daily Amount: 1 Patch. 1 Patch  
    
   
   
   
  
 haloperidol 2 mg/mL oral concentrate Commonly known as:  HALDOL Your last dose was: Your next dose is:    
   
   
 2 mL by Per G Tube route every eight (8) hours as needed (Nausea). Indications: CANCER CHEMOTHERAPY-INDUCED NAUSEA AND VOMITING  
 4 mg HYDROmorphone 1 mg/mL Liqd oral solution Commonly known as:  DILAUDID Your last dose was: Your next dose is: Take 4 mL by mouth every four (4) hours as needed. Max Daily Amount: 24 mg. Indications: Pain  
 4 mg L. acidoph & paracasei- S therm- Bifido 8 billion cell Cap cap Commonly known as:  HILTON-Q/RISAQUAD Your last dose was: Your next dose is: Take 1 Cap by mouth daily. 1 Cap  
    
   
   
   
  
 levoFLOXacin 250 mg/10 mL solution Commonly known as:  Paralee Joshua Your last dose was: Your next dose is: Take 30 mL by mouth every twenty-four (24) hours for 12 days. 750 mg  
    
   
   
   
  
 LOPRESSOR 50 mg tablet Generic drug:  metoprolol tartrate Your last dose was: Your next dose is: Take  by mouth two (2) times a day. magnesium oxide 400 mg tablet Commonly known as:  MAG-OX Your last dose was: Your next dose is: Take 1 Tab by mouth two (2) times a day.  Indications: HYPOMAGNESEMIA  
 400 mg  
 metoclopramide 5 mg/5 mL syrup Commonly known as:  REGLAN Your last dose was: Your next dose is: Take 10 mL by mouth four (4) times daily as needed. Take before meals as needed 10 mg  
    
   
   
   
  
 ondansetron 4 mg disintegrating tablet Commonly known as:  ZOFRAN ODT Your last dose was: Your next dose is: Take 1 Tab by mouth every eight (8) hours as needed for Nausea. 4 mg  
    
   
   
   
  
 polyethylene glycol 17 gram/dose powder Commonly known as:  Hayde Downs Your last dose was: Your next dose is: Take 17 g by mouth daily as needed. 17 g  
    
   
   
   
  
 promethazine 6.25 mg/5 mL syrup Commonly known as:  PHENERGAN Your last dose was: Your next dose is: Take 20 mL by mouth four (4) times daily as needed for Nausea. 25 mg  
    
   
   
   
  
 scopolamine 1 mg over 3 days Pt3d Commonly known as:  TRANSDERM-SCOP Your last dose was: Your next dose is:    
   
   
 1 Patch by TransDERmal route every seventy-two (72) hours as needed. 1.5 mg  
    
   
   
   
  
 sterile water irrigation soln 45.6 mL with hydrocortisone 10 mg tab 30 mg, nystatin 100,000 unit/mL susp 1,440,000 Units, diphenhydrAMINE 12.5 mg/5 mL elix 150 mg oral suspension Your last dose was: Your next dose is: Take 5 mL by mouth every six (6) hours as needed for up to 10 days. 5 mL  
    
   
   
   
  
 sucralfate 100 mg/mL suspension Commonly known as:  David Chatham Your last dose was: Your next dose is: Take 5 mL by mouth four (4) times daily. 1 tsp  
    
   
   
   
  
 zolpidem 10 mg tablet Commonly known as:  AMBIEN Your last dose was: Your next dose is: Take 1 Tab by mouth nightly as needed for Sleep. Max Daily Amount: 10 mg. Indications: SLEEP-ONSET INSOMNIA  10 mg  
    
   
 * Notice: This list has 2 medication(s) that are the same as other medications prescribed for you. Read the directions carefully, and ask your doctor or other care provider to review them with you. Where to Get Your Medications These medications were sent to [facility], [address]  [place] [address] [address] [facility], [address] [place] [address] Phone:  [phone] L. acidoph & paracasei- S therm- Bifido 8 billion cell Cap cap

## 2018-03-26 NOTE — IP AVS SNAPSHOT
110 [facility] [place] [address] [place], [place] [phone] Patient: Rob Rodriguez MRN: [id number] :1960 About your hospitalization You were admitted on:  2018 You last received care in the:  [facility] PSYCHIATRIC [place] 3N TELEMETRY You were discharged on:  2018 Why you were hospitalized Your primary diagnosis was:  Not on File Your diagnoses also included:  Ovarian Cancer (Hcc), Sbo (Small Bowel Obstruction), Impaired Oral Gastric Feeding Tube Follow-up Information Follow up With Details Comments Contact Info Cheryl Carlson MD   [address] [address] [phone] Your Scheduled Appointments Tuesday April 10, 2018  2:00 PM EDT HOSPITAL FOLLOW-UP with Favian Gao MD  
[facility] Gynecologic Oncology 365[address] [facility]) [address] [id number] [phone] Discharge Orders None A check alejandro indicates which time of day the medication should be taken. My Medications CHANGE how you take these medications Instructions Each Dose to Equal  
 Morning Noon Evening Bedtime  
 potassium chloride 20 mEq packet Commonly known as:  KLOR-CON What changed:  Another medication with the same name was removed. Continue taking this medication, and follow the directions you see here. Your last dose was: Your next dose is: Take 1 Packet by mouth two (2) times a day. 20 mEq CONTINUE taking these medications Instructions Each Dose to Equal  
 Morning Noon Evening Bedtime  
 apixaban 2.5 mg tablet Commonly known as:  Robert Man Your last dose was: Your next dose is: Take 1 Tab by mouth two (2) times a day. 2.5 mg  
    
   
   
   
  
 cyclobenzaprine 10 mg tablet Commonly known as:  FLEXERIL Your last dose was: Your next dose is: Take 1 Tab by mouth three (3) times daily as needed for Muscle Spasm(s). 10 mg  
    
   
   
   
  
 dexamethasone 4 mg tablet Commonly known as:  DECADRON Your last dose was: Your next dose is: Take 2 tabs QAM day before chemo and for 2 days after DEXILANT 60 mg Cpdb Generic drug:  Dexlansoprazole Your last dose was: Your next dose is: Take 60 mg by mouth daily. 60 mg  
    
   
   
   
  
 dronabinol 5 mg/mL Soln Commonly known as:  SYNDROS Your last dose was: Your next dose is: Take 2.1 mg by mouth two (2) times a day. Max Daily Amount: 4.2 mg. Take 30 minutes before lunch and dinner 2.1 mg  
    
   
   
   
  
 * fentaNYL 50 mcg/hr PATCH Commonly known as:  Job Carlson Your last dose was: Your next dose is:    
   
   
 1 Patch by TransDERmal route every seventy-two (72) hours. Max Daily Amount: 1 Patch. This is in ADDITION to your 100 mcg patch for a total of 150 mcg patch every 72 hours 1 Patch * fentaNYL 100 mcg/hr PATCH Commonly known as:  Job Carlson Your last dose was: Your next dose is:    
   
   
 1 Patch by TransDERmal route every seventy-two (72) hours. Max Daily Amount: 1 Patch. 1 Patch  
    
   
   
   
  
 haloperidol 2 mg/mL oral concentrate Commonly known as:  HALDOL Your last dose was: Your next dose is:    
   
   
 2 mL by Per G Tube route every eight (8) hours as needed (Nausea). Indications: CANCER CHEMOTHERAPY-INDUCED NAUSEA AND VOMITING  
 4 mg HYDROmorphone 1 mg/mL Liqd oral solution Commonly known as:  DILAUDID Your last dose was: Your next dose is: Take 4 mL by mouth every four (4) hours as needed. Max Daily Amount: 24 mg. Indications: Pain  
 4 mg L. acidoph & paracasei- S therm- Bifido 8 billion cell Cap cap Commonly known as:  HILTON-Q/RISAQUAD Your last dose was: Your next dose is: Take 1 Cap by mouth daily. 1 Cap  
    
   
   
   
  
 levoFLOXacin 250 mg/10 mL solution Commonly known as:  Kit Counter Your last dose was: Your next dose is: Take 30 mL by mouth every twenty-four (24) hours for 12 days. 750 mg  
    
   
   
   
  
 LOPRESSOR 50 mg tablet Generic drug:  metoprolol tartrate Your last dose was: Your next dose is: Take  by mouth two (2) times a day. magnesium oxide 400 mg tablet Commonly known as:  MAG-OX Your last dose was: Your next dose is: Take 1 Tab by mouth two (2) times a day. Indications: HYPOMAGNESEMIA  
 400 mg  
    
   
   
   
  
 metoclopramide 5 mg/5 mL syrup Commonly known as:  REGLAN Your last dose was: Your next dose is: Take 10 mL by mouth four (4) times daily as needed. Take before meals as needed 10 mg  
    
   
   
   
  
 ondansetron 4 mg disintegrating tablet Commonly known as:  ZOFRAN ODT Your last dose was: Your next dose is: Take 1 Tab by mouth every eight (8) hours as needed for Nausea. 4 mg  
    
   
   
   
  
 polyethylene glycol 17 gram/dose powder Commonly known as:  Niharika Arts Your last dose was: Your next dose is: Take 17 g by mouth daily as needed. 17 g  
    
   
   
   
  
 promethazine 6.25 mg/5 mL syrup Commonly known as:  PHENERGAN Your last dose was: Your next dose is: Take 20 mL by mouth four (4) times daily as needed for Nausea. 25 mg  
    
   
   
   
  
 scopolamine 1 mg over 3 days Pt3d Commonly known as:  TRANSDERM-SCOP Your last dose was: Your next dose is: 1 Patch by TransDERmal route every seventy-two (72) hours as needed. 1.5 mg  
    
   
   
   
  
 sterile water irrigation soln 45.6 mL with hydrocortisone 10 mg tab 30 mg, nystatin 100,000 unit/mL susp 1,440,000 Units, diphenhydrAMINE 12.5 mg/5 mL elix 150 mg oral suspension Your last dose was: Your next dose is: Take 5 mL by mouth every six (6) hours as needed for up to 10 days. 5 mL  
    
   
   
   
  
 sucralfate 100 mg/mL suspension Commonly known as:  Ko Mclain Your last dose was: Your next dose is: Take 5 mL by mouth four (4) times daily. 1 tsp  
    
   
   
   
  
 zolpidem 10 mg tablet Commonly known as:  AMBIEN Your last dose was: Your next dose is: Take 1 Tab by mouth nightly as needed for Sleep. Max Daily Amount: 10 mg. Indications: SLEEP-ONSET INSOMNIA 10 mg  
    
   
   
   
  
 * Notice: This list has 2 medication(s) that are the same as other medications prescribed for you. Read the directions carefully, and ask your doctor or other care provider to review them with you. Where to Get Your Medications These medications were sent to [facility] [address], [address]  [address], [address] [place] [address] Phone:  [phone] L. acidoph & paracasei- S therm- Bifido 8 billion cell Cap cap Opioid Education Prescription Opioids: What You Need to Know: 
 
Prescription opioids can be used to help relieve moderate-to-severe pain and are often prescribed following a surgery or injury, or for certain health conditions. These medications can be an important part of treatment but also come with serious risks. Opioids are strong pain medicines. Examples include hydrocodone, oxycodone, fentanyl, and morphine. Heroin is an example of an illegal opioid.   It is important to work with your health care provider to make sure you are getting the safest, most effective care. WHAT ARE THE RISKS AND SIDE EFFECTS OF OPIOID USE? Prescription opioids carry serious risks of addiction and overdose, especially with prolonged use. An opioid overdose, often marked by slow breathing, can cause sudden death. The use of prescription opioids can have a number of side effects as well, even when taken as directed. · Tolerance-meaning you might need to take more of a medication for the same pain relief · Physical dependence-meaning you have symptoms of withdrawal when the medication is stopped. Withdrawal symptoms can include nausea, sweating, chills, diarrhea, stomach cramps, and muscle aches. Withdrawal can last up to several weeks, depending on which drug you took and how long you took it. · Increased sensitivity to pain · Constipation · Nausea, vomiting, and dry mouth · Sleepiness and dizziness · Confusion · Depression · Low levels of testosterone that can result in lower sex drive, energy, and strength · Itching and sweating RISKS ARE GREATER WITH:      
· History of drug misuse, substance use disorder, or overdose · Mental health conditions (such as depression or anxiety) · Sleep apnea · Older age (72 years or older) · Pregnancy Avoid alcohol while taking prescription opioids. Also, unless specifically advised by your health care provider, medications to avoid include: · Benzodiazepines (such as Xanax or Valium) · Muscle relaxants (such as Soma or Flexeril) · Hypnotics (such as Ambien or Lunesta) · Other prescription opioids KNOW YOUR OPTIONS Talk to your health care provider about ways to manage your pain that don't involve prescription opioids. Some of these options may actually work better and have fewer risks and side effects. Options may include: 
· Pain relievers such as acetaminophen, ibuprofen, and naproxen · Some medications that are also used for depression or seizures · Physical therapy and exercise · Counseling to help patients learn how to cope better with triggers of pain and stress. · Application of heat or cold compress · Massage therapy · Relaxation techniques Be Informed Make sure you know the name of your medication, how much and how often to take it, and its potential risks & side effects. IF YOU ARE PRESCRIBED OPIOIDS FOR PAIN: 
· Never take opioids in greater amounts or more often than prescribed. Remember the goal is not to be pain-free but to manage your pain at a tolerable level. · Follow up with your primary care provider to: · Work together to create a plan on how to manage your pain. · Talk about ways to help manage your pain that don't involve prescription opioids. · Talk about any and all concerns and side effects. · Help prevent misuse and abuse. · Never sell or share prescription opioids · Help prevent misuse and abuse. · Store prescription opioids in a secure place and out of reach of others (this may include visitors, children, friends, and family). · Safely dispose of unused/unwanted prescription opioids: Find your community drug take-back program or your pharmacy mail-back program, or flush them down the toilet, following guidance from the Food and Drug Administration (www.fda.gov/Drugs/ResourcesForYou). · Visit www.cdc.gov/drugoverdose to learn about the risks of opioid abuse and overdose. · If you believe you may be struggling with addiction, tell your health care provider and ask for guidance or call [address] [place] [address] at [phone]. Discharge Instructions [address], [address] [place] 
P [phone]  F [phone] Patient Discharge Instructions Farhan Cartagena / [id number] : 1960 Admitted 3/26/2018 Discharged: 3/27/2018 Take Home Medications No current facility-administered medications on file prior to encounter. Current Outpatient Prescriptions on File Prior to Encounter Medication Sig Dispense Refill  potassium chloride (KLOR-CON) 20 mEq packet Take 1 Packet by mouth two (2) times a day. [facility]  promethazine (PHENERGAN) 6.25 mg/5 mL syrup Take 20 mL by mouth four (4) times daily as needed for Nausea. 400 mL 5  
 levoFLOXacin (LEVAQUIN) 250 mg/10 mL solution Take 30 mL by mouth every twenty-four (24) hours for 12 days. 360 mL 0  
 potassium chloride (KAON 10%) 20 mEq/15 mL solution Take 30 mL by mouth two (2) times a day for 30 days. 480 mL 4  
 fentaNYL (DURAGESIC) 50 mcg/hr PATCH 1 Patch by TransDERmal route every seventy-two (72) hours. Max Daily Amount: 1 Patch. This is in ADDITION to your 100 mcg patch for a total of 150 mcg patch every 72 hours 10 Patch 0  
 fentaNYL (DURAGESIC) 100 mcg/hr PATCH 1 Patch by TransDERmal route every seventy-two (72) hours. Max Daily Amount: 1 Patch. 10 Patch 0  
 metoclopramide (REGLAN) 5 mg/5 mL syrup Take 10 mL by mouth four (4) times daily as needed. Take before meals as needed 500 mL 3  cyclobenzaprine (FLEXERIL) 10 mg tablet Take 1 Tab by mouth three (3) times daily as needed for Muscle Spasm(s). 30 Tab 1  
 zolpidem (AMBIEN) 10 mg tablet Take 1 Tab by mouth nightly as needed for Sleep. Max Daily Amount: 10 mg. Indications: SLEEP-ONSET INSOMNIA 30 Tab 2  
 scopolamine (TRANSDERM-SCOP) 1 mg over 3 days pt3d 1 Patch by TransDERmal route every seventy-two (72) hours as needed. 10 Patch 1  
 metoprolol tartrate (LOPRESSOR) 50 mg tablet Take  by mouth two (2) times a day.  magnesium oxide (MAG-OX) 400 mg tablet Take 1 Tab by mouth two (2) times a day. Indications: HYPOMAGNESEMIA 60 Tab 2  
 sucralfate (CARAFATE) 100 mg/mL suspension Take 5 mL by mouth four (4) times daily.  414 mL 3  
  apixaban (ELIQUIS) 2.5 mg tablet Take 1 Tab by mouth two (2) times a day. 60 Tab 6  
 ondansetron (ZOFRAN ODT) 4 mg disintegrating tablet Take 1 Tab by mouth every eight (8) hours as needed for Nausea. 30 Tab 2  
 HYDROmorphone (DILAUDID) 1 mg/mL liqd oral solution Take 4 mL by mouth every four (4) hours as needed. Max Daily Amount: 24 mg. Indications: Pain 475 mL 0  
 haloperidol (HALDOL) 2 mg/mL oral concentrate 2 mL by Per G Tube route every eight (8) hours as needed (Nausea). Indications: CANCER CHEMOTHERAPY-INDUCED NAUSEA AND VOMITING 30 mL 3  
 sterile water irrigation soln 45.6 mL with hydrocortisone 10 mg tab 30 mg, nystatin 100,000 unit/mL susp 1,440,000 Units, diphenhydrAMINE 12.5 mg/5 mL elix 150 mg oral suspension Take 5 mL by mouth every six (6) hours as needed for up to 10 days. 250 mL 4  
 dexamethasone (DECADRON) 4 mg tablet Take 2 tabs QAM day before chemo and for 2 days after 30 Tab 4  
 dronabinol (SYNDROS) 5 mg/mL soln Take 2.1 mg by mouth two (2) times a day. Max Daily Amount: 4.2 mg. Take 30 minutes before lunch and dinner 30 mL 1  
 polyethylene glycol (MIRALAX) 17 gram/dose powder Take 17 g by mouth daily as needed. 238 g 3  
 
 
· It is important that you take the medication exactly as they are prescribed. · Keep your medication in the bottles provided by the pharmacist and keep a list of the medication names, dosages, and times to be taken in your wallet. · Do not take other medications without consulting your doctor. What to do at [facility] Recommended diet: Regular, small frequent meals, low roughage diet,  stay hydrated. You should take a stool softener such as colace for constipation. If constipation persists for >24 hours you should take a dose of Milk of Magnesia. Call if your constipation persists. Recommended activity: No driving while on pain medication Walk at least 6 times per day Showers okay, no tub bathing/swimming for two weeks Activity as able, stairs okay. If you experience any of the following persisting symptoms: 
 
Nausea/vomiting, fever > 101 F, diarrhea/constipation, increasing pain despite medication, increasing vaginal discharge or any concerns, please call our office. Follow-up with Dr. Clarissa Suarez either Bk  or . Call [phone] for an appointment. Information obtained by : 
I understand that if any problems occur once I am at home I am to contact my physician. I understand and acknowledge receipt of the instructions indicated above. Physician's or R.N.'s Signature                                                                  Date/Time Patient or Representative Signature                                                          Date/Time [employer] Activation Thank you for requesting access to Skout. Please follow the instructions below to securely access and download your online medical record. Skout allows you to send messages to your doctor, view your test results, renew your prescriptions, schedule appointments, and more. How Do I Sign Up? 1. In your internet browser, go to [email or web address] 
2. Click on the First Time User? Click Here link in the Sign In box. You will be redirect to the New Member Sign Up page. 3. Enter your Skout Access Code exactly as it appears below. You will not need to use this code after youve completed the sign-up process. If you do not sign up before the expiration date, you must request a new code. Skout Access Code: Activation code not generated Current Skout Status: Active (This is the date your Skout access code will ) 4. Enter the last four digits of your Social Security Number (xxxx) and Date of Birth (mm/dd/yyyy) as indicated and click Submit. You will be taken to the next sign-up page. 5. Create a GeoCities ID. This will be your GeoCities login ID and cannot be changed, so think of one that is secure and easy to remember. 6. Create a GeoCities password. You can change your password at any time. 7. Enter your Password Reset Question and Answer. This can be used at a later time if you forget your password. 8. Enter your e-mail address. You will receive e-mail notification when new information is available in [address] 9. Click Sign Up. You can now view and download portions of your medical record. 10. Click the Download Summary menu link to download a portable copy of your medical information. Additional Information If you have questions, please visit the Frequently Asked Questions section of the GeoCities website at [email or web address] Remember, GeoCities is NOT to be used for urgent needs. For medical emergencies, dial 911. Introducing [facility] HE[facility] SERVICES! Dear Mojgan Connolly: 
Thank you for requesting a GeoCities account. Our records indicate that you already have an active GeoCities account. You can access your account anytime at [email or web address] Did you know that you can access your hospital and ER discharge instructions at any time in GeoCities? You can also review all of your test results from your hospital stay or ER visit. Additional Information If you have questions, please visit the Frequently Asked Questions section of the GeoCities website at [email or web address] Remember, GeoCities is NOT to be used for urgent needs. For medical emergencies, dial 911. Now available from your iPhone and Android! Introducing Tate Deluna As a [facility] patient, I wanted to make you aware of our electronic visit tool called Tate Deluna. [employer] allows you to connect within minutes with a medical provider 24 hours a day, seven days a week via a mobile device or tablet or logging into a secure website from your computer. You can access [employer] from anywhere in the [place]. A virtual visit might be right for you when you have a simple condition and feel like you just dont want to get out of bed, or cant get away from work for an appointment, when your regular [facility] provider is not available (evenings, weekends or holidays), or when youre out of town and need minor care. Electronic visits cost only $49 and if the [employer] provider determines a prescription is needed to treat your condition, one can be electronically transmitted to a nearby pharmacy*. Please take a moment to enroll today if you have not already done so. The enrollment process is free and takes just a few minutes. To enroll, please download the [employer] tariq to your tablet or phone, or visit [email or web address] to enroll on your computer. And, as an [address] [place] [address] patient with a [employer] account, the results of your visits will be scanned into your electronic medical record and your primary care provider will be able to view the scanned results. We urge you to continue to see your regular [facility] provider for your ongoing medical care. And while your primary care provider may not be the one available when you seek a [employer] virtual visit, the peace of mind you get from getting a real diagnosis real time can be priceless. For more information on [employer], view our Frequently Asked Questions (FAQs) at [email or web address]. Sincerely, 
 
Sandra Holland MD 
Chief Medical Officer 50Ashwin Mccrary *:  certain medications cannot be prescribed via [employer] Providers Seen During Your Hospitalization Provider Specialty Primary office phone Collin Corcoran MD Gynecologic Oncology [phone] Your Primary Care Physician (PCP) Primary Care Physician Office Phone Office Fax [address] [phone] You are allergic to the following Allergen Reactions Ativan (Lorazepam) Other (comments) SEVERE CONFUSION Recent Documentation OB Status Smoking Status Pregnant Never Smoker Emergency Contacts Name Discharge Info Relation Home Work Mobile [address] CAREGIVER [3] Spouse [3] [phone] Patient Belongings The following personal items are in your possession at time of discharge: 
     Visual Aid: Glasses, At bedside Please provide this summary of care documentation to your next provider. Signatures-by signing, you are acknowledging that this After Visit Summary has been reviewed with you and you have received a copy. Patient Signature:  ____________________________________________________________ Date:  ____________________________________________________________  
  
Ramo Ahmadi Provider Signature:  ____________________________________________________________ Date:  ____________________________________________________________

## 2018-03-27 VITALS
DIASTOLIC BLOOD PRESSURE: 77 MMHG | HEART RATE: 88 BPM | RESPIRATION RATE: 14 BRPM | TEMPERATURE: 99.1 F | SYSTOLIC BLOOD PRESSURE: 116 MMHG | OXYGEN SATURATION: 96 %

## 2018-03-27 LAB
ANION GAP SERPL CALC-SCNC: 7 MMOL/L (ref 5–15)
BASOPHILS # BLD: 0 K/UL (ref 0–0.1)
BASOPHILS NFR BLD: 0 % (ref 0–1)
BUN SERPL-MCNC: 8 MG/DL (ref 6–20)
BUN/CREAT SERPL: 13 (ref 12–20)
CALCIUM SERPL-MCNC: 7.8 MG/DL (ref 8.5–10.1)
CHLORIDE SERPL-SCNC: 101 MMOL/L (ref 97–108)
CO2 SERPL-SCNC: 29 MMOL/L (ref 21–32)
CREAT SERPL-MCNC: 0.62 MG/DL (ref 0.55–1.02)
DIFFERENTIAL METHOD BLD: ABNORMAL
EOSINOPHIL # BLD: 0.1 K/UL (ref 0–0.4)
EOSINOPHIL NFR BLD: 1 % (ref 0–7)
ERYTHROCYTE [DISTWIDTH] IN BLOOD BY AUTOMATED COUNT: 16.2 % (ref 11.5–14.5)
GLUCOSE SERPL-MCNC: 116 MG/DL (ref 65–100)
HCT VFR BLD AUTO: 29 % (ref 35–47)
HGB BLD-MCNC: 9.1 G/DL (ref 11.5–16)
IMM GRANULOCYTES # BLD: 0.1 K/UL (ref 0–0.04)
IMM GRANULOCYTES NFR BLD AUTO: 2 % (ref 0–0.5)
LYMPHOCYTES # BLD: 1.2 K/UL (ref 0.8–3.5)
LYMPHOCYTES NFR BLD: 15 % (ref 12–49)
MAGNESIUM SERPL-MCNC: 1.4 MG/DL (ref 1.6–2.4)
MCH RBC QN AUTO: 29.9 PG (ref 26–34)
MCHC RBC AUTO-ENTMCNC: 31.4 G/DL (ref 30–36.5)
MCV RBC AUTO: 95.4 FL (ref 80–99)
MONOCYTES # BLD: 0.8 K/UL (ref 0–1)
MONOCYTES NFR BLD: 10 % (ref 5–13)
NEUTS SEG # BLD: 5.9 K/UL (ref 1.8–8)
NEUTS SEG NFR BLD: 73 % (ref 32–75)
NRBC # BLD: 0 K/UL (ref 0–0.01)
NRBC BLD-RTO: 0 PER 100 WBC
PLATELET # BLD AUTO: 263 K/UL (ref 150–400)
PMV BLD AUTO: 9.3 FL (ref 8.9–12.9)
POTASSIUM SERPL-SCNC: 3.3 MMOL/L (ref 3.5–5.1)
RBC # BLD AUTO: 3.04 M/UL (ref 3.8–5.2)
SODIUM SERPL-SCNC: 137 MMOL/L (ref 136–145)
WBC # BLD AUTO: 8.2 K/UL (ref 3.6–11)

## 2018-03-27 PROCEDURE — 85025 COMPLETE CBC W/AUTO DIFF WBC: CPT | Performed by: OBSTETRICS & GYNECOLOGY

## 2018-03-27 PROCEDURE — 83735 ASSAY OF MAGNESIUM: CPT | Performed by: OBSTETRICS & GYNECOLOGY

## 2018-03-27 PROCEDURE — 74011000258 HC RX REV CODE- 258: Performed by: NURSE PRACTITIONER

## 2018-03-27 PROCEDURE — C9113 INJ PANTOPRAZOLE SODIUM, VIA: HCPCS | Performed by: NURSE PRACTITIONER

## 2018-03-27 PROCEDURE — 74011250637 HC RX REV CODE- 250/637: Performed by: NURSE PRACTITIONER

## 2018-03-27 PROCEDURE — 80048 BASIC METABOLIC PNL TOTAL CA: CPT | Performed by: OBSTETRICS & GYNECOLOGY

## 2018-03-27 PROCEDURE — 74011250636 HC RX REV CODE- 250/636: Performed by: NURSE PRACTITIONER

## 2018-03-27 PROCEDURE — 36415 COLL VENOUS BLD VENIPUNCTURE: CPT | Performed by: OBSTETRICS & GYNECOLOGY

## 2018-03-27 PROCEDURE — 99218 HC RM OBSERVATION: CPT

## 2018-03-27 PROCEDURE — 77030018846 HC SOL IRR STRL H20 ICUM -A

## 2018-03-27 RX ORDER — MAGNESIUM SULFATE HEPTAHYDRATE 40 MG/ML
2 INJECTION, SOLUTION INTRAVENOUS
Status: COMPLETED | OUTPATIENT
Start: 2018-03-27 | End: 2018-03-27

## 2018-03-27 RX ORDER — HYDROMORPHONE HYDROCHLORIDE 2 MG/ML
1 INJECTION, SOLUTION INTRAMUSCULAR; INTRAVENOUS; SUBCUTANEOUS ONCE
Status: COMPLETED | OUTPATIENT
Start: 2018-03-27 | End: 2018-03-27

## 2018-03-27 RX ORDER — POTASSIUM CHLORIDE 14.9 MG/ML
10 INJECTION INTRAVENOUS
Status: COMPLETED | OUTPATIENT
Start: 2018-03-27 | End: 2018-03-27

## 2018-03-27 RX ORDER — MAGNESIUM SULFATE HEPTAHYDRATE 40 MG/ML
2 INJECTION, SOLUTION INTRAVENOUS ONCE
Status: DISCONTINUED | OUTPATIENT
Start: 2018-03-27 | End: 2018-03-27

## 2018-03-27 RX ORDER — HEPARIN 100 UNIT/ML
300 SYRINGE INTRAVENOUS AS NEEDED
Status: DISCONTINUED | OUTPATIENT
Start: 2018-03-27 | End: 2018-03-27 | Stop reason: HOSPADM

## 2018-03-27 RX ADMIN — MAGNESIUM SULFATE HEPTAHYDRATE 2 G: 40 INJECTION, SOLUTION INTRAVENOUS at 10:00

## 2018-03-27 RX ADMIN — HYDROMORPHONE HYDROCHLORIDE 1 MG: 2 INJECTION INTRAMUSCULAR; INTRAVENOUS; SUBCUTANEOUS at 12:56

## 2018-03-27 RX ADMIN — HEPARIN 300 UNITS: 100 SYRINGE at 17:54

## 2018-03-27 RX ADMIN — HYDROMORPHONE HYDROCHLORIDE 1 MG: 2 INJECTION INTRAMUSCULAR; INTRAVENOUS; SUBCUTANEOUS at 08:19

## 2018-03-27 RX ADMIN — Medication 10 ML: at 13:04

## 2018-03-27 RX ADMIN — METOCLOPRAMIDE 10 MG: 5 INJECTION, SOLUTION INTRAMUSCULAR; INTRAVENOUS at 13:05

## 2018-03-27 RX ADMIN — HYDROMORPHONE HYDROCHLORIDE 1 MG: 2 INJECTION INTRAMUSCULAR; INTRAVENOUS; SUBCUTANEOUS at 15:42

## 2018-03-27 RX ADMIN — DEXTROSE MONOHYDRATE, SODIUM CHLORIDE, AND POTASSIUM CHLORIDE 125 ML/HR: 50; 9; 1.49 INJECTION, SOLUTION INTRAVENOUS at 03:57

## 2018-03-27 RX ADMIN — Medication 1 CAPSULE: at 08:19

## 2018-03-27 RX ADMIN — SUCRALFATE 0.5 G: 1 SUSPENSION ORAL at 08:19

## 2018-03-27 RX ADMIN — POTASSIUM CHLORIDE 10 MEQ: 200 INJECTION, SOLUTION INTRAVENOUS at 07:23

## 2018-03-27 RX ADMIN — PANTOPRAZOLE SODIUM 40 MG: 40 INJECTION, POWDER, FOR SOLUTION INTRAVENOUS at 08:19

## 2018-03-27 RX ADMIN — POTASSIUM CHLORIDE 10 MEQ: 200 INJECTION, SOLUTION INTRAVENOUS at 08:29

## 2018-03-27 RX ADMIN — MAGNESIUM SULFATE HEPTAHYDRATE 2 G: 40 INJECTION, SOLUTION INTRAVENOUS at 14:34

## 2018-03-27 RX ADMIN — DEXTROSE MONOHYDRATE, SODIUM CHLORIDE, AND POTASSIUM CHLORIDE 125 ML/HR: 50; 9; 1.49 INJECTION, SOLUTION INTRAVENOUS at 11:48

## 2018-03-27 RX ADMIN — PROMETHAZINE HYDROCHLORIDE 25 MG: 25 INJECTION INTRAMUSCULAR; INTRAVENOUS at 16:19

## 2018-03-27 RX ADMIN — Medication 10 ML: at 17:54

## 2018-03-27 RX ADMIN — SUCRALFATE 0.5 G: 1 SUSPENSION ORAL at 13:04

## 2018-03-27 RX ADMIN — HYDROMORPHONE HYDROCHLORIDE 1 MG: 2 INJECTION INTRAMUSCULAR; INTRAVENOUS; SUBCUTANEOUS at 03:57

## 2018-03-27 RX ADMIN — METOCLOPRAMIDE 10 MG: 5 INJECTION, SOLUTION INTRAMUSCULAR; INTRAVENOUS at 05:00

## 2018-03-27 RX ADMIN — PROMETHAZINE HYDROCHLORIDE 25 MG: 25 INJECTION INTRAMUSCULAR; INTRAVENOUS at 10:24

## 2018-03-27 RX ADMIN — APIXABAN 2.5 MG: 2.5 TABLET, FILM COATED ORAL at 08:19

## 2018-03-27 RX ADMIN — POTASSIUM CHLORIDE 10 MEQ: 200 INJECTION, SOLUTION INTRAVENOUS at 11:46

## 2018-03-27 RX ADMIN — POTASSIUM CHLORIDE 10 MEQ: 200 INJECTION, SOLUTION INTRAVENOUS at 12:59

## 2018-03-27 RX ADMIN — Medication 10 ML: at 05:00

## 2018-03-27 RX ADMIN — MAGNESIUM SULFATE HEPTAHYDRATE 2 G: 40 INJECTION, SOLUTION INTRAVENOUS at 10:24

## 2018-03-27 NOTE — PROGRESS NOTES
Bedside and Verbal shift change report given to [address] [place] [address] [place] (oncoming nurse) by jeanne Reyes (offgoing nurse). Report included the following information SBAR, Kardex, Intake/Output, MAR and Recent Results.

## 2018-03-27 NOTE — DISCHARGE INSTRUCTIONS
[address] [facility] [address], [address]  P [phone]  F [phone]      Patient Discharge Instructions    Cathryn Torrez / [id number] : 1960    Admitted 3/26/2018 Discharged: 3/27/2018     Take Home Medications     No current facility-administered medications on file prior to encounter. Current Outpatient Prescriptions on File Prior to Encounter   Medication Sig Dispense Refill    potassium chloride (KLOR-CON) 20 mEq packet Take 1 Packet by mouth two (2) times a day. 60 Packet 3    promethazine (PHENERGAN) 6.25 mg/5 mL syrup Take 20 mL by mouth four (4) times daily as needed for Nausea. 400 mL 5    levoFLOXacin (LEVAQUIN) 250 mg/10 mL solution Take 30 mL by mouth every twenty-four (24) hours for 12 days. 360 mL 0    potassium chloride (KAON 10%) 20 mEq/15 mL solution Take 30 mL by mouth two (2) times a day for 30 days. 480 mL 4    fentaNYL (DURAGESIC) 50 mcg/hr PATCH 1 Patch by TransDERmal route every seventy-two (72) hours. Max Daily Amount: 1 Patch. This is in ADDITION to your 100 mcg patch for a total of 150 mcg patch every 72 hours 10 Patch 0    fentaNYL (DURAGESIC) 100 mcg/hr PATCH 1 Patch by TransDERmal route every seventy-two (72) hours. Max Daily Amount: 1 Patch. 10 Patch 0    metoclopramide (REGLAN) 5 mg/5 mL syrup Take 10 mL by mouth four (4) times daily as needed. Take before meals as needed 500 mL 3    cyclobenzaprine (FLEXERIL) 10 mg tablet Take 1 Tab by mouth three (3) times daily as needed for Muscle Spasm(s). 30 Tab 1    zolpidem (AMBIEN) 10 mg tablet Take 1 Tab by mouth nightly as needed for Sleep. Max Daily Amount: 10 mg. Indications: SLEEP-ONSET INSOMNIA 30 Tab 2    scopolamine (TRANSDERM-SCOP) 1 mg over 3 days pt3d 1 Patch by TransDERmal route every seventy-two (72) hours as needed. 10 Patch 1    metoprolol tartrate (LOPRESSOR) 50 mg tablet Take  by mouth two (2) times a day.       magnesium oxide (MAG-OX) 400 mg tablet Take 1 Tab by mouth two (2) times a day. Indications: HYPOMAGNESEMIA 60 Tab 2    sucralfate (CARAFATE) 100 mg/mL suspension Take 5 mL by mouth four (4) times daily. 414 mL 3    apixaban (ELIQUIS) 2.5 mg tablet Take 1 Tab by mouth two (2) times a day. 60 Tab 6    ondansetron (ZOFRAN ODT) 4 mg disintegrating tablet Take 1 Tab by mouth every eight (8) hours as needed for Nausea. 30 Tab 2    HYDROmorphone (DILAUDID) 1 mg/mL liqd oral solution Take 4 mL by mouth every four (4) hours as needed. Max Daily Amount: 24 mg. Indications: Pain 475 mL 0    haloperidol (HALDOL) 2 mg/mL oral concentrate 2 mL by Per G Tube route every eight (8) hours as needed (Nausea). Indications: CANCER CHEMOTHERAPY-INDUCED NAUSEA AND VOMITING 30 mL 3    sterile water irrigation soln 45.6 mL with hydrocortisone 10 mg tab 30 mg, nystatin 100,000 unit/mL susp 1,440,000 Units, diphenhydrAMINE 12.5 mg/5 mL elix 150 mg oral suspension Take 5 mL by mouth every six (6) hours as needed for up to 10 days. 250 mL 4    dexamethasone (DECADRON) 4 mg tablet Take 2 tabs QAM day before chemo and for 2 days after 30 Tab 4    dronabinol (SYNDROS) 5 mg/mL soln Take 2.1 mg by mouth two (2) times a day. Max Daily Amount: 4.2 mg. Take 30 minutes before lunch and dinner 30 mL 1    polyethylene glycol (MIRALAX) 17 gram/dose powder Take 17 g by mouth daily as needed. 238 g 3       · It is important that you take the medication exactly as they are prescribed. · Keep your medication in the bottles provided by the pharmacist and keep a list of the medication names, dosages, and times to be taken in your wallet. · Do not take other medications without consulting your doctor. What to do at Home    Recommended diet: Regular, small frequent meals, low roughage diet,  stay hydrated. You should take a stool softener such as colace for constipation. If constipation persists for >24 hours you should take a dose of Milk of Magnesia.  Call if your constipation persists. Recommended activity:   No driving while on pain medication  Walk at least 6 times per day  Showers okay, no tub bathing/swimming for two weeks  Activity as able, stairs okay. If you experience any of the following persisting symptoms:    Nausea/vomiting, fever > 101 F, diarrhea/constipation, increasing pain despite medication, increasing vaginal discharge or any concerns, please call our office. Follow-up with Dr. Bowen Cali either [facility] 5th or April 10th. Call [phone] for an appointment. Information obtained by :  I understand that if any problems occur once I am at home I am to contact my physician. I understand and acknowledge receipt of the instructions indicated above. Physician's or R.N.'s Signature                                                                  Date/Time                                                                                                                                              Patient or Representative Signature                                                          Date/Time    [email or web address] Activation    Thank you for requesting access to [email or web address]. Please follow the instructions below to securely access and download your online medical record. [email or web address] allows you to send messages to your doctor, view your test results, renew your prescriptions, schedule appointments, and more. How Do I Sign Up? 1. In your internet browser, go to [email or web address]  2. Click on the First Time User? Click Here link in the Sign In box. You will be redirect to the New Member Sign Up page. 3. Enter your [email or web address] Access Code exactly as it appears below. You will not need to use this code after youve completed the sign-up process. If you do not sign up before the expiration date, you must request a new code.     MyChart Access Code: Activation code not generated  Current [employer] Status: Active (This is the date your [employer] access code will )    4. Enter the last four digits of your Social Security Number (xxxx) and Date of Birth (mm/dd/yyyy) as indicated and click Submit. You will be taken to the next sign-up page. 5. Create a [employer] ID. This will be your [employer] login ID and cannot be changed, so think of one that is secure and easy to remember. 6. Create a [employer] password. You can change your password at any time. 7. Enter your Password Reset Question and Answer. This can be used at a later time if you forget your password. 8. Enter your e-mail address. You will receive e-mail notification when new information is available in [address] 9. Click Sign Up. You can now view and download portions of your medical record. 10. Click the Download Summary menu link to download a portable copy of your medical information. Additional Information    If you have questions, please visit the Frequently Asked Questions section of the [employer] website at [email or web address] Remember, [employer] is NOT to be used for urgent needs. For medical emergencies, dial 911.

## 2018-03-27 NOTE — PROGRESS NOTES
[address] [place] [address]  Dr. Cade Diaz. HAYDER Don     [place], [place]         Rob Jennifer       [id number]  1960    Admitted 3/26/2018 Date 3/27/2018   HPI:  HISTORY OF PRESENT ILLNESS:  Vishal Smith is a 61 y.o.  postmenopausal female with a diagnosis of stage IV ovarian cancer.  She underwent exploratory laparotomy with suboptimal debulking in 2015.  She had extensive disease.  She was readmitted about a week later with obstruction and subsequently had a cecal perforation, requiring resection, end ileostomy, and mucous fistula.  During the hospitalization she was also diagnosed with pulmonary embolus and had chest tubes placed for bilateral pleural effusions.  She had a prolonged hospital course and actually received a dose of cisplatin chemotherapy while in the hospital to help dry up the effusions.  She completed 6 cycles of Taxol/Carboplatin chemotherapy following her initial debulking.    She  did relatively well, considering she had an ileostomy to deal with during chemotherapy.  Dr. Pedro Luis Damon took her to the OR on 16 for interval debulking and reversal of her ileostomy.  She had extensive disease, but we were able to resect the majority of her disease. One week later she developed an anastomotic leak requiring reexploration and recreation of an ileostomy.  She had another prolonged hospitalization, but recovered well since surgery.  We then reinitiated Taxol/Carboplatin chemotherapy, but did reduce the dose of Taxol to 135 mg/m2. Georgina Quezada completed 4 mores cycles.  Her CA-125 normalized and we stopped treatment.      She had evidence of recurrent disease and for a while was receiving Doxil/Avastin.  She completed 3 cycles of that regimen ().    She was admitted to [facility] several time since during that regimen with evidence of partial small bowel obstruction.  The symptoms resolved each time with conservative management.     She then had increased ascites on her CT scan, but no significant change in her disease otherwise.  Her CA-125 also went up.  All of this suggested progression of disease.  We switched her chemotherapy to [facility]. She completed 7 cycles 5/23/2017-9/12/2017). Her CA-125 had risen over the last few draws. Herb Lopez last CT on 1/9/2018 showed improvement.   She had been feeling good  She has been having some shortness of breathe with exertion.  Her son's wedding was in January. Viridiana Sheppard went well and there were no problems  She had been hospitalized almost every cycle for the last few cycles for GI issues so she was switch to single agent Carboplatin begining on 10/17/17 and has completed 5 cycles so far with last being 2/13/2018.     On 2/20/18 due to increasing nausea, vomiting and weight loss (down to 82 lbs), her gastroenterologist in [place], preformed an upper endoscopy and called to inform us that it showed very friable, ulcerative esophagitis and he felt she needed admission for nutrition and possible gastrostomy tube placement.     She was therefore admitted to [facility] in [facility] on 2/22 and has been in house since then receiving TPN daily with TIW Lipids. Her weight on admission was down to 84 lbs. She initially was able to eat a small amount, but the nausea and vomiting returned and she continued be nauseated almost all the time. Ostomy has continue to function, although decrease in output due to decreased PO intake. She had a CT scan there on 2/26/28 that  Showed:  1: significant distention of small bowel with possibility secondary  To an obstruction focus just proximal to her ileostomy with clumping of small bowel and evidence of omental caking and peritoneal carcinomatosis\".   2: Moderate hydronephrosis and proximal hydroureter likely secondary to extrinsic compression upon the proximal or mid ureters due to the clumped and distended bowel and peritoneal carcinomatosis. 3: Hyperattenuating foci within the liver; cannot exclude hepatic metastatic foci  4: Noncalcified nodular foci in the lingula: cannot exclude pulmonary parenchymal metastatic disease. (report scanned into media of this chart)     After several 9 days at [facility], her attending physician requested pt be transferred to Dr. Senia Olguin for continued management of her disease and assist with decision making going forward. She was admitted here from March 5th-March 22nd. (see discharge summary for details of visit)    On March 15th, she had a g-tube placed for decompression and after several days, her pain subsided and she felt improvement in her nausea and kept G-Tube open to drainage to gaytan bag. She was discharged on 3/22 in stable condition and tolerating diet. SUBJECTIVE:  Pt did well over night. G-tube placement successful and draining bile colored fluid. Nausea controlled and pt tolerating full liquids well as well as light solids.                 Patient Active Problem List    Diagnosis Date Noted    Hypokalemia 03/26/2018    SBO (small bowel obstruction) 03/26/2018    Impaired oral gastric feeding tube 03/26/2018    Mucositis due to chemotherapy 03/21/2018    Protein-calorie malnutrition, severe ([address] 03/05/2018    Esophagitis determined by endoscopy 03/05/2018    Ovarian cancer, unspecified laterality ([address] 11/30/2017    Nausea and vomiting 09/14/2017    Partial bowel obstruction 09/13/2017    Intractable vomiting with nausea 08/10/2017    Ovarian cancer ([address] 08/09/2017    Nausea & vomiting 08/09/2017    Carcinomatosis ([address] 08/09/2017    Hydronephrosis of left kidney 06/28/2017    Abdominal pain 06/27/2017    Hypomagnesemia 06/13/2017    Erosive esophagitis 06/07/2017    Ulcerative esophagitis 06/07/2017    Isolated proteinuria 05/25/2017    Malignant ascites 05/08/2017    Hydronephrosis of right kidney 05/08/2017    Protein calorie malnutrition ([facility] 04/28/2017    Counseling regarding end of life decision making 04/25/2017    Anticoagulation adequate with anticoagulant therapy 03/28/2017    Chemotherapy-induced neutropenia (HCC) 03/22/2017    Ileus ([address] 03/21/2017    Thrombosis of pelvic vein 03/21/2017    Hx of pulmonary embolus 03/21/2017    Dehydration 03/21/2017    Cancer associated pain 05/13/2016    Generalized abdominal pain 05/13/2016    Anxiety about health 05/13/2016    CINV (chemotherapy-induced nausea and vomiting) 03/22/2016    Anemia due to chemotherapy 02/22/2016    Ileostomy in place [facility]) 02/15/2016    Carcinomatosis peritonei ([address] 10/29/2015    Small bowel obstruction 10/29/2015    Malignant neoplasm of both ovaries ([address] 10/12/2015     Past Medical History:   Diagnosis Date    Abdominal carcinomatosis ([address] 10/2015    Arthritis     left shoulder    BRCA negative 12/2015    Chronic pain     Depression     HX    Environmental allergies     GERD (gastroesophageal reflux disease)     Hypertension     NEW OVER PAST 5-6 MONTHS    Ovarian cancer ([address] 10/2015    serous adenocarcinoma, high grade, stage IIIC    Pulmonary embolism ([address] 10/2015      Past Surgical History:   Procedure Laterality Date    CARDIAC SURG PROCEDURE UNLIST  12/11/15    cardiac cath/normal     HX BREAST BIOPSY  1995    benign right breast bx    HX DILATION AND CURETTAGE  2/2011    POLYPECTOMY    HX GI  2015    PERFORATED BOWEL; ILEOSTOMY    HX GYN  10/2015    EXP LAPAROTOMY    HX HEENT  LAST 2005    oral tissue graft X3    HX HEENT  1970    tonsillectomy    HX LAPAROTOMY  10/2015    tumor sampling    HX LAPAROTOMY  4/2016    hysterectomy, BSO, radical debulking    HX LAPAROTOMY  5/2016    resection ileocolic anastomosis, leak, ileostomy    HX OTHER SURGICAL  11/2015    tunneled portacath      Social History   Substance Use Topics    Smoking status: Never Smoker    Smokeless tobacco: Never Used    Alcohol use No      Family History Problem Relation Age of Onset    Cancer Maternal Uncle      skin    Hypertension Mother     High Cholesterol Mother     Anxiety Mother     Heart Disease Mother     High Cholesterol Father     Hypertension Father     Stroke Father     Arthritis-osteo Sister     Migraines Sister     Other Sister      FIBROMYALGIA    Depression Brother     Other Brother      DRUG ABUSE HEPATITIS C    Migraines Sister     Arrhythmia Sister     Depression Sister     Lung Disease Paternal Aunt      COPD    Cancer Paternal Uncle      LUNG    Lung Disease Paternal Uncle      COPD    Lung Disease Maternal Grandmother      COPD    Anesth Problems Neg Hx       Current Facility-Administered Medications   Medication Dose Route Frequency    potassium chloride 10 mEq in 50 ml IVPB  10 mEq IntraVENous Q1H    magnesium sulfate 2 g/50 ml IVPB (premix or compounded)  2 g IntraVENous Q1H    apixaban (ELIQUIS) tablet 2.5 mg  2.5 mg Oral BID    cyclobenzaprine (FLEXERIL) tablet 10 mg  10 mg Oral TID PRN    fentaNYL (DURAGESIC) 100 mcg/hr patch 1 Patch  1 Patch TransDERmal Q72H    fentaNYL (DURAGESIC) 50 mcg/hr patch 1 Patch  1 Patch TransDERmal Q72H    lactobac ac& pc-s.therm-b.anim (HILTON Q/RISAQUAD)  1 Cap Oral DAILY    metoprolol tartrate (LOPRESSOR) tablet 50 mg  50 mg Oral Q12H    sucralfate (CARAFATE) 100 mg/mL oral suspension 0.5 g  0.5 g Oral QID    zolpidem (AMBIEN) tablet 10 mg  10 mg Oral QHS PRN    ondansetron (ZOFRAN) injection 4 mg  4 mg IntraVENous Q6H PRN    promethazine (PHENERGAN) 25 mg in 0.9% sodium chloride 50 mL IVPB  25 mg IntraVENous Q6H PRN    HYDROmorphone (DILAUDID) injection 1 mg  1 mg IntraVENous Q4H PRN    metoclopramide HCl (REGLAN) injection 10 mg  10 mg IntraVENous Q6H    sodium chloride (NS) flush 5-10 mL  5-10 mL IntraVENous Q8H    sodium chloride (NS) flush 5-10 mL  5-10 mL IntraVENous PRN    naloxone (NARCAN) injection 0.4 mg  0.4 mg IntraVENous PRN    diphenhydrAMINE (BENADRYL) injection 12.5 mg  12.5 mg IntraVENous Q4H PRN    levoFLOXacin (LEVAQUIN) 500 mg in D5W IVPB  500 mg IntraVENous Q24H    dextrose 5% - 0.9% NaCl with KCl 20 mEq/L infusion  125 mL/hr IntraVENous CONTINUOUS    pantoprazole (PROTONIX) 40 mg in sodium chloride (NS) 10 mL injection  40 mg IntraVENous Q12H    scopolamine (TRANSDERM-SCOP) 1 mg over 3 days 1 Patch  1 Patch TransDERmal Q72H     Facility-Administered Medications Ordered in Other Encounters   Medication Dose Route Frequency    heparin (porcine) pf 300 Units  300 Units InterCATHeter PRN    lidocaine (XYLOCAINE) 2 % jelly   Mucous Membrane PRN     Allergies   Allergen Reactions    Ativan [Lorazepam] Other (comments)     SEVERE CONFUSION        Review of Systems  General: Some wt loss form discharge, but says she is able to take in her protein drinks and has not been vomiting  HEENT: Denies visual changes, dysphagia or headache  Resp: Denies SOB, DUDLEY, wheezing or cough  CV: Denies CP, palpitations  GI/: Acknowledges some nausea over weekend with vomiting X2. Denies diarrhea, constipation or dysuria. Says ostomy functioning well  MuskSkel: Denies muscle ache or joint pain  Neuro: Denies dizzyness or syncope      OBJECTIVE:    Physical Exam  General: Thin female, A&O X3 in NAD with pleasant affect  HEENT: Sclera anicteric, mucosa pink, moist. Pupils equal and reactive to light  Port site: without redness, tenderness or swelling. Heart: Regular without M/R/G  Lungs: CTA Bilat   Abd: Soft, thin, mild tenderness without fluid wave or distention and with + BS throughout. G-Tube in place and insertion site without redness or swelling  Ext: Without edema and + pedal pulses bilat  Neuro: grossly intact      Data Review 3/27/2016:  WBC's 8.2; HGB 9.1; HCT 29.0; Plt 263; ANC 5.9; Na 136; K+ 3.3 (replaced); Cl 101; CO2 29; BUN 8; Creat0.62; Gluc 116; Mag 1.4 (replaced)        Imaging  See percutaneous placement of G-Tube under imaging. IMPRESSION:    Patient Active Problem List   Diagnosis Code    Malignant neoplasm of both ovaries ([facility] C56.1, C56.2    Carcinomatosis peritonei ([facility] C78.6, C80.1    Small bowel obstruction K56.609    Ileostomy in place ([facility] Z93.2    Anemia due to chemotherapy D64.81, T45.1X5A    CINV (chemotherapy-induced nausea and vomiting) R11.2, T45.1X5A    Cancer associated pain G89.3    Generalized abdominal pain R10.84    Anxiety about health F41.8    Ileus (HCC) K56.7    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    Hx of pulmonary embolus Z86.711    Dehydration E86.0    Chemotherapy-induced neutropenia (HCC) D70.1, T45.1X5A    Anticoagulation adequate with anticoagulant therapy Z79.01    Counseling regarding end of life decision making Z71.89    Protein calorie malnutrition ([facility] E46    Malignant ascites R18.0    Hydronephrosis of right kidney N13.30    Isolated proteinuria R80.0    Erosive esophagitis K22.10    Ulcerative esophagitis K22.10    Hypomagnesemia E83.42    Abdominal pain R10.9    Hydronephrosis of left kidney N13.30    Ovarian cancer (HCC) C56.9    Nausea & vomiting R11.2    Carcinomatosis (HCC) C80.0    Intractable vomiting with nausea R11.2    Partial bowel obstruction K56.600    Nausea and vomiting R11.2    Ovarian cancer, unspecified laterality (HCC) C56.9    Protein-calorie malnutrition, severe (HCC) E43    Esophagitis determined by endoscopy K20.9    Mucositis due to chemotherapy K12.31    Hypokalemia E87.6    SBO (small bowel obstruction) K56.609    Impaired oral gastric feeding tube T85.598A       PLAN:  Will plan on discharge home today. Again instructions reviewed on management with G-tube.   She will have Home Health visit on Monday  IV hydration as outpt as needed  Continue Eliquis  Continue current pain management and she will call for any concerns or questions  Discharge today          Signed By: Luis Palacios NP     3/27/2018/3:26 PM

## 2018-03-27 NOTE — PROGRESS NOTES
3/27/2018  7:37 AM    Patient seen, interviewed with Don Cano NP    The patient feels better, no N/V, gastrostomy draining  Gastrostomy replaced  Ongoing electrolye suppliments.       Vitals:    03/26/18 1739 03/26/18 2025 03/26/18 2333   BP: 133/88 117/67 113/57   Pulse: 93 (!) 108 93   Resp: 16 16 16   Temp: 98.9 °F (37.2 °C) 100 °F (37.8 °C) 99.2 °F (37.3 °C)   SpO2: 99% 95% 97%     Plan:   Complete electrolyte supplimentation   Anticipate discharge    Aditya Ca MD

## 2018-03-27 NOTE — PROGRESS NOTES
03/27/2018; 14:15 -   CM met with patient with spouse at bedside. Patient stated that attending NP advised her to reschedule [facility] for Friday, 3/30/18. CM offered to contact [facility] directly to ensure the rescheduled visit. Patient and spouse expressed understanding. CM to contact [facility] to reschedule initial visit. CRM: Derrek Gilford, MPH; Z: [phone]    14:35 -   CM called [facility]: [phone], option 1, option 1) to request having the patient reassigned to services for Friday, 3/30/18, explaining that the patient was readmitted to [facility] on 3/26/18 and will be leaving today, 3/27/18. CM explained that attending physician team is requesting a DOS of 3/30/18 to check labs and hydration. Elicia to call CM back with confirmation. CRM: Derrek Gilford, MPH; Z: [phone]    16:15 -   CM contacted [facility]: [phone], option 1, option 1) and confirmed SOC of 3/31/18. Attending physician has cleared an [facility] of 3/31/18. [facility] has requested updated orders to reflect what type of hydration is needed, what type of labs are needed, and that the port-a-cath can be utilized via placing a new access point. CM to notify attending physician of need for new order and bedside nurse of request to remove the current access point. NOTE: Please withdraw the current access point from the port-a-cath prior to discharge. CRM: Derrek Gilford, MPH; Z: [phone]    16:40 -   CM has created a new referral to [facility] via [employer] and has fax attached updated orders. CM to notify patient of [facility] 3/31/18.   CRM: Derrek Gilford, MPH; Z: [phone]

## 2018-03-29 DIAGNOSIS — R11.2 CINV (CHEMOTHERAPY-INDUCED NAUSEA AND VOMITING): ICD-10-CM

## 2018-03-29 DIAGNOSIS — K21.00 REFLUX ESOPHAGITIS: Primary | ICD-10-CM

## 2018-03-29 DIAGNOSIS — T45.1X5A CINV (CHEMOTHERAPY-INDUCED NAUSEA AND VOMITING): ICD-10-CM

## 2018-03-29 RX ORDER — FAMOTIDINE 40 MG/1
40 TABLET, FILM COATED ORAL 2 TIMES DAILY
Qty: 60 TAB | Refills: 2 | Status: SHIPPED | OUTPATIENT
Start: 2018-03-29

## 2018-04-03 DIAGNOSIS — R11.2 CINV (CHEMOTHERAPY-INDUCED NAUSEA AND VOMITING): ICD-10-CM

## 2018-04-03 DIAGNOSIS — C56.3 MALIGNANT NEOPLASM OF BOTH OVARIES (HCC): Primary | ICD-10-CM

## 2018-04-03 DIAGNOSIS — T45.1X5A CINV (CHEMOTHERAPY-INDUCED NAUSEA AND VOMITING): ICD-10-CM

## 2018-04-03 DIAGNOSIS — C78.6 CARCINOMATOSIS PERITONEI (HCC): ICD-10-CM

## 2018-04-03 DIAGNOSIS — G89.3 CANCER ASSOCIATED PAIN: ICD-10-CM

## 2018-04-03 DIAGNOSIS — R18.0 MALIGNANT ASCITES: ICD-10-CM

## 2018-04-03 DIAGNOSIS — K56.609 SBO (SMALL BOWEL OBSTRUCTION) (HCC): ICD-10-CM

## 2018-04-03 RX ORDER — SCOLOPAMINE TRANSDERMAL SYSTEM 1 MG/1
1 PATCH, EXTENDED RELEASE TRANSDERMAL
Qty: 10 PATCH | Refills: 3 | Status: SHIPPED | OUTPATIENT
Start: 2018-04-03

## 2018-04-04 PROBLEM — D63.8 ANEMIA DUE TO CHRONIC ILLNESS: Status: ACTIVE | Noted: 2018-04-04

## 2018-04-05 ENCOUNTER — TELEPHONE (OUTPATIENT)
Dept: GYNECOLOGY | Age: 58
End: 2018-04-05

## 2018-04-05 NOTE — TELEPHONE ENCOUNTER
Gyn Onc  Telephone Visit    Patient calls today regarding Rx for dronabinol update. I placed a call to patients Rx was told prior auth required. She is interested if this will help with her nausea and hopefully appetite. I'll look into this. Continues to have episodes of emesis despite Gtube draining. She is taking in soft diet and liquids, flushing gtube. Reflux better overall. Discussed moving to a liquid only diet for her. She is having modest ostomy output. She very much wants to avoid coming into the hospital.      Pain remains an issue. She is on 150mcg of fentanyl. Was using 4ml PO dilaudid Q4hr, increased to 6 and most recently 8ml. The increase to 8 has helped, but she limits dosings because she will not go above 24mls total/day as this is what the bottle instructs not to exceed. She then attempts control pain by deep breathing. She is having no sedation opioid effect. Discussed she can increase total to 36mls/day so she can titrate dosings either more frequently or for QID v TID use. Also need to consider alt adjunct for better control either through fentanyl patches with rapid rescue such as SL fentanyl to prevent uncontrolled pain cycle. Palliative care consultation for her pain and other sx appropriate at this time. She is open to this. We will f/u in the AM on timing for consult. She will be here for f/u with Dr. Catarina Moreno this Tuesday.       BENY Colin

## 2018-04-06 ENCOUNTER — TELEPHONE (OUTPATIENT)
Dept: GYNECOLOGY | Age: 58
End: 2018-04-06

## 2018-04-06 ENCOUNTER — NURSE NAVIGATOR (OUTPATIENT)
Dept: PALLATIVE CARE | Age: 58
End: 2018-04-06

## 2018-04-06 DIAGNOSIS — G89.3 CHRONIC PAIN DUE TO NEOPLASM: ICD-10-CM

## 2018-04-06 DIAGNOSIS — K56.690 OTHER PARTIAL INTESTINAL OBSTRUCTION (HCC): ICD-10-CM

## 2018-04-06 DIAGNOSIS — R11.2 NAUSEA AND VOMITING, INTRACTABILITY OF VOMITING NOT SPECIFIED, UNSPECIFIED VOMITING TYPE: ICD-10-CM

## 2018-04-06 DIAGNOSIS — C56.9 MALIGNANT NEOPLASM OF OVARY, UNSPECIFIED LATERALITY (HCC): Primary | ICD-10-CM

## 2018-04-06 NOTE — PROGRESS NOTES
[employer] Palliative Medicine Office  Nursing Note  [phone]  Fax [phone]     Name:  Niurka Silvestre  YOB: 1960     Received call from [place], [place] with [facility] GYN/ONC regarding [place]. Mr. Freddie Bowman would like for pt to been seen in outpatient Palliative Medicine clinic on Tuesday April 10 for symptom management. He says pt is experiencing unmanaged pain. Pt lives in [facility] and will be coming to [place] on April 10 for an appt with Dr. Sandra Souza. If Dr. Mitali Malloy is unable to see pt in Palliative clinic on 4/10, Mr. Freddie Bowman is requesting a phone consultation with Dr. Mitali Malloy regarding pt's pain management. Nurse sent email to Dr. Mitali Malloy who agreed to add pt on to her clinic schedule on 4/10/18, 2:30pm at [facility]. Dr. Mitali Malloy is also willing to speak with BENY Boss on the phone this afternoon to discuss pt's pain medications and to see if any adjustments can be made so that pt will be more comfortable until Tuesday. Nurse gave Mr. Esteban Tesfyae Dr. Elenore Collet cell #. Nurse called pt and informed her of appt on Tuesday 4/10/18. She voiced her appreciation.   Julien Pastor, CLARISSAN, RN  Clinical Referral Navigator

## 2018-04-06 NOTE — TELEPHONE ENCOUNTER
[address] [facility]  Telephone Visit      Calls in this AM to report she has been having some blood tinged output from her stoma. She used on OTC hemoccult test which returned positive. Not present last week Tuesday on leaving hospital for reinsertion of Gtube, but she is unsure when first noted. No bloody output in emesis or from Gtube itself. She is on Eloquis. Instructed to stop. No NSAID use. Hgb on DC was 9.1g. Denies light headed, palpitations. Most recent hgb from home health Monday this week was 10.8. 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 Last egd with gastritis/esophagitis. Reflux is better controlled. She will call if things change. Using the 10ml dilaudid overnight, this is helping. Call in to [address] [facility] [facility] office for appt.        BENY Tompkins

## 2018-04-06 NOTE — TELEPHONE ENCOUNTER
[facility]  Telephone Visit    Spoke with Chadwick Montenegro and Dr. Tess Pryor re: June. Appt scheduled for next Tuesday here and to follow with them. Plan for the weekend will be to utilize dilaudid 10ml increase to q3-4 hrs and consideration of PCA next week. Relayed plan to June. She understands. She estimates ~260mls remain which should be just enough to get to Tuesday. Usually able to get through the night, though last night was difficult. Will check back Monday. Her PCP has assisted us in the past for prescriptions, though June has met some resistance. If Rx needed, they are willing to come here.       BENY Schmidt

## 2018-04-09 ENCOUNTER — TELEPHONE (OUTPATIENT)
Dept: GYNECOLOGY | Age: 58
End: 2018-04-09

## 2018-04-09 NOTE — TELEPHONE ENCOUNTER
The pharmacist called to confirm type of fluids for IV hydration. Per vo from Samara Downs, NP advised the pharmacist to order 2L of Normal Saline.

## 2018-04-10 ENCOUNTER — OFFICE VISIT (OUTPATIENT)
Dept: PALLATIVE CARE | Age: 58
End: 2018-04-10

## 2018-04-10 ENCOUNTER — NURSE NAVIGATOR (OUTPATIENT)
Dept: PALLATIVE CARE | Age: 58
End: 2018-04-10

## 2018-04-10 ENCOUNTER — TELEPHONE (OUTPATIENT)
Dept: GYNECOLOGY | Age: 58
End: 2018-04-10

## 2018-04-10 ENCOUNTER — OFFICE VISIT (OUTPATIENT)
Dept: GYNECOLOGY | Age: 58
End: 2018-04-10

## 2018-04-10 VITALS
OXYGEN SATURATION: 98 % | WEIGHT: 82.9 LBS | DIASTOLIC BLOOD PRESSURE: 71 MMHG | HEART RATE: 105 BPM | SYSTOLIC BLOOD PRESSURE: 82 MMHG | RESPIRATION RATE: 18 BRPM | HEIGHT: 62 IN | BODY MASS INDEX: 15.25 KG/M2

## 2018-04-10 VITALS
HEIGHT: 63 IN | HEART RATE: 106 BPM | SYSTOLIC BLOOD PRESSURE: 89 MMHG | WEIGHT: 82.6 LBS | BODY MASS INDEX: 14.64 KG/M2 | DIASTOLIC BLOOD PRESSURE: 71 MMHG

## 2018-04-10 DIAGNOSIS — K56.609 SBO (SMALL BOWEL OBSTRUCTION) (HCC): ICD-10-CM

## 2018-04-10 DIAGNOSIS — T45.1X5A ANEMIA DUE TO CHEMOTHERAPY: ICD-10-CM

## 2018-04-10 DIAGNOSIS — Z93.2 ILEOSTOMY IN PLACE (HCC): ICD-10-CM

## 2018-04-10 DIAGNOSIS — T45.1X5A CINV (CHEMOTHERAPY-INDUCED NAUSEA AND VOMITING): ICD-10-CM

## 2018-04-10 DIAGNOSIS — R10.84 GENERALIZED ABDOMINAL PAIN: ICD-10-CM

## 2018-04-10 DIAGNOSIS — C56.3 MALIGNANT NEOPLASM OF BOTH OVARIES (HCC): ICD-10-CM

## 2018-04-10 DIAGNOSIS — F41.8 ANXIETY ABOUT HEALTH: ICD-10-CM

## 2018-04-10 DIAGNOSIS — R11.2 CINV (CHEMOTHERAPY-INDUCED NAUSEA AND VOMITING): ICD-10-CM

## 2018-04-10 DIAGNOSIS — G89.3 CANCER ASSOCIATED PAIN: ICD-10-CM

## 2018-04-10 DIAGNOSIS — C56.9 OVARIAN CANCER, UNSPECIFIED LATERALITY (HCC): ICD-10-CM

## 2018-04-10 DIAGNOSIS — C56.3 MALIGNANT NEOPLASM OF BOTH OVARIES (HCC): Primary | ICD-10-CM

## 2018-04-10 DIAGNOSIS — D70.1 CHEMOTHERAPY-INDUCED NEUTROPENIA (HCC): ICD-10-CM

## 2018-04-10 DIAGNOSIS — E43 PROTEIN-CALORIE MALNUTRITION, SEVERE (HCC): ICD-10-CM

## 2018-04-10 DIAGNOSIS — R10.9 INTRACTABLE ABDOMINAL PAIN: Primary | ICD-10-CM

## 2018-04-10 DIAGNOSIS — C78.6 CARCINOMATOSIS PERITONEI (HCC): Primary | ICD-10-CM

## 2018-04-10 DIAGNOSIS — R11.0 NAUSEA WITHOUT VOMITING: ICD-10-CM

## 2018-04-10 DIAGNOSIS — T45.1X5A CHEMOTHERAPY-INDUCED NEUTROPENIA (HCC): ICD-10-CM

## 2018-04-10 DIAGNOSIS — C78.6 CARCINOMATOSIS PERITONEI (HCC): ICD-10-CM

## 2018-04-10 DIAGNOSIS — E87.1 HYPONATREMIA: ICD-10-CM

## 2018-04-10 DIAGNOSIS — D64.81 ANEMIA DUE TO CHEMOTHERAPY: ICD-10-CM

## 2018-04-10 DIAGNOSIS — R11.2 NAUSEA AND VOMITING, INTRACTABILITY OF VOMITING NOT SPECIFIED, UNSPECIFIED VOMITING TYPE: ICD-10-CM

## 2018-04-10 RX ORDER — HYDROMORPHONE HYDROCHLORIDE 5 MG/5ML
10 SOLUTION ORAL
Qty: 475 ML | Refills: 0 | Status: SHIPPED | OUTPATIENT
Start: 2018-04-10 | End: 2018-04-20 | Stop reason: DRUGHIGH

## 2018-04-10 RX ORDER — METHADONE HYDROCHLORIDE 5 MG/5ML
5 SOLUTION ORAL 3 TIMES DAILY
Qty: 225 ML | Refills: 0 | Status: SHIPPED | OUTPATIENT
Start: 2018-04-10 | End: 2018-04-19 | Stop reason: SDUPTHER

## 2018-04-10 RX ORDER — LORAZEPAM 2 MG/ML
0.5 CONCENTRATE ORAL
Qty: 1 BOTTLE | Refills: 0 | Status: SHIPPED | OUTPATIENT
Start: 2018-04-10 | End: 2018-04-24 | Stop reason: ALTCHOICE

## 2018-04-10 NOTE — PATIENT INSTRUCTIONS
Dear Constantino Mims ,    It was a pleasure seeing you today at our office. Your described symptoms were: Fatigue: 0 Drowsiness: 4   Depression: 3 Pain: 6   Anxiety: 2 Nausea: 4   Anorexia: 6 Dyspnea: 5   Best Well-Bein Constipation: No   Other Problem (Comment): 0       This is the plan we talked about:     1. Abdominal pain  - You are suffering from very sharp pain around the peg tube and this has required the recent increase in dosage of dilaudid. We talked about how you may not be receiving the full dosage of what we are giving you as medicine may not be getting fully absorbed. - Continue Dilaudid 10 mg (10 ml) every 3 hours as needed for severe sharp pain. - You are not benefiting from Fentanyl patch because of loss of adipose tissue (fat) in your body. - Stop Fentanyl patch  - We discussed how long acting tablet form medications may not have enough time to get absorbed in your stomach and therefore agreed on trying Methadone 5 mg three times a day. - We discussed the use and side effects of these medicines in detail.  - We will check on you in 2 days to see how you are doing. 2. Nausea  - You are on Haldol and promethazine as needed. - You reacted poorly to ativan IV in the hospital but we agreed to try oral liquid 0.5 mg to see how you do. 3. Anorexia  - I will have our dietician Nenita Schumacher give you a call. 4. Goals of care  - You have an AMD  - You shared you want a protective DNR and we signed DDNR today.       This is what you have shared with us about Advance Care Planning  Advance Care Planning 4/10/2018   Patient's Healthcare Decision Maker is: Legal Next of Kya 68   Primary Decision Maker Name Sergo Hendricks   Primary Decision Maker Phone Number [phone]; [phone]   Primary Decision Maker Relationship to Patient Spouse   Secondary Decision Maker Name Wendi oMoney    Secondary Decision Maker Phone Number [phone]   Secondary Decision Maker Relationship to Patient Adult child Confirm Advance Directive None   Patient Would Like to Complete Advance Directive Yes   Does the patient have other document types -         The Palliative Medicine Team is here to support you and your family.      We will see you again in 2 weeks    Sincerely,      Caryl Carvajal MD and the Palliative Medicine Team

## 2018-04-10 NOTE — TELEPHONE ENCOUNTER
Called [employer]  and obtained approval for prior authorization for [employer]. They will fax a confirmation. I called [facility] and left a message that it has been approved and called the pt and left a message as well.

## 2018-04-10 NOTE — PROGRESS NOTES
Palliative Medicine Office Visit  Palliative Medicine Nurse Check In  [phone]    Patient Name: Niurka Silvestre  YOB: 1960      Date of Office Visit: 4/10/2018      Patient states: \"  \"    From Check In Sheet (scanned in Media):  Has a medical provider talked with you about cardiopulmonary resuscitation (CPR)? [x] Yes   [] No   [] Unable to obtain    Nurse reminder to complete or update ACP FlowSheet:    Is ACP on the Problem List?    [] Yes    [x] No  IF ACP Document is ON FILE; Nurse to place ACP on Problem List     Is there an ACP Note in Chart Review/Note? [] Yes    [x] No   If NO: [address] Planning 4/10/2018   Patient's Healthcare Decision Maker is: Legal Next of Kin   Primary Decision Maker Name Kati Perdomo   Primary Decision Maker Phone Number [phone]; [phone]   Primary Decision Maker Relationship to Patient Spouse   Secondary Decision Maker Name Maribel Gold    Secondary Decision Maker Phone Number [phone]   Secondary Decision Maker Relationship to Patient Adult child   Confirm Advance Directive None   Patient Would Like to Complete Advance Directive Yes   Does the patient have other document types -       Is there anything that we should know about you as a person in order to provide you the best care possible? Have you been to the ER, urgent care clinic since your last visit? [] Yes   [x] No   [] Unable to obtain    Have you been hospitalized since your last visit? [] Yes   [x] No   [] Unable to obtain    Have you seen or consulted any other health care providers outside of the [address] [place] [address] since your last visit?    [] Yes   [x] No   [] Unable to obtain    Functional status (describe):         Last BM: Ileostomy      accessed (date): 4/10/2018     Bottle review (for opioid pain medication):  Medication 1:   Date filled:   Directions:   # filled:   # left:   # pills taking per day:  Last dose taken:    Medication 2:   Date filled:   Directions:   # filled:   # left:   # pills taking per day:  Last dose taken:    Medication 3:   Date filled:   Directions:   # filled:   # left:   # pills taking per day:  Last dose taken:    Medication 4:   Date filled:   Directions:   # filled:   # left:   # pills taking per day:  Last dose taken:

## 2018-04-10 NOTE — MR AVS SNAPSHOT
[address] [facility] 
[phone] Patient: Nirav Rodriguez MRN: [id number] :1960 Visit Information Date & Time Provider Department Dept. Phone Encounter #  
 4/10/2018  2:30 PM Melva Lopez MD [place] [id number] Upcoming Health Maintenance Date Due Hepatitis C Screening 1960 Pneumococcal 19-64 Highest Risk (1 of 3 - PCV13) 10/29/1978 DTaP/Tdap/Td series (1 - Tdap) 10/29/1980 PAP AKA CERVICAL CYTOLOGY 10/29/1980 FOBT Q 1 YEAR AGE 50-75 10/29/2009 Influenza Age 5 to Adult 2017 BREAST CANCER SCRN MAMMOGRAM 2018* *Topic was postponed. The date shown is not the original due date. Allergies as of 4/10/2018  Review Complete On: 4/10/2018 By: Luigi Gabriel MD  
  
 Severity Noted Reaction Type Reactions Ativan [Lorazepam] Low 2016   Side Effect Other (comments) SEVERE CONFUSION Current Immunizations  Reviewed on 2018 No immunizations on file. Not reviewed this visit You Were Diagnosed With   
  
 Codes Comments Intractable abdominal pain    -  Primary ICD-10-CM: R10.9 ICD-9-CM: 789.00 Malignant neoplasm of both ovaries ([facility] [address] [facility]     ICD-10-CM: C56.1, C56.2 ICD-9-CM: 183.0 [facility]     ICD-10-CM: C78.6, C80.1 ICD-9-CM: 197.6, 199.1 Cancer associated pain     ICD-10-CM: G89.3 ICD-9-CM: 338.3 Generalized abdominal pain     ICD-10-CM: R10.84 ICD-9-CM: 789.07 Ovarian cancer, unspecified laterality ([facility]     ICD-10-CM: C56.9 ICD-9-CM: 183.0 Ileostomy in place [facility])     ICD-10-CM: Z93.2 ICD-9-CM: V44.2 Anxiety about health     ICD-10-CM: F41.8 ICD-9-CM: 300.09   
 CINV (chemotherapy-induced nausea and vomiting)     ICD-10-CM: R11.2, T45.1X5A 
ICD-9-CM: 787.01, E933.1  Chemotherapy-induced neutropenia (HCC)     ICD-10-CM: D70.1, T45.1X5A 
 ICD-9-CM: 288.03, E933.1 Hyponatremia     ICD-10-CM: E87.1 ICD-9-CM: 276.1 Anemia due to chemotherapy     ICD-10-CM: D64.81, T45.1X5A 
ICD-9-CM: 285.3, E933.1 Nausea without vomiting     ICD-10-CM: R11.0 ICD-9-CM: 787.02 Vitals BP Pulse Resp Height(growth percentile) Weight(growth percentile) SpO2  
 (!) 82/71 (BP 1 Location: Left arm, BP Patient Position: Sitting) (!) 105 18 5' 2\" (1.575 m) 82 lb 14.4 oz (37.6 kg) 98% Breastfeeding? BMI OB Status Smoking Status Unknown 15.16 kg/m2 Hysterectomy Never Smoker Vitals History BMI and BSA Data Body Mass Index Body Surface Area  
 15.16 kg/m 2 1.28 m 2 Preferred Pharmacy Pharmacy Name Phone [address] [facility] [phone] Your Updated Medication List  
  
   
This list is accurate as of 4/10/18  4:46 PM.  Always use your most recent med list.  
  
  
  
  
 DEXILANT 60 mg Cpdb Generic drug:  Dexlansoprazole Take 60 mg by mouth daily. dronabinol 5 mg/mL Soln Commonly known as:  SYNDROS Take 2.1 mg by mouth two (2) times a day. Max Daily Amount: 4.2 mg. Take 30 minutes before lunch and dinner  
  
 famotidine 40 mg tablet Commonly known as:  PEPCID Take 1 Tab by mouth two (2) times a day.  
  
 haloperidol 2 mg/mL oral concentrate Commonly known as:  HALDOL  
2 mL by Per G Tube route every eight (8) hours as needed (Nausea). Indications: CANCER CHEMOTHERAPY-INDUCED NAUSEA AND VOMITING  
  
 HYDROmorphone 1 mg/mL Liqd oral solution Commonly known as:  DILAUDID Take 10 mL by mouth every three (3) hours. Max Daily Amount: 80 mg. Indications: Pain L. acidoph & paracasei- S therm- Bifido 8 billion cell Cap cap Commonly known as:  HILTON-Q/RISAQUAD Take 1 Cap by mouth daily. LORazepam 2 mg/mL concentrated solution Commonly known as:  INTENSOL Take 0.25 mL by mouth every eight (8) hours as needed for Anxiety. Max Daily Amount: 1.5 mg.  
  
 magnesium oxide 400 mg tablet Commonly known as:  MAG-OX Take 1 Tab by mouth two (2) times a day. Indications: HYPOMAGNESEMIA  
  
 methadone 5 mg/5 mL oral solution Commonly known as:  DOLOPHINE Take 5 mL by mouth three (3) times daily. Max Daily Amount: 15 mg.  
  
 metoclopramide 5 mg/5 mL syrup Commonly known as:  REGLAN Take 10 mL by mouth four (4) times daily as needed. Take before meals as needed  
  
 ondansetron 4 mg disintegrating tablet Commonly known as:  ZOFRAN ODT Take 1 Tab by mouth every eight (8) hours as needed for Nausea. potassium chloride 20 mEq packet Commonly known as:  KLOR-CON Take 1 Packet by mouth two (2) times a day. promethazine 6.25 mg/5 mL syrup Commonly known as:  PHENERGAN Take 20 mL by mouth four (4) times daily as needed for Nausea. scopolamine 1 mg over 3 days Pt3d Commonly known as:  TRANSDERM-SCOP  
1 Patch by TransDERmal route every seventy-two (72) hours as needed. sucralfate 100 mg/mL suspension Commonly known as:  Evonnie Bane Take 5 mL by mouth four (4) times daily. zolpidem 10 mg tablet Commonly known as:  AMBIEN Take 1 Tab by mouth nightly as needed for Sleep. Max Daily Amount: 10 mg. Indications: SLEEP-ONSET INSOMNIA Prescriptions Printed Refills HYDROmorphone (DILAUDID) 1 mg/mL liqd oral solution 0 Sig: Take 10 mL by mouth every three (3) hours. Max Daily Amount: 80 mg. Indications: Pain Class: Print Route: Oral  
 methadone (DOLOPHINE) 5 mg/5 mL oral solution 0 Sig: Take 5 mL by mouth three (3) times daily. Max Daily Amount: 15 mg.  
 Class: Print Route: Oral  
 LORazepam (INTENSOL) 2 mg/mL concentrated solution 0 Sig: Take 0.25 mL by mouth every eight (8) hours as needed for Anxiety. Max Daily Amount: 1.5 mg.  
 Class: Print Route: Oral  
  
Patient Instructions Dear Tonya Nagel , It was a pleasure seeing you today at our office. Your described symptoms were: Fatigue: 0 Drowsiness: 4 Depression: 3 Pain: 6 Anxiety: 2 Nausea: 4 Anorexia: 6 Dyspnea: 5 Best Well-Bein Constipation: No  
Other Problem (Comment): 0 This is the plan we talked about: 1. Abdominal pain - You are suffering from very sharp pain around the peg tube and this has required the recent increase in dosage of dilaudid. We talked about how you may not be receiving the full dosage of what we are giving you as medicine may not be getting fully absorbed. - Continue Dilaudid 10 mg (10 ml) every 3 hours as needed for severe sharp pain. - You are not benefiting from Fentanyl patch because of loss of adipose tissue (fat) in your body. - Stop Fentanyl patch - We discussed how long acting tablet form medications may not have enough time to get absorbed in your stomach and therefore agreed on trying Methadone 5 mg three times a day. - We discussed the use and side effects of these medicines in detail. 
- We will check on you in 2 days to see how you are doing. 2. Nausea - You are on Haldol and promethazine as needed. - You reacted poorly to ativan IV in the hospital but we agreed to try oral liquid 0.5 mg to see how you do. 3. Anorexia - I will have our dietician Damian Gomez give you a call. 4. Goals of care - You have an AMD 
- You shared you want a protective DNR and we signed DDNR today. This is what you have shared with us about Advance Care Planning Advance Care Planning 4/10/2018 Patient's Healthcare Decision Maker is: Legal Next of Kin Primary Decision Maker Name Kristen Morin Primary Decision Maker Phone Number [phone]; [phone] Primary Decision Maker Relationship to Patient Spouse Secondary Decision Maker Name Marielyreji Nolan Secondary Decision Maker Phone Number [phone] Secondary Decision Maker Relationship to Patient Adult child Confirm Advance Directive None Patient Would Like to Complete Advance Directive Yes Does the patient have other document types - The Palliative Medicine Team is here to support you and your family. We will see you again in 2 weeks Sincerely, Sesar Urban MD and the Palliative Medicine Team 
 
 
  
Introducing [facility]! Dear Marva Serve: 
Thank you for requesting a [employer] account. Our records indicate that you already have an active [employer] account. You can access your account anytime at [email or web address] Did you know that you can access your hospital and ER discharge instructions at any time in [employer]? You can also review all of your test results from your hospital stay or ER visit. Additional Information If you have questions, please visit the Frequently Asked Questions section of the [employer] website at [email or web address] Remember, [employer] is NOT to be used for urgent needs. For medical emergencies, dial 911. Now available from your iPhone and Android! Please provide this summary of care documentation to your next provider. Your primary care clinician is listed as Zoë Woodard. If you have any questions after today's visit, please call [phone].

## 2018-04-10 NOTE — PROGRESS NOTES
[address] [place] [address] [address], [address] [facility]  [phone]  MD Francheska Oneal MD  Office Note  Patient ID:  Name:  Tonya Nagel  MRN:  [id number]  :  1960/57 y.o. Date:  4/10/2018      HISTORY OF PRESENT ILLNESS:  Tonya Nagel is a 61 y.o.  postmenopausal female with a diagnosis of stage IV ovarian cancer. She underwent exploratory laparotomy with suboptimal debulking in 2015. She had extensive disease. She was readmitted about a week later with obstruction and subsequently had a cecal perforation, requiring resection, end ileostomy, and mucous fistula. During the hospitalization she was also diagnosed with pulmonary embolus and had chest tubes placed for bilateral pleural effusions. She had a prolonged hospital course and actually received a dose of cisplatin chemotherapy while in the hospital to help dry up the effusions. She completed 6 cycles of Taxol/Carboplatin chemotherapy following her initial debulking. She  did relatively well, considering she had an ileostomy to deal with during chemotherapy. I took her to the OR on 16 for interval debulking and reversal of her ileostomy. She had extensive disease, but we were able to resect the majority of her disease. One week later she developed an anastomotic leak requiring reexploration and recreation of an ileostomy. She had another prolonged hospitalization, but recovered well since surgery. We then reinitiated Taxol/Carboplatin chemotherapy, but did reduce the dose of Taxol to 135 mg/m2. She completed 4 mores cycles. Her CA-125 normalized and we stopped treatment. She now has evidence of recurrent disease and for a while was receiving Doxil/Avastin. She completed 3 cycles of that regimen. She was admitted to [facility] several time since during that regimen with evidence of partial small bowel obstruction.   The symptoms have resolved each time with conservative management. She had increased ascites on her last scan, but no significant change in her disease otherwise. Her CA-125 also went up. All of this suggests progression of disease. We switched her chemotherapy to [facility]. She completed 7 cycles. Her CA-125 had risen over the last few draws. She had been hospitalized almost every cycle for the last few cycles for GI issues. She was switched to single agent Carboplatin and completed 4 cycles. She initially appeared to be responding. She was recently hospitalized in [facility] for small bowel obstruction before being transferred to [facility]. It did not resolve with conservative management and we did not feel she was a candidate for surgical intervention. She had a palliative PEG tube placed during that admission. We also made the decision to stop chemotherapy. She has continued to have issues of pain and nausea/vomiting despite the presence of the PEG tube. She is continuing to lose weight and her appetite is decreasing.        Problem List:  Patient Active Problem List    Diagnosis Date Noted    Anemia due to chronic illness 04/04/2018    Hypokalemia 03/26/2018    SBO (small bowel obstruction) ([address] 03/26/2018    Impaired oral gastric feeding tube 03/26/2018    Mucositis due to chemotherapy 03/21/2018    Protein-calorie malnutrition, severe ([address] 03/05/2018    Esophagitis determined by endoscopy 03/05/2018    Ovarian cancer, unspecified laterality ([address] 11/30/2017    Nausea and vomiting 09/14/2017    Partial bowel obstruction ([address] 09/13/2017    Intractable vomiting with nausea 08/10/2017    Ovarian cancer ([address] 08/09/2017    Nausea & vomiting 08/09/2017    Carcinomatosis ([address] 08/09/2017    Hydronephrosis of left kidney 06/28/2017    Abdominal pain 06/27/2017    Hypomagnesemia 06/13/2017    Erosive esophagitis 06/07/2017    Ulcerative esophagitis 06/07/2017    Isolated proteinuria 05/25/2017    Malignant ascites 05/08/2017    Hydronephrosis of right kidney 05/08/2017    Protein calorie malnutrition ([address] 04/28/2017    Counseling regarding end of life decision making 04/25/2017    Anticoagulation adequate with anticoagulant therapy 03/28/2017    Chemotherapy-induced neutropenia (HCC) 03/22/2017    Ileus ([address] 03/21/2017    Thrombosis of pelvic vein 03/21/2017    Hx of pulmonary embolus 03/21/2017    Dehydration 03/21/2017    Cancer associated pain 05/13/2016    Generalized abdominal pain 05/13/2016    Anxiety about health 05/13/2016    CINV (chemotherapy-induced nausea and vomiting) 03/22/2016    Anemia due to chemotherapy 02/22/2016    Ileostomy in place [facility]) 02/15/2016    Carcinomatosis peritonei ([address] 10/29/2015    Small bowel obstruction 10/29/2015    Malignant neoplasm of both ovaries ([address] 10/12/2015     PMH:  Past Medical History:   Diagnosis Date    Abdominal carcinomatosis ([address] 10/2015    Arthritis     left shoulder    BRCA negative 12/2015    Chronic pain     Depression     HX    Environmental allergies     GERD (gastroesophageal reflux disease)     Hypertension     NEW OVER PAST 5-6 MONTHS    Ovarian cancer ([address] 10/2015    serous adenocarcinoma, high grade, stage IIIC    Pulmonary embolism ([address] 10/2015      PSH:  Past Surgical History:   Procedure Laterality Date    CARDIAC SURG PROCEDURE UNLIST  12/11/15    cardiac cath/normal     HX BREAST BIOPSY  1995    benign right breast bx    HX DILATION AND CURETTAGE  2/2011    POLYPECTOMY    HX GI  2015    PERFORATED BOWEL; ILEOSTOMY    HX GYN  10/2015    EXP LAPAROTOMY    HX HEENT  LAST 2005    oral tissue graft X3    HX HEENT  1970    tonsillectomy    HX LAPAROTOMY  10/2015    tumor sampling    HX LAPAROTOMY  4/2016    hysterectomy, BSO, radical debulking    HX LAPAROTOMY  5/2016    resection ileocolic anastomosis, leak, ileostomy    HX OTHER SURGICAL  11/2015    tunneled portacath      Social History:  Social History   Substance Use Topics    Smoking status: Never Smoker    Smokeless tobacco: Never Used    Alcohol use No      Family History:  Family History   Problem Relation Age of Onset    Cancer Maternal Uncle      skin    Hypertension Mother     High Cholesterol Mother     Anxiety Mother     Heart Disease Mother     High Cholesterol Father     Hypertension Father     Stroke Father    [place] Erica Sister     Migraines Sister     Other Sister      FIBROMYALGIA    Depression Brother     Other Brother      DRUG ABUSE HEPATITIS C    Migraines Sister     Arrhythmia Sister     Depression Sister     Lung Disease Paternal Aunt      COPD    Cancer Paternal Uncle      LUNG    Lung Disease Paternal Uncle      COPD    Lung Disease Maternal Grandmother      COPD    Anesth Problems Neg Hx       Medications: (reviewed)  Current Outpatient Prescriptions   Medication Sig    scopolamine (TRANSDERM-SCOP) 1 mg over 3 days pt3d 1 Patch by TransDERmal route every seventy-two (72) hours as needed.  famotidine (PEPCID) 40 mg tablet Take 1 Tab by mouth two (2) times a day.  Dexlansoprazole (DEXILANT) 60 mg CpDB Take 60 mg by mouth daily.  HYDROmorphone (DILAUDID) 1 mg/mL liqd oral solution Take 4 mL by mouth every four (4) hours as needed. Max Daily Amount: 24 mg. Indications: Pain    promethazine (PHENERGAN) 6.25 mg/5 mL syrup Take 20 mL by mouth four (4) times daily as needed for Nausea.  haloperidol (HALDOL) 2 mg/mL oral concentrate 2 mL by Per G Tube route every eight (8) hours as needed (Nausea). Indications: CANCER CHEMOTHERAPY-INDUCED NAUSEA AND VOMITING    fentaNYL (DURAGESIC) 50 mcg/hr PATCH 1 Patch by TransDERmal route every seventy-two (72) hours. Max Daily Amount: 1 Patch. This is in ADDITION to your 100 mcg patch for a total of 150 mcg patch every 72 hours    fentaNYL (DURAGESIC) 100 mcg/hr PATCH 1 Patch by TransDERmal route every seventy-two (72) hours. Max Daily Amount: 1 Patch.     metoclopramide (REGLAN) 5 mg/5 mL syrup Take 10 mL by mouth four (4) times daily as needed. Take before meals as needed    cyclobenzaprine (FLEXERIL) 10 mg tablet Take 1 Tab by mouth three (3) times daily as needed for Muscle Spasm(s).  zolpidem (AMBIEN) 10 mg tablet Take 1 Tab by mouth nightly as needed for Sleep. Max Daily Amount: 10 mg. Indications: SLEEP-ONSET INSOMNIA    metoprolol tartrate (LOPRESSOR) 50 mg tablet Take  by mouth two (2) times a day.  sucralfate (CARAFATE) 100 mg/mL suspension Take 5 mL by mouth four (4) times daily.  L. acidoph & paracasei- S therm- Bifido (HILTON-Q/RISAQUAD) 8 billion cell cap cap Take 1 Cap by mouth daily.  potassium chloride (KLOR-CON) 20 mEq packet Take 1 Packet by mouth two (2) times a day.  dexamethasone (DECADRON) 4 mg tablet Take 2 tabs QAM day before chemo and for 2 days after    dronabinol (SYNDROS) 5 mg/mL soln Take 2.1 mg by mouth two (2) times a day. Max Daily Amount: 4.2 mg. Take 30 minutes before lunch and dinner    magnesium oxide (MAG-OX) 400 mg tablet Take 1 Tab by mouth two (2) times a day. Indications: HYPOMAGNESEMIA    apixaban (ELIQUIS) 2.5 mg tablet Take 1 Tab by mouth two (2) times a day.  ondansetron (ZOFRAN ODT) 4 mg disintegrating tablet Take 1 Tab by mouth every eight (8) hours as needed for Nausea. No current facility-administered medications for this visit. Allergies: (reviewed)  Allergies   Allergen Reactions    Ativan [Lorazepam] Other (comments)     SEVERE CONFUSION          OBJECTIVE:    Physical Exam:  VITAL SIGNS: Vitals:    04/10/18 1359   BP: (!) 89/71   Pulse: (!) 106   Weight: 82 lb 9.6 oz (37.5 kg)   Height: 5' 2.99\" (1.6 m)     Body mass index is 14.64 kg/(m^2). GENERAL AVA: Conversant, alert, oriented. No acute distress. HEENT: HEENT. No thyroid enlargement. No JVD. Neck: Supple without restrictions. RESPIRATORY: Clear to auscultation and percussion to the bases. No CVAT.    CARDIOVASC: RRR without murmur/rub. GASTROINT: soft, non-tender, without masses or organomegaly; ileostomy in RLQ   MUSCULOSKEL: no joint tenderness, deformity or swelling   EXTREMITIES: extremities normal, atraumatic, no cyanosis or edema   PELVIC: Deferred   RECTAL: Deferred   JOSE SURVEY: No suspicious lymphadenopathy or edema noted. NEURO: Grossly intact. No acute deficit. Lab Results   Component Value Date/Time    WBC 8.2 03/27/2018 04:56 AM    HGB 9.1 (L) 03/27/2018 04:56 AM    HCT 29.0 (L) 03/27/2018 04:56 AM    PLATELET 341 08/61/8260 04:56 AM    MCV 95.4 03/27/2018 04:56 AM     Lab Results   Component Value Date/Time    Sodium 137 03/27/2018 04:56 AM    Potassium 3.3 (L) 03/27/2018 04:56 AM    Chloride 101 03/27/2018 04:56 AM    CO2 29 03/27/2018 04:56 AM    Anion gap 7 03/27/2018 04:56 AM    Glucose 116 (H) 03/27/2018 04:56 AM    BUN 8 03/27/2018 04:56 AM    Creatinine 0.62 03/27/2018 04:56 AM    BUN/Creatinine ratio 13 03/27/2018 04:56 AM    GFR est AA >60 03/27/2018 04:56 AM    GFR est non-AA >60 03/27/2018 04:56 AM    Calcium 7.8 (L) 03/27/2018 04:56 AM       CT of abdomen/pelvis (1/9/18)  Lung bases: There has been interval resolution of very small bilateral pleural  effusions to prior CT dated September 2017. There has been near total resolution  of bibasilar airspace disease and there is minimal residual linear scarring or  atelectasis within the peripheral left lung base.     Liver: There is a 1.2 cm x 7 mm hypodensity within the upper right hepatic lobe,  unchanged. There is a 7 mm hypodensity within the left hepatic lobe, unchanged  compared to prior CT dated September 2015.     Adrenals: Adrenal glands are normal.     Pancreas: The pancreas is normal.     Gallbladder: The gallbladder is normal.     Kidneys: There is persistent bilateral hydronephrosis, as seen on prior CT dated  September 2017.     Spleen: The spleen is normal.     Lymph nodes.  There is no rogerio hepatitis, mesenteric, retroperitoneal or pelvic  lymphadenopathy.     Bowel: Bowel loops are centrally located, unchanged. No thickened loop of bowel  is visualized. Right lower quadrant ostomy is unchanged.     Urinary bladder: Urinary bladder is partially filled and grossly normal.     Bones: No lytic or sclerotic osseous lesion is visualized.     Miscellaneous: There is a small amount of anterior intra-abdominal loculated  ascites which is decreased in quantity compared to prior CT dated September 2017. There is a 1.2 cm x 2.1 cm thin-walled fluid collection anterior to the  lateral segment of the left hepatic lobe, unchanged compared to prior CT dated  September 2017. There is subtle soft tissue nodularity along the right hepatic  lobe capsule, unchanged. There is no free intraperitoneal gas. IMPRESSION:   1. Interval decrease in quantity of anterior loculated intra-abdominal fluid. Subtle soft tissue nodularity along the right hepatic lobe capsule, unchanged. 1.2 cm x 2.1 cm thin-walled fluid collection anterior to the lateral segment of  the left hepatic lobe, unchanged compared to prior CT dated September 2017.   2. Interval resolution of bilateral pleural fluid. Near total resolution of  bibasilar airspace disease. 3. Persistent bilateral hydronephrosis, unchanged. IMPRESSION/PLAN:  Cody Cleary is a 61 y.o. female with a history of stage IV ovarian cancer. She has persistent/progressive disease and is not receiving any tumor-directed therapy at this time. She has intermittent small bowel obstruction secondary to disease and is not a candidate for surgery. She has a PEG tube for palliative decompression. In short, Salome Jaquez is not doing well. I had a long discussion with her and her  today and again explained that she is not a candidate for any kind of chemotherapy at this point, and I explained that she likely will never be. She seems to understand that, but she states she's not ready to give up just yet.   She is scheduled to see palliative care today to see if they can help with managing her pain and nausea. I will see her back sometime in the next few weeks to reevaluate and to discuss options further, including hospice.       Signed By: Hector Velasquez MD     4/10/2018/10:35 AM

## 2018-04-11 NOTE — PROGRESS NOTES
Palliative Medicine  Nursing Note  [phone]  Fax [phone]        Clinic Office Visit  Patient Name: Emilie Marshall  YOB: 1960    4/10/2018      Advance Care Planning 4/10/2018   Patient's Healthcare Decision Maker is: Legal Next of Kin   Primary Decision Maker Name Adrián Guy   Primary Decision Maker Phone Number [phone]; [phone]   Primary Decision Maker Relationship to Patient Spouse   Secondary Decision Maker Name Minna Martinez    Secondary Decision Maker Phone Number [phone]   Secondary Decision Maker Relationship to Patient Adult child   Confirm Advance Directive None   Patient Would Like to Complete Advance Directive Yes   Does the patient have other document types -         Plan per Dr. Adia Tyler,      Provided New Patient Welcome Packet: Includes Opioid Safety, PM brochure and flyer, Magnet with Palliative Medicine Phone number. AVS reviewed with patient, verbalized an understanding of material discussed. Instructions given to patient to call the Palliative Medicine Office at [phone] for any questions or concerns 24 hours a day, 7 days a weeks. Follow up appointment scheduled for 2 weeks. Nurse Navigator will provide a follow up phone call in about 2 days. Started on methadone  PM nurse will call patient to make sure she is tolerating new pain medication regimen.     CLARISSA WoodN, RN, OCN, [facility]  Palliative Medicine

## 2018-04-11 NOTE — PROGRESS NOTES
Palliative Medicine Outpatient Services  [place]: [phone] ([phone])    Patient Name: Jono Ceron  YOB: 1960    Date of Current Visit: 04/11/18  Location of Current Visit:    [] [facility] Office  [] [facility] Office  [] [facility] Office  [] Home  [] Other:      Date of Initial Visit: 4/11/18  Requesting Physician: Dr. Charmaine Lawler  Primary Care Physician: Yola Berumen MD      SUMMARY:   Jono Ceron is a 61y.o. year old with a past history of stage IV ovarian cancer followed by Dr Charmaine Lawler who has had previous debulking surgery for extensive disease and chemo, prior complications with fecal peritonitis, cecal perforation, SBO s/p ileostomy, multiple admissions in the past for the same, disease progression with several lines of chemotherapy. She had venting peg placed in March during her last hospitalization, she has about 2 l output from venting peg daily, gets 2 l fluids via port at home. She is currently on chemo holiday due to severe weakness and uncontrolled pain. She is here to help with intractable abdominal pain management and psychosocial support. Lives with , daughter visits 2-3 times per week, very helpful. She is alone at home for some hours every day when  is out on chores or work, she is able to perform ADLs independently. Has 2 sons who live in [place] and [place]. She worked as an  for a [employer] but mostly has been . PALLIATIVE DIAGNOSES:       ICD-10-CM ICD-9-CM    1. Intractable abdominal pain R10.9 789.00 methadone (DOLOPHINE) 5 mg/5 mL oral solution   2. Malignant neoplasm of both ovaries (HCC) C56.1 183.0 HYDROmorphone (DILAUDID) 1 mg/mL liqd oral solution    C56.2  methadone (DOLOPHINE) 5 mg/5 mL oral solution      ADVANCE CARE PLANNING FIRST 30 MINS   3. Carcinomatosis peritonei (HCC) C78.6 197.6 HYDROmorphone (DILAUDID) 1 mg/mL liqd oral solution    C80.1 199.1    4. Cancer associated pain G89.3 338.3 HYDROmorphone (DILAUDID) 1 mg/mL liqd oral solution   5. Generalized abdominal pain R10.84 789.07 HYDROmorphone (DILAUDID) 1 mg/mL liqd oral solution   6. Ovarian cancer, unspecified laterality (HCC) C56.9 183.0 HYDROmorphone (DILAUDID) 1 mg/mL liqd oral solution   7. Ileostomy in place [facility]) Z93.2 V44.2 HYDROmorphone (DILAUDID) 1 mg/mL liqd oral solution   8. Anxiety about health F41.8 300.09 HYDROmorphone (DILAUDID) 1 mg/mL liqd oral solution   9. CINV (chemotherapy-induced nausea and vomiting) R11.2 787.01 HYDROmorphone (DILAUDID) 1 mg/mL liqd oral solution    T45.1X5A E933.1    10. Chemotherapy-induced neutropenia (HCC) D70.1 288.03 HYDROmorphone (DILAUDID) 1 mg/mL liqd oral solution    T45.1X5A E933.1    11. Hyponatremia E87.1 276.1 HYDROmorphone (DILAUDID) 1 mg/mL liqd oral solution   12. Anemia due to chemotherapy D64.81 285.3 HYDROmorphone (DILAUDID) 1 mg/mL liqd oral solution    T45.1X5A E933.1    13. Nausea without vomiting R11.0 787.02 LORazepam (INTENSOL) 2 mg/mL concentrated solution          PLAN:     Patient Instructions     Dear Sravan Bhat ,    It was a pleasure seeing you today at our office. Your described symptoms were: Fatigue: 0 Drowsiness: 4   Depression: 3 Pain: 6   Anxiety: 2 Nausea: 4   Anorexia: 6 Dyspnea: 5   Best Well-Bein Constipation: No   Other Problem (Comment): 0       This is the plan we talked about:     1. Abdominal pain  - You are suffering from very sharp pain around the peg tube and this has required the recent increase in dosage of dilaudid. We talked about how you may not be receiving the full dosage of what we are giving you as medicine may not be getting fully absorbed. - Continue Dilaudid 10 mg (10 ml) every 3 hours as needed for severe sharp pain. - You are not benefiting from Fentanyl patch because of loss of adipose tissue (fat) in your body.   - Stop Fentanyl patch  - We discussed how long acting tablet form medications may not have enough time to get absorbed in your stomach and therefore agreed on trying Methadone 5 mg three times a day. - We discussed the use and side effects of these medicines in detail.  - We will check on you in 2 days to see how you are doing. 2. Nausea  - You are on Haldol and promethazine as needed. - You reacted poorly to ativan IV in the hospital but we agreed to try oral liquid 0.5 mg to see how you do. 3. Anorexia  - I will have our dietician Maria G Osorio give you a call. 4. Goals of care  - You have an AMD  - You shared you want a protective DNR and we signed DDNR today. This is what you have shared with us about Floyd Elizabeth. Planning 4/10/2018   Patient's Healthcare Decision Maker is: Legal Next of    Primary Decision Maker Name Bob Smith   Primary Decision Maker Phone Number [phone]; [phone]   Primary Decision Maker Relationship to Patient Spouse   Secondary Decision Maker Name Nick Meneses    Secondary Decision Maker Phone Number [phone]   Secondary Decision Maker Relationship to Patient Adult child   Confirm Advance Directive None   Patient Would Like to Complete Advance Directive Yes   Does the patient have other document types -         The Palliative Medicine Team is here to support you and your family.      We will see you again in 2 weeks    Sincerely,      Bunny Berrios MD and the Palliative Medicine Team      Counseling and Coordination:   Goals of care discussion- 30 + mins     GOALS OF CARE / TREATMENT PREFERENCES:   [====Goals of Care====]  GOALS OF CARE:  Patient / health care proxy stated goals: DNR      TREATMENT PREFERENCES:   Code Status:  [] Attempt Resuscitation       [x] Do Not Attempt Resuscitation    Advance Care Planning:  Advance Care Planning 4/10/2018   Patient's Healthcare Decision Maker is: Legal Next of [address]   Primary Decision Maker Name Bob Smith   Primary Decision Maker Phone Number [phone]; [phone]   Primary Decision Maker Relationship to Patient Spouse   Secondary Decision Maker Name Manan Ambriz    Secondary Decision Maker Phone Number [phone]   Secondary Decision Maker Relationship to Patient Adult child   Confirm Advance Directive None   Patient Would Like to Complete Advance Directive Yes   Does the patient have other document types -       Other:  (If patient appropriate for POST, consider using PALLPOST smart phrase here)    The palliative care team has discussed with patient / health care proxy about goals of care / treatment preferences for patient.  [====Goals of Care====]         PRESCRIPTIONS GIVEN:     Medications Ordered Today   Medications    HYDROmorphone (DILAUDID) 1 mg/mL liqd oral solution     Sig: Take 10 mL by mouth every three (3) hours. Max Daily Amount: 80 mg. Indications: Pain     Dispense:  475 mL     Refill:  0    methadone (DOLOPHINE) 5 mg/5 mL oral solution     Sig: Take 5 mL by mouth three (3) times daily. Max Daily Amount: 15 mg. Dispense:  225 mL     Refill:  0    LORazepam (INTENSOL) 2 mg/mL concentrated solution     Sig: Take 0.25 mL by mouth every eight (8) hours as needed for Anxiety. Max Daily Amount: 1.5 mg.     Dispense:  1 Bottle     Refill:  0           FOLLOW UP:     Future Appointments  Date Time Provider Layton Mujica   4/24/2018 12:30 PM Negro Simon MD [address] [place] [address] IN CARE:   Patient Care Team:  Binta Devi MD as PCP - General (Internal Medicine)  Nima Maldonado MD (Gynecologic Oncology)       HISTORY:   Nursing documentation from date of visit reviewed. Reviewed patient-completed ESAS and advance care planning form. Reviewed patient record in prescription monitoring program.    CHIEF COMPLAINT: abdominal pain    HPI/SUBJECTIVE:    The patient is: [x] Verbal / [] Nonverbal     Pleasant woman here with . Abdominal pain- excruciating pain around the peg tube but felt all over abdomen.  Pain escalates very quickly, dilaudid 10 mg takes about 15 mins to start helping and it does help. She has been taking it every 3 hours, has gone no more than 6 hours before needing it. She also feels if she lets the pain linger and not take medicine every 3 hours, it becomes harder to control. She does not think the fentanyl patch is helping    Pain and fatigue are her biggest limiting factors. She shared that she is on a chemo holiday. She hopes to try any other chemo/ immunotherapy Dr. Meredith Vargas has to offer in the near future. She shares she does value her quality of life and if at any time I feel she needs hospice, I should let her know and she will agree. She connects venting peg to gravity all the time except when clamping it after food intake for 1.5 hours. She wants to know what to eat that won't clog her tube. She drains about 1.8- 2l per day from the peg. She gets 2l fluids via port at home from home health. Her goals- to improve pain management, improve strength in daily activities, travel to [address] [place] [address] and [place] to see her sons. Wants chemo holiday for now, wants to see how she feels in a month or so and then might want some oral therapy that Dr. Meredith Vargas is offering.  Understands that her disease is not curable.     End of life wishes- wants protective DNR    Clinical Pain Assessment (nonverbal scale for nonverbal patients):   [++++ Clinical Pain Assessment++++]  [++++Pain Severity++++]: Pain: 6  [++++Pain Character++++]: severe, sharp  [++++Pain Duration++++]:months, more over the past 1 week  [++++Pain Effect++++]:functional and emotional  [++++Pain Factors++++]:none  [++++Pain Frequency++++]:on and off with continuous dull pain  [++++Pain Location++++]:abdomen  [++++ Clinical Pain Assessment++++]       FUNCTIONAL ASSESSMENT:     Palliative Performance Scale (PPS):    70       PSYCHOSOCIAL/SPIRITUAL SCREENING:     Any spiritual / Oriental orthodox concerns:  [] Yes /  [x] No    Caregiver Burnout:  [] Yes /  [x] No /  [] No Caregiver Present      Anticipatory grief assessment:   [x] Normal  / [] Maladaptive ESAS Anxiety: Anxiety: 2    ESAS Depression: Depression: 3       REVIEW OF SYSTEMS:     The following systems were [] reviewed / [] unable to be reviewed  Systems: constitutional, ears/nose/mouth/throat, respiratory, gastrointestinal, genitourinary, musculoskeletal, integumentary, neurologic, psychiatric, endocrine. Positive findings noted below. Modified ESAS Completed by: provider   Fatigue: 0 Drowsiness: 4   Depression: 3 Pain: 6   Anxiety: 2 Nausea: 4   Anorexia: 6 Dyspnea: 5   Best Well-Bein Constipation: No   Other Problem (Comment): 0          PHYSICAL EXAM:     Wt Readings from Last 3 Encounters:   04/10/18 82 lb 14.4 oz (37.6 kg)   04/10/18 82 lb 9.6 oz (37.5 kg)   18 92 lb (41.7 kg)     Blood pressure (!) 82/71, pulse (!) 105, resp. rate 18, height 5' 2\" (1.575 m), weight 82 lb 14.4 oz (37.6 kg), SpO2 98 %, unknown if currently breastfeeding. Last bowel movement: See Nursing Note    Constitutional: alert, oriented, very thin but dressed well and appears well. Eyes: pupils equal, anicteric  ENMT: no nasal discharge, moist mucous membranes  Cardiovascular: regular rhythm, distal pulses intact  Respiratory: breathing not labored, symmetric  Gastrointestinal: ileostomy with bag in place, venting peg in place.   Musculoskeletal: no deformity, no tenderness to palpation  Skin: warm, dry  Neurologic: following commands, moving all extremities  Psychiatric: full affect, no hallucinations  Other:       HISTORY:     Past Medical History:   Diagnosis Date    Abdominal carcinomatosis ([address] 10/2015    Arthritis     left shoulder    BRCA negative 2015    Chronic pain     Depression     HX    Environmental allergies     GERD (gastroesophageal reflux disease)     Hypertension     NEW OVER PAST 5-6 MONTHS    Ovarian cancer ([address] 10/2015    serous adenocarcinoma, high grade, stage IIIC    Pulmonary embolism ([address] 10/2015      Past Surgical History:   Procedure Laterality Date    CARDIAC SURG PROCEDURE UNLIST  12/11/15    cardiac cath/normal     HX BREAST BIOPSY  1995    benign right breast bx    HX DILATION AND CURETTAGE  2/2011    POLYPECTOMY    HX GI  2015    PERFORATED BOWEL; ILEOSTOMY    HX GYN  10/2015    EXP LAPAROTOMY    HX HEENT  LAST 2005    oral tissue graft X3    HX HEENT  1970    tonsillectomy    HX LAPAROTOMY  10/2015    tumor sampling    HX LAPAROTOMY  4/2016    hysterectomy, BSO, radical debulking    HX LAPAROTOMY  5/2016    resection ileocolic anastomosis, leak, ileostomy    HX OTHER SURGICAL  11/2015    tunneled portacath      Family History   Problem Relation Age of Onset    Cancer Maternal Uncle      skin    Hypertension Mother     High Cholesterol Mother     Anxiety Mother     Heart Disease Mother     High Cholesterol Father     Hypertension Father     Stroke Father     Arthritis-osteo Sister     Migraines Sister     Other Sister      FIBROMYALGIA    Depression Brother     Other Brother      DRUG ABUSE HEPATITIS C    Migraines Sister     Arrhythmia Sister     Depression Sister     Lung Disease Paternal Aunt      COPD    Cancer Paternal Uncle      LUNG    Lung Disease Paternal Uncle      COPD    Lung Disease Maternal Grandmother      COPD    Anesth Problems Neg Hx       History reviewed, no pertinent family history. Social History   Substance Use Topics    Smoking status: Never Smoker    Smokeless tobacco: Never Used    Alcohol use No     Allergies   Allergen Reactions    Ativan [Lorazepam] Other (comments)     SEVERE CONFUSION      Current Outpatient Prescriptions   Medication Sig    HYDROmorphone (DILAUDID) 1 mg/mL liqd oral solution Take 10 mL by mouth every three (3) hours. Max Daily Amount: 80 mg. Indications: Pain    methadone (DOLOPHINE) 5 mg/5 mL oral solution Take 5 mL by mouth three (3) times daily. Max Daily Amount: 15 mg.     LORazepam (INTENSOL) 2 mg/mL concentrated solution Take 0.25 mL by mouth every eight (8) hours as needed for Anxiety. Max Daily Amount: 1.5 mg.    scopolamine (TRANSDERM-SCOP) 1 mg over 3 days pt3d 1 Patch by TransDERmal route every seventy-two (72) hours as needed.  famotidine (PEPCID) 40 mg tablet Take 1 Tab by mouth two (2) times a day.  L. acidoph & paracasei- S therm- Bifido (HILTON-Q/RISAQUAD) 8 billion cell cap cap Take 1 Cap by mouth daily.  Dexlansoprazole (DEXILANT) 60 mg CpDB Take 60 mg by mouth daily.  promethazine (PHENERGAN) 6.25 mg/5 mL syrup Take 20 mL by mouth four (4) times daily as needed for Nausea.  haloperidol (HALDOL) 2 mg/mL oral concentrate 2 mL by Per G Tube route every eight (8) hours as needed (Nausea). Indications: CANCER CHEMOTHERAPY-INDUCED NAUSEA AND VOMITING    metoclopramide (REGLAN) 5 mg/5 mL syrup Take 10 mL by mouth four (4) times daily as needed. Take before meals as needed    zolpidem (AMBIEN) 10 mg tablet Take 1 Tab by mouth nightly as needed for Sleep. Max Daily Amount: 10 mg. Indications: SLEEP-ONSET INSOMNIA    sucralfate (CARAFATE) 100 mg/mL suspension Take 5 mL by mouth four (4) times daily.  potassium chloride (KLOR-CON) 20 mEq packet Take 1 Packet by mouth two (2) times a day.  dronabinol (SYNDROS) 5 mg/mL soln Take 2.1 mg by mouth two (2) times a day. Max Daily Amount: 4.2 mg. Take 30 minutes before lunch and dinner    magnesium oxide (MAG-OX) 400 mg tablet Take 1 Tab by mouth two (2) times a day. Indications: HYPOMAGNESEMIA    ondansetron (ZOFRAN ODT) 4 mg disintegrating tablet Take 1 Tab by mouth every eight (8) hours as needed for Nausea. No current facility-administered medications for this visit.            LAB DATA REVIEWED:     Lab Results   Component Value Date/Time    WBC 8.2 03/27/2018 04:56 AM    HGB 9.1 (L) 03/27/2018 04:56 AM    PLATELET 983 55/22/2530 04:56 AM     Lab Results   Component Value Date/Time    Sodium 137 03/27/2018 04:56 AM    Potassium 3.3 (L) 03/27/2018 04:56 AM    Chloride 101 03/27/2018 04:56 AM    CO2 29 03/27/2018 04:56 AM    BUN 8 03/27/2018 04:56 AM    Creatinine 0.62 03/27/2018 04:56 AM    Calcium 7.8 (L) 03/27/2018 04:56 AM    Magnesium 1.4 (L) 03/27/2018 04:56 AM    Phosphorus 3.2 03/16/2018 04:36 AM      Lab Results   Component Value Date/Time    AST (SGOT) 18 03/26/2018 06:55 PM    Alk.  phosphatase 85 03/26/2018 06:55 PM    Protein, total 6.5 03/26/2018 06:55 PM    Albumin 2.2 (L) 03/26/2018 06:55 PM    Globulin 4.3 (H) 03/26/2018 06:55 PM     Lab Results   Component Value Date/Time    INR 1.0 03/13/2018 05:05 AM    Prothrombin time 10.1 03/13/2018 05:05 AM    aPTT 28.9 05/13/2016 05:39 AM      No results found for: IRON, FE, TIBC, IBCT, PSAT, FERR        CONTROLLED SUBSTANCES SAFETY ASSESSMENT (IF ON CONTROLLED SUBSTANCES):     Reviewed opioid safety handout:  [x] Yes   [] No  24 hour opioid dose >150mg morphine equivalent/day:  [] Yes   [] No  Benzodiazepines:  [] Yes   [] No  Sleep apnea:  [] Yes   [] No  Urine Toxicology Testing within last 6 months:  [] Yes   [] No  History of or new aberrant medication taking behaviors:  [] Yes   [] No            Total time: 90m  Counseling / coordination time: 60m  > 50% counseling / coordination?: y

## 2018-04-11 NOTE — ACP (ADVANCE CARE PLANNING)
Advance Care Planning 4/10/2018   Patient's Healthcare Decision Maker is: Legal Next of Kin   Primary Decision Maker Name Estevan Pardo   Primary Decision Maker Phone Number [phone]; [phone]   Primary Decision Maker Relationship to Patient Spouse   Secondary Decision Maker Name Aleida Rincon Phone Number [phone]   Secondary Decision Maker Relationship to Patient Adult child   Confirm Advance Directive None   Patient Would Like to Complete Advance Directive Yes   Does the patient have other document types -     Patient has AMD    Her goals- to improve pain management, improve strength in daily activities, travel to [address] [facility] and [address] [place] [address] to see her sons. Wants chemo holiday for now, wants to see how she feels in a month or so and then might want some oral therapy that Dr. Brenda Queen is offering. Understands that her disease is not curable. End of life wishes- wants protective DNR.   DDNR signed

## 2018-04-13 ENCOUNTER — DOCUMENTATION ONLY (OUTPATIENT)
Dept: PALLATIVE CARE | Age: 58
End: 2018-04-13

## 2018-04-13 ENCOUNTER — TELEPHONE (OUTPATIENT)
Dept: PALLATIVE CARE | Age: 58
End: 2018-04-13

## 2018-04-13 NOTE — TELEPHONE ENCOUNTER
Patient has question about medication . ... Patient just started last night Lorazepam because pharmacy had to order medication, felt it caused her to pace the floor, with  feelings of restless leg syndrome , antsy and just cant keep still, reports her body would not let it self relax, Reports she had IV Lorazepam before in the past and had similar side effects then as well, she was to take medication for nausea and it has not improved and does not know if it just needs to build up in her system. Patient has had 3 doses currently     Methadone does not seem to be working , felt like she had not taken anything , has had 2 doses, pain currently is about 5/10.      Patient also  reports nausea and vomiting several times a day for last 2 day , feels like the vomiting is more today since starting new medications, 2 episodes today and three yesterday     Informed this information would be forwarded to PM Team

## 2018-04-13 NOTE — PROGRESS NOTES
Phone call with patient and . 1. Ativan causing restlessness- this was a trial medicine only and she is not tolerating it. So we will STOP it. 2. Had 2 episodes of vomiting yesterday and today. This is not unusual for her but wanted to make sure this is not new medicine related. Explained that it is hard to know what is causing her nausea exacerbation. It is hard to say that the Methadone is the cause of it. - She has peritoneal carcinamatosis, venting peg is draining as usual about 1800cc per day. She is tolerating po.    3. Nausea- on Haldol and Phenargan. Explained that Methadone, Haldol and Phenargan may interact and cause side effects including arrythmia, muscle twitching, etc. We have very limited options for pain management given that she cannot take po long acting tablets, fentanyl patch not helpful due to lack of adipose tissue. She is willing to try Methadone for now. If over wknd her pain increases, increase Methadone to 5 mg in the morning and afternoon, 10 mg at bedtime. She has follow up with cardiology on Wednesday, will get EKG there. She is having muscle and bone aches. Last blood work on 3/27- K- 3.3. She had blood work by home health team yesterday. Will replace potassium as needed. Patient and  know to call us should her pain level increase over the weekend.

## 2018-04-16 ENCOUNTER — DOCUMENTATION ONLY (OUTPATIENT)
Dept: PALLATIVE CARE | Age: 58
End: 2018-04-16

## 2018-04-16 NOTE — PROGRESS NOTES
Phone call to patient to follow up on pain medication. She tells me that she \"got through the weekend okay\". No lethargy, feels Methadone is helping a bit but not a whole lot. Her need for every 3 hour Dilaudid 10 mg liquid has decreased to every 3-5 hours. She recognizes that as an improvement. Agrees to increase Methadone to 5 mg every 6 hours from every 8 hours. Total 24 hour dose should be 20 mg / day. She admits to stopping her potassium and magnesium supplements for a week, she is feeling better now and will re start Mg supplements. She is not able to tolerate K+ supplements and will try to eat bananas. We will follow up with the blood work that was done last week.

## 2018-04-17 ENCOUNTER — NURSE NAVIGATOR (OUTPATIENT)
Dept: PALLATIVE CARE | Age: 58
End: 2018-04-17

## 2018-04-17 NOTE — PROGRESS NOTES
Palliative Medicine  Nursing Note  [phone]  Fax [phone]        Clinic Office Visit  Patient Name: Maddie Hammond  YOB: 1960    4/17/2018      Advance Care Planning 4/10/2018   Patient's Healthcare Decision Maker is: Legal Next of Kin   Primary Decision Maker Name Xenia Quezada   Primary Decision Maker Phone Number [phone]; [phone]   Primary Decision Maker Relationship to Patient Spouse   Secondary Decision Maker Name Ronnie Lane    Secondary Decision Maker Phone Number [phone]   Secondary Decision Maker Relationship to Patient Adult child   Confirm Advance Directive None   Patient Would Like to Complete Advance Directive Yes   Does the patient have other document types -     Spoke with Ms. Smith to let her know her lab work looked okay, her Potassium was at her baseline and she doesn't need to have IV Potassium added to her fluid for this week. She was asked how she was tolerating her methadone since she had increased to every 6 hours, she stated that it was helping some but she felt that she would have to wait until to tomorrow to really be able to tell the full effects. She is still having pain at the insertion site of her G-tube that was place weeks ago but that she feels like that methadone is making some difference in the level of pain at the site. She is not feeling sleepy at all from the methadone, states she is tolerating it well. She was told PM nurse will call her tomorrow to follow up.     Malik Crook, CLARISSAN, RN, OCN, [facility]  Palliative Medicine

## 2018-04-18 ENCOUNTER — NURSE NAVIGATOR (OUTPATIENT)
Dept: PALLATIVE CARE | Age: 58
End: 2018-04-18

## 2018-04-18 ENCOUNTER — TELEPHONE (OUTPATIENT)
Dept: ONCOLOGY | Age: 58
End: 2018-04-18

## 2018-04-18 DIAGNOSIS — R18.0 MALIGNANT ASCITES: ICD-10-CM

## 2018-04-18 DIAGNOSIS — G89.3 CANCER ASSOCIATED PAIN: ICD-10-CM

## 2018-04-18 DIAGNOSIS — C78.6 CARCINOMATOSIS PERITONEI (HCC): ICD-10-CM

## 2018-04-18 DIAGNOSIS — K56.609 SBO (SMALL BOWEL OBSTRUCTION) (HCC): ICD-10-CM

## 2018-04-18 DIAGNOSIS — R11.2 CINV (CHEMOTHERAPY-INDUCED NAUSEA AND VOMITING): ICD-10-CM

## 2018-04-18 DIAGNOSIS — C56.3 MALIGNANT NEOPLASM OF BOTH OVARIES (HCC): ICD-10-CM

## 2018-04-18 DIAGNOSIS — T45.1X5A CINV (CHEMOTHERAPY-INDUCED NAUSEA AND VOMITING): ICD-10-CM

## 2018-04-18 NOTE — TELEPHONE ENCOUNTER
[facility] [place] at [facility]   [address] [facility], [address]: [phone]  F: [phone]    Medical Nutrition Therapy      Telephone Encounter: Nutrition referral received from palliative care regarding nutrition and prevention of tube feed clogs. Chart reviewed and spoke with BENY Jang.  RD has previously met with patient, back in October of 2017. Patient with Gtube for decompression and drainage. It has been suggested that patient try to follow more of a liquid diet to prevent clogging of tube, however patient has been reluctant. Called and left a message with patient, offering additional guidance about strategies to prevent clogging. Contact information provided. providing contact information for     I appreciate the opportunity to participate in Ms. Tano Smith's care.     Signed By: Cathy Flores, [address] [place] [address], [address] [place] [address] , [address]     Contact: [phone]

## 2018-04-18 NOTE — PROGRESS NOTES
Palliative Medicine  Nursing Note  [phone])  Fax [phone]        Clinic Office Visit  Patient Name: Kumar Schumacher  YOB: 1960    4/18/2018      Advance Care Planning 4/10/2018   Patient's Healthcare Decision Maker is: Legal Next of Kin   Primary Decision Maker Name Samantha Stiles   Primary Decision Maker Phone Number [phone]; [phone]   Primary Decision Maker Relationship to Patient Spouse   Secondary Decision Maker Name Karis Oh    Secondary Decision Maker Phone Number [phone]   Secondary Decision Maker Relationship to Patient Adult child   Confirm Advance Directive None   Patient Would Like to Complete Advance Directive Yes   Does the patient have other document types -       Spoke with Ms. Smith, she continues to have pain at the insertion site of g-tube but feels that the methadone is helping. She continues to use dilaudid about every 3 hours. She believes that her pain is somewhat better but it is not completely where she would like for it to be. She was told that this would be discussed with Dr. Yaritza Hendricks, PM nurse will call her again tomorrow in follow up.       Nancy Carbajal, CLARISSAN, RN, OCN, VIA [facility]  Palliative Medicine

## 2018-04-19 ENCOUNTER — NURSE NAVIGATOR (OUTPATIENT)
Dept: PALLATIVE CARE | Age: 58
End: 2018-04-19

## 2018-04-19 ENCOUNTER — TELEPHONE (OUTPATIENT)
Dept: PALLATIVE CARE | Age: 58
End: 2018-04-19

## 2018-04-19 ENCOUNTER — TELEPHONE (OUTPATIENT)
Dept: GYNECOLOGY | Age: 58
End: 2018-04-19

## 2018-04-19 DIAGNOSIS — F41.8 ANXIETY ABOUT HEALTH: ICD-10-CM

## 2018-04-19 DIAGNOSIS — C56.3 MALIGNANT NEOPLASM OF BOTH OVARIES (HCC): Primary | ICD-10-CM

## 2018-04-19 DIAGNOSIS — C78.6 CARCINOMATOSIS PERITONEI (HCC): ICD-10-CM

## 2018-04-19 DIAGNOSIS — R10.84 GENERALIZED ABDOMINAL PAIN: ICD-10-CM

## 2018-04-19 DIAGNOSIS — R11.2 CINV (CHEMOTHERAPY-INDUCED NAUSEA AND VOMITING): ICD-10-CM

## 2018-04-19 DIAGNOSIS — D70.1 CHEMOTHERAPY-INDUCED NEUTROPENIA (HCC): ICD-10-CM

## 2018-04-19 DIAGNOSIS — T45.1X5A CINV (CHEMOTHERAPY-INDUCED NAUSEA AND VOMITING): ICD-10-CM

## 2018-04-19 DIAGNOSIS — C56.3 MALIGNANT NEOPLASM OF BOTH OVARIES (HCC): ICD-10-CM

## 2018-04-19 DIAGNOSIS — Z93.2 ILEOSTOMY IN PLACE (HCC): ICD-10-CM

## 2018-04-19 DIAGNOSIS — G89.3 CANCER ASSOCIATED PAIN: ICD-10-CM

## 2018-04-19 DIAGNOSIS — E87.1 HYPONATREMIA: ICD-10-CM

## 2018-04-19 DIAGNOSIS — C56.9 MALIGNANT NEOPLASM OF OVARY, UNSPECIFIED LATERALITY (HCC): ICD-10-CM

## 2018-04-19 DIAGNOSIS — R10.9 INTRACTABLE ABDOMINAL PAIN: ICD-10-CM

## 2018-04-19 DIAGNOSIS — C56.9 OVARIAN CANCER, UNSPECIFIED LATERALITY (HCC): ICD-10-CM

## 2018-04-19 DIAGNOSIS — T45.1X5A CHEMOTHERAPY-INDUCED NEUTROPENIA (HCC): ICD-10-CM

## 2018-04-19 DIAGNOSIS — T45.1X5A ANEMIA DUE TO CHEMOTHERAPY: ICD-10-CM

## 2018-04-19 DIAGNOSIS — K56.609 SMALL BOWEL OBSTRUCTION (HCC): ICD-10-CM

## 2018-04-19 DIAGNOSIS — D64.81 ANEMIA DUE TO CHEMOTHERAPY: ICD-10-CM

## 2018-04-19 RX ORDER — METHADONE HYDROCHLORIDE 5 MG/5ML
5 SOLUTION ORAL 4 TIMES DAILY
Qty: 140 ML | Refills: 0 | Status: SHIPPED | OUTPATIENT
Start: 2018-04-19 | End: 2018-04-26

## 2018-04-19 NOTE — TELEPHONE ENCOUNTER
Patient is calling to speak to nurse regarding her taking her pain medication at 1:45pm.  She states that pain feels worse instead of feeling better. States she does not feel good.

## 2018-04-19 NOTE — TELEPHONE ENCOUNTER
Per Dr Yuly Rios VO for Subsys  400 mcg every 4 hours , patient aware , documents will be completed on tomorrow

## 2018-04-19 NOTE — TELEPHONE ENCOUNTER
Patient called to discuss Eliquis. Continues with light bloody discharge from stoma. Advised to continue holding. 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 No stoma output. +Gtube output. Abd pain continues, working with pall care actively for control today, receives methadone, dilaudid. Consider SL fentanyl. She has appt to see them next week. She has no appt to see us upcoming. Ask that she see us within 2 weeks to monitor her status- emotional, physical and psych. Currently she feels she is in a holding pattern. She has heard of, but not ready to commit to hospice, desiring to see how she does and hoping for additional treatment. She is invited to call/present sooner if she desires.      BENY Headley

## 2018-04-19 NOTE — TELEPHONE ENCOUNTER
Patient calling to speak to nurse regarding instructions she got from MD however she cannot read her writing now. Call transferred to nurse.

## 2018-04-19 NOTE — PROGRESS NOTES
Palliative Medicine  Nursing Note  [phone])  Fax [phone]        Clinic Office Visit  Patient Name: Sridevi Chase  YOB: 1960    4/19/2018      Advance Care Planning 4/10/2018   Patient's Healthcare Decision Maker is: Legal Next of Kin   Primary Decision Maker Name Greg Alvarez   Primary Decision Maker Phone Number [phone]; [phone]   Primary Decision Maker Relationship to Patient Spouse   Secondary Decision Maker Name Sydnee Orellana    Secondary Decision Maker Phone Number [phone]   Secondary Decision Maker Relationship to Patient Adult child   Confirm Advance Directive None   Patient Would Like to Complete Advance Directive Yes   Does the patient have other document types -     Spoke with Ms. Smith who needs a refill on her methadone prescription. It has been printed and her  will pick it up at the [facility] [place] office this afternooon. She also shared that she is not feeling as well today, she felt a little chilled this morning temp was 99.3, had some abdomen pain that was different than her usual pain although she has had it in the past but not for a while. She was instructed to she keep a close eye on it and to check her temperature if she continued to feel this way. Dr. David Cunningham made aware of this. Ms. Moisés Cota was also instructed to call if she has any other concerns or issues that arise.     Pili Rosa, BSN, RN, OCN, VIA [facility]  Palliative Medicine

## 2018-04-23 ENCOUNTER — TELEPHONE (OUTPATIENT)
Dept: PALLATIVE CARE | Age: 58
End: 2018-04-23

## 2018-04-23 ENCOUNTER — DOCUMENTATION ONLY (OUTPATIENT)
Dept: PALLATIVE CARE | Age: 58
End: 2018-04-23

## 2018-04-23 NOTE — PROGRESS NOTES
Phone call with patient to follow up. Per patient, increase in Methadone to 5-10-10 mg and Dilaudid 15 mg every 3 hours did help Sunday which allowed her to do more activities, but pain has been quite severe since about 4 am today. She feels that the dilaudid helps but not more than 1-2 hours. She wants to take Dilaudid 15 mg every 2 hours as needed only. Her output via g tube improved now we are not clamping it regularly when she is taking medicines. She is still waiting to receive Subsys. She will call us once she receives it so we can walk her through how to use it. She will follow up in clinic with me tomorrow.

## 2018-04-24 ENCOUNTER — OFFICE VISIT (OUTPATIENT)
Dept: PALLATIVE CARE | Age: 58
End: 2018-04-24

## 2018-04-24 ENCOUNTER — TELEPHONE (OUTPATIENT)
Dept: PALLATIVE CARE | Age: 58
End: 2018-04-24

## 2018-04-24 VITALS
OXYGEN SATURATION: 94 % | RESPIRATION RATE: 18 BRPM | HEIGHT: 62 IN | WEIGHT: 72.8 LBS | HEART RATE: 109 BPM | BODY MASS INDEX: 13.4 KG/M2 | DIASTOLIC BLOOD PRESSURE: 76 MMHG | SYSTOLIC BLOOD PRESSURE: 107 MMHG

## 2018-04-24 DIAGNOSIS — C56.3 MALIGNANT NEOPLASM OF BOTH OVARIES (HCC): ICD-10-CM

## 2018-04-24 DIAGNOSIS — C78.6 CARCINOMATOSIS PERITONEI (HCC): ICD-10-CM

## 2018-04-24 DIAGNOSIS — K56.699 OTHER SPECIFIED INTESTINAL OBSTRUCTION, UNSPECIFIED WHETHER PARTIAL OR COMPLETE (HCC): Primary | ICD-10-CM

## 2018-04-24 DIAGNOSIS — R10.9 INTRACTABLE ABDOMINAL PAIN: ICD-10-CM

## 2018-04-24 DIAGNOSIS — R11.2 INTRACTABLE VOMITING WITH NAUSEA, UNSPECIFIED VOMITING TYPE: ICD-10-CM

## 2018-04-24 RX ORDER — DEXAMETHASONE 4 MG/1
4 TABLET ORAL 3 TIMES DAILY
Qty: 90 TAB | Refills: 0 | Status: SHIPPED | OUTPATIENT
Start: 2018-04-24

## 2018-04-24 RX ORDER — HALOPERIDOL 2 MG/ML
4 SOLUTION ORAL
Qty: 60 ML | Refills: 3 | Status: SHIPPED | OUTPATIENT
Start: 2018-04-24

## 2018-04-24 RX ORDER — NALOXONE HYDROCHLORIDE 4 MG/.1ML
SPRAY NASAL
Qty: 5 EACH | Refills: 1 | Status: SHIPPED | OUTPATIENT
Start: 2018-04-24

## 2018-04-24 NOTE — PATIENT INSTRUCTIONS
Dear Zaida Murillo ,    It was a pleasure seeing you today at our office. Your described symptoms were: Fatigue: 6 Drowsiness: 6   Depression: 4 Pain: 6   Anxiety: 6 Nausea: 5   Anorexia: 6 Dyspnea: 6   Best Well-Bein Constipation: No   Other Problem (Comment): 5       This is the plan we talked about:     1. Abdominal pain  - You are suffering from very sharp pain around the peg tube and constant abdominal pain related to the cancer/ intermittent blocakages. - I am also worried that there is very poor absorption of the medicines you are taking orally and that is why we have had to increase the doses of Dilaudid and Methadone over the last 2 weeks. - You do not have any excessive sedation from the pain medicines. - Increase Methadone to 15 mg (1.5ml) every 8 hours. This is your long acting medicine to allow for less frequent use of Dilaudid. We are changing the prescription to a highly concentrated version to allow for optimal absorption.  - Increase Dilaudid 15 mg (1.5ml) every 2 hours as needed for abdominal pain- we agreed on such frequent doses to allow for optimal absorption of medicine.  - You DO NOT have to clamp the venting peg for 30- 90 minutes after each dose of medicine anymore. I believe clamping the peg tube is causing you more nausea and pain. It is okay to clamp 5-10 mins only after each dose of pain medicine. 2. Subsys  - This is sublingual Fentanyl spray. I believe the addition of this medicine will help us decrease the amount of dilaudid you need. I also hope you will be able to absorb the Fentanyl spray much better than the liquid opioid medicines. - You required 2 sprays of 100 mcg to help with some pain this morning, you can take 2 sprays together for your next dose and repeat every 4 hours. - If the 2 sprays are not helping with pain control, we will have to increase it to 3 sprays at a time. - We will check in with you to see how you are doing.     3. Sleep  - You sleep better when you remember to take your Ambien 10 mg at bedtime. Please continue that. 4. Nausea  - You are on Haldol and promethazine as needed. 5. Opioid safety  - You and your family are well educated and aware of the risks of high dose opioids. You have not had any sedating side effects so far and there is someone with you at home always. - I am providing you with Narcan (opioid overdose reversal medicine) to use if ever needed. 6. Bowel obstruction  - We discussed this being an expected complication for you at any given time. - I want to provide you with Decadron 4 mg tabs to keep at home  - Should you develop a bowel blockage   - Decrease output via venting peg - for now, you drain about 1800cc/ day   - Severe increase in nausea not controlled by Haldol or Phenargan   - Severe increase in abdominal pain   - For any or all of the above can be indications of impending bowel blockage. You can start Decadron 4 mg three times a day at this time. 4. Goals of care  - You have an AMD  - Your goal is to get the pain under better control so you can discuss further palliative treatment options. You are following up with Dr. Darshana Medina next week. - You shared you want a protective DNR and we signed DDNR. This is what you have shared with us about Floyd Elizabeth. Planning 4/10/2018   Patient's Healthcare Decision Maker is: Legal Next of Kin   Primary Decision Maker Name Xenia Quezada   Primary Decision Maker Phone Number [phone]; [phone]   Primary Decision Maker Relationship to Patient Spouse   Secondary Decision Maker Name Ronnie Lane    Secondary Decision Maker Phone Number [phone]   Secondary Decision Maker Relationship to Patient Adult child   Confirm Advance Directive None   Patient Would Like to Complete Advance Directive Yes   Does the patient have other document types -         The Palliative Medicine Team is here to support you and your family.      We will see you again in 2 weeks    Sincerely,      Gisele aGrcia MD and the Palliative Medicine Team

## 2018-04-24 NOTE — MR AVS SNAPSHOT
[address] [facility] 1200 [facility] [address] [place] [address] 
[phone] Patient: Nayeli Almanza MRN: [id number] :1960 Visit Information Date & Time Provider Department Dept. Phone Encounter #  
 2018 12:30 PM Yong Gould MD Palliative [address] [id number] Your Appointments 5/3/2018  1:15 PM  
3 WEEK with Edna Muñiz MD  
[facility] Gynecologic Oncology [facility]) Appt Note: 3 week ck [address] [facility] [phone]  
[address] [place] [address] [place] [address] Upcoming Health Maintenance Date Due Hepatitis C Screening 1960 Pneumococcal 19-64 Highest Risk (1 of 3 - PCV13) 10/29/1978 DTaP/Tdap/Td series (1 - Tdap) 10/29/1980 PAP AKA CERVICAL CYTOLOGY 10/29/1980 FOBT Q 1 YEAR AGE 50-75 10/29/2009 Influenza Age 5 to Adult 2017 BREAST CANCER SCRN MAMMOGRAM 2018* *Topic was postponed. The date shown is not the original due date. Allergies as of 2018  Review Complete On: 2018 By: Yong Gould MD  
  
 Severity Noted Reaction Type Reactions Ativan [Lorazepam] Low 2016   Side Effect Other (comments) SEVERE CONFUSION Current Immunizations  Reviewed on 2018 No immunizations on file. Not reviewed this visit You Were Diagnosed With   
  
 Codes Comments Other specified intestinal obstruction, unspecified whether partial or complete ([address]    -  Primary ICD-10-CM: W89.358 ICD-9-CM: 560.89 Malignant neoplasm of both ovaries ([address]     ICD-10-CM: C56.1, C56.2 ICD-9-CM: 183.0 Intractable abdominal pain     ICD-10-CM: R10.9 ICD-9-CM: 789.00 Vitals BP Pulse Resp Height(growth percentile) Weight(growth percentile) SpO2  
 107/76 (BP 1 Location: Left arm, BP Patient Position: Sitting) (!) 109 18 5' 2\" (1.575 m) 72 lb 12.8 oz (33 kg) 94% BMI OB Status Smoking Status 13.32 kg/m2 Hysterectomy Never Smoker Vitals History BMI and BSA Data Body Mass Index Body Surface Area  
 13.32 kg/m 2 1.2 m 2 Preferred Pharmacy Pharmacy Name Phone [facility] [phone] Your Updated Medication List  
  
   
This list is accurate as of 4/24/18  2:06 PM.  Always use your most recent med list.  
  
  
  
  
 dexamethasone 4 mg tablet Commonly known as:  DECADRON Take 1 Tab by mouth three (3) times daily. DEXILANT 60 mg Cpdb Generic drug:  Dexlansoprazole Take 60 mg by mouth daily. dronabinol 5 mg/mL Soln Commonly known as:  SYNDROS Take 2.1 mg by mouth two (2) times a day. Max Daily Amount: 4.2 mg. Take 30 minutes before lunch and dinner  
  
 famotidine 40 mg tablet Commonly known as:  PEPCID Take 1 Tab by mouth two (2) times a day. * fentaNYL 400 mcg/spray Spry Commonly known as:  SUBSYS  
400 mcg by SubLINGual route every four (4) hours as needed. Max Daily Amount: 2,400 mcg. * fentaNYL 100 mcg/spray Spry Commonly known as:  SUBSYS  
100 mcg by SubLINGual route every four (4) hours. Max Daily Amount: 600 mcg.  
  
 haloperidol 2 mg/mL oral concentrate Commonly known as:  HALDOL  
2 mL by Per G Tube route every eight (8) hours as needed (Nausea). Indications: CANCER CHEMOTHERAPY-INDUCED NAUSEA AND VOMITING  
  
 L. acidoph & paracasei- S therm- Bifido 8 billion cell Cap cap Commonly known as:  HILTON-Q/RISAQUAD Take 1 Cap by mouth daily. magnesium oxide 400 mg tablet Commonly known as:  MAG-OX Take 1 Tab by mouth two (2) times a day. Indications: HYPOMAGNESEMIA  
  
 methadone 5 mg/5 mL oral solution Commonly known as:  DOLOPHINE Take 5 mL by mouth four (4) times daily for 7 days. Max Daily Amount: 20 mg.  
  
 metoclopramide 5 mg/5 mL syrup Commonly known as:  REGLAN  
 Take 10 mL by mouth four (4) times daily as needed. Take before meals as needed  
  
 naloxone 4 mg/actuation nasal spray Commonly known as:  [employer] Use 1 spray intranasally into 1 nostril. Use a new Narcan nasal spray for subsequent doses and administer into alternating nostrils. May repeat every 2 to 3 minutes as needed. Indications: OPIATE-INDUCED RESPIRATORY DEPRESSION  
  
 ondansetron 4 mg disintegrating tablet Commonly known as:  ZOFRAN ODT Take 1 Tab by mouth every eight (8) hours as needed for Nausea. OTHER Hydromorphone 5mg/1ml every 3 hours as needed for pain. Use 2 ml per dose. OTHER Dilaudid 10 mg/ 1ml Take 15 mg (1.5 ml) every 2 hours as needed for abdominal pain OTHER Methadone 10 mg/ 1 ml Take 15 mg (1.5ml) every 8 hours for abdominal pain  
  
 potassium chloride 20 mEq packet Commonly known as:  KLOR-CON Take 1 Packet by mouth two (2) times a day. promethazine 6.25 mg/5 mL syrup Commonly known as:  PHENERGAN Take 20 mL by mouth four (4) times daily as needed for Nausea. scopolamine 1 mg over 3 days Pt3d Commonly known as:  TRANSDERM-SCOP  
1 Patch by TransDERmal route every seventy-two (72) hours as needed. sucralfate 100 mg/mL suspension Commonly known as:  Jocelyn Hooks Take 5 mL by mouth four (4) times daily. zolpidem 10 mg tablet Commonly known as:  AMBIEN Take 1 Tab by mouth nightly as needed for Sleep. Max Daily Amount: 10 mg. Indications: SLEEP-ONSET INSOMNIA * Notice: This list has 2 medication(s) that are the same as other medications prescribed for you. Read the directions carefully, and ask your doctor or other care provider to review them with you. Prescriptions Printed Refills OTHER 0 Sig: Dilaudid 10 mg/ 1ml Take 15 mg (1.5 ml) every 2 hours as needed for abdominal pain  
 Class: Print OTHER 0 Sig: Methadone 10 mg/ 1 ml Take 15 mg (1.5ml) every 8 hours for abdominal pain Class: Print  
 naloxone (NARCAN) 4 mg/actuation nasal spray 1 Sig: Use 1 spray intranasally into 1 nostril. Use a new Narcan nasal spray for subsequent doses and administer into alternating nostrils. May repeat every 2 to 3 minutes as needed. Indications: OPIATE-INDUCED RESPIRATORY DEPRESSION Class: Print Prescriptions Sent to Pharmacy Refills  
 dexamethasone (DECADRON) 4 mg tablet 0 Sig: Take 1 Tab by mouth three (3) times daily. Class: Normal  
 Pharmacy: [address] [facility] [address] Ph #: [phone] Route: Oral  
  
Patient Instructions Dear Chet Ford , It was a pleasure seeing you today at our office. Your described symptoms were: Fatigue: 6 Drowsiness: 6 Depression: 4 Pain: 6 Anxiety: 6 Nausea: 5 Anorexia: 6 Dyspnea: 6 Best Well-Bein Constipation: No  
Other Problem (Comment): 5 This is the plan we talked about: 1. Abdominal pain - You are suffering from very sharp pain around the peg tube and constant abdominal pain related to the cancer/ intermittent blocakages. - I am also worried that there is very poor absorption of the medicines you are taking orally and that is why we have had to increase the doses of Dilaudid and Methadone over the last 2 weeks. - You do not have any excessive sedation from the pain medicines. - Increase Methadone to 15 mg (1.5ml) every 8 hours. This is your long acting medicine to allow for less frequent use of Dilaudid. We are changing the prescription to a highly concentrated version to allow for optimal absorption. 
- Increase Dilaudid 15 mg (1.5ml) every 2 hours as needed for abdominal pain- we agreed on such frequent doses to allow for optimal absorption of medicine. 
- You DO NOT have to clamp the venting peg for 30- 90 minutes after each dose of medicine anymore.  I believe clamping the peg tube is causing you more nausea and pain. It is okay to clamp 5-10 mins only after each dose of pain medicine. 2. Darine Madison - This is sublingual Fentanyl spray. I believe the addition of this medicine will help us decrease the amount of dilaudid you need. I also hope you will be able to absorb the Fentanyl spray much better than the liquid opioid medicines. - You required 2 sprays of 100 mcg to help with some pain this morning, you can take 2 sprays together for your next dose and repeat every 4 hours. - If the 2 sprays are not helping with pain control, we will have to increase it to 3 sprays at a time. - We will check in with you to see how you are doing. 3. Sleep - You sleep better when you remember to take your Ambien 10 mg at bedtime. Please continue that. 4. Nausea - You are on Haldol and promethazine as needed. 5. Opioid safety - You and your family are well educated and aware of the risks of high dose opioids. You have not had any sedating side effects so far and there is someone with you at home always. - I am providing you with Narcan (opioid overdose reversal medicine) to use if ever needed. 6. Bowel obstruction - We discussed this being an expected complication for you at any given time. - I want to provide you with Decadron 4 mg tabs to keep at home - Should you develop a bowel blockage - Decrease output via venting peg - for now, you drain about 1800cc/ day - Severe increase in nausea not controlled by Haldol or Phenargan - Severe increase in abdominal pain - For any or all of the above can be indications of impending bowel blockage. You can start Decadron 4 mg three times a day at this time. 4. Goals of care - You have an AMD 
- Your goal is to get the pain under better control so you can discuss further palliative treatment options. You are following up with Dr. Stevan Joseph next week. - You shared you want a protective DNR and we signed DDNR. This is what you have shared with us about Advance Care Planning Advance Care Planning 4/10/2018 Patient's Healthcare Decision Maker is: Legal Next of Kin Primary Decision Maker Name Guevara Miller Primary Decision Maker Phone Number [phone]; [phone] Primary Decision Maker Relationship to Patient Spouse Secondary Decision Maker Name Bianca Fountain Secondary Decision Maker Phone Number [phone] Secondary Decision Maker Relationship to Patient Adult child Confirm Advance Directive None Patient Would Like to Complete Advance Directive Yes Does the patient have other document types - The Palliative Medicine Team is here to support you and your family. We will see you again in 2 weeks Sincerely, Nehal Alvarado MD and the Palliative Medicine Team 
 
 
  
Introducing [facility]! Dear Alicja Wilson: 
Thank you for requesting a independenceIT account. Our records indicate that you already have an active independenceIT account. You can access your account anytime at [email or web address] Did you know that you can access your hospital and ER discharge instructions at any time in independenceIT? You can also review all of your test results from your hospital stay or ER visit. Additional Information If you have questions, please visit the Frequently Asked Questions section of the independenceIT website at [email or web address] Remember, independenceIT is NOT to be used for urgent needs. For medical emergencies, dial 911. Now available from your iPhone and Android! Please provide this summary of care documentation to your next provider. Your primary care clinician is listed as Yeni Pike. If you have any questions after today's visit, please call [phone].

## 2018-04-24 NOTE — PROGRESS NOTES
Palliative Medicine Outpatient Services  Mccabe: [phone])    Patient Name: Tonya Nagel  YOB: 1960    Date of Current Visit: 04/24/18  Location of Current Visit:    [x] [facility] Office  [] [facility]  [] [facility] Office  [] Home  [] Other:      Date of Initial Visit: 4/11/18  Requesting Physician: Dr. Ambrosio Borrego  Primary Care Physician: Scout Khan MD      SUMMARY:   Tonya Nagel is a 61y.o. year old with a past history of stage IV ovarian cancer followed by Dr Ambrosio Borrego who has had previous debulking surgery for extensive disease and chemo, prior complications with fecal peritonitis, cecal perforation, SBO s/p ileostomy, multiple admissions in the past for the same, disease progression with several lines of chemotherapy. She had venting peg placed in March during her last hospitalization, she has about 2 l output from venting peg daily, gets 2 l fluids via port at home. She is currently on chemo holiday due to severe weakness and uncontrolled pain. She is here to help with intractable abdominal pain management and psychosocial support. Lives with , daughter visits 2-3 times per week, very helpful. She is alone at home for some hours every day when  is out on chores or work, she is able to perform ADLs independently. Has 2 sons who live in [place] and [place]. She worked as an  for a [employer] but mostly has been . Multiple opioid dose increases were made since last visit 2 weeks ago. Patient remains in intractable pain albeit slightly more comfortable. PALLIATIVE DIAGNOSES:       ICD-10-CM ICD-9-CM    1. Other specified intestinal obstruction, unspecified whether partial or complete (HCC) K56.699 560.89 dexamethasone (DECADRON) 4 mg tablet   2. Intractable abdominal pain R10.9 789.00 OTHER      OTHER   3. Malignant neoplasm of both ovaries (HCC) C56.1 183.0 OTHER    C56.2  OTHER   4. Carcinomatosis peritonei (HCC) C78.6 197.6     C80.1 199.1    5. Intractable vomiting with nausea, unspecified vomiting type R11.2 536.2           PLAN:     Patient Instructions     Dear Vergil Libman ,    It was a pleasure seeing you today at our office. Your described symptoms were: Fatigue: 6 Drowsiness: 6   Depression: 4 Pain: 6   Anxiety: 6 Nausea: 5   Anorexia: 6 Dyspnea: 6   Best Well-Bein Constipation: No   Other Problem (Comment): 5       This is the plan we talked about:     1. Abdominal pain  - You are suffering from very sharp pain around the peg tube and constant abdominal pain related to the cancer/ intermittent blocakages. - I am also worried that there is very poor absorption of the medicines you are taking orally and that is why we have had to increase the doses of Dilaudid and Methadone over the last 2 weeks. - You do not have any excessive sedation from the pain medicines. - Increase Methadone to 15 mg (1.5ml) every 8 hours. This is your long acting medicine to allow for less frequent use of Dilaudid. We are changing the prescription to a highly concentrated version to allow for optimal absorption.  - Increase Dilaudid 15 mg (1.5ml) every 2 hours as needed for abdominal pain- we agreed on such frequent doses to allow for optimal absorption of medicine.  - You DO NOT have to clamp the venting peg for 30- 90 minutes after each dose of medicine anymore. I believe clamping the peg tube is causing you more nausea and pain. It is okay to clamp 5-10 mins only after each dose of pain medicine. 2. Subsys  - This is sublingual Fentanyl spray. I believe the addition of this medicine will help us decrease the amount of dilaudid you need. I also hope you will be able to absorb the Fentanyl spray much better than the liquid opioid medicines. - You required 2 sprays of 100 mcg to help with some pain this morning, you can take 2 sprays together for your next dose and repeat every 4 hours.   - If the 2 sprays are not helping with pain control, we will have to increase it to 3 sprays at a time. - We will check in with you to see how you are doing. 3. Sleep  - You sleep better when you remember to take your Ambien 10 mg at bedtime. Please continue that. 4. Nausea  - You are on Haldol and promethazine as needed. 5. Opioid safety  - You and your family are well educated and aware of the risks of high dose opioids. You have not had any sedating side effects so far and there is someone with you at home always. - I am providing you with Narcan (opioid overdose reversal medicine) to use if ever needed. 6. Bowel obstruction  - We discussed this being an expected complication for you at any given time. - I want to provide you with Decadron 4 mg tabs to keep at home  - Should you develop a bowel blockage   - Decrease output via venting peg - for now, you drain about 1800cc/ day   - Severe increase in nausea not controlled by Haldol or Phenargan   - Severe increase in abdominal pain   - For any or all of the above can be indications of impending bowel blockage. You can start Decadron 4 mg three times a day at this time. 4. Goals of care  - You have an AMD  - Your goal is to get the pain under better control so you can discuss further palliative treatment options. You are following up with Dr. Quincy Saravia next week. - You shared you want a protective DNR and we signed DDNR.       This is what you have shared with us about Floyd Elizabeth. Planning 4/10/2018   Patient's Healthcare Decision Maker is: Legal Next of Kya 68   Primary Decision Maker Name Sergo Hendricks   Primary Decision Maker Phone Number [phone]; [phone]   Primary Decision Maker Relationship to Patient Spouse   Secondary Decision Maker Name Wendi Mooney    Secondary Decision Maker Phone Number [phone]   Secondary Decision Maker Relationship to Patient Adult child   Confirm Advance Directive None   Patient Would Like to Complete Advance Directive Yes   Does the patient have other document types -         The Palliative Medicine Team is here to support you and your family. We will see you again in 2 weeks    Sincerely,      Chika Choudhury MD and the Palliative Medicine Team      Counseling and Coordination:   Goals of care discussion- 30 + mins     GOALS OF CARE / TREATMENT PREFERENCES:   [====Goals of Care====]  GOALS OF CARE:  Patient / health care proxy stated goals: DNR      TREATMENT PREFERENCES:   Code Status:  [] Attempt Resuscitation       [x] Do Not Attempt Resuscitation    Advance Care Planning:  Advance Care Planning 4/10/2018   Patient's Healthcare Decision Maker is: Legal Next of Kya 68   Primary Decision Maker Name Tracy Hale   Primary Decision Maker Phone Number [phone]; [phone]   Primary Decision Maker Relationship to Patient Spouse   Secondary Decision Maker Name Aleida Rincon Phone Number [phone]   Secondary Decision Maker Relationship to Patient Adult child   Confirm Advance Directive None   Patient Would Like to Complete Advance Directive Yes   Does the patient have other document types -       Other:  (If patient appropriate for POST, consider using PALLPOST smart phrase here)    The palliative care team has discussed with patient / health care proxy about goals of care / treatment preferences for patient.  [====Goals of Care====]         PRESCRIPTIONS GIVEN:     Medications Ordered Today   Medications    OTHER     Sig: Dilaudid 10 mg/ 1ml  Take 15 mg (1.5 ml) every 2 hours as needed for abdominal pain     Dispense:  270 mL     Refill:  0    OTHER     Sig: Methadone 10 mg/ 1 ml  Take 15 mg (1.5ml) every 8 hours for abdominal pain     Dispense:  70 mL     Refill:  0    dexamethasone (DECADRON) 4 mg tablet     Sig: Take 1 Tab by mouth three (3) times daily. Dispense:  90 Tab     Refill:  0    naloxone (NARCAN) 4 mg/actuation nasal spray     Sig: Use 1 spray intranasally into 1 nostril.  Use a new Narcan nasal spray for subsequent doses and administer into alternating nostrils. May repeat every 2 to 3 minutes as needed. Indications: OPIATE-INDUCED RESPIRATORY DEPRESSION     Dispense:  5 Each     Refill:  1           FOLLOW UP:     Future Appointments  Date Time Provider Layton Mujica   5/3/2018 1:15 PM Jaclyn Harris MD [facility]  IN CARE:   Patient Care Team:  Maria Dolores Norton MD as PCP - General (Internal Medicine)  Jaclyn Harris MD (Gynecologic Oncology)       HISTORY:   Nursing documentation from date of visit reviewed. Reviewed patient-completed ESAS and advance care planning form. Reviewed patient record in prescription monitoring program.    CHIEF COMPLAINT: abdominal pain    HPI/SUBJECTIVE:    The patient is: [x] Verbal / [] Nonverbal     Pleasant woman here with . She reports being a bit more comfortable now than she was 2 weeks ago but still needs to take the dilaudid every 2-3 hours to keep up with pain control. We discussed how I believe she must have little to very poor oral and GI absorption given her small gut, venting peg, altered anatomy of her GI system that she is likely only absorbing 40-60% of her medicine. Abdominal pain- excruciating pain around the peg tube but felt all over abdomen. She is currently taking dilaudid 15 mg every 2-3 hours and Methadone    Pain and fatigue are her biggest limiting factors. Sleep- she sleeps well when she takes her usual Ambien    She was clamping her venting peg for 1.5 hours after ever dose of medicine which was causing more nausea and abdominal pain.  notes that she is better with cutting down the clamping time to about 30 mins. She continues to eat full liquid diet. She is never too sleepy, / relative or friend is always with her. She wants to get her pain better controlled, planning to talk with her children this weekend and share with them how sick she is is.  She is not planning on \"giving up\", she still intends to try chemo once pain is better controlled. -----------------------    She shared that she is on a chemo holiday. She hopes to try any other chemo/ immunotherapy Dr. Sunshine Lauren has to offer in the near future. She shares she does value her quality of life and if at any time I feel she needs hospice, I should let her know and she will agree. She connects venting peg to gravity all the time except when clamping it after food intake for 1.5 hours. She wants to know what to eat that won't clog her tube. She drains about 1.8- 2l per day from the peg. She gets 2l fluids via port at home from home health. Her goals- to improve pain management, improve strength in daily activities, travel to [address] [facility] and [address] [address] to see her sons. Wants chemo holiday for now, wants to see how she feels in a month or so and then might want some oral therapy that Dr. Sunshine Lauren is offering.  Understands that her disease is not curable.     End of life wishes- wants protective DNR    Clinical Pain Assessment (nonverbal scale for nonverbal patients):   [++++ Clinical Pain Assessment++++]  [++++Pain Severity++++]: Pain: 6  [++++Pain Character++++]: severe, sharp  [++++Pain Duration++++]:months, more over the past 1 week  [++++Pain Effect++++]:functional and emotional  [++++Pain Factors++++]:none  [++++Pain Frequency++++]:on and off with continuous dull pain  [++++Pain Location++++]:abdomen  [++++ Clinical Pain Assessment++++]       FUNCTIONAL ASSESSMENT:     Palliative Performance Scale (PPS):  PPS: 70 70       PSYCHOSOCIAL/SPIRITUAL SCREENING:     Any spiritual / Denominational concerns:  [] Yes /  [x] No    Caregiver Burnout:  [] Yes /  [x] No /  [] No Caregiver Present      Anticipatory grief assessment:   [x] Normal  / [] Maladaptive       ESAS Anxiety: Anxiety: 6    ESAS Depression: Depression: 4       REVIEW OF SYSTEMS:     The following systems were [] reviewed / [] unable to be reviewed  Systems: constitutional, ears/nose/mouth/throat, respiratory, gastrointestinal, genitourinary, musculoskeletal, integumentary, neurologic, psychiatric, endocrine. Positive findings noted below. Modified ESAS Completed by: provider   Fatigue: 6 Drowsiness: 6   Depression: 4 Pain: 6   Anxiety: 6 Nausea: 5   Anorexia: 6 Dyspnea: 6   Best Well-Bein Constipation: No   Other Problem (Comment): 5          PHYSICAL EXAM:     Wt Readings from Last 3 Encounters:   18 72 lb 12.8 oz (33 kg)   04/10/18 82 lb 14.4 oz (37.6 kg)   04/10/18 82 lb 9.6 oz (37.5 kg)     Blood pressure 107/76, pulse (!) 109, resp. rate 18, height 5' 2\" (1.575 m), weight 72 lb 12.8 oz (33 kg), SpO2 94 %, unknown if currently breastfeeding. Last bowel movement: See Nursing Note    Constitutional: alert, oriented, very thin but dressed well and appears well. Eyes: pupils equal, anicteric  ENMT: no nasal discharge, moist mucous membranes  Cardiovascular: regular rhythm, distal pulses intact  Respiratory: breathing not labored, symmetric  Gastrointestinal: ileostomy with bag in place, venting peg in place.   Musculoskeletal: no deformity, no tenderness to palpation  Skin: warm, dry  Neurologic: following commands, moving all extremities  Psychiatric: full affect, no hallucinations  Other:       HISTORY:     Past Medical History:   Diagnosis Date    Abdominal carcinomatosis ([facility] 10/2015    Arthritis     left shoulder    BRCA negative 2015    Chronic pain     Depression     HX    Environmental allergies     GERD (gastroesophageal reflux disease)     Hypertension     NEW OVER PAST 5-6 MONTHS    Ovarian cancer ([facility] 10/2015    serous adenocarcinoma, high grade, stage IIIC    Pulmonary embolism ([facility] 10/2015      Past Surgical History:   Procedure Laterality Date    CARDIAC SURG PROCEDURE UNLIST  12/11/15    cardiac cath/normal     HX BREAST BIOPSY      benign right breast bx    HX DILATION AND CURETTAGE  2011    POLYPECTOMY    HX GI   PERFORATED BOWEL; ILEOSTOMY    HX GYN  10/2015    EXP LAPAROTOMY    HX HEENT  LAST 2005    oral tissue graft X3    HX HEENT  1970    tonsillectomy    HX LAPAROTOMY  10/2015    tumor sampling    HX LAPAROTOMY  4/2016    hysterectomy, BSO, radical debulking    HX LAPAROTOMY  5/2016    resection ileocolic anastomosis, leak, ileostomy    HX OTHER SURGICAL  11/2015    tunneled portacath      Family History   Problem Relation Age of Onset    Cancer Maternal Uncle      skin    Hypertension Mother     High Cholesterol Mother     Anxiety Mother     Heart Disease Mother     High Cholesterol Father     Hypertension Father     Stroke Father     Arthritis-osteo Sister     Migraines Sister     Other Sister      FIBROMYALGIA    Depression Brother     Other Brother      DRUG ABUSE HEPATITIS C    Migraines Sister     Arrhythmia Sister     Depression Sister     Lung Disease Paternal Aunt      COPD    Cancer Paternal Uncle      LUNG    Lung Disease Paternal Uncle      COPD    Lung Disease Maternal Grandmother      COPD    Anesth Problems Neg Hx       History reviewed, no pertinent family history. Social History   Substance Use Topics    Smoking status: Never Smoker    Smokeless tobacco: Never Used    Alcohol use No     Allergies   Allergen Reactions    Ativan [Lorazepam] Other (comments)     SEVERE CONFUSION      Current Outpatient Prescriptions   Medication Sig    OTHER Dilaudid 10 mg/ 1ml  Take 15 mg (1.5 ml) every 2 hours as needed for abdominal pain    OTHER Methadone 10 mg/ 1 ml  Take 15 mg (1.5ml) every 8 hours for abdominal pain    dexamethasone (DECADRON) 4 mg tablet Take 1 Tab by mouth three (3) times daily.  naloxone (NARCAN) 4 mg/actuation nasal spray Use 1 spray intranasally into 1 nostril. Use a new Narcan nasal spray for subsequent doses and administer into alternating nostrils. May repeat every 2 to 3 minutes as needed.   Indications: OPIATE-INDUCED RESPIRATORY DEPRESSION    fentaNYL (SUBSYS) 400 mcg/spray spry 400 mcg by SubLINGual route every four (4) hours as needed. Max Daily Amount: 2,400 mcg.  fentaNYL (SUBSYS) 100 mcg/spray spry 100 mcg by SubLINGual route every four (4) hours. Max Daily Amount: 600 mcg.  OTHER Hydromorphone 5mg/1ml every 3 hours as needed for pain. Use 2 ml per dose.  methadone (DOLOPHINE) 5 mg/5 mL oral solution Take 5 mL by mouth four (4) times daily for 7 days. Max Daily Amount: 20 mg.    scopolamine (TRANSDERM-SCOP) 1 mg over 3 days pt3d 1 Patch by TransDERmal route every seventy-two (72) hours as needed.  famotidine (PEPCID) 40 mg tablet Take 1 Tab by mouth two (2) times a day.  promethazine (PHENERGAN) 6.25 mg/5 mL syrup Take 20 mL by mouth four (4) times daily as needed for Nausea.  haloperidol (HALDOL) 2 mg/mL oral concentrate 2 mL by Per G Tube route every eight (8) hours as needed (Nausea). Indications: CANCER CHEMOTHERAPY-INDUCED NAUSEA AND VOMITING    zolpidem (AMBIEN) 10 mg tablet Take 1 Tab by mouth nightly as needed for Sleep. Max Daily Amount: 10 mg. Indications: SLEEP-ONSET INSOMNIA    dronabinol (SYNDROS) 5 mg/mL soln Take 2.1 mg by mouth two (2) times a day. Max Daily Amount: 4.2 mg. Take 30 minutes before lunch and dinner    magnesium oxide (MAG-OX) 400 mg tablet Take 1 Tab by mouth two (2) times a day. Indications: HYPOMAGNESEMIA    sucralfate (CARAFATE) 100 mg/mL suspension Take 5 mL by mouth four (4) times daily.  L. acidoph & paracasei- S therm- Bifido (HILTON-Q/RISAQUAD) 8 billion cell cap cap Take 1 Cap by mouth daily.  Dexlansoprazole (DEXILANT) 60 mg CpDB Take 60 mg by mouth daily.  potassium chloride (KLOR-CON) 20 mEq packet Take 1 Packet by mouth two (2) times a day.  metoclopramide (REGLAN) 5 mg/5 mL syrup Take 10 mL by mouth four (4) times daily as needed.  Take before meals as needed    ondansetron (ZOFRAN ODT) 4 mg disintegrating tablet Take 1 Tab by mouth every eight (8) hours as needed for Nausea. No current facility-administered medications for this visit. LAB DATA REVIEWED:     Lab Results   Component Value Date/Time    WBC 8.2 03/27/2018 04:56 AM    HGB 9.1 (L) 03/27/2018 04:56 AM    PLATELET 241 62/33/4260 04:56 AM     Lab Results   Component Value Date/Time    Sodium 137 03/27/2018 04:56 AM    Potassium 3.3 (L) 03/27/2018 04:56 AM    Chloride 101 03/27/2018 04:56 AM    CO2 29 03/27/2018 04:56 AM    BUN 8 03/27/2018 04:56 AM    Creatinine 0.62 03/27/2018 04:56 AM    Calcium 7.8 (L) 03/27/2018 04:56 AM    Magnesium 1.4 (L) 03/27/2018 04:56 AM    Phosphorus 3.2 03/16/2018 04:36 AM      Lab Results   Component Value Date/Time    AST (SGOT) 18 03/26/2018 06:55 PM    Alk.  phosphatase 85 03/26/2018 06:55 PM    Protein, total 6.5 03/26/2018 06:55 PM    Albumin 2.2 (L) 03/26/2018 06:55 PM    Globulin 4.3 (H) 03/26/2018 06:55 PM     Lab Results   Component Value Date/Time    INR 1.0 03/13/2018 05:05 AM    Prothrombin time 10.1 03/13/2018 05:05 AM    aPTT 28.9 05/13/2016 05:39 AM      No results found for: IRON, FE, TIBC, IBCT, PSAT, FERR        CONTROLLED SUBSTANCES SAFETY ASSESSMENT (IF ON CONTROLLED SUBSTANCES):     Reviewed opioid safety handout:  [x] Yes   [] No  24 hour opioid dose >150mg morphine equivalent/day:  [] Yes   [] No  Benzodiazepines:  [] Yes   [] No  Sleep apnea:  [] Yes   [] No  Urine Toxicology Testing within last 6 months:  [] Yes   [] No  History of or new aberrant medication taking behaviors:  [] Yes   [] No            Total time: 100m  Counseling / coordination time: 60m  > 50% counseling / coordination?: y  High complexity decision making regarding opioids and goals of care

## 2018-04-24 NOTE — PROGRESS NOTES
Palliative Medicine Office Visit  Palliative Medicine Nurse Check In  [phone]    Patient Name: Curt Sotelo  YOB: 1960      Date of Office Visit: 4/24/2018   Patient states: \"  \"    From Check In Sheet (scanned in Media):  Has a medical provider talked with you about cardiopulmonary resuscitation (CPR)? [x] Yes   [] No   [] Unable to obtain    Nurse reminder to complete or update ACP FlowSheet:    Is ACP on the Problem List?    [x] Yes    [] No  IF ACP Document is ON FILE; Nurse to place ACP on Problem List     Is there an ACP Note in Chart Review/Note? [x] Yes    [] No   If NO: [address] Planning 4/10/2018   Patient's Healthcare Decision Maker is: Legal Next of Kin   Primary Decision Maker Name Ines Flores   Primary Decision Maker Phone Number [phone]; [phone]   Primary Decision Maker Relationship to Patient Spouse   Secondary Decision Maker Name Jewel Alcocer    Secondary Decision Maker Phone Number [phone]   Secondary Decision Maker Relationship to Patient Adult child   Confirm Advance Directive None   Patient Would Like to Complete Advance Directive Yes   Does the patient have other document types -       Is there anything that we should know about you as a person in order to provide you the best care possible? Have you been to the ER, urgent care clinic since your last visit? [] Yes   [x] No   [] Unable to obtain    Have you been hospitalized since your last visit? [] Yes   [x] No   [] Unable to obtain    Have you seen or consulted any other health care providers outside of the [address] [place] [address] since your last visit?    [] Yes   [x] No   [] Unable to obtain    Functional status (describe):       Last BM: ileostomy      accessed (date): 4/24/2018    Bottle review (for opioid pain medication):  Medication 1:   Date filled:   Directions:   # filled:   # left:   # pills taking per day:  Last dose taken:    Medication 2:   Date filled:   Directions:   # filled:   # left:   # pills taking per day:  Last dose taken:    Medication 3:   Date filled:   Directions:   # filled:   # left:   # pills taking per day:  Last dose taken:    Medication 4:   Date filled:   Directions:   # filled:   # left:   # pills taking per day:  Last dose taken:

## 2018-04-24 NOTE — TELEPHONE ENCOUNTER
Spoke to Ms. Smith, she received her Subsys in the mail today. She is coming to [facility] [place] office for appointment and will bring the new medication with her. She was going to take a dose before leaving home.     Ayan Franklin RN  Palliative Medicine

## 2018-04-27 ENCOUNTER — TELEPHONE (OUTPATIENT)
Dept: GYNECOLOGY | Age: 58
End: 2018-04-27

## 2018-04-27 ENCOUNTER — NURSE NAVIGATOR (OUTPATIENT)
Dept: PALLATIVE CARE | Age: 58
End: 2018-04-27

## 2018-04-27 ENCOUNTER — TELEPHONE (OUTPATIENT)
Dept: PALLATIVE CARE | Age: 58
End: 2018-04-27

## 2018-04-27 DIAGNOSIS — R10.9 INTRACTABLE ABDOMINAL PAIN: ICD-10-CM

## 2018-04-27 DIAGNOSIS — C56.3 MALIGNANT NEOPLASM OF BOTH OVARIES (HCC): ICD-10-CM

## 2018-04-27 NOTE — PROGRESS NOTES
Palliative Medicine  Nursing Note  [phone]  Fax [phone]        Telephone Call  Patient Name: Esequiel Hunt  YOB: 1960    4/27/2018      Advance Care Planning 4/10/2018   Patient's Healthcare Decision Maker is: Legal Next of Kin   Primary Decision Maker Name Guevara Miller   Primary Decision Maker Phone Number [phone]; [phone]   Primary Decision Maker Relationship to Patient Spouse   Secondary Decision Maker Name Bianca Fountain    Secondary Decision Maker Phone Number [phone]   Secondary Decision Maker Relationship to Patient Adult child   Confirm Advance Directive None   Patient Would Like to Complete Advance Directive Yes   Does the patient have other document types -     Received a phone call from Rafael Smith at 1:40 pm.  He called to let us know that Ms Jeff León had not had any drainage from her tube since 8:00 am.  He stated that they had flushed it and still did only got a small amount of fluid out and it smelt like stool. He was concerned that she had developed an obstruction. They were told by Dr. Melinda Lizarraga the symptoms of an obstruction and if they thought she may have one to call us and to start on Decadron 4 mg three times a day. PM nurse instructed him to do that, emailed Dr. Melinda Lizarraga to let her know. She asked that they also call and let Dr. Sandra Granados know as well. [facility] straight for Ms. Smith, then called back to let them know what that it was taken care of. At that time (2:20), Ms. Smith's tube had begun to drain and had 300 cc out of it. Mr. Jeff León felt much better about the that. 445-called Mr. Smith to check on Ms. Smith, he states she is feeling much better, she took some of the Subsys and it really makes her rest.  He said that she seems to feel much better this afternoon. He was told that the NP on-call is aware of the situation and to call if he needs anything over the weekend.      Cortez Ceballos Silvestre Dominique, BSN, RN, OCN, [facility]

## 2018-04-27 NOTE — TELEPHONE ENCOUNTER
Mr. Franko Terrell is calling to speak to nurse or MD.  States patient may have a bowel obstruction going on. Please call to advise.

## 2018-04-27 NOTE — TELEPHONE ENCOUNTER
Saw palliative care and they instructed patient to follow up with Dr. Inge Sampson or Troy Pineda to discuss visit.

## 2018-04-28 ENCOUNTER — TELEPHONE (OUTPATIENT)
Dept: PALLATIVE CARE | Age: 58
End: 2018-04-28

## 2018-04-28 NOTE — PROGRESS NOTES
Received call from patients  who reported that her current dose of subsys (4sprays) was making her confused and very lethargic. She wakes up and is back to her baseline before the next dose. Her pain was well controlled but the altered mentation was concerning. I recommended that at her next dose he try 3 sprays and see how she does. He is going to call back if her pain is not controlled with 3 sprays, or if her mentation is still altered.

## 2018-04-29 ENCOUNTER — TELEPHONE (OUTPATIENT)
Dept: PALLATIVE CARE | Age: 58
End: 2018-04-29

## 2018-04-29 NOTE — TELEPHONE ENCOUNTER
Spoke with Mr Edd Coello a few times today    1:00pm- Ms Edd Coello did well overnight after receiving only 3 sprays of Subsys (300mcg), but slept uninterrupted for 5-6 hours which caused her to miss quite a few doses of Dilaudid and at least 1 dose of subsys. This morning she woke up in extreme pain- so he had been giving the dilaudid q2, methadone, and subsys 3 sprays (300mcg) every 4 hours. He has been unable to get her pain back under control even with all of this. She is due for Sybsys now, so I instructed him to give her 4 sprays (400mcg) and call me back in 30 min if she was still not getting any relief.    1:50pm- Mr Edd Coello called back- she is still in extreme pain. Instructed him to give her another dose of 400mcg of Subsis. Instructed him to call me back if she was getting no relief in 1 hour. He also tells me that her venting tube was putting out a normal amount all morning, but now it had slowed down, so he was going to flush it    4:54pm: Mr Edd Coello callled back. He has been keeping on top of her Dialudid q2, and Subsys 400mcg q4, but she her pain is not under control. It has been 3 hours since her last dose of Subsys, but given that this is the best option for her, as she is likely not absorbing the dialudid or the methadone- I told him to give her another dose now. If she cannot make it to the 4 hour alejandro before she needs another dose, it is OK to give it to her, but please call me back to let me know. At this point, I called Dr Yoel Kapoor to update her on the situation. We discussed some options, but with our inability to get her IV medication in the home, and her malabsorption issues, the [facility] is the best option for her. I called Mr Edd Coello back, and instructed him to give her the Subsys every 2-3 hours as needed for pain. If this was insufficient, to call me back.   I let him know that if we are unable to get her pain under control with q2 dosing, we may need to consider sending her to the hospital for IV medication. I also told him to keep an eye on his Subsys supply, and if it looked like he was starting to run low before the weekend was over, we may need to send her to the ED for IV pain medication. He thanked me and verbalized understanding.

## 2018-04-30 NOTE — TELEPHONE ENCOUNTER
Received call from Mr Philip Wise did well overnight with q2hour dosing of the Subsys. He tells me that occasionally she woke at the 1.5 hr alejandro, and was in pain, but was able to fall back asleep for a little while to make it to 2 hours.         He did a count of their Subsys inventory and feels that if they use it every 2 hours, it will only last until 11pm tonight    We discussed again, the option of taking her to the hospital to get her pain control through IV medication as it was clear that she was not absorbing her PO meds    He told me he will call me back at 3pm to let me know, but if they choose to go to the hospital, they prefer Kirill Malhotra to update her on the situation

## 2018-05-01 DIAGNOSIS — C56.3 MALIGNANT NEOPLASM OF BOTH OVARIES (HCC): ICD-10-CM

## 2018-05-01 DIAGNOSIS — C78.6 CARCINOMATOSIS PERITONEI (HCC): ICD-10-CM

## 2018-05-03 ENCOUNTER — TELEPHONE (OUTPATIENT)
Dept: GYNECOLOGY | Age: 58
End: 2018-05-03

## 2018-05-03 NOTE — TELEPHONE ENCOUNTER
I spoke with Mr. Nancy Mitchell in follow up and he stated she was still in [facility] under hospice care, and her pain is getting under control. All of her children have been with her, and she is waiting on a few more family to visit. Mr. Nancy Mitchell voiced he would like to come to our office to visit with all of us after \"this episode is over\". I have encouraged him to stay in contact with us.

## 2021-10-05 NOTE — PROGRESS NOTES
[facility]  [address] [place] [address], [address], [address] [place] [address]  P [phone]  F [phone]       Patient Name: Jennifer Valenzuela   Admit Date: 3/5/2018       Primary Dx Progressive ovarian cancer, carcinomatosis. GI dysfunction, relative obstruction   Visit Date: 3/14/2018         SUBJECTIVE:  Results of endoscopy noted. Will await larger GTube thrusday via IR. Some abd discomfort overnight, increased reflux. No emesis. Stoma continues modest output   No F/C, SOB. Ambulating well. OBJECTIVE:    Patient Vitals for the past 24 hrs:   Temp Pulse Resp BP SpO2   03/14/18 0159 99.6 °F (37.6 °C) 97 18 115/78 98 %   03/13/18 1741 - (!) 101 - 99/67 -   03/13/18 1632 98.8 °F (37.1 °C) 89 18 96/61 96 %   03/13/18 1202 98.9 °F (37.2 °C) 83 18 113/73 99 %   03/13/18 1133 - 84 18 97/49 99 %   03/13/18 1127 - 80 16 97/55 98 %   03/13/18 1123 98.6 °F (37 °C) 82 19 (!) 81/47 97 %   03/13/18 1115 98 °F (36.7 °C) 83 18 (!) 76/41 98 %   03/13/18 0854 98.8 °F (37.1 °C) 94 14 124/79 99 %         Date 03/13/18 0700 - 03/14/18 0659 03/14/18 0700 - 03/15/18 0659   Shift 9441-7594 8194-9750 24 Hour Total 2884-1120 2202-5798 24 Hour Total   I  N  T  A  K  E   Shift Total  (mL/kg)         O  U  T  P  U  T   Urine  (mL/kg/hr)  1100  (2.2) 1100  (1.1)         Urine Voided  1100 1100         Urine Occurrence(s) 2 x  2 x       Emesis/NG output 0  0         Output (ml) ([REMOVED] Nasogastric Tube 03/06/18) 0  0       Shift Total  (mL/kg) 0  (0) 1100  (26.2) 1100  (26.2)      NET 0 -1100 -1100      Weight (kg) 42 42 42 42 42 42       Physical Exam     General:  alert, cooperative, no distress, in good spirits     Cardiac:  Regular rate and rhythm        Lungs:  nonlabored  Abdomen:  Firm but palpable,  thin, nondistended, nontender, no fluid. Extremity: no edema, SCDs in place      Lab Results   Component Value Date/Time    WBC 4.4 03/14/2018 03:18 AM    ABS.  NEUTROPHILS 2.3 03/14/2018 03:18 AM    HGB 8.5 (L) 03/14/2018 03:18 AM    HCT 26.6 (L) 03/14/2018 03:18 AM    MCV 96.0 03/14/2018 03:18 AM    MCH 30.7 03/14/2018 03:18 AM    PLATELET 206 30/50/1220 03:18 AM     Lab Results   Component Value Date/Time    Sodium 139 03/14/2018 03:18 AM    Potassium 4.2 03/14/2018 03:18 AM    Chloride 104 03/14/2018 03:18 AM    CO2 27 03/14/2018 03:18 AM    Glucose 109 (H) 03/14/2018 03:18 AM    BUN 25 (H) 03/14/2018 03:18 AM    Creatinine 0.63 03/14/2018 03:18 AM    Calcium 8.4 (L) 03/14/2018 03:18 AM    Albumin 2.2 (L) 03/14/2018 03:18 AM    Bilirubin, total 0.3 03/14/2018 03:18 AM    AST (SGOT) 12 (L) 03/14/2018 03:18 AM    ALT (SGPT) 13 03/14/2018 03:18 AM    Alk. phosphatase 101 03/14/2018 03:18 AM     IMPRESSION/PLAN:     61 y.o. with advanced ovarian cancer, carcinomatosis with ?partial GI obstruction admitted in transfer for obstruction, N/V. Present on Admission:   Anemia due to chemotherapy   Abdominal pain   Cancer associated pain   Carcinomatosis ([facility] [address]   Counseling regarding end of life decision making   Hx of pulmonary embolus   Erosive esophagitis   Carcinomatosis peritonei (HCC)   Intractable vomiting with nausea   Small bowel obstruction   Dehydration   Anticoagulation adequate with anticoagulant therapy   Malignant ascites   Hypomagnesemia   Hydronephrosis of left kidney   Ileostomy in place [facility])      Continue NGT decompression, TPN/lipids, 12 hour cycle  Anemia, stable  Thrombocytopenia resolved- holding eliquis for procedures. She seems to have resolved, though duration of tolerance without reflux may vary. Long discussions, may best be served with palliative peg for venting purposes PRN, allowing PO intake as tolerated. Plan Thursday IR peg. Antiemetics/antireflux control. Monitor pain. IV for breakthru. Long conversation yesterday with Laura Horn and her . Hopefully will be able to get her home by the end of the week/weekend if they are able to place the PEG.         Kenneth Richards, PA - Possibly related to esophagitis/reflux, other etiologies include active Crohn's (though no objective evidence pointing towards flare)  - Treat symptomatically with IV PPI BID, add sucralfate liquid 1g q6h for esophagitis per GI recs  - avoid opiates in this patient given increased mortality with opiates in IBD patients  - Diet as tolerated for now, trial of advancing to soft diet  - Antiemetics as needed. Check QTc qOD  - obtain [facility] records from recent visit (imaging, EGD, recent GI note)  - TPN restarted, plan to wean if tolerating diet  - added lidocaine swish and swallow before meals to help with pain  - Psychiatry no longer actively following  - EGD showed gastric friability and was biopsied. There was erythematous duodenopathy but no esophagitis. Possibly related to esophagitis/reflux, other etiologies include active Crohn's (though no objective evidence pointing towards flare).  EGD showed gastric friability and was biopsied. There was erythematous duodenopathy but no esophagitis.    - Treat symptomatically with IV PPI BID, add sucralfate liquid 1g q6h for esophagitis per GI recs  - On low dose dilaudid for pain. Will attempt to avoid opiates in this patient when possible given increased mortality with opiates in IBD patients  - Diet as tolerated for now, currently on liquid diet   - Antiemetics as needed. Check QTc qOD  - obtain [facility] records from recent visit (imaging, EGD, recent GI note)  -C/w TPN, plan to wean if tolerating diet  - c/w lidocaine swish and swallow before meals to help with pain  - Psychiatry no longer actively following

## 2022-01-25 NOTE — PROGRESS NOTES
[facility]  [address] [facility], [address], [address] [place]  P [phone]  F [phone]       Patient Name: Ricky Frederick   Admit Date: 3/5/2018   Primary Dx Progressive ovarian cancer, carcinomatosis. GI dysfunction, relative obstruction   Visit Date: 3/19/2018         SUBJECTIVE:  G-tube placement successful and draining well. Pt feels her nausea is improved. Still sore from placement, but says it is getting better each day. Stoma continues modest output. TPN stopped yesterday. Taking in PO. She die have some nausea and one episode of emesis this morning. No F/C, SOB. Ambulating well. Not sure she is ready to go home and be on her own. OBJECTIVE:    Patient Vitals for the past 24 hrs:   Temp Pulse Resp BP SpO2   03/18/18 1937 99 °F (37.2 °C) 84 16 100/65 97 %   03/18/18 1518 98.7 °F (37.1 °C) 99 16 110/67 98 %   03/18/18 0742 98.5 °F (36.9 °C) 90 18 94/55 99 %         Date 03/18/18 0700 - 03/19/18 0659 03/19/18 0700 - 03/20/18 0659   Shift 6698-7351 6051-8157 24 Hour Total 2182-3227 6996-2502 24 Hour Total   I  N  T  A  K  E   Shift Total  (mL/kg)         O  U  T  P  U  T   Urine  (mL/kg/hr) 500  (1) 400  (0.8) 900  (0.9)         Urine Voided 500 400 900       Emesis/NG output 250 150 400         Output (ml) (Feeding Tube 03/15/18) 250 150 400       Shift Total  (mL/kg) 750  (17.7) 550  (13) 1300  (30.6)      NET -750 -550 -1300      Weight (kg) 42.5 42.5 42.5 42.5 42.5 42.5       Physical Exam     General:  A&O X3 in NAD and remaing in good spirits   Port site: without tenderness, redness or swelling     Cardiac:  Regular without M/R/G        Lungs:  CTA bilat without wheezing or rales  Abdomen: Thin. Firm but palpable without tenderness. Nondistended, no fluid. G-Tube draining bile colored fluid to bedside drainage. Extremity: no edema, SCDs in place      Lab Results   Component Value Date/Time    WBC 3.2 (L) 03/19/2018 04:03 AM    ABS.  NEUTROPHILS 1.4 (L) 03/19/2018 04:03 AM    HGB 7.5 (L) 03/19/2018 04:03 AM    HCT 23.1 (L) 03/19/2018 04:03 AM    MCV 94.3 03/19/2018 04:03 AM    MCH 30.6 03/19/2018 04:03 AM    PLATELET 512 48/01/7037 04:03 AM     Lab Results   Component Value Date/Time    Sodium 137 03/19/2018 04:03 AM    Potassium 2.9 (L) 03/19/2018 04:03 AM    Chloride 98 03/19/2018 04:03 AM    CO2 28 03/19/2018 04:03 AM    Glucose 81 03/19/2018 04:03 AM    BUN 8 03/19/2018 04:03 AM    Creatinine 0.67 03/19/2018 04:03 AM    Calcium 8.3 (L) 03/19/2018 04:03 AM    Albumin 1.9 (L) 03/19/2018 04:03 AM    Bilirubin, total 0.3 03/19/2018 04:03 AM    AST (SGOT) 21 03/19/2018 04:03 AM    ALT (SGPT) 14 03/19/2018 04:03 AM    Alk. phosphatase 95 03/19/2018 04:03 AM     IMPRESSION/PLAN:     61 y.o. with advanced ovarian cancer, carcinomatosis with ?partial GI obstruction admitted in transfer for obstruction, N/V. Present on Admission:   Anemia due to chemotherapy   Abdominal pain   Cancer associated pain   Carcinomatosis ([address]   Counseling regarding end of life decision making   Hx of pulmonary embolus   Erosive esophagitis   Carcinomatosis peritonei (HCC)   Intractable vomiting with nausea   Small bowel obstruction   Dehydration   Anticoagulation adequate with anticoagulant therapy   Malignant ascites   Hypomagnesemia   Hydronephrosis of left kidney   Ileostomy in place ([address]      TPN stopped and pt tolerating small frequent full liquid meals. Will advance today as tolerated  Will begin clamping gastric tube and open as needed to evaluate prior to discharge and help with learning curve for pt. Anemia: noted HGB 7.5 wih pt feeling increasingly fatigued. Will proceed with transfusing 2 UPRBC's today  Discussed clamping it at home and indications to unclamping it. Thrombocytopenia resolved - continue eliquis     Antiemetics/antireflux control. Monitor pain. IV for breakthough.  Will have to convert 24 hour totals of IV dilaudid to PO in anticipation of discharge soon, possibly tomorrow. She has used 11 mg of Dilaudid in last 24 hours. Will discuss with Dr. Daniele Calderón about possibly increasing fentanyl patch to 200. Ongoing conversation with Chandler Hurley and her  regarding prognosis and going forward. At this point, pt would like to hold on chemotherapy and just \"take a break\" and will follow up with Dr. Daniele Calderón as out pt to plan further. No plans for discharge today. Possible discharge tomorrow.         Dina Maxwell, NP Price (Do Not Change): 0.00 Instructions: This plan will send the code FBSD to the PM system.  DO NOT or CHANGE the price. Detail Level: Simple

## 2024-03-01 NOTE — PROGRESS NOTES
Melody Peter. HAYDER Don    Admit Date: 2017  Madhav Hughes       [id number]  1960      Admitted 2017 Date 2017   MAHSA Hughes is a 61 y.o.  postmenopausal female with a diagnosis of stage IV ovarian cancer. She underwent exploratory laparotomy with suboptimal debulking. She had extensive disease. She was readmitted about a week later with obstruction and subsequently had a cecal perforation, requiring resection, end ileostomy, and mucous fistula. During the hospitalization she was also diagnosed with pulmonary embolus and had chest tubes placed for bilateral pleural effusions. She had a prolonged hospital course and actually received a dose of cisplatin chemotherapy while in the hospital to help dry up the effusions. She completed 6 cycles of Taxol/Carboplatin chemotherapy following her initial debulking. She did relatively well, considering she had an ileostomy to deal with during chemotherapy. I took her to the OR on 16 for interval debulking and reversal of her ileostomy. She had extensive disease, but we were able to resect the majority of her disease. One week later she developed an anastomotic leak requiring reexploration and recreation of an ileostomy. She had another prolonged hospitalization, but recovered well since surgery. We then reinitiated Taxol/Carboplatin chemotherapy, but did reduce the dose of Taxol to 135 mg/m2. She completed 4 mores cycles. Her CA-125 normalized and we stopped treatment.      On , she presented to Dr. Celeste Major office with her  to discuss her follow up/surveillance CT scan. Unfortunately, it demonstrated progression of disease, although minimally worse than her prior scan 2 months previously. She was actually feeling better than she did at that time. After review of scans and disease, the decision was made to begin Doxil/Avastin Q28 days for 6 cycles.  She began this on 2017. In 2017, she had completed 3 cycles of Doxil/Avastin and had CT scans at that time showed she had evidence of recurrent disease. She was switched at that time to [facility] and began this regimen on 2017 and has completed 4 cycles of this with the last being on 2017. With this regimen, her  has decreased to 65 on 17 from 145.6 on 2017. Subjective:   Pt's ostomy began to function again yesterday. Pt states \"I feel back to normal\" and denies nausea and says her abd pain is \"greatly improved\". PCA per palliative on demand only and pt says she is not using it like she was yesterday    Pt has remained a-febrile since initial temp. Zosyn continues. Scopolamine patch in use     remains at side supportive          Objective:     Blood pressure 118/64, pulse 68, temperature 98.9 °F (37.2 °C), resp. rate 16, weight 113 lb (51.3 kg), SpO2 98 %, not currently breastfeeding. Temp (24hrs), Av.8 °F (37.1 °C), Min:97.9 °F (36.6 °C), Max:99.5 °F (37.5 °C)      _____________________  Physical Exam:     General: A&O X 3 Looks much better today, more alert and in good spirits  Port site: Remains without swelling or redness. Cardiovascular: Regular without M/R/G  Lungs:CTA bilat without wheezing or rales. Noted CT scan yesterday, but good breath sounds throughout. IS still encouraged  Abdomen: Soft without tenderness this morning. Remains without distention and stool and gas in ostomy bag  Ext: + pedal pulses without edema bilat.  SCD's and Compression boots in place bilat  Neuro: grossly intact    Recent Results (from the past 24 hour(s))   CBC WITH AUTOMATED DIFF    Collection Time: 08/10/17 10:30 AM   Result Value Ref Range    WBC 7.7 3.6 - 11.0 K/uL    RBC 3.01 (L) 3.80 - 5.20 M/uL    HGB 9.2 (L) 11.5 - 16.0 g/dL    HCT 28.0 (L) 35.0 - 47.0 %    MCV 93.0 80.0 - 99.0 FL    MCH 30.6 26.0 - 34.0 PG    MCHC 32.9 30.0 - 36.5 g/dL    RDW 16.0 (H) 11.5 - 14.5 %    PLATELET 736 (L) 792 - 400 K/uL    NEUTROPHILS 72 32 - 75 %    LYMPHOCYTES 18 12 - 49 %    MONOCYTES 10 5 - 13 %    EOSINOPHILS 0 0 - 7 %    BASOPHILS 0 0 - 1 %    ABS. NEUTROPHILS 5.5 1.8 - 8.0 K/UL    ABS. LYMPHOCYTES 1.4 0.8 - 3.5 K/UL    ABS. MONOCYTES 0.8 0.0 - 1.0 K/UL    ABS. EOSINOPHILS 0.0 0.0 - 0.4 K/UL    ABS. BASOPHILS 0.0 0.0 - 0.1 K/UL     Imaging: KUB 8/8/2017 PM  The bowel gas pattern is atypical but not distinctly obstructive. There is no  free intraperitoneal air. There is no evident organomegaly or significant  intraabdominal calcification. There is IVC filter.     The lungs are free of acute disease. Heart size is normal and there is no overt  pulmonary edema. Left costophrenic angle remains blunted. Port catheter is in  place.     IMPRESSION: Atypical but nonspecific bowel gas pattern. No acute cardiopulmonary  disease. KUB after CT scan 8/10/2017:  FINDINGS:    2 supine and upright views were obtained of the abdomen. Persistent small air-fluid levels in the mid abdomen are seen. .  There is a right ostomy. No free intraperitoneal air is appreciated. Residual intravenous contrast in the collecting system with bilateral  pelvocaliectasis/hydroureter and residual intravenous contrast in the urinary  bladder are seen. Abdominal and left pelvic surgical clips are present. Inferior vena caval filter is in.     IMPRESSION:    Persistent small bowel air-fluid levels midabdomen. CT Abd/Pelvis 8/10/2017  FINDINGS:   LUNG BASES: There is a patch of airspace process identified in the right middle  lobe along the minor fissure. This is incompletely visualized, however. There  may be more extensive airspace process in the remainder of the right middle  lobe. There is also patchy airspace process in the base of the right lower lobe. These findings were not present on the previous CT. No pleural effusion. Small  hiatal hernia noted.   LIVER: 8.4 mm lucent lesion noted hepatic segment 8 unchanged from previous  exam. This is isodense with liver on delayed imaging. Small well-defined lucency  measuring 5.8 mm in tip of hepatic segment 3 is unchanged. There is no biliary  dilatation. GALLBLADDER: Unremarkable. SPLEEN: No mass.     PANCREAS: No mass or ductal dilatation. ADRENALS: Unremarkable. KIDNEYS: Hydronephrosis of both kidneys again noted. No renal mass is seen other  than a tiny lucency in the periphery of the right mid kidney unchanged.     GI: No dilatation or wall thickening. There is a colostomy in the right lower  quadrant.     APPENDIX: Not identified  PERITONEUM: There is ascites with loculations along the peritoneal surface in  the left upper quadrant which may be metastatic deposits. In addition there is a  somewhat rounded clumped appearance of the bowel which may be secondary to  peritoneal deposits. RETROPERITONEUM: No lymphadenopathy or aortic aneurysm.        URINARY BLADDER: No mass or calculus. PELVIS: There is been resection of the colon. The rectum is identified. This  terminates blindly. BONES: No destructive bone lesion. ADDITIONAL COMMENTS: N/A     IMPRESSION  IMPRESSION:     1. In the right middle lobe and in the right lower lobe there are areas of  airspace process not seen previously. These may be areas of developing  pneumonia. 2. Hydronephrosis unchanged. 3. Ascites with areas of loculation and possible peritoneal neoplasm spread  unchanged from the previous study as well.   4. Lucencies in the liver unchanged from previous study.         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)      Hx of pulmonary embolus (3/21/2017)      Dehydration (3/21/2017)      Bowel obstruction ([address] (4/27/2017)      Ulcerative esophagitis (6/7/2017)      Hydronephrosis of left kidney (6/28/2017)      Ovarian cancer ([facility] [address] (8/9/2017)      Nausea & vomiting (8/9/2017)      Carcinomatosis ([facility] (8/9/2017)      Intractable vomiting with nausea (8/10/2017)      Febrile illness (8/10/2017)        Plan: Will begin GI lite diet  CA-125 results still pending  Continue with Scopolamine patch for now and will evaluate after she begins to eat  Continue Reglan  Appreciate palliative input. They will calculate proper dose for discharge. It must be in liquid form  Continue to mobilize throughout the day as able  Continue Eliaquis    Continue maintaince  IV fluid with MVI until evaluated after eating   Hopefully, will be able to discharge home later today now that ostomy has opened up. Bonnie Stanton NP  8/11/2017    Data Review:    Recent Labs      08/10/17   1030  08/09/17   1930   WBC  7.7  6.4   HGB  9.2*  9.2*   HCT  28.0*  28.5*   PLT  122*  114*     Recent Labs      08/09/17   1930   NA  131*   K  3.7   CL  95*   CO2  28   GLU  112*   BUN  22*   CREA  1.09*   CA  9.0   MG  1.9   ALB  3.5   SGOT  53*   ALT  93*     No results for input(s): AML, LPSE in the last 72 hours.         ______________________  Medications:    Current Facility-Administered Medications   Medication Dose Route Frequency Provider Last Rate Last Dose    piperacillin-tazobactam (ZOSYN) 3.375 g in 0.9% sodium chloride (MBP/ADV) 100 mL  3.375 g IntraVENous Q8H Bianca Unger MD 25 mL/hr at 08/11/17 0635 3.375 g at 08/11/17 0635    acetaminophen (TYLENOL) solution 500 mg  500 mg Oral Q4H PRN Genny Don NP        scopolamine (TRANSDERM-SCOP) 1.5 mg  1.5 mg TransDERmal Q72H Genny Don NP   1.5 mg at 08/10/17 0830    HYDROmorphone (PF) (DILAUDID) injection 2 mg  2 mg IntraVENous Q1H PRN Genny Don NP   2 mg at 08/11/17 0650    HYDROmorphone (PF) 15 mg/30 ml (DILAUDID) PCA   IntraVENous CONTINUOUS Antoinette Flores MD        apixaban (ELIQUIS) tablet 2.5 mg  2.5 mg Oral BID Genny Don NP   2.5 mg at 08/10/17 2042    fentaNYL (DURAGESIC) 100 mcg/hr patch 1 Patch  1 Patch TransDERmal Q72H Genny E Wall, NP        metoprolol tartrate (LOPRESSOR) tablet 50 mg  50 mg Oral BID Genny E Wall, NP   50 mg at 08/10/17 2042    morphine 10 mg/5 mL oral solution 20 mg  20 mg Oral Q4H PRN Genny E Wall, NP        pantoprazole (PROTONIX) granules for oral suspension 40 mg  40 mg Oral BID Genny E Wall, NP   40 mg at 08/10/17 2041    sucralfate (CARAFATE) 100 mg/mL oral suspension 0.5 g  0.5 g Oral QID Genny E Wall, NP   0.5 g at 08/11/17 0044    zolpidem (AMBIEN) tablet 10 mg  10 mg Oral QHS PRN Genny E Florencia, NP        dextrose 5 % - 0.45% NaCl 1,000 mL with potassium chloride 10 mEq, magnesium sulfate 1 g, thiamine 100 mg, mvi, adult no. 4 with vit K 10 mL infusion   IntraVENous CONTINUOUS Genny E Florencia,  mL/hr at 08/11/17 0054      metoclopramide HCl (REGLAN) injection 10 mg  10 mg IntraVENous Q6H Genny E Wall, NP   10 mg at 08/11/17 4911    sodium chloride (NS) flush 5-10 mL  5-10 mL IntraVENous Q8H Genny E Wall, NP   10 mL at 08/11/17 0048    sodium chloride (NS) flush 5-10 mL  5-10 mL IntraVENous PRN Genny E Wall, NP   10 mL at 08/11/17 0656    naloxone (NARCAN) injection 0.4 mg  0.4 mg IntraVENous PRN Gneny E Wall, NP        diphenhydrAMINE (BENADRYL) injection 12.5 mg  12.5 mg IntraVENous Q4H PRN Genny E Wall, NP        magnesium hydroxide (MILK OF MAGNESIA) 400 mg/5 mL oral suspension 30 mL  30 mL Oral DAILY PRN Genny E Wall, NP        prochlorperazine (COMPAZINE) with saline injection 10 mg  10 mg IntraVENous Q6H PRN Genny E Wall, NP   10 mg at 08/10/17 0251    promethazine (PHENERGAN) 25 mg in 0.9% sodium chloride 50 mL IVPB  25 mg IntraVENous Q8H PRN Chely Hurd  mL/hr at 08/10/17 2100 25 mg at 08/10/17 2100     Facility-Administered Medications Ordered in Other Encounters   Medication Dose Route Frequency Provider Last Rate Last Dose    [START ON 8/15/2017] dexamethasone (DECADRON) 4 mg/mL injection 8 mg  8 mg IntraVENous MD Triny Vasquez ON 8/15/2017] famotidine (PF) (PEPCID) 20 mg in sodium chloride 0.9 % 10 mL injection  20 mg IntraVENous ONCE Ashanti Shaw MD        [START ON 8/15/2017] fosaprepitant (EMEND) 150 mg in 0.9% sodium chloride 150 mL IVPB  150 mg IntraVENous Cait Connor MD        [START ON 8/15/2017] gemcitabine (GEMZAR) 1,264 mg in 0.9% sodium chloride 250 mL, overfill volume 25 mL chemo infusion  1,264 mg IntraVENous MD Triny Vasquez ON 8/15/2017] granisetron (KYTRIL) injection 1 mg  1 mg IntraVENous ONCE MD Triny Baron ON 8/15/2017] sodium chloride 0.9 % bolus infusion 2,000 mL  2,000 mL IntraVENous ONCE Ashanti Shaw MD       [facility] [address] Mariee ON 8/15/2017] bevacizumab (AVASTIN) 840 mg in 0.9% sodium chloride 100 mL, overfill volume 10 mL IVPB  840 mg IntraVENous Cait Connor MD Admission

## 2024-09-25 NOTE — PROGRESS NOTES
I have reviewed discharge instructions with the patient. The patient verbalized understanding.  Iv removed from L wrist and L chst port deacsessd (M6) obeys commands

## (undated) DEVICE — SOLIDIFIER FLUID 3000 CC ABSORB

## (undated) DEVICE — NEEDLE HYPO 18GA L1.5IN PNK S STL HUB POLYPR SHLD REG BVL

## (undated) DEVICE — FORCEPS BX L240CM JAW DIA2.8MM L CAP W/ NDL MIC MESH TOOTH

## (undated) DEVICE — SET EXTN TBNG L BOR 4 W STPCOCK ST 32IN PRIMING VOL 6ML

## (undated) DEVICE — SET ADMIN 16ML TBNG L100IN 2 Y INJ SITE IV PIGGY BK DISP

## (undated) DEVICE — Z DISCONTINUED NO SUB IDED SET EXTN W/ 4 W STPCOCK M SPIN LOK 36IN

## (undated) DEVICE — ABDOMINAL PAD: Brand: DERMACEA

## (undated) DEVICE — CANN NASAL O2 CAPNOGRAPHY AD -- FILTERLINE

## (undated) DEVICE — ENDO CARRY-ON PROCEDURE KIT INCLUDES ENZYMATIC SPONGE, GAUZE, BIOHAZARD LABEL, TRAY, LUBRICANT, DIRTY SCOPE LABEL, WATER LABEL, TRAY, DRAWSTRING PAD, AND DEFENDO 4-PIECE KIT.: Brand: ENDO CARRY-ON PROCEDURE KIT

## (undated) DEVICE — STERILE LATEX POWDER-FREE SURGICAL GLOVESWITH NITRILE COATING: Brand: PROTEXIS

## (undated) DEVICE — Device

## (undated) DEVICE — KENDALL RADIOLUCENT FOAM MONITORING ELECTRODE -RECTANGULAR SHAPE: Brand: KENDALL

## (undated) DEVICE — BW-412T DISP COMBO CLEANING BRUSH: Brand: SINGLE USE COMBINATION CLEANING BRUSH

## (undated) DEVICE — KIT IV STRT W CHLORAPREP PD 1ML

## (undated) DEVICE — NEONATAL-ADULT SPO2 SENSOR: Brand: NELLCOR

## (undated) DEVICE — BAG BELONG PT PERS CLEAR HANDL

## (undated) DEVICE — CONTAINER SPEC 20 ML LID NEUT BUFF FORMALIN 10 % POLYPR STS

## (undated) DEVICE — CATH IV AUTOGRD BC BLU 22GA 25 -- INSYTE

## (undated) DEVICE — BAG SPEC BIOHZD LF 2MIL 6X10IN -- CONVERT TO ITEM 357326

## (undated) DEVICE — SYRINGE MED 20ML STD CLR PLAS LUERLOCK TIP N CTRL DISP

## (undated) DEVICE — 1200 GUARD II KIT W/5MM TUBE W/O VAC TUBE: Brand: GUARDIAN